# Patient Record
Sex: MALE | HISPANIC OR LATINO | Employment: UNEMPLOYED | URBAN - METROPOLITAN AREA
[De-identification: names, ages, dates, MRNs, and addresses within clinical notes are randomized per-mention and may not be internally consistent; named-entity substitution may affect disease eponyms.]

---

## 2017-02-25 ENCOUNTER — TRANSFERRED RECORDS (OUTPATIENT)
Dept: HEALTH INFORMATION MANAGEMENT | Facility: CLINIC | Age: 47
End: 2017-02-25

## 2017-02-28 ENCOUNTER — TRANSFERRED RECORDS (OUTPATIENT)
Dept: HEALTH INFORMATION MANAGEMENT | Facility: CLINIC | Age: 47
End: 2017-02-28

## 2017-03-07 ENCOUNTER — TRANSFERRED RECORDS (OUTPATIENT)
Dept: HEALTH INFORMATION MANAGEMENT | Facility: CLINIC | Age: 47
End: 2017-03-07

## 2017-03-20 LAB — COPATH REPORT: NORMAL

## 2017-03-20 PROCEDURE — 00000346 ZZHCL STATISTIC REVIEW OUTSIDE SLIDES TC 88321: Performed by: INTERNAL MEDICINE

## 2017-04-07 ENCOUNTER — ONCOLOGY VISIT (OUTPATIENT)
Dept: ONCOLOGY | Facility: CLINIC | Age: 47
End: 2017-04-07
Attending: INTERNAL MEDICINE
Payer: MEDICAID

## 2017-04-07 VITALS
DIASTOLIC BLOOD PRESSURE: 82 MMHG | HEIGHT: 69 IN | RESPIRATION RATE: 16 BRPM | HEART RATE: 76 BPM | TEMPERATURE: 98.2 F | WEIGHT: 232 LBS | BODY MASS INDEX: 34.36 KG/M2 | SYSTOLIC BLOOD PRESSURE: 124 MMHG | OXYGEN SATURATION: 96 %

## 2017-04-07 DIAGNOSIS — C64.2 MALIGNANT NEOPLASM OF LEFT KIDNEY (H): Primary | ICD-10-CM

## 2017-04-07 PROCEDURE — 99211 OFF/OP EST MAY X REQ PHY/QHP: CPT

## 2017-04-07 PROCEDURE — 99205 OFFICE O/P NEW HI 60 MIN: CPT | Performed by: INTERNAL MEDICINE

## 2017-04-07 RX ORDER — NAPROXEN 250 MG/1
250 TABLET ORAL 2 TIMES DAILY PRN
Status: CANCELLED
Start: 2017-04-07

## 2017-04-07 RX ORDER — ALBUTEROL SULFATE 90 UG/1
1-2 AEROSOL, METERED RESPIRATORY (INHALATION)
Status: CANCELLED
Start: 2017-04-07

## 2017-04-07 RX ORDER — DEXTROSE MONOHYDRATE 50 MG/ML
1000 INJECTION, SOLUTION INTRAVENOUS CONTINUOUS
Status: CANCELLED
Start: 2017-04-07

## 2017-04-07 RX ORDER — ONDANSETRON 2 MG/ML
8 INJECTION INTRAMUSCULAR; INTRAVENOUS EVERY 8 HOURS
Status: CANCELLED
Start: 2017-04-07

## 2017-04-07 RX ORDER — DIPHENOXYLATE HCL/ATROPINE 2.5-.025MG
1 TABLET ORAL
Status: CANCELLED
Start: 2017-04-07

## 2017-04-07 RX ORDER — PROCHLORPERAZINE MALEATE 10 MG
10 TABLET ORAL EVERY 6 HOURS PRN
Status: CANCELLED
Start: 2017-04-07

## 2017-04-07 RX ORDER — DEXTROSE MONOHYDRATE, SODIUM CHLORIDE, AND POTASSIUM CHLORIDE 50; 1.49; 9 G/1000ML; G/1000ML; G/1000ML
INJECTION, SOLUTION INTRAVENOUS CONTINUOUS
Status: CANCELLED
Start: 2017-04-07

## 2017-04-07 RX ORDER — DIPHENHYDRAMINE HCL 25 MG
25 CAPSULE ORAL EVERY 4 HOURS PRN
Status: CANCELLED
Start: 2017-04-07

## 2017-04-07 RX ORDER — METHYLPREDNISOLONE SODIUM SUCCINATE 125 MG/2ML
125 INJECTION, POWDER, LYOPHILIZED, FOR SOLUTION INTRAMUSCULAR; INTRAVENOUS
Status: CANCELLED
Start: 2017-04-07

## 2017-04-07 RX ORDER — LORAZEPAM 0.5 MG/1
.5-1 TABLET ORAL EVERY 6 HOURS PRN
Status: CANCELLED
Start: 2017-04-07

## 2017-04-07 RX ORDER — ALBUTEROL SULFATE 0.83 MG/ML
2.5 SOLUTION RESPIRATORY (INHALATION)
Status: CANCELLED | OUTPATIENT
Start: 2017-04-07

## 2017-04-07 RX ORDER — DIPHENHYDRAMINE HYDROCHLORIDE 50 MG/ML
50 INJECTION INTRAMUSCULAR; INTRAVENOUS
Status: CANCELLED
Start: 2017-04-07

## 2017-04-07 RX ORDER — CEPHALEXIN 500 MG/1
500 CAPSULE ORAL 2 TIMES DAILY
Status: CANCELLED
Start: 2017-04-07

## 2017-04-07 RX ORDER — MEPERIDINE HYDROCHLORIDE 25 MG/ML
25 INJECTION INTRAMUSCULAR; INTRAVENOUS; SUBCUTANEOUS
Status: CANCELLED
Start: 2017-04-07

## 2017-04-07 RX ORDER — MEPERIDINE HYDROCHLORIDE 25 MG/ML
25 INJECTION INTRAMUSCULAR; INTRAVENOUS; SUBCUTANEOUS EVERY 30 MIN PRN
Status: CANCELLED | OUTPATIENT
Start: 2017-04-07

## 2017-04-07 RX ORDER — LORAZEPAM 2 MG/ML
.5-1 INJECTION INTRAMUSCULAR EVERY 6 HOURS PRN
Status: CANCELLED | OUTPATIENT
Start: 2017-04-07

## 2017-04-07 RX ORDER — SODIUM CHLORIDE 9 MG/ML
1000 INJECTION, SOLUTION INTRAVENOUS CONTINUOUS PRN
Status: CANCELLED
Start: 2017-04-07

## 2017-04-07 RX ORDER — ACETAMINOPHEN 325 MG/1
650 TABLET ORAL EVERY 4 HOURS
Status: CANCELLED
Start: 2017-04-07

## 2017-04-07 ASSESSMENT — PAIN SCALES - GENERAL: PAINLEVEL: NO PAIN (0)

## 2017-04-07 NOTE — PATIENT INSTRUCTIONS
Please schedule-  All tests are scheduled for April 11th and 12th/[facility]    - PFT    - Echocardiogram    - CT scan    - Bone scan    - Brain MRI    - Visit with NP/PA once above are done and results are available during morning hrs- Scheduled for April 13th @ 9:15 -[facility] bldg/[facility]    PICC line placement and Hospital admission 7D immediately after visit with NP/PA for high dose IL2 therapy- Jose will arrange for this/[facility]    AVS printed for pt.  [facility]

## 2017-04-07 NOTE — NURSING NOTE
"Julio John is a 47 year old male who presents for:  Chief Complaint   Patient presents with     Oncology Clinic Visit     New Patient Consult        Initial Vitals:  /82  Pulse 76  Temp 98.2  F (36.8  C) (Oral)  Resp 16  Ht 1.753 m (5' 9\")  Wt 105.2 kg (232 lb)  SpO2 96%  BMI 34.26 kg/m2 Estimated body mass index is 34.26 kg/(m^2) as calculated from the following:    Height as of this encounter: 1.753 m (5' 9\").    Weight as of this encounter: 105.2 kg (232 lb).. Body surface area is 2.26 meters squared. BP completed using cuff size: large  No Pain (0) No LMP for male patient. Allergies and medications reviewed.     Medications: Medication refills not needed today.  Pharmacy name entered into EPIC: Data Unavailable    Comments: Consult New Patient    8 minutes for nursing intake (face to face time)   Josie Bryan CMA     DISCHARGE PLAN:  Next appointments: See patient instruction section  Departure Mode: Ambulatory  Accompanied by: self  0 minutes for nursing discharge (face to face time)   Josie Bryan CMA            "

## 2017-04-07 NOTE — PROGRESS NOTES
[facility] PHYSICIANS  [facility] SPECIALTY CLINIC   HEMATOLOGY AND MEDICAL ONCOLOGY    NEW PATIENT VISIT NOTE    PATIENT NAME: Julio John   MRN# [id number]     Date of Visit: Apr 7, 2017    Referring Provider: MD BILL Braun [facility]  [address] [place] [address] YOB: 1970      HISTORY OF PRESENTING ILLNESS   Julio is 47 year old gentleman who has been referred to Medical Oncology at [facility] for immunotherapy with high dose IL2.     Julio comes in alone for this visit and his wife joining us over the phone. History obtained in presence of a Romanian interpretor. Julio presented with hemorrhoids and had BRBPR. He had intense pain after picking a box and fevers for 2 days. He had blood in stools. He presented to ED with these complains. He still had pain in his lower back. He was worked up with a CT scan which revealed a kidney cancer. He was then informed about his kidney cancer. He had left nephrectomy in March and had hernia surgery in June. He was noted to have pulmonary nodules in left lung on surveillance scan and was referred to Dr. Rodriguez in Medical Oncology. He was offered either surgery, radiation or chemotherapy but Dr. Rodriguez recommended radiation therapy. He has completed stereotactic radiation. His next restaging scans have suggested new nodules in right lung.     He has good energy for the most part, he does have occasional fatigue, but he tries not to focus on this. He has not lost any weight and on the contrary has been gaining weight. He has gained about 20 lbs from December.    He was helping a friend couple of days ago and felt dizzy and SOB. He has SOB climbing upstairs. He has noticed this lately. He gets SOB after 2 flights of stairs. His wife has insisted him to take the stairs at [facility] for him to lose weight.   He has pain in his low back and aches in his bones. He has pain in his shoulders, legs.     Per Charts  from Dr. Rodriguez's notes.   1/29/16 - Incidental left kidney mass (work-up for abdominal pain) - 7.8 x 6.7 x 6.7 cm mass localized in the left kidney.  1/28/16 - presence of multiple, but very small pulmonary nodules concerning for metastatic disease.  2/15/16 - Brain MRI negative for CNS metastatic disease.  2/25/16 - NM Bone scan negative for metastatic disease.     3/9/16 - Kidney, left, nephrectomy - Renal cell carcinoma, clear cell type, Boom nuclear grade 3 of 4, 7.5 cm in greatest dimmension, limited to kidney.     3/28/16 - CT Chest - Multiple pulmonary nodules bilaterally, the largest of which is 7 millimeters in the left base. There is a new left upper lobe nodule measuring 2 millimeters. A left lower lobe nodule has decreased in size from 5 mm to 2 mm, and may have been infectious/inflammatory. Regarding the other nodules, they are concerning for metastatic disease, given history of left renal cell carcinoma status post nephrectomy.     6/15/16 - CT chest - Stable pulmonary nodules.  6/15/16 - Abd/Pelvis MRI - no evidence of recurrent disease    10/17/16 - CT chest - increase in size of pulmonary nodules - concerning for metastatic disease   10/17/16 - Abd/Pelvis MRI - no evidence of recurrent disease  11/14/16 - CT guided biopsy of LLL nodule positive for malignancy; consistent with metastatic carcinoma of renal origin.     12/02/16 - NM Bone negative for osseous metastatic disease.   12/03/16 - MR Brain negative for metastatic disease.     December 2016-January 2017 - Underwent stereotactic radiation therapy to pulmonary nodules @ [facility]. We will request records today for further details    2/28/17 - CT C/A/P New small-multiple pulmonary nodules seen in bilateral lungs.        PAST MEDICAL HISTORY   - None other than recent diagnosis of kidney cancer with biopsy proven lung metastasis      CURRENT OUTPATIENT MEDICATIONS   He is not on any active medications.   The last medication he had taken  "was for stomach - he took it for 2 weeks. Not on any medications now.      ALLERGIES   All allergies reviewed and addressed  No Known Allergies     SOCIAL HISTORY   He is  and has 6 kids and 2 young kids living with her current wife (4 from previous marriage living with her ex-wife). He is not working at this time. 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   He does not smoke. He smoked about 2 packs every time he was at [employer]. He stopped [employer] and stopped everything including cigarette and alcohol. He would visit the [employer] about 4 times a week. He did a lot of cocaine and tequila in the past. He denies any active drug use.      FAMILY HISTORY   None  On direct questioning he admits that his maternal grandmother had uterine cancer  He had genetic testing done by Dr Rodriguez and no inherited cancer was identified.      REVIEW OF SYSTEMS   Pertinent positives have been included in HPI; remainder of detailed complete 20-point ROS was negative.     PHYSICAL EXAM   /82  Pulse 76  Temp 98.2  F (36.8  C) (Oral)  Resp 16  Ht 1.753 m (5' 9\")  Wt 105.2 kg (232 lb)  SpO2 96%  BMI 34.26 kg/m2    Wt Readings from Last 3 Encounters:   04/07/17 105.2 kg (232 lb)     GEN: NAD  HEENT: PERRL, EOMI, no icterus, injection or pallor. Oropharynx is clear.  NECK: no cervical or supraclavicular lymphadenopathy  LUNGS: clear bilaterally  CV: regular, no murmurs, rubs, or gallops  ABDOMEN: soft, non-tender, non-distended, normal bowel sounds, no hepatosplenomegaly by percussion or palpation  EXT: warm, well perfused, no edema  NEURO: alert  SKIN: no rashes     LABORATORY AND IMAGING STUDIES   No results for input(s): NA, POTASSIUM, CHLORIDE, CO2, ANIONGAP, BUN, CR, GLC, KVNG in the last 72181 hours.  No results for input(s): MAG, PHOS in the last 88246 hours.  No results for input(s): WBC, HGB, PLT, MCV, NEUTROPHIL in the last 36054 hours.  No " results for input(s): BILITOTAL, ALKPHOS, ALT, AST, ALBUMIN, LDH in the last 83394 hours.  No results found for: TSH    No results found for this or any previous visit.  Lab Results   Component Value Date    PATH  03/20/2017     Patient Name: ALIA VASQUEZ  MR#: [id number]  Specimen #: [id number]  Collected: 3/20/2017  Received: 3/20/2017  Reported: 3/20/2017 17:39  Ordering Phy(s): [facility] PACK  Additional Phy(s): [facility], Department of  Pathology    For improved result formatting, select 'View Enhanced Report Format'  under Linked Documents section.    TEST(S):  A: 12 Slides, case #[id number]  B: 11 Slides, case #[id number]    FINAL DIAGNOSIS:  I. CASE FROM [facility], [place], [place]  ([id number], OBTAINED 03/11/2016):  Kidney, left, nephrectomy:  - Clear cell renal cell carcinoma, ISUP grade 3  - Tumor size: 7.5 cm  - Tumor extent: Carcinoma involves renal sinus fat and segmental  branches of renal vein  - Margins: Negative for carcinoma  - Pathologic stage: pT3a  - Non neoplastic kidney with no significant pathology    II. CASE FROM [facility], [place], [place]  ([id number], OBTAINED 11/14/2016):  Lung, left, nodule, CT-guided needle core biopsy:  - Metastatic clear cell renal carcinoma    COMMENT:  Immunohistochemical stains performed with appropriate controls at an  outside institution were sent for our review.  The lung metastasis is  positive for PAX-8 and negative for CK7, CK20, TTF-1, consistent with  the known renal primary.    I have personally reviewed all specimens and or slides, including the  listed special stains, and used them with my medical judgement to  determine the final diagnosis.    Electronically signed out by:    Jen Gillette M.D., [facility]    CLINICAL HISTORY:  The patient is a 47-year-old male.    GROSS:  Received from [facility] in [place], [place] are 12  stained slides labeled  [id number] (obtained 03/11/16), 11 stained  slides labeled [id number] (obtained 11/14/16), and copies of the  referring pathologist's reports with patient identifying information.  All slides are returned.    MICROSCOPIC:  Microscopic examination was performed.    CPT Codes:  A: 17613-JPX0  B: 77491-OMZ8    TESTING LAB LOCATION:  [address], [address]   [id number] [phone]    COLLECTION SITE:  Client:  [facility]  Location:  [id number] (B)           ASSESSMENT  1.   RCC from left kidney - clear cell with pulmonary metastasis  2. No other significant medical comorbidity  3. ECOG PS 0     DISCUSSION   I had a lengthy discussion with Julio about his disease course, treatment options and prognosis. With additional recurrent disease, this disease is unlikely to be curable, but would remain treatable. I reviewed primarily immunotherapy in form of high dose IL2 at this visit.     I reviewed high dose IL2 at great length. This modality involves inpatient treatment with intravenous high doses of interleukin - a chemical substance release in setting of infections. It is administered every 8 hrs for a maximum of 14 doses. After a break of 3-4 weeks, this treatment is repeated again. Restaging scans are done after another 4-6 weeks and entire treatment can be repeated once (2 more inpatient courses of therapy) in the setting of response and depending on how it was tolerated.     This gives fevers, chills, fatigue and feels like a sever case of flu. It also leads to peripheral accumulation of fluid called vascular leak. This can cause fluid accumulation in legs, lungs and other places and also drop blood pressure. In the past this was administered in the Intensive care Unit and was associated with significant mortality. We have been one of the bigger centers providing this treatment for long time and in  our experience, we can safely administer this treatment without placing patients life at risk. In our experience it is not important to administer the last dose to get optimal response and response is not dependent on dose of this therapy.     I did clearly outline the low response rates in the order of 20% and the small but real possibility of long term disease control or possibly cure in about 6% of patients.     Traditional chemotherapy does not seem to be effective for kidney cancer. There are numerous biological agents which have been approved within the last decade and have shown both efficacy in terms of responses with shrinkage of tumor, long term disease control and improvement in survival.    In my opinion, for patients who are fit and healthy, it is worthwhile trying immunotherapy with high dose IL2 for there is a slim but realistic chance for cure. If this has to be considered, sooner is better as the disease burden is low and immune system is well preserved.     Julio seems to be interested in trying high dose IL2 and we will get additional evaluations to establish adequate organ functions - echocardiogram for heart and lung function tests. I would also get a brain MRI and bone scan to complete the staging. I will get CT chest/abd/pelvis repeated as the last one was over 6 weeks ago.      PLAN 1.   Recommend high dose therapy with interleukin 2. I have reviewed the immunotherapy inpatient regimen, side effects, risks, benefits and alternatives. Julio is interested in this therapy.   2. I will get a pulmonary function test and echocardiogram done prior to initiating therapy.   3. I will get a brain MRI, bone scan and CT chest/abd/pelvis to complete staging  4. We will initiate therapy with IL2 once we have the above completed.   5. We plan to restage after 4 weeks after 2 cycles of inpatient therapy  by 3-4 weeks. If he tolerates therapy and is responding, we would consider additional 2 rounds  of therapy.     Over 60 min of direct face to face time spent with patient with more than 50% time spent in counseling and coordinating care.      [employer]  ,  Division of Hematology, Oncology & Transplantation  [facility].

## 2017-04-07 NOTE — MR AVS SNAPSHOT
After Visit Summary   4/7/2017    Julio John    MRN: [id number]           Patient Information     Date Of Birth          1970        Visit Information        Provider Department      4/7/2017 8:15 AM Ludwin Painting MD; [facility] [employer] [facility]        Today's Diagnoses     Malignant neoplasm of left kidney (H)    -  1      Care Instructions    Please schedule-  All tests are scheduled for April 11th and 12th/[facility]    - PFT    - Echocardiogram    - CT scan    - Bone scan    - Brain MRI    - Visit with NP/PA once above are done and results are available during morning hrs- Scheduled for April 13th @ 9:15 -[facility] bldg/[facility]    PICC line placement and Hospital admission 7D immediately after visit with NP/PA for high dose IL2 therapy- Jose will arrange for this/[facility]    AVS printed for pt.  [facility]        Follow-ups after your visit        Your next 10 appointments already scheduled     Apr 11, 2017 10:30 AM CDT   Ech Limited with [id number] ([facility])    [address]  [facility] [address] [phone]           1.  Please bring or wear a comfortable two-piece outfit. 2.  You may eat, drink and take your normal medicines. 3.  For any questions that cannot be answered, please contact the ordering physician ***Please check-in at the [facility] Registration Office located in [address] in the [facility] building. When you are finished registering, please go to [address] and have a seat. The technician will call your name for the test.            Apr 11, 2017  2:00 PM CDT   Pulmonary Function with RH PULMONARY FUNCTION   [facility] ([facility])    [address] [facility] [address]   [phone]           No Inhalers for 6 hours prior to test No Smoking 2 hours prior to test            Apr 12, 2017 11:00 AM CDT   NM  INJECTION with RSCCINJ   [facility] ([facility])    [address]  [facility] [address]   [phone]            Apr 12, 2017 11:30 AM CDT   CT CHEST/ABDOMEN/PELVIS W CONTRAST with RSCCCT1   [facility] ([facility])    [address]  [facility] [address]   [phone]           Please bring any scans or X-rays taken at other hospitals, if similar tests were done. Also bring a list of your medicines, including vitamins, minerals and over-the-counter drugs. It is safest to leave personal items at home.  Be sure to tell your doctor:   If you have any allergies.   If there s any chance you are pregnant.   If you are breastfeeding.   If you have any special needs.  You may have contrast for this exam. To prepare:   Do not eat or drink for 2 hours before your exam. If you need to take medicine, you may take it with small sips of water. (We may ask you to take liquid medicine as well.)   The day before your exam, drink extra fluids at least six 8-ounce glasses (unless your doctor tells you to restrict your fluids).  Patients over 70 or patients with diabetes or kidney problems:   If you haven t had a blood test (creatinine test) within the last 30 days, go to your clinic or Diagnostic Imaging Department for this test.  If you have diabetes:   If your kidney function is normal, continue taking your metformin (Avandamet, Glucophage, Glucovance, Metaglip) on the day of your exam.   If your kidney function is abnormal, wait 48 hours before restarting this medicine.  You will have oral contrast for this exam:   You will drink the contrast at home. Get this from your clinic or Diagnostic Imaging Department. Please follow the directions given.  Please wear loose clothing, such as a sweat suit or jogging clothes. Avoid snaps, zippers and other metal. We may ask you to undress and put on a hospital  gown.  If you have any questions, please call the Imaging Department where you will have your exam.            Apr 12, 2017 12:00 PM CDT   MR BRAIN W CONTRAST with RSCCMR1   [facility] ([facility])    [address]  [facility] [id number] [phone]           Take your medicines as usual, unless your doctor tells you not to. Bring a list of your current medicines to your exam (including vitamins, minerals and over-the-counter drugs).  You will be given intravenous contrast for this exam. To prepare:   The day before your exam, drink extra fluids at least six 8-ounce glasses (unless your doctor tells you to restrict your fluids).   Have a blood test (creatinine test) within 30 days of your exam. Go to your clinic or Diagnostic Imaging Department for this test.  The MRI machine uses a strong magnet. Please wear clothes without metal (snaps, zippers). A sweatsuit works well, or we may give you a hospital gown.  Please remove any body piercings and hair extensions before you arrive. You will also remove watches, jewelry, hairpins, wallets, dentures, partial dental plates and hearing aids. You may wear contact lenses, and you may be able to wear your rings. We have a safe place to keep your personal items, but it is safer to leave them at home.   **IMPORTANT** THE INSTRUCTIONS BELOW ARE ONLY FOR THOSE PATIENTS WHO HAVE BEEN TOLD THEY WILL RECEIVE SEDATION OR GENERAL ANESTHESIA DURING THEIR MRI PROCEDURE:  IF YOU WILL RECEIVE SEDATION (take medicine to help you relax during your exam):   You must get the medicine from your doctor before you arrive. Bring the medicine to the exam. Do not take it at home.   Arrive one hour early. Bring someone who can take you home after the test. Your medicine will make you sleepy. After the exam, you may not drive, take a bus or take a taxi by yourself.   No eating 8 hours before your exam. You may have clear liquids up  until 4 hours before your exam. (Clear liquids include water, clear tea, black coffee and fruit juice without pulp.)  IF YOU WILL RECEIVE ANESTHESIA (be asleep for your exam):   Arrive 1 1/2 hours early. Bring someone who can take you home after the test. You may not drive, take a bus or take a taxi by yourself.   No eating 8 hours before your exam. You may have clear liquids up until 4 hours before your exam. (Clear liquids include water, clear tea, black coffee and fruit juice without pulp.)  Please call the Imaging Department at your exam site with any questions.            Apr 12, 2017  1:00 PM CDT   NM BONE SCAN WHOLE BODY with RSCCNM1   [facility] ([facility])    [address]  [facility] [address] [phone]           Please bring a list of your medicines to the exam. (Include vitamins, minerals and over-the-counter drugs.) You should wear comfortable clothes. Leave your valuables at home. Please bring related prior results and films. Tell your doctor:   If you are breastfeeding or may be pregnant.   If you have had a barium test within the past few days. Barium may change the results of certain exams.   If you think you may need sedation (medicine to help you relax).  You may eat and drink as normal.  Drink plenty of fluids and empty bladder frequently between injection and scans.            Apr 13, 2017  9:20 AM CDT   (Arrive by 9:05 AM)   Return Visit with JAZMYNE Barron   [facility] ([facility])    [address] [place] [address]  [address]nd [facility] [address] [phone]              Future tests that were ordered for you today     Open Future Orders        Priority Expected Expires Ordered    IR PICC Vascular Routine  4/7/2018 4/7/2017    CT Chest/Abdomen/Pelvis w Contrast Routine 4/7/2017 6/7/2017 4/7/2017    NM Bone Scan Whole Body Routine 4/7/2017 6/7/2017 4/7/2017     "Echocardiogram Limited Routine 2017    MR Brain w Contrast Routine 2017    General PFT Lab (Please always keep checked) Routine 2017    CBC with platelets differential Routine 2017    Comprehensive metabolic panel Routine 2017    Lactate Dehydrogenase Routine 2017    Magnesium Routine 2017    Pulmonary Function Test Routine 2017            Who to contact     If you have questions or need follow up information about today's clinic visit or your schedule please contact [facility] directly at [phone].  Normal or non-critical lab and imaging results will be communicated to you by MyChart, letter or phone within 4 business days after the clinic has received the results. If you do not hear from us within 7 days, please contact the clinic through [employer] or phone. If you have a critical or abnormal lab result, we will notify you by phone as soon as possible.  Submit refill requests through [employer] or call your pharmacy and they will forward the refill request to us. Please allow 3 business days for your refill to be completed.          Additional Information About Your Visit        [employer] Information     [employer] lets you send messages to your doctor, view your test results, renew your prescriptions, schedule appointments and more. To sign up, go to [email or web address] . Click on \"Log in\" on the left side of the screen, which will take you to the Welcome page. Then click on \"Sign up Now\" on the right side of the page.     You will be asked to enter the access code listed below, as well as some personal information. Please follow the directions to create your username and password.     Your access code is: [email or web address]  Expires: 2017 10:24 AM     Your access code will  in 90 days. If you need help or a new " "code, please call your [facility] clinic or [phone].        Care EveryWhere ID     This is your Care EveryWhere ID. This could be used by other organizations to access your [facility] medical records  [id number]        Your Vitals Were     Pulse Temperature Respirations Height Pulse Oximetry BMI (Body Mass Index)    76 98.2  F (36.8  C) (Oral) 16 1.753 m (5' 9\") 96% 34.26 kg/m2       Blood Pressure from Last 3 Encounters:   04/07/17 124/82    Weight from Last 3 Encounters:   04/07/17 105.2 kg (232 lb)               Primary Care Provider Office Phone # Fax #    Uchemadu MD Doc [phone]       [facility] [address] [facility] [address]        Thank you!     Thank you for choosing [facility]  for your care. Our goal is always to provide you with excellent care. Hearing back from our patients is one way we can continue to improve our services. Please take a few minutes to complete the written survey that you may receive in the mail after your visit with us. Thank you!             Your Updated Medication List - Protect others around you: Learn how to safely use, store and throw away your medicines at www.disposemymeds.org.      Notice  As of 4/7/2017 10:24 AM    You have not been prescribed any medications.      "

## 2017-04-10 NOTE — NURSING NOTE
Patient education completed for IL-2.  All infoirmation given in written form both in English and Dominican.   at chairside.  Please see complete teach under Education tab.  Jose Mike, RN, BSN, OCN  [facility]  Patient Care Coordinator

## 2017-04-11 ENCOUNTER — TELEPHONE (OUTPATIENT)
Dept: ONCOLOGY | Facility: CLINIC | Age: 47
End: 2017-04-11

## 2017-04-11 ENCOUNTER — HOSPITAL ENCOUNTER (OUTPATIENT)
Dept: RESPIRATORY THERAPY | Facility: CLINIC | Age: 47
End: 2017-04-11
Attending: INTERNAL MEDICINE
Payer: MEDICAID

## 2017-04-11 ENCOUNTER — HOSPITAL ENCOUNTER (OUTPATIENT)
Dept: CARDIOLOGY | Facility: CLINIC | Age: 47
Discharge: HOME | End: 2017-04-11
Attending: INTERNAL MEDICINE | Admitting: INTERNAL MEDICINE
Payer: MEDICAID

## 2017-04-11 DIAGNOSIS — C64.2 MALIGNANT NEOPLASM OF LEFT KIDNEY (H): ICD-10-CM

## 2017-04-11 LAB
DLCOUNC-%PRED-PRE: 96 %
DLCOUNC-PRE: 28.64 ML/MIN/MMHG
DLCOUNC-PRED: 29.7 ML/MIN/MMHG
ERV-%PRED-PRE: 95 %
ERV-PRE: 0.62 L
ERV-PRED: 0.65 L
EXPTIME-PRE: 11 SEC
FEF2575-%PRED-PRE: 83 %
FEF2575-PRE: 2.7 L/SEC
FEF2575-PRED: 3.21 L/SEC
FEFMAX-%PRED-PRE: 71 %
FEFMAX-PRE: 6.47 L/SEC
FEFMAX-PRED: 9.07 L/SEC
FEV1-%PRED-PRE: 92 %
FEV1-PRE: 3.05 L
FEV1FEV6-PRE: 80 %
FEV1FEV6-PRED: 81 %
FEV1FVC-PRE: 78 %
FEV1FVC-PRED: 81 %
FEV1SVC-PRE: 86 %
FEV1SVC-PRED: 71 %
FIFMAX-PRE: 3.07 L/SEC
FRCPLETH-%PRED-PRE: 76 %
FRCPLETH-PRE: 2.51 L
FRCPLETH-PRED: 3.26 L
FVC-%PRED-PRE: 96 %
FVC-PRE: 3.91 L
FVC-PRED: 4.05 L
IC-%PRED-PRE: 74 %
IC-PRE: 2.94 L
IC-PRED: 3.94 L
RVPLETH-%PRED-PRE: 94 %
RVPLETH-PRE: 1.89 L
RVPLETH-PRED: 2 L
TLCPLETH-%PRED-PRE: 86 %
TLCPLETH-PRE: 5.45 L
TLCPLETH-PRED: 6.31 L
VA-%PRED-PRE: 73 %
VA-PRE: 4.46 L
VC-%PRED-PRE: 77 %
VC-PRE: 3.56 L
VC-PRED: 4.59 L

## 2017-04-11 PROCEDURE — 94726 PLETHYSMOGRAPHY LUNG VOLUMES: CPT

## 2017-04-11 PROCEDURE — 93306 TTE W/DOPPLER COMPLETE: CPT | Mod: 26 | Performed by: INTERNAL MEDICINE

## 2017-04-11 PROCEDURE — 94729 DIFFUSING CAPACITY: CPT

## 2017-04-11 PROCEDURE — 93306 TTE W/DOPPLER COMPLETE: CPT

## 2017-04-11 PROCEDURE — 0399T ZZHC MYOCARDIAL STRAIN IMAGING: CPT | Performed by: INTERNAL MEDICINE

## 2017-04-11 PROCEDURE — 94010 BREATHING CAPACITY TEST: CPT

## 2017-04-11 NOTE — TELEPHONE ENCOUNTER
Called patient to give his information for admission to the hospital for inpatient chemo treatment.  Patient to be admitted on 4/17/17 at the  with 10am arrival.  Patient is able to go directly to 7D or can check in at the Information desk.  Confirmation # is [id number]. Patient verbalized understanding and will call with any questions or concerns.  Jose Mike RN, BSN, OCN  [facility] [place]  Patient Care Coordinator'

## 2017-04-11 NOTE — PROGRESS NOTES
PFT Note:  Pt completed pulmonary function testing with DLCO.  Good Pt effort and cooperation.     Spirometry  Meets all ATS-ERS recommendations.     Plethysmography  All plethysmographic measurements meet ATS-ERS recommendations.    DLCO  Does not meet ATS-ERS recommendations.  DLCO is an average of 3 maneuvers.  Inspired volume <85% of VC and his breath hold was >12 seconds.  He is unable to breath in fast enough for a perfect test.  DLCO values are within 2 ml CO/min/mmHg.  DLCO is not corrected for Hgb.    April 11, 2017.3:17 PM    Cheng Aponte

## 2017-04-12 ENCOUNTER — HOSPITAL ENCOUNTER (OUTPATIENT)
Dept: MRI IMAGING | Facility: CLINIC | Age: 47
End: 2017-04-12
Attending: INTERNAL MEDICINE
Payer: MEDICAID

## 2017-04-12 ENCOUNTER — HOSPITAL ENCOUNTER (OUTPATIENT)
Dept: NUCLEAR MEDICINE | Facility: CLINIC | Age: 47
Setting detail: NUCLEAR MEDICINE
End: 2017-04-12
Attending: INTERNAL MEDICINE
Payer: MEDICAID

## 2017-04-12 ENCOUNTER — HOSPITAL ENCOUNTER (OUTPATIENT)
Dept: CT IMAGING | Facility: CLINIC | Age: 47
Discharge: HOME | End: 2017-04-12
Attending: INTERNAL MEDICINE | Admitting: INTERNAL MEDICINE
Payer: MEDICAID

## 2017-04-12 DIAGNOSIS — C64.2 MALIGNANT NEOPLASM OF LEFT KIDNEY (H): ICD-10-CM

## 2017-04-12 LAB
CREAT BLD-MCNC: 1 MG/DL (ref 0.66–1.25)
GFR SERPL CREATININE-BSD FRML MDRD: 80 ML/MIN/1.7M2

## 2017-04-12 PROCEDURE — 78306 BONE IMAGING WHOLE BODY: CPT

## 2017-04-12 PROCEDURE — A9561 TC99M OXIDRONATE: HCPCS | Performed by: INTERNAL MEDICINE

## 2017-04-12 PROCEDURE — 34300033 ZZH RX 343: Performed by: INTERNAL MEDICINE

## 2017-04-12 RX ORDER — GADOBUTROL 604.72 MG/ML
10 INJECTION INTRAVENOUS ONCE
Status: COMPLETED | OUTPATIENT
Start: 2017-04-12 | End: 2017-04-12

## 2017-04-12 RX ORDER — IOPAMIDOL 755 MG/ML
500 INJECTION, SOLUTION INTRAVASCULAR ONCE
Status: COMPLETED | OUTPATIENT
Start: 2017-04-12 | End: 2017-04-12

## 2017-04-12 RX ADMIN — IOPAMIDOL 100 ML: 755 INJECTION, SOLUTION INTRAVENOUS at 11:53

## 2017-04-12 RX ADMIN — GADOBUTROL 10 ML: 604.72 INJECTION INTRAVENOUS at 10:58

## 2017-04-12 RX ADMIN — SODIUM CHLORIDE 65 ML: 9 INJECTION, SOLUTION INTRAVENOUS at 11:53

## 2017-04-12 RX ADMIN — Medication 24 MCI.: at 11:45

## 2017-04-13 NOTE — PROCEDURES
Please see medical chart for graphs and statistics related to this report.       REFERRING PHYSICIAN:   Ludwin Painting                                     TECHNICIAN:   Cheng Aponte   DIAGNOSIS:   Kidney cancer with lung metastasis   HEIGHT:   66.00 inches                 WEIGHT:   230.00 Lbs.       DYSPNEA:   After any exertion   COUGH:   No cough   WHEEZE:   Rare      TOBACCO PROD:   Cigarette   YEARS SMOKED:   4.0   PKS/DAY:   0.6   YEARS QUIT:    2.0                                                              MEDICATIONS:           POST-TEST COMMENTS:   Patient completed pulmonary function testing with DLCO.  Good patient effort and cooperation.  All spirometry and plethysmographic measurements meet ATS-ERS recommendations.  DLCO does not meet ATS-ERS recommendations.  DLCO is an average of 3 maneuvers.  Inspired volume < 85% of VC and his breath hold was > 12seconds.  He is unable to breath in fast enough for a perfect test.  DLCO values are within 2ml CO/min/mmHg.  DLCO is not corrected for HGB.          INTERPRETATION:      1.  Normal volumes   2.  Normal lung mechanics   3.  Normal gas transfer   4.  Normal studies         VIDHYA DEL TORO MD             D: 2017 09:40   T: 2017 09:56   MT: MANUEL      Name:     ALIA VASQUEZ   MRN:      [id number]        Account:        [id number]   :      1970           Procedure Date: 2017      Document: [id number]       cc: Ludwin Painting MD

## 2017-04-17 ENCOUNTER — HOSPITAL ENCOUNTER (INPATIENT)
Dept: VASCULAR ULTRASOUND | Facility: CLINIC | Age: 47
DRG: 837 | End: 2017-04-17
Attending: NURSE PRACTITIONER | Admitting: INTERNAL MEDICINE
Payer: MEDICAID

## 2017-04-17 ENCOUNTER — HOSPITAL ENCOUNTER (INPATIENT)
Facility: CLINIC | Age: 47
LOS: 3 days | Discharge: HOME | DRG: 837 | End: 2017-04-20
Attending: INTERNAL MEDICINE | Admitting: INTERNAL MEDICINE
Payer: MEDICAID

## 2017-04-17 ENCOUNTER — APPOINTMENT (OUTPATIENT)
Dept: LAB | Facility: CLINIC | Age: 47
End: 2017-04-17
Attending: INTERNAL MEDICINE

## 2017-04-17 ENCOUNTER — ONCOLOGY VISIT (OUTPATIENT)
Dept: ONCOLOGY | Facility: CLINIC | Age: 47
End: 2017-04-17
Attending: PHYSICIAN ASSISTANT
Payer: MEDICAID

## 2017-04-17 VITALS
DIASTOLIC BLOOD PRESSURE: 88 MMHG | BODY MASS INDEX: 34.11 KG/M2 | WEIGHT: 231 LBS | SYSTOLIC BLOOD PRESSURE: 130 MMHG | TEMPERATURE: 98 F | RESPIRATION RATE: 18 BRPM | OXYGEN SATURATION: 98 % | HEART RATE: 80 BPM

## 2017-04-17 DIAGNOSIS — C64.2 MALIGNANT NEOPLASM OF LEFT KIDNEY (H): ICD-10-CM

## 2017-04-17 PROBLEM — C64.9 CLEAR CELL RENAL CELL CARCINOMA (H): Status: ACTIVE | Noted: 2017-04-17

## 2017-04-17 LAB
ALBUMIN SERPL-MCNC: 3.6 G/DL (ref 3.4–5)
ALP SERPL-CCNC: 70 U/L (ref 40–150)
ALT SERPL W P-5'-P-CCNC: 26 U/L (ref 0–70)
ANION GAP SERPL CALCULATED.3IONS-SCNC: 8 MMOL/L (ref 3–14)
AST SERPL W P-5'-P-CCNC: 18 U/L (ref 0–45)
BASOPHILS # BLD AUTO: 0 10E9/L (ref 0–0.2)
BASOPHILS NFR BLD AUTO: 0.9 %
BILIRUB SERPL-MCNC: 0.5 MG/DL (ref 0.2–1.3)
BUN SERPL-MCNC: 20 MG/DL (ref 7–30)
CALCIUM SERPL-MCNC: 8.1 MG/DL (ref 8.5–10.1)
CHLORIDE SERPL-SCNC: 109 MMOL/L (ref 94–109)
CO2 SERPL-SCNC: 24 MMOL/L (ref 20–32)
CREAT SERPL-MCNC: 1.17 MG/DL (ref 0.66–1.25)
DIFFERENTIAL METHOD BLD: NORMAL
EOSINOPHIL # BLD AUTO: 0.2 10E9/L (ref 0–0.7)
EOSINOPHIL NFR BLD AUTO: 4.1 %
ERYTHROCYTE [DISTWIDTH] IN BLOOD BY AUTOMATED COUNT: 13.2 % (ref 10–15)
GFR SERPL CREATININE-BSD FRML MDRD: 67 ML/MIN/1.7M2
GLUCOSE SERPL-MCNC: 111 MG/DL (ref 70–99)
HCT VFR BLD AUTO: 45.7 % (ref 40–53)
HGB BLD-MCNC: 15.4 G/DL (ref 13.3–17.7)
IMM GRANULOCYTES # BLD: 0 10E9/L (ref 0–0.4)
IMM GRANULOCYTES NFR BLD: 0.4 %
LDH SERPL L TO P-CCNC: 167 U/L (ref 85–227)
LYMPHOCYTES # BLD AUTO: 1.2 10E9/L (ref 0.8–5.3)
LYMPHOCYTES NFR BLD AUTO: 26.2 %
MAGNESIUM SERPL-MCNC: 2.4 MG/DL (ref 1.6–2.3)
MCH RBC QN AUTO: 28.7 PG (ref 26.5–33)
MCHC RBC AUTO-ENTMCNC: 33.7 G/DL (ref 31.5–36.5)
MCV RBC AUTO: 85 FL (ref 78–100)
MONOCYTES # BLD AUTO: 0.5 10E9/L (ref 0–1.3)
MONOCYTES NFR BLD AUTO: 10 %
NEUTROPHILS # BLD AUTO: 2.7 10E9/L (ref 1.6–8.3)
NEUTROPHILS NFR BLD AUTO: 58.4 %
NRBC # BLD AUTO: 0 10*3/UL
NRBC BLD AUTO-RTO: 0 /100
PLATELET # BLD AUTO: 161 10E9/L (ref 150–450)
POTASSIUM SERPL-SCNC: 3.9 MMOL/L (ref 3.4–5.3)
PROT SERPL-MCNC: 7 G/DL (ref 6.8–8.8)
RBC # BLD AUTO: 5.37 10E12/L (ref 4.4–5.9)
SODIUM SERPL-SCNC: 141 MMOL/L (ref 133–144)
WBC # BLD AUTO: 4.7 10E9/L (ref 4–11)

## 2017-04-17 PROCEDURE — 25000125 ZZHC RX 250: Performed by: NURSE PRACTITIONER

## 2017-04-17 PROCEDURE — 12000008 ZZH R&B INTERMEDIATE UMMC

## 2017-04-17 PROCEDURE — 36569 INSJ PICC 5 YR+ W/O IMAGING: CPT

## 2017-04-17 PROCEDURE — 85025 COMPLETE CBC W/AUTO DIFF WBC: CPT | Performed by: INTERNAL MEDICINE

## 2017-04-17 PROCEDURE — 99212 OFFICE O/P EST SF 10 MIN: CPT | Mod: ZF

## 2017-04-17 PROCEDURE — 25000132 ZZH RX MED GY IP 250 OP 250 PS 637: Performed by: INTERNAL MEDICINE

## 2017-04-17 PROCEDURE — 25800025 ZZH RX 258: Performed by: INTERNAL MEDICINE

## 2017-04-17 PROCEDURE — 27210208 ZZH KIT VALVED DOUBLE LUMEN

## 2017-04-17 PROCEDURE — 25000125 ZZHC RX 250: Performed by: INTERNAL MEDICINE

## 2017-04-17 PROCEDURE — 83735 ASSAY OF MAGNESIUM: CPT | Performed by: INTERNAL MEDICINE

## 2017-04-17 PROCEDURE — 83615 LACTATE (LD) (LDH) ENZYME: CPT | Performed by: INTERNAL MEDICINE

## 2017-04-17 PROCEDURE — 80053 COMPREHEN METABOLIC PANEL: CPT | Performed by: INTERNAL MEDICINE

## 2017-04-17 PROCEDURE — 36415 COLL VENOUS BLD VENIPUNCTURE: CPT | Performed by: INTERNAL MEDICINE

## 2017-04-17 PROCEDURE — 36415 COLL VENOUS BLD VENIPUNCTURE: CPT

## 2017-04-17 PROCEDURE — T1013 SIGN LANG/ORAL INTERPRETER: HCPCS | Mod: U3,ZF

## 2017-04-17 PROCEDURE — 99221 1ST HOSP IP/OBS SF/LOW 40: CPT | Mod: AI | Performed by: INTERNAL MEDICINE

## 2017-04-17 PROCEDURE — 99207 ZZC CHGS TRANSFERRED TO HOSPITAL: CPT | Mod: ZP | Performed by: PHYSICIAN ASSISTANT

## 2017-04-17 PROCEDURE — 3E04302 INTRODUCTION OF HIGH-DOSE INTERLEUKIN-2 INTO CENTRAL VEIN, PERCUTANEOUS APPROACH: ICD-10-PCS | Performed by: INTERNAL MEDICINE

## 2017-04-17 PROCEDURE — 25000128 H RX IP 250 OP 636: Performed by: INTERNAL MEDICINE

## 2017-04-17 RX ORDER — ACETAMINOPHEN 500 MG
500 TABLET ORAL
Status: ON HOLD | COMMUNITY
Start: 2016-11-07 | End: 2017-05-06

## 2017-04-17 RX ORDER — LORAZEPAM 2 MG/ML
.5-1 INJECTION INTRAMUSCULAR EVERY 6 HOURS PRN
Status: DISCONTINUED | OUTPATIENT
Start: 2017-04-17 | End: 2017-04-20 | Stop reason: HOSPADM

## 2017-04-17 RX ORDER — SODIUM CHLORIDE 9 MG/ML
1000 INJECTION, SOLUTION INTRAVENOUS CONTINUOUS PRN
Status: DISCONTINUED | OUTPATIENT
Start: 2017-04-17 | End: 2017-04-20 | Stop reason: CLARIF

## 2017-04-17 RX ORDER — DIPHENHYDRAMINE HCL 25 MG
25 CAPSULE ORAL EVERY 4 HOURS PRN
Status: DISCONTINUED | OUTPATIENT
Start: 2017-04-17 | End: 2017-04-20 | Stop reason: HOSPADM

## 2017-04-17 RX ORDER — LIDOCAINE 40 MG/G
CREAM TOPICAL
Status: DISCONTINUED | OUTPATIENT
Start: 2017-04-17 | End: 2017-04-18 | Stop reason: CLARIF

## 2017-04-17 RX ORDER — DIPHENOXYLATE HCL/ATROPINE 2.5-.025MG
1 TABLET ORAL
Status: DISCONTINUED | OUTPATIENT
Start: 2017-04-17 | End: 2017-04-20 | Stop reason: HOSPADM

## 2017-04-17 RX ORDER — POTASSIUM CL/LIDO/0.9 % NACL 10MEQ/0.1L
10 INTRAVENOUS SOLUTION, PIGGYBACK (ML) INTRAVENOUS
Status: DISCONTINUED | OUTPATIENT
Start: 2017-04-17 | End: 2017-04-20 | Stop reason: HOSPADM

## 2017-04-17 RX ORDER — DEXTROSE MONOHYDRATE 50 MG/ML
1000 INJECTION, SOLUTION INTRAVENOUS CONTINUOUS
Status: DISCONTINUED | OUTPATIENT
Start: 2017-04-17 | End: 2017-04-20 | Stop reason: CLARIF

## 2017-04-17 RX ORDER — ACETAMINOPHEN 325 MG/1
650 TABLET ORAL EVERY 4 HOURS
Status: DISCONTINUED | OUTPATIENT
Start: 2017-04-17 | End: 2017-04-20 | Stop reason: HOSPADM

## 2017-04-17 RX ORDER — NAPROXEN 250 MG/1
250 TABLET ORAL 2 TIMES DAILY PRN
Status: DISCONTINUED | OUTPATIENT
Start: 2017-04-17 | End: 2017-04-20 | Stop reason: HOSPADM

## 2017-04-17 RX ORDER — MEPERIDINE HYDROCHLORIDE 25 MG/ML
25 INJECTION INTRAMUSCULAR; INTRAVENOUS; SUBCUTANEOUS
Status: DISCONTINUED | OUTPATIENT
Start: 2017-04-17 | End: 2017-04-20 | Stop reason: HOSPADM

## 2017-04-17 RX ORDER — ALBUTEROL SULFATE 0.83 MG/ML
2.5 SOLUTION RESPIRATORY (INHALATION)
Status: DISCONTINUED | OUTPATIENT
Start: 2017-04-17 | End: 2017-04-20 | Stop reason: CLARIF

## 2017-04-17 RX ORDER — EPINEPHRINE 1 MG/ML
0.3 INJECTION INTRAMUSCULAR; INTRAVENOUS; SUBCUTANEOUS EVERY 5 MIN PRN
Status: DISCONTINUED | OUTPATIENT
Start: 2017-04-17 | End: 2017-04-20 | Stop reason: CLARIF

## 2017-04-17 RX ORDER — NALOXONE HYDROCHLORIDE 0.4 MG/ML
.1-.4 INJECTION, SOLUTION INTRAMUSCULAR; INTRAVENOUS; SUBCUTANEOUS
Status: DISCONTINUED | OUTPATIENT
Start: 2017-04-17 | End: 2017-04-20 | Stop reason: HOSPADM

## 2017-04-17 RX ORDER — LORAZEPAM 0.5 MG/1
.5-1 TABLET ORAL EVERY 6 HOURS PRN
Status: DISCONTINUED | OUTPATIENT
Start: 2017-04-17 | End: 2017-04-20 | Stop reason: HOSPADM

## 2017-04-17 RX ORDER — CEPHALEXIN 500 MG/1
500 CAPSULE ORAL 2 TIMES DAILY
Status: DISCONTINUED | OUTPATIENT
Start: 2017-04-17 | End: 2017-04-20 | Stop reason: HOSPADM

## 2017-04-17 RX ORDER — HEPARIN SODIUM,PORCINE 10 UNIT/ML
2-5 VIAL (ML) INTRAVENOUS
Status: DISCONTINUED | OUTPATIENT
Start: 2017-04-17 | End: 2017-04-20 | Stop reason: HOSPADM

## 2017-04-17 RX ORDER — METHYLPREDNISOLONE SODIUM SUCCINATE 125 MG/2ML
125 INJECTION, POWDER, LYOPHILIZED, FOR SOLUTION INTRAMUSCULAR; INTRAVENOUS
Status: DISCONTINUED | OUTPATIENT
Start: 2017-04-17 | End: 2017-04-20 | Stop reason: CLARIF

## 2017-04-17 RX ORDER — POTASSIUM CHLORIDE 750 MG/1
20-40 TABLET, EXTENDED RELEASE ORAL
Status: DISCONTINUED | OUTPATIENT
Start: 2017-04-17 | End: 2017-04-20 | Stop reason: HOSPADM

## 2017-04-17 RX ORDER — MAGNESIUM SULFATE HEPTAHYDRATE 40 MG/ML
4 INJECTION, SOLUTION INTRAVENOUS EVERY 4 HOURS PRN
Status: DISCONTINUED | OUTPATIENT
Start: 2017-04-17 | End: 2017-04-20 | Stop reason: HOSPADM

## 2017-04-17 RX ORDER — DEXTROSE MONOHYDRATE, SODIUM CHLORIDE, AND POTASSIUM CHLORIDE 50; 1.49; 9 G/1000ML; G/1000ML; G/1000ML
INJECTION, SOLUTION INTRAVENOUS CONTINUOUS
Status: DISCONTINUED | OUTPATIENT
Start: 2017-04-17 | End: 2017-04-20

## 2017-04-17 RX ORDER — ONDANSETRON 2 MG/ML
8 INJECTION INTRAMUSCULAR; INTRAVENOUS EVERY 8 HOURS
Status: DISCONTINUED | OUTPATIENT
Start: 2017-04-17 | End: 2017-04-20 | Stop reason: HOSPADM

## 2017-04-17 RX ORDER — HEPARIN SODIUM,PORCINE 10 UNIT/ML
2-5 VIAL (ML) INTRAVENOUS
Status: DISCONTINUED | OUTPATIENT
Start: 2017-04-17 | End: 2017-04-18 | Stop reason: CLARIF

## 2017-04-17 RX ORDER — MEPERIDINE HYDROCHLORIDE 25 MG/ML
25 INJECTION INTRAMUSCULAR; INTRAVENOUS; SUBCUTANEOUS EVERY 30 MIN PRN
Status: DISCONTINUED | OUTPATIENT
Start: 2017-04-17 | End: 2017-04-20 | Stop reason: HOSPADM

## 2017-04-17 RX ORDER — POTASSIUM CHLORIDE 7.45 MG/ML
10 INJECTION INTRAVENOUS
Status: DISCONTINUED | OUTPATIENT
Start: 2017-04-17 | End: 2017-04-20 | Stop reason: HOSPADM

## 2017-04-17 RX ORDER — POTASSIUM CHLORIDE 29.8 MG/ML
20 INJECTION INTRAVENOUS
Status: DISCONTINUED | OUTPATIENT
Start: 2017-04-17 | End: 2017-04-20 | Stop reason: HOSPADM

## 2017-04-17 RX ORDER — POTASSIUM CHLORIDE 1.5 G/1.58G
20-40 POWDER, FOR SOLUTION ORAL
Status: DISCONTINUED | OUTPATIENT
Start: 2017-04-17 | End: 2017-04-20 | Stop reason: HOSPADM

## 2017-04-17 RX ORDER — PROCHLORPERAZINE MALEATE 10 MG
10 TABLET ORAL EVERY 6 HOURS PRN
Status: DISCONTINUED | OUTPATIENT
Start: 2017-04-17 | End: 2017-04-20 | Stop reason: HOSPADM

## 2017-04-17 RX ORDER — DIPHENHYDRAMINE HYDROCHLORIDE 50 MG/ML
50 INJECTION INTRAMUSCULAR; INTRAVENOUS
Status: DISCONTINUED | OUTPATIENT
Start: 2017-04-17 | End: 2017-04-20 | Stop reason: CLARIF

## 2017-04-17 RX ORDER — ALBUTEROL SULFATE 90 UG/1
1-2 AEROSOL, METERED RESPIRATORY (INHALATION)
Status: DISCONTINUED | OUTPATIENT
Start: 2017-04-17 | End: 2017-04-20 | Stop reason: CLARIF

## 2017-04-17 RX ADMIN — ONDANSETRON 8 MG: 2 INJECTION INTRAMUSCULAR; INTRAVENOUS at 15:27

## 2017-04-17 RX ADMIN — ACETAMINOPHEN 650 MG: 325 TABLET, FILM COATED ORAL at 20:15

## 2017-04-17 RX ADMIN — CEPHALEXIN 500 MG: 500 CAPSULE ORAL at 14:42

## 2017-04-17 RX ADMIN — ACETAMINOPHEN 650 MG: 325 TABLET, FILM COATED ORAL at 15:27

## 2017-04-17 RX ADMIN — POTASSIUM CHLORIDE, DEXTROSE MONOHYDRATE AND SODIUM CHLORIDE: 150; 5; 900 INJECTION, SOLUTION INTRAVENOUS at 14:42

## 2017-04-17 RX ADMIN — LIDOCAINE HYDROCHLORIDE 3.5 ML: 10 INJECTION, SOLUTION INFILTRATION; PERINEURAL at 12:49

## 2017-04-17 RX ADMIN — RANITIDINE 150 MG: 150 TABLET ORAL at 20:00

## 2017-04-17 RX ADMIN — CEPHALEXIN 500 MG: 500 CAPSULE ORAL at 20:15

## 2017-04-17 RX ADMIN — DEXTROSE MONOHYDRATE 1000 ML: 50 INJECTION, SOLUTION INTRAVENOUS at 15:39

## 2017-04-17 RX ADMIN — ALDESLEUKIN 64 MILLION UNITS: 1.1 INJECTION, POWDER, LYOPHILIZED, FOR SOLUTION INTRAVENOUS at 16:26

## 2017-04-17 RX ADMIN — RANITIDINE 150 MG: 150 TABLET ORAL at 15:27

## 2017-04-17 RX ADMIN — DEXTROSE MONOHYDRATE 1000 ML: 50 INJECTION, SOLUTION INTRAVENOUS at 14:43

## 2017-04-17 RX ADMIN — LIDOCAINE HYDROCHLORIDE 3.5 ML: 10 INJECTION, SOLUTION INFILTRATION; PERINEURAL at 12:50

## 2017-04-17 NOTE — IP AVS SNAPSHOT
[address] [address]    Phone:  [phone]                                       After Visit Summary   4/17/2017    Julio John    MRN: [id number]           After Visit Summary Signature Page     I have received my discharge instructions, and my questions have been answered. I have discussed any challenges I see with this plan with the nurse or doctor.    ..........................................................................................................................................  Patient/Patient Representative Signature      ..........................................................................................................................................  Patient Representative Print Name and Relationship to Patient    ..................................................               ................................................  Date                                            Time    ..........................................................................................................................................  Reviewed by Signature/Title    ...................................................              ..............................................  Date                                                            Time

## 2017-04-17 NOTE — LETTER
4/17/2017      RE: Julio John  [address]    [facility] [address]       Hematology-Oncology Visit  Apr 17, 2017    Reason for Visit: follow-up metastatic renal cell carcinoma    HPI: Julio John is a 47 year old gentleman without significant past medical history with metastatic renal cell carcinoma to lungs. He presented with back pain, fevers, BRBPR with hemorrhoids. He had a CT scan which revealed left kidney cancer. He has left nephrectomy 3/2016. He was note to have lung nodules on left lung on surveillance scan. He had stereotactic radiation to lung. Follow-up scans showed new nodules in R lung. He was referred to Dr. Painting for consideration of IL-2. Dr. Painting deemed him a good candidate. He had echo, PFTs, brain MRI, bone scan, and CT CAP last week. He comes in prior to admission for cycle 1 of IL-2.     Interval History: Julio is here today with his wife and children and a professional . He is feeling quite well and maintains a positive attitude. For about a week, he has had intermittent chest tightness with questionable wheezing, SOB, and palpitations. His wife thinks this may be due to anxiety with him starting treatment. He does not have a significant cough. Fell a few weeks ago and has had some shoulder discomfort. Also saw dentist last week because of some molar pain. He has cracked filling and has some sensitivity with this. He is wondering if treatment will affect this. No fevers/chills or infectious concerns. Has been eating and drinking well. No nausea, abdominal pain, changes in bowels or bladder. ROS otherwise negative.     No current facility-administered medications for this visit.      No current outpatient prescriptions on file.     Facility-Administered Medications Ordered in Other Visits   Medication     eucerin cream     dextrose 5% and 0.9% NaCl + KCl 20 mEq/L infusion     dextrose 5% infusion     0.9% sodium chloride BOLUS      ondansetron (ZOFRAN) injection 8 mg     acetaminophen (TYLENOL) tablet 650 mg     ranitidine (ZANTAC) tablet 150 mg     cephALEXin (KEFLEX) capsule 500 mg     diphenhydrAMINE (BENADRYL) capsule 25 mg     meperidine (DEMEROL) injection 25 mg     naproxen (NAPROSYN) tablet 250 mg     diphenoxylate-atropine (LOMOTIL) 2.5-0.025 MG per tablet 1 tablet     aldesleukin (PROLEUKIN) 64 Million Units in D5W 53.56 mL infusion     prochlorperazine (COMPAZINE) tablet 10 mg     prochlorperazine (COMPAZINE) injection 10 mg     LORazepam (ATIVAN) tablet 0.5-1 mg     LORazepam (ATIVAN) injection 0.5-1 mg     MEDICATION INSTRUCTION     methylPREDNISolone sodium succinate (solu-MEDROL) injection 125 mg     diphenhydrAMINE (BENADRYL) injection 50 mg     meperidine (DEMEROL) injection 25 mg     EPINEPHrine (ADRENALIN) injection 0.3 mg     albuterol (PROAIR HFA/PROVENTIL HFA/VENTOLIN HFA) Inhaler 1-2 puff     albuterol neb solution 2.5 mg     0.9% sodium chloride infusion       PHYSICAL EXAM:  /88 (BP Location: Right arm, Patient Position: Chair, Cuff Size: Adult Large)  Pulse 80  Temp 98  F (36.7  C) (Oral)  Resp 18  Wt 104.8 kg (231 lb)  SpO2 98%  BMI 34.11 kg/m2  General: Alert, oriented, pleasant, NAD  Skin: No rashes, bruising, or petechiae on exposed skin   HEENT: Normocephalic, atraumatic, PERRLA, EOMI. Moist mucus membranes, no lesions or thrush. Gum line looks okay without erythema.   Neck: No cervical or supraclavicular LAD.   Axillary: No LAD  Lungs: CTA bilaterally, normal work of breathing  Cardiac: RRR, S1, S2, no murmurs  Abdomen: Soft, nontender, nondistended. Normoactive bowel sounds. No hepatosplenomegaly, masses  Neuro: CNII-XII grossly intact  Extremities: No pedal edema    Labs:   Results for orders placed or performed in visit on 04/17/17 (from the past 24 hour(s))   CBC with platelets differential   Result Value Ref Range    WBC 4.7 4.0 - 11.0 10e9/L    RBC Count 5.37 4.4 - 5.9 10e12/L    Hemoglobin  15.4 13.3 - 17.7 g/dL    Hematocrit 45.7 40.0 - 53.0 %    MCV 85 78 - 100 fl    MCH 28.7 26.5 - 33.0 pg    MCHC 33.7 31.5 - 36.5 g/dL    RDW 13.2 10.0 - 15.0 %    Platelet Count 161 150 - 450 10e9/L    Diff Method Automated Method     % Neutrophils 58.4 %    % Lymphocytes 26.2 %    % Monocytes 10.0 %    % Eosinophils 4.1 %    % Basophils 0.9 %    % Immature Granulocytes 0.4 %    Nucleated RBCs 0 0 /100    Absolute Neutrophil 2.7 1.6 - 8.3 10e9/L    Absolute Lymphocytes 1.2 0.8 - 5.3 10e9/L    Absolute Monocytes 0.5 0.0 - 1.3 10e9/L    Absolute Eosinophils 0.2 0.0 - 0.7 10e9/L    Absolute Basophils 0.0 0.0 - 0.2 10e9/L    Abs Immature Granulocytes 0.0 0 - 0.4 10e9/L    Absolute Nucleated RBC 0.0    Comprehensive metabolic panel   Result Value Ref Range    Sodium 141 133 - 144 mmol/L    Potassium 3.9 3.4 - 5.3 mmol/L    Chloride 109 94 - 109 mmol/L    Carbon Dioxide 24 20 - 32 mmol/L    Anion Gap 8 3 - 14 mmol/L    Glucose 111 (H) 70 - 99 mg/dL    Urea Nitrogen 20 7 - 30 mg/dL    Creatinine 1.17 0.66 - 1.25 mg/dL    GFR Estimate 67 >60 mL/min/1.7m2    GFR Estimate If Black 81 >60 mL/min/1.7m2    Calcium 8.1 (L) 8.5 - 10.1 mg/dL    Bilirubin Total 0.5 0.2 - 1.3 mg/dL    Albumin 3.6 3.4 - 5.0 g/dL    Protein Total 7.0 6.8 - 8.8 g/dL    Alkaline Phosphatase 70 40 - 150 U/L    ALT 26 0 - 70 U/L    AST 18 0 - 45 U/L   Lactate Dehydrogenase   Result Value Ref Range    Lactate Dehydrogenase 167 85 - 227 U/L   Magnesium   Result Value Ref Range    Magnesium 2.4 (H) 1.6 - 2.3 mg/dL       Assessment & Plan:   1. Metastatic renal cell carcinoma with pulmonary metastasis: S/p left nephrectomy and XRT to left lung. Now with disease involving R lung. I reviewed his restaging from last week. His brain MRI and bone scan show ANGELA. His CT CAP shows stable nodules in R and L lung. He does have some post-XRT changes in left lung. Echo and PFTs are WNL. He is feeling well to proceed with IL-2 cycle 1 today. He will need PICC line placed  in hospital. Plan to restage following 2 cycles.     2. Intermittent dyspnea, chest tightness palpitations: ?Secondary to anxiety. Normal echo and PFTs done about 5 days ago. CT shows some radiation changes. His oxygen saturation and BP are fine. Monitor.     3. Cracked filling: Follow-up with dentist.     Jocelyn Seaman PA-C    [facility]  [address] [place] [id number] [phone]

## 2017-04-17 NOTE — MR AVS SNAPSHOT
"              After Visit Summary   2017    Julio John    MRN: [id number]           Patient Information     Date Of Birth          1970        Visit Information        Provider Department      2017 8:00 AM Reyes, Rebecca; Jocelyn Seaman PA [facility]        Today's Diagnoses     Malignant neoplasm of left kidney (H)           Follow-ups after your visit        Who to contact     If you have questions or need follow up information about today's clinic visit or your schedule please contact [facility] directly at [phone].  Normal or non-critical lab and imaging results will be communicated to you by [employer], letter or phone within 4 business days after the clinic has received the results. If you do not hear from us within 7 days, please contact the clinic through [employer] or phone. If you have a critical or abnormal lab result, we will notify you by phone as soon as possible.  Submit refill requests through Qpyn or call your pharmacy and they will forward the refill request to us. Please allow 3 business days for your refill to be completed.          Additional Information About Your Visit        MyChart Information     Qpyn lets you send messages to your doctor, view your test results, renew your prescriptions, schedule appointments and more. To sign up, go to [email or web address] . Click on \"Log in\" on the left side of the screen, which will take you to the Welcome page. Then click on \"Sign up Now\" on the right side of the page.     You will be asked to enter the access code listed below, as well as some personal information. Please follow the directions to create your username and password.     Your access code is: [email or web address]  Expires: 2017 10:24 AM     Your access code will  in 90 days. If you need help or a new code, please call your [place] clinic or [phone].        Care EveryWhere ID     This is your Care " EveryWhere ID. This could be used by other organizations to access your [place] medical records  [id number]        Your Vitals Were     Pulse Temperature Respirations Pulse Oximetry BMI (Body Mass Index)       80 98  F (36.7  C) (Oral) 18 98% 34.11 kg/m2        Blood Pressure from Last 3 Encounters:   04/17/17 123/77   04/17/17 130/88   04/07/17 124/82    Weight from Last 3 Encounters:   04/17/17 104.4 kg (230 lb 3.2 oz)   04/17/17 104.8 kg (231 lb)   04/07/17 105.2 kg (232 lb)              We Performed the Following     CBC with platelets differential     Comprehensive metabolic panel     Lactate Dehydrogenase     Magnesium        Primary Care Provider Office Phone # Fax #    Maryhelensean Roach [phone]       [facility] [address] [place] [address]        Thank you!     Thank you for choosing [facility]  for your care. Our goal is always to provide you with excellent care. Hearing back from our patients is one way we can continue to improve our services. Please take a few minutes to complete the written survey that you may receive in the mail after your visit with us. Thank you!             Your Updated Medication List - Protect others around you: Learn how to safely use, store and throw away your medicines at www.disposemymeds.org.          This list is accurate as of: 4/17/17  9:29 AM.  Always use your most recent med list.                   Brand Name Dispense Instructions for use    acetaminophen 500 MG tablet    TYLENOL     Take 500 mg by mouth

## 2017-04-17 NOTE — SIGNIFICANT EVENT
SPIRITUAL HEALTH SERVICES Significant Event  Hoahaoism Sacrament of ANOINTING  [facility] ([place] 7D    Pt anointed by Father Pk Canales, [facility]  , per request of patient.  Pt shared his illness narrative and his life and family stories. Pt was baptized Hoahaoism, but had not yet received first communion or confirmation. He has good attitude facing his illness. He understood that he is sick for some reasons and God would like to use him so save others. Since he got sick he is praying for many people around the world. Pt is a Danish speaker, he speaks a little bit English, but he is fluent in Danish. For this reason, an interpretor was translating our conversation.     Father Pk Delatorre

## 2017-04-17 NOTE — NURSING NOTE
Chief Complaint   Patient presents with     Blood Draw     right arm, vitals taken      Susi Sanchez CMA

## 2017-04-17 NOTE — PROGRESS NOTES
DATE/TIME  (DOT-TD, DOT-NOW) CHEMO CHECK ACTIVITY (REGIMEN & DOSE CHECK, DAY, DOSE #, NAME OF CHEMO #1)  CHEMO DRUG #2  CHEMO DRUG #3 NAME OF RN #1 (USE DOT-ME HERE) NAME OF RN#2 (2ND RN TO LOG IN SEPARATELY)   4/17/2017 10:15 AM IL-2 Protocol Double Check   Piper Rush    4/17/2017  4:24 PM   IL-2 dose #1 double check   Jennie Lu     04/18/17  8:03 AM   IL-2 dose #2 double check   Nicole Jagdish Butler     4/18/2017 8:48 AM  IL-2 dose #3 double check   Dawson Rush  (ad)   04/18/17  3:56 PM       IL-2 dose #4 double check   Tess Beck   04/19/17  12:29 AM   IL-2 Dose #5 double check   Barrett Dubon    4/19/2017  8:40 AM   IL-2 dose #6 double check   Jennie Costello     04/19/17  5:06 PM   IL-2 dose #7 double check   Tess Shin   (socorro)  Socorro Garcia     4/20/2017  2:12 AM   IL-2 dose # 8   Barrett Dasilva

## 2017-04-17 NOTE — PROGRESS NOTES
Hematology-Oncology Visit  Apr 17, 2017    Reason for Visit: follow-up metastatic renal cell carcinoma    HPI: Julio John is a 47 year old gentleman without significant past medical history with metastatic renal cell carcinoma to lungs. He presented with back pain, fevers, BRBPR with hemorrhoids. He had a CT scan which revealed left kidney cancer. He has left nephrectomy 3/2016. He was note to have lung nodules on left lung on surveillance scan. He had stereotactic radiation to lung. Follow-up scans showed new nodules in R lung. He was referred to Dr. Painting for consideration of IL-2. Dr. Painting deemed him a good candidate. He had echo, PFTs, brain MRI, bone scan, and CT CAP last week. He comes in prior to admission for cycle 1 of IL-2.     Interval History: Julio is here today with his wife and children and a professional . He is feeling quite well and maintains a positive attitude. For about a week, he has had intermittent chest tightness with questionable wheezing, SOB, and palpitations. His wife thinks this may be due to anxiety with him starting treatment. He does not have a significant cough. Fell a few weeks ago and has had some shoulder discomfort. Also saw dentist last week because of some molar pain. He has cracked filling and has some sensitivity with this. He is wondering if treatment will affect this. No fevers/chills or infectious concerns. Has been eating and drinking well. No nausea, abdominal pain, changes in bowels or bladder. ROS otherwise negative.     No current facility-administered medications for this visit.      No current outpatient prescriptions on file.     Facility-Administered Medications Ordered in Other Visits   Medication     eucerin cream     dextrose 5% and 0.9% NaCl + KCl 20 mEq/L infusion     dextrose 5% infusion     0.9% sodium chloride BOLUS     ondansetron (ZOFRAN) injection 8 mg     acetaminophen (TYLENOL) tablet 650 mg     ranitidine (ZANTAC) tablet  150 mg     cephALEXin (KEFLEX) capsule 500 mg     diphenhydrAMINE (BENADRYL) capsule 25 mg     meperidine (DEMEROL) injection 25 mg     naproxen (NAPROSYN) tablet 250 mg     diphenoxylate-atropine (LOMOTIL) 2.5-0.025 MG per tablet 1 tablet     aldesleukin (PROLEUKIN) 64 Million Units in D5W 53.56 mL infusion     prochlorperazine (COMPAZINE) tablet 10 mg     prochlorperazine (COMPAZINE) injection 10 mg     LORazepam (ATIVAN) tablet 0.5-1 mg     LORazepam (ATIVAN) injection 0.5-1 mg     MEDICATION INSTRUCTION     methylPREDNISolone sodium succinate (solu-MEDROL) injection 125 mg     diphenhydrAMINE (BENADRYL) injection 50 mg     meperidine (DEMEROL) injection 25 mg     EPINEPHrine (ADRENALIN) injection 0.3 mg     albuterol (PROAIR HFA/PROVENTIL HFA/VENTOLIN HFA) Inhaler 1-2 puff     albuterol neb solution 2.5 mg     0.9% sodium chloride infusion       PHYSICAL EXAM:  /88 (BP Location: Right arm, Patient Position: Chair, Cuff Size: Adult Large)  Pulse 80  Temp 98  F (36.7  C) (Oral)  Resp 18  Wt 104.8 kg (231 lb)  SpO2 98%  BMI 34.11 kg/m2  General: Alert, oriented, pleasant, NAD  Skin: No rashes, bruising, or petechiae on exposed skin   HEENT: Normocephalic, atraumatic, PERRLA, EOMI. Moist mucus membranes, no lesions or thrush. Gum line looks okay without erythema.   Neck: No cervical or supraclavicular LAD.   Axillary: No LAD  Lungs: CTA bilaterally, normal work of breathing  Cardiac: RRR, S1, S2, no murmurs  Abdomen: Soft, nontender, nondistended. Normoactive bowel sounds. No hepatosplenomegaly, masses  Neuro: CNII-XII grossly intact  Extremities: No pedal edema    Labs:   Results for orders placed or performed in visit on 04/17/17 (from the past 24 hour(s))   CBC with platelets differential   Result Value Ref Range    WBC 4.7 4.0 - 11.0 10e9/L    RBC Count 5.37 4.4 - 5.9 10e12/L    Hemoglobin 15.4 13.3 - 17.7 g/dL    Hematocrit 45.7 40.0 - 53.0 %    MCV 85 78 - 100 fl    MCH 28.7 26.5 - 33.0 pg    MCHC  33.7 31.5 - 36.5 g/dL    RDW 13.2 10.0 - 15.0 %    Platelet Count 161 150 - 450 10e9/L    Diff Method Automated Method     % Neutrophils 58.4 %    % Lymphocytes 26.2 %    % Monocytes 10.0 %    % Eosinophils 4.1 %    % Basophils 0.9 %    % Immature Granulocytes 0.4 %    Nucleated RBCs 0 0 /100    Absolute Neutrophil 2.7 1.6 - 8.3 10e9/L    Absolute Lymphocytes 1.2 0.8 - 5.3 10e9/L    Absolute Monocytes 0.5 0.0 - 1.3 10e9/L    Absolute Eosinophils 0.2 0.0 - 0.7 10e9/L    Absolute Basophils 0.0 0.0 - 0.2 10e9/L    Abs Immature Granulocytes 0.0 0 - 0.4 10e9/L    Absolute Nucleated RBC 0.0    Comprehensive metabolic panel   Result Value Ref Range    Sodium 141 133 - 144 mmol/L    Potassium 3.9 3.4 - 5.3 mmol/L    Chloride 109 94 - 109 mmol/L    Carbon Dioxide 24 20 - 32 mmol/L    Anion Gap 8 3 - 14 mmol/L    Glucose 111 (H) 70 - 99 mg/dL    Urea Nitrogen 20 7 - 30 mg/dL    Creatinine 1.17 0.66 - 1.25 mg/dL    GFR Estimate 67 >60 mL/min/1.7m2    GFR Estimate If Black 81 >60 mL/min/1.7m2    Calcium 8.1 (L) 8.5 - 10.1 mg/dL    Bilirubin Total 0.5 0.2 - 1.3 mg/dL    Albumin 3.6 3.4 - 5.0 g/dL    Protein Total 7.0 6.8 - 8.8 g/dL    Alkaline Phosphatase 70 40 - 150 U/L    ALT 26 0 - 70 U/L    AST 18 0 - 45 U/L   Lactate Dehydrogenase   Result Value Ref Range    Lactate Dehydrogenase 167 85 - 227 U/L   Magnesium   Result Value Ref Range    Magnesium 2.4 (H) 1.6 - 2.3 mg/dL       Assessment & Plan:   1. Metastatic renal cell carcinoma with pulmonary metastasis: S/p left nephrectomy and XRT to left lung. Now with disease involving R lung. I reviewed his restaging from last week. His brain MRI and bone scan show ANGELA. His CT CAP shows stable nodules in R and L lung. He does have some post-XRT changes in left lung. Echo and PFTs are WNL. He is feeling well to proceed with IL-2 cycle 1 today. He will need PICC line placed in hospital. Plan to restage following 2 cycles.     2. Intermittent dyspnea, chest tightness palpitations:  ?Secondary to anxiety. Normal echo and PFTs done about 5 days ago. CT shows some radiation changes. His oxygen saturation and BP are fine. Monitor.     3. Cracked filling: Follow-up with dentist.     Jocelyn Seaman PA-C    [facility] [address] [address] [phone]

## 2017-04-17 NOTE — IP AVS SNAPSHOT
MRN:[id number]                      After Visit Summary   4/17/2017    Julio John    MRN: [id number]           Thank you!     Thank you for choosing [facility] for your care. Our goal is always to provide you with excellent care. Hearing back from our patients is one way we can continue to improve our services. Please take a few minutes to complete the written survey that you may receive in the mail after you visit with us. Thank you!        Patient Information     Date Of Birth          1970        Designated Caregiver       Most Recent Value    Caregiver    Will someone help with your care after discharge? yes    Name of designated caregiver Alda spouse    Phone number of caregiver [phone]    Caregiver address [address]      About your hospital stay     You were admitted on:  April 17, 2017 You last received care in the:  [address] [facility]    You were discharged on:  April 20, 2017        Reason for your hospital stay       Admitted for IL 2 immunotherapy, completed 8 doses, had expected s/e.                  Who to Call     For medical emergencies, please call 911.  For non-urgent questions about your medical care, please call your primary care provider or clinic, [phone]          Attending Provider     Provider Specialty    Darrian Contreras DO Internal Medicine-Hematology & Oncology       Primary Care Provider Office Phone # Fax #    Uchem[facility]u MD Doc [phone]       [facility] [address] [place] [address]         When to contact your care team       Please call the [facility] [phone] for temperature greater than 100.4 F, uncontrolled nausea/vomiting/diarrhea or unrelieved constipation, pain not relieved by medications, bleeding not stopped by pressure, dizziness, chest pain, shortness of breath, changes in level of consciousness, or any other new concerning symptoms.                   After Care Instructions     Activity       Your activity upon discharge: as tolerated            Diet       Follow this diet upon discharge: regular                  Follow-up Appointments     Adult [facility] Follow-up and recommended labs and tests       Scheduled for next week with ANA + labs.    Appointments on [place] and/or [facility] (with [facility] or [facility] provider or service). Call [phone] if you haven't heard regarding these appointments within 7 days of discharge.                  Your next 10 appointments already scheduled     Apr 21, 2017  8:00 AM [facility] Inpatient with Shikha Rai    Services Department ([facility])    [address] [place] [id number] [phone]            Apr 22, 2017  8:00 AM [facility] Inpatient with  MD Xavi    Services Department ([facility])    [address] [place] [id number] [phone]            Apr 23, 2017  8:00 AM [facility] Inpatient with  MD Xavi    Services Department ([facility])    [address] [place] [id number] [phone]            Apr 24, 2017  8:00 AM [facility] Inpatient with Rebecca Reyes    Services Department ([facility])    [address] [place] [address] [phone]            Apr 25, 2017  8:00 AM [facility] Inpatient with Ifrah Bunn    Services Department ([facility])    [address] [place] [address]   [phone]            Apr 25, 2017 12:45 PM T   [facility] Draw with  [facility]   [facility] Draw ([facility])    [address] [place] [address]  "Floor  [facility] [id number] [phone]            Apr 25, 2017  1:20 PM CDT   (Arrive by 1:05 PM)   Return Visit with KAPIL Willoughby [facility] ([facility])    [address] [facility] [address] [facility]  2nd [facility] [id number] [phone]              Future tests that were ordered for you     CBC with platelets differential       Last Lab Result: Hemoglobin (g/dL)       Date                     Value                 04/20/2017               13.0 (L)         ----------            Comprehensive metabolic panel                 Pending Results     Date and Time Order Name Status Description    4/19/2017 2332 EKG 12-lead, complete Preliminary     4/19/2017 2330 Blood culture Preliminary     4/18/2017 2330 Blood culture Preliminary     4/17/2017 2330 Blood culture Preliminary     4/17/2017 1117 IR PICC Vascular In process             Statement of Approval     Ordered          04/20/17 1157  I have reviewed and agree with all the recommendations and orders detailed in this document.  EFFECTIVE NOW     Approved and electronically signed by:  Safia Wong APRN CNP             Admission Information     Date & Time Provider Department Dept. Phone    4/17/2017 Darrian Contreras DO Unit [address]D [facility] [place] [phone]      Your Vitals Were     Blood Pressure Pulse Temperature Respirations Height Weight    118/60 (BP Location: Right arm) 123 97.8  F (36.6  C) (Oral) 18 1.676 m (5' 6\") 110.4 kg (243 lb 4.8 oz)    Pulse Oximetry BMI (Body Mass Index)                96% 39.27 kg/m2          Yun Yun Information     Yun Yun lets you send messages to your doctor, view your test results, renew your prescriptions, schedule appointments and more. To sign up, go to [email or web address] . Click on \"Log in\" on the left side of the screen, which will take you to the Welcome page. Then click on \"Sign up Now\" on the right side of the page.     You will be asked " to enter the access code listed below, as well as some personal information. Please follow the directions to create your username and password.     Your access code is: [email or web address]  Expires: 2017 10:24 AM     Your access code will  in 90 days. If you need help or a new code, please call your [facility] clinic or [phone].        Care EveryWhere ID     This is your Care EveryWhere ID. This could be used by other organizations to access your [facility] medical records  [id number]           Review of your medicines      START taking        Dose / Directions    diphenoxylate-atropine 2.5-0.025 MG per tablet   Commonly known as:  LOMOTIL   Used for:  Malignant neoplasm of left kidney (H)        Dose:  1 tablet   Take 1 tablet by mouth 4 times daily as needed for diarrhea (After each diarrheal stool.)   Quantity:  40 tablet   Refills:  0       ondansetron 4 MG tablet   Commonly known as:  ZOFRAN   Used for:  Malignant neoplasm of left kidney (H)        Dose:  8 mg   Take 2 tablets (8 mg) by mouth every 8 hours as needed for nausea   Quantity:  18 tablet   Refills:  0       prochlorperazine 10 MG tablet   Commonly known as:  COMPAZINE   Used for:  Malignant neoplasm of left kidney (H)        Dose:  10 mg   Take 1 tablet (10 mg) by mouth every 6 hours as needed (Breakthrough Nausea/Vomiting)   Quantity:  30 tablet   Refills:  0         CONTINUE these medicines which have NOT CHANGED        Dose / Directions    acetaminophen 500 MG tablet   Commonly known as:  TYLENOL        Dose:  500 mg   Take 500 mg by mouth   Refills:  0            Where to get your medicines      These medications were sent to [facility] [place], [place] - [address]  [address] [address]     Phone:  [phone]     ondansetron 4 MG tablet    prochlorperazine 10 MG tablet         Some of these will need a paper prescription and others can be bought over the counter. Ask your nurse if you have  questions.     Bring a paper prescription for each of these medications     diphenoxylate-atropine 2.5-0.025 MG per tablet                Protect others around you: Learn how to safely use, store and throw away your medicines at www.disposemymeds.org.             Medication List: This is a list of all your medications and when to take them. Check marks below indicate your daily home schedule. Keep this list as a reference.      Medications           Morning Afternoon Evening Bedtime As Needed    acetaminophen 500 MG tablet   Commonly known as:  TYLENOL   Take 500 mg by mouth   Last time this was given:  650 mg on 4/20/2017 11:56 AM                                   diphenoxylate-atropine 2.5-0.025 MG per tablet   Commonly known as:  LOMOTIL   Take 1 tablet by mouth 4 times daily as needed for diarrhea (After each diarrheal stool.)   Last time this was given:  1 tablet on 4/20/2017  9:54 AM                                   ondansetron 4 MG tablet   Commonly known as:  ZOFRAN   Take 2 tablets (8 mg) by mouth every 8 hours as needed for nausea                                   prochlorperazine 10 MG tablet   Commonly known as:  COMPAZINE   Take 1 tablet (10 mg) by mouth every 6 hours as needed (Breakthrough Nausea/Vomiting)   Last time this was given:  10 mg on 4/19/2017  7:59 PM

## 2017-04-17 NOTE — NURSING NOTE
"Julio John is a 47 year old male who presents for:  Chief Complaint   Patient presents with     Blood Draw     right arm, vitals taken      Oncology Clinic Visit     met renal         Initial Vitals:  /88 (BP Location: Right arm, Patient Position: Chair, Cuff Size: Adult Large)  Pulse 80  Temp 98  F (36.7  C) (Oral)  Resp 18  Wt 104.8 kg (231 lb)  SpO2 98%  BMI 34.11 kg/m2 Estimated body mass index is 34.11 kg/(m^2) as calculated from the following:    Height as of 4/7/17: 1.753 m (5' 9\").    Weight as of this encounter: 104.8 kg (231 lb).. Body surface area is 2.26 meters squared. BP completed using cuff size: NA (Not Taken)  Data Unavailable No LMP for male patient. Allergies and medications reviewed.     Medications: Medication refills not needed today.  Pharmacy name entered into EPIC: Data Unavailable    Comments: pt states of ongoing chest pain and makes a wheezing sound (4) and only taking tylenol for tooth molars pain and nothing else    4 minutes for nursing intake (face to face time)   Suni Irizarry CMA        "

## 2017-04-17 NOTE — PLAN OF CARE
Problem: Goal Outcome Summary  Goal: Goal Outcome Summary  Outcome: No Change  Nursing Focus: Admission  D: Arrived at 10:00 from home via ambulatory. Patient accompanied by spouse and children. Admitted for interleukin 1st cycle for kidney cancer. Complains of nervousness about new therapy. He arrived without intravenous access.       I: Admission process began.  Patient oriented to room, enviroment, call light.  MD notified of patient's arrival on unit. Peripherally inserted central catheter ordered and obtained. Teaching done with .     A: Vital signs stable, afebrile.  Patient stable at this time.      P: Implement plan of care when available. Continue to monitor patient. Nursing interventions as appropriate. Notify MD with changes in pt status. Interleukin scheduled for 16:00.

## 2017-04-17 NOTE — PROGRESS NOTES
Nursing Focus: Chemotherapy  D: Positive blood return via PICC. Insertion site is clean/dry/intact, dressing intact with no complaints of pain.  Urine output is recorded in intake in Doc Flowsheet.    I: Premedications given per order (see electronic medical administration record). Dose #1 of IL-2 started to infuse over 15 minutes. Reviewed pt teaching on chemotherapy side effects.  Pt denies need for further teaching. Chemotherapy double checked per protocol by two chemotherapy competent RN's.   A: Tolerating procedure well. Denies nausea and or pain.   P: Continue to monitor urine output and symptoms of nausea. Screen for symptoms of toxicity.

## 2017-04-17 NOTE — H&P
[facility], [place]    History and Physical  Hematology / Oncology     Date of Admission:  4/17/2017  Date of Service (when I saw the patient): 04/17/17    Assessment & Plan   Julio John is a 48 yo M PMH recurrent metastatic RCC (mets lungs) s/p nephrectomy & XRT, CVA 2010, A fib cardiovert 2012, BPH, Obesity, admitted for cycle 1 IL therapy.    # Metastatic clear cell renal cancer (pT3a, lung metastasis) s/p L nephrectomy (x3/16) & stereotactic XRT to lung nodules (x12/16) admitted for IL 2 therapy.  - Place PICC line.  - Labs reviewed, WDL.  - Follow treatment plan below.    # Therapy Plan Aldesleukin 64 million units q 8 hr (for 14 doses, or as long as tolerated).  - Pre med Zofran 8 mg q 8 & tylenol 650 mg q 4.  - Supportive care included ranitidine 150 mg BID, cephalexin 500 mg BID (D1-5 of thepray).  - PRN Meds (benadryl, meperidine, naproxen, compazine, ativan & lomotil).  - Follow IVF guidelines (D5&0.9NS + 20 k at 100 ml/hr with PRN bolus 250 ml SBP< 85 or low UOP).  - Daily weights, I&O q 8, NO steroids NO diuretics, VS q 4.  - Follow labs CBC, CMP, Mg, Ph, LDH, CK total, & daily blood cultures.    FEN  - IVF per protocol  - Electrolyte s/s  - RDAT    PPX  - Ranitidine BID  - DVT/PE held r/t ambulatory patient & concern for TCP    Code Status   Full Code    DISPO: d/c to home once acute toxicities 2/2 to IL 2 have resolved.    This plan of care has been discussed with Dr. Contreras.    Safia Wong  [facility]  [phone]  Hematology/Oncology  April 17, 2017    Primary Care Physician   Tao Roach    History of Present Illness   History obtained from chart review and discussion with the patient.     Oncology History -  Presented with BRBPR & hemorrhoids, intense pain (low back) after lifting a heavy box, & persistent fevers.    1/29/16 - Incidental left kidney mass (work-up for abdominal pain) - 7.8 x 6.7 x 6.7 cm mass localized in the left kidney.  1/28/16  - presence of multiple, but very small pulmonary nodules concerning for metastatic disease.  2/15/16 - Brain MRI negative for CNS metastatic disease.  2/25/16 - NM Bone scan negative for metastatic disease.     3/9/16 - Kidney, left, nephrectomy - Renal cell carcinoma, clear cell type, Boom nuclear grade 3 of 4, 7.5 cm in greatest dimmension, limited to kidney.     3/28/16 - CT Chest - Multiple pulmonary nodules bilaterally, the largest of which is 7 millimeters in the left base. There is a new left upper lobe nodule measuring 2 millimeters. A left lower lobe nodule has decreased in size from 5 mm to 2 mm, and may have been infectious/inflammatory. Regarding the other nodules, they are concerning for metastatic disease, given history of left renal cell carcinoma status post nephrectomy.     6/15/16 - CT chest - Stable pulmonary nodules.  6/15/16 - Abd/Pelvis MRI - no evidence of recurrent disease    10/17/16 - CT chest - increase in size of pulmonary nodules - concerning for metastatic disease   10/17/16 - Abd/Pelvis MRI - no evidence of recurrent disease  11/14/16 - CT guided biopsy of LLL nodule positive for malignancy; consistent with metastatic carcinoma of renal origin.     12/02/16 - NM Bone negative for osseous metastatic disease.   12/03/16 - MR Brain negative for metastatic disease.     December 2016-January 2017 - Underwent stereotactic radiation therapy to pulmonary nodules @ [facility]. We will request records today for further details    2/28/17 - CT C/A/P New small-multiple pulmonary nodules seen in bilateral lungs.     April 2017 work up completed prior to initiation of IL 2 therapy-   PFT- normal volumes, normal lung mechanics, normal gas transfer, normal studies , ECHO- EF 53%, Brain MRI- no evidence of brain/bone mets... Focal lesion superior aspect of L parotid, Bone scan- negative for osseous disease , CT CAP--post rad change LL, scattered R lung nodules & dominant NAIN nodule no other  evidence of met disease in abdomen or pelvis s/p L nephrectomy.    Today Julio reports -- doing well overall, he is feeling strong & ready to start chemotherapy.  He had multiple questions regarding his upcoming treatment & overall status of his disease.  We reviewed his CT imaging & the size of the lung nodules (1 cm LL, 0.3 cm scattered bilateral lungs), we reviewed SE of IL 2 therapy.  He had a few questions regarding a burning pain felt in his LLQ s/p nephrectomy that has since resolved, & chest tightness/sharp pain that happened a few times last week (no cough, no fever/chills, no prolonged SOB).  As these symptoms have all resolved, & he had an extensive work up prior to initiating IL 2 we will just monitor.  He reports he's eating & drinking well, and completing his own ADLs.  Agreeable to start IL 2 today, PICC line placement set for noon.      Past Medical History    No past medical history on file.    Past Surgical History   No past surgical history on file.    Prior to Admission Medications   Prior to Admission Medications   Prescriptions Last Dose Informant Patient Reported? Taking?   acetaminophen (TYLENOL) 500 MG tablet   Yes No   Sig: Take 500 mg by mouth      Facility-Administered Medications: None     Allergies   No Known Allergies    Social History   Social History     Social History     Marital status:      Spouse name: N/A     Number of children: N/A     Years of education: N/A     Occupational History     Not on file.     Social History Main Topics     Smoking status: Never Smoker     Smokeless tobacco: Not on file     Alcohol use No     Drug use: Not on file     Sexual activity: Not on file     Other Topics Concern     Not on file     Social History Narrative       Family History   No family history on file.    Review of Systems   ROS negative unless otherwise noted in above HPI.    Physical Exam   Temp:  [97.3  F (36.3  C)-98  F (36.7  C)] 97.3  F (36.3  C)  Pulse:  [70-80] 70  Resp:   [16-18] 16  BP: (123-130)/(77-88) 123/77  SpO2:  [95 %-98 %] 95 %  230 lbs 3.2 oz    Constitutional: Awake, alert, cooperative, in NAD.  Eyes: PERRL, EOMI, sclera clear, conjunctiva normal.  ENT: Normocephalic, without obvious abnormality, sinuses nontender on palpation, oral pharynx with moist mucus membranes  Respiratory: Non-labored breathing, good air exchange, clear to auscultation bilaterally, no crackles or wheezing.  Cardiovascular: RRR, no murmur noted.  GI: + bowel sounds, soft, non-distended, non-tender, no masses palpated, no hepatosplenomegaly.  Skin: No concerning lesions or rash on exposed areas.  Musculoskeletal: No edema lucille LEs.  Neurologic: Awake, alert & oriented x3.  Cranial nerves II-XII are grossly intact.   Psych: appropriate affect    Data   Results for orders placed or performed in visit on 04/17/17 (from the past 24 hour(s))   CBC with platelets differential   Result Value Ref Range    WBC 4.7 4.0 - 11.0 10e9/L    RBC Count 5.37 4.4 - 5.9 10e12/L    Hemoglobin 15.4 13.3 - 17.7 g/dL    Hematocrit 45.7 40.0 - 53.0 %    MCV 85 78 - 100 fl    MCH 28.7 26.5 - 33.0 pg    MCHC 33.7 31.5 - 36.5 g/dL    RDW 13.2 10.0 - 15.0 %    Platelet Count 161 150 - 450 10e9/L    Diff Method Automated Method     % Neutrophils 58.4 %    % Lymphocytes 26.2 %    % Monocytes 10.0 %    % Eosinophils 4.1 %    % Basophils 0.9 %    % Immature Granulocytes 0.4 %    Nucleated RBCs 0 0 /100    Absolute Neutrophil 2.7 1.6 - 8.3 10e9/L    Absolute Lymphocytes 1.2 0.8 - 5.3 10e9/L    Absolute Monocytes 0.5 0.0 - 1.3 10e9/L    Absolute Eosinophils 0.2 0.0 - 0.7 10e9/L    Absolute Basophils 0.0 0.0 - 0.2 10e9/L    Abs Immature Granulocytes 0.0 0 - 0.4 10e9/L    Absolute Nucleated RBC 0.0    Comprehensive metabolic panel   Result Value Ref Range    Sodium 141 133 - 144 mmol/L    Potassium 3.9 3.4 - 5.3 mmol/L    Chloride 109 94 - 109 mmol/L    Carbon Dioxide 24 20 - 32 mmol/L    Anion Gap 8 3 - 14 mmol/L    Glucose 111 (H) 70 -  99 mg/dL    Urea Nitrogen 20 7 - 30 mg/dL    Creatinine 1.17 0.66 - 1.25 mg/dL    GFR Estimate 67 >60 mL/min/1.7m2    GFR Estimate If Black 81 >60 mL/min/1.7m2    Calcium 8.1 (L) 8.5 - 10.1 mg/dL    Bilirubin Total 0.5 0.2 - 1.3 mg/dL    Albumin 3.6 3.4 - 5.0 g/dL    Protein Total 7.0 6.8 - 8.8 g/dL    Alkaline Phosphatase 70 40 - 150 U/L    ALT 26 0 - 70 U/L    AST 18 0 - 45 U/L   Lactate Dehydrogenase   Result Value Ref Range    Lactate Dehydrogenase 167 85 - 227 U/L   Magnesium   Result Value Ref Range    Magnesium 2.4 (H) 1.6 - 2.3 mg/dL

## 2017-04-18 LAB
ALBUMIN SERPL-MCNC: 3.2 G/DL (ref 3.4–5)
ALP SERPL-CCNC: 45 U/L (ref 40–150)
ALT SERPL W P-5'-P-CCNC: 21 U/L (ref 0–70)
ANION GAP SERPL CALCULATED.3IONS-SCNC: 7 MMOL/L (ref 3–14)
AST SERPL W P-5'-P-CCNC: 11 U/L (ref 0–45)
BASOPHILS # BLD AUTO: 0 10E9/L (ref 0–0.2)
BASOPHILS NFR BLD AUTO: 0.1 %
BILIRUB SERPL-MCNC: 1.4 MG/DL (ref 0.2–1.3)
BUN SERPL-MCNC: 14 MG/DL (ref 7–30)
CALCIUM SERPL-MCNC: 7.6 MG/DL (ref 8.5–10.1)
CHLORIDE SERPL-SCNC: 109 MMOL/L (ref 94–109)
CK SERPL-CCNC: 90 U/L (ref 30–300)
CO2 SERPL-SCNC: 21 MMOL/L (ref 20–32)
CREAT SERPL-MCNC: 1.21 MG/DL (ref 0.66–1.25)
DIFFERENTIAL METHOD BLD: ABNORMAL
EOSINOPHIL # BLD AUTO: 0 10E9/L (ref 0–0.7)
EOSINOPHIL NFR BLD AUTO: 0.5 %
ERYTHROCYTE [DISTWIDTH] IN BLOOD BY AUTOMATED COUNT: 13.8 % (ref 10–15)
GFR SERPL CREATININE-BSD FRML MDRD: 64 ML/MIN/1.7M2
GLUCOSE SERPL-MCNC: 124 MG/DL (ref 70–99)
HCT VFR BLD AUTO: 43.3 % (ref 40–53)
HGB BLD-MCNC: 14.4 G/DL (ref 13.3–17.7)
IMM GRANULOCYTES # BLD: 0 10E9/L (ref 0–0.4)
IMM GRANULOCYTES NFR BLD: 0.1 %
LACTATE BLD-SCNC: 1 MMOL/L (ref 0.7–2.1)
LDH SERPL L TO P-CCNC: 147 U/L (ref 85–227)
LYMPHOCYTES # BLD AUTO: 0.1 10E9/L (ref 0.8–5.3)
LYMPHOCYTES NFR BLD AUTO: 1.3 %
MAGNESIUM SERPL-MCNC: 1.7 MG/DL (ref 1.6–2.3)
MCH RBC QN AUTO: 28.9 PG (ref 26.5–33)
MCHC RBC AUTO-ENTMCNC: 33.3 G/DL (ref 31.5–36.5)
MCV RBC AUTO: 87 FL (ref 78–100)
MONOCYTES # BLD AUTO: 0.3 10E9/L (ref 0–1.3)
MONOCYTES NFR BLD AUTO: 3.8 %
NEUTROPHILS # BLD AUTO: 7.4 10E9/L (ref 1.6–8.3)
NEUTROPHILS NFR BLD AUTO: 94.2 %
NRBC # BLD AUTO: 0 10*3/UL
NRBC BLD AUTO-RTO: 0 /100
PHOSPHATE SERPL-MCNC: 2.3 MG/DL (ref 2.5–4.5)
PLATELET # BLD AUTO: 101 10E9/L (ref 150–450)
POTASSIUM SERPL-SCNC: 3.7 MMOL/L (ref 3.4–5.3)
PROT SERPL-MCNC: 6.2 G/DL (ref 6.8–8.8)
RBC # BLD AUTO: 4.98 10E12/L (ref 4.4–5.9)
SODIUM SERPL-SCNC: 137 MMOL/L (ref 133–144)
WBC # BLD AUTO: 7.8 10E9/L (ref 4–11)

## 2017-04-18 PROCEDURE — 25000125 ZZHC RX 250: Performed by: NURSE PRACTITIONER

## 2017-04-18 PROCEDURE — 83615 LACTATE (LD) (LDH) ENZYME: CPT | Performed by: NURSE PRACTITIONER

## 2017-04-18 PROCEDURE — 99232 SBSQ HOSP IP/OBS MODERATE 35: CPT | Performed by: INTERNAL MEDICINE

## 2017-04-18 PROCEDURE — T1013 SIGN LANG/ORAL INTERPRETER: HCPCS | Mod: U3

## 2017-04-18 PROCEDURE — 25000128 H RX IP 250 OP 636: Performed by: INTERNAL MEDICINE

## 2017-04-18 PROCEDURE — 80053 COMPREHEN METABOLIC PANEL: CPT | Performed by: NURSE PRACTITIONER

## 2017-04-18 PROCEDURE — 83735 ASSAY OF MAGNESIUM: CPT | Performed by: NURSE PRACTITIONER

## 2017-04-18 PROCEDURE — 83605 ASSAY OF LACTIC ACID: CPT | Performed by: INTERNAL MEDICINE

## 2017-04-18 PROCEDURE — 12000008 ZZH R&B INTERMEDIATE UMMC

## 2017-04-18 PROCEDURE — 25800025 ZZH RX 258: Performed by: INTERNAL MEDICINE

## 2017-04-18 PROCEDURE — 85025 COMPLETE CBC W/AUTO DIFF WBC: CPT | Performed by: NURSE PRACTITIONER

## 2017-04-18 PROCEDURE — 87040 BLOOD CULTURE FOR BACTERIA: CPT | Performed by: NURSE PRACTITIONER

## 2017-04-18 PROCEDURE — 25000132 ZZH RX MED GY IP 250 OP 250 PS 637: Performed by: INTERNAL MEDICINE

## 2017-04-18 PROCEDURE — 82550 ASSAY OF CK (CPK): CPT | Performed by: NURSE PRACTITIONER

## 2017-04-18 PROCEDURE — 36592 COLLECT BLOOD FROM PICC: CPT | Performed by: NURSE PRACTITIONER

## 2017-04-18 PROCEDURE — 84100 ASSAY OF PHOSPHORUS: CPT | Performed by: NURSE PRACTITIONER

## 2017-04-18 PROCEDURE — 25000125 ZZHC RX 250: Performed by: INTERNAL MEDICINE

## 2017-04-18 RX ORDER — HYDROMORPHONE HYDROCHLORIDE 1 MG/ML
0.5 INJECTION, SOLUTION INTRAMUSCULAR; INTRAVENOUS; SUBCUTANEOUS ONCE
Status: DISCONTINUED | OUTPATIENT
Start: 2017-04-18 | End: 2017-04-20 | Stop reason: CLARIF

## 2017-04-18 RX ORDER — OXYCODONE HYDROCHLORIDE 5 MG/1
5 TABLET ORAL EVERY 4 HOURS PRN
Status: DISCONTINUED | OUTPATIENT
Start: 2017-04-18 | End: 2017-04-20 | Stop reason: HOSPADM

## 2017-04-18 RX ADMIN — ACETAMINOPHEN 650 MG: 325 TABLET, FILM COATED ORAL at 12:12

## 2017-04-18 RX ADMIN — CEPHALEXIN 500 MG: 500 CAPSULE ORAL at 08:39

## 2017-04-18 RX ADMIN — LORAZEPAM 0.5 MG: 2 INJECTION INTRAMUSCULAR; INTRAVENOUS at 11:48

## 2017-04-18 RX ADMIN — ALDESLEUKIN 64 MILLION UNITS: 1.1 INJECTION, POWDER, LYOPHILIZED, FOR SOLUTION INTRAVENOUS at 00:35

## 2017-04-18 RX ADMIN — ACETAMINOPHEN 650 MG: 325 TABLET, FILM COATED ORAL at 20:06

## 2017-04-18 RX ADMIN — ACETAMINOPHEN 650 MG: 325 TABLET, FILM COATED ORAL at 00:00

## 2017-04-18 RX ADMIN — ALDESLEUKIN 64 MILLION UNITS: 1.1 INJECTION, POWDER, LYOPHILIZED, FOR SOLUTION INTRAVENOUS at 17:06

## 2017-04-18 RX ADMIN — RANITIDINE 150 MG: 150 TABLET ORAL at 08:39

## 2017-04-18 RX ADMIN — POTASSIUM CHLORIDE, DEXTROSE MONOHYDRATE AND SODIUM CHLORIDE: 150; 5; 900 INJECTION, SOLUTION INTRAVENOUS at 11:34

## 2017-04-18 RX ADMIN — MEPERIDINE HYDROCHLORIDE 25 MG: 25 INJECTION, SOLUTION INTRAMUSCULAR; INTRAVENOUS; SUBCUTANEOUS at 02:00

## 2017-04-18 RX ADMIN — ACETAMINOPHEN 650 MG: 325 TABLET, FILM COATED ORAL at 08:39

## 2017-04-18 RX ADMIN — ACETAMINOPHEN 650 MG: 325 TABLET, FILM COATED ORAL at 04:00

## 2017-04-18 RX ADMIN — MEPERIDINE HYDROCHLORIDE 25 MG: 25 INJECTION, SOLUTION INTRAMUSCULAR; INTRAVENOUS; SUBCUTANEOUS at 19:39

## 2017-04-18 RX ADMIN — CEPHALEXIN 500 MG: 500 CAPSULE ORAL at 20:06

## 2017-04-18 RX ADMIN — SODIUM PHOSPHATE, MONOBASIC, MONOHYDRATE AND SODIUM PHOSPHATE, DIBASIC, ANHYDROUS 15 MMOL: 276; 142 INJECTION, SOLUTION INTRAVENOUS at 09:39

## 2017-04-18 RX ADMIN — ACETAMINOPHEN 650 MG: 325 TABLET, FILM COATED ORAL at 16:19

## 2017-04-18 RX ADMIN — RANITIDINE 150 MG: 150 TABLET ORAL at 20:05

## 2017-04-18 RX ADMIN — MEPERIDINE HYDROCHLORIDE 25 MG: 25 INJECTION, SOLUTION INTRAMUSCULAR; INTRAVENOUS; SUBCUTANEOUS at 10:41

## 2017-04-18 RX ADMIN — MEPERIDINE HYDROCHLORIDE 25 MG: 25 INJECTION, SOLUTION INTRAMUSCULAR; INTRAVENOUS; SUBCUTANEOUS at 18:46

## 2017-04-18 RX ADMIN — ONDANSETRON 8 MG: 2 INJECTION INTRAMUSCULAR; INTRAVENOUS at 08:39

## 2017-04-18 RX ADMIN — ALDESLEUKIN 64 MILLION UNITS: 1.1 INJECTION, POWDER, LYOPHILIZED, FOR SOLUTION INTRAVENOUS at 08:50

## 2017-04-18 RX ADMIN — ONDANSETRON 8 MG: 2 INJECTION INTRAMUSCULAR; INTRAVENOUS at 16:19

## 2017-04-18 RX ADMIN — ONDANSETRON 8 MG: 2 INJECTION INTRAMUSCULAR; INTRAVENOUS at 00:00

## 2017-04-18 RX ADMIN — POTASSIUM CHLORIDE, DEXTROSE MONOHYDRATE AND SODIUM CHLORIDE: 150; 5; 900 INJECTION, SOLUTION INTRAVENOUS at 20:23

## 2017-04-18 NOTE — PLAN OF CARE
Problem: Goal Outcome Summary  Goal: Goal Outcome Summary  Outcome: No Change  D/I: Dose #3 Aldesleukin given. Urine output/Blood pressure parameters met.  See flowsheet. Aldesleukin infused at 08:50 over 15 minutes. Meperidine 1 dose IV required for rigors and chilling. Acetaminophen given as scheduled every four hours. Intravenous fluid support. Patient complains of pain in mid-chest, right groin, bones and headache associated with rigors. This responded well to a small dose of lorazepam in addition to meperidine.  A: Tolerating Aldesleukin well. Evidence of capillary leak syndrome was rigors and low-grade fevers.  P: Continue to monitor fluid status and vital signs for evidence of capillary leak syndrome. Continue IV fluid support and scheduled acetaminophen. Notify MD if patient falls below blood pressure or urine output parameters. Dose #4 ordered from pharmacy and is planned for 17:00.

## 2017-04-18 NOTE — DOWNTIME EVENT NOTE
The EMR was down for 11 hours on 4/18/2017.    Jennie Martinez RN and Nicole Aguirre RN were responsible for completing the paper charting during this time period.     The following information was re-entered into the system by Nicole Dubon: Flowsheet data, Intake and output, Orders and MAR    The following information will remain in the paper chart: head to toe assessment and end of shift and chemo note    Nicole Dubon  4/18/2017

## 2017-04-18 NOTE — PROGRESS NOTES
[facility], [place]    Hematology / Oncology Progress Note    Date of Service (when I saw the patient): 04/18/2017     Assessment & Plan   Julio John is a 48 yo M PMH recurrent metastatic RCC (mets lungs) s/p nephrectomy & XRT, CVA 2010, A fib cardiovert 2012, BPH, Obesity, admitted for cycle 1 IL therapy.     # Metastatic clear cell renal cancer (pT3a, lung metastasis) s/p L nephrectomy (x3/16) & stereotactic XRT to lung nodules (x12/16) admitted for IL 2 therapy.  - Follow treatment plan below.  - Had intermittent HA, SOB with infusion, rigors, & testicle pain overnight, all resolved s/p dose.     # Therapy Plan Aldesleukin 64 million units q 8 hr (for 14 doses, or as long as tolerated).  - Pre med Zofran 8 mg q 8 & tylenol 650 mg q 4.  - Supportive care included ranitidine 150 mg BID, cephalexin 500 mg BID (D1-5 of thepray).  - PRN Meds (benadryl, meperidine, naproxen, compazine, ativan & lomotil).  - Follow IVF guidelines (D5&0.9NS + 20 k at 100 ml/hr with PRN bolus 250 ml SBP< 85 or low UOP).  - Daily weights, I&O q 8, NO steroids NO diuretics, VS q 4.  - Follow labs CBC, CMP, Mg, Ph, LDH, CK total, & daily blood cultures  (Cx sent 0604, tmax 99.7).    # Testicle pain on R.  Happened during IL 2 infusion, then resolved.  No edema, rash, lesion, ecchymosis noted.  - Monitor for recurrence, PO oxycodone PRN, hot packs.  Nursing wonders if it's r/t leg shaking during rigors induced trauma.     FEN  - IVF per protocol (UOP borderline, RN monitoring per protocol).  - Electrolyte s/s  - RDAT     PPX  - Ranitidine BID  - DVT/PE held r/t ambulatory patient & concern for TCP (plt 101).    Full Code     DISPO: d/c to home once acute toxicities 2/2 to IL 2 have resolved.     This plan of care has been discussed with Dr. Contreras.    Safia Wong  [facility]  [phone]  Hematology/Oncology  April 18, 2017    Interval History   Ujlio reports R testicle pain with infusion.  Reports  felt like shooting pain.  Used pain medication & hot packs to help.  (Per RN reports with his rigors his legs were shaking very bad & may have caused some trauma r/t shaking against sensitive area).  He also reports  HA last night that improved with pain medication (0.5 mg iv dilaudid).  He reports with the infusion he was very SOB but that it resolved once chilling started (used demerol PRN).  He denies dysuria (UOP overnight 250 ml/325 ml ida shift) being monitored by nursing. Tmax recorded was 99.7 F, with stable BP's, no O2 required.  No diarrhea, no N, no V.  Able to move around the room when not chilling.  Okay to proceed with further IL 2 doses.  Cr 1.21.      Physical Exam   Vitals:    04/17/17 0941 04/18/17 0918   Weight: 104.4 kg (230 lb 3.2 oz) 105.4 kg (232 lb 4.8 oz)     Vital Signs with Ranges  Temp:  [96.4  F (35.8  C)-99.7  F (37.6  C)] 99.5  F (37.5  C)  Pulse:  [] 103  Resp:  [18-20] 18  BP: ()/(50-93) 148/93  SpO2:  [94 %-98 %] 98 %  I/O last 3 completed shifts:  In: 2911 [P.O.:1100; I.V.:1811]  Out: 575 [Urine:575]    Constitutional: Awake, alert, cooperative, in NAD.  Eyes: PERRL, EOMI, sclera clear, conjunctiva normal.  ENT: Normocephalic, without obvious abnormality, sinuses nontender on palpation, oral pharynx with moist mucus membranes.  Respiratory: Non-labored breathing, good air exchange, clear to auscultation bilaterally, no crackles or wheezing.  Cardiovascular: RRR, no murmur noted.  GI: + bowel sounds, soft, non-distended, non-tender, no masses palpated, no hepatosplenomegaly.  Skin: No concerning lesions or rash on exposed areas.  No lesion/sores/bruising/edema noted on scrotum.  Musculoskeletal: No edema lucille LEs.  Neurologic: Awake, alert & oriented x3. Cranial nerves II-XII are grossly intact.   Psych: appropriate affect.    MEDS  Medications     dextrose 5% and 0.9% NaCl + KCl 20 mEq/L 100 mL/hr at 04/17/17 1442     D5W 1,000 mL (04/17/17 1539)     - MEDICATION  INSTRUCTIONS -       NaCl       - MEDICATION INSTRUCTIONS -         HYDROmorphone  0.5 mg Intravenous Once     eucerin   Topical BID     ondansetron  8 mg Intravenous Q8H     acetaminophen  650 mg Oral Q4H     ranitidine  150 mg Oral BID     cephalexin  500 mg Oral BID     aldesleukin (PROLEUKIN) infusion  600,000 Units/kg (Treatment Plan Recorded) Intravenous Q8H     sodium chloride (PF)  10 mL Intracatheter Q7 Days       LABS  Recent Labs   Lab Test  04/18/17   0604  04/17/17   0808   WBC  7.8  4.7   NEUTROPHIL  94.2  58.4   HGB  14.4  15.4   PLT  101*  161     Recent Labs   Lab Test  04/18/17   0604  04/17/17   0808   NA  137  141   POTASSIUM  3.7  3.9   CHLORIDE  109  109   CO2  21  24   ANIONGAP  7  8   BUN  14  20   CR  1.21  1.17   KVNG  7.6*  8.1*     Recent Labs   Lab Test  04/18/17   0604  04/17/17   0808   MAG  1.7  2.4*   PHOS  2.3*   --    LDH  147  167     Recent Labs   Lab Test  04/18/17   0604  04/17/17   0808   BILITOTAL  1.4*  0.5   ALKPHOS  45  70   ALT  21  26   AST  11  18   ALBUMIN  3.2*  3.6   LDH  147  167     CULTURES    Recent Labs   Lab Test  04/18/17   0604   CULT  Pending   SDES  Blood PURPLE PORT       IMAGING  No results found for this or any previous visit (from the past 24 hour(s)).

## 2017-04-18 NOTE — PROGRESS NOTES
Nursing Focus: Chemotherapy  D: Positive blood return via PICC. Insertion site is clean/dry/intact, dressing intact with no complaints of pain.  Urine output is recorded in intake in Doc Flowsheet.    I: Premedications given per order (see electronic medical administration record). Urine parameters met, VSS, labs WNL. Dose #4 of IL-2 started to infuse over 15 minutes. Reviewed pt teaching on chemotherapy side effects.  Pt denies need for further teaching. Chemotherapy double checked per protocol by two chemotherapy competent RN's.   A: Tolerating procedure well. Denies nausea and or pain.   P: Continue to monitor urine output and symptoms of nausea. Screen for symptoms of toxicity.

## 2017-04-19 ENCOUNTER — OFFICE VISIT (OUTPATIENT)
Dept: INTERPRETER SERVICES | Facility: CLINIC | Age: 47
End: 2017-04-19

## 2017-04-19 LAB
ALBUMIN SERPL-MCNC: 2.7 G/DL (ref 3.4–5)
ALBUMIN UR-MCNC: 30 MG/DL
ALP SERPL-CCNC: 46 U/L (ref 40–150)
ALT SERPL W P-5'-P-CCNC: 48 U/L (ref 0–70)
ANION GAP SERPL CALCULATED.3IONS-SCNC: 7 MMOL/L (ref 3–14)
APPEARANCE UR: CLEAR
AST SERPL W P-5'-P-CCNC: 41 U/L (ref 0–45)
BASOPHILS # BLD AUTO: 0 10E9/L (ref 0–0.2)
BASOPHILS NFR BLD AUTO: 0.1 %
BILIRUB SERPL-MCNC: 2.4 MG/DL (ref 0.2–1.3)
BILIRUB UR QL STRIP: ABNORMAL
BUN SERPL-MCNC: 10 MG/DL (ref 7–30)
CALCIUM SERPL-MCNC: 7 MG/DL (ref 8.5–10.1)
CHLORIDE SERPL-SCNC: 111 MMOL/L (ref 94–109)
CK SERPL-CCNC: 102 U/L (ref 30–300)
CO2 SERPL-SCNC: 21 MMOL/L (ref 20–32)
COLOR UR AUTO: ABNORMAL
CREAT SERPL-MCNC: 1.31 MG/DL (ref 0.66–1.25)
DIFFERENTIAL METHOD BLD: ABNORMAL
EOSINOPHIL # BLD AUTO: 0.3 10E9/L (ref 0–0.7)
EOSINOPHIL NFR BLD AUTO: 3.5 %
ERYTHROCYTE [DISTWIDTH] IN BLOOD BY AUTOMATED COUNT: 14 % (ref 10–15)
GFR SERPL CREATININE-BSD FRML MDRD: 59 ML/MIN/1.7M2
GLUCOSE SERPL-MCNC: 126 MG/DL (ref 70–99)
GLUCOSE UR STRIP-MCNC: NEGATIVE MG/DL
HCT VFR BLD AUTO: 41.3 % (ref 40–53)
HGB BLD-MCNC: 13.7 G/DL (ref 13.3–17.7)
HGB UR QL STRIP: ABNORMAL
IMM GRANULOCYTES # BLD: 0 10E9/L (ref 0–0.4)
IMM GRANULOCYTES NFR BLD: 0.3 %
KETONES UR STRIP-MCNC: 5 MG/DL
LACTATE BLD-SCNC: 1.4 MMOL/L (ref 0.7–2.1)
LDH SERPL L TO P-CCNC: 204 U/L (ref 85–227)
LEUKOCYTE ESTERASE UR QL STRIP: NEGATIVE
LYMPHOCYTES # BLD AUTO: 0.1 10E9/L (ref 0.8–5.3)
LYMPHOCYTES NFR BLD AUTO: 2 %
MAGNESIUM SERPL-MCNC: 1.7 MG/DL (ref 1.6–2.3)
MCH RBC QN AUTO: 28.6 PG (ref 26.5–33)
MCHC RBC AUTO-ENTMCNC: 33.2 G/DL (ref 31.5–36.5)
MCV RBC AUTO: 86 FL (ref 78–100)
MONOCYTES # BLD AUTO: 0.4 10E9/L (ref 0–1.3)
MONOCYTES NFR BLD AUTO: 5.1 %
NEUTROPHILS # BLD AUTO: 6.3 10E9/L (ref 1.6–8.3)
NEUTROPHILS NFR BLD AUTO: 89 %
NITRATE UR QL: NEGATIVE
NRBC # BLD AUTO: 0 10*3/UL
NRBC BLD AUTO-RTO: 0 /100
PH UR STRIP: 6 PH (ref 5–7)
PHOSPHATE SERPL-MCNC: 1.1 MG/DL (ref 2.5–4.5)
PHOSPHATE SERPL-MCNC: 1.2 MG/DL (ref 2.5–4.5)
PLATELET # BLD AUTO: 70 10E9/L (ref 150–450)
POTASSIUM SERPL-SCNC: 3.8 MMOL/L (ref 3.4–5.3)
PROT SERPL-MCNC: 5.5 G/DL (ref 6.8–8.8)
RBC # BLD AUTO: 4.79 10E12/L (ref 4.4–5.9)
SODIUM SERPL-SCNC: 139 MMOL/L (ref 133–144)
SP GR UR STRIP: 1.03 (ref 1–1.03)
URN SPEC COLLECT METH UR: ABNORMAL
UROBILINOGEN UR STRIP-MCNC: 2 MG/DL (ref 0–2)
WBC # BLD AUTO: 7.1 10E9/L (ref 4–11)

## 2017-04-19 PROCEDURE — 25000125 ZZHC RX 250: Performed by: NURSE PRACTITIONER

## 2017-04-19 PROCEDURE — 40000275 ZZH STATISTIC RCP TIME EA 10 MIN

## 2017-04-19 PROCEDURE — 25000125 ZZHC RX 250: Performed by: INTERNAL MEDICINE

## 2017-04-19 PROCEDURE — T1013 SIGN LANG/ORAL INTERPRETER: HCPCS | Mod: U3

## 2017-04-19 PROCEDURE — 83615 LACTATE (LD) (LDH) ENZYME: CPT | Performed by: NURSE PRACTITIONER

## 2017-04-19 PROCEDURE — 84100 ASSAY OF PHOSPHORUS: CPT | Performed by: NURSE PRACTITIONER

## 2017-04-19 PROCEDURE — 40000558 ZZH STATISTIC CVC DRESSING CHANGE

## 2017-04-19 PROCEDURE — 83735 ASSAY OF MAGNESIUM: CPT | Performed by: NURSE PRACTITIONER

## 2017-04-19 PROCEDURE — 82550 ASSAY OF CK (CPK): CPT | Performed by: NURSE PRACTITIONER

## 2017-04-19 PROCEDURE — 93010 ELECTROCARDIOGRAM REPORT: CPT | Performed by: INTERNAL MEDICINE

## 2017-04-19 PROCEDURE — 80053 COMPREHEN METABOLIC PANEL: CPT | Performed by: NURSE PRACTITIONER

## 2017-04-19 PROCEDURE — 25000132 ZZH RX MED GY IP 250 OP 250 PS 637: Performed by: INTERNAL MEDICINE

## 2017-04-19 PROCEDURE — 25000128 H RX IP 250 OP 636: Performed by: INTERNAL MEDICINE

## 2017-04-19 PROCEDURE — 99232 SBSQ HOSP IP/OBS MODERATE 35: CPT | Mod: 25 | Performed by: INTERNAL MEDICINE

## 2017-04-19 PROCEDURE — 36415 COLL VENOUS BLD VENIPUNCTURE: CPT | Performed by: INTERNAL MEDICINE

## 2017-04-19 PROCEDURE — 87040 BLOOD CULTURE FOR BACTERIA: CPT | Performed by: NURSE PRACTITIONER

## 2017-04-19 PROCEDURE — 81001 URINALYSIS AUTO W/SCOPE: CPT | Performed by: NURSE PRACTITIONER

## 2017-04-19 PROCEDURE — 36592 COLLECT BLOOD FROM PICC: CPT | Performed by: NURSE PRACTITIONER

## 2017-04-19 PROCEDURE — 12000008 ZZH R&B INTERMEDIATE UMMC

## 2017-04-19 PROCEDURE — 83605 ASSAY OF LACTIC ACID: CPT | Performed by: INTERNAL MEDICINE

## 2017-04-19 PROCEDURE — 85025 COMPLETE CBC W/AUTO DIFF WBC: CPT | Performed by: NURSE PRACTITIONER

## 2017-04-19 PROCEDURE — 25800025 ZZH RX 258: Performed by: INTERNAL MEDICINE

## 2017-04-19 PROCEDURE — 93005 ELECTROCARDIOGRAM TRACING: CPT

## 2017-04-19 RX ADMIN — MEPERIDINE HYDROCHLORIDE 25 MG: 25 INJECTION, SOLUTION INTRAMUSCULAR; INTRAVENOUS; SUBCUTANEOUS at 02:35

## 2017-04-19 RX ADMIN — Medication: at 07:43

## 2017-04-19 RX ADMIN — OXYCODONE HYDROCHLORIDE 5 MG: 5 TABLET ORAL at 17:07

## 2017-04-19 RX ADMIN — DIPHENOXYLATE HYDROCHLORIDE AND ATROPINE SULFATE 1 TABLET: 2.5; .025 TABLET ORAL at 14:42

## 2017-04-19 RX ADMIN — ONDANSETRON 8 MG: 2 INJECTION INTRAMUSCULAR; INTRAVENOUS at 00:17

## 2017-04-19 RX ADMIN — ACETAMINOPHEN 650 MG: 325 TABLET, FILM COATED ORAL at 00:16

## 2017-04-19 RX ADMIN — ACETAMINOPHEN 650 MG: 325 TABLET, FILM COATED ORAL at 11:47

## 2017-04-19 RX ADMIN — RANITIDINE 150 MG: 150 TABLET ORAL at 07:43

## 2017-04-19 RX ADMIN — POTASSIUM CHLORIDE, DEXTROSE MONOHYDRATE AND SODIUM CHLORIDE: 150; 5; 900 INJECTION, SOLUTION INTRAVENOUS at 04:35

## 2017-04-19 RX ADMIN — ACETAMINOPHEN 650 MG: 325 TABLET, FILM COATED ORAL at 04:28

## 2017-04-19 RX ADMIN — NAPROXEN 250 MG: 250 TABLET ORAL at 13:24

## 2017-04-19 RX ADMIN — ACETAMINOPHEN 650 MG: 325 TABLET, FILM COATED ORAL at 07:43

## 2017-04-19 RX ADMIN — MEPERIDINE HYDROCHLORIDE 25 MG: 25 INJECTION, SOLUTION INTRAMUSCULAR; INTRAVENOUS; SUBCUTANEOUS at 11:00

## 2017-04-19 RX ADMIN — ACETAMINOPHEN 650 MG: 325 TABLET, FILM COATED ORAL at 16:27

## 2017-04-19 RX ADMIN — RANITIDINE 150 MG: 150 TABLET ORAL at 19:59

## 2017-04-19 RX ADMIN — MEPERIDINE HYDROCHLORIDE 25 MG: 25 INJECTION, SOLUTION INTRAMUSCULAR; INTRAVENOUS; SUBCUTANEOUS at 10:24

## 2017-04-19 RX ADMIN — PROCHLORPERAZINE MALEATE 10 MG: 10 TABLET, FILM COATED ORAL at 19:59

## 2017-04-19 RX ADMIN — ACETAMINOPHEN 650 MG: 325 TABLET, FILM COATED ORAL at 19:59

## 2017-04-19 RX ADMIN — CEPHALEXIN 500 MG: 500 CAPSULE ORAL at 19:59

## 2017-04-19 RX ADMIN — SODIUM PHOSPHATE, MONOBASIC, MONOHYDRATE AND SODIUM PHOSPHATE, DIBASIC, ANHYDROUS 20 MMOL: 276; 142 INJECTION, SOLUTION INTRAVENOUS at 09:26

## 2017-04-19 RX ADMIN — OXYCODONE HYDROCHLORIDE 5 MG: 5 TABLET ORAL at 09:03

## 2017-04-19 RX ADMIN — OXYCODONE HYDROCHLORIDE 5 MG: 5 TABLET ORAL at 02:45

## 2017-04-19 RX ADMIN — DIPHENOXYLATE HYDROCHLORIDE AND ATROPINE SULFATE 1 TABLET: 2.5; .025 TABLET ORAL at 19:59

## 2017-04-19 RX ADMIN — MEPERIDINE HYDROCHLORIDE 25 MG: 25 INJECTION, SOLUTION INTRAMUSCULAR; INTRAVENOUS; SUBCUTANEOUS at 03:01

## 2017-04-19 RX ADMIN — MEPERIDINE HYDROCHLORIDE 25 MG: 25 INJECTION, SOLUTION INTRAMUSCULAR; INTRAVENOUS; SUBCUTANEOUS at 18:51

## 2017-04-19 RX ADMIN — ONDANSETRON 8 MG: 2 INJECTION INTRAMUSCULAR; INTRAVENOUS at 16:27

## 2017-04-19 RX ADMIN — DIPHENOXYLATE HYDROCHLORIDE AND ATROPINE SULFATE 1 TABLET: 2.5; .025 TABLET ORAL at 07:43

## 2017-04-19 RX ADMIN — ONDANSETRON 8 MG: 2 INJECTION INTRAMUSCULAR; INTRAVENOUS at 07:43

## 2017-04-19 RX ADMIN — Medication: at 20:00

## 2017-04-19 RX ADMIN — ALDESLEUKIN 64 MILLION UNITS: 1.1 INJECTION, POWDER, LYOPHILIZED, FOR SOLUTION INTRAVENOUS at 09:03

## 2017-04-19 RX ADMIN — DIPHENOXYLATE HYDROCHLORIDE AND ATROPINE SULFATE 1 TABLET: 2.5; .025 TABLET ORAL at 10:38

## 2017-04-19 RX ADMIN — SODIUM PHOSPHATE, MONOBASIC, MONOHYDRATE AND SODIUM PHOSPHATE, DIBASIC, ANHYDROUS 20 MMOL: 276; 142 INJECTION, SOLUTION INTRAVENOUS at 20:04

## 2017-04-19 RX ADMIN — MEPERIDINE HYDROCHLORIDE 25 MG: 25 INJECTION, SOLUTION INTRAMUSCULAR; INTRAVENOUS; SUBCUTANEOUS at 18:16

## 2017-04-19 RX ADMIN — ALDESLEUKIN 64 MILLION UNITS: 1.1 INJECTION, POWDER, LYOPHILIZED, FOR SOLUTION INTRAVENOUS at 01:01

## 2017-04-19 RX ADMIN — POTASSIUM CHLORIDE, DEXTROSE MONOHYDRATE AND SODIUM CHLORIDE: 150; 5; 900 INJECTION, SOLUTION INTRAVENOUS at 14:42

## 2017-04-19 RX ADMIN — ALDESLEUKIN 64 MILLION UNITS: 1.1 INJECTION, POWDER, LYOPHILIZED, FOR SOLUTION INTRAVENOUS at 17:12

## 2017-04-19 RX ADMIN — CEPHALEXIN 500 MG: 500 CAPSULE ORAL at 07:43

## 2017-04-19 ASSESSMENT — PAIN DESCRIPTION - DESCRIPTORS
DESCRIPTORS: ACHING
DESCRIPTORS: HEADACHE;ACHING
DESCRIPTORS: ACHING

## 2017-04-19 NOTE — PLAN OF CARE
Problem: Goal Outcome Summary  Goal: Goal Outcome Summary  Outcome: No Change  Tmax 103.3. HR tachy with rigors. Received dose #4 of IL-2. Pt rigored 1.5hrs after. Demerol x2 required. Pt c/o slight nausea; scheduled Zofran given with relief. Crackles heard in the lung bases. Triggered SIRS; LA was 1.0. Pt is starting to have loose stools now. Pt knows we need to measure these stools. During rigors, pt gets a headache and pain in various areas. Oxycodone was ordered to try with next dose of IL-2 to see if that helps. Skin is reddened and starting to itch; aquaphor provided. Pt has met parameters for dose #5. Dose ordered from pharmacy. Wears oxygen intermittently. Cont with POC.

## 2017-04-19 NOTE — PLAN OF CARE
Problem: Goal Outcome Summary  Goal: Goal Outcome Summary  Alert and oriented X 4. Dose # 5 IL-2 given at 0100. Tolerated infusion well. Developed SOB, chills, rigors and headache 1.5 hours after dose. Used Willian Hugger warming blanket. Meperidine given X 2. Oxygen at 2 LPM/NC during IL-2 reaction. T-max 101.6. Pulse 132. Denied dizziness, chest pain or pressure. Physician updated. Voiding adequately. Had large loose stool. Slept in between cares.

## 2017-04-19 NOTE — PLAN OF CARE
Problem: Chemotherapy Effects (Adult)  Goal: Signs and Symptoms of Listed Potential Problems Will be Absent or Manageable (Chemotherapy Effects)  Signs and symptoms of listed potential problems will be absent or manageable by discharge/transition of care (reference Chemotherapy Effects (Adult) CPG).   Outcome: No Change     Received dose #6 IL-2 at 0900 (see chemo note), rigors about 1.5hrs after dose requiring demerol x2. Pt c/o ear, molar, and chest pain during rxt, MD's aware, tried giving PO oxycodone x1 w/ dose IL-2 to prevent headache/pains, provided little relief. Tmax 101.9, naproxen x1. Tachycardic to 125 after rxt. BP's /60. Meeting urine parameters for dose #7 IL-2 due at 1700, 260mL U/O so far. Urine this AM was red/blood tinged, MD's aware, UA/UC sent. Had 1 soft BM and 2 loose BMs (110mL loose BM total), lomotil given x3, please continue to offer. Pt w/ little PO intake but continues taking frequent sips water. Phos replaced for 1.1, recheck ordered for 1530. Platelets down to 70k, MD's aware. Continue to monitor and w/ POC.     Temp trending down after naproxen, 101.1. Meeting all other parameters for next dose IL-2, ordered from pharmacy.

## 2017-04-19 NOTE — PROGRESS NOTES
[facility], [place]    Hematology / Oncology Progress Note    Date of Service (when I saw the patient): 04/19/2017     Assessment & Plan   Julio John is a 48 yo M PMH recurrent metastatic RCC (mets lungs) s/p nephrectomy & XRT, CVA 2010, A fib cardiovert 2012, BPH, Obesity, admitted for cycle 1 IL therapy.     # Metastatic clear cell renal cancer (pT3a, lung metastasis) s/p L nephrectomy (x3/16) & stereotactic XRT to lung nodules (x12/16) admitted for IL 2 therapy.  - Follow treatment plan below.  - Has HA, testicle pain, rigors 90 minutes s/p infusion.  Also having some loose stool, will use imodium today.  Fever as high as 103.3 yesterday, BP stable 90's/50's.  On RA.  Symptoms resolve after demerol & oxycodone are given.  Continue to watch Cr it's climbing as anticipated 1.31 today.     # Therapy Plan Aldesleukin 64 million units q 8 hr (for 14 doses, or as long as tolerated).  - Pre med Zofran 8 mg q 8 & tylenol 650 mg q 4.  - Supportive care included ranitidine 150 mg BID, cephalexin 500 mg BID (D1-5 of thepray).  - PRN Meds (benadryl, meperidine, naproxen, compazine, ativan & lomotil).  - Follow IVF guidelines (D5&0.9NS + 20 k at 100 ml/hr with PRN bolus 250 ml SBP< 85 or low UOP).  - Daily weights (wt up slightly yesterday 104.4adimt /105.4), I&O q 8, NO steroids NO diuretics, VS q 4.  - Follow labs CBC, CMP, Mg, Ph, LDH, CK total (trending up 90/102), & daily blood cultures  (Cx sent 4/19, tmax 103.3).    # Testicle pain on R.  Happens with Rigors s/p IL 2 infusion 90 minutes, then resolves.  No edema, rash, lesion, ecchymosis noted.  - Monitor for recurrence, PO oxycodone PRN, hot packs.  Nursing wonders if it's r/t leg shaking during rigors induced trauma.    # Red Urine.  - Think r/t low plt (70 today) will send a UA/UC.  No dysuria reports.  Cr is up at 1.31.     FEN  - IVF per protocol (UOP good  ml, RN monitoring per protocol).  - Electrolyte s/s  -  RDAT     PPX  - Ranitidine BID  - DVT/PE held r/t ambulatory patient & concern for TCP (plt 70).    Full Code     DISPO: d/c to home once acute toxicities 2/2 to IL 2 have resolved.     This plan of care has been discussed with Dr. Contreras.    Safia Wong  [facility]  [phone]  Hematology/Oncology  April 19, 2017    Interval History   Julio reports coming up on his 6th dose this AM.  He reports HA with the rigors, 2 loose stools (RN brining imodium this AM), testicle pain, molar pain.  He was curious about his kidney function (Cr up to 1.31).  He reports his symptoms resolve with time (after the dose, the rigors start about 90 minutes s/p dose and last until he gets demerol, the HA abates with Oxycodone).  He has no abdominal pain/cramping, just loose stool.  He reports still eating/good appetite (grapes & fluids present at bedside).  Asked how long he should continue, I told him he can go up to 14 doses as he tolerates.  He'd like to continue, he's hopeful with more doses the IL 2 will attack the disease in his lungs.  He lives in [place], had a Curse Business, he's [place] [facility] [place] he has 7 kids (from 21-6 mo old).  He wants to live many more years and have more children.    Physical Exam   Vitals:    04/17/17 0941 04/18/17 0918 04/19/17 0745   Weight: 104.4 kg (230 lb 3.2 oz) 105.4 kg (232 lb 4.8 oz) 107.1 kg (236 lb 1.6 oz)     Vital Signs with Ranges  Temp:  [97.6  F (36.4  C)-103.3  F (39.6  C)] 99.8  F (37.7  C)  Pulse:  [] 136  Heart Rate:  [] 105  Resp:  [16-20] 20  BP: ()/(43-93) 97/63  SpO2:  [94 %-98 %] 96 %  I/O last 3 completed shifts:  In: 3450 [P.O.:300; I.V.:2855; IV Piggyback:295]  Out: 2660 [Urine:2560; Stool:100]    Constitutional: Awake, alert, cooperative, in NAD, sitting up in bed, animated with discussion.  Eyes: PERRL, EOMI, sclera clear, conjunctiva normal.  ENT: Normocephalic, without obvious abnormality, sinuses nontender on palpation,  oral pharynx with moist mucus membranes.  Respiratory: Non-labored breathing, good air exchange, clear to auscultation bilaterally, no crackles or wheezing.  Cardiovascular: RRR, no murmur noted.  GI: + bowel sounds, soft, non-distended, non-tender, no masses palpated, no hepatosplenomegaly.  Skin: No concerning lesions or rash on exposed areas.  Musculoskeletal: No edema lucille LEs.  Neurologic: Awake, alert & oriented x3. Cranial nerves II-XII are grossly intact.   Psych: appropriate affect.    MEDS  Medications     dextrose 5% and 0.9% NaCl + KCl 20 mEq/L 100 mL/hr at 04/19/17 0435     D5W 1,000 mL (04/18/17 1623)     - MEDICATION INSTRUCTIONS -       NaCl       - MEDICATION INSTRUCTIONS -         HYDROmorphone  0.5 mg Intravenous Once     eucerin   Topical BID     ondansetron  8 mg Intravenous Q8H     acetaminophen  650 mg Oral Q4H     ranitidine  150 mg Oral BID     cephalexin  500 mg Oral BID     aldesleukin (PROLEUKIN) infusion  600,000 Units/kg (Treatment Plan Recorded) Intravenous Q8H     sodium chloride (PF)  10 mL Intracatheter Q7 Days       LABS  Recent Labs   Lab Test  04/18/17   0604  04/17/17   0808   WBC  7.8  4.7   NEUTROPHIL  94.2  58.4   HGB  14.4  15.4   PLT  101*  161     Recent Labs   Lab Test  04/18/17   0604  04/17/17   0808   NA  137  141   POTASSIUM  3.7  3.9   CHLORIDE  109  109   CO2  21  24   ANIONGAP  7  8   BUN  14  20   CR  1.21  1.17   KVNG  7.6*  8.1*     Recent Labs   Lab Test  04/18/17   0604  04/17/17   0808   MAG  1.7  2.4*   PHOS  2.3*   --    LDH  147  167     Recent Labs   Lab Test  04/18/17   0604  04/17/17   0808   BILITOTAL  1.4*  0.5   ALKPHOS  45  70   ALT  21  26   AST  11  18   ALBUMIN  3.2*  3.6   LDH  147  167     CULTURES    Recent Labs   Lab Test  04/19/17   0548  04/18/17   0604   CULT  No growth after 1 hour  No growth after 1 day   SDES  Blood  Blood PURPLE PORT       IMAGING  No results found for this or any previous visit (from the past 24 hour(s)).

## 2017-04-19 NOTE — PROGRESS NOTES
Nursing Focus: Chemotherapy  D: Positive blood return via PICC. Insertion site is clean/dry/intact, dressing intact with no complaints of pain.  Urine output is recorded in intake in Doc Flowsheet.    I: Premedications given per order (see electronic medical administration record). Dose #6 of IL-2 started to infuse over 15 minutes. Reviewed pt teaching on chemotherapy side effects.  Pt denies need for further teaching. Chemotherapy double checked per protocol by two chemotherapy competent RN's.   A: Tolerating procedure well. Denies nausea and or pain.   P: Continue to monitor urine output and symptoms of nausea. Screen for symptoms of toxicity.

## 2017-04-20 ENCOUNTER — CARE COORDINATION (OUTPATIENT)
Dept: CARDIOLOGY | Facility: CLINIC | Age: 47
End: 2017-04-20

## 2017-04-20 ENCOUNTER — OFFICE VISIT (OUTPATIENT)
Dept: INTERPRETER SERVICES | Facility: CLINIC | Age: 47
End: 2017-04-20

## 2017-04-20 VITALS
SYSTOLIC BLOOD PRESSURE: 118 MMHG | DIASTOLIC BLOOD PRESSURE: 60 MMHG | RESPIRATION RATE: 18 BRPM | BODY MASS INDEX: 39.1 KG/M2 | WEIGHT: 243.3 LBS | HEIGHT: 66 IN | HEART RATE: 123 BPM | OXYGEN SATURATION: 96 % | TEMPERATURE: 97.8 F

## 2017-04-20 LAB
ALBUMIN SERPL-MCNC: 2.5 G/DL (ref 3.4–5)
ALP SERPL-CCNC: 36 U/L (ref 40–150)
ALT SERPL W P-5'-P-CCNC: 109 U/L (ref 0–70)
ANION GAP SERPL CALCULATED.3IONS-SCNC: 8 MMOL/L (ref 3–14)
AST SERPL W P-5'-P-CCNC: 93 U/L (ref 0–45)
BASOPHILS # BLD AUTO: 0 10E9/L (ref 0–0.2)
BASOPHILS NFR BLD AUTO: 0 %
BILIRUB SERPL-MCNC: 3.7 MG/DL (ref 0.2–1.3)
BUN SERPL-MCNC: 13 MG/DL (ref 7–30)
CALCIUM SERPL-MCNC: 6.6 MG/DL (ref 8.5–10.1)
CHLORIDE SERPL-SCNC: 110 MMOL/L (ref 94–109)
CK SERPL-CCNC: 154 U/L (ref 30–300)
CO2 SERPL-SCNC: 18 MMOL/L (ref 20–32)
CREAT SERPL-MCNC: 1.48 MG/DL (ref 0.66–1.25)
DIFFERENTIAL METHOD BLD: ABNORMAL
EOSINOPHIL # BLD AUTO: 0.1 10E9/L (ref 0–0.7)
EOSINOPHIL NFR BLD AUTO: 3.8 %
ERYTHROCYTE [DISTWIDTH] IN BLOOD BY AUTOMATED COUNT: 14.3 % (ref 10–15)
GFR SERPL CREATININE-BSD FRML MDRD: 51 ML/MIN/1.7M2
GLUCOSE BLDC GLUCOMTR-MCNC: 97 MG/DL (ref 70–99)
GLUCOSE SERPL-MCNC: 109 MG/DL (ref 70–99)
HCT VFR BLD AUTO: 38.9 % (ref 40–53)
HGB BLD-MCNC: 13 G/DL (ref 13.3–17.7)
IMM GRANULOCYTES # BLD: 0 10E9/L (ref 0–0.4)
IMM GRANULOCYTES NFR BLD: 0 %
INTERPRETATION ECG - MUSE: NORMAL
LDH SERPL L TO P-CCNC: 277 U/L (ref 85–227)
LYMPHOCYTES # BLD AUTO: 0.1 10E9/L (ref 0.8–5.3)
LYMPHOCYTES NFR BLD AUTO: 3 %
MAGNESIUM SERPL-MCNC: 1.5 MG/DL (ref 1.6–2.3)
MCH RBC QN AUTO: 28.6 PG (ref 26.5–33)
MCHC RBC AUTO-ENTMCNC: 33.4 G/DL (ref 31.5–36.5)
MCV RBC AUTO: 86 FL (ref 78–100)
MONOCYTES # BLD AUTO: 0.1 10E9/L (ref 0–1.3)
MONOCYTES NFR BLD AUTO: 3.5 %
NEUTROPHILS # BLD AUTO: 3.3 10E9/L (ref 1.6–8.3)
NEUTROPHILS NFR BLD AUTO: 89.7 %
NRBC # BLD AUTO: 0 10*3/UL
NRBC BLD AUTO-RTO: 0 /100
PHOSPHATE SERPL-MCNC: 1.4 MG/DL (ref 2.5–4.5)
PLATELET # BLD AUTO: 43 10E9/L (ref 150–450)
POTASSIUM SERPL-SCNC: 3.7 MMOL/L (ref 3.4–5.3)
PROT SERPL-MCNC: 5 G/DL (ref 6.8–8.8)
RBC # BLD AUTO: 4.54 10E12/L (ref 4.4–5.9)
SODIUM SERPL-SCNC: 136 MMOL/L (ref 133–144)
WBC # BLD AUTO: 3.7 10E9/L (ref 4–11)

## 2017-04-20 PROCEDURE — 25000132 ZZH RX MED GY IP 250 OP 250 PS 637: Performed by: INTERNAL MEDICINE

## 2017-04-20 PROCEDURE — 83735 ASSAY OF MAGNESIUM: CPT | Performed by: NURSE PRACTITIONER

## 2017-04-20 PROCEDURE — 25000125 ZZHC RX 250: Performed by: NURSE PRACTITIONER

## 2017-04-20 PROCEDURE — 25000132 ZZH RX MED GY IP 250 OP 250 PS 637: Performed by: NURSE PRACTITIONER

## 2017-04-20 PROCEDURE — 25800025 ZZH RX 258: Performed by: INTERNAL MEDICINE

## 2017-04-20 PROCEDURE — 99238 HOSP IP/OBS DSCHRG MGMT 30/<: CPT | Performed by: INTERNAL MEDICINE

## 2017-04-20 PROCEDURE — 83615 LACTATE (LD) (LDH) ENZYME: CPT | Performed by: NURSE PRACTITIONER

## 2017-04-20 PROCEDURE — 40000802 ZZH SITE CHECK

## 2017-04-20 PROCEDURE — 25000128 H RX IP 250 OP 636: Performed by: INTERNAL MEDICINE

## 2017-04-20 PROCEDURE — 85025 COMPLETE CBC W/AUTO DIFF WBC: CPT | Performed by: NURSE PRACTITIONER

## 2017-04-20 PROCEDURE — 87040 BLOOD CULTURE FOR BACTERIA: CPT | Performed by: NURSE PRACTITIONER

## 2017-04-20 PROCEDURE — 25000128 H RX IP 250 OP 636: Performed by: NURSE PRACTITIONER

## 2017-04-20 PROCEDURE — 36592 COLLECT BLOOD FROM PICC: CPT | Performed by: NURSE PRACTITIONER

## 2017-04-20 PROCEDURE — T1013 SIGN LANG/ORAL INTERPRETER: HCPCS | Mod: U3

## 2017-04-20 PROCEDURE — 84100 ASSAY OF PHOSPHORUS: CPT | Performed by: NURSE PRACTITIONER

## 2017-04-20 PROCEDURE — 80053 COMPREHEN METABOLIC PANEL: CPT | Performed by: NURSE PRACTITIONER

## 2017-04-20 PROCEDURE — 82550 ASSAY OF CK (CPK): CPT | Performed by: NURSE PRACTITIONER

## 2017-04-20 PROCEDURE — 00000146 ZZHCL STATISTIC GLUCOSE BY METER IP

## 2017-04-20 PROCEDURE — 25000125 ZZHC RX 250: Performed by: INTERNAL MEDICINE

## 2017-04-20 RX ORDER — DEXTROSE MONOHYDRATE, SODIUM CHLORIDE, AND POTASSIUM CHLORIDE 50; 1.49; 9 G/1000ML; G/1000ML; G/1000ML
INJECTION, SOLUTION INTRAVENOUS CONTINUOUS
Status: DISCONTINUED | OUTPATIENT
Start: 2017-04-20 | End: 2017-04-20 | Stop reason: HOSPADM

## 2017-04-20 RX ORDER — DIPHENOXYLATE HCL/ATROPINE 2.5-.025MG
1 TABLET ORAL 4 TIMES DAILY PRN
Qty: 40 TABLET | Refills: 0 | Status: SHIPPED | OUTPATIENT
Start: 2017-04-20 | End: 2017-05-09

## 2017-04-20 RX ORDER — ONDANSETRON 4 MG/1
8 TABLET, FILM COATED ORAL EVERY 8 HOURS PRN
Qty: 18 TABLET | Refills: 0 | Status: SHIPPED | OUTPATIENT
Start: 2017-04-20 | End: 2022-01-01

## 2017-04-20 RX ORDER — PROCHLORPERAZINE MALEATE 10 MG
10 TABLET ORAL EVERY 6 HOURS PRN
Qty: 30 TABLET | Refills: 0 | Status: SHIPPED | OUTPATIENT
Start: 2017-04-20 | End: 2022-01-01

## 2017-04-20 RX ADMIN — POTASSIUM CHLORIDE, DEXTROSE MONOHYDRATE AND SODIUM CHLORIDE: 150; 5; 900 INJECTION, SOLUTION INTRAVENOUS at 01:45

## 2017-04-20 RX ADMIN — RANITIDINE 150 MG: 150 TABLET ORAL at 08:05

## 2017-04-20 RX ADMIN — MEPERIDINE HYDROCHLORIDE 25 MG: 25 INJECTION, SOLUTION INTRAMUSCULAR; INTRAVENOUS; SUBCUTANEOUS at 03:50

## 2017-04-20 RX ADMIN — ONDANSETRON 8 MG: 2 INJECTION INTRAMUSCULAR; INTRAVENOUS at 01:07

## 2017-04-20 RX ADMIN — POTASSIUM CHLORIDE, DEXTROSE MONOHYDRATE AND SODIUM CHLORIDE: 150; 5; 900 INJECTION, SOLUTION INTRAVENOUS at 00:58

## 2017-04-20 RX ADMIN — SODIUM PHOSPHATE, MONOBASIC, MONOHYDRATE AND SODIUM PHOSPHATE, DIBASIC, ANHYDROUS 20 MMOL: 276; 142 INJECTION, SOLUTION INTRAVENOUS at 09:54

## 2017-04-20 RX ADMIN — OXYCODONE HYDROCHLORIDE 5 MG: 5 TABLET ORAL at 02:34

## 2017-04-20 RX ADMIN — DIPHENOXYLATE HYDROCHLORIDE AND ATROPINE SULFATE 1 TABLET: 2.5; .025 TABLET ORAL at 02:34

## 2017-04-20 RX ADMIN — ACETAMINOPHEN 650 MG: 325 TABLET, FILM COATED ORAL at 11:56

## 2017-04-20 RX ADMIN — CEPHALEXIN 500 MG: 500 CAPSULE ORAL at 08:05

## 2017-04-20 RX ADMIN — ONDANSETRON 8 MG: 2 INJECTION INTRAMUSCULAR; INTRAVENOUS at 07:32

## 2017-04-20 RX ADMIN — DIPHENOXYLATE HYDROCHLORIDE AND ATROPINE SULFATE 1 TABLET: 2.5; .025 TABLET ORAL at 09:54

## 2017-04-20 RX ADMIN — MAGNESIUM SULFATE IN WATER 4 G: 40 INJECTION, SOLUTION INTRAVENOUS at 07:32

## 2017-04-20 RX ADMIN — ALDESLEUKIN 64 MILLION UNITS: 1.1 INJECTION, POWDER, LYOPHILIZED, FOR SOLUTION INTRAVENOUS at 02:24

## 2017-04-20 RX ADMIN — SODIUM CHLORIDE 250 ML: 9 INJECTION, SOLUTION INTRAVENOUS at 08:09

## 2017-04-20 RX ADMIN — ACETAMINOPHEN 650 MG: 325 TABLET, FILM COATED ORAL at 06:28

## 2017-04-20 RX ADMIN — Medication 1 LOZENGE: at 10:40

## 2017-04-20 RX ADMIN — PROCHLORPERAZINE EDISYLATE 10 MG: 5 INJECTION INTRAMUSCULAR; INTRAVENOUS at 02:52

## 2017-04-20 RX ADMIN — ACETAMINOPHEN 650 MG: 325 TABLET, FILM COATED ORAL at 01:07

## 2017-04-20 RX ADMIN — LORAZEPAM 0.5 MG: 2 INJECTION INTRAMUSCULAR; INTRAVENOUS at 05:53

## 2017-04-20 NOTE — PLAN OF CARE
Problem: Goal Outcome Summary  Goal: Goal Outcome Summary  Outcome: No Change  Tmax 102.7. Came down with scheduled tylenol. HR tachy. Triggered sepsis; LA was 1.4. Received dose #7 of IL-2. Oxycodone given with dose to try and eliminate pain with rigors. Pt unfortunately still experienced the same pain with rigors. Demerol given about 1.5hrs after dose and required an additional dose to alleviate them. PRN compazine given for nausea. Did not eat much of anything this shift. Phos recheck from day shift was 1.2; Phos is currently being replaced. Recheck with AM labs. Lomotil given for loose stools. Urine is orange/red in color. Pt has met parameters for dose #8; IL-2 ordered. Pt feels unwell now. He is more fatigued and feeling the effects of this but wants to continue on. Pt reported feeling dizzy this evening; instructed to call when needing to get up. VS were stable at that time. Pt is now resting. Cont to monitor and with POC.

## 2017-04-20 NOTE — PROGRESS NOTES
[facility], [place]    Hematology / Oncology Progress Note    Date of Service (when I saw the patient): 04/20/2017     Assessment & Plan   Julio John is a 46 yo M PMH recurrent metastatic RCC (mets lungs) s/p nephrectomy & XRT, CVA 2010, A fib cardiovert 2012, BPH, Obesity, admitted for cycle 1 IL therapy.     # Metastatic clear cell renal cancer (pT3a, lung metastasis) s/p L nephrectomy (x3/16) & stereotactic XRT to lung nodules (x12/16) admitted for IL 2 therapy Able to complete 8 doses total.  - Side effects included testicle pain, rigors 90 minutes s/p infusion.  Loose stool, using imodium.  Fevers as high as 103.3, BP soft 2/2 to capillary leak.  On RA, no crackles on exam.  Headaches.  N/V.  Throat pain.  Supportive care continues, will likely stay 4/20 to ensure resolution of symptoms r/t IL 2.       # Therapy Plan Aldesleukin 64 million units q 8 hr (completed 8 doses).  - Pre med Zofran 8 mg q 8 & tylenol 650 mg q 4.  - Supportive care included ranitidine 150 mg BID, cephalexin 500 mg BID (D1-5 of thepray).  - PRN Meds (benadryl, meperidine, naproxen, compazine, ativan & lomotil).  - Follow IVF guidelines (D5&0.9NS + 20 k at 100 ml/hr with PRN bolus 250 ml SBP< 85 or low UOP).  - Daily weights (wt up slightly yesterday 104.4adimt /105.4), I&O q 8, NO steroids NO diuretics, VS q 4.  - Follow labs CBC, CMP, Mg, Ph, LDH, CK total, & daily blood cultures.  Seeing elevation in Cr, Liver enzymes.    # Testicle pain on R.  Happens with Rigors s/p IL 2 infusion 90 minutes, then resolves.  No edema, rash, lesion, ecchymosis noted.  - Monitor for recurrence, PO oxycodone PRN, hot packs.  Nursing wonders if it's r/t leg shaking during rigors induced trauma.    # Red Urine.  - Think r/t low plt (43 today) will send a UA/UC--no infection Cx NTD.  No dysuria reports.  Cr is up at 1.48.     FEN  - IVF per protocol (UOP good  ml, RN monitoring per protocol).  - Electrolyte  s/s.  - RDAT.     PPX  - Ranitidine BID.  - DVT/PE held r/t ambulatory patient & concern for TCP (plt 43).    Full Code     DISPO: d/c to home likely tomorrow, Julio had significant symptoms and poor sleep overnight.  I'd like to see him up walking around & eating better prior to d/c.     This plan of care has been discussed with Dr. Contreras.    Safia gage  [facility]  [phone]  Hematology/Oncology  April 20, 2017    Interval History   Julio reports a multitude of symptoms overnight.  SOB, palpitations, nausea, vomiting, diarrhea, rigors, headache, tooth pain, throat pain, testicle pain.  All of these symptoms prevented him from getting any good rest.  Reports he's mostly just tired this AM with a sore throat (lozenge offered).  He wants to sleep.  Reports poor PO intake last ida/AM r/t N/V.  He wants to try and get up and move around later this afternoon after he gets a nap.  He wants his PICC line out so he can be more mobile & shower.  We discussed going home today vs. Tomorrow, but that the goal is to see him up & moving around, eating & drinking well, with symptoms appropriately managed.    Physical Exam   Vitals:    04/17/17 0941 04/18/17 0918 04/19/17 0745 04/20/17 0901   Weight: 104.4 kg (230 lb 3.2 oz) 105.4 kg (232 lb 4.8 oz) 107.1 kg (236 lb 1.6 oz) 110.4 kg (243 lb 4.8 oz)     Vital Signs with Ranges  Temp:  [97.4  F (36.3  C)-102.7  F (39.3  C)] 97.8  F (36.6  C)  Pulse:  [123-127] 123  Heart Rate:  [] 104  Resp:  [18-22] 18  BP: ()/(41-67) 118/60  SpO2:  [95 %-98 %] 96 %  I/O last 3 completed shifts:  In: 5473 [P.O.:1920; I.V.:3435; IV Piggyback:118]  Out: 1965 [Urine:1330; Emesis/NG output:180; Stool:455]    Constitutional: Awake, alert, cooperative, in NAD, sitting up in bed, animated with discussion.  Eyes: PERRL, EOMI, sclera clear, conjunctiva normal.  ENT: Normocephalic, without obvious abnormality, sinuses nontender on palpation, oral pharynx with moist mucus  membranes.  Respiratory: Non-labored breathing, good air exchange, clear to auscultation bilaterally, no crackles or wheezing.  Cardiovascular: RRR, no murmur noted.  GI: + bowel sounds, soft, non-distended, non-tender, no masses palpated, no hepatosplenomegaly.  Skin: No concerning lesions or rash on exposed areas.  Musculoskeletal: No edema lucille LEs.  Neurologic: Awake, alert & oriented x3. Cranial nerves II-XII are grossly intact.   Psych: appropriate affect.    MEDS  Medications     dextrose 5% and 0.9% NaCl + KCl 20 mEq/L 100 mL/hr at 04/20/17 0145     - MEDICATION INSTRUCTIONS -         eucerin   Topical BID     ondansetron  8 mg Intravenous Q8H     acetaminophen  650 mg Oral Q4H     ranitidine  150 mg Oral BID     cephalexin  500 mg Oral BID       LABS  Recent Labs   Lab Test  04/18/17   0604  04/17/17   0808   WBC  7.8  4.7   NEUTROPHIL  94.2  58.4   HGB  14.4  15.4   PLT  101*  161     Recent Labs   Lab Test  04/18/17   0604  04/17/17   0808   NA  137  141   POTASSIUM  3.7  3.9   CHLORIDE  109  109   CO2  21  24   ANIONGAP  7  8   BUN  14  20   CR  1.21  1.17   KVNG  7.6*  8.1*     Recent Labs   Lab Test  04/18/17   0604  04/17/17   0808   MAG  1.7  2.4*   PHOS  2.3*   --    LDH  147  167     Recent Labs   Lab Test  04/18/17   0604  04/17/17   0808   BILITOTAL  1.4*  0.5   ALKPHOS  45  70   ALT  21  26   AST  11  18   ALBUMIN  3.2*  3.6   LDH  147  167     CULTURES    Recent Labs   Lab Test  04/20/17   0553  04/19/17   0548  04/18/17   0604   CULT  Pending  No growth after 22 hours  No growth after 2 days   SDES  Blood Unspecified Site  Blood Unspecified Site  Blood PURPLE PORT       IMAGING  No results found for this or any previous visit (from the past 24 hour(s)).

## 2017-04-20 NOTE — PLAN OF CARE
Problem: Goal Outcome Summary  Goal: Goal Outcome Summary  Dose # 8 IL-2 given at 0224. Tolerated infusion well. Oxycodone given preemptively for pain during rigors. Developed chills and rigors about 1 hour and 25 minutes after IL-2 dose. Used Willian Hugger warming blanket and one dose of meperidine. Has struggled with nausea most of the night. Had 180 mL emesis consisting of water and partially digested banana . Used scheduled Zofran as well as prn compazine and lorazepam. Reports relief after lorazepam.  Had 100 mL of tan-coloured, loose stool and a small soft stool. Lomotil given. Will continue to monitor nausea, s/sx of capillary syndrome as well as any complications from pancytopenia. Bed alarm on d/t pt receiving IL-2 and multiple sedating medications.

## 2017-04-20 NOTE — PROGRESS NOTES
"[facility]  \"Hello, my name is Delia Colbert , and I am calling from the [facility].  I want to check in and see how you are doing, after leaving the hospital.  You may also receive a call from your Care Coordinator (care team), but I want to make sure you don t have any urgent needs.  I have a couple questions to review with you:     Post-Discharge Outreach                                                    Julio John is a 47 year old male       Follow-up Appointments           [facility] Follow-up and recommended labs and tests       Scheduled for next week with ANA + labs.                  Apr 25, 2017 12:45 PM CDT   [facility] with  [facility]   [facility] ([facility]     [address]  [address] [facility] [address] [phone]                 Apr 25, 2017 1:20 PM CDT   (Arrive by 1:05 PM)   Return Visit with Dori Garnica PA-C   [facility] ([facility])             Care Team:    Patient Care Team       Relationship Specialty Notifications Start End    Tao Roach MD PCP - General   3/17/17     Phone: [phone] Fax: [phone]         [facility] [address] [place] [address]            Transition of Care Review                                                      Did you have a surgery or procedure during your hospital visit? NO   If yes, do you have any of the following:     Signs of infection:  No    Pain:  No         Wound/incision concerns? N/A    Do you have all of your medications/refills?  Yes    Are you having any side effects or questions about your medication(s)? No    Do you have any new or worsening symptoms?  No- But Patient Did say that he is still having nausea and dizziness, and wants to know when will the diarrhea stop? He would like for someone to please give him a call.     Do you have " any future appointments scheduled?  Yes             Plan                                                      Thanks for your time.  Your Care Coordinator may follow-up within the next couple days.  In the meantime if you have questions, concerns or problems call your care team.        Delia Colbert

## 2017-04-20 NOTE — DISCHARGE SUMMARY
[facility], [place]    Discharge Summary  Hematology / Oncology    Date of Admission:  4/17/2017  Date of Discharge:  4/20/2017  Discharging Provider: Safia Wong  Date of Service (when I saw the patient): 04/20/17    Discharge Diagnoses   Active Problems:    Clear cell renal cell carcinoma (H)      History of Present Illness   **Adopted from H&P -- Oncology History -    Julio John is a 46 yo M PMH recurrent metastatic RCC (mets lungs) s/p nephrectomy & XRT, CVA 2010, A fib cardiovert 2012, BPH, Obesity, admitted for cycle 1 IL therapy.  Presented with BRBPR & hemorrhoids, intense pain (low back) after lifting a heavy box, & persistent fevers.  Met with Dr Painting & in April 2017 work up completed prior to initiation of IL 2 therapy PFT- normal volumes, normal lung mechanics, normal gas transfer, normal studies , ECHO- EF 53%, Brain MRI- no evidence of brain/bone mets... Focal lesion superior aspect of L parotid, Bone scan- negative for osseous disease , CT CAP--post rad change LL, scattered R lung nodules & dominant NAIN nodule no other evidence of met disease in abdomen or pelvis s/p L nephrectomy.     Today Julio reports -- doing well overall, he is feeling strong & ready to start chemotherapy. He had multiple questions regarding his upcoming treatment & overall status of his disease. We reviewed his CT imaging & the size of the lung nodules (1 cm LL, 0.3 cm scattered bilateral lungs), we reviewed SE of IL 2 therapy. He had a few questions regarding a burning pain felt in his LLQ s/p nephrectomy that has since resolved, & chest tightness/sharp pain that happened a few times last week (no cough, no fever/chills, no prolonged SOB). As these symptoms have all resolved, & he had an extensive work up prior to initiating IL 2 we will just monitor. He reports he's eating & drinking well, and completing his own ADLs. Agreeable to start IL 2 today, PICC line placement set for  noon.     Hospital Course   Julio John is a 46 yo M PMH recurrent metastatic RCC (mets lungs) s/p nephrectomy & XRT, CVA 2010, A fib cardiovert 2012, BPH, Obesity, admitted for cycle 1 IL therapy.  He was admitted x4/17/17 and was able to complete 8 doses of IL 2.  He did experience expected side effects & lab normalities associated with IL2 induced capillary leak syndrome.  SOB, palpitations, nausea, vomiting, diarrhea, rigors, headache, tooth pain, throat pain, testicle pain.  He was managed with supportive medications.  His last dose of IL2 was 4/20 around 2 AM.  He's feeling better this afternoon and able to get up and move around the unit with his family, eat normally, and his diarrhea is managed with imodium.  He's using PRN throat lozenges for throat tenderness.  He wants to go home today.  I set up ANA f/u with him for next week (per Garima's note he will have 3-4 weeks between IL 2 cycles, nothing is scheduled, being that Julio had a lot of symptoms, and a lot of anxiety I think it'd be helpful for him to be seen more closely outpatient).      Problems addressed--    # Metastatic clear cell renal cancer (pT3a, lung metastasis) s/p L nephrectomy (x3/16) & stereotactic XRT to lung nodules (x12/16) admitted for IL 2 therapy Able to complete 8 doses total.  - Side effects included testicle pain, rigors 90 minutes s/p infusion. Loose stool, using imodium. Fevers as high as 103.3, BP soft 2/2 to capillary leak. On RA, no crackles on exam. Headaches. N/V. Throat pain. Supportive care continues.  Patient receiving electrolyte replacements but overall feeling better and would like to d/c to home.      # Therapy Plan Aldesleukin 64 million units q 8 hr (completed 8 doses).  - Pre med Zofran 8 mg q 8 & tylenol 650 mg q 4.  - Supportive care included ranitidine 150 mg BID, cephalexin 500 mg BID (D1-5 of thepray).  - PRN Meds (benadryl, meperidine, naproxen, compazine, ativan & lomotil).  - Follow IVF guidelines  (D5&0.9NS + 20 k at 100 ml/hr with PRN bolus 250 ml SBP< 85 or low UOP).  - Daily weights (wt up slightly yesterday 104.4adimt /105.4), I&O q 8, NO steroids NO diuretics, VS q 4.  - Follow labs CBC, CMP, Mg, Ph, LDH, CK total, & daily blood cultures. Seeing anticipated elevation in Cr, Liver enzymes.    # LEO stage I, expected side effect of IL 2, repeat Cr at follow up visit & avoid nephrotoxins.    # Pancytopenia r/t IL-2 immunotherapy, expected side effect, repeat CBC at follow up visit.     # Testicle pain on R.  Happens with Rigors s/p IL 2 infusion 90 minutes, then resolves. No edema, rash, lesion, ecchymosis noted.  - Monitor for recurrence, PO oxycodone PRN, hot packs. Nursing wonders if it's r/t leg shaking during rigors induced trauma.     # Red Urine.  - Think r/t low plt (43 today) will send a UA/UC--no infection Cx NTD. No dysuria reports. Cr is up at 1.48.      FEN  - IVF per protocol.  - Electrolyte s/s.  - RDAT.      PPX  - Ranitidine BID.  - DVT/PE held r/t ambulatory patient & concern for TCP (plt 43).     Full Code      DISPO: d/c to home likely tomorrow, Julio had significant symptoms and poor sleep overnight. I'd like to see him up walking around & eating better prior to d/c.      This plan of care has been discussed with Dr. Contreras.  Safia Wong  [facility]  [phone]  Hematology/Oncology  April 20, 2017    Pending Results   These results will be followed up by Jocelyn Seaman / Brooks Painting  Unresulted Labs Ordered in the Past 30 Days of this Admission     Date and Time Order Name Status Description    4/19/2017 2330 Blood culture Preliminary     4/18/2017 2330 Blood culture Preliminary     4/17/2017 2330 Blood culture Preliminary           Code Status   Full Code    Primary Care Physician   Tao Nwaononiwu    Physical Exam   Vital Signs with Ranges  Temp:  [97.4  F (36.3  C)-102.7  F (39.3  C)] 97.8  F (36.6  C)  Pulse:  [123-127] 123  Heart Rate:  [] 104  Resp:  [18-22]  18  BP: ()/(41-65) 118/60  SpO2:  [95 %-98 %] 96 %    Constitutional: Awake, alert, cooperative, in NAD, sitting up in bed, animated with discussion.  Seen later in the day up walking the halls, steady on his feet.  Eyes: PERRL, EOMI, sclera clear, conjunctiva normal.  ENT: Normocephalic, without obvious abnormality, sinuses nontender on palpation, oral pharynx with moist mucus membranes.  Respiratory: Non-labored breathing, good air exchange, clear to auscultation bilaterally, no crackles or wheezing.  Cardiovascular: RRR, no murmur noted.  GI: + bowel sounds, soft, non-distended, non-tender, no masses palpated, no hepatosplenomegaly.  Skin: No concerning lesions or rash on exposed areas.  Musculoskeletal: No edema lucille LEs.  Neurologic: Awake, alert & oriented x3. Cranial nerves II-XII are grossly intact.   Psych: appropriate affect.    Discharge Disposition   Home & in stable condition.    Discharge Orders   No discharge procedures on file.  Discharge Medications   Current Discharge Medication List      CONTINUE these medications which have NOT CHANGED    Details   acetaminophen (TYLENOL) 500 MG tablet Take 500 mg by mouth           Allergies   No Known Allergies  Data   Most Recent 3 CBC's:  Recent Labs   Lab Test  04/20/17   0553  04/19/17   0548  04/18/17   0604   WBC  3.7*  7.1  7.8   HGB  13.0*  13.7  14.4   MCV  86  86  87   PLT  43*  70*  101*      Most Recent 3 BMP's:  Recent Labs   Lab Test  04/20/17   0553  04/19/17   0548  04/18/17   0604   NA  136  139  137   POTASSIUM  3.7  3.8  3.7   CHLORIDE  110*  111*  109   CO2  18*  21  21   BUN  13  10  14   CR  1.48*  1.31*  1.21   ANIONGAP  8  7  7   KVNG  6.6*  7.0*  7.6*   GLC  109*  126*  124*     Most Recent 2 LFT's:  Recent Labs   Lab Test  04/20/17   0553  04/19/17   0548   AST  93*  41   ALT  109*  48   ALKPHOS  36*  46   BILITOTAL  3.7*  2.4*     Most Recent INR's and Anticoagulation Dosing History:  Anticoagulation Dose History     There is no  flowsheet data to display.        Most Recent 3 Troponin's:No lab results found.  Most Recent Cholesterol Panel:No lab results found.  Most Recent 6 Bacteria Isolates From Any Culture (See EPIC Reports for Culture Details):  Recent Labs   Lab Test  04/20/17   0553  04/19/17   0548  04/18/17   0604   CULT  Pending  No growth after 22 hours  No growth after 2 days     Most Recent TSH, T4 and A1c Labs:No lab results found.

## 2017-04-20 NOTE — PLAN OF CARE
Problem: Goal Outcome Summary  Goal: Goal Outcome Summary  Alert and oriented X 4. Tachycardic. C/o heart palpitations and dizziness. Denies chest pain or pressure.  Physician updated. Sinus tachycardia per ECG. Falls risk education reinforced. Pt verbalized understanding. Urinal and commode at bedside.

## 2017-04-20 NOTE — PROGRESS NOTES
Discharge  D: Orders for discharge and outpatient medications written.  I: Home medications and return to clinic schedule reviewed with patient. Discharge instructions and parameters for calling Health Care Provider reviewed. Patient left at 1430 accompanied by spouse.   A: Patient/family verbalized understanding and was ready for discharge.  Vitals stabilizing, afebrile.  Pt feeling overall poor post IL-2 treatment as expected, looking forward to discharging home and resting.  Mag and Phos replaced prior to d/c.  PICC removed.  Instructed pt to take lomotil as needed for diarrhea and antiemetics.   P: Patient instructed to  medications in Pharmacy. Follow up as scheduled in clinic next week.

## 2017-04-24 LAB
BACTERIA SPEC CULT: NO GROWTH
MICRO REPORT STATUS: NORMAL
SPECIMEN SOURCE: NORMAL

## 2017-04-25 ENCOUNTER — ONCOLOGY VISIT (OUTPATIENT)
Dept: ONCOLOGY | Facility: CLINIC | Age: 47
End: 2017-04-25
Attending: PHYSICIAN ASSISTANT
Payer: MEDICAID

## 2017-04-25 ENCOUNTER — APPOINTMENT (OUTPATIENT)
Dept: LAB | Facility: CLINIC | Age: 47
End: 2017-04-25
Attending: INTERNAL MEDICINE
Payer: MEDICAID

## 2017-04-25 ENCOUNTER — TELEPHONE (OUTPATIENT)
Dept: ONCOLOGY | Facility: CLINIC | Age: 47
End: 2017-04-25

## 2017-04-25 VITALS
HEART RATE: 109 BPM | BODY MASS INDEX: 36.26 KG/M2 | DIASTOLIC BLOOD PRESSURE: 88 MMHG | TEMPERATURE: 98.8 F | SYSTOLIC BLOOD PRESSURE: 122 MMHG | HEIGHT: 66 IN | OXYGEN SATURATION: 95 % | RESPIRATION RATE: 18 BRPM | WEIGHT: 225.6 LBS

## 2017-04-25 DIAGNOSIS — C64.2 MALIGNANT NEOPLASM OF LEFT KIDNEY (H): ICD-10-CM

## 2017-04-25 LAB
ALBUMIN SERPL-MCNC: 3.5 G/DL (ref 3.4–5)
ALP SERPL-CCNC: 86 U/L (ref 40–150)
ALT SERPL W P-5'-P-CCNC: 88 U/L (ref 0–70)
ANION GAP SERPL CALCULATED.3IONS-SCNC: 5 MMOL/L (ref 3–14)
AST SERPL W P-5'-P-CCNC: 22 U/L (ref 0–45)
BACTERIA SPEC CULT: NO GROWTH
BASOPHILS # BLD AUTO: 0.1 10E9/L (ref 0–0.2)
BASOPHILS NFR BLD AUTO: 0.6 %
BILIRUB SERPL-MCNC: 0.7 MG/DL (ref 0.2–1.3)
BUN SERPL-MCNC: 16 MG/DL (ref 7–30)
CALCIUM SERPL-MCNC: 8.6 MG/DL (ref 8.5–10.1)
CHLORIDE SERPL-SCNC: 106 MMOL/L (ref 94–109)
CO2 SERPL-SCNC: 26 MMOL/L (ref 20–32)
CREAT SERPL-MCNC: 1.06 MG/DL (ref 0.66–1.25)
DIFFERENTIAL METHOD BLD: NORMAL
EOSINOPHIL # BLD AUTO: 0.5 10E9/L (ref 0–0.7)
EOSINOPHIL NFR BLD AUTO: 5.7 %
ERYTHROCYTE [DISTWIDTH] IN BLOOD BY AUTOMATED COUNT: 13.7 % (ref 10–15)
GFR SERPL CREATININE-BSD FRML MDRD: 75 ML/MIN/1.7M2
GLUCOSE SERPL-MCNC: 119 MG/DL (ref 70–99)
HCT VFR BLD AUTO: 45.9 % (ref 40–53)
HGB BLD-MCNC: 15.5 G/DL (ref 13.3–17.7)
IMM GRANULOCYTES # BLD: 0.3 10E9/L (ref 0–0.4)
IMM GRANULOCYTES NFR BLD: 3.7 %
LYMPHOCYTES # BLD AUTO: 2.7 10E9/L (ref 0.8–5.3)
LYMPHOCYTES NFR BLD AUTO: 32.3 %
MCH RBC QN AUTO: 28.7 PG (ref 26.5–33)
MCHC RBC AUTO-ENTMCNC: 33.8 G/DL (ref 31.5–36.5)
MCV RBC AUTO: 85 FL (ref 78–100)
MICRO REPORT STATUS: NORMAL
MONOCYTES # BLD AUTO: 0.7 10E9/L (ref 0–1.3)
MONOCYTES NFR BLD AUTO: 8 %
NEUTROPHILS # BLD AUTO: 4.2 10E9/L (ref 1.6–8.3)
NEUTROPHILS NFR BLD AUTO: 49.7 %
NRBC # BLD AUTO: 0 10*3/UL
NRBC BLD AUTO-RTO: 0 /100
PLATELET # BLD AUTO: 207 10E9/L (ref 150–450)
POTASSIUM SERPL-SCNC: 4.1 MMOL/L (ref 3.4–5.3)
PROT SERPL-MCNC: 7.4 G/DL (ref 6.8–8.8)
RBC # BLD AUTO: 5.41 10E12/L (ref 4.4–5.9)
SODIUM SERPL-SCNC: 137 MMOL/L (ref 133–144)
SPECIMEN SOURCE: NORMAL
WBC # BLD AUTO: 8.4 10E9/L (ref 4–11)

## 2017-04-25 PROCEDURE — 99214 OFFICE O/P EST MOD 30 MIN: CPT | Mod: ZP | Performed by: PHYSICIAN ASSISTANT

## 2017-04-25 PROCEDURE — 80053 COMPREHEN METABOLIC PANEL: CPT | Performed by: NURSE PRACTITIONER

## 2017-04-25 PROCEDURE — 36415 COLL VENOUS BLD VENIPUNCTURE: CPT

## 2017-04-25 PROCEDURE — 85025 COMPLETE CBC W/AUTO DIFF WBC: CPT | Performed by: NURSE PRACTITIONER

## 2017-04-25 PROCEDURE — 99212 OFFICE O/P EST SF 10 MIN: CPT | Mod: ZF

## 2017-04-25 ASSESSMENT — PAIN SCALES - GENERAL: PAINLEVEL: MILD PAIN (3)

## 2017-04-25 NOTE — Clinical Note
4/25/2017       RE: Julio John  [address]    [place] [place] [address]     Dear Colleague,    Thank you for referring your patient, Julio John, to the [facility]. Please see a copy of my visit note below.    No notes on file    Again, thank you for allowing me to participate in the care of your patient.      Sincerely,    Dori Garnica PA-C

## 2017-04-25 NOTE — MR AVS SNAPSHOT
After Visit Summary   4/25/2017    Julio John    MRN: [id number]           Patient Information     Date Of Birth          1970        Visit Information        Provider Department      4/25/2017 1:05 PM Dori Garnica PA-C; [facility] [employer] [facility]        Today's Diagnoses     Malignant neoplasm of left kidney (H)           Follow-ups after your visit        Your next 10 appointments already scheduled     May 03, 2017  8:15 AM CDT   [facility]   [facility] ([facility])    [address] [place] [id number] [phone]            May 03, 2017  8:40 AM CDT   (Arrive by 8:25 AM)   Return Visit with JAZMYNE Barron   [facility] ([facility])    [address] [place] [id number] [phone]              Future tests that were ordered for you today     Open Future Orders        Priority Expected Expires Ordered    IR PICC Placement > 5 Yrs of Age Routine  4/25/2018 4/25/2017            Who to contact     If you have questions or need follow up information about today's clinic visit or your schedule please contact [facility] directly at [phone].  Normal or non-critical lab and imaging results will be communicated to you by MyChart, letter or phone within 4 business days after the clinic has received the results. If you do not hear from us within 7 days, please contact the clinic through MyChart or phone. If you have a critical or abnormal lab result, we will notify you by phone as soon as possible.  Submit refill requests through VivoText or call your pharmacy and they will forward the refill request to us. Please allow 3 business days for your refill to be completed.          Additional Information About Your Visit        MyChart Information     Freedu.int  "lets you send messages to your doctor, view your test results, renew your prescriptions, schedule appointments and more. To sign up, go to [email or web address] . Click on \"Log in\" on the left side of the screen, which will take you to the Welcome page. Then click on \"Sign up Now\" on the right side of the page.     You will be asked to enter the access code listed below, as well as some personal information. Please follow the directions to create your username and password.     Your access code is: [email or web address]  Expires: 2017 10:24 AM     Your access code will  in 90 days. If you need help or a new code, please call your [place] clinic or [phone].        Care EveryWhere ID     This is your Care EveryWhere ID. This could be used by other organizations to access your [place] medical records  [id number]        Your Vitals Were     Pulse Temperature Respirations Height Pulse Oximetry BMI (Body Mass Index)    109 98.8  F (37.1  C) (Oral) 18 1.676 m (5' 5.98\") 95% 36.43 kg/m2       Blood Pressure from Last 3 Encounters:   17 122/88   17 118/60   17 130/88    Weight from Last 3 Encounters:   17 102.3 kg (225 lb 9.6 oz)   17 110.4 kg (243 lb 4.8 oz)   17 104.8 kg (231 lb)              We Performed the Following     CBC with platelets differential     Comprehensive metabolic panel        Primary Care Provider Office Phone # Fax #    Uchemadu MD Doc [phone]       [facility] [address] [place] [address]        Thank you!     Thank you for choosing [facility]  for your care. Our goal is always to provide you with excellent care. Hearing back from our patients is one way we can continue to improve our services. Please take a few minutes to complete the written survey that you may receive in the mail after your visit with us. Thank you!             Your Updated Medication List - Protect others around you: " Learn how to safely use, store and throw away your medicines at www.disposemymeds.org.          This list is accurate as of: 4/25/17  2:56 PM.  Always use your most recent med list.                   Brand Name Dispense Instructions for use    acetaminophen 500 MG tablet    TYLENOL     Take 500 mg by mouth       diphenoxylate-atropine 2.5-0.025 MG per tablet    LOMOTIL    40 tablet    Take 1 tablet by mouth 4 times daily as needed for diarrhea (After each diarrheal stool.)       ondansetron 4 MG tablet    ZOFRAN    18 tablet    Take 2 tablets (8 mg) by mouth every 8 hours as needed for nausea       prochlorperazine 10 MG tablet    COMPAZINE    30 tablet    Take 1 tablet (10 mg) by mouth every 6 hours as needed (Breakthrough Nausea/Vomiting)

## 2017-04-25 NOTE — NURSING NOTE
Chief Complaint   Patient presents with     Oncology Clinic Visit     Clear cell renal cell carcinoma      Blood Draw     Pt with hx of renal cell ca labs collected via venipuncture.

## 2017-04-25 NOTE — LETTER
"4/25/2017      RE: Julio John  [address]  [facility] [address]       [facility]  FOLLOW-UP VISIT NOTE  Date of visit: 4-25-17          REASON FOR VISIT: [facility] here for post IL-2 follow up    HPI: Julio John is a 47 year old gentleman without significant past medical history with metastatic renal cell carcinoma to lungs. He presented with back pain, fevers, BRBPR with hemorrhoids. He had a CT scan which revealed left kidney cancer. He has left nephrectomy 3/2016. He was note to have lung nodules on left lung on surveillance scan. He had stereotactic radiation to lung. Follow-up scans showed new nodules in R lung. He was referred to Dr. Painting for consideration of IL-2. Dr. Painting deemed him a good candidate.     His echo and PFTs were appropriate for IL-2.  His brain MRI and bone scan were negative.  His CT CAP showed growing pulmonary metastasis only.    INTERVAL HISTORY: Julio is here with his wife and 2 year old son and six month old baby.  He was inpatient 4/17-4/20 fro C1 of IL-2 and tolerated 8 total doses.  He had expected toxicity including capillary leak syndrome, SOB, palpitations, nausea, vomiting, diarrhea, rigors, headache, tooth pain, throat pain, testicle pain. He was managed with supportive medications. He has been home for about five days now and slowly is feeling stronger and appetite is returning.  He is mostly still bothered by his left lower molar and apparently he wants the dentist to pull it out.      ROS: no f/s/c. Breathing has improved. Diarrhea has stopped.      EXAM:  /88  Pulse 109  Temp 98.8  F (37.1  C) (Oral)  Resp 18  Ht 1.676 m (5' 5.98\")  Wt 102.3 kg (225 lb 9.6 oz)  SpO2 95%  BMI 36.43 kg/m2  Wt Readings from Last 4 Encounters:   05/03/17 103.3 kg (227 lb 11.2 oz)   04/25/17 102.3 kg (225 lb 9.6 oz)   04/20/17 110.4 kg (243 lb 4.8 oz)   04/17/17 104.8 kg (231 lb)     Vital signs were reviewed. "   Patient alert and oriented x3.   PERRLA. EOMI. No scleral icterus noted. OP without thrush/sores.  He has no gum erythema or ionfection in his gums but it tender with pressure to lower molars  Neck exam: No palpable cervical, supraclavicular or axillary nodes bilaterally.   Heart: RRR no murmurs noted.   Lungs: clear to auscultation bilaterally.  No crackles or wheezing.   Abd: positive bowel sounds in all four quadrants.  No tenderness to palpation.  No hepatomegaly.   Extremities: No lower extremity edema.   Neuro: grossly intact.   Mood and affect is stable.       LABS:      4/20/2017 05:53 4/25/2017 13:39   Sodium 136 137   Potassium 3.7 4.1   Chloride 110 (H) 106   Carbon Dioxide 18 (L) 26   Urea Nitrogen 13 16   Creatinine 1.48 (H) 1.06   GFR Estimate 51 (L) 75   GFR Estimate If Black 62 >90...   Calcium 6.6 (L) 8.6   Anion Gap 8 5   Magnesium 1.5 (L)    Phosphorus 1.4 (L)    Albumin 2.5 (L) 3.5   Protein Total 5.0 (L) 7.4   Bilirubin Total 3.7 (H) 0.7   Alkaline Phosphatase 36 (L) 86    (H) 88 (H)   AST 93 (H) 22   CK Total 154    Lactate Dehydrogenase 277 (H)    Glucose 109 (H) 119 (H)   WBC 3.7 (L) 8.4   Hemoglobin 13.0 (L) 15.5   Hematocrit 38.9 (L) 45.9   Platelet Count 43 (LL) 207   RBC Count 4.54 5.41   MCV 86 85     ASSESSMENT/PLAN:       Dori Garnica PA-C

## 2017-04-25 NOTE — TELEPHONE ENCOUNTER
Called patient to see how he was feeling and if diarrhea had subsided.  Patient states diarrhea is better, but he is having pain in his chest and teeth hurt. Reports breathing can be difficult at times.  Patient was at appt with JAZMYNE Nava at [facility] at time of writer's call.  Will be evaluated and writer will monitor status in EMR and f/u with patient.  Patient understood conversation with writer and will have  at [facility] visit today.  Jose Mike RN, BSN, OCN  [facility]  Patient Care Coordinator

## 2017-04-25 NOTE — NURSING NOTE
"Julio John is a 47 year old male who presents for:  Chief Complaint   Patient presents with     Oncology Clinic Visit     Clear cell renal cell carcinoma      Blood Draw     Pt with hx of renal cell ca labs collected via venipuncture.         Initial Vitals:  /88  Pulse 109  Temp 98.8  F (37.1  C) (Oral)  Resp 18  Ht 1.676 m (5' 5.98\")  Wt 102.3 kg (225 lb 9.6 oz)  SpO2 95%  BMI 36.43 kg/m2 Estimated body mass index is 36.43 kg/(m^2) as calculated from the following:    Height as of this encounter: 1.676 m (5' 5.98\").    Weight as of this encounter: 102.3 kg (225 lb 9.6 oz).. Body surface area is 2.18 meters squared. BP completed using cuff size: regular  Mild Pain (3) No LMP for male patient. Allergies and medications reviewed.     Medications: Medication refills not needed today.  Pharmacy name entered into EPIC: Data Unavailable    Comments: Pt mentions he has had a headache from Monday night, 4/24/17 to Tuesday 4/25/17. Pt would also like Bailee Garnica to know he is having one of his mollars taken out tomorrow     7 minutes for nursing intake (face to face time)   Julita Nobles MA        "

## 2017-04-26 LAB
BACTERIA SPEC CULT: NO GROWTH
MICRO REPORT STATUS: NORMAL
SPECIMEN SOURCE: NORMAL

## 2017-05-01 ENCOUNTER — TELEPHONE (OUTPATIENT)
Dept: ONCOLOGY | Facility: CLINIC | Age: 47
End: 2017-05-01

## 2017-05-01 NOTE — TELEPHONE ENCOUNTER
NOMAN Mendoza from  at the  called to inquire as to status of patient's admission for cycle #2 of IL-2.  Patient was seen by PA on 4/25, but no notes.  Called patient and he reports his chemo has been delayed until 5/3/17  Kelly notified + MD called and verified same.  Will place orders, patient has lab and PA visit prior to admission.  Writer verified with Jose Luis in patient placement, admission is confirmed for 10am on 5/3/17.  Patient verbalized understanding of all instructions and will call with any questions or concerns.  Jose Mike, RN, BSN, OCN  [facility]  Patient Care Coordinator'

## 2017-05-02 DIAGNOSIS — C64.2 MALIGNANT NEOPLASM OF LEFT KIDNEY (H): Primary | ICD-10-CM

## 2017-05-03 ENCOUNTER — APPOINTMENT (OUTPATIENT)
Dept: ULTRASOUND IMAGING | Facility: CLINIC | Age: 47
DRG: 838 | End: 2017-05-03
Attending: NURSE PRACTITIONER
Payer: MEDICAID

## 2017-05-03 ENCOUNTER — HOSPITAL ENCOUNTER (INPATIENT)
Facility: CLINIC | Age: 47
LOS: 3 days | Discharge: HOME | DRG: 838 | End: 2017-05-06
Attending: INTERNAL MEDICINE | Admitting: INTERNAL MEDICINE
Payer: MEDICAID

## 2017-05-03 ENCOUNTER — ONCOLOGY VISIT (OUTPATIENT)
Dept: ONCOLOGY | Facility: CLINIC | Age: 47
DRG: 838 | End: 2017-05-03
Attending: PHYSICIAN ASSISTANT
Payer: MEDICAID

## 2017-05-03 ENCOUNTER — APPOINTMENT (OUTPATIENT)
Dept: LAB | Facility: CLINIC | Age: 47
DRG: 838 | End: 2017-05-03
Attending: INTERNAL MEDICINE
Payer: MEDICAID

## 2017-05-03 ENCOUNTER — HOSPITAL ENCOUNTER (INPATIENT)
Dept: VASCULAR ULTRASOUND | Facility: CLINIC | Age: 47
DRG: 838 | End: 2017-05-03
Attending: PHYSICIAN ASSISTANT | Admitting: INTERNAL MEDICINE
Payer: MEDICAID

## 2017-05-03 VITALS
OXYGEN SATURATION: 96 % | DIASTOLIC BLOOD PRESSURE: 81 MMHG | TEMPERATURE: 98 F | RESPIRATION RATE: 18 BRPM | HEART RATE: 80 BPM | SYSTOLIC BLOOD PRESSURE: 122 MMHG | BODY MASS INDEX: 36.77 KG/M2 | WEIGHT: 227.7 LBS

## 2017-05-03 DIAGNOSIS — C64.2 MALIGNANT NEOPLASM OF LEFT KIDNEY (H): Primary | ICD-10-CM

## 2017-05-03 DIAGNOSIS — C64.2 MALIGNANT NEOPLASM OF LEFT KIDNEY (H): ICD-10-CM

## 2017-05-03 DIAGNOSIS — L29.9 PRURITIC DISORDER: Primary | ICD-10-CM

## 2017-05-03 PROBLEM — C64.9 RENAL CELL CARCINOMA (H): Status: ACTIVE | Noted: 2017-05-03

## 2017-05-03 LAB
ALBUMIN SERPL-MCNC: 3.4 G/DL (ref 3.4–5)
ALP SERPL-CCNC: 69 U/L (ref 40–150)
ALT SERPL W P-5'-P-CCNC: 43 U/L (ref 0–70)
ANION GAP SERPL CALCULATED.3IONS-SCNC: 10 MMOL/L (ref 3–14)
AST SERPL W P-5'-P-CCNC: 18 U/L (ref 0–45)
BASOPHILS # BLD AUTO: 0.1 10E9/L (ref 0–0.2)
BASOPHILS NFR BLD AUTO: 0.9 %
BILIRUB SERPL-MCNC: 0.6 MG/DL (ref 0.2–1.3)
BUN SERPL-MCNC: 12 MG/DL (ref 7–30)
CALCIUM SERPL-MCNC: 8 MG/DL (ref 8.5–10.1)
CHLORIDE SERPL-SCNC: 108 MMOL/L (ref 94–109)
CO2 SERPL-SCNC: 21 MMOL/L (ref 20–32)
CREAT SERPL-MCNC: 0.97 MG/DL (ref 0.66–1.25)
DIFFERENTIAL METHOD BLD: NORMAL
EOSINOPHIL # BLD AUTO: 0.6 10E9/L (ref 0–0.7)
EOSINOPHIL NFR BLD AUTO: 11.4 %
ERYTHROCYTE [DISTWIDTH] IN BLOOD BY AUTOMATED COUNT: 13.8 % (ref 10–15)
GFR SERPL CREATININE-BSD FRML MDRD: 83 ML/MIN/1.7M2
GLUCOSE BLDC GLUCOMTR-MCNC: 91 MG/DL (ref 70–99)
GLUCOSE SERPL-MCNC: 104 MG/DL (ref 70–99)
HCT VFR BLD AUTO: 45.1 % (ref 40–53)
HGB BLD-MCNC: 15 G/DL (ref 13.3–17.7)
IMM GRANULOCYTES # BLD: 0 10E9/L (ref 0–0.4)
IMM GRANULOCYTES NFR BLD: 0.4 %
LYMPHOCYTES # BLD AUTO: 1.7 10E9/L (ref 0.8–5.3)
LYMPHOCYTES NFR BLD AUTO: 31.9 %
MAGNESIUM SERPL-MCNC: 2.3 MG/DL (ref 1.6–2.3)
MCH RBC QN AUTO: 28.5 PG (ref 26.5–33)
MCHC RBC AUTO-ENTMCNC: 33.3 G/DL (ref 31.5–36.5)
MCV RBC AUTO: 86 FL (ref 78–100)
MONOCYTES # BLD AUTO: 0.5 10E9/L (ref 0–1.3)
MONOCYTES NFR BLD AUTO: 9 %
NEUTROPHILS # BLD AUTO: 2.5 10E9/L (ref 1.6–8.3)
NEUTROPHILS NFR BLD AUTO: 46.4 %
NRBC # BLD AUTO: 0 10*3/UL
NRBC BLD AUTO-RTO: 0 /100
PLATELET # BLD AUTO: 246 10E9/L (ref 150–450)
POTASSIUM SERPL-SCNC: 4.2 MMOL/L (ref 3.4–5.3)
PROT SERPL-MCNC: 6.9 G/DL (ref 6.8–8.8)
RBC # BLD AUTO: 5.27 10E12/L (ref 4.4–5.9)
SODIUM SERPL-SCNC: 139 MMOL/L (ref 133–144)
WBC # BLD AUTO: 5.5 10E9/L (ref 4–11)

## 2017-05-03 PROCEDURE — 83735 ASSAY OF MAGNESIUM: CPT | Performed by: PHYSICIAN ASSISTANT

## 2017-05-03 PROCEDURE — 99221 1ST HOSP IP/OBS SF/LOW 40: CPT | Mod: 25 | Performed by: INTERNAL MEDICINE

## 2017-05-03 PROCEDURE — 93005 ELECTROCARDIOGRAM TRACING: CPT

## 2017-05-03 PROCEDURE — 25000132 ZZH RX MED GY IP 250 OP 250 PS 637: Performed by: PHYSICIAN ASSISTANT

## 2017-05-03 PROCEDURE — 85025 COMPLETE CBC W/AUTO DIFF WBC: CPT | Performed by: PHYSICIAN ASSISTANT

## 2017-05-03 PROCEDURE — 93971 EXTREMITY STUDY: CPT | Mod: LT

## 2017-05-03 PROCEDURE — 25000125 ZZHC RX 250: Performed by: PHYSICIAN ASSISTANT

## 2017-05-03 PROCEDURE — 93010 ELECTROCARDIOGRAM REPORT: CPT | Performed by: INTERNAL MEDICINE

## 2017-05-03 PROCEDURE — C1751 CATH, INF, PER/CENT/MIDLINE: HCPCS

## 2017-05-03 PROCEDURE — 00000146 ZZHCL STATISTIC GLUCOSE BY METER IP

## 2017-05-03 PROCEDURE — 25000128 H RX IP 250 OP 636: Performed by: PHYSICIAN ASSISTANT

## 2017-05-03 PROCEDURE — 25000132 ZZH RX MED GY IP 250 OP 250 PS 637: Performed by: NURSE PRACTITIONER

## 2017-05-03 PROCEDURE — 12000008 ZZH R&B INTERMEDIATE UMMC

## 2017-05-03 PROCEDURE — 3E04302 INTRODUCTION OF HIGH-DOSE INTERLEUKIN-2 INTO CENTRAL VEIN, PERCUTANEOUS APPROACH: ICD-10-PCS | Performed by: INTERNAL MEDICINE

## 2017-05-03 PROCEDURE — 25800025 ZZH RX 258: Performed by: PHYSICIAN ASSISTANT

## 2017-05-03 PROCEDURE — 80053 COMPREHEN METABOLIC PANEL: CPT | Performed by: PHYSICIAN ASSISTANT

## 2017-05-03 PROCEDURE — 99212 OFFICE O/P EST SF 10 MIN: CPT | Mod: ZF

## 2017-05-03 PROCEDURE — 36569 INSJ PICC 5 YR+ W/O IMAGING: CPT

## 2017-05-03 PROCEDURE — 99207 ZZC CHGS TRANSFERRED TO HOSPITAL: CPT | Mod: ZP | Performed by: PHYSICIAN ASSISTANT

## 2017-05-03 RX ORDER — DEXTROSE MONOHYDRATE 50 MG/ML
1000 INJECTION, SOLUTION INTRAVENOUS CONTINUOUS
Status: CANCELLED
Start: 2017-05-03

## 2017-05-03 RX ORDER — DEXTROSE MONOHYDRATE 50 MG/ML
1000 INJECTION, SOLUTION INTRAVENOUS CONTINUOUS
Status: DISCONTINUED | OUTPATIENT
Start: 2017-05-03 | End: 2017-05-05

## 2017-05-03 RX ORDER — ACETAMINOPHEN 325 MG/1
650 TABLET ORAL EVERY 4 HOURS
Status: DISCONTINUED | OUTPATIENT
Start: 2017-05-03 | End: 2017-05-05

## 2017-05-03 RX ORDER — DIPHENHYDRAMINE HYDROCHLORIDE 50 MG/ML
50 INJECTION INTRAMUSCULAR; INTRAVENOUS
Status: DISCONTINUED | OUTPATIENT
Start: 2017-05-03 | End: 2017-05-05

## 2017-05-03 RX ORDER — MEPERIDINE HYDROCHLORIDE 25 MG/ML
25 INJECTION INTRAMUSCULAR; INTRAVENOUS; SUBCUTANEOUS
Status: CANCELLED
Start: 2017-05-03

## 2017-05-03 RX ORDER — MEPERIDINE HYDROCHLORIDE 25 MG/ML
25 INJECTION INTRAMUSCULAR; INTRAVENOUS; SUBCUTANEOUS EVERY 30 MIN PRN
Status: CANCELLED | OUTPATIENT
Start: 2017-05-03

## 2017-05-03 RX ORDER — DIPHENHYDRAMINE HYDROCHLORIDE 50 MG/ML
50 INJECTION INTRAMUSCULAR; INTRAVENOUS
Status: CANCELLED
Start: 2017-05-03

## 2017-05-03 RX ORDER — NAPROXEN 250 MG/1
250 TABLET ORAL 2 TIMES DAILY PRN
Status: DISCONTINUED | OUTPATIENT
Start: 2017-05-03 | End: 2017-05-05

## 2017-05-03 RX ORDER — NAPROXEN 250 MG/1
250 TABLET ORAL 2 TIMES DAILY PRN
Status: CANCELLED
Start: 2017-05-03

## 2017-05-03 RX ORDER — LORAZEPAM 2 MG/ML
.5-1 INJECTION INTRAMUSCULAR EVERY 6 HOURS PRN
Status: CANCELLED | OUTPATIENT
Start: 2017-05-03

## 2017-05-03 RX ORDER — PROCHLORPERAZINE MALEATE 10 MG
10 TABLET ORAL EVERY 6 HOURS PRN
Status: CANCELLED
Start: 2017-05-03

## 2017-05-03 RX ORDER — MEPERIDINE HYDROCHLORIDE 25 MG/ML
25 INJECTION INTRAMUSCULAR; INTRAVENOUS; SUBCUTANEOUS EVERY 30 MIN PRN
Status: DISCONTINUED | OUTPATIENT
Start: 2017-05-03 | End: 2017-05-05

## 2017-05-03 RX ORDER — NALOXONE HYDROCHLORIDE 0.4 MG/ML
.1-.4 INJECTION, SOLUTION INTRAMUSCULAR; INTRAVENOUS; SUBCUTANEOUS
Status: DISCONTINUED | OUTPATIENT
Start: 2017-05-03 | End: 2017-05-06 | Stop reason: HOSPADM

## 2017-05-03 RX ORDER — ONDANSETRON 2 MG/ML
8 INJECTION INTRAMUSCULAR; INTRAVENOUS EVERY 8 HOURS
Status: DISCONTINUED | OUTPATIENT
Start: 2017-05-03 | End: 2017-05-04

## 2017-05-03 RX ORDER — LORAZEPAM 2 MG/ML
.5-1 INJECTION INTRAMUSCULAR EVERY 6 HOURS PRN
Status: DISCONTINUED | OUTPATIENT
Start: 2017-05-03 | End: 2017-05-06 | Stop reason: HOSPADM

## 2017-05-03 RX ORDER — METHYLPREDNISOLONE SODIUM SUCCINATE 125 MG/2ML
125 INJECTION, POWDER, LYOPHILIZED, FOR SOLUTION INTRAMUSCULAR; INTRAVENOUS
Status: DISCONTINUED | OUTPATIENT
Start: 2017-05-03 | End: 2017-05-05

## 2017-05-03 RX ORDER — ALBUTEROL SULFATE 0.83 MG/ML
2.5 SOLUTION RESPIRATORY (INHALATION)
Status: DISCONTINUED | OUTPATIENT
Start: 2017-05-03 | End: 2017-05-05

## 2017-05-03 RX ORDER — METHYLPREDNISOLONE SODIUM SUCCINATE 125 MG/2ML
125 INJECTION, POWDER, LYOPHILIZED, FOR SOLUTION INTRAMUSCULAR; INTRAVENOUS
Status: CANCELLED
Start: 2017-05-03

## 2017-05-03 RX ORDER — DEXTROSE MONOHYDRATE, SODIUM CHLORIDE, AND POTASSIUM CHLORIDE 50; 1.49; 9 G/1000ML; G/1000ML; G/1000ML
INJECTION, SOLUTION INTRAVENOUS CONTINUOUS
Status: DISCONTINUED | OUTPATIENT
Start: 2017-05-03 | End: 2017-05-06 | Stop reason: HOSPADM

## 2017-05-03 RX ORDER — EPINEPHRINE 1 MG/ML
0.3 INJECTION INTRAMUSCULAR; INTRAVENOUS; SUBCUTANEOUS EVERY 5 MIN PRN
Status: DISCONTINUED | OUTPATIENT
Start: 2017-05-03 | End: 2017-05-05

## 2017-05-03 RX ORDER — ACETAMINOPHEN 325 MG/1
650 TABLET ORAL EVERY 4 HOURS
Status: CANCELLED
Start: 2017-05-03

## 2017-05-03 RX ORDER — DEXTROSE MONOHYDRATE, SODIUM CHLORIDE, AND POTASSIUM CHLORIDE 50; 1.49; 9 G/1000ML; G/1000ML; G/1000ML
INJECTION, SOLUTION INTRAVENOUS CONTINUOUS
Status: CANCELLED
Start: 2017-05-03

## 2017-05-03 RX ORDER — ALBUTEROL SULFATE 0.83 MG/ML
2.5 SOLUTION RESPIRATORY (INHALATION)
Status: CANCELLED | OUTPATIENT
Start: 2017-05-03

## 2017-05-03 RX ORDER — SODIUM CHLORIDE 9 MG/ML
1000 INJECTION, SOLUTION INTRAVENOUS CONTINUOUS PRN
Status: CANCELLED
Start: 2017-05-03

## 2017-05-03 RX ORDER — DIPHENHYDRAMINE HCL 25 MG
25 CAPSULE ORAL EVERY 4 HOURS PRN
Status: CANCELLED
Start: 2017-05-03

## 2017-05-03 RX ORDER — DIPHENOXYLATE HCL/ATROPINE 2.5-.025MG
1 TABLET ORAL
Status: DISCONTINUED | OUTPATIENT
Start: 2017-05-03 | End: 2017-05-05

## 2017-05-03 RX ORDER — LORAZEPAM 0.5 MG/1
.5-1 TABLET ORAL EVERY 6 HOURS PRN
Status: DISCONTINUED | OUTPATIENT
Start: 2017-05-03 | End: 2017-05-06 | Stop reason: HOSPADM

## 2017-05-03 RX ORDER — OXYCODONE HYDROCHLORIDE 5 MG/1
5 TABLET ORAL EVERY 4 HOURS PRN
Status: DISCONTINUED | OUTPATIENT
Start: 2017-05-03 | End: 2017-05-06 | Stop reason: HOSPADM

## 2017-05-03 RX ORDER — PROCHLORPERAZINE MALEATE 10 MG
10 TABLET ORAL EVERY 6 HOURS PRN
Status: DISCONTINUED | OUTPATIENT
Start: 2017-05-03 | End: 2017-05-06 | Stop reason: HOSPADM

## 2017-05-03 RX ORDER — DIPHENOXYLATE HCL/ATROPINE 2.5-.025MG
1 TABLET ORAL
Status: CANCELLED
Start: 2017-05-03

## 2017-05-03 RX ORDER — ALBUTEROL SULFATE 90 UG/1
1-2 AEROSOL, METERED RESPIRATORY (INHALATION)
Status: DISCONTINUED | OUTPATIENT
Start: 2017-05-03 | End: 2017-05-05

## 2017-05-03 RX ORDER — CEPHALEXIN 500 MG/1
500 CAPSULE ORAL 2 TIMES DAILY
Status: DISCONTINUED | OUTPATIENT
Start: 2017-05-03 | End: 2017-05-06 | Stop reason: HOSPADM

## 2017-05-03 RX ORDER — MEPERIDINE HYDROCHLORIDE 25 MG/ML
25 INJECTION INTRAMUSCULAR; INTRAVENOUS; SUBCUTANEOUS
Status: DISCONTINUED | OUTPATIENT
Start: 2017-05-03 | End: 2017-05-05

## 2017-05-03 RX ORDER — ONDANSETRON 2 MG/ML
8 INJECTION INTRAMUSCULAR; INTRAVENOUS EVERY 8 HOURS
Status: CANCELLED
Start: 2017-05-03

## 2017-05-03 RX ORDER — LORAZEPAM 0.5 MG/1
.5-1 TABLET ORAL EVERY 6 HOURS PRN
Status: CANCELLED
Start: 2017-05-03

## 2017-05-03 RX ORDER — ALBUTEROL SULFATE 90 UG/1
1-2 AEROSOL, METERED RESPIRATORY (INHALATION)
Status: CANCELLED
Start: 2017-05-03

## 2017-05-03 RX ORDER — SODIUM CHLORIDE 9 MG/ML
1000 INJECTION, SOLUTION INTRAVENOUS CONTINUOUS PRN
Status: DISCONTINUED | OUTPATIENT
Start: 2017-05-03 | End: 2017-05-05

## 2017-05-03 RX ORDER — CEPHALEXIN 500 MG/1
500 CAPSULE ORAL 2 TIMES DAILY
Status: CANCELLED
Start: 2017-05-03

## 2017-05-03 RX ORDER — DIPHENHYDRAMINE HCL 25 MG
25 CAPSULE ORAL EVERY 4 HOURS PRN
Status: DISCONTINUED | OUTPATIENT
Start: 2017-05-03 | End: 2017-05-05

## 2017-05-03 RX ADMIN — LIDOCAINE HYDROCHLORIDE 5 ML: 10 INJECTION, SOLUTION INFILTRATION; PERINEURAL at 11:43

## 2017-05-03 RX ADMIN — RANITIDINE 150 MG: 150 TABLET ORAL at 12:13

## 2017-05-03 RX ADMIN — ALDESLEUKIN 64 MILLION UNITS: 1.1 INJECTION, POWDER, LYOPHILIZED, FOR SOLUTION INTRAVENOUS at 17:48

## 2017-05-03 RX ADMIN — ACETAMINOPHEN 650 MG: 325 TABLET, FILM COATED ORAL at 17:51

## 2017-05-03 RX ADMIN — ONDANSETRON 8 MG: 2 INJECTION INTRAMUSCULAR; INTRAVENOUS at 22:16

## 2017-05-03 RX ADMIN — ACETAMINOPHEN 650 MG: 325 TABLET, FILM COATED ORAL at 14:02

## 2017-05-03 RX ADMIN — OXYCODONE HYDROCHLORIDE 5 MG: 5 TABLET ORAL at 19:02

## 2017-05-03 RX ADMIN — ONDANSETRON 8 MG: 2 INJECTION INTRAMUSCULAR; INTRAVENOUS at 15:05

## 2017-05-03 RX ADMIN — RANITIDINE 150 MG: 150 TABLET ORAL at 20:34

## 2017-05-03 RX ADMIN — WHITE PETROLATUM: 1.75 OINTMENT TOPICAL at 14:02

## 2017-05-03 RX ADMIN — POTASSIUM CHLORIDE, DEXTROSE MONOHYDRATE AND SODIUM CHLORIDE: 150; 5; 900 INJECTION, SOLUTION INTRAVENOUS at 15:02

## 2017-05-03 RX ADMIN — DEXTROSE MONOHYDRATE 1000 ML: 50 INJECTION, SOLUTION INTRAVENOUS at 15:07

## 2017-05-03 RX ADMIN — ACETAMINOPHEN 650 MG: 325 TABLET, FILM COATED ORAL at 22:16

## 2017-05-03 RX ADMIN — CEPHALEXIN 500 MG: 500 CAPSULE ORAL at 12:13

## 2017-05-03 RX ADMIN — LORAZEPAM 0.5 MG: 2 INJECTION INTRAMUSCULAR; INTRAVENOUS at 22:32

## 2017-05-03 RX ADMIN — DEXTROSE MONOHYDRATE 1000 ML: 50 INJECTION, SOLUTION INTRAVENOUS at 17:46

## 2017-05-03 RX ADMIN — CEPHALEXIN 500 MG: 500 CAPSULE ORAL at 20:34

## 2017-05-03 ASSESSMENT — PAIN SCALES - GENERAL: PAINLEVEL: NO PAIN (0)

## 2017-05-03 NOTE — MR AVS SNAPSHOT
After Visit Summary   5/3/2017    Julio John    MRN: [id number]           Patient Information     Date Of Birth          1970        Visit Information        Provider Department      5/3/2017 8:25 AM Jocelyn Seaman PA; DA [employer] [facility]        Today's Diagnoses     Malignant neoplasm of left kidney (H)    -  1       Follow-ups after your visit        Additional Services     PSYCHOLOGY REFERRAL       Your provider has referred you to:  [facility]    Please be aware that coverage of these services is subject to the terms and limitations of your health insurance plan.  Call member services at your health plan with any benefit or coverage questions.      Please bring the following to your appointment:    >>   Any x-rays, CTs or MRIs which have been performed.  Contact the facility where they were done to arrange for  prior to your scheduled appointment.   >>   List of current medications   >>   This referral request   >>   Any documents/labs given to you for this referral                  Your next 10 appointments already scheduled     May 16, 2017  8:00 AM CDT   (Arrive by 7:45 AM)   NEW WITH ROOM with Nirav Kim PsyD,  2 116 CONSULT [facility] ([facility])    [address] [place] [address]  2nd [facility] [address] [phone]              Future tests that were ordered for you today     Open Future Orders        Priority Expected Expires Ordered    IR PICC Vascular Routine 5/31/2017 5/3/2018 5/3/2017    CT Chest Abdomen Pelvis w/o & w Contrast Routine 5/26/2017 6/1/2018 5/3/2017    IR PICC Vascular Routine 5/3/2017 5/3/2018 5/3/2017            Who to contact     If you have questions or need follow up information about today's clinic visit or your schedule please contact [facility] directly at [phone].  Normal or non-critical lab and imaging  "results will be communicated to you by MyChart, letter or phone within 4 business days after the clinic has received the results. If you do not hear from us within 7 days, please contact the clinic through Jamglue or phone. If you have a critical or abnormal lab result, we will notify you by phone as soon as possible.  Submit refill requests through Jamglue or call your pharmacy and they will forward the refill request to us. Please allow 3 business days for your refill to be completed.          Additional Information About Your Visit        Jamglue Information     Jamglue lets you send messages to your doctor, view your test results, renew your prescriptions, schedule appointments and more. To sign up, go to [email or web address] . Click on \"Log in\" on the left side of the screen, which will take you to the Welcome page. Then click on \"Sign up Now\" on the right side of the page.     You will be asked to enter the access code listed below, as well as some personal information. Please follow the directions to create your username and password.     Your access code is: [email or web address]  Expires: 2017 10:24 AM     Your access code will  in 90 days. If you need help or a new code, please call your [place] clinic or [phone].        Care EveryWhere ID     This is your Care EveryWhere ID. This could be used by other organizations to access your [place] medical records  [id number]        Your Vitals Were     Pulse Temperature Respirations Pulse Oximetry BMI (Body Mass Index)       80 98  F (36.7  C) (Oral) 18 96% 36.77 kg/m2        Blood Pressure from Last 3 Encounters:   17 95/50   17 122/81   17 122/88    Weight from Last 3 Encounters:   17 102.8 kg (226 lb 10.1 oz)   17 103.3 kg (227 lb 11.2 oz)   17 102.3 kg (225 lb 9.6 oz)              We Performed the Following     CBC with platelets differential     Comprehensive metabolic panel     Magnesium     PSYCHOLOGY " REFERRAL        Primary Care Provider Office Phone # Fax #    Uchemadu MD Doc [phone]       [facility] [address]  [facility] [address]        Thank you!     Thank you for choosing [facility]  for your care. Our goal is always to provide you with excellent care. Hearing back from our patients is one way we can continue to improve our services. Please take a few minutes to complete the written survey that you may receive in the mail after your visit with us. Thank you!             Your Updated Medication List - Protect others around you: Learn how to safely use, store and throw away your medicines at www.disposemymeds.org.          This list is accurate as of: 5/3/17 10:17 AM.  Always use your most recent med list.                   Brand Name Dispense Instructions for use    acetaminophen 500 MG tablet    TYLENOL     Take 500 mg by mouth       diphenoxylate-atropine 2.5-0.025 MG per tablet    LOMOTIL    40 tablet    Take 1 tablet by mouth 4 times daily as needed for diarrhea (After each diarrheal stool.)       ondansetron 4 MG tablet    ZOFRAN    18 tablet    Take 2 tablets (8 mg) by mouth every 8 hours as needed for nausea       prochlorperazine 10 MG tablet    COMPAZINE    30 tablet    Take 1 tablet (10 mg) by mouth every 6 hours as needed (Breakthrough Nausea/Vomiting)

## 2017-05-03 NOTE — PROGRESS NOTES
DATE/TIME  (DOT-TD, DOT-NOW) CHEMO CHECK ACTIVITY (REGIMEN & DOSE CHECK, DAY, DOSE #, NAME OF CHEMO #1)  CHEMO DRUG #2  CHEMO DRUG #3 NAME OF RN #1 (USE DOT-ME HERE) NAME OF RN#2 (2ND RN TO LOG IN SEPARATELY)   5/3/2017  10:49 AM   IL-2 protocol check   Jennie Varelawillem Costello     5/3/2017  5:31 PM   Aldesleukin dose #1   Behzad Discala   Awo Braden     05/04/17  2:02 AM Aldesleukin dose #2   Bobbi Garcia   5/4/2017  9:44 PM   Aldesleukin dose #3   Behzad Discala   Awo Braden     05/05/17  0630 AM Aldesleukin dose #4   Bobbi HERNANDEZ

## 2017-05-03 NOTE — IP AVS SNAPSHOT
[address] [address]    Phone:  [phone]                                       After Visit Summary   5/3/2017    Julio John    MRN: [id number]           After Visit Summary Signature Page     I have received my discharge instructions, and my questions have been answered. I have discussed any challenges I see with this plan with the nurse or doctor.    ..........................................................................................................................................  Patient/Patient Representative Signature      ..........................................................................................................................................  Patient Representative Print Name and Relationship to Patient    ..................................................               ................................................  Date                                            Time    ..........................................................................................................................................  Reviewed by Signature/Title    ...................................................              ..............................................  Date                                                            Time

## 2017-05-03 NOTE — PLAN OF CARE
"Problem: Chemotherapy Effects (Adult)  Goal: Signs and Symptoms of Listed Potential Problems Will be Absent or Manageable (Chemotherapy Effects)  Signs and symptoms of listed potential problems will be absent or manageable by discharge/transition of care (reference Chemotherapy Effects (Adult) CPG).   Outcome: No Change     VSS. Afebrile. Currently denies pain/nausea. Anxious for cycle #2 IL-2, had intermittent chest, tooth, and testicular pain w/ cycle 1 IL-2. Pt endorses good appetite, up ad paco. PICC placed in L arm on admission. Upon return to unit, pt c/o L hand feeling \"heavy\" and \"funny,\" hand cool to touch, strong pulse. NP notified, stat US ordered. Waiting to begin IV fluids until US obtained. Dose #1 IL-2 scheduled for 1400 pending US. Continue to monitor and w/ POC.     Addendum: US negative for DVT. IVF and pre-med IV zofran given at 1510, then will proceed w/ IL-2 dose #1.   "

## 2017-05-03 NOTE — PROGRESS NOTES
Nursing Focus: Admission  D: Arrived at 1030 from clinic via private vehicle. Patient accompanied by self. Admitted for cycle #2 IL-2.      I: Admission process began.  Patient oriented to room, enviroment, call light.  Md. notified of patients arrival on unit.     A: Vital signs stable, afebrile.  Patient stable at this time.  No current complaints.  Pt will have PICC line placed prior to initiation of cycle #2 IL-2.    P: Implement plan of care when available. Continue to monitor patient. Nursing interventions as appropriate. Notify md with changes in pt status.

## 2017-05-03 NOTE — PROGRESS NOTES
Hematology-Oncology Visit  May 3, 2017    Reason for Visit: follow-up metastatic renal cell carcinoma    HPI: Julio John is a 47 year old gentleman without significant past medical history with metastatic renal cell carcinoma to lungs. He presented with back pain, fevers, BRBPR with hemorrhoids. He had a CT scan which revealed left kidney cancer. He has left nephrectomy 3/2016. He was note to have lung nodules on left lung on surveillance scan. He had stereotactic radiation to lung. Follow-up scans showed new nodules in R lung. He was referred to Dr. Painting for consideration of IL-2. Dr. Painting deemed him a good candidate. He had echo, PFTs, brain MRI, bone scan, and CT CAP as a baseline prior to starting therapy on 4/17/17. He comes in prior to admission for cycle 2 of IL-2.     Interval History:   Julio is here with his wife, Alda, son, daughter and  for his appointment today. He has been doing well since his last IL2 infusion. He slowly has stopped itching as much as when he was first discharged. He occasionally has a dry cough which is still lingering. He had DUGGAN at time of discharge but that has resolved. He is anxious and scared to go into chemo again today but he does feel stronger and ready. He has not had any issues with fevers or chills.     Review of Systems: Patient denies any of the following except if noted above: fever, chills, vision or hearing changes, chest pain, cough, dyspnea, abdominal pain, nausea, vomiting, diarrhea, constipation, urinary concerns, headaches, numbness, tingling or issues with mood.     Current Outpatient Prescriptions   Medication     diphenoxylate-atropine (LOMOTIL) 2.5-0.025 MG per tablet     prochlorperazine (COMPAZINE) 10 MG tablet     ondansetron (ZOFRAN) 4 MG tablet     acetaminophen (TYLENOL) 500 MG tablet     No current facility-administered medications for this visit.        PHYSICAL EXAM:  /81 (BP Location: Right arm, Patient Position: Chair,  Cuff Size: Adult Regular)  Pulse 80  Temp 98  F (36.7  C) (Oral)  Resp 18  Wt 103.3 kg (227 lb 11.2 oz)  SpO2 96%  BMI 36.77 kg/m2  General: Alert, oriented, pleasant, NAD  Skin: No rashes, bruising, or petechiae on exposed skin   HEENT: Normocephalic, atraumatic, PERRLA, EOMI. Moist mucus membranes, no lesions or thrush. No erythema.   Neck: No cervical or supraclavicular LAD.   Axillary: No LAD  Lungs: CTA bilaterally, normal work of breathing. No wheezing, crackles or rhonchi  Cardiac: RRR, S1, S2, no murmurs  Abdomen: Soft, nontender, nondistended. Normoactive bowel sounds. No hepatosplenomegaly, masses  Neuro: CNII-XII grossly intact  Extremities: No pedal edema    Labs:   Results for orders placed or performed in visit on 05/03/17 (from the past 24 hour(s))   CBC with platelets differential   Result Value Ref Range    WBC 5.5 4.0 - 11.0 10e9/L    RBC Count 5.27 4.4 - 5.9 10e12/L    Hemoglobin 15.0 13.3 - 17.7 g/dL    Hematocrit 45.1 40.0 - 53.0 %    MCV 86 78 - 100 fl    MCH 28.5 26.5 - 33.0 pg    MCHC 33.3 31.5 - 36.5 g/dL    RDW 13.8 10.0 - 15.0 %    Platelet Count 246 150 - 450 10e9/L    Diff Method Automated Method     % Neutrophils 46.4 %    % Lymphocytes 31.9 %    % Monocytes 9.0 %    % Eosinophils 11.4 %    % Basophils 0.9 %    % Immature Granulocytes 0.4 %    Nucleated RBCs 0 0 /100    Absolute Neutrophil 2.5 1.6 - 8.3 10e9/L    Absolute Lymphocytes 1.7 0.8 - 5.3 10e9/L    Absolute Monocytes 0.5 0.0 - 1.3 10e9/L    Absolute Eosinophils 0.6 0.0 - 0.7 10e9/L    Absolute Basophils 0.1 0.0 - 0.2 10e9/L    Abs Immature Granulocytes 0.0 0 - 0.4 10e9/L    Absolute Nucleated RBC 0.0    Comprehensive metabolic panel   Result Value Ref Range    Sodium 139 133 - 144 mmol/L    Potassium 4.2 3.4 - 5.3 mmol/L    Chloride 108 94 - 109 mmol/L    Carbon Dioxide 21 20 - 32 mmol/L    Anion Gap 10 3 - 14 mmol/L    Glucose 104 (H) 70 - 99 mg/dL    Urea Nitrogen 12 7 - 30 mg/dL    Creatinine 0.97 0.66 - 1.25 mg/dL     GFR Estimate 83 >60 mL/min/1.7m2    GFR Estimate If Black >90 >60 mL/min/1.7m2    Calcium 8.0 (L) 8.5 - 10.1 mg/dL    Bilirubin Total 0.6 0.2 - 1.3 mg/dL    Albumin 3.4 3.4 - 5.0 g/dL    Protein Total 6.9 6.8 - 8.8 g/dL    Alkaline Phosphatase 69 40 - 150 U/L    ALT 43 0 - 70 U/L    AST 18 0 - 45 U/L   Magnesium   Result Value Ref Range    Magnesium 2.3 1.6 - 2.3 mg/dL       Assessment & Plan:   1. Metastatic renal cell carcinoma with pulmonary metastasis: S/p left nephrectomy and XRT to left lung. Now with disease involving R lung. He started on treatment with IL-2 and is s/p 1 cycle. He is feeling well to proceed with IL-2 cycle 2 today. He will need PICC line placed in hospital. He will have a 4 week break prior to restaging CT CAP prior to cycle three. He will see Dr. Painting on 5/31 and be admitted the same day for cycle 3. Order placed for picc line. He will call with any interval concerns.     2. Anxiety: He admits to being anxious today about the next cycle. Discussed seeing psychology to have someone to talk to about his anxiety. He was in agreement. Referral placed and asked scheduling to have an appointment late next week or early the following week.     3. Cracked filling: Follow-up with dentist - did not discuss at today's appointment.     Jes REARDON    The patient was seen in conjunction with Jes Rhodes NP who served as a scribe for today's visit. I have reviewed and edited the above note, and agree with the above findings and plan.    Jocelyn Seaman PA-C  [facility] [address] [address] [phone]

## 2017-05-03 NOTE — NURSING NOTE
Chief Complaint   Patient presents with     Blood Draw     labs drawn by vpt by rn.  vs taken.     Labs drawn by vpt by rn.  Vs taken.  Pt checked in to next appt.  Dulce Maria Moe RN

## 2017-05-03 NOTE — H&P
[facility], [place]    History and Physical  Hematology / Oncology     Date of Admission:  5/3/2017    Assessment & Plan   Julio John is a 47 year old man with metastatic renal cell carcinoma to the lungs. He is admitted for cycle 2 HD IL-2 therapy.     #Metastatic RCC. S/p L nephrectomy, XRT to L lung, and cycle 1 HD IL-2. Admitted for cycle 2 IL-2. Pt feeling well. Labs and vitals reviewed and adequate to proceed with treatment.     Cycle 2 Treatment Plan  -D5 NS + KCl 20meq at 100cc/hr.  -NS bolus prn SBP <85 or UOP <30cc/hr (<240 cc/8 hr), may repeat x1.  -Premedicate with zofran q8h and tylenol q4h.   -Zantac bid, keflex bid.   -PRN: benadryl, demerol, naprosyn, lomotil, compazine, ativan.   -Aldesleukin 64 million units IV q8 hours x14 doses or as many as tolerated.   -Avoid steroids and diuretics.   -Oxycodone prn pain.     #Intermittent dyspnea, chest tightness. ?Secondary to anxiety vs recent radiation. Normal echo and PFTs done recently. Chest CT shows some radiation changes. Oxygen saturation and BP are fine. Monitor.    FEN:   -IVF per treatment plan   -PRN lyte replacement  -RDAT     Mare Pfeiffer DNP, APRN, CNP  Hematology/Oncology  Pager: [phone]    Code Status   Full Code    Primary Care Physician   Primary hematologist/oncologist: Dr. Painting    Chief Complaint   Scheduled admission for cycle 2 HD IL-2    History of Present Illness   History obtained from chart review and discussed with patient.    Julio John is a 47 year old man with metastatic renal cell carcinoma to the lungs. He is admitted for cycle 2 HD IL-2 therapy. He initially presented with back pain, fevers, BRBPR with hemorrhoids. He had a CT scan that revealed left kidney cancer. He had left nephrectomy 3/2016. He was noted to have lung nodules on left lung on surveillance scan. He had stereotactic radiation to lung. Follow-up scans showed new nodules in right lung. He was referred to  Dr. Painting for consideration of IL-2. Dr. Painting deemed him a good candidate. He tolerated cycle 1 relatively well and completed 8 doses. He had expected side effects as well as testicular pain of unclear etiology but felt r/t either edema or rigors. He has recovered from that first cycle. Plan to restage after 2 cycles.     Past Medical History    I have reviewed this patient's medical history and updated it with pertinent information if needed.   No past medical history on file.    Past Surgical History   I have reviewed this patient's surgical history and updated it with pertinent information if needed.  No past surgical history on file.    Prior to Admission Medications   Prior to Admission Medications   Prescriptions Last Dose Informant Patient Reported? Taking?   acetaminophen (TYLENOL) 500 MG tablet   Yes No   Sig: Take 500 mg by mouth   diphenoxylate-atropine (LOMOTIL) 2.5-0.025 MG per tablet   No No   Sig: Take 1 tablet by mouth 4 times daily as needed for diarrhea (After each diarrheal stool.)   Patient not taking: Reported on 5/3/2017   ondansetron (ZOFRAN) 4 MG tablet   No No   Sig: Take 2 tablets (8 mg) by mouth every 8 hours as needed for nausea   Patient not taking: Reported on 5/3/2017   prochlorperazine (COMPAZINE) 10 MG tablet   No No   Sig: Take 1 tablet (10 mg) by mouth every 6 hours as needed (Breakthrough Nausea/Vomiting)   Patient not taking: Reported on 5/3/2017      Facility-Administered Medications: None       Allergies   No Known Allergies    Social History   I have reviewed this patient's social history and updated it with pertinent information if needed. Julio Draper Alvaro  reports that he has never smoked. He does not have any smokeless tobacco history on file. He reports that he does not drink alcohol.    Review of Systems   Negative other than as stated above in HPI.   Physical Exam                      Vital Signs with Ranges  Temp:  [98  F (36.7  C)] 98  F (36.7  C)  Pulse:  [80]  "80  Resp:  [18] 18  BP: (122)/(81) 122/81  SpO2:  [96 %] 96 %  0 lbs 0 oz    Ht 1.676 m (5' 6\")  Wt 102.8 kg (226 lb 10.1 oz)  BMI 36.58 kg/m2  Vitals:    05/03/17 1144   Weight: 102.8 kg (226 lb 10.1 oz)       Constitutional: Pleasant Bahamian-speaking man seen sitting comfortably in bed in no distress. Alert and interactive.   HEENT: NCAT. PERRL, anicteric sclera. Oral mucosa pink and moist with no lesions or thrush.  Respiratory: Non-labored breathing, good air exchange, lungs clear to auscultation bilaterally.   Cardiovascular: Regular rate and rhythm. No murmur or rub.   GI: Normoactive bowel sounds. Abdomen soft, non-distended, and non-tender.   Skin: Warm and dry. Dry, flaking, itchy skin on b/l forearms and back. No concerning lesions or rash.  Musculoskeletal: Extremities grossly normal, non-tender, trace non-pitting sockline edema.   Neurologic: A&O, speech normal, no focal deficits.   Neuropsychiatric: Mentation and affect appear normal/appropriate.  Vascular Access: LUE PICC is CDI.       Data   CBC  Recent Labs  Lab 05/03/17  0909   WBC 5.5   RBC 5.27   HGB 15.0   HCT 45.1   MCV 86   MCH 28.5   MCHC 33.3   RDW 13.8        CMP  Recent Labs  Lab 05/03/17  0909      POTASSIUM 4.2   CHLORIDE 108   CO2 21   ANIONGAP 10   *   BUN 12   CR 0.97   GFRESTIMATED 83   GFRESTBLACK >90   KVNG 8.0*   MAG 2.3   PROTTOTAL 6.9   ALBUMIN 3.4   BILITOTAL 0.6   ALKPHOS 69   AST 18   ALT 43     INRNo lab results found in last 7 days.                          "

## 2017-05-03 NOTE — LETTER
5/3/2017      RE: Julio John  [id number]  [place] [address]       Hematology-Oncology Visit  May 3, 2017    Reason for Visit: follow-up metastatic renal cell carcinoma    HPI: Julio John is a 47 year old gentleman without significant past medical history with metastatic renal cell carcinoma to lungs. He presented with back pain, fevers, BRBPR with hemorrhoids. He had a CT scan which revealed left kidney cancer. He has left nephrectomy 3/2016. He was note to have lung nodules on left lung on surveillance scan. He had stereotactic radiation to lung. Follow-up scans showed new nodules in R lung. He was referred to Dr. Painting for consideration of IL-2. Dr. Painting deemed him a good candidate. He had echo, PFTs, brain MRI, bone scan, and CT CAP as a baseline prior to starting therapy on 4/17/17. He comes in prior to admission for cycle 2 of IL-2.     Interval History:   Julio is here with his wife, Alda, son, daughter and  for his appointment today. He has been doing well since his last IL2 infusion. He slowly has stopped itching as much as when he was first discharged. He occasionally has a dry cough which is still lingering. He had DUGGAN at time of discharge but that has resolved. He is anxious and scared to go into chemo again today but he does feel stronger and ready. He has not had any issues with fevers or chills.     Review of Systems: Patient denies any of the following except if noted above: fever, chills, vision or hearing changes, chest pain, cough, dyspnea, abdominal pain, nausea, vomiting, diarrhea, constipation, urinary concerns, headaches, numbness, tingling or issues with mood.     Current Outpatient Prescriptions   Medication     diphenoxylate-atropine (LOMOTIL) 2.5-0.025 MG per tablet     prochlorperazine (COMPAZINE) 10 MG tablet     ondansetron (ZOFRAN) 4 MG tablet     acetaminophen (TYLENOL) 500 MG tablet     No current facility-administered medications for  this visit.        PHYSICAL EXAM:  /81 (BP Location: Right arm, Patient Position: Chair, Cuff Size: Adult Regular)  Pulse 80  Temp 98  F (36.7  C) (Oral)  Resp 18  Wt 103.3 kg (227 lb 11.2 oz)  SpO2 96%  BMI 36.77 kg/m2  General: Alert, oriented, pleasant, NAD  Skin: No rashes, bruising, or petechiae on exposed skin   HEENT: Normocephalic, atraumatic, PERRLA, EOMI. Moist mucus membranes, no lesions or thrush. No erythema.   Neck: No cervical or supraclavicular LAD.   Axillary: No LAD  Lungs: CTA bilaterally, normal work of breathing. No wheezing, crackles or rhonchi  Cardiac: RRR, S1, S2, no murmurs  Abdomen: Soft, nontender, nondistended. Normoactive bowel sounds. No hepatosplenomegaly, masses  Neuro: CNII-XII grossly intact  Extremities: No pedal edema    Labs:   Results for orders placed or performed in visit on 05/03/17 (from the past 24 hour(s))   CBC with platelets differential   Result Value Ref Range    WBC 5.5 4.0 - 11.0 10e9/L    RBC Count 5.27 4.4 - 5.9 10e12/L    Hemoglobin 15.0 13.3 - 17.7 g/dL    Hematocrit 45.1 40.0 - 53.0 %    MCV 86 78 - 100 fl    MCH 28.5 26.5 - 33.0 pg    MCHC 33.3 31.5 - 36.5 g/dL    RDW 13.8 10.0 - 15.0 %    Platelet Count 246 150 - 450 10e9/L    Diff Method Automated Method     % Neutrophils 46.4 %    % Lymphocytes 31.9 %    % Monocytes 9.0 %    % Eosinophils 11.4 %    % Basophils 0.9 %    % Immature Granulocytes 0.4 %    Nucleated RBCs 0 0 /100    Absolute Neutrophil 2.5 1.6 - 8.3 10e9/L    Absolute Lymphocytes 1.7 0.8 - 5.3 10e9/L    Absolute Monocytes 0.5 0.0 - 1.3 10e9/L    Absolute Eosinophils 0.6 0.0 - 0.7 10e9/L    Absolute Basophils 0.1 0.0 - 0.2 10e9/L    Abs Immature Granulocytes 0.0 0 - 0.4 10e9/L    Absolute Nucleated RBC 0.0    Comprehensive metabolic panel   Result Value Ref Range    Sodium 139 133 - 144 mmol/L    Potassium 4.2 3.4 - 5.3 mmol/L    Chloride 108 94 - 109 mmol/L    Carbon Dioxide 21 20 - 32 mmol/L    Anion Gap 10 3 - 14 mmol/L    Glucose  104 (H) 70 - 99 mg/dL    Urea Nitrogen 12 7 - 30 mg/dL    Creatinine 0.97 0.66 - 1.25 mg/dL    GFR Estimate 83 >60 mL/min/1.7m2    GFR Estimate If Black >90 >60 mL/min/1.7m2    Calcium 8.0 (L) 8.5 - 10.1 mg/dL    Bilirubin Total 0.6 0.2 - 1.3 mg/dL    Albumin 3.4 3.4 - 5.0 g/dL    Protein Total 6.9 6.8 - 8.8 g/dL    Alkaline Phosphatase 69 40 - 150 U/L    ALT 43 0 - 70 U/L    AST 18 0 - 45 U/L   Magnesium   Result Value Ref Range    Magnesium 2.3 1.6 - 2.3 mg/dL       Assessment & Plan:   1. Metastatic renal cell carcinoma with pulmonary metastasis: S/p left nephrectomy and XRT to left lung. Now with disease involving R lung. He started on treatment with IL-2 and is s/p 1 cycle. He is feeling well to proceed with IL-2 cycle 2 today. He will need PICC line placed in hospital. He will have a 4 week break prior to restaging CT CAP prior to cycle three. He will see Dr. Painting on 5/31 and be admitted the same day for cycle 3. Order placed for picc line. He will call with any interval concerns.     2. Anxiety: He admits to being anxious today about the next cycle. Discussed seeing psychology to have someone to talk to about his anxiety. He was in agreement. Referral placed and asked scheduling to have an appointment late next week or early the following week.     3. Cracked filling: Follow-up with dentist - did not discuss at today's appointment.     Jes TENA NP-C [facility]    The patient was seen in conjunction with Jes Rhodes NP who served as a scribe for today's visit. I have reviewed and edited the above note, and agree with the above findings and plan.    Jocelyn Seaman PA-C  [facility] [address] [address]  [phone]

## 2017-05-03 NOTE — NURSING NOTE
"Oncology Rooming Note    May 3, 2017 9:22 AM   Julio John is a 47 year old male who presents for:    Chief Complaint   Patient presents with     Blood Draw     labs drawn by vpt by rn.  vs taken.     Oncology Clinic Visit     Clear cell renal cell carcinoma (H)     Initial Vitals: /81 (BP Location: Right arm, Patient Position: Chair, Cuff Size: Adult Regular)  Pulse 80  Temp 98  F (36.7  C) (Oral)  Resp 18  Wt 103.3 kg (227 lb 11.2 oz)  SpO2 96%  BMI 36.77 kg/m2 Estimated body mass index is 36.77 kg/(m^2) as calculated from the following:    Height as of 4/25/17: 1.676 m (5' 5.98\").    Weight as of this encounter: 103.3 kg (227 lb 11.2 oz). Body surface area is 2.19 meters squared.  No Pain (0) Comment: Data Unavailable   No LMP for male patient.  Allergies reviewed: Yes  Medications reviewed: Yes    Medications: Medication refills not needed today.  Pharmacy name entered into EPIC: Data Unavailable    Clinical concerns: Patient is extremely anxious about     6 minutes for nursing intake (face to face time)     Damaris Ruelas CMA              "

## 2017-05-03 NOTE — PROGRESS NOTES
"[facility]  FOLLOW-UP VISIT NOTE  Date of visit: 4-25-17          REASON FOR VISIT: [facility] here for post IL-2 follow up    HPI: Julio Jhon is a 47 year old gentleman without significant past medical history with metastatic renal cell carcinoma to lungs. He presented with back pain, fevers, BRBPR with hemorrhoids. He had a CT scan which revealed left kidney cancer. He has left nephrectomy 3/2016. He was note to have lung nodules on left lung on surveillance scan. He had stereotactic radiation to lung. Follow-up scans showed new nodules in R lung. He was referred to Dr. Painting for consideration of IL-2. Dr. Painting deemed him a good candidate.     His echo and PFTs were appropriate for IL-2.  His brain MRI and bone scan were negative.  His CT CAP showed growing pulmonary metastasis only.    INTERVAL HISTORY: Julio is here with his wife and 2 year old son and six month old baby.  He was inpatient 4/17-4/20 fro C1 of IL-2 and tolerated 8 total doses.  He had expected toxicity including capillary leak syndrome, SOB, palpitations, nausea, vomiting, diarrhea, rigors, headache, tooth pain, throat pain, testicle pain. He was managed with supportive medications. He has been home for about five days now and slowly is feeling stronger and appetite is returning.  He is mostly still bothered by his left lower molar and apparently he wants the dentist to pull it out.      ROS: no f/s/c. Breathing has improved. Diarrhea has stopped.      EXAM:  /88  Pulse 109  Temp 98.8  F (37.1  C) (Oral)  Resp 18  Ht 1.676 m (5' 5.98\")  Wt 102.3 kg (225 lb 9.6 oz)  SpO2 95%  BMI 36.43 kg/m2  Wt Readings from Last 4 Encounters:   05/03/17 103.3 kg (227 lb 11.2 oz)   04/25/17 102.3 kg (225 lb 9.6 oz)   04/20/17 110.4 kg (243 lb 4.8 oz)   04/17/17 104.8 kg (231 lb)     Vital signs were reviewed.   Patient alert and oriented x3.   PERRLA. EOMI. No scleral icterus noted. OP without thrush/sores.  He " has no gum erythema or ionfection in his gums but it tender with pressure to lower molars  Neck exam: No palpable cervical, supraclavicular or axillary nodes bilaterally.   Heart: RRR no murmurs noted.   Lungs: clear to auscultation bilaterally.  No crackles or wheezing.   Abd: positive bowel sounds in all four quadrants.  No tenderness to palpation.  No hepatomegaly.   Extremities: No lower extremity edema.   Neuro: grossly intact.   Mood and affect is stable.       LABS:      4/20/2017 05:53 4/25/2017 13:39   Sodium 136 137   Potassium 3.7 4.1   Chloride 110 (H) 106   Carbon Dioxide 18 (L) 26   Urea Nitrogen 13 16   Creatinine 1.48 (H) 1.06   GFR Estimate 51 (L) 75   GFR Estimate If Black 62 >90...   Calcium 6.6 (L) 8.6   Anion Gap 8 5   Magnesium 1.5 (L)    Phosphorus 1.4 (L)    Albumin 2.5 (L) 3.5   Protein Total 5.0 (L) 7.4   Bilirubin Total 3.7 (H) 0.7   Alkaline Phosphatase 36 (L) 86    (H) 88 (H)   AST 93 (H) 22   CK Total 154    Lactate Dehydrogenase 277 (H)    Glucose 109 (H) 119 (H)   WBC 3.7 (L) 8.4   Hemoglobin 13.0 (L) 15.5   Hematocrit 38.9 (L) 45.9   Platelet Count 43 (LL) 207   RBC Count 4.54 5.41   MCV 86 85     ASSESSMENT/PLAN:   47 year old with mRCC, s/p C1 of IL-2.  Julio and I reviewed all his laboratory data today showing improvement of his lytes, creatinine and counts.  Physically he is improving- better appetite, improved sleep, diarrhea has stopped.  Despite improvement in physical and laboratory findings today, he is still having tenderness of his lower gum. He will see the dentist for an evaluation.  Since he is doing so well, we will set him up for admission next week for C2.      Bailee Garnica PA-C

## 2017-05-03 NOTE — IP AVS SNAPSHOT
MRN:[id number]                      After Visit Summary   5/3/2017    Julio John    MRN: [id number]           Thank you!     Thank you for choosing [facility] for your care. Our goal is always to provide you with excellent care. Hearing back from our patients is one way we can continue to improve our services. Please take a few minutes to complete the written survey that you may receive in the mail after you visit with us. Thank you!        Patient Information     Date Of Birth          1970        Designated Caregiver       Most Recent Value    Caregiver    Will someone help with your care after discharge? yes    Name of designated caregiver Alda    Phone number of caregiver [phone]    Caregiver address [address]      About your hospital stay     You were admitted on:  May 3, 2017 You last received care in the:  Unit [facility]    You were discharged on:  May 6, 2017       Who to Call     For medical emergencies, please call 911.  For non-urgent questions about your medical care, please call your primary care provider or clinic, [phone]          Attending Provider     Provider Specialty    Rick Martins MD Oncology       Primary Care Provider Office Phone # Fax #    Uchemadu MD Doc [phone]       [facility] [address] [place] [address]        Your next 10 appointments already scheduled     May 12, 2017  9:00 AM CDT   [facility] with  [facility]   [facility] ([facility])    [address]  2nd [facility] [address] [phone]            May 12, 2017  9:30 AM CDT   (Arrive by 9:15 AM)   Return Visit with Dori Garnica PA-C   [facility] ([facility])    [address]  2nd Floor  [facility] [address] [phone]            May 16, 2017  8:00 AM CDT   (Arrive by 7:45  "AM)   NEW WITH ROOM with Nirav Kim PsyD,  2 116 CONSULT [facility] ([facility])    [address]  2nd Floor  [facility] [address] [phone]              Pending Results     Date and Time Order Name Status Description    2017 0020 EKG 12-lead, tracing only Preliminary     2017 2330 Blood culture Preliminary     5/3/2017 2330 Blood culture Preliminary             Admission Information     Date & Time Provider Department Dept. Phone    5/3/2017 Rick Martins MD Unit [facility] [place] [phone]      Your Vitals Were     Blood Pressure Pulse Temperature Respirations Height Weight    112/68 (BP Location: Right arm) 115 97.9  F (36.6  C) (Oral) 18 1.676 m (5' 6\") 108 kg (238 lb 1.6 oz)    Pulse Oximetry BMI (Body Mass Index)                97% 38.43 kg/m2          [email or web address] Information     [email or web address] lets you send messages to your doctor, view your test results, renew your prescriptions, schedule appointments and more. To sign up, go to [email or web address] . Click on \"Log in\" on the left side of the screen, which will take you to the Welcome page. Then click on \"Sign up Now\" on the right side of the page.     You will be asked to enter the access code listed below, as well as some personal information. Please follow the directions to create your username and password.     Your access code is: [email or web address]  Expires: 2017 10:24 AM     Your access code will  in 90 days. If you need help or a new code, please call your [place] clinic or [phone].        Care EveryWhere ID     This is your Care EveryWhere ID. This could be used by other organizations to access your [place] medical records  [id number]           Review of your medicines      START taking        Dose / Directions    eucerin cream   Used for:  Malignant neoplasm of left kidney (H)        Apply topically 2 times daily   Refills:  0       hydrOXYzine 25 " MG tablet   Commonly known as:  ATARAX   Used for:  Pruritic disorder        Dose:  25 mg   Take 1 tablet (25 mg) by mouth every 4 hours as needed for itching   Quantity:  60 tablet   Refills:  0       oxyCODONE 5 MG IR tablet   Commonly known as:  ROXICODONE   Used for:  Malignant neoplasm of left kidney (H)        Dose:  5 mg   Take 1 tablet (5 mg) by mouth every 4 hours as needed for moderate to severe pain   Quantity:  10 tablet   Refills:  0         CONTINUE these medicines which may have CHANGED, or have new prescriptions. If we are uncertain of the size of tablets/capsules you have at home, strength may be listed as something that might have changed.        Dose / Directions    acetaminophen 500 MG tablet   Commonly known as:  TYLENOL   This may have changed:    - when to take this  - reasons to take this   Used for:  Malignant neoplasm of left kidney (H)        Dose:  500 mg   Take 1 tablet (500 mg) by mouth every 6 hours as needed for mild pain   Quantity:  1 Bottle   Refills:  1         CONTINUE these medicines which have NOT CHANGED        Dose / Directions    diphenoxylate-atropine 2.5-0.025 MG per tablet   Commonly known as:  LOMOTIL   Used for:  Malignant neoplasm of left kidney (H)        Dose:  1 tablet   Take 1 tablet by mouth 4 times daily as needed for diarrhea (After each diarrheal stool.)   Quantity:  40 tablet   Refills:  0       ondansetron 4 MG tablet   Commonly known as:  ZOFRAN   Used for:  Malignant neoplasm of left kidney (H)        Dose:  8 mg   Take 2 tablets (8 mg) by mouth every 8 hours as needed for nausea   Quantity:  18 tablet   Refills:  0       prochlorperazine 10 MG tablet   Commonly known as:  COMPAZINE   Used for:  Malignant neoplasm of left kidney (H)        Dose:  10 mg   Take 1 tablet (10 mg) by mouth every 6 hours as needed (Breakthrough Nausea/Vomiting)   Quantity:  30 tablet   Refills:  0            Where to get your medicines      These medications were sent to [facility] [place], [place] - [address]  [address] [address]     Phone:  [phone]     acetaminophen 500 MG tablet    hydrOXYzine 25 MG tablet         Some of these will need a paper prescription and others can be bought over the counter. Ask your nurse if you have questions.     Bring a paper prescription for each of these medications     oxyCODONE 5 MG IR tablet                Protect others around you: Learn how to safely use, store and throw away your medicines at www.disposemymeds.org.             Medication List: This is a list of all your medications and when to take them. Check marks below indicate your daily home schedule. Keep this list as a reference.      Medications           Morning Afternoon Evening Bedtime As Needed    acetaminophen 500 MG tablet   Commonly known as:  TYLENOL   Take 1 tablet (500 mg) by mouth every 6 hours as needed for mild pain   Last time this was given:  650 mg on 5/5/2017 10:26 AM                                diphenoxylate-atropine 2.5-0.025 MG per tablet   Commonly known as:  LOMOTIL   Take 1 tablet by mouth 4 times daily as needed for diarrhea (After each diarrheal stool.)   Last time this was given:  1 tablet on 5/5/2017  8:34 PM                                eucerin cream   Apply topically 2 times daily   Last time this was given:  5/5/2017  8:34 PM                                hydrOXYzine 25 MG tablet   Commonly known as:  ATARAX   Take 1 tablet (25 mg) by mouth every 4 hours as needed for itching   Last time this was given:  25 mg on 5/6/2017  1:45 AM                                ondansetron 4 MG tablet   Commonly known as:  ZOFRAN   Take 2 tablets (8 mg) by mouth every 8 hours as needed for nausea                                oxyCODONE 5 MG IR tablet   Commonly known as:  ROXICODONE   Take 1 tablet (5 mg) by mouth every 4 hours as needed for moderate to severe pain   Last time this was given:  5 mg on 5/5/2017  6:59 PM                                 prochlorperazine 10 MG tablet   Commonly known as:  COMPAZINE   Take 1 tablet (10 mg) by mouth every 6 hours as needed (Breakthrough Nausea/Vomiting)

## 2017-05-04 LAB
ALBUMIN SERPL-MCNC: 2.7 G/DL (ref 3.4–5)
ALBUMIN UR-MCNC: 30 MG/DL
ALP SERPL-CCNC: 45 U/L (ref 40–150)
ALT SERPL W P-5'-P-CCNC: 39 U/L (ref 0–70)
ANION GAP SERPL CALCULATED.3IONS-SCNC: 11 MMOL/L (ref 3–14)
APPEARANCE UR: CLEAR
AST SERPL W P-5'-P-CCNC: 24 U/L (ref 0–45)
BASOPHILS # BLD AUTO: 0 10E9/L (ref 0–0.2)
BASOPHILS NFR BLD AUTO: 0.1 %
BILIRUB SERPL-MCNC: 1 MG/DL (ref 0.2–1.3)
BILIRUB UR QL STRIP: ABNORMAL
BUN SERPL-MCNC: 12 MG/DL (ref 7–30)
CALCIUM SERPL-MCNC: 7.5 MG/DL (ref 8.5–10.1)
CHLORIDE SERPL-SCNC: 109 MMOL/L (ref 94–109)
CK SERPL-CCNC: 57 U/L (ref 30–300)
CO2 SERPL-SCNC: 21 MMOL/L (ref 20–32)
COLOR UR AUTO: ABNORMAL
CREAT SERPL-MCNC: 1.33 MG/DL (ref 0.66–1.25)
DIFFERENTIAL METHOD BLD: ABNORMAL
EOSINOPHIL # BLD AUTO: 0.2 10E9/L (ref 0–0.7)
EOSINOPHIL NFR BLD AUTO: 2.3 %
ERYTHROCYTE [DISTWIDTH] IN BLOOD BY AUTOMATED COUNT: 14.4 % (ref 10–15)
GFR SERPL CREATININE-BSD FRML MDRD: 58 ML/MIN/1.7M2
GLUCOSE SERPL-MCNC: 124 MG/DL (ref 70–99)
GLUCOSE UR STRIP-MCNC: NEGATIVE MG/DL
GRAN CASTS #/AREA URNS LPF: 5 /LPF
HCT VFR BLD AUTO: 41.4 % (ref 40–53)
HGB BLD-MCNC: 13.9 G/DL (ref 13.3–17.7)
HGB UR QL STRIP: NEGATIVE
HYALINE CASTS #/AREA URNS LPF: 11 /LPF (ref 0–2)
IMM GRANULOCYTES # BLD: 0 10E9/L (ref 0–0.4)
IMM GRANULOCYTES NFR BLD: 0.5 %
INTERPRETATION ECG - MUSE: NORMAL
KETONES UR STRIP-MCNC: 5 MG/DL
LACTATE BLD-SCNC: 3 MMOL/L (ref 0.7–2.1)
LDH SERPL L TO P-CCNC: 217 U/L (ref 85–227)
LEUKOCYTE ESTERASE UR QL STRIP: NEGATIVE
LYMPHOCYTES # BLD AUTO: 0.1 10E9/L (ref 0.8–5.3)
LYMPHOCYTES NFR BLD AUTO: 1.1 %
MAGNESIUM SERPL-MCNC: 1.5 MG/DL (ref 1.6–2.3)
MAGNESIUM SERPL-MCNC: 3.1 MG/DL (ref 1.6–2.3)
MCH RBC QN AUTO: 29.3 PG (ref 26.5–33)
MCHC RBC AUTO-ENTMCNC: 33.6 G/DL (ref 31.5–36.5)
MCV RBC AUTO: 87 FL (ref 78–100)
MONOCYTES # BLD AUTO: 0.1 10E9/L (ref 0–1.3)
MONOCYTES NFR BLD AUTO: 0.8 %
MUCOUS THREADS #/AREA URNS LPF: PRESENT /LPF
NEUTROPHILS # BLD AUTO: 8 10E9/L (ref 1.6–8.3)
NEUTROPHILS NFR BLD AUTO: 95.2 %
NITRATE UR QL: NEGATIVE
NRBC # BLD AUTO: 0 10*3/UL
NRBC BLD AUTO-RTO: 0 /100
PH UR STRIP: 5.5 PH (ref 5–7)
PHOSPHATE SERPL-MCNC: 1.4 MG/DL (ref 2.5–4.5)
PHOSPHATE SERPL-MCNC: 3.2 MG/DL (ref 2.5–4.5)
PLATELET # BLD AUTO: 158 10E9/L (ref 150–450)
POTASSIUM SERPL-SCNC: 4.2 MMOL/L (ref 3.4–5.3)
PROT SERPL-MCNC: 5.7 G/DL (ref 6.8–8.8)
RBC # BLD AUTO: 4.75 10E12/L (ref 4.4–5.9)
RBC #/AREA URNS AUTO: 3 /HPF (ref 0–2)
SODIUM SERPL-SCNC: 141 MMOL/L (ref 133–144)
SP GR UR STRIP: 1.04 (ref 1–1.03)
SQUAMOUS #/AREA URNS AUTO: 1 /HPF (ref 0–1)
TRANS CELLS #/AREA URNS HPF: <1 /HPF (ref 0–1)
URN SPEC COLLECT METH UR: ABNORMAL
UROBILINOGEN UR STRIP-MCNC: 2 MG/DL (ref 0–2)
WBC # BLD AUTO: 8.4 10E9/L (ref 4–11)
WBC #/AREA URNS AUTO: 2 /HPF (ref 0–2)

## 2017-05-04 PROCEDURE — 85025 COMPLETE CBC W/AUTO DIFF WBC: CPT | Performed by: PHYSICIAN ASSISTANT

## 2017-05-04 PROCEDURE — 99233 SBSQ HOSP IP/OBS HIGH 50: CPT | Performed by: INTERNAL MEDICINE

## 2017-05-04 PROCEDURE — 83735 ASSAY OF MAGNESIUM: CPT | Performed by: PHYSICIAN ASSISTANT

## 2017-05-04 PROCEDURE — 25000132 ZZH RX MED GY IP 250 OP 250 PS 637: Performed by: PHYSICIAN ASSISTANT

## 2017-05-04 PROCEDURE — 25000132 ZZH RX MED GY IP 250 OP 250 PS 637: Performed by: NURSE PRACTITIONER

## 2017-05-04 PROCEDURE — 83605 ASSAY OF LACTIC ACID: CPT | Performed by: INTERNAL MEDICINE

## 2017-05-04 PROCEDURE — 25000128 H RX IP 250 OP 636: Performed by: PHYSICIAN ASSISTANT

## 2017-05-04 PROCEDURE — 80053 COMPREHEN METABOLIC PANEL: CPT | Performed by: PHYSICIAN ASSISTANT

## 2017-05-04 PROCEDURE — 84100 ASSAY OF PHOSPHORUS: CPT | Performed by: INTERNAL MEDICINE

## 2017-05-04 PROCEDURE — 81001 URINALYSIS AUTO W/SCOPE: CPT | Performed by: NURSE PRACTITIONER

## 2017-05-04 PROCEDURE — 25000128 H RX IP 250 OP 636: Performed by: NURSE PRACTITIONER

## 2017-05-04 PROCEDURE — 25800025 ZZH RX 258: Performed by: PHYSICIAN ASSISTANT

## 2017-05-04 PROCEDURE — 83615 LACTATE (LD) (LDH) ENZYME: CPT | Performed by: PHYSICIAN ASSISTANT

## 2017-05-04 PROCEDURE — 87040 BLOOD CULTURE FOR BACTERIA: CPT | Performed by: PHYSICIAN ASSISTANT

## 2017-05-04 PROCEDURE — 36592 COLLECT BLOOD FROM PICC: CPT | Performed by: PHYSICIAN ASSISTANT

## 2017-05-04 PROCEDURE — 25000125 ZZHC RX 250: Performed by: NURSE PRACTITIONER

## 2017-05-04 PROCEDURE — 36592 COLLECT BLOOD FROM PICC: CPT | Performed by: INTERNAL MEDICINE

## 2017-05-04 PROCEDURE — 84484 ASSAY OF TROPONIN QUANT: CPT | Performed by: INTERNAL MEDICINE

## 2017-05-04 PROCEDURE — 82550 ASSAY OF CK (CPK): CPT | Performed by: PHYSICIAN ASSISTANT

## 2017-05-04 PROCEDURE — 12000008 ZZH R&B INTERMEDIATE UMMC

## 2017-05-04 PROCEDURE — 83735 ASSAY OF MAGNESIUM: CPT | Performed by: INTERNAL MEDICINE

## 2017-05-04 PROCEDURE — 84100 ASSAY OF PHOSPHORUS: CPT | Performed by: PHYSICIAN ASSISTANT

## 2017-05-04 RX ORDER — POTASSIUM CHLORIDE 1.5 G/1.58G
20-40 POWDER, FOR SOLUTION ORAL
Status: DISCONTINUED | OUTPATIENT
Start: 2017-05-04 | End: 2017-05-06 | Stop reason: HOSPADM

## 2017-05-04 RX ORDER — POTASSIUM CHLORIDE 750 MG/1
20-40 TABLET, EXTENDED RELEASE ORAL
Status: DISCONTINUED | OUTPATIENT
Start: 2017-05-04 | End: 2017-05-06 | Stop reason: HOSPADM

## 2017-05-04 RX ORDER — POTASSIUM CL/LIDO/0.9 % NACL 10MEQ/0.1L
10 INTRAVENOUS SOLUTION, PIGGYBACK (ML) INTRAVENOUS
Status: DISCONTINUED | OUTPATIENT
Start: 2017-05-04 | End: 2017-05-06 | Stop reason: HOSPADM

## 2017-05-04 RX ORDER — GRANISETRON HYDROCHLORIDE 1 MG/ML
1 INJECTION INTRAVENOUS EVERY 12 HOURS
Status: DISCONTINUED | OUTPATIENT
Start: 2017-05-04 | End: 2017-05-06 | Stop reason: HOSPADM

## 2017-05-04 RX ORDER — POTASSIUM CHLORIDE 29.8 MG/ML
20 INJECTION INTRAVENOUS
Status: DISCONTINUED | OUTPATIENT
Start: 2017-05-04 | End: 2017-05-06 | Stop reason: HOSPADM

## 2017-05-04 RX ORDER — MAGNESIUM SULFATE HEPTAHYDRATE 40 MG/ML
4 INJECTION, SOLUTION INTRAVENOUS EVERY 4 HOURS PRN
Status: DISCONTINUED | OUTPATIENT
Start: 2017-05-04 | End: 2017-05-06 | Stop reason: HOSPADM

## 2017-05-04 RX ORDER — POTASSIUM CHLORIDE 7.45 MG/ML
10 INJECTION INTRAVENOUS
Status: DISCONTINUED | OUTPATIENT
Start: 2017-05-04 | End: 2017-05-06 | Stop reason: HOSPADM

## 2017-05-04 RX ADMIN — LORAZEPAM 0.5 MG: 2 INJECTION INTRAMUSCULAR; INTRAVENOUS at 02:46

## 2017-05-04 RX ADMIN — POTASSIUM CHLORIDE, DEXTROSE MONOHYDRATE AND SODIUM CHLORIDE: 150; 5; 900 INJECTION, SOLUTION INTRAVENOUS at 11:29

## 2017-05-04 RX ADMIN — ACETAMINOPHEN 650 MG: 325 TABLET, FILM COATED ORAL at 14:04

## 2017-05-04 RX ADMIN — ACETAMINOPHEN 650 MG: 325 TABLET, FILM COATED ORAL at 10:49

## 2017-05-04 RX ADMIN — WHITE PETROLATUM: 1.75 OINTMENT TOPICAL at 21:21

## 2017-05-04 RX ADMIN — ACETAMINOPHEN 650 MG: 325 TABLET, FILM COATED ORAL at 06:22

## 2017-05-04 RX ADMIN — SODIUM PHOSPHATE, MONOBASIC, MONOHYDRATE AND SODIUM PHOSPHATE, DIBASIC, ANHYDROUS 20 MMOL: 276; 142 INJECTION, SOLUTION INTRAVENOUS at 12:12

## 2017-05-04 RX ADMIN — POTASSIUM CHLORIDE, DEXTROSE MONOHYDRATE AND SODIUM CHLORIDE: 150; 5; 900 INJECTION, SOLUTION INTRAVENOUS at 01:02

## 2017-05-04 RX ADMIN — RANITIDINE 150 MG: 150 TABLET ORAL at 20:13

## 2017-05-04 RX ADMIN — CEPHALEXIN 500 MG: 500 CAPSULE ORAL at 08:01

## 2017-05-04 RX ADMIN — ONDANSETRON 8 MG: 2 INJECTION INTRAMUSCULAR; INTRAVENOUS at 06:22

## 2017-05-04 RX ADMIN — LORAZEPAM 0.5 MG: 2 INJECTION INTRAMUSCULAR; INTRAVENOUS at 23:53

## 2017-05-04 RX ADMIN — ALDESLEUKIN 64 MILLION UNITS: 1.1 INJECTION, POWDER, LYOPHILIZED, FOR SOLUTION INTRAVENOUS at 02:03

## 2017-05-04 RX ADMIN — RANITIDINE 150 MG: 150 TABLET ORAL at 08:01

## 2017-05-04 RX ADMIN — CEPHALEXIN 500 MG: 500 CAPSULE ORAL at 20:14

## 2017-05-04 RX ADMIN — OXYCODONE HYDROCHLORIDE 5 MG: 5 TABLET ORAL at 01:50

## 2017-05-04 RX ADMIN — LORAZEPAM 0.5 MG: 2 INJECTION INTRAMUSCULAR; INTRAVENOUS at 23:13

## 2017-05-04 RX ADMIN — MEPERIDINE HYDROCHLORIDE 25 MG: 25 INJECTION, SOLUTION INTRAMUSCULAR; INTRAVENOUS; SUBCUTANEOUS at 23:28

## 2017-05-04 RX ADMIN — GRANISETRON HYDROCHLORIDE 1 MG: 1 INJECTION INTRAVENOUS at 14:04

## 2017-05-04 RX ADMIN — OXYCODONE HYDROCHLORIDE 5 MG: 5 TABLET ORAL at 22:32

## 2017-05-04 RX ADMIN — NAPROXEN 250 MG: 250 TABLET ORAL at 04:26

## 2017-05-04 RX ADMIN — SODIUM CHLORIDE 250 ML: 9 INJECTION, SOLUTION INTRAVENOUS at 14:05

## 2017-05-04 RX ADMIN — ALDESLEUKIN 64 MILLION UNITS: 1.1 INJECTION, POWDER, LYOPHILIZED, FOR SOLUTION INTRAVENOUS at 21:50

## 2017-05-04 RX ADMIN — SODIUM CHLORIDE 250 ML: 9 INJECTION, SOLUTION INTRAVENOUS at 09:21

## 2017-05-04 RX ADMIN — MEPERIDINE HYDROCHLORIDE 25 MG: 25 INJECTION, SOLUTION INTRAMUSCULAR; INTRAVENOUS; SUBCUTANEOUS at 03:31

## 2017-05-04 RX ADMIN — ACETAMINOPHEN 650 MG: 325 TABLET, FILM COATED ORAL at 18:05

## 2017-05-04 RX ADMIN — ACETAMINOPHEN 650 MG: 325 TABLET, FILM COATED ORAL at 01:50

## 2017-05-04 RX ADMIN — MAGNESIUM SULFATE HEPTAHYDRATE 4 G: 40 INJECTION, SOLUTION INTRAVENOUS at 09:22

## 2017-05-04 RX ADMIN — PROCHLORPERAZINE EDISYLATE 10 MG: 5 INJECTION INTRAMUSCULAR; INTRAVENOUS at 03:31

## 2017-05-04 ASSESSMENT — PAIN DESCRIPTION - DESCRIPTORS: DESCRIPTORS: ACHING

## 2017-05-04 NOTE — PROGRESS NOTES
[facility], [place]    Hematology / Oncology Progress Note    Date of Admission: 5/3/2017  Hospital Day #: 1   Date of Service (when I saw the patient): 05/04/2017     Assessment & Plan   Julio John is a 47 year old man with metastatic renal cell carcinoma to the lungs. He is admitted for cycle 2 high dose IL-2 therapy.      #Metastatic RCC. S/p L nephrectomy, XRT to L lung, and cycle 1 HD IL-2. Admitted for cycle 2 IL-2. Has completed 2 doses so far.      Cycle 2 Treatment Plan  -D5 NS + KCl 20meq at 100cc/hr.  -NS bolus prn SBP <85 or UOP <30cc/hr (<240 cc/8 hr), may repeat x1.  -Premedicate with tylenol q4h. D/c'ed zofran premed d/t headaches, replaced with granisetron 1mg q12h.   -Zantac bid, keflex bid.   -PRN: benadryl, demerol, naprosyn, lomotil, compazine, ativan.   -Aldesleukin 64 million units IV q8 hours x14 doses or as many as tolerated.   -Avoid steroids and diuretics.   -Oxycodone prn pain.     *OK to hold next dose for up to 24 hours to allow pt to recover physically and/or if not meeting parameters.      #Intermittent dyspnea, chest tightness. ?Secondary to anxiety vs recent radiation. Normal echo and PFTs done recently. Chest CT shows some radiation changes. Oxygen saturation and BP are fine. Monitor.     FEN:   -IVF per treatment plan   -PRN lyte replacement  -RDAT    Code status: FULL   Disposition: D/c home on completion of IL-2 and acute toxicities.     Mare Pfeiffer DNP, APRN, CNP  Hematology/Oncology  Pager: [phone]    Interval History   Julio reports feeling OK this AM and when seen again later in the afternoon. Has been resting most of day. Low UOP with mild gross hematuria, not yet meeting UOP parameters for 3rd dose. Reports heart palpitations and rigors after each dose IL-2. Also reports HA presumed r/t zofran so will try a different antiemetic. Mild, vague and intermittent pains usually around same time as other IL-2 symptoms. No other  immediate concerns or acute issues.     Physical Exam   Temp: 98  F (36.7  C) Temp src: Oral BP: 100/61 Pulse: 91   Resp: 20 SpO2: 95 % O2 Device: None (Room air)    Vitals:    05/03/17 1144 05/04/17 1208   Weight: 102.8 kg (226 lb 10.1 oz) 104.1 kg (229 lb 6.4 oz)     Vital Signs with Ranges  Temp:  [97.5  F (36.4  C)-103.4  F (39.7  C)] 98  F (36.7  C)  Pulse:  [] 91  Resp:  [18-20] 20  BP: ()/(50-70) 100/61  SpO2:  [92 %-98 %] 95 %  I/O last 3 completed shifts:  In: 6927 [P.O.:2909; I.V.:3459; IV Piggyback:559]  Out: 1850 [Urine:1650; Emesis/NG output:200]    Constitutional: Pleasant [place]ib[place]ian-speaking man seen with , resting comfortably in bed in NAD. Alert and interactive.   HEENT: NCAT. PERRL, anicteric sclera. MMM, no lesions or thrush.  Respiratory: Non-labored breathing on RA, good air exchange, lungs clear to auscultation bilaterally.   Cardiovascular: Regular rate and rhythm. No murmur.   GI: Normoactive bowel sounds. Abdomen soft, non-distended, and non-tender.   Skin: Warm and dry. Dry, flaking, itchy skin on b/l forearms and back. No concerning lesions or rash.  Musculoskeletal: Extremities grossly normal, non-tender, trace non-pitting sockline edema.   Neurologic: A&O, speech normal, no focal deficits.   Neuropsychiatric: Mentation and affect appear normal/appropriate.  Vascular Access: LUE PICC is CDI, no pain or swelling.    Medications   Current Facility-Administered Medications   Medication     potassium chloride SA (K-DUR/KLOR-CON M) CR tablet 20-40 mEq     potassium chloride (KLOR-CON) Packet 20-40 mEq     potassium chloride 10 mEq in 100 mL intermittent infusion     potassium chloride 10 mEq in 100 mL intermittent infusion with 10 mg lidocaine     potassium chloride 20 mEq in 50 mL intermittent infusion     magnesium sulfate 4 g in 100 mL sterile water (premade)     sodium phosphate 15 mmol in D5W intermittent infusion     sodium phosphate 20 mmol in D5W intermittent  infusion     sodium phosphate 25 mmol in D5W intermittent infusion     granisetron (KYTRIL) injection 1 mg     Medication Instruction     oxyCODONE (ROXICODONE) IR tablet 5 mg     eucerin cream     dextrose 5% and 0.9% NaCl with potassium chloride 20 mEq infusion     dextrose 5% infusion     0.9% sodium chloride BOLUS     acetaminophen (TYLENOL) tablet 650 mg     ranitidine (ZANTAC) tablet 150 mg     cephALEXin (KEFLEX) capsule 500 mg     diphenhydrAMINE (BENADRYL) capsule 25 mg     meperidine (DEMEROL) injection 25 mg     naproxen (NAPROSYN) tablet 250 mg     diphenoxylate-atropine (LOMOTIL) 2.5-0.025 MG per tablet 1 tablet     aldesleukin (PROLEUKIN) 64 Million Units in D5W 59 mL infusion     prochlorperazine (COMPAZINE) tablet 10 mg     prochlorperazine (COMPAZINE) injection 10 mg     LORazepam (ATIVAN) tablet 0.5-1 mg     LORazepam (ATIVAN) injection 0.5-1 mg     MEDICATION INSTRUCTION     methylPREDNISolone sodium succinate (solu-MEDROL) injection 125 mg     diphenhydrAMINE (BENADRYL) injection 50 mg     meperidine (DEMEROL) injection 25 mg     EPINEPHrine (ADRENALIN) injection 0.3 mg     albuterol (PROAIR HFA/PROVENTIL HFA/VENTOLIN HFA) Inhaler 1-2 puff     albuterol neb solution 2.5 mg     0.9% sodium chloride infusion     naloxone (NARCAN) injection 0.1-0.4 mg       Data   CBC  Recent Labs  Lab 05/04/17  0440 05/03/17  0909   WBC 8.4 5.5   RBC 4.75 5.27   HGB 13.9 15.0   HCT 41.4 45.1   MCV 87 86   MCH 29.3 28.5   MCHC 33.6 33.3   RDW 14.4 13.8    246     CMP  Recent Labs  Lab 05/04/17  1410 05/04/17  0440 05/03/17  0909   NA  --  141 139   POTASSIUM  --  4.2 4.2   CHLORIDE  --  109 108   CO2  --  21 21   ANIONGAP  --  11 10   GLC  --  124* 104*   BUN  --  12 12   CR  --  1.33* 0.97   GFRESTIMATED  --  58* 83   GFRESTBLACK  --  70 >90   KVNG  --  7.5* 8.0*   MAG 3.1* 1.5* 2.3   PHOS  --  1.4*  --    PROTTOTAL  --  5.7* 6.9   ALBUMIN  --  2.7* 3.4   BILITOTAL  --  1.0 0.6   ALKPHOS  --  45 69   AST  --   24 18   ALT  --  39 43     INRNo lab results found in last 7 days.    Results for orders placed or performed during the hospital encounter of 05/03/17   US Upper Extremity Venous Duplex Left Portable    Narrative    EXAMINATION: DOPPLER VENOUS ULTRASOUND OF THE LEFT UPPER EXTREMITY,  5/3/2017 2:45 PM     COMPARISON: None.    HISTORY: Left arm feels heavy, cool to touch    TECHNIQUE:  Gray-scale evaluation with compression, spectral flow and  color Doppler assessment of the deep venous system of the left upper  extremity.    FINDINGS:  Left: Normal blood flow and waveforms are demonstrated in the internal  jugular, innominate, subclavian, and axillary veins. There is normal  compressibility of the brachial, basilic and cephalic veins.    A PICC is seen coursing from the basilic vein through the axillary and  subclavian veins.      Impression    IMPRESSION:  1.  No evidence of left upper extremity deep venous thrombosis.    I have personally reviewed the examination and initial interpretation  and I agree with the findings.    TRISTAN SHERWOOD MD

## 2017-05-04 NOTE — PLAN OF CARE
Problem: Goal Outcome Summary  Goal: Goal Outcome Summary  T max 102.8 Oral (103.4 Ax after Willian Hugger removed). HR max at 142, BP low at 93/52.  Naproxen given, temp now down to 100.1, HR at 124.     D/I: Dose #2 Aldesleukin given @2 AM. Urine output/Blood pressure parameters met.  See flowsheet. Aldesleukin infused at 2 AM over 15 minutes. Demerol x1 for this dose for chilling and rigors approximately 1.5 hrs after infusion.  Acetaminophen given as scheduled every four hours. Intravenous fluid support. Ativan x 1 for nausea and anxiety. Pt requested oxycodone for headache and bone pain x 1. Compazine given with demerol for nausea.  A: Tolerating Aldesleukin thus far. Pt very anxious, some tachycardia related to anxiety prior to infusion, ativan very helpful for nausea and anxiety per pt.   P: Continue to monitor fluid status and vital signs for evidence of capillary leak syndrome. Continue IV fluid support and scheduled acetaminophen. Notify MD if patient falls below blood pressure or urine output parameters.  Lactic acid @ 3.0. MD notified. Continue to monitor.

## 2017-05-04 NOTE — PLAN OF CARE
Problem: Goal Outcome Summary  Goal: Goal Outcome Summary  Outcome: No Change  Here for cycle 2 IL-2. Received first dose at 1800. HR after IL-2 dose 120's-130's. EKG done for c/o palpitations. EKG showed sinus tach. Tmax 100.3 while using Willian Hugger. Patient request Bairhugger and oxycodone X 1 for HA and bone pain r/t IL-2 administration. . All his schedule medication was explained to him upon given. Had one clear emesis. Given Zofran and ativan. Will continue with POC.

## 2017-05-04 NOTE — PLAN OF CARE
Problem: Goal Outcome Summary  Goal: Goal Outcome Summary  Outcome: No Change  AVSS this shift. No complaints of nausea or pain. Urine output 175 this shift, unable to give IL-2 dose #3. Administered 250 cc bolus x2 this shift. Per Mare, NP, may hold IL-2 dose until tomorrow. Blood-tinged urine early this am, notified NP, so far unable to collect UA/UC. Mag and phos replaced. Phos recheck scheduled for 1815. Continue with POC.    Addendum: pt urinated additional 100 cc, sent UA/UC. Has 200 cc UO since 0800, so needs additional 40 cc by 1600 to receive dose #3 of IL-2.

## 2017-05-05 ENCOUNTER — OFFICE VISIT (OUTPATIENT)
Dept: INTERPRETER SERVICES | Facility: CLINIC | Age: 47
End: 2017-05-05

## 2017-05-05 LAB
ALBUMIN SERPL-MCNC: 2.2 G/DL (ref 3.4–5)
ALP SERPL-CCNC: 44 U/L (ref 40–150)
ALT SERPL W P-5'-P-CCNC: 37 U/L (ref 0–70)
ANION GAP SERPL CALCULATED.3IONS-SCNC: 11 MMOL/L (ref 3–14)
AST SERPL W P-5'-P-CCNC: 23 U/L (ref 0–45)
BASOPHILS # BLD AUTO: 0 10E9/L (ref 0–0.2)
BASOPHILS NFR BLD AUTO: 0.1 %
BILIRUB SERPL-MCNC: 1 MG/DL (ref 0.2–1.3)
BUN SERPL-MCNC: 15 MG/DL (ref 7–30)
CALCIUM SERPL-MCNC: 6.8 MG/DL (ref 8.5–10.1)
CHLORIDE SERPL-SCNC: 111 MMOL/L (ref 94–109)
CK SERPL-CCNC: 100 U/L (ref 30–300)
CO2 SERPL-SCNC: 18 MMOL/L (ref 20–32)
CREAT SERPL-MCNC: 1.79 MG/DL (ref 0.66–1.25)
DIFFERENTIAL METHOD BLD: ABNORMAL
EOSINOPHIL # BLD AUTO: 1.5 10E9/L (ref 0–0.7)
EOSINOPHIL NFR BLD AUTO: 19.2 %
ERYTHROCYTE [DISTWIDTH] IN BLOOD BY AUTOMATED COUNT: 14.9 % (ref 10–15)
GFR SERPL CREATININE-BSD FRML MDRD: 41 ML/MIN/1.7M2
GLUCOSE SERPL-MCNC: 143 MG/DL (ref 70–99)
HCT VFR BLD AUTO: 40.1 % (ref 40–53)
HGB BLD-MCNC: 13.1 G/DL (ref 13.3–17.7)
IMM GRANULOCYTES # BLD: 0 10E9/L (ref 0–0.4)
IMM GRANULOCYTES NFR BLD: 0.5 %
LACTATE BLD-SCNC: 1.8 MMOL/L (ref 0.7–2.1)
LDH SERPL L TO P-CCNC: 162 U/L (ref 85–227)
LYMPHOCYTES # BLD AUTO: 0.3 10E9/L (ref 0.8–5.3)
LYMPHOCYTES NFR BLD AUTO: 3.2 %
MAGNESIUM SERPL-MCNC: 2.3 MG/DL (ref 1.6–2.3)
MCH RBC QN AUTO: 28.6 PG (ref 26.5–33)
MCHC RBC AUTO-ENTMCNC: 32.7 G/DL (ref 31.5–36.5)
MCV RBC AUTO: 88 FL (ref 78–100)
MONOCYTES # BLD AUTO: 0.4 10E9/L (ref 0–1.3)
MONOCYTES NFR BLD AUTO: 5.3 %
NEUTROPHILS # BLD AUTO: 5.8 10E9/L (ref 1.6–8.3)
NEUTROPHILS NFR BLD AUTO: 71.7 %
NRBC # BLD AUTO: 0 10*3/UL
NRBC BLD AUTO-RTO: 0 /100
PHOSPHATE SERPL-MCNC: 2.3 MG/DL (ref 2.5–4.5)
PLATELET # BLD AUTO: 123 10E9/L (ref 150–450)
POTASSIUM SERPL-SCNC: 4.2 MMOL/L (ref 3.4–5.3)
PROT SERPL-MCNC: 4.8 G/DL (ref 6.8–8.8)
RBC # BLD AUTO: 4.58 10E12/L (ref 4.4–5.9)
SODIUM SERPL-SCNC: 140 MMOL/L (ref 133–144)
TROPONIN I SERPL-MCNC: NORMAL UG/L (ref 0–0.04)
WBC # BLD AUTO: 8 10E9/L (ref 4–11)

## 2017-05-05 PROCEDURE — 25000128 H RX IP 250 OP 636: Performed by: NURSE PRACTITIONER

## 2017-05-05 PROCEDURE — 83735 ASSAY OF MAGNESIUM: CPT | Performed by: PHYSICIAN ASSISTANT

## 2017-05-05 PROCEDURE — 83605 ASSAY OF LACTIC ACID: CPT | Performed by: INTERNAL MEDICINE

## 2017-05-05 PROCEDURE — 87040 BLOOD CULTURE FOR BACTERIA: CPT | Performed by: PHYSICIAN ASSISTANT

## 2017-05-05 PROCEDURE — 36592 COLLECT BLOOD FROM PICC: CPT | Performed by: INTERNAL MEDICINE

## 2017-05-05 PROCEDURE — 25000132 ZZH RX MED GY IP 250 OP 250 PS 637: Performed by: PHYSICIAN ASSISTANT

## 2017-05-05 PROCEDURE — 12000008 ZZH R&B INTERMEDIATE UMMC

## 2017-05-05 PROCEDURE — 99233 SBSQ HOSP IP/OBS HIGH 50: CPT | Performed by: INTERNAL MEDICINE

## 2017-05-05 PROCEDURE — T1013 SIGN LANG/ORAL INTERPRETER: HCPCS | Mod: U3

## 2017-05-05 PROCEDURE — 84100 ASSAY OF PHOSPHORUS: CPT | Performed by: PHYSICIAN ASSISTANT

## 2017-05-05 PROCEDURE — 83615 LACTATE (LD) (LDH) ENZYME: CPT | Performed by: PHYSICIAN ASSISTANT

## 2017-05-05 PROCEDURE — 25000128 H RX IP 250 OP 636: Performed by: STUDENT IN AN ORGANIZED HEALTH CARE EDUCATION/TRAINING PROGRAM

## 2017-05-05 PROCEDURE — 93010 ELECTROCARDIOGRAM REPORT: CPT | Performed by: INTERNAL MEDICINE

## 2017-05-05 PROCEDURE — 36592 COLLECT BLOOD FROM PICC: CPT | Performed by: PHYSICIAN ASSISTANT

## 2017-05-05 PROCEDURE — 25800025 ZZH RX 258: Performed by: PHYSICIAN ASSISTANT

## 2017-05-05 PROCEDURE — 80053 COMPREHEN METABOLIC PANEL: CPT | Performed by: PHYSICIAN ASSISTANT

## 2017-05-05 PROCEDURE — 82550 ASSAY OF CK (CPK): CPT | Performed by: PHYSICIAN ASSISTANT

## 2017-05-05 PROCEDURE — 25000125 ZZHC RX 250: Performed by: NURSE PRACTITIONER

## 2017-05-05 PROCEDURE — 25000132 ZZH RX MED GY IP 250 OP 250 PS 637: Performed by: NURSE PRACTITIONER

## 2017-05-05 PROCEDURE — 25000128 H RX IP 250 OP 636: Performed by: PHYSICIAN ASSISTANT

## 2017-05-05 PROCEDURE — 85025 COMPLETE CBC W/AUTO DIFF WBC: CPT | Performed by: PHYSICIAN ASSISTANT

## 2017-05-05 RX ORDER — DIPHENOXYLATE HCL/ATROPINE 2.5-.025MG
1 TABLET ORAL 4 TIMES DAILY
Status: DISCONTINUED | OUTPATIENT
Start: 2017-05-05 | End: 2017-05-06 | Stop reason: HOSPADM

## 2017-05-05 RX ORDER — FLUDROCORTISONE ACETATE 0.1 MG/1
200 TABLET ORAL ONCE
Status: COMPLETED | OUTPATIENT
Start: 2017-05-05 | End: 2017-05-05

## 2017-05-05 RX ORDER — LOPERAMIDE HCL 2 MG
2-4 CAPSULE ORAL 4 TIMES DAILY PRN
Status: DISCONTINUED | OUTPATIENT
Start: 2017-05-05 | End: 2017-05-06 | Stop reason: HOSPADM

## 2017-05-05 RX ADMIN — RANITIDINE 150 MG: 150 TABLET ORAL at 08:51

## 2017-05-05 RX ADMIN — MEPERIDINE HYDROCHLORIDE 25 MG: 25 INJECTION, SOLUTION INTRAMUSCULAR; INTRAVENOUS; SUBCUTANEOUS at 07:51

## 2017-05-05 RX ADMIN — CEPHALEXIN 500 MG: 500 CAPSULE ORAL at 20:34

## 2017-05-05 RX ADMIN — DIPHENOXYLATE HYDROCHLORIDE AND ATROPINE SULFATE 1 TABLET: 2.5; .025 TABLET ORAL at 20:34

## 2017-05-05 RX ADMIN — LORAZEPAM 0.5 MG: 2 INJECTION INTRAMUSCULAR; INTRAVENOUS at 15:45

## 2017-05-05 RX ADMIN — LORAZEPAM 0.5 MG: 2 INJECTION INTRAMUSCULAR; INTRAVENOUS at 08:42

## 2017-05-05 RX ADMIN — SODIUM CHLORIDE 250 ML: 9 INJECTION, SOLUTION INTRAVENOUS at 02:12

## 2017-05-05 RX ADMIN — CEPHALEXIN 500 MG: 500 CAPSULE ORAL at 08:51

## 2017-05-05 RX ADMIN — PROCHLORPERAZINE EDISYLATE 10 MG: 5 INJECTION INTRAMUSCULAR; INTRAVENOUS at 04:27

## 2017-05-05 RX ADMIN — SODIUM CHLORIDE 500 ML: 9 INJECTION, SOLUTION INTRAVENOUS at 04:30

## 2017-05-05 RX ADMIN — DIPHENOXYLATE HYDROCHLORIDE AND ATROPINE SULFATE 1 TABLET: 2.5; .025 TABLET ORAL at 12:14

## 2017-05-05 RX ADMIN — RANITIDINE 150 MG: 150 TABLET ORAL at 20:34

## 2017-05-05 RX ADMIN — SODIUM CHLORIDE 500 ML: 9 INJECTION, SOLUTION INTRAVENOUS at 11:12

## 2017-05-05 RX ADMIN — GRANISETRON HYDROCHLORIDE 1 MG: 1 INJECTION INTRAVENOUS at 02:10

## 2017-05-05 RX ADMIN — NAPROXEN 250 MG: 250 TABLET ORAL at 00:06

## 2017-05-05 RX ADMIN — ACETAMINOPHEN 650 MG: 325 TABLET, FILM COATED ORAL at 10:26

## 2017-05-05 RX ADMIN — DIPHENOXYLATE HYDROCHLORIDE AND ATROPINE SULFATE 1 TABLET: 2.5; .025 TABLET ORAL at 15:41

## 2017-05-05 RX ADMIN — POTASSIUM CHLORIDE, DEXTROSE MONOHYDRATE AND SODIUM CHLORIDE: 150; 5; 900 INJECTION, SOLUTION INTRAVENOUS at 01:54

## 2017-05-05 RX ADMIN — GRANISETRON HYDROCHLORIDE 1 MG: 1 INJECTION INTRAVENOUS at 14:15

## 2017-05-05 RX ADMIN — POTASSIUM CHLORIDE, DEXTROSE MONOHYDRATE AND SODIUM CHLORIDE: 150; 5; 900 INJECTION, SOLUTION INTRAVENOUS at 15:41

## 2017-05-05 RX ADMIN — LOPERAMIDE HYDROCHLORIDE 4 MG: 2 CAPSULE ORAL at 18:59

## 2017-05-05 RX ADMIN — ACETAMINOPHEN 650 MG: 325 TABLET, FILM COATED ORAL at 01:54

## 2017-05-05 RX ADMIN — LORAZEPAM 0.5 MG: 2 INJECTION INTRAMUSCULAR; INTRAVENOUS at 18:59

## 2017-05-05 RX ADMIN — OXYCODONE HYDROCHLORIDE 5 MG: 5 TABLET ORAL at 06:47

## 2017-05-05 RX ADMIN — SODIUM CHLORIDE 250 ML: 9 INJECTION, SOLUTION INTRAVENOUS at 14:10

## 2017-05-05 RX ADMIN — SODIUM PHOSPHATE, MONOBASIC, MONOHYDRATE AND SODIUM PHOSPHATE, DIBASIC, ANHYDROUS 15 MMOL: 276; 142 INJECTION, SOLUTION INTRAVENOUS at 10:21

## 2017-05-05 RX ADMIN — LORAZEPAM 0.5 MG: 2 INJECTION INTRAMUSCULAR; INTRAVENOUS at 12:20

## 2017-05-05 RX ADMIN — SODIUM CHLORIDE 250 ML: 9 INJECTION, SOLUTION INTRAVENOUS at 03:11

## 2017-05-05 RX ADMIN — OXYCODONE HYDROCHLORIDE 5 MG: 5 TABLET ORAL at 18:59

## 2017-05-05 RX ADMIN — ALDESLEUKIN 64 MILLION UNITS: 1.1 INJECTION, POWDER, LYOPHILIZED, FOR SOLUTION INTRAVENOUS at 06:37

## 2017-05-05 RX ADMIN — FLUDROCORTISONE ACETATE 200 MCG: 0.1 TABLET ORAL at 13:45

## 2017-05-05 RX ADMIN — WHITE PETROLATUM: 1.75 OINTMENT TOPICAL at 20:34

## 2017-05-05 RX ADMIN — DIPHENOXYLATE HYDROCHLORIDE AND ATROPINE SULFATE 1 TABLET: 2.5; .025 TABLET ORAL at 04:27

## 2017-05-05 RX ADMIN — ACETAMINOPHEN 650 MG: 325 TABLET, FILM COATED ORAL at 06:12

## 2017-05-05 ASSESSMENT — PAIN DESCRIPTION - DESCRIPTORS
DESCRIPTORS: HEADACHE
DESCRIPTORS: HEADACHE
DESCRIPTORS: TIGHTNESS

## 2017-05-05 NOTE — PLAN OF CARE
Problem: Goal Outcome Summary  Goal: Goal Outcome Summary  Patient Vitals for the past 8 hrs:   Temp Temp src Heart Rate BP Resp   05/05/17 0531 97.4  F (36.3  C) Oral 109 102/50 20   05/05/17 0430 - - - (!) 84/41 -   05/05/17 0358 - - 110 (!) 89/44 -   05/05/17 0325 97.7  F (36.5  C) Oral 115 (!) 80/39 20   05/05/17 0311 - - - (!) 85/43 -   05/05/17 0216 - - - (!) 84/39 -   05/05/17 0151 100.4  F (38  C) Oral 127 (!) 86/37 20   05/05/17 0102 100.8  F (38.2  C) Oral 134 (!) 87/41 18   05/05/17 0004 102.8  F (39.3  C) Oral 157 118/50 20   05/04/17 2244 96.2  F (35.7  C) Oral - 122/72 18     T max 102.8. HR max 157. Pt received naproxen for fever of 102.8. Now afebrile, HR in 110s. BP low at 80/39, pt received 2x 250 ml bolus with marginal effect. MD notified, 500 cc bolus now infusing. Pt reports chest tightness with deep breaths. LS clear. No coughing noted. EKG reveals Sinus tach rhythm, troponin <0.015. Compazine x 1 and ativan x 1 for nausea. Pt reports 1 large watery stool, lomotil x 1.  Continue to monitor BPs closely.    Addendum: Pt reports another watery stool. BP is now WNL at 102/50.

## 2017-05-05 NOTE — PLAN OF CARE
Problem: Goal Outcome Summary  Goal: Goal Outcome Summary  Outcome: No Change  D/I: Dose #4 Aldesleukin given at 0645. Urine output/Blood pressure parameters met.  See flowsheet. Aldesleukin infused at 236 ml/hr over 15 minutes. No demerol needed thus far. Pt has Willian Hugger on low and warm blankets in anticipation of reaction, which occurs approx. 1 hr post infusion. Acetaminophen given as scheduled every four hours. Intravenous fluid support.   A: Tolerating Aldesleukin thus far. Pt continues to have loose stools, lomotil x 1 this AM. PO oxycodone for HA and bone pain prior to infusion of IL-2.   P: Continue to monitor fluid status and vital signs for evidence of capillary leak syndrome. Continue IV fluid support and scheduled acetaminophen. Notify MD if patient falls below blood pressure or urine output parameters.

## 2017-05-05 NOTE — PROGRESS NOTES
SPIRITUAL HEALTH SERVICES Progress Note  [facility] ([place] 7D    Routine visit with per epic consult order. This is a pt I had anointed last month. Pt is asking God for a second chance in his life. After our reflective conversation, we shared a prayer and blessed the pt. This unit will continue to f/u, but Worship priests are also available if necessary.                                                                                                                                         Father Pk Hopson

## 2017-05-05 NOTE — PROGRESS NOTES
[facility], [place]    Hematology / Oncology Progress Note    Date of Admission: 5/3/2017  Hospital Day #: 2   Date of Service (when I saw the patient): 05/05/2017     Assessment & Plan   Julio John is a 47 year old man with metastatic renal cell carcinoma to the lungs. He is admitted for cycle 2 high dose IL-2 therapy.      #Metastatic RCC. S/p L nephrectomy, XRT to L lung, and cycle 1 HD IL-2. Admitted for cycle 2 IL-2. Pt completed 4 doses; no further therapy this cycle per patient request (d/t has been very symptomatic with each dose) and persistent hypotension and urinary retention. Monitor overnight, continue supportive care.   -PRN benadryl, naprosyn, lomotil, compazine, ativan.   -Oxycodone prn pain.     #Hypotension. 2/2 IL-2. Pt asymptomatic. Minimal improvement with multiple IVF boluses. Will try a 1x dose florinef with 250cc bolus. Continue to monitor.      #Intermittent dyspnea, chest tightness. ?Secondary to anxiety vs recent radiation. Normal echo and PFTs done recently. Chest CT shows some radiation changes. Oxygen saturation fine. Monitor.     FEN:   -Continue IVF per treatment plan though done with IL-2  -PRN lyte replacement  -RDAT    Code status: FULL   Disposition: Possible d/c home tomorrow if stable and resolution of acute toxicities.     Mare Pfeiffer DNP, APRN, CNP  Hematology/Oncology  Pager: [phone]    Interval History   Julio is resting comfortably and feels OK at this time. Had dose #4 IL-2 at 6:45am. Notes that he gets very symptomatic about 1 hour after each dose (headache, chills, rigors, heart palpitations, vague groin pain, muscle aches, etc.) and these symptoms resolve after about an hour. His BP has been low all morning and afternoon despite no further doses of IL-2. He is asymptomatic. He does not want to continue with IL-2 therapy due to ill feeling.     Physical Exam   Temp: 98.8  F (37.1  C) Temp src: Oral BP: (!) 77/41 Pulse: 118  Heart Rate: 102 Resp: 18 SpO2: 96 % O2 Device: None (Room air) Oxygen Delivery: 2 LPM  Vitals:    05/03/17 1144 05/04/17 1208 05/05/17 1233   Weight: 102.8 kg (226 lb 10.1 oz) 104.1 kg (229 lb 6.4 oz) 108 kg (238 lb 1.6 oz)     Vital Signs with Ranges  Temp:  [96.2  F (35.7  C)-102.8  F (39.3  C)] 98.8  F (37.1  C)  Pulse:  [] 118  Heart Rate:  [102-157] 102  Resp:  [16-20] 18  BP: ()/(37-72) 77/41  SpO2:  [91 %-97 %] 96 %  I/O last 3 completed shifts:  In: 5648 [P.O.:730; I.V.:3109; IV Piggyback:1809]  Out: 680 [Urine:680]    Constitutional: Pleasant Urdu-speaking man seen with , resting comfortably in bed, tired but NAD. Alert and interactive.   HEENT: NCAT. PERRL, anicteric sclera. MMM, no lesions or thrush.  Respiratory: Non-labored breathing on RA, good air exchange, lungs clear to auscultation bilaterally. No crackles, cough, or wheeze.   Cardiovascular: Regular mild tachycardia. No murmur or rub. Strong radial pulse.   GI: Normoactive bowel sounds. Abdomen soft, non-distended, and non-tender.   Skin: Warm and dry. No concerning lesions or rash.  Musculoskeletal: Extremities grossly normal, non-tender, trace non-pitting BLE edema.   Neurologic: A&O, speech normal, no focal deficits.   Neuropsychiatric: Mentation and affect appear normal/appropriate.  Vascular Access: LUE PICC is CDI.    Medications   Current Facility-Administered Medications   Medication     diphenoxylate-atropine (LOMOTIL) 2.5-0.025 MG per tablet 1 tablet     0.9% sodium chloride BOLUS     potassium chloride SA (K-DUR/KLOR-CON M) CR tablet 20-40 mEq     potassium chloride (KLOR-CON) Packet 20-40 mEq     potassium chloride 10 mEq in 100 mL intermittent infusion     potassium chloride 10 mEq in 100 mL intermittent infusion with 10 mg lidocaine     potassium chloride 20 mEq in 50 mL intermittent infusion     magnesium sulfate 4 g in 100 mL sterile water (premade)     sodium phosphate 15 mmol in D5W intermittent infusion      sodium phosphate 20 mmol in D5W intermittent infusion     sodium phosphate 25 mmol in D5W intermittent infusion     granisetron (KYTRIL) injection 1 mg     Medication Instruction     oxyCODONE (ROXICODONE) IR tablet 5 mg     eucerin cream     dextrose 5% and 0.9% NaCl with potassium chloride 20 mEq infusion     ranitidine (ZANTAC) tablet 150 mg     cephALEXin (KEFLEX) capsule 500 mg     prochlorperazine (COMPAZINE) tablet 10 mg     prochlorperazine (COMPAZINE) injection 10 mg     LORazepam (ATIVAN) tablet 0.5-1 mg     LORazepam (ATIVAN) injection 0.5-1 mg     naloxone (NARCAN) injection 0.1-0.4 mg       Data   CBC    Recent Labs  Lab 05/05/17  0628 05/04/17  0440 05/03/17  0909   WBC 8.0 8.4 5.5   RBC 4.58 4.75 5.27   HGB 13.1* 13.9 15.0   HCT 40.1 41.4 45.1   MCV 88 87 86   MCH 28.6 29.3 28.5   MCHC 32.7 33.6 33.3   RDW 14.9 14.4 13.8   * 158 246     CMP    Recent Labs  Lab 05/05/17  0628 05/04/17  1801 05/04/17  1410 05/04/17  0440 05/03/17  0909     --   --  141 139   POTASSIUM 4.2  --   --  4.2 4.2   CHLORIDE 111*  --   --  109 108   CO2 18*  --   --  21 21   ANIONGAP 11  --   --  11 10   *  --   --  124* 104*   BUN 15  --   --  12 12   CR 1.79*  --   --  1.33* 0.97   GFRESTIMATED 41*  --   --  58* 83   GFRESTBLACK 49*  --   --  70 >90   KVNG 6.8*  --   --  7.5* 8.0*   MAG 2.3  --  3.1* 1.5* 2.3   PHOS 2.3* 3.2  --  1.4*  --    PROTTOTAL 4.8*  --   --  5.7* 6.9   ALBUMIN 2.2*  --   --  2.7* 3.4   BILITOTAL 1.0  --   --  1.0 0.6   ALKPHOS 44  --   --  45 69   AST 23  --   --  24 18   ALT 37  --   --  39 43     INRNo lab results found in last 7 days.    Results for orders placed or performed during the hospital encounter of 05/03/17   US Upper Extremity Venous Duplex Left Portable    Narrative    EXAMINATION: DOPPLER VENOUS ULTRASOUND OF THE LEFT UPPER EXTREMITY,  5/3/2017 2:45 PM     COMPARISON: None.    HISTORY: Left arm feels heavy, cool to touch    TECHNIQUE:  Gray-scale evaluation with  compression, spectral flow and  color Doppler assessment of the deep venous system of the left upper  extremity.    FINDINGS:  Left: Normal blood flow and waveforms are demonstrated in the internal  jugular, innominate, subclavian, and axillary veins. There is normal  compressibility of the brachial, basilic and cephalic veins.    A PICC is seen coursing from the basilic vein through the axillary and  subclavian veins.      Impression    IMPRESSION:  1.  No evidence of left upper extremity deep venous thrombosis.    I have personally reviewed the examination and initial interpretation  and I agree with the findings.    TRISTAN SHERWOOD MD

## 2017-05-05 NOTE — PLAN OF CARE
Problem: Goal Outcome Summary  Goal: Goal Outcome Summary  Outcome: No Change  Here for cycle 2 IL-2. Received third dose at 2150.  Patient request Bairhugger and oxycodone X 1 for HA and bone pain r/t IL-2 administration. Strong rigors post administration. Ativan X1 and demorol X1 administered. All his schedule medication was explained to him upon given. Declined his tylenol. Will continsue with POC.

## 2017-05-05 NOTE — PROGRESS NOTES
"S: Paged re: chest pain.  At bedside, pt describes as \"airway tightness.\"  States he has had \"chest flutter all day\" pain has gone away in interval between page and bedside exam.  Pt given naproxen by RN for pain, prior tylenol dose declined.  No SOB, breathing comfortably per pt report.  Concerned about dark urine today.     O: EKG tachy to 130-140s, no ST elevations/depressions or t-wave inversions per my read, O2 requirements stable. RRR, tachy on auscultation.  Normal WOB.     A/P: Continue hydration per plan of day team.  UA done already today, so no need for additional study ~8 hours later.  Chest pain unclear etiology.  EKG without concerning findings for ischemia, but he is tachy.  Lactate ordered per protocol.  Trop initially ordered prior to bedside exam.  After exam, suspect it will be demand ischemia if positive due to tachycardia. Reassess as needed if recurrent.     Jose Clinton MD  Moonlighter    "

## 2017-05-06 VITALS
WEIGHT: 238.1 LBS | BODY MASS INDEX: 38.27 KG/M2 | HEART RATE: 115 BPM | HEIGHT: 66 IN | RESPIRATION RATE: 18 BRPM | DIASTOLIC BLOOD PRESSURE: 68 MMHG | TEMPERATURE: 97.9 F | OXYGEN SATURATION: 97 % | SYSTOLIC BLOOD PRESSURE: 112 MMHG

## 2017-05-06 LAB
ALBUMIN SERPL-MCNC: 2.1 G/DL (ref 3.4–5)
ALP SERPL-CCNC: 46 U/L (ref 40–150)
ALT SERPL W P-5'-P-CCNC: 40 U/L (ref 0–70)
ANION GAP SERPL CALCULATED.3IONS-SCNC: 8 MMOL/L (ref 3–14)
AST SERPL W P-5'-P-CCNC: 29 U/L (ref 0–45)
BASOPHILS # BLD AUTO: 0 10E9/L (ref 0–0.2)
BASOPHILS NFR BLD AUTO: 0.5 %
BILIRUB SERPL-MCNC: 1.5 MG/DL (ref 0.2–1.3)
BUN SERPL-MCNC: 13 MG/DL (ref 7–30)
CALCIUM SERPL-MCNC: 6.7 MG/DL (ref 8.5–10.1)
CHLORIDE SERPL-SCNC: 114 MMOL/L (ref 94–109)
CK SERPL-CCNC: 77 U/L (ref 30–300)
CO2 SERPL-SCNC: 19 MMOL/L (ref 20–32)
CREAT SERPL-MCNC: 1.36 MG/DL (ref 0.66–1.25)
DIFFERENTIAL METHOD BLD: ABNORMAL
EOSINOPHIL # BLD AUTO: 1.8 10E9/L (ref 0–0.7)
EOSINOPHIL NFR BLD AUTO: 29.7 %
ERYTHROCYTE [DISTWIDTH] IN BLOOD BY AUTOMATED COUNT: 15.3 % (ref 10–15)
GFR SERPL CREATININE-BSD FRML MDRD: 56 ML/MIN/1.7M2
GLUCOSE SERPL-MCNC: 94 MG/DL (ref 70–99)
HCT VFR BLD AUTO: 37.1 % (ref 40–53)
HGB BLD-MCNC: 11.9 G/DL (ref 13.3–17.7)
IMM GRANULOCYTES # BLD: 0 10E9/L (ref 0–0.4)
IMM GRANULOCYTES NFR BLD: 0.3 %
LDH SERPL L TO P-CCNC: 203 U/L (ref 85–227)
LYMPHOCYTES # BLD AUTO: 0.9 10E9/L (ref 0.8–5.3)
LYMPHOCYTES NFR BLD AUTO: 15.9 %
MAGNESIUM SERPL-MCNC: 2.3 MG/DL (ref 1.6–2.3)
MCH RBC QN AUTO: 28.3 PG (ref 26.5–33)
MCHC RBC AUTO-ENTMCNC: 32.1 G/DL (ref 31.5–36.5)
MCV RBC AUTO: 88 FL (ref 78–100)
MONOCYTES # BLD AUTO: 0.5 10E9/L (ref 0–1.3)
MONOCYTES NFR BLD AUTO: 7.8 %
NEUTROPHILS # BLD AUTO: 2.7 10E9/L (ref 1.6–8.3)
NEUTROPHILS NFR BLD AUTO: 45.8 %
NRBC # BLD AUTO: 0 10*3/UL
NRBC BLD AUTO-RTO: 0 /100
PHOSPHATE SERPL-MCNC: 1.6 MG/DL (ref 2.5–4.5)
PLATELET # BLD AUTO: 95 10E9/L (ref 150–450)
POTASSIUM SERPL-SCNC: 4.3 MMOL/L (ref 3.4–5.3)
PROT SERPL-MCNC: 4.8 G/DL (ref 6.8–8.8)
RBC # BLD AUTO: 4.2 10E12/L (ref 4.4–5.9)
SODIUM SERPL-SCNC: 142 MMOL/L (ref 133–144)
WBC # BLD AUTO: 5.9 10E9/L (ref 4–11)

## 2017-05-06 PROCEDURE — 85025 COMPLETE CBC W/AUTO DIFF WBC: CPT | Performed by: PHYSICIAN ASSISTANT

## 2017-05-06 PROCEDURE — 25000132 ZZH RX MED GY IP 250 OP 250 PS 637: Performed by: DERMATOLOGY

## 2017-05-06 PROCEDURE — 25000128 H RX IP 250 OP 636: Performed by: NURSE PRACTITIONER

## 2017-05-06 PROCEDURE — 84100 ASSAY OF PHOSPHORUS: CPT | Performed by: PHYSICIAN ASSISTANT

## 2017-05-06 PROCEDURE — 25800025 ZZH RX 258: Performed by: PHYSICIAN ASSISTANT

## 2017-05-06 PROCEDURE — 25000132 ZZH RX MED GY IP 250 OP 250 PS 637: Performed by: PHYSICIAN ASSISTANT

## 2017-05-06 PROCEDURE — 99239 HOSP IP/OBS DSCHRG MGMT >30: CPT | Performed by: INTERNAL MEDICINE

## 2017-05-06 PROCEDURE — 36592 COLLECT BLOOD FROM PICC: CPT | Performed by: PHYSICIAN ASSISTANT

## 2017-05-06 PROCEDURE — 80053 COMPREHEN METABOLIC PANEL: CPT | Performed by: PHYSICIAN ASSISTANT

## 2017-05-06 PROCEDURE — 82550 ASSAY OF CK (CPK): CPT | Performed by: PHYSICIAN ASSISTANT

## 2017-05-06 PROCEDURE — 83615 LACTATE (LD) (LDH) ENZYME: CPT | Performed by: PHYSICIAN ASSISTANT

## 2017-05-06 PROCEDURE — 83735 ASSAY OF MAGNESIUM: CPT | Performed by: PHYSICIAN ASSISTANT

## 2017-05-06 RX ORDER — ACETAMINOPHEN 500 MG
500 TABLET ORAL EVERY 6 HOURS PRN
Qty: 1 BOTTLE | Refills: 1 | Status: SHIPPED | OUTPATIENT
Start: 2017-05-06 | End: 2022-01-01

## 2017-05-06 RX ORDER — HYDROXYZINE HYDROCHLORIDE 25 MG/1
25 TABLET, FILM COATED ORAL 3 TIMES DAILY PRN
Status: DISCONTINUED | OUTPATIENT
Start: 2017-05-06 | End: 2017-05-06 | Stop reason: HOSPADM

## 2017-05-06 RX ORDER — HYDROXYZINE HYDROCHLORIDE 25 MG/1
25 TABLET, FILM COATED ORAL EVERY 4 HOURS PRN
Qty: 60 TABLET | Refills: 0 | Status: ON HOLD | OUTPATIENT
Start: 2017-05-06 | End: 2017-05-16

## 2017-05-06 RX ORDER — OXYCODONE HYDROCHLORIDE 5 MG/1
5 TABLET ORAL EVERY 4 HOURS PRN
Qty: 10 TABLET | Refills: 0 | Status: SHIPPED | OUTPATIENT
Start: 2017-05-06 | End: 2017-12-25

## 2017-05-06 RX ADMIN — POTASSIUM CHLORIDE, DEXTROSE MONOHYDRATE AND SODIUM CHLORIDE: 150; 5; 900 INJECTION, SOLUTION INTRAVENOUS at 01:45

## 2017-05-06 RX ADMIN — POTASSIUM & SODIUM PHOSPHATES POWDER PACK 280-160-250 MG 3 PACKET: 280-160-250 PACK at 10:55

## 2017-05-06 RX ADMIN — HYDROXYZINE HYDROCHLORIDE 25 MG: 25 TABLET ORAL at 01:45

## 2017-05-06 RX ADMIN — GRANISETRON HYDROCHLORIDE 1 MG: 1 INJECTION INTRAVENOUS at 01:45

## 2017-05-06 RX ADMIN — WHITE PETROLATUM: 1.75 OINTMENT TOPICAL at 10:56

## 2017-05-06 NOTE — PLAN OF CARE
Problem: Goal Outcome Summary  Goal: Goal Outcome Summary  Outcome: No Change     1900-0730: BPs improving, OVSS. Afebrile. C/o a headache and itchiness. PRN PO Atarax 25 mg given. Heat applied and rest promoted for headache. Denied nausea and vomiting. One loose stool. AM Phosphorous 1.6 and pharmacy notified to tube up medication. Awake multiple times throughout the night and walked the halls. Potential discharge today. Continue with POC.

## 2017-05-06 NOTE — DISCHARGE SUMMARY
[facility], [place]    Discharge Summary  Hospitalist    Date of Admission:  5/3/2017  Date of Discharge:  5/6/2017 11:08 AM  Discharging Provider: Angelica Ahn  Date of Service (when I saw the patient): 05/06/17    Discharge Diagnoses   Metastatic renal cell carcinoma  IL2 therapy  Hypotension    History of Present Illness   Julio John is a 47 year old man with metastatic renal cell carcinoma to the lungs. He is admitted for cycle 2 high dose IL-2 therapy.     Hospital Course   Julio John was admitted on 5/3/2017.  The following problems were addressed during his hospitalization:    #Metastatic RCC. S/p L nephrectomy, XRT to L lung, and cycle 1 HD IL-2. Admitted for cycle 2 IL-2. Pt completed 4 doses; no further therapy this cycle per patient request (d/t has been very symptomatic with each dose) and persistent hypotension and urinary retention. Resolved today. Feeling well.   -PRN atarax at home for pruritis  -Oxycodone prn pain.   - Follow up in clinic in about 1 week     #Hypotension. 2/2 IL-2. Pt asymptomatic. Minimal improvement with multiple IVF boluses. Given a 1x dose florinef with 250cc bolus on 5/5. Normotensive on day of discharge.       #Intermittent dyspnea, chest tightness. ?Secondary to anxiety vs recent radiation. Normal echo and PFTs done recently. Chest CT shows some radiation changes. Oxygen saturation fine. Resolved on day of discharge.     Angelica Ahn PAFER  Hematology/Oncology    Significant Results and Procedures     Pending Results   Unresulted Labs Ordered in the Past 30 Days of this Admission     Date and Time Order Name Status Description    5/4/2017 2330 Blood culture Preliminary     5/3/2017 2330 Blood culture Preliminary           Code Status   Full Code       Primary Care Physician   Tao Nwaononiwu    Constitutional: Pleasant, alert and conversational, NAD  HEENT: NCAT. PERRL, anicteric sclera. MMM, no  lesions or thrush.  Respiratory: Non-labored breathing on RA, good air exchange, lungs clear to auscultation bilaterally. No crackles, cough, or wheeze.   Cardiovascular: RRR, S1S2, no m/r/g   GI: Normoactive bowel sounds. Abdomen soft, non-distended, and non-tender.   Skin: Warm and dry. No concerning lesions or rash.  Musculoskeletal: Extremities grossly normal, non-tender, trace non-pitting BLE edema.   Neurologic: A&O, speech normal, no focal deficits.   Neuropsychiatric: Mentation and affect appear normal/appropriate.  Vascular Access: LUE PICC is CDI.    Discharge Disposition   Discharged to home  Condition at discharge: Good    Consultations This Hospital Stay   VASCULAR ACCESS ADULT IP CONSULT  SPIRITUAL HEALTH SERVICES IP CONSULT    Time Spent on this Encounter     Discharge Orders     CBC with platelets differential   Last Lab Result: Hemoglobin (g/dL)      Date                     Value                05/06/2017               11.9 (L)         ----------     Comprehensive metabolic panel     Phosphorus     Magnesium     Reason for your hospital stay   IL 2 treatment for metastatic renal cell cancer     Follow Up and recommended labs and tests   Follow up as scheduled in the oncology clinic     Activity   Your activity upon discharge: activity as tolerated     Discharge Instructions   New medications: hydroxyzine is for itching, you can take 1 pill every 4 hours as needed.     [facility] ([phone]) for temperature > 100.4, shortness of breath, chest pain, headaches, vision changes, bleeding, uncontrolled nausea, vomiting, diarrhea, or pain.     Full Code     Diet   Follow this diet upon discharge: Regular       Discharge Medications   Discharge Medication List as of 5/6/2017 10:36 AM      START taking these medications    Details   oxyCODONE (ROXICODONE) 5 MG IR tablet Take 1 tablet (5 mg) by mouth every 4 hours as needed for moderate to severe pain, Disp-10 tablet, R-0,  Local Print      hydrOXYzine (ATARAX) 25 MG tablet Take 1 tablet (25 mg) by mouth every 4 hours as needed for itching, Disp-60 tablet, R-0, E-Prescribe      Skin Protectants, Misc. (EUCERIN) cream Apply topically 2 times dailyHistorical         CONTINUE these medications which have CHANGED    Details   acetaminophen (TYLENOL) 500 MG tablet Take 1 tablet (500 mg) by mouth every 6 hours as needed for mild pain, Disp-1 Bottle, R-1, E-Prescribe         CONTINUE these medications which have NOT CHANGED    Details   diphenoxylate-atropine (LOMOTIL) 2.5-0.025 MG per tablet Take 1 tablet by mouth 4 times daily as needed for diarrhea (After each diarrheal stool.), Disp-40 tablet, R-0, Local Print      prochlorperazine (COMPAZINE) 10 MG tablet Take 1 tablet (10 mg) by mouth every 6 hours as needed (Breakthrough Nausea/Vomiting), Disp-30 tablet, R-0, E-Prescribe      ondansetron (ZOFRAN) 4 MG tablet Take 2 tablets (8 mg) by mouth every 8 hours as needed for nausea, Disp-18 tablet, R-0, E-Prescribe           Allergies   No Known Allergies  Data   Most Recent 3 CBC's:  Recent Labs   Lab Test  05/06/17   0557  05/05/17   0628  05/04/17   0440   WBC  5.9  8.0  8.4   HGB  11.9*  13.1*  13.9   MCV  88  88  87   PLT  95*  123*  158      Most Recent 3 BMP's:  Recent Labs   Lab Test  05/06/17   0557  05/05/17   0628  05/04/17   0440   NA  142  140  141   POTASSIUM  4.3  4.2  4.2   CHLORIDE  114*  111*  109   CO2  19*  18*  21   BUN  13  15  12   CR  1.36*  1.79*  1.33*   ANIONGAP  8  11  11   KVNG  6.7*  6.8*  7.5*   GLC  94  143*  124*     Most Recent 2 LFT's:  Recent Labs   Lab Test  05/06/17 0557  05/05/17   0628   AST  29  23   ALT  40  37   ALKPHOS  46  44   BILITOTAL  1.5*  1.0     Most Recent INR's and Anticoagulation Dosing History:  Anticoagulation Dose History     There is no flowsheet data to display.        Most Recent 3 Troponin's:  Recent Labs   Lab Test  05/04/17   1801   TROPI  <0.015  The 99th percentile for upper  reference range is 0.045 ug/L.  Troponin values in   the range of 0.045 - 0.120 ug/L may be associated with risks of adverse   clinical events.       Most Recent Cholesterol Panel:No lab results found.  Most Recent 6 Bacteria Isolates From Any Culture (See EPIC Reports for Culture Details):  Recent Labs   Lab Test  05/05/17   0628  05/04/17   0445  04/20/17   0553  04/19/17   0548  04/18/17   0604   CULT  No growth after 21 hours  No growth after 2 days  No growth  No growth  No growth     Most Recent TSH, T4 and A1c Labs:No lab results found.  Results for orders placed or performed during the hospital encounter of 05/03/17   US Upper Extremity Venous Duplex Left Portable    Narrative    EXAMINATION: DOPPLER VENOUS ULTRASOUND OF THE LEFT UPPER EXTREMITY,  5/3/2017 2:45 PM     COMPARISON: None.    HISTORY: Left arm feels heavy, cool to touch    TECHNIQUE:  Gray-scale evaluation with compression, spectral flow and  color Doppler assessment of the deep venous system of the left upper  extremity.    FINDINGS:  Left: Normal blood flow and waveforms are demonstrated in the internal  jugular, innominate, subclavian, and axillary veins. There is normal  compressibility of the brachial, basilic and cephalic veins.    A PICC is seen coursing from the basilic vein through the axillary and  subclavian veins.      Impression    IMPRESSION:  1.  No evidence of left upper extremity deep venous thrombosis.    I have personally reviewed the examination and initial interpretation  and I agree with the findings.    TRISTAN SHERWOOD MD

## 2017-05-07 ENCOUNTER — TELEPHONE (OUTPATIENT)
Dept: NURSING | Facility: CLINIC | Age: 47
End: 2017-05-07

## 2017-05-07 ENCOUNTER — HOSPITAL ENCOUNTER (EMERGENCY)
Facility: CLINIC | Age: 47
Discharge: HOME | End: 2017-05-08
Attending: EMERGENCY MEDICINE | Admitting: EMERGENCY MEDICINE
Payer: MEDICAID

## 2017-05-07 DIAGNOSIS — C64.9 CLEAR CELL RENAL CELL CARCINOMA, UNSPECIFIED LATERALITY (H): ICD-10-CM

## 2017-05-07 DIAGNOSIS — C78.00 MALIGNANT NEOPLASM METASTATIC TO LUNG, UNSPECIFIED LATERALITY (H): ICD-10-CM

## 2017-05-07 DIAGNOSIS — M62.838 MUSCLE SPASM: ICD-10-CM

## 2017-05-07 PROCEDURE — 83735 ASSAY OF MAGNESIUM: CPT | Performed by: EMERGENCY MEDICINE

## 2017-05-07 PROCEDURE — 96361 HYDRATE IV INFUSION ADD-ON: CPT | Performed by: EMERGENCY MEDICINE

## 2017-05-07 PROCEDURE — 25000128 H RX IP 250 OP 636: Performed by: EMERGENCY MEDICINE

## 2017-05-07 PROCEDURE — 80048 BASIC METABOLIC PNL TOTAL CA: CPT | Performed by: EMERGENCY MEDICINE

## 2017-05-07 PROCEDURE — 96374 THER/PROPH/DIAG INJ IV PUSH: CPT | Performed by: EMERGENCY MEDICINE

## 2017-05-07 PROCEDURE — 99284 EMERGENCY DEPT VISIT MOD MDM: CPT | Mod: 25 | Performed by: EMERGENCY MEDICINE

## 2017-05-07 PROCEDURE — 99284 EMERGENCY DEPT VISIT MOD MDM: CPT | Mod: Z6 | Performed by: EMERGENCY MEDICINE

## 2017-05-07 PROCEDURE — 85025 COMPLETE CBC W/AUTO DIFF WBC: CPT | Performed by: EMERGENCY MEDICINE

## 2017-05-07 RX ORDER — SODIUM CHLORIDE 9 MG/ML
1000 INJECTION, SOLUTION INTRAVENOUS CONTINUOUS
Status: DISCONTINUED | OUTPATIENT
Start: 2017-05-07 | End: 2017-05-08 | Stop reason: HOSPADM

## 2017-05-07 RX ORDER — LORAZEPAM 2 MG/ML
0.5 INJECTION INTRAMUSCULAR ONCE
Status: COMPLETED | OUTPATIENT
Start: 2017-05-07 | End: 2017-05-07

## 2017-05-07 RX ADMIN — LORAZEPAM 0.5 MG: 2 INJECTION INTRAMUSCULAR; INTRAVENOUS at 23:56

## 2017-05-07 RX ADMIN — SODIUM CHLORIDE 1000 ML: 9 INJECTION, SOLUTION INTRAVENOUS at 23:57

## 2017-05-07 NOTE — ED AVS SNAPSHOT
[facility], Emergency Department    [address] [address]    Phone:  [phone]                                       Julio John   MRN: [id number]    Department:  [facility], Emergency Department   Date of Visit:  5/7/2017           Patient Information     Date Of Birth          1970        Your diagnoses for this visit were:     Muscle spasm        You were seen by James Perez MD.      Follow-up Information     Schedule an appointment as soon as possible for a visit with Tao Roach MD.    Contact information:    [facility]  [address] [address]  [phone]          Follow up with [facility], Emergency Department.    Specialty:  EMERGENCY MEDICINE    Why:  If symptoms worsen    Contact information:    [address] [facility] [id number] [phone]    Additional information:    The [facility] is located on the corner of [address] and [address] on the [facility] [facility]. It is easily accessible from virtually any point in [place], via [employer] and [employer].        Discharge Instructions         Espasmo muscular  Un espasmo muscular (también llamado calambre) es phyllis contracción involuntaria de un músculo, el cual se tensa de manera rápida y brusca y forma un bulto aidan. Los espasmos musculares son muy dolorosos. Siga leyendo para aprender más sobre los espasmos musculares y saber cómo tratarlos y prevenirlos.     Cuál es la causa de un espasmo muscular?  Un espasmo muscular se produce debido a la irritación de las fibras musculares. Algunas situaciones pueden favorecer la aparición de un espasmo muscular, por ejemplo:    Phyllis lesión    El ejercicio físico intenso    El gran cansancio muscular    Un músculo que se mantiene en la misma posición aishwarya mucho tiempo    La deshidratación    La falta de ciertos minerales en el cuerpo    Determinados medicamentos    Ciertas  afecciones médicas, ponce la insuficiencia renal o la diabetes  Cómo calmar un espasmo muscular  Magali siempre, los espasmos musculares aparecen y desaparecen rápidamente. Cuando se contraiga el músculo, estírelo muy suavemente y hágale masajes. Eso ayudará a calmar las fibras musculares. Luego, descanse el músculo.  Prevención de espasmos musculares  Según cuál sea la causa de los espasmos, joel vez usted pueda prevenirlos. Consulte a martinez médico sobre los pasos que debe seguir para evitarlos. Usted puede:    Beber suficiente líquido para no deshidratarse, en especial cuando hace ejercicio.    Kemi suplementos de vitaminas o minerales.    Hacer actividad física periódicamente.    Estirar fito músculos periódicamente.    Kemi medicamentos de venta susan, ponce ibuprofeno o naproxeno.    Kemi un relajante muscular de venta bajo receta.  Llame a martinez médico si tiene alguno de estos síntomas:    Calambres mariana    Calambres que reyes mucho tiempo, que no desaparecen aunque estire fito músculos, o que desaparecen y luego vuelven.    Dolor, hormigueo o debilidad en las piernas    Sasha nocturnos que le impiden dormir           [id number] [employer]. [address] [place] [id number]. Todos los derechos reservados. Esta información no pretende sustituir la atención médica profesional. Sólo martinez médico puede diagnosticar y tratar un problema de keven.          Future Appointments        Provider Department Dept Phone Center    5/12/2017 9:00 AM [facility] [phone] [facility]    5/12/2017 9:30 AM Dori Garnica PA-C [facility] [phone] [facility]    5/16/2017 8:00 AM Nirav Kim PsyD; 2nd Floor Consult Room [facility] [phone] [facility]      24 Hour Appointment Hotline       To make an appointment at any [facility], call [phone] ([phone]). If you don't have a family doctor or clinic, we will help you  find one. [facility] are conveniently located to serve the needs of you and your family.             Review of your medicines      Our records show that you are taking the medicines listed below. If these are incorrect, please call your family doctor or clinic.        Dose / Directions Last dose taken    acetaminophen 500 MG tablet   Commonly known as:  TYLENOL   Dose:  500 mg   Quantity:  1 Bottle        Take 1 tablet (500 mg) by mouth every 6 hours as needed for mild pain   Refills:  1        diphenoxylate-atropine 2.5-0.025 MG per tablet   Commonly known as:  LOMOTIL   Dose:  1 tablet   Quantity:  40 tablet        Take 1 tablet by mouth 4 times daily as needed for diarrhea (After each diarrheal stool.)   Refills:  0        eucerin cream        Apply topically 2 times daily   Refills:  0        hydrOXYzine 25 MG tablet   Commonly known as:  ATARAX   Dose:  25 mg   Quantity:  60 tablet        Take 1 tablet (25 mg) by mouth every 4 hours as needed for itching   Refills:  0        ondansetron 4 MG tablet   Commonly known as:  ZOFRAN   Dose:  8 mg   Quantity:  18 tablet        Take 2 tablets (8 mg) by mouth every 8 hours as needed for nausea   Refills:  0        oxyCODONE 5 MG IR tablet   Commonly known as:  ROXICODONE   Dose:  5 mg   Quantity:  10 tablet        Take 1 tablet (5 mg) by mouth every 4 hours as needed for moderate to severe pain   Refills:  0        prochlorperazine 10 MG tablet   Commonly known as:  COMPAZINE   Dose:  10 mg   Quantity:  30 tablet        Take 1 tablet (10 mg) by mouth every 6 hours as needed (Breakthrough Nausea/Vomiting)   Refills:  0                Procedures and tests performed during your visit     Basic metabolic panel    CBC with platelets differential    Magnesium      Orders Needing Specimen Collection     None      Pending Results     No orders found for last 3 day(s).            Pending Culture Results     No orders found for last 3 day(s).            Pending Results  "Instructions     If you had any lab results that were not finalized at the time of your Discharge, you can call the ED Lab Result RN at [phone]. You will be contacted by this team for any positive Lab results or changes in treatment. The nurses are available 7 days a week from 10A to 6:30P.  You can leave a message 24 hours per day and they will return your call.        Thank you for choosing [facility]       Thank you for choosing [facility] for your care. Our goal is always to provide you with excellent care. Hearing back from our patients is one way we can continue to improve our services. Please take a few minutes to complete the written survey that you may receive in the mail after you visit with us. Thank you!        itravelhart Information     MyScreen lets you send messages to your doctor, view your test results, renew your prescriptions, schedule appointments and more. To sign up, go to [email or web address] . Click on \"Log in\" on the left side of the screen, which will take you to the Welcome page. Then click on \"Sign up Now\" on the right side of the page.     You will be asked to enter the access code listed below, as well as some personal information. Please follow the directions to create your username and password.     Your access code is: [email or web address]  Expires: 2017 10:24 AM     Your access code will  in 90 days. If you need help or a new code, please call your [facility] clinic or [phone].        Care EveryWhere ID     This is your Care EveryWhere ID. This could be used by other organizations to access your [facility] medical records  [id number]        After Visit Summary       This is your record. Keep this with you and show to your community pharmacist(s) and doctor(s) at your next visit.                  "

## 2017-05-07 NOTE — ED AVS SNAPSHOT
[facility], [place], Emergency Department    [address] [place] [id number]    Phone:  [phone]                                       Julio John   MRN: [id number]    Department:  [facility], Emergency Department   Date of Visit:  5/7/2017           After Visit Summary Signature Page     I have received my discharge instructions, and my questions have been answered. I have discussed any challenges I see with this plan with the nurse or doctor.    ..........................................................................................................................................  Patient/Patient Representative Signature      ..........................................................................................................................................  Patient Representative Print Name and Relationship to Patient    ..................................................               ................................................  Date                                            Time    ..........................................................................................................................................  Reviewed by Signature/Title    ...................................................              ..............................................  Date                                                            Time

## 2017-05-08 ENCOUNTER — CARE COORDINATION (OUTPATIENT)
Dept: CARE COORDINATION | Facility: CLINIC | Age: 47
End: 2017-05-08

## 2017-05-08 VITALS
BODY MASS INDEX: 36.96 KG/M2 | SYSTOLIC BLOOD PRESSURE: 136 MMHG | RESPIRATION RATE: 17 BRPM | HEIGHT: 66 IN | WEIGHT: 230 LBS | TEMPERATURE: 97.8 F | DIASTOLIC BLOOD PRESSURE: 91 MMHG | HEART RATE: 82 BPM | OXYGEN SATURATION: 100 %

## 2017-05-08 LAB
ANION GAP SERPL CALCULATED.3IONS-SCNC: 6 MMOL/L (ref 3–14)
BASOPHILS # BLD AUTO: 0 10E9/L (ref 0–0.2)
BASOPHILS NFR BLD AUTO: 0.4 %
BUN SERPL-MCNC: 13 MG/DL (ref 7–30)
CALCIUM SERPL-MCNC: 7.9 MG/DL (ref 8.5–10.1)
CHLORIDE SERPL-SCNC: 106 MMOL/L (ref 94–109)
CO2 SERPL-SCNC: 26 MMOL/L (ref 20–32)
CREAT SERPL-MCNC: 1.25 MG/DL (ref 0.66–1.25)
DIFFERENTIAL METHOD BLD: ABNORMAL
EOSINOPHIL # BLD AUTO: 2.3 10E9/L (ref 0–0.7)
EOSINOPHIL NFR BLD AUTO: 29.2 %
ERYTHROCYTE [DISTWIDTH] IN BLOOD BY AUTOMATED COUNT: 14.2 % (ref 10–15)
GFR SERPL CREATININE-BSD FRML MDRD: 62 ML/MIN/1.7M2
GLUCOSE SERPL-MCNC: 94 MG/DL (ref 70–99)
HCT VFR BLD AUTO: 37.8 % (ref 40–53)
HGB BLD-MCNC: 12.9 G/DL (ref 13.3–17.7)
IMM GRANULOCYTES # BLD: 0 10E9/L (ref 0–0.4)
IMM GRANULOCYTES NFR BLD: 0.3 %
INTERPRETATION ECG - MUSE: NORMAL
LYMPHOCYTES # BLD AUTO: 3.3 10E9/L (ref 0.8–5.3)
LYMPHOCYTES NFR BLD AUTO: 43.3 %
MAGNESIUM SERPL-MCNC: 2 MG/DL (ref 1.6–2.3)
MCH RBC QN AUTO: 29 PG (ref 26.5–33)
MCHC RBC AUTO-ENTMCNC: 34.1 G/DL (ref 31.5–36.5)
MCV RBC AUTO: 85 FL (ref 78–100)
MONOCYTES # BLD AUTO: 0.5 10E9/L (ref 0–1.3)
MONOCYTES NFR BLD AUTO: 6.4 %
NEUTROPHILS # BLD AUTO: 1.6 10E9/L (ref 1.6–8.3)
NEUTROPHILS NFR BLD AUTO: 20.4 %
NRBC # BLD AUTO: 0 10*3/UL
NRBC BLD AUTO-RTO: 0 /100
PLATELET # BLD AUTO: 143 10E9/L (ref 150–450)
POTASSIUM SERPL-SCNC: 4 MMOL/L (ref 3.4–5.3)
RBC # BLD AUTO: 4.45 10E12/L (ref 4.4–5.9)
SODIUM SERPL-SCNC: 138 MMOL/L (ref 133–144)
WBC # BLD AUTO: 7.7 10E9/L (ref 4–11)

## 2017-05-08 NOTE — DISCHARGE INSTRUCTIONS
Espasmo muscular  Un espasmo muscular (también llamado calambre) es phyllis contracción involuntaria de un músculo, el cual se tensa de manera rápida y brusca y forma un bulto aidan. Los espasmos musculares son muy dolorosos. Siga leyendo para aprender más sobre los espasmos musculares y saber cómo tratarlos y prevenirlos.     Cuál es la causa de un espasmo muscular?  Un espasmo muscular se produce debido a la irritación de las fibras musculares. Algunas situaciones pueden favorecer la aparición de un espasmo muscular, por ejemplo:    Phyllis lesión    El ejercicio físico intenso    El gran cansancio muscular    Un músculo que se mantiene en la misma posición aishwarya mucho tiempo    La deshidratación    La falta de ciertos minerales en el cuerpo    Determinados medicamentos    Ciertas afecciones médicas, ponce la insuficiencia renal o la diabetes  Cómo calmar un espasmo muscular  Magali siempre, los espasmos musculares aparecen y desaparecen rápidamente. Cuando se contraiga el músculo, estírelo muy suavemente y hágale masajes. Eso ayudará a calmar las fibras musculares. Luego, descanse el músculo.  Prevención de espasmos musculares  Según cuál sea la causa de los espasmos, joel vez usted pueda prevenirlos. Consulte a martinez médico sobre los pasos que debe seguir para evitarlos. Usted puede:    Beber suficiente líquido para no deshidratarse, en especial cuando hace ejercicio.    Kemi suplementos de vitaminas o minerales.    Hacer actividad física periódicamente.    Estirar fito músculos periódicamente.    Kemi medicamentos de venta susan, ponce ibuprofeno o naproxeno.    Kemi un relajante muscular de venta bajo receta.  Llame a martinez médico si tiene alguno de estos síntomas:    Calambres mariana    Calambres que reyes mucho tiempo, que no desaparecen aunque estire fito músculos, o que desaparecen y luego vuelven.    Dolor, hormigueo o debilidad en las piernas    Sasha nocturnos que le impiden dormir           [id number] [employer]. [address] [place] [address], [place] [address]. Todos los derechos reservados. Esta información no pretende sustituir la atención médica profesional. Sólo martinez médico puede diagnosticar y tratar un problema de keven.

## 2017-05-08 NOTE — TELEPHONE ENCOUNTER
"Call Type: Triage Call    Presenting Problem: Pt calling on [facility] line w/ assist of [employer]   #[id number].c/o \"cramps all over my body\" all day today.  Apparently this is a 2nd call. Pt called 30 min ago and waiting to  hear from on-call provider for [facility]. Told pt we will page  again and if he does not hear from on-call in 20-30 min call us back  again. Paged on-call via [facility] page  to call pt directly  [phone]. Double checked phone# w/ [facility] page  for  accuracy.  Triage Note:  Guideline Title: No Guideline - Advice Per Reference (Adult)  Recommended Disposition: Call Provider Immediately  Original Inclination: Wanted to speak with a nurse  Override Disposition:  Intended Action: Call PCP/HCP  Physician Contacted: No  CALL PROVIDER IMMEDIATELY ?  YES  SEE ED IMMEDIATELY ? NO  ACTIVATE  ? NO  Physician Instructions:  Care Advice:  "

## 2017-05-08 NOTE — ED PROVIDER NOTES
History     Chief Complaint   Patient presents with     Generalized Body Aches     Pruritis     HPI  Julio John is a 47 year old male with a history of metastatic renal cell carcinoma. He was just discharged from the hospital on May 6th after getting his 2nd cycle of IL-2. He notes that he was feeling better after discharge but then today started developing these diffuse crampy sensations all over his body.  He notes that he feels it in his legs, in his abdomen, in his back, in his chest and in his arms. Intermittently throughout the day he feels a tight, twinge sensation that causes cramping of his muscles. He denies any fevers or chills. No nausea or vomiting. No chest pain or shortness of breath. No abdominal pain. No dysuria or hematuria. No leg swelling. He notes that these symptoms have made him feel very anxious and he just wanted to get checked out. He does have an appointment with oncology on 5/12 for post-hospital follow-up.    I have reviewed the Medications, Allergies, Past Medical and Surgical History, and Social History in the [employer] system.    Past Medical History:   Diagnosis Date     Metastatic renal cell carcinoma (H)        No past surgical history on file.    No family history on file.    Social History   Substance Use Topics     Smoking status: Never Smoker     Smokeless tobacco: Not on file     Alcohol use No     Current Facility-Administered Medications   Medication     0.9% sodium chloride BOLUS    Followed by     0.9% sodium chloride infusion     LORazepam (ATIVAN) injection 0.5 mg     Current Outpatient Prescriptions   Medication     acetaminophen (TYLENOL) 500 MG tablet     oxyCODONE (ROXICODONE) 5 MG IR tablet     hydrOXYzine (ATARAX) 25 MG tablet     Skin Protectants, Misc. (EUCERIN) cream     diphenoxylate-atropine (LOMOTIL) 2.5-0.025 MG per tablet     prochlorperazine (COMPAZINE) 10 MG tablet     ondansetron (ZOFRAN) 4 MG tablet      No Known Allergies    Review of Systems  "  All other systems negative except as noted in the HPI.      Physical Exam   BP: (!) 147/106  Pulse: 82  Heart Rate: 70  Temp: 97.8  F (36.6  C)  Resp: 18  Height: 167.6 cm (5' 6\")  Weight: 104.3 kg (230 lb)  SpO2: 100 %  Physical Exam   Gen: NAD, sitting on stretcher, conversant and pleasant, non-toxic appearing  HEENT: NCAT, PERRL, EOMI, MMM  Neck: trachea midline, supple with FROM  Cardio: normal rate regular rhythm, no R/M/G, normally perfused and warm  Pulm: steady, non-labored respirations, normal WOB, CTAB, no w/r/r  Abd: soft nt/nd, no organomegaly, no CVA tenderness  Ext: normal peripheral pulses, no edema, neurovascularly intact distally.  Skin: no rashes or signs of trauma  Neuro: no focal deficits, 5/5 strength in all ext      ED Course     ED Course     Procedures           Labs Ordered and Resulted from Time of ED Arrival Up to the Time of Departure from the ED   CBC WITH PLATELETS DIFFERENTIAL - Abnormal; Notable for the following:        Result Value    Hemoglobin 12.9 (*)     Hematocrit 37.8 (*)     Platelet Count 143 (*)     Absolute Eosinophils 2.3 (*)     All other components within normal limits   BASIC METABOLIC PANEL - Abnormal; Notable for the following:     Calcium 7.9 (*)     All other components within normal limits   MAGNESIUM            Assessments & Plan (with Medical Decision Making)   This is a 47 year old male with a history of metastatic renal cell carcinoma with metastases to the lungs. He is status post left nephrectomy, XRT to the left lung, and 2 cycles of IL-2. He complains of diffuse body cramping but no symptoms of fevers, lightheadedness, chest pain or urinary symptoms. I suspect metabolic derangements or anxiety as the etiology for his symptoms. I have a very low suspicion for infection, PE or myositis as the etiology for his symptoms. After history and physical examination he was given 0.5 mg of Ativan for his anxiety.  Basic labs were obtained including a CBC and a " chemistry which were unremarkable without any evidence of leukocytosis, anemia, metabolic derangements. Mildly low calcium, encouraged to take OTC calcium supplementation, though I think most likely his symptoms are more related to anxiety.  Patient was reassured. He was already feeling much better and no longer having any symptoms and so he was discharged to home in good condition with close follow up with his oncologist.     I have reviewed the nursing notes.    I have reviewed the findings, diagnosis, plan and need for follow up with the patient.    New Prescriptions    No medications on file       Final diagnoses:   Muscle spasm   I, Joyce Black, am serving as a trained medical scribe to document services personally performed by James Perez MD, based on the provider's statements to me.      I, James Perez MD, was physically present and have reviewed and verified the accuracy of this note documented by Joyce Black.       5/7/2017   [facility], [place], EMERGENCY DEPARTMENT     James Peerz MD  05/08/17 0041

## 2017-05-08 NOTE — ED NOTES
Patient told to come to ED by hem/onc after having body cramps all over starting yesterday but worsening today. Patient had new chemo infusion on Friday. Patient having itching all over, but was told this was a normal side effect.

## 2017-05-08 NOTE — PROGRESS NOTES
Patient returned to the ED yesterday 5/7 before post dc follow up could be done so none will be done at this time              James Perez MD   Emergency Medicine           History          Chief Complaint   Patient presents with     Generalized Body Aches     Pruritis

## 2017-05-08 NOTE — TELEPHONE ENCOUNTER
"Call Type: Triage Call    Presenting Problem:  \" I was in the hospital, and  released, Today I  am home, and I  am having muscle cramps all over, they felt a little  better  after I drank gatorade , but not a lot,  I have had them all  day, they come and go all  over \"  He reports no  problems with  urination , has been able to drink , he continues to be itchy  allover, medication  does not help much .  Cramps are severe, like  akilah horse, pain ful , in hands, back , arms , fingers, stomach ,  legs, come and go , about every 15 minutes or so for hours .   [facility]aged  [facility] , Dr Safia Herndon . His  number  is [phone]  A  was used to complete  this call.  Triage Note:  Guideline Title: Muscle Pain ; Muscle Pain  Recommended Disposition: Provide Home/Self Care  Original Inclination: Would have called HCP now  Override Disposition: Call Provider Immediately  Intended Action: Call PCP/HCP  Physician Contacted: No  All other situations ?  YES  Muscle pain associated with flu-like symptoms ? NO  Muscle pain following heat exposure ? NO  History of collagen or connective tissue disorders AND is having a flare ? NO  Impairs ability to perform activities of daily living (ADLs) ? NO  Recent new exercise, overuse, or prolonged physical activity ? NO  Severe breathing problems ? NO  Muscle pain associated with neck pain ? NO  Following suspected spider bite or scorpion sting ? NO  Sudden change in mental status ? NO  Generalized muscle pain AND multiple tender points especially in neck and  shoulders ? NO  Continuous or intermittent muscle aching/pain for 2 weeks AND no other symptoms ?  NO  Generalized muscle pain AND headache, fatigue, joint pain or rash ? NO  Generalized muscle pain AND recent heavy alcohol or substance abuse ? NO  Generalized muscle pain and dark, red, or cola-colored urine ? NO  Generalized muscle pain, spasm or weakness AND currently on " lipid-lowering  medication (such as Crestor, Lipitor, Mevacor, Pravachol, Vytorin, Zetia, Zocor,  etc.) ? NO  New generalized muscle pain AND possible ingestion of poorly processed meat(s) ? NO  Localized muscle pain AND joint ache or pain ? NO  Muscle aches/pain associated with back pain ? NO  Any other cardiac signs/symptoms for more than 5 minutes, now or within last hour.  Pain is NOT associated with taking a deep breath or a productive cough, movement,  or touch to a localized area on the chest or upper body. ? NO  Over 50 years of age with new muscle pain and stiffness around neck, shoulders and  hips and not previously evaluated ? NO  One or more occurrences of unexplained pain in shoulders, neck, jaw, in one or  both arms, stomach or back lasting more than a few minutes that has not been  evaluated by a healthcare provider and has risk factors for cardiovascular  disease. ? NO  Sudden onset of shortness of breath, chest pain and cough with blood tinged sputum  ? NO  Symptoms began after a change or starting a new prescription or nonprescription  medicine or alternative medicine / therapy within last 4 weeks ? NO  High to low (but not zero) risk of exposure to Ebola within the past 21 days ? NO  Traveled out of country in past 2 months and new onset of unexplained symptom(s) ?  NO  New onset or worsening of profound weakness or inability to stand ? NO  Sudden, intense pain from a muscle cramp, often in calf, thigh, or foot lasting  for seconds or minutes. ? NO  New onset of generalized muscle pain AND recently stopped or decreased oral  corticosteroids ? NO  Physician Instructions:  Care Advice: Muscle ache or pain that is persistent for 2 weeks or more,  regardless of age, is not usual. If it has not been evaluated, you need to  see a provider.

## 2017-05-09 ENCOUNTER — APPOINTMENT (OUTPATIENT)
Dept: CT IMAGING | Facility: CLINIC | Age: 47
End: 2017-05-09
Attending: EMERGENCY MEDICINE
Payer: MEDICAID

## 2017-05-09 ENCOUNTER — HOSPITAL ENCOUNTER (EMERGENCY)
Facility: CLINIC | Age: 47
Discharge: HOME | End: 2017-05-09
Attending: EMERGENCY MEDICINE | Admitting: EMERGENCY MEDICINE
Payer: MEDICAID

## 2017-05-09 VITALS
TEMPERATURE: 98.7 F | HEIGHT: 66 IN | DIASTOLIC BLOOD PRESSURE: 86 MMHG | BODY MASS INDEX: 37.06 KG/M2 | RESPIRATION RATE: 14 BRPM | OXYGEN SATURATION: 100 % | WEIGHT: 230.6 LBS | SYSTOLIC BLOOD PRESSURE: 142 MMHG | HEART RATE: 69 BPM

## 2017-05-09 DIAGNOSIS — R19.7 DIARRHEA, UNSPECIFIED TYPE: ICD-10-CM

## 2017-05-09 DIAGNOSIS — R10.84 ABDOMINAL PAIN, GENERALIZED: ICD-10-CM

## 2017-05-09 LAB
ALBUMIN SERPL-MCNC: 3.2 G/DL (ref 3.4–5)
ALBUMIN UR-MCNC: NEGATIVE MG/DL
ALP SERPL-CCNC: 114 U/L (ref 40–150)
ALT SERPL W P-5'-P-CCNC: 32 U/L (ref 0–70)
AMYLASE SERPL-CCNC: 84 U/L (ref 30–110)
ANION GAP SERPL CALCULATED.3IONS-SCNC: 7 MMOL/L (ref 3–14)
APPEARANCE UR: CLEAR
AST SERPL W P-5'-P-CCNC: 18 U/L (ref 0–45)
BASOPHILS # BLD AUTO: 0 10E9/L (ref 0–0.2)
BASOPHILS NFR BLD AUTO: 0.1 %
BILIRUB SERPL-MCNC: 0.4 MG/DL (ref 0.2–1.3)
BILIRUB UR QL STRIP: NEGATIVE
BUN SERPL-MCNC: 15 MG/DL (ref 7–30)
CALCIUM SERPL-MCNC: 8.3 MG/DL (ref 8.5–10.1)
CHLORIDE SERPL-SCNC: 102 MMOL/L (ref 94–109)
CO2 SERPL-SCNC: 25 MMOL/L (ref 20–32)
COLOR UR AUTO: ABNORMAL
CREAT SERPL-MCNC: 1.19 MG/DL (ref 0.66–1.25)
DIFFERENTIAL METHOD BLD: ABNORMAL
EOSINOPHIL # BLD AUTO: 3.3 10E9/L (ref 0–0.7)
EOSINOPHIL NFR BLD AUTO: 24.3 %
ERYTHROCYTE [DISTWIDTH] IN BLOOD BY AUTOMATED COUNT: 14.1 % (ref 10–15)
GFR SERPL CREATININE-BSD FRML MDRD: 65 ML/MIN/1.7M2
GLUCOSE SERPL-MCNC: 104 MG/DL (ref 70–99)
GLUCOSE UR STRIP-MCNC: NEGATIVE MG/DL
HCT VFR BLD AUTO: 41.1 % (ref 40–53)
HGB BLD-MCNC: 13.9 G/DL (ref 13.3–17.7)
HGB UR QL STRIP: NEGATIVE
IMM GRANULOCYTES # BLD: 0.1 10E9/L (ref 0–0.4)
IMM GRANULOCYTES NFR BLD: 0.7 %
KETONES UR STRIP-MCNC: NEGATIVE MG/DL
LEUKOCYTE ESTERASE UR QL STRIP: NEGATIVE
LIPASE SERPL-CCNC: 514 U/L (ref 73–393)
LYMPHOCYTES # BLD AUTO: 3.8 10E9/L (ref 0.8–5.3)
LYMPHOCYTES NFR BLD AUTO: 27.7 %
MCH RBC QN AUTO: 28.7 PG (ref 26.5–33)
MCHC RBC AUTO-ENTMCNC: 33.8 G/DL (ref 31.5–36.5)
MCV RBC AUTO: 85 FL (ref 78–100)
MONOCYTES # BLD AUTO: 0.8 10E9/L (ref 0–1.3)
MONOCYTES NFR BLD AUTO: 6.1 %
MUCOUS THREADS #/AREA URNS LPF: PRESENT /LPF
NEUTROPHILS # BLD AUTO: 5.5 10E9/L (ref 1.6–8.3)
NEUTROPHILS NFR BLD AUTO: 41.1 %
NITRATE UR QL: NEGATIVE
NRBC # BLD AUTO: 0 10*3/UL
NRBC BLD AUTO-RTO: 0 /100
PH UR STRIP: 5 PH (ref 5–7)
PLATELET # BLD AUTO: 173 10E9/L (ref 150–450)
POTASSIUM SERPL-SCNC: 3.9 MMOL/L (ref 3.4–5.3)
PROT SERPL-MCNC: 7 G/DL (ref 6.8–8.8)
RBC # BLD AUTO: 4.84 10E12/L (ref 4.4–5.9)
RBC #/AREA URNS AUTO: 0 /HPF (ref 0–2)
SODIUM SERPL-SCNC: 135 MMOL/L (ref 133–144)
SP GR UR STRIP: 1.01 (ref 1–1.03)
URN SPEC COLLECT METH UR: ABNORMAL
UROBILINOGEN UR STRIP-MCNC: NORMAL MG/DL (ref 0–2)
WBC # BLD AUTO: 13.5 10E9/L (ref 4–11)
WBC #/AREA URNS AUTO: <1 /HPF (ref 0–2)

## 2017-05-09 PROCEDURE — 85025 COMPLETE CBC W/AUTO DIFF WBC: CPT | Performed by: EMERGENCY MEDICINE

## 2017-05-09 PROCEDURE — 96374 THER/PROPH/DIAG INJ IV PUSH: CPT | Mod: 59 | Performed by: EMERGENCY MEDICINE

## 2017-05-09 PROCEDURE — 74177 CT ABD & PELVIS W/CONTRAST: CPT

## 2017-05-09 PROCEDURE — 82150 ASSAY OF AMYLASE: CPT | Performed by: EMERGENCY MEDICINE

## 2017-05-09 PROCEDURE — 80053 COMPREHEN METABOLIC PANEL: CPT | Performed by: EMERGENCY MEDICINE

## 2017-05-09 PROCEDURE — 25000128 H RX IP 250 OP 636: Performed by: EMERGENCY MEDICINE

## 2017-05-09 PROCEDURE — 83690 ASSAY OF LIPASE: CPT | Performed by: EMERGENCY MEDICINE

## 2017-05-09 PROCEDURE — 96361 HYDRATE IV INFUSION ADD-ON: CPT | Mod: 59 | Performed by: EMERGENCY MEDICINE

## 2017-05-09 PROCEDURE — 25500064 ZZH RX 255 OP 636: Performed by: RADIOLOGY

## 2017-05-09 PROCEDURE — 99285 EMERGENCY DEPT VISIT HI MDM: CPT | Mod: Z6 | Performed by: EMERGENCY MEDICINE

## 2017-05-09 PROCEDURE — 99285 EMERGENCY DEPT VISIT HI MDM: CPT | Mod: 25 | Performed by: EMERGENCY MEDICINE

## 2017-05-09 PROCEDURE — 81001 URINALYSIS AUTO W/SCOPE: CPT | Performed by: EMERGENCY MEDICINE

## 2017-05-09 RX ORDER — IOPAMIDOL 755 MG/ML
135 INJECTION, SOLUTION INTRAVASCULAR ONCE
Status: COMPLETED | OUTPATIENT
Start: 2017-05-09 | End: 2017-05-09

## 2017-05-09 RX ORDER — HYDROMORPHONE HYDROCHLORIDE 1 MG/ML
0.5 INJECTION, SOLUTION INTRAMUSCULAR; INTRAVENOUS; SUBCUTANEOUS ONCE
Status: COMPLETED | OUTPATIENT
Start: 2017-05-09 | End: 2017-05-09

## 2017-05-09 RX ORDER — SODIUM CHLORIDE 9 MG/ML
1000 INJECTION, SOLUTION INTRAVENOUS CONTINUOUS
Status: DISCONTINUED | OUTPATIENT
Start: 2017-05-09 | End: 2017-05-10 | Stop reason: HOSPADM

## 2017-05-09 RX ORDER — ONDANSETRON 2 MG/ML
4 INJECTION INTRAMUSCULAR; INTRAVENOUS ONCE
Status: DISCONTINUED | OUTPATIENT
Start: 2017-05-09 | End: 2017-05-10 | Stop reason: HOSPADM

## 2017-05-09 RX ADMIN — SODIUM CHLORIDE 1000 ML: 9 INJECTION, SOLUTION INTRAVENOUS at 19:47

## 2017-05-09 RX ADMIN — SODIUM CHLORIDE 1000 ML: 900 INJECTION, SOLUTION INTRAVENOUS at 21:31

## 2017-05-09 RX ADMIN — IOPAMIDOL 135 ML: 755 INJECTION, SOLUTION INTRAVENOUS at 21:11

## 2017-05-09 RX ADMIN — HYDROMORPHONE HYDROCHLORIDE 0.5 MG: 1 INJECTION, SOLUTION INTRAMUSCULAR; INTRAVENOUS; SUBCUTANEOUS at 21:01

## 2017-05-09 ASSESSMENT — ENCOUNTER SYMPTOMS
FEVER: 0
NAUSEA: 1
EYE REDNESS: 0
CONFUSION: 0
ABDOMINAL PAIN: 1
VOMITING: 0
WEAKNESS: 0
LIGHT-HEADEDNESS: 0
ARTHRALGIAS: 0
NECK STIFFNESS: 0
DIFFICULTY URINATING: 0
DIARRHEA: 1
SHORTNESS OF BREATH: 0
COLOR CHANGE: 0

## 2017-05-09 NOTE — ED AVS SNAPSHOT
[facility], Emergency Department    [address] [address]    Phone:  [phone]                                       Julio John   MRN: [id number]    Department:  [facility], Emergency Department   Date of Visit:  5/9/2017           Patient Information     Date Of Birth          1970        Your diagnoses for this visit were:     Abdominal pain, generalized     Diarrhea, unspecified type        You were seen by Jarad Devlin MD.        Discharge Instructions             Dieta Para El Vómito O La Diarrea [Diet, Vomiting Or Diarrhea, 6Yr-Adult]    Shena vez que pare el vómito entonces...   Dimitri Las Primeras 12-24 Horas Siga La Siguiente Dieta:    Bebidas: Agua corriente, bebidas para deportistas ponce soluciónes de electrolito, refrescos sin cafeína, agua mineral (común o saborizada), jugos de fruta isidro, té y café descafeinados.    Sopas: Caldo louis y consomé.    Postres: Gelatina común, barras heladas de agua y fruta. Cuando se sienta mejor, puede agregar 6-8 onzas de yogur por día.  Dimitri Las Siguientes 24 Horas Puede Agregar Lo Siguiente A Lo De Mullen:    Cereal caliente, tostadas solas, pan, panecillos o galletas.    Tallarines solos, arroz, puré de patatas, sopa de tallarines con berta o de arroz.    Fruta enlatada sin endulzar (evite la lynch) y bananas.  Limite el consumo de grasas a menos de 15 gramos por día evitando margarinas, manteca, aceites, mayonesa, salsas, condimentos, comidas fritas, mantequilla de maní, carne, berta y pescado.  Limite las fibras: evitando las verduras crudas o cocidas, frutas frescas (excepto bananas) y cereales de salvado.  Limite la cafeína y el chocolate. No use condimentos o especias con excepción de la sivan.  Dimitri Las Siguientes 24 Horas  Gradualmente retome la dieta normal a medida que se sienta mejor.    9850-6349 [employer]. [address] [place] [address]. Todos los derechos reservados. Esta  información no pretende sustituir la atención médica profesional. Sólo martinez médico puede diagnosticar y tratar un problema de keven.          Diet for Vomiting or Diarrhea (Adult)    If your symptoms return or get worse after eating certain foods listed below, you should stop eating them until your symptoms ease and you feel better.  Once the vomiting stops, then follow the steps below.   During the first 12 to 24 hours  During the first 12 to 24 hours, follow this diet:    Beverages. Plain water, sport drinks like electrolyte solutions, soft drinks without caffeine, mineral water (plain or flavored), clear fruit juices, and decaffeinated tea and coffee.    Soups. Clear broth, consommé, and bouillon.    Desserts. Plain gelatin, popsicles, and fruit juice bars. As you feel better, you may add 6 to 8 ounces of yogurt per day. If you have diarrhea, don't have foods or beverages that contain sugar, high-fructose corn syrup, or sugar alcohols.  During the next 24 hours  During the next 24 hours you may add the following to the above:    Hot cereal, plain toast, bread, rolls, and crackers    Plain noodles, rice, mashed potatoes, and chicken noodle or rice soup    Unsweetened canned fruit (but not pineapple) and bananas  Don't have more than 15 grams of fat a day. Do this by staying away from margarine, butter, oils, mayonnaise, sauces, gravies, fried foods, peanut butter, meat, poultry, and fish.  Don't eat much fiber. Stay away from raw or cooked vegetables, fresh fruits (except bananas), and bran cereals.  Limit how much caffeine and chocolate you have. Do not use any spices or seasonings except salt.  During the next 24 hours  Gradually go back to your normal diet, as you feel better and your symptoms ease.    0571-7331 [employer]. [address] [place] [address], [place] [address]. All rights reserved. This information is not intended as a substitute for professional medical care. Always follow your healthcare  professional's instructions.      Follow-up with your providers on Friday as planned.    Return to the ER if fever, worsening symptoms, or other concerns.          Future Appointments        Provider Department Dept Phone Center    5/12/2017 9:00 AM Michelle Britton; [facility] Lab Draw  Services Department [phone] [facility]    5/12/2017 9:15 AM Michelle Britton; Dori Garnica PA-C [facility] [phone] [facility]    5/16/2017 8:00 AM Nirav Kim PsyD; 2nd Floor Consult Room [facility] [phone] [facility]      24 Hour Appointment Hotline       To make an appointment at any [facility], call [phone] ([phone]). If you don't have a family doctor or clinic, we will help you find one. [facility] clinics are conveniently located to serve the needs of you and your family.             Review of your medicines      Our records show that you are taking the medicines listed below. If these are incorrect, please call your family doctor or clinic.        Dose / Directions Last dose taken    acetaminophen 500 MG tablet   Commonly known as:  TYLENOL   Dose:  500 mg   Quantity:  1 Bottle        Take 1 tablet (500 mg) by mouth every 6 hours as needed for mild pain   Refills:  1        eucerin cream        Apply topically 2 times daily   Refills:  0        hydrOXYzine 25 MG tablet   Commonly known as:  ATARAX   Dose:  25 mg   Quantity:  60 tablet        Take 1 tablet (25 mg) by mouth every 4 hours as needed for itching   Refills:  0        ondansetron 4 MG tablet   Commonly known as:  ZOFRAN   Dose:  8 mg   Quantity:  18 tablet        Take 2 tablets (8 mg) by mouth every 8 hours as needed for nausea   Refills:  0        oxyCODONE 5 MG IR tablet   Commonly known as:  ROXICODONE   Dose:  5 mg   Quantity:  10 tablet        Take 1 tablet (5 mg) by mouth every 4 hours as needed for moderate to severe pain   Refills:  0        prochlorperazine 10 MG  tablet   Commonly known as:  COMPAZINE   Dose:  10 mg   Quantity:  30 tablet        Take 1 tablet (10 mg) by mouth every 6 hours as needed (Breakthrough Nausea/Vomiting)   Refills:  0                Procedures and tests performed during your visit     Amylase    CBC with platelets differential    CT Abdomen Pelvis w Contrast    Comprehensive metabolic panel    Lipase    Peripheral IV: Standard    Routine UA with microscopic      Orders Needing Specimen Collection     Ordered          05/09/17 1926  Clostridium difficile toxin B PCR - STAT, [facility]: STAT, Needs to be Collected     Scheduled Task Status   05/09/17 1927 Collect Clostridium difficile toxin B PCR Open   Order Class:  PCU Collect                05/09/17 1926  Enteric Bacteria and Virus Panel by JABIER Stool - STAT, [facility]: STAT, Needs to be Collected     Scheduled Task Status   05/09/17 1927 Collect Enteric Bacteria and Virus Panel by JABIER Stool Open   Order Class:  PCU Collect                  Pending Results     No orders found from 5/7/2017 to 5/10/2017.            Pending Culture Results     No orders found from 5/7/2017 to 5/10/2017.            Pending Results Instructions     If you had any lab results that were not finalized at the time of your Discharge, you can call the ED Lab Result RN at [phone]. You will be contacted by this team for any positive Lab results or changes in treatment. The nurses are available 7 days a week from 10A to 6:30P.  You can leave a message 24 hours per day and they will return your call.        Thank you for choosing [facility]       Thank you for choosing [facility] for your care. Our goal is always to provide you with excellent care. Hearing back from our patients is one way we can continue to improve our services. Please take a few minutes to complete the written survey that you may receive in the mail after you visit with us. Thank you!        [employer] Information     [email or web address] lets you send messages to your doctor, view your  "test results, renew your prescriptions, schedule appointments and more. To sign up, go to [email or web address] . Click on \"Log in\" on the left side of the screen, which will take you to the Welcome page. Then click on \"Sign up Now\" on the right side of the page.     You will be asked to enter the access code listed below, as well as some personal information. Please follow the directions to create your username and password.     Your access code is: [email or web address]  Expires: 2017 10:24 AM     Your access code will  in 90 days. If you need help or a new code, please call your [facility] clinic or [phone].        Care EveryWhere ID     This is your Care EveryWhere ID. This could be used by other organizations to access your [facility] medical records  [id number]        After Visit Summary       This is your record. Keep this with you and show to your community pharmacist(s) and doctor(s) at your next visit.                  "

## 2017-05-09 NOTE — ED AVS SNAPSHOT
[facility], [place], Emergency Department    [address] [place] [address]    Phone:  [phone]                                       Julio John   MRN: [id number]    Department:  [facility], Emergency Department   Date of Visit:  5/9/2017           After Visit Summary Signature Page     I have received my discharge instructions, and my questions have been answered. I have discussed any challenges I see with this plan with the nurse or doctor.    ..........................................................................................................................................  Patient/Patient Representative Signature      ..........................................................................................................................................  Patient Representative Print Name and Relationship to Patient    ..................................................               ................................................  Date                                            Time    ..........................................................................................................................................  Reviewed by Signature/Title    ...................................................              ..............................................  Date                                                            Time

## 2017-05-10 LAB
BACTERIA SPEC CULT: NO GROWTH
MICRO REPORT STATUS: NORMAL
SPECIMEN SOURCE: NORMAL

## 2017-05-10 NOTE — ED NOTES
48 yo male presents ambulatory to triage with c/o abdominal pain, nausea & diarrhea starting at 03:00 this morning.   Also c/o urinary retention & headache.   Hx cancer, last chemo tx Saturday.

## 2017-05-10 NOTE — PROGRESS NOTES
Patient continues to remove BP cuff. Patient educated about the need to check BP, but would prefer to not wear BP cuff.

## 2017-05-10 NOTE — ED PROVIDER NOTES
"  History     Chief Complaint   Patient presents with     Abdominal Pain     Diarrhea     HPI  Julio John is a 47 year old male who presents to the emergency department with abdominal pain and diarrhea.  Patient has a history of metastatic renal cell carcinoma.  He underwent interleukin-2 treatment last week.  This was complicated by thrombocytopenia per his report and also had hypotension.  Last night, the patient states he developed diffuse abdominal cramping with diarrhea.  He states he had multiple episodes of watery diarrhea.  He denies any hematochezia.  He denies recent antibiotic use.  Patient reports nausea but denies vomiting.  Patient states that after he developed the diarrhea, he had urinary frequency.  He denies dysuria and urgency.  Patient denies any chest pain or dyspnea.  He denies cough.  Patient states that he ate barbecued chicken after discharge from the hospital recently.    I have reviewed the Medications, Allergies, Past Medical and Surgical History, and Social History in the Epic system.    Review of Systems   Constitutional: Negative for fever.   HENT: Negative for congestion.    Eyes: Negative for redness.   Respiratory: Negative for shortness of breath.    Cardiovascular: Negative for chest pain.   Gastrointestinal: Positive for abdominal pain, diarrhea and nausea. Negative for vomiting.   Genitourinary: Negative for difficulty urinating.   Musculoskeletal: Negative for arthralgias and neck stiffness.   Skin: Negative for color change.   Neurological: Negative for weakness and light-headedness.   Psychiatric/Behavioral: Negative for confusion.   All other systems reviewed and are negative.      Physical Exam   BP: 146/82  Heart Rate: 91  Temp: 98.8  F (37.1  C)  Resp: 11  Height: 167.6 cm (5' 6\")  Weight: 104.6 kg (230 lb 9.6 oz)  SpO2: 96 %  Physical Exam   Constitutional: He appears well-developed and well-nourished. No distress.   HENT:   Head: Normocephalic and atraumatic. "   Eyes: Pupils are equal, round, and reactive to light. No scleral icterus.   Neck: Normal range of motion.   Cardiovascular: Normal rate, regular rhythm, normal heart sounds and intact distal pulses.    Pulmonary/Chest: Effort normal and breath sounds normal. No respiratory distress.   Abdominal: Soft. Bowel sounds are normal. There is tenderness in the left lower quadrant. There is no rebound.   Musculoskeletal: Normal range of motion. He exhibits no edema or tenderness.   Neurological: He is alert. He has normal strength.   Skin: Skin is warm. No rash noted. He is not diaphoretic.   Nursing note and vitals reviewed.      ED Course     ED Course     Procedures            Critical Care time:             Results for orders placed or performed during the hospital encounter of 05/09/17 (from the past 24 hour(s))   CBC with platelets differential   Result Value Ref Range    WBC 13.5 (H) 4.0 - 11.0 10e9/L    RBC Count 4.84 4.4 - 5.9 10e12/L    Hemoglobin 13.9 13.3 - 17.7 g/dL    Hematocrit 41.1 40.0 - 53.0 %    MCV 85 78 - 100 fl    MCH 28.7 26.5 - 33.0 pg    MCHC 33.8 31.5 - 36.5 g/dL    RDW 14.1 10.0 - 15.0 %    Platelet Count 173 150 - 450 10e9/L    Diff Method Automated Method     % Neutrophils 41.1 %    % Lymphocytes 27.7 %    % Monocytes 6.1 %    % Eosinophils 24.3 %    % Basophils 0.1 %    % Immature Granulocytes 0.7 %    Nucleated RBCs 0 0 /100    Absolute Neutrophil 5.5 1.6 - 8.3 10e9/L    Absolute Lymphocytes 3.8 0.8 - 5.3 10e9/L    Absolute Monocytes 0.8 0.0 - 1.3 10e9/L    Absolute Eosinophils 3.3 (H) 0.0 - 0.7 10e9/L    Absolute Basophils 0.0 0.0 - 0.2 10e9/L    Abs Immature Granulocytes 0.1 0 - 0.4 10e9/L    Absolute Nucleated RBC 0.0    Comprehensive metabolic panel   Result Value Ref Range    Sodium 135 133 - 144 mmol/L    Potassium 3.9 3.4 - 5.3 mmol/L    Chloride 102 94 - 109 mmol/L    Carbon Dioxide 25 20 - 32 mmol/L    Anion Gap 7 3 - 14 mmol/L    Glucose 104 (H) 70 - 99 mg/dL    Urea Nitrogen 15 7 -  30 mg/dL    Creatinine 1.19 0.66 - 1.25 mg/dL    GFR Estimate 65 >60 mL/min/1.7m2    GFR Estimate If Black 79 >60 mL/min/1.7m2    Calcium 8.3 (L) 8.5 - 10.1 mg/dL    Bilirubin Total 0.4 0.2 - 1.3 mg/dL    Albumin 3.2 (L) 3.4 - 5.0 g/dL    Protein Total 7.0 6.8 - 8.8 g/dL    Alkaline Phosphatase 114 40 - 150 U/L    ALT 32 0 - 70 U/L    AST 18 0 - 45 U/L   Routine UA with microscopic   Result Value Ref Range    Color Urine Light Yellow     Appearance Urine Clear     Glucose Urine Negative NEG mg/dL    Bilirubin Urine Negative NEG    Ketones Urine Negative NEG mg/dL    Specific Gravity Urine 1.008 1.003 - 1.035    Blood Urine Negative NEG    pH Urine 5.0 5.0 - 7.0 pH    Protein Albumin Urine Negative NEG mg/dL    Urobilinogen mg/dL Normal 0.0 - 2.0 mg/dL    Nitrite Urine Negative NEG    Leukocyte Esterase Urine Negative NEG    Source Midstream Urine     WBC Urine <1 0 - 2 /HPF    RBC Urine 0 0 - 2 /HPF    Mucous Urine Present (A) NEG /LPF   Lipase   Result Value Ref Range    Lipase 514 (H) 73 - 393 U/L   Amylase   Result Value Ref Range    Amylase 84 30 - 110 U/L   CT Abdomen Pelvis w Contrast    Narrative    EXAMINATION: CT ABDOMEN PELVIS W CONTRAST, 5/9/2017 9:19 PM    TECHNIQUE:  Helical CT images from the lung bases through the  symphysis pubis were obtained with contrast.  Coronal and sagittal  reformatted images were generated at a workstation for further  assessment.    CONTRAST:  135 cc CT 4/12/2017 IV.    COMPARISON: None.    HISTORY: LLQ pain, diffuse abdominal pain and diarrhea. History of  metastatic renal cell carcinoma.    FINDINGS:    Lines and tubes: None.    Liver: Focal region of hypodensity without mass effect along the right  hepatic lobe, adjacent to the falciform ligament; appearance favors  focal fatty infiltration. No suspicious liver lesions. Portal veins  appear patent.  Gallbladder: No gallstones. No evidence of acute cholecystitis.  Spleen: Normal size.  Pancreas: No suspicious pancreatic  lesions. The pancreatic duct is not  dilated.  Adrenal glands: No adrenal nodules.  Kidneys: Postsurgical changes of a left nephrectomy. No hydronephrosis  or obstructing renal stones.  Bladder / Pelvic organs: Coarse prostate calcifications. Urinary  bladder is unremarkable.  Bowel: No bowel wall thickening. Appendicolith; no inflammation.  Lymph nodes: No retroperitoneal, mesenteric, or pelvic  lymphadenopathy.  Peritoneum / Retroperitoneum: No free fluid or air within the abdomen.  Vessels: No infrarenal aortic aneurysm.     Lung bases: Increasing soft tissue density in the left lung base  compared with 4/12/2017. Bones and soft tissues: No suspicious osseous  lesions. Tiny, fat-containing umbilical hernia.      Impression    IMPRESSION:   1. No radiographic explanation to explain the patient's left lower  quadrant pain.  2. Increasing soft tissue density associated with the left lower lobe.  Findings may be related to the patient's metastatic renal disease  versus infection.  3. Postsurgical changes of a left nephrectomy. No suspicious soft  tissue nodularity in the surgical bed.    I have personally reviewed the examination and initial interpretation  and I agree with the findings.    SMOOTH SAM MD      Medications   0.9% sodium chloride infusion (0 mLs Intravenous Stopped 5/9/17 2256)   ondansetron (ZOFRAN) injection 4 mg (4 mg Intravenous Not Given 5/9/17 1951)   0.9% sodium chloride BOLUS (0 mLs Intravenous Stopped 5/9/17 2131)   HYDROmorphone (PF) (DILAUDID) injection 0.5 mg (0.5 mg Intravenous Given 5/9/17 2101)   iopamidol (ISOVUE-370) solution 135 mL (135 mLs Intravenous Given 5/9/17 2111)   sodium chloride (PF) 0.9% PF flush 84 mL (84 mLs Intravenous Given 5/9/17 2111)         Discussed with Oncology Fellow- ok for outpatient follow-up.  Message sent to Dr. Painting regarding patient's worsening eosinophilia.    10:44 PM Patient re-evaluated- feeling better.        Assessments & Plan (with Medical  Decision Making)   47 year old male with history of renal cell carcinoma status post recent mild to treatment here with 1 day history of intermittent abdominal pain/cramping with associated nausea and diarrhea.  Patient also had some urinary frequency earlier in the day.  In the emergency department, the patient had no further episodes of diarrhea.  The patient does have a mild leukocytosis and worsening eosinophilia.  His labs are otherwise normal.  CT of the abdomen and pelvis was performed due to left lower quadrant abdominal pain.  This is not reveal any cause for abdominal pain or acute intra-abdominal pathology.  The patient appeared clinically improved after above therapies.  His symptoms and recent treatment were discussed with the on-call oncology fellow who felt patient was appropriate for outpatient management.  He appears clinically well and is feeling improved after above therapies.  Patient will be discharged home.  A message was sent to his primary oncologist regarding his worsening eosinophilia.    I have reviewed the nursing notes.    I have reviewed the findings, diagnosis, plan and need for follow up with the patient.    Discharge Medication List as of 5/9/2017 10:56 PM          Final diagnoses:   Abdominal pain, generalized   Diarrhea, unspecified type       5/9/2017   [facility] [place], EMERGENCY DEPARTMENT     Jarad Devlin MD  05/09/17 4054

## 2017-05-10 NOTE — DISCHARGE INSTRUCTIONS
Dieta Para El Vómito O La Diarrea [Diet, Vomiting Or Diarrhea, 6Yr-Adult]    Shena vez que pare el vómito entonces...   Dimitri [place] Primer[place] 12-24 Horas Siga La Siguiente Dieta:    Bebidas: Agua corriente, bebidas para deportistas ponce soluciónes de electrolito, refrescos sin cafeína, agua mineral (común o saborizada), jugos de fruta isidro, té y café descafeinados.    Sopas: Caldo louis y consomé.    Postres: Gelatina común, barras heladas de agua y fruta. Cuando se sienta mejor, puede agregar 6-8 onzas de yogur por día.  Dimitri Las Siguientes 24 Horas Puede Agregar Lo Siguiente A Lo De [place]:    Cereal caliente, tostadas solas, pan, panecillos o galletas.    Tallarines solos, arroz, puré de patatas, sopa de tallarines con berta o de arroz.    Fruta enlatada sin endulzar (evite la lynch) y bananas.  Limite el consumo de grasas a menos de 15 gramos por día evitando margarinas, manteca, aceites, mayonesa, salsas, condimentos, comidas fritas, mantequilla de maní, carne, berta y pescado.  Limite las fibras: evitando las verduras crudas o cocidas, frutas frescas (excepto bananas) y cereales de salvado.  Limite la cafeína y el chocolate. No use condimentos o especias con excepción de la sivan.  Dimitri Las Siguientes 24 Horas  Gradualmente retome la dieta normal a medida que se sienta mejor.    8648-4409 [employer]. [address] [place] [address]. Todos los derechos reservados. Esta información no pretende sustituir la atención médica profesional. Sólo martinez médico puede diagnosticar y tratar un problema de keven.          Diet for Vomiting or Diarrhea (Adult)    If your symptoms return or get worse after eating certain foods listed below, you should stop eating them until your symptoms ease and you feel better.  Once the vomiting stops, then follow the steps below.   During the first 12 to 24 hours  During the first 12 to 24 hours, follow this diet:    Beverages. Plain water, sport drinks like  electrolyte solutions, soft drinks without caffeine, mineral water (plain or flavored), clear fruit juices, and decaffeinated tea and coffee.    Soups. Clear broth, consommé, and bouillon.    Desserts. Plain gelatin, popsicles, and fruit juice bars. As you feel better, you may add 6 to 8 ounces of yogurt per day. If you have diarrhea, don't have foods or beverages that contain sugar, high-fructose corn syrup, or sugar alcohols.  During the next 24 hours  During the next 24 hours you may add the following to the above:    Hot cereal, plain toast, bread, rolls, and crackers    Plain noodles, rice, mashed potatoes, and chicken noodle or rice soup    Unsweetened canned fruit (but not pineapple) and bananas  Don't have more than 15 grams of fat a day. Do this by staying away from margarine, butter, oils, mayonnaise, sauces, gravies, fried foods, peanut butter, meat, poultry, and fish.  Don't eat much fiber. Stay away from raw or cooked vegetables, fresh fruits (except bananas), and bran cereals.  Limit how much caffeine and chocolate you have. Do not use any spices or seasonings except salt.  During the next 24 hours  Gradually go back to your normal diet, as you feel better and your symptoms ease.    4613-5881 [employer]. [address] [place] [address], [place] [address]. All rights reserved. This information is not intended as a substitute for professional medical care. Always follow your healthcare professional's instructions.      Follow-up with your providers on Friday as planned.    Return to the ER if fever, worsening symptoms, or other concerns.

## 2017-05-11 LAB
BACTERIA SPEC CULT: NO GROWTH
MICRO REPORT STATUS: NORMAL
SPECIMEN SOURCE: NORMAL

## 2017-05-12 ENCOUNTER — ONCOLOGY VISIT (OUTPATIENT)
Dept: ONCOLOGY | Facility: CLINIC | Age: 47
End: 2017-05-12
Attending: PHYSICIAN ASSISTANT
Payer: MEDICAID

## 2017-05-12 VITALS
WEIGHT: 222.7 LBS | TEMPERATURE: 98.3 F | RESPIRATION RATE: 16 BRPM | OXYGEN SATURATION: 97 % | HEART RATE: 67 BPM | BODY MASS INDEX: 35.79 KG/M2 | HEIGHT: 66 IN | SYSTOLIC BLOOD PRESSURE: 115 MMHG | DIASTOLIC BLOOD PRESSURE: 77 MMHG

## 2017-05-12 DIAGNOSIS — C64.2 MALIGNANT NEOPLASM OF LEFT KIDNEY (H): ICD-10-CM

## 2017-05-12 LAB
ALBUMIN SERPL-MCNC: 3.3 G/DL (ref 3.4–5)
ALP SERPL-CCNC: 71 U/L (ref 40–150)
ALT SERPL W P-5'-P-CCNC: 26 U/L (ref 0–70)
ANION GAP SERPL CALCULATED.3IONS-SCNC: 8 MMOL/L (ref 3–14)
AST SERPL W P-5'-P-CCNC: 17 U/L (ref 0–45)
BASOPHILS # BLD AUTO: 0.1 10E9/L (ref 0–0.2)
BASOPHILS NFR BLD AUTO: 0.8 %
BILIRUB SERPL-MCNC: 0.4 MG/DL (ref 0.2–1.3)
BUN SERPL-MCNC: 11 MG/DL (ref 7–30)
CALCIUM SERPL-MCNC: 8.1 MG/DL (ref 8.5–10.1)
CHLORIDE SERPL-SCNC: 108 MMOL/L (ref 94–109)
CO2 SERPL-SCNC: 23 MMOL/L (ref 20–32)
CREAT SERPL-MCNC: 1.09 MG/DL (ref 0.66–1.25)
DIFFERENTIAL METHOD BLD: ABNORMAL
EOSINOPHIL # BLD AUTO: 1.5 10E9/L (ref 0–0.7)
EOSINOPHIL NFR BLD AUTO: 17.6 %
ERYTHROCYTE [DISTWIDTH] IN BLOOD BY AUTOMATED COUNT: 14.2 % (ref 10–15)
GFR SERPL CREATININE-BSD FRML MDRD: 72 ML/MIN/1.7M2
GLUCOSE SERPL-MCNC: 105 MG/DL (ref 70–99)
HCT VFR BLD AUTO: 43.6 % (ref 40–53)
HGB BLD-MCNC: 14.3 G/DL (ref 13.3–17.7)
IMM GRANULOCYTES # BLD: 0.3 10E9/L (ref 0–0.4)
IMM GRANULOCYTES NFR BLD: 3 %
LYMPHOCYTES # BLD AUTO: 3 10E9/L (ref 0.8–5.3)
LYMPHOCYTES NFR BLD AUTO: 35.4 %
MAGNESIUM SERPL-MCNC: 2.3 MG/DL (ref 1.6–2.3)
MCH RBC QN AUTO: 28.4 PG (ref 26.5–33)
MCHC RBC AUTO-ENTMCNC: 32.8 G/DL (ref 31.5–36.5)
MCV RBC AUTO: 87 FL (ref 78–100)
MONOCYTES # BLD AUTO: 0.5 10E9/L (ref 0–1.3)
MONOCYTES NFR BLD AUTO: 6.4 %
NEUTROPHILS # BLD AUTO: 3.1 10E9/L (ref 1.6–8.3)
NEUTROPHILS NFR BLD AUTO: 36.8 %
NRBC # BLD AUTO: 0 10*3/UL
NRBC BLD AUTO-RTO: 0 /100
PHOSPHATE SERPL-MCNC: 3 MG/DL (ref 2.5–4.5)
PLATELET # BLD AUTO: 178 10E9/L (ref 150–450)
POTASSIUM SERPL-SCNC: 4 MMOL/L (ref 3.4–5.3)
PROT SERPL-MCNC: 7 G/DL (ref 6.8–8.8)
RBC # BLD AUTO: 5.03 10E12/L (ref 4.4–5.9)
SODIUM SERPL-SCNC: 139 MMOL/L (ref 133–144)
WBC # BLD AUTO: 8.4 10E9/L (ref 4–11)

## 2017-05-12 PROCEDURE — 40000268 ZZH STATISTIC NO CHARGES: Mod: ZF

## 2017-05-12 PROCEDURE — 84100 ASSAY OF PHOSPHORUS: CPT | Performed by: PHYSICIAN ASSISTANT

## 2017-05-12 PROCEDURE — 83735 ASSAY OF MAGNESIUM: CPT | Performed by: PHYSICIAN ASSISTANT

## 2017-05-12 PROCEDURE — 36415 COLL VENOUS BLD VENIPUNCTURE: CPT

## 2017-05-12 PROCEDURE — 85025 COMPLETE CBC W/AUTO DIFF WBC: CPT | Performed by: PHYSICIAN ASSISTANT

## 2017-05-12 PROCEDURE — T1013 SIGN LANG/ORAL INTERPRETER: HCPCS | Mod: U3

## 2017-05-12 PROCEDURE — 99212 OFFICE O/P EST SF 10 MIN: CPT

## 2017-05-12 PROCEDURE — 99214 OFFICE O/P EST MOD 30 MIN: CPT | Mod: ZP | Performed by: PHYSICIAN ASSISTANT

## 2017-05-12 PROCEDURE — T1013 SIGN LANG/ORAL INTERPRETER: HCPCS | Mod: U3,ZF

## 2017-05-12 PROCEDURE — 80053 COMPREHEN METABOLIC PANEL: CPT | Performed by: PHYSICIAN ASSISTANT

## 2017-05-12 ASSESSMENT — PAIN SCALES - GENERAL: PAINLEVEL: NO PAIN (0)

## 2017-05-12 NOTE — PROGRESS NOTES
"Hematology-Oncology Visit  May 12, 2017    Reason for Visit: follow-up metastatic renal cell carcinoma    HPI: Julio John is a 47 year old gentleman without significant past medical history with metastatic renal cell carcinoma to lungs. He presented with back pain, fevers, BRBPR with hemorrhoids. He had a CT scan which revealed left kidney cancer. He has left nephrectomy 3/2016. He was note to have lung nodules on left lung on surveillance scan. He had stereotactic radiation to lung. Follow-up scans showed new nodules in R lung. He was referred to Dr. Painting for consideration of IL-2. Dr. Pianting deemed him a good candidate. He had echo, PFTs, brain MRI, bone scan, and CT CAP as a baseline prior to starting therapy on 4/17/17. He comes in prior to admission for cycle 2 of IL-2.     Interval History:   Julio is here alone today.  He is recovering from IL-2 a little slower than the first cycle. He has a cough intermittently, no sputum, f/s/c and some mild shortness of breath.  He has itchy skin, fatigue and appetite is totally resolved.  He otherwise is well.     Review of Systems: Patient denies any of the following except if noted above: fever, chills, vision or hearing changes, chest pain, cough, dyspnea, abdominal pain, nausea, vomiting, diarrhea, constipation, urinary concerns, headaches, numbness, tingling or issues with mood.     Current Outpatient Prescriptions   Medication     acetaminophen (TYLENOL) 500 MG tablet     oxyCODONE (ROXICODONE) 5 MG IR tablet     hydrOXYzine (ATARAX) 25 MG tablet     Skin Protectants, Misc. (EUCERIN) cream     prochlorperazine (COMPAZINE) 10 MG tablet     ondansetron (ZOFRAN) 4 MG tablet     No current facility-administered medications for this visit.        PHYSICAL EXAM:  /77  Pulse 67  Temp 98.3  F (36.8  C) (Oral)  Resp 16  Ht 1.676 m (5' 6\")  Wt 101 kg (222 lb 11.2 oz)  SpO2 97%  BMI 35.94 kg/m2  General: Alert, oriented, pleasant, NAD  Skin: No rashes, " bruising, or petechiae on exposed skin   HEENT: Normocephalic, atraumatic, PERRLA, EOMI. Moist mucus membranes, no lesions or thrush. No erythema.   Neck: No cervical or supraclavicular LAD.   Axillary: No LAD  Lungs: CTA bilaterally, normal work of breathing. No wheezing, crackles or rhonchi  Cardiac: RRR, S1, S2, no murmurs  Abdomen: Soft, nontender, nondistended. Normoactive bowel sounds. No hepatosplenomegaly, masses  Neuro: CNII-XII grossly intact  Extremities: No pedal edema    Labs:      5/6/2017 05:57 5/7/2017 23:39 5/9/2017 19:40 5/12/2017 09:48   Sodium 142 138 135 139   Potassium 4.3 4.0 3.9 4.0   Chloride 114 (H) 106 102 108   Carbon Dioxide 19 (L) 26 25 23   Urea Nitrogen 13 13 15 11   Creatinine 1.36 (H) 1.25 1.19 1.09   GFR Estimate 56 (L) 62 65 72   GFR Estimate If Black 68 75 79 88   Calcium 6.7 (L) 7.9 (L) 8.3 (L) 8.1 (L)   Anion Gap 8 6 7 8   Magnesium 2.3 2.0  2.3   Phosphorus 1.6 (L)   3.0   Albumin 2.1 (L)  3.2 (L) 3.3 (L)   Protein Total 4.8 (L)  7.0 7.0   Bilirubin Total 1.5 (H)  0.4 0.4   Alkaline Phosphatase 46  114 71   ALT 40  32 26   AST 29  18 17   Amylase   84    CK Total 77      Lactate Dehydrogenase 203      Lipase   514 (H)    Glucose 94 94 104 (H) 105 (H)   WBC 5.9 7.7 13.5 (H) 8.4   Hemoglobin 11.9 (L) 12.9 (L) 13.9 14.3   Hematocrit 37.1 (L) 37.8 (L) 41.1 43.6   Platelet Count 95 (L) 143 (L) 173 178   RBC Count 4.20 (L) 4.45 4.84 5.03   MCV 88 85 85 87     Assessment & Plan:   1. Metastatic renal cell carcinoma with pulmonary metastasis: S/p left nephrectomy and XRT to left lung. Now with disease involving R lung. He started on treatment with IL-2 and is s/p 1 cycle. He tolerated C2 OK, only 4 doses this time as he had quite a bit of toxicity.    He will have a 4 week break prior to restaging CT CAP prior to cycle three. He will see Dr. Painting on 5/31 and be admitted the same day for cycle 3. Order placed for picc line. He will call with any interval concerns.     2. Cough.  Its not  uncommon to have cough post IL-2 from excess fluid overload.  We reviewed his CT scan and he does have increase GGO in the left lung. This could be some post radiation changes.  He has no other symptoms of infection at this time so I will hold off on antibiotics, but encouraged him to call in next week if cough worsens or new symptoms occur.       Bailee Garnica PA-C  [facility] [address] [id number] [phone]

## 2017-05-12 NOTE — NURSING NOTE
Chief Complaint   Patient presents with     Blood Draw     vs/labs drawn in right AC by cma   See flowsheets.  Shaunna Newton,CMA

## 2017-05-12 NOTE — MR AVS SNAPSHOT
After Visit Summary   5/12/2017    Julio John    MRN: [id number]           Patient Information     Date Of Birth          1970        Visit Information        Provider Department      5/12/2017 9:15 AM Mercedes Arreola Lucia G, PA-C [facility]        Today's Diagnoses     Malignant neoplasm of left kidney (H)           Follow-ups after your visit        Your next 10 appointments already scheduled     May 16, 2017  7:45 AM CDT   NEW WITH ROOM with Nirav Kim PsyD,  2 116 CONSULT [facility] ([facility])    [address]  2nd Floor  [facility] [id number] [phone]            May 30, 2017 10:20 AM CDT   (Arrive by 10:05 AM)   CT CHEST ABDOMEN PELVIS W/O & W CONTRAST with UCCT2   [facility] CT ([facility])    [address]  [address] [facility] [id number] [phone]           Please bring any scans or X-rays taken at other hospitals, if similar tests were done. Also bring a list of your medicines, including vitamins, minerals and over-the-counter drugs. It is safest to leave personal items at home.  Be sure to tell your doctor:   If you have any allergies.   If there s any chance you are pregnant.   If you are breastfeeding.   If you have any special needs.  You may have contrast for this exam. To prepare:   Do not eat or drink for 2 hours before your exam. If you need to take medicine, you may take it with small sips of water. (We may ask you to take liquid medicine as well.)   The day before your exam, drink extra fluids at least six 8-ounce glasses (unless your doctor tells you to restrict your fluids).  Patients over 70 or patients with diabetes or kidney problems:   If you haven t had a blood test (creatinine test) within the last 30 days, go to your clinic or Diagnostic Imaging Department for this test.  If you have diabetes:    If your kidney function is normal, continue taking your metformin (Avandamet, Glucophage, Glucovance, Metaglip) on the day of your exam.   If your kidney function is abnormal, wait 48 hours before restarting this medicine.  You will have oral contrast for this exam:   You will drink the contrast at home. Get this from your clinic or Diagnostic Imaging Department. Please follow the directions given.  Please wear loose clothing, such as a sweat suit or jogging clothes. Avoid snaps, zippers and other metal. We may ask you to undress and put on a hospital gown.  If you have any questions, please call the Imaging Department where you will have your exam.            May 31, 2017  8:00 AM CDT   [facility] Lab Draw with  [email or web address] LAB DRAW   [facility] Lab Draw ([facility])    [address] [place] [address] [phone]            May 31, 2017  8:45 AM CDT   (Arrive by 8:30 AM)   Return Visit with Ludwin Painting MD   [facility] ([facility])    [address] [place] [address] [phone]              Future tests that were ordered for you today     Open Future Orders        Priority Expected Expires Ordered    IR PICC Placement > 5 Yrs of Age Routine 5/31/2017 6/9/2017 5/12/2017            Who to contact     If you have questions or need follow up information about today's clinic visit or your schedule please contact [facility] directly at [phone].  Normal or non-critical lab and imaging results will be communicated to you by MyChart, letter or phone within 4 business days after the clinic has received the results. If you do not hear from us within 7 days, please contact the clinic through MyChart or phone. If you have a critical or abnormal lab result, we will notify you by phone as soon as possible.  Submit refill requests through [employer] or call your pharmacy  "and they will forward the refill request to us. Please allow 3 business days for your refill to be completed.          Additional Information About Your Visit        MyChart Information     "[employer]" lets you send messages to your doctor, view your test results, renew your prescriptions, schedule appointments and more. To sign up, go to [email or web address]" . Click on \"Log in\" on the left side of the screen, which will take you to the Welcome page. Then click on \"Sign up Now\" on the right side of the page.     You will be asked to enter the access code listed below, as well as some personal information. Please follow the directions to create your username and password.     Your access code is: [email or web address]  Expires: 2017 10:24 AM     Your access code will  in 90 days. If you need help or a new code, please call your [place] clinic or [phone].        Care EveryWhere ID     This is your [facility] EveryWhere ID. This could be used by other organizations to access your [place] medical records  [id number]        Your Vitals Were     Pulse Temperature Respirations Height Pulse Oximetry BMI (Body Mass Index)    67 98.3  F (36.8  C) (Oral) 16 1.676 m (5' 6\") 97% 35.94 kg/m2       Blood Pressure from Last 3 Encounters:   17 115/77   17 142/86   17 (!) 136/91    Weight from Last 3 Encounters:   17 101 kg (222 lb 11.2 oz)   17 104.6 kg (230 lb 9.6 oz)   17 104.3 kg (230 lb)              We Performed the Following     CBC with platelets differential     Comprehensive metabolic panel     Magnesium     Phosphorus        Primary Care Provider Office Phone # Fax #    Uchemadu MD Doc [phone]       [facility] [address] [facility] [address]        Thank you!     Thank you for choosing [facility]  for your care. Our goal is always to provide you with excellent care. Hearing back from our patients is one way we can " continue to improve our services. Please take a few minutes to complete the written survey that you may receive in the mail after your visit with us. Thank you!             Your Updated Medication List - Protect others around you: Learn how to safely use, store and throw away your medicines at www.disposemymeds.org.          This list is accurate as of: 5/12/17 11:13 AM.  Always use your most recent med list.                   Brand Name Dispense Instructions for use    acetaminophen 500 MG tablet    TYLENOL    1 Bottle    Take 1 tablet (500 mg) by mouth every 6 hours as needed for mild pain       eucerin cream      Apply topically 2 times daily Reported on 5/12/2017       hydrOXYzine 25 MG tablet    ATARAX    60 tablet    Take 1 tablet (25 mg) by mouth every 4 hours as needed for itching       ondansetron 4 MG tablet    ZOFRAN    18 tablet    Take 2 tablets (8 mg) by mouth every 8 hours as needed for nausea       oxyCODONE 5 MG IR tablet    ROXICODONE    10 tablet    Take 1 tablet (5 mg) by mouth every 4 hours as needed for moderate to severe pain       prochlorperazine 10 MG tablet    COMPAZINE    30 tablet    Take 1 tablet (10 mg) by mouth every 6 hours as needed (Breakthrough Nausea/Vomiting)

## 2017-05-12 NOTE — NURSING NOTE
"Oncology Rooming Note    May 12, 2017 10:27 AM   Julio John is a 47 year old male who presents for:    Chief Complaint   Patient presents with     Blood Draw     vs/labs drawn in right AC by [facility]     Oncology Clinic Visit     return patient visit related for lab results/right rib pain follow up related to Renal cell carcinoma (H)     Initial Vitals: /77  Pulse 67  Temp 98.3  F (36.8  C) (Oral)  Resp 16  Ht 1.676 m (5' 6\")  Wt 101 kg (222 lb 11.2 oz)  SpO2 97%  BMI 35.94 kg/m2 Estimated body mass index is 35.94 kg/(m^2) as calculated from the following:    Height as of this encounter: 1.676 m (5' 6\").    Weight as of this encounter: 101 kg (222 lb 11.2 oz). Body surface area is 2.17 meters squared.  No Pain (0) Comment: Data Unavailable   No LMP for male patient.  Allergies reviewed: Yes  Medications reviewed: Yes    Medications: Medication refills not needed today.  Pharmacy name entered into EPIC: Data Unavailable    Clinical concerns: right rib pain - when coughing and urinating. Also itching that lasts all day, especially at night. ayde andrew was notified.    5 minutes for nursing intake (face to face time)     Ronda Pino CMA              "

## 2017-05-14 ENCOUNTER — APPOINTMENT (OUTPATIENT)
Dept: ULTRASOUND IMAGING | Facility: CLINIC | Age: 47
DRG: 815 | End: 2017-05-14
Attending: EMERGENCY MEDICINE
Payer: MEDICAID

## 2017-05-14 ENCOUNTER — APPOINTMENT (OUTPATIENT)
Dept: CT IMAGING | Facility: CLINIC | Age: 47
DRG: 815 | End: 2017-05-14
Attending: EMERGENCY MEDICINE
Payer: MEDICAID

## 2017-05-14 ENCOUNTER — HOSPITAL ENCOUNTER (INPATIENT)
Facility: CLINIC | Age: 47
LOS: 1 days | Discharge: HOME | DRG: 815 | End: 2017-05-16
Attending: EMERGENCY MEDICINE | Admitting: INTERNAL MEDICINE
Payer: MEDICAID

## 2017-05-14 DIAGNOSIS — C80.1 CLEAR CELL CARCINOMA (H): ICD-10-CM

## 2017-05-14 DIAGNOSIS — R50.9 FEVER IN ADULT: ICD-10-CM

## 2017-05-14 DIAGNOSIS — R05.9 COUGH: ICD-10-CM

## 2017-05-14 DIAGNOSIS — R10.9 RIGHT SIDED ABDOMINAL PAIN: ICD-10-CM

## 2017-05-14 LAB
ALBUMIN SERPL-MCNC: 3.2 G/DL (ref 3.4–5)
ALBUMIN UR-MCNC: 10 MG/DL
ALP SERPL-CCNC: 55 U/L (ref 40–150)
ALT SERPL W P-5'-P-CCNC: 22 U/L (ref 0–70)
ANION GAP SERPL CALCULATED.3IONS-SCNC: 9 MMOL/L (ref 3–14)
APPEARANCE UR: CLEAR
AST SERPL W P-5'-P-CCNC: 10 U/L (ref 0–45)
BASOPHILS # BLD AUTO: 0 10E9/L (ref 0–0.2)
BASOPHILS NFR BLD AUTO: 0.1 %
BILIRUB SERPL-MCNC: 0.7 MG/DL (ref 0.2–1.3)
BILIRUB UR QL STRIP: NEGATIVE
BUN SERPL-MCNC: 18 MG/DL (ref 7–30)
CALCIUM SERPL-MCNC: 8 MG/DL (ref 8.5–10.1)
CHLORIDE SERPL-SCNC: 106 MMOL/L (ref 94–109)
CO2 SERPL-SCNC: 22 MMOL/L (ref 20–32)
COLOR UR AUTO: YELLOW
CREAT SERPL-MCNC: 1.52 MG/DL (ref 0.66–1.25)
DIFFERENTIAL METHOD BLD: ABNORMAL
EOSINOPHIL # BLD AUTO: 1 10E9/L (ref 0–0.7)
EOSINOPHIL NFR BLD AUTO: 6.7 %
ERYTHROCYTE [DISTWIDTH] IN BLOOD BY AUTOMATED COUNT: 14.7 % (ref 10–15)
GFR SERPL CREATININE-BSD FRML MDRD: 49 ML/MIN/1.7M2
GLUCOSE SERPL-MCNC: 97 MG/DL (ref 70–99)
GLUCOSE UR STRIP-MCNC: NEGATIVE MG/DL
HCT VFR BLD AUTO: 43.1 % (ref 40–53)
HGB BLD-MCNC: 14.8 G/DL (ref 13.3–17.7)
HGB UR QL STRIP: ABNORMAL
IMM GRANULOCYTES # BLD: 0 10E9/L (ref 0–0.4)
IMM GRANULOCYTES NFR BLD: 0.3 %
KETONES UR STRIP-MCNC: 5 MG/DL
LACTATE BLD-SCNC: 0.9 MMOL/L (ref 0.7–2.1)
LEUKOCYTE ESTERASE UR QL STRIP: NEGATIVE
LIPASE SERPL-CCNC: 202 U/L (ref 73–393)
LYMPHOCYTES # BLD AUTO: 0.3 10E9/L (ref 0.8–5.3)
LYMPHOCYTES NFR BLD AUTO: 1.8 %
MCH RBC QN AUTO: 29.4 PG (ref 26.5–33)
MCHC RBC AUTO-ENTMCNC: 34.3 G/DL (ref 31.5–36.5)
MCV RBC AUTO: 86 FL (ref 78–100)
MONOCYTES # BLD AUTO: 0.3 10E9/L (ref 0–1.3)
MONOCYTES NFR BLD AUTO: 2.1 %
MUCOUS THREADS #/AREA URNS LPF: PRESENT /LPF
NEUTROPHILS # BLD AUTO: 13.7 10E9/L (ref 1.6–8.3)
NEUTROPHILS NFR BLD AUTO: 89 %
NITRATE UR QL: NEGATIVE
NRBC # BLD AUTO: 0 10*3/UL
NRBC BLD AUTO-RTO: 0 /100
PH UR STRIP: 5.5 PH (ref 5–7)
PLATELET # BLD AUTO: 144 10E9/L (ref 150–450)
POTASSIUM SERPL-SCNC: 3.4 MMOL/L (ref 3.4–5.3)
PROT SERPL-MCNC: 6.4 G/DL (ref 6.8–8.8)
RBC # BLD AUTO: 5.03 10E12/L (ref 4.4–5.9)
RBC #/AREA URNS AUTO: 1 /HPF (ref 0–2)
SODIUM SERPL-SCNC: 137 MMOL/L (ref 133–144)
SP GR UR STRIP: 1.03 (ref 1–1.03)
SQUAMOUS #/AREA URNS AUTO: 1 /HPF (ref 0–1)
URN SPEC COLLECT METH UR: ABNORMAL
UROBILINOGEN UR STRIP-MCNC: 2 MG/DL (ref 0–2)
WBC # BLD AUTO: 15.4 10E9/L (ref 4–11)
WBC #/AREA URNS AUTO: 1 /HPF (ref 0–2)

## 2017-05-14 PROCEDURE — 80053 COMPREHEN METABOLIC PANEL: CPT | Performed by: EMERGENCY MEDICINE

## 2017-05-14 PROCEDURE — 93005 ELECTROCARDIOGRAM TRACING: CPT | Performed by: EMERGENCY MEDICINE

## 2017-05-14 PROCEDURE — 76705 ECHO EXAM OF ABDOMEN: CPT

## 2017-05-14 PROCEDURE — 99285 EMERGENCY DEPT VISIT HI MDM: CPT | Mod: Z6 | Performed by: EMERGENCY MEDICINE

## 2017-05-14 PROCEDURE — 87040 BLOOD CULTURE FOR BACTERIA: CPT | Performed by: EMERGENCY MEDICINE

## 2017-05-14 PROCEDURE — 25000128 H RX IP 250 OP 636: Performed by: EMERGENCY MEDICINE

## 2017-05-14 PROCEDURE — 99285 EMERGENCY DEPT VISIT HI MDM: CPT | Mod: 25 | Performed by: EMERGENCY MEDICINE

## 2017-05-14 PROCEDURE — 74176 CT ABD & PELVIS W/O CONTRAST: CPT

## 2017-05-14 PROCEDURE — 85025 COMPLETE CBC W/AUTO DIFF WBC: CPT | Performed by: EMERGENCY MEDICINE

## 2017-05-14 PROCEDURE — 96361 HYDRATE IV INFUSION ADD-ON: CPT | Performed by: EMERGENCY MEDICINE

## 2017-05-14 PROCEDURE — 83690 ASSAY OF LIPASE: CPT | Performed by: EMERGENCY MEDICINE

## 2017-05-14 PROCEDURE — 81001 URINALYSIS AUTO W/SCOPE: CPT | Performed by: EMERGENCY MEDICINE

## 2017-05-14 PROCEDURE — 83605 ASSAY OF LACTIC ACID: CPT | Performed by: EMERGENCY MEDICINE

## 2017-05-14 PROCEDURE — 25000132 ZZH RX MED GY IP 250 OP 250 PS 637: Performed by: EMERGENCY MEDICINE

## 2017-05-14 PROCEDURE — 96375 TX/PRO/DX INJ NEW DRUG ADDON: CPT | Performed by: EMERGENCY MEDICINE

## 2017-05-14 RX ORDER — ACETAMINOPHEN 325 MG/1
650 TABLET ORAL ONCE
Status: COMPLETED | OUTPATIENT
Start: 2017-05-14 | End: 2017-05-14

## 2017-05-14 RX ORDER — DIPHENHYDRAMINE HYDROCHLORIDE 50 MG/ML
25 INJECTION INTRAMUSCULAR; INTRAVENOUS ONCE
Status: COMPLETED | OUTPATIENT
Start: 2017-05-14 | End: 2017-05-14

## 2017-05-14 RX ORDER — ONDANSETRON 2 MG/ML
4 INJECTION INTRAMUSCULAR; INTRAVENOUS ONCE
Status: COMPLETED | OUTPATIENT
Start: 2017-05-14 | End: 2017-05-14

## 2017-05-14 RX ADMIN — SODIUM CHLORIDE 1000 ML: 9 INJECTION, SOLUTION INTRAVENOUS at 21:35

## 2017-05-14 RX ADMIN — DIPHENHYDRAMINE HYDROCHLORIDE 25 MG: 50 INJECTION, SOLUTION INTRAMUSCULAR; INTRAVENOUS at 23:01

## 2017-05-14 RX ADMIN — ACETAMINOPHEN 650 MG: 325 TABLET, FILM COATED ORAL at 21:32

## 2017-05-14 RX ADMIN — HYDROMORPHONE HYDROCHLORIDE 1 MG: 1 INJECTION, SOLUTION INTRAMUSCULAR; INTRAVENOUS; SUBCUTANEOUS at 18:34

## 2017-05-14 RX ADMIN — SODIUM CHLORIDE 1000 ML: 9 INJECTION, SOLUTION INTRAVENOUS at 18:34

## 2017-05-14 RX ADMIN — SODIUM CHLORIDE 500 ML: 9 INJECTION, SOLUTION INTRAVENOUS at 20:24

## 2017-05-14 RX ADMIN — ONDANSETRON 4 MG: 2 INJECTION INTRAMUSCULAR; INTRAVENOUS at 18:34

## 2017-05-14 RX ADMIN — HYDROMORPHONE HYDROCHLORIDE 1 MG: 1 INJECTION, SOLUTION INTRAMUSCULAR; INTRAVENOUS; SUBCUTANEOUS at 20:24

## 2017-05-14 ASSESSMENT — ENCOUNTER SYMPTOMS
SHORTNESS OF BREATH: 1
COUGH: 1
HEADACHES: 1
HEMATURIA: 0
DYSURIA: 1
FEVER: 1
ABDOMINAL PAIN: 1
NAUSEA: 1
VOMITING: 1
DIARRHEA: 1
ARTHRALGIAS: 1

## 2017-05-14 NOTE — IP AVS SNAPSHOT
[address] [address]    Phone:  [phone]                                       After Visit Summary   5/14/2017    Julio John    MRN: [id number]           After Visit Summary Signature Page     I have received my discharge instructions, and my questions have been answered. I have discussed any challenges I see with this plan with the nurse or doctor.    ..........................................................................................................................................  Patient/Patient Representative Signature      ..........................................................................................................................................  Patient Representative Print Name and Relationship to Patient    ..................................................               ................................................  Date                                            Time    ..........................................................................................................................................  Reviewed by Signature/Title    ...................................................              ..............................................  Date                                                            Time

## 2017-05-14 NOTE — IP AVS SNAPSHOT
MRN:[id number]                      After Visit Summary   5/14/2017    Julio John    MRN: [id number]           Thank you!     Thank you for choosing [facility] for your care. Our goal is always to provide you with excellent care. Hearing back from our patients is one way we can continue to improve our services. Please take a few minutes to complete the written survey that you may receive in the mail after you visit with us. Thank you!        Patient Information     Date Of Birth          1970        Designated Caregiver       Most Recent Value    Caregiver    Will someone help with your care after discharge? yes    Name of designated caregiver Jessy    Phone number of caregiver     Caregiver address [address] [place], [place]       About your hospital stay     You were admitted on:  May 15, 2017 You last received care in the:  [address] [facility]    You were discharged on:  May 16, 2017        Reason for your hospital stay       Admitted we think for allergic reaction (vs. IL2 drug reaction).                  Who to Call     For medical emergencies, please call 911.  For non-urgent questions about your medical care, please call your primary care provider or clinic, [phone]          Attending Provider     Provider Specialty    Stacey Bacon MD Emergency Medicine    Yamila Leonardo MD Hematology & Oncology    Darrian Contreras DO Internal Medicine-Hematology & Oncology       Primary Care Provider Office Phone # Fax #    Uchemadu MD Doc [phone]       [facility] [address] [place] [address]         When to contact your care team       Please call the [facility] Triage RN Line [phone] ([facility] RN available M-F 8-5, after 5 pm the RN Advisor will page the On-Call Oncology Fellow who will return your call) for temperature greater than 100.4 F, uncontrolled nausea/vomiting/diarrhea or  unrelieved constipation, pain not relieved by medications, bleeding not stopped by pressure, dizziness, chest pain, shortness of breath, changes in level of consciousness, or any other new concerning symptoms.    If you experience itching, peppery-feeling skin, sneezing, watery eyes, feel free to take PRN benadryl or PRN atarax.    - Continue to take Zytrec for allergies 1x daily.  - If you feel short of breath or throat tightness, wheezing at any time, signs of an anaphylactic reaction please present to your nearest ED/EMS services.                  After Care Instructions     Activity       Your activity upon discharge: Activity as tolerated.            Diet       Follow this diet upon discharge: RDAT                  Follow-up Appointments     Adult [facility] Follow-up and recommended labs and tests       Please f/u in 1 week for hospital f/u (r/t IL 2 drug reaction vs. Allergy prompting hospital admission).  Had LOE & fever on admission, please check Cr & CBC w diff.  D/C on Zyrtec, with PRN atarax & benadryl.  No antibiotics were ordered on d/c (could have had allergy exacerbation r/t zosyn as itching got much more intense/worse after starting antibiotics).    - Working on rescheduling health psychology appointment as well (5/23 8 AM scheduled).    Appointments on [place] and/or [facility] (with [facility] or [facility] provider or service). Call [phone] if you haven't heard regarding these appointments within 7 days of discharge.            Follow Up and recommended labs and tests       CBC with Diff & CMP.                  Your next 10 appointments already scheduled     May 23, 2017  8:00 AM CDT   (Arrive by 7:45 AM)   NEW WITH ROOM with Nirav Kim PsyD,  2 114 CONSULT [facility] ([facility])    [address]  2nd Floor  [facility] [address] [phone]            May 23, 2017  9:45 AM T   [facility] Lab Draw with [address] LAB DRAW    [facility] Lab Draw ([facility])    [address]  2nd Floor  [facility] [address]   [phone]            May 23, 2017 10:20 AM CDT   (Arrive by 10:05 AM)   Return Visit with Dori Garnica PA-C   [facility] ([facility])    [address]  2nd [facility] [address]   [phone]            May 30, 2017 10:20 AM CDT   (Arrive by 10:05 AM)   CT CHEST ABDOMEN PELVIS W/O & W CONTRAST with UCCT2   [facility] CT ([facility])    [address]  1st [facility] [address]   [phone]           Please bring any scans or X-rays taken at other hospitals, if similar tests were done. Also bring a list of your medicines, including vitamins, minerals and over-the-counter drugs. It is safest to leave personal items at home.  Be sure to tell your doctor:   If you have any allergies.   If there s any chance you are pregnant.   If you are breastfeeding.   If you have any special needs.  You may have contrast for this exam. To prepare:   Do not eat or drink for 2 hours before your exam. If you need to take medicine, you may take it with small sips of water. (We may ask you to take liquid medicine as well.)   The day before your exam, drink extra fluids at least six 8-ounce glasses (unless your doctor tells you to restrict your fluids).  Patients over 70 or patients with diabetes or kidney problems:   If you haven t had a blood test (creatinine test) within the last 30 days, go to your clinic or Diagnostic Imaging Department for this test.  If you have diabetes:   If your kidney function is normal, continue taking your metformin (Avandamet, Glucophage, Glucovance, Metaglip) on the day of your exam.   If your kidney function is abnormal, wait 48 hours before restarting this medicine.  You will have oral contrast for this exam:   You will drink the contrast at home. Get  "this from your clinic or Diagnostic Imaging Department. Please follow the directions given.  Please wear loose clothing, such as a sweat suit or jogging clothes. Avoid snaps, zippers and other metal. We may ask you to undress and put on a hospital gown.  If you have any questions, please call the Imaging Department where you will have your exam.            May 31, 2017  8:00 AM CDT   [facility] Lab Draw with  [facility] LAB DRAW   [facility] ([facility])    [address] [place] [address] [phone]            May 31, 2017  8:45 AM CDT   (Arrive by 8:30 AM)   Return Visit with Ludwin Painting MD   [facility] ([facility])    [address] [place] [address] [phone]              Future tests that were ordered for you     CBC with platelets differential       Last Lab Result: Hemoglobin (g/dL)       Date                     Value                 05/16/2017               12.4 (L)         ----------            Comprehensive metabolic panel                 Pending Results     Date and Time Order Name Status Description    5/15/2017 0817 Respiratory Virus Panel by PCR In process     5/15/2017 0007 Blood culture Preliminary     5/15/2017 0007 Blood culture Preliminary             Statement of Approval     Ordered          05/16/17 0950  I have reviewed and agree with all the recommendations and orders detailed in this document.  EFFECTIVE NOW     Approved and electronically signed by:  Safia Wong APRN CNP             Admission Information     Date & Time Provider Department Dept. Phone    5/14/2017 Darrian Contreras, DO [address] [facility] [phone]      Your Vitals Were     Blood Pressure Pulse Temperature Respirations Height Weight    118/64 68 98  F (36.7  C) (Oral) 16 1.676 m (5' 6\") 100.8 kg (222 lb 3.6 oz)    Pulse Oximetry BMI (Body Mass Index) " "               98% 35.87 kg/m2          [employer] Information     [employer] lets you send messages to your doctor, view your test results, renew your prescriptions, schedule appointments and more. To sign up, go to [email or web address] . Click on \"Log in\" on the left side of the screen, which will take you to the Welcome page. Then click on \"Sign up Now\" on the right side of the page.     You will be asked to enter the access code listed below, as well as some personal information. Please follow the directions to create your username and password.     Your access code is: [email or web address]  Expires: 2017 10:24 AM     Your access code will  in 90 days. If you need help or a new code, please call your [place] clinic or [phone].        Care EveryWhere ID     This is your Care EveryWhere ID. This could be used by other organizations to access your [place] medical records  [id number]           Review of your medicines      START taking        Dose / Directions    cetirizine 10 MG tablet   Commonly known as:  zyrTEC   Used for:  Clear cell carcinoma (H)        Dose:  10 mg   Take 1 tablet (10 mg) by mouth daily   Quantity:  30 tablet   Refills:  0       diphenhydrAMINE 50 MG capsule   Commonly known as:  BENADRYL   Used for:  Clear cell carcinoma (H)        Dose:  50 mg   Take 1 capsule (50 mg) by mouth every 6 hours as needed for itching   Quantity:  56 capsule   Refills:  0       ranitidine 150 MG tablet   Commonly known as:  ZANTAC   Used for:  Clear cell carcinoma (H)        Dose:  150 mg   Take 1 tablet (150 mg) by mouth 2 times daily   Quantity:  60 tablet   Refills:  0         CONTINUE these medicines which may have CHANGED, or have new prescriptions. If we are uncertain of the size of tablets/capsules you have at home, strength may be listed as something that might have changed.        Dose / Directions    hydrOXYzine 50 MG tablet   Commonly known as:  ATARAX   This may have changed:    - medication " strength  - how much to take  - reasons to take this   Used for:  Clear cell carcinoma (H)        Dose:  50 mg   Take 1 tablet (50 mg) by mouth every 4 hours as needed for itching or anxiety   Quantity:  30 tablet   Refills:  1         CONTINUE these medicines which have NOT CHANGED        Dose / Directions    acetaminophen 500 MG tablet   Commonly known as:  TYLENOL   Used for:  Malignant neoplasm of left kidney (H)        Dose:  500 mg   Take 1 tablet (500 mg) by mouth every 6 hours as needed for mild pain   Quantity:  1 Bottle   Refills:  1       eucerin cream   Used for:  Malignant neoplasm of left kidney (H)        Apply topically 2 times daily Reported on 5/12/2017   Refills:  0       ondansetron 4 MG tablet   Commonly known as:  ZOFRAN   Used for:  Malignant neoplasm of left kidney (H)        Dose:  8 mg   Take 2 tablets (8 mg) by mouth every 8 hours as needed for nausea   Quantity:  18 tablet   Refills:  0       oxyCODONE 5 MG IR tablet   Commonly known as:  ROXICODONE   Used for:  Malignant neoplasm of left kidney (H)        Dose:  5 mg   Take 1 tablet (5 mg) by mouth every 4 hours as needed for moderate to severe pain   Quantity:  10 tablet   Refills:  0       prochlorperazine 10 MG tablet   Commonly known as:  COMPAZINE   Used for:  Malignant neoplasm of left kidney (H)        Dose:  10 mg   Take 1 tablet (10 mg) by mouth every 6 hours as needed (Breakthrough Nausea/Vomiting)   Quantity:  30 tablet   Refills:  0            Where to get your medicines      These medications were sent to [facility] [place], [place] - [address] [facility]  [address] [address]     Phone:  [phone]     cetirizine 10 MG tablet    diphenhydrAMINE 50 MG capsule    hydrOXYzine 50 MG tablet    ranitidine 150 MG tablet                Protect others around you: Learn how to safely use, store and throw away your medicines at www.disposemymeds.org.             Medication List: This is a list  of all your medications and when to take them. Check marks below indicate your daily home schedule. Keep this list as a reference.      Medications           Morning Afternoon Evening Bedtime As Needed    acetaminophen 500 MG tablet   Commonly known as:  TYLENOL   Take 1 tablet (500 mg) by mouth every 6 hours as needed for mild pain   Last time this was given:  500 mg on 5/15/2017  5:02 AM                                cetirizine 10 MG tablet   Commonly known as:  zyrTEC   Take 1 tablet (10 mg) by mouth daily   Last time this was given:  10 mg on 5/16/2017  8:05 AM                                diphenhydrAMINE 50 MG capsule   Commonly known as:  BENADRYL   Take 1 capsule (50 mg) by mouth every 6 hours as needed for itching   Last time this was given:  50 mg on 5/15/2017  5:29 PM                                eucerin cream   Apply topically 2 times daily Reported on 5/12/2017   Last time this was given:  5/16/2017  8:07 AM                                hydrOXYzine 50 MG tablet   Commonly known as:  ATARAX   Take 1 tablet (50 mg) by mouth every 4 hours as needed for itching or anxiety   Last time this was given:  50 mg on 5/16/2017  8:06 AM                                ondansetron 4 MG tablet   Commonly known as:  ZOFRAN   Take 2 tablets (8 mg) by mouth every 8 hours as needed for nausea                                oxyCODONE 5 MG IR tablet   Commonly known as:  ROXICODONE   Take 1 tablet (5 mg) by mouth every 4 hours as needed for moderate to severe pain                                prochlorperazine 10 MG tablet   Commonly known as:  COMPAZINE   Take 1 tablet (10 mg) by mouth every 6 hours as needed (Breakthrough Nausea/Vomiting)                                ranitidine 150 MG tablet   Commonly known as:  ZANTAC   Take 1 tablet (150 mg) by mouth 2 times daily   Last time this was given:  150 mg on 5/16/2017  8:06 AM

## 2017-05-14 NOTE — ED NOTES
Arrived to ED d/t c/o fever, 100.0 F oral upon arrival to triage, pulse tachy at 129, BP low at 95/61, hx of metastatic renal CA w/ mets to the lungs, last chemo on Friday, also c/o pain under right rib

## 2017-05-14 NOTE — ED PROVIDER NOTES
History     Chief Complaint   Patient presents with     Fever     HPI  Julio John is a 47 year old male with a history of metastatic renal cell carcinoma s/p left nephrectomy who presents for evaluation of fever and abdominal pain. Patient complains of a fever, measured at 100.3 degrees this morning as well as a headache, arthralgias, and an intermittently productive cough with blood-tinged sputum that began last night. He also complains of right lower quadrant abdominal pain with associated nausea and 2 episodes of vomiting with a small amount of blood that began 2-3 hours prior to arrival. He passed one diarrheal stool this morning. He has had chronic issues with shortness of breath, and states this is unchanged from baseline. He also complains of dysuria since last night, but denies hematuria. He did take Tylenol this morning after he measured his fever. He has not spoken to any providers from his Oncology team today prior to arrival. He reports his last chemotherapy treatment was last Friday.     Per chart review, patient underwent IL 2 treatment on 05/03/2017. Patient was worked up in the ER on 05/09/2017 for abdominal pain and diarrhea. His work-up included labs and his CT-scan of the abdomen pelvis revealed no acute findings.        I have reviewed the Medications, Allergies, Past Medical and Surgical History, and Social History in the [facility] system.  Past Medical History:   Diagnosis Date     Metastatic renal cell carcinoma (H)        History reviewed. No pertinent surgical history.    No family history on file.    Social History   Substance Use Topics     Smoking status: Never Smoker     Smokeless tobacco: Never Used     Alcohol use No     Current Facility-Administered Medications   Medication     piperacillin-tazobactam (ZOSYN) 3.375 g vial to attach to  mL bag     Current Outpatient Prescriptions   Medication     acetaminophen (TYLENOL) 500 MG tablet     oxyCODONE (ROXICODONE) 5 MG IR  tablet     hydrOXYzine (ATARAX) 25 MG tablet     Skin Protectants, Misc. (EUCERIN) cream     prochlorperazine (COMPAZINE) 10 MG tablet     ondansetron (ZOFRAN) 4 MG tablet      No Known Allergies    Review of Systems   Constitutional: Positive for fever (100.3).   Respiratory: Positive for cough (productive, blood-tinged) and shortness of breath.    Gastrointestinal: Positive for abdominal pain (RLQ), diarrhea (x1 episode), nausea and vomiting (x2 episodes w/blood).   Genitourinary: Positive for dysuria. Negative for hematuria.   Musculoskeletal: Positive for arthralgias.   Neurological: Positive for headaches.   All other systems reviewed and are negative.      Physical Exam   BP: 95/61  Pulse: 129  Temp: 100  F (37.8  C)  Resp: 18  Weight: 101.8 kg (224 lb 6.9 oz)  SpO2: 97 %  Physical Exam   Constitutional: He appears distressed.   Anxious, distressed, having difficulty holding still, moving all over the bed   HENT:   Head: Normocephalic and atraumatic.   Mouth/Throat: Oropharynx is clear and moist. No oropharyngeal exudate.   Eyes: Pupils are equal, round, and reactive to light. No scleral icterus.   Cardiovascular: Normal heart sounds and intact distal pulses.    tachycardic   Pulmonary/Chest: Effort normal. No respiratory distress.   Rhonchi in L lower lobe   Abdominal: Soft. There is no tenderness.   Abdomen is soft, TTP in RUQ and less TTP in epigastrium, no lower abd TTP, hypoactive bowel sounds   Musculoskeletal: He exhibits no edema or tenderness.   Skin: Skin is warm. No rash noted. He is not diaphoretic.   Psychiatric:   Highly anxious   Nursing note and vitals reviewed.      ED Course     ED Course     Procedures       6:09 PM  The patient was seen and examined by Dr. Bacon in Room 20.          Critical Care time:  none               Results for orders placed or performed during the hospital encounter of 05/14/17 (from the past 24 hour(s))   EKG 12-lead, tracing only   Result Value Ref Range     Interpretation ECG Click View Image link to view waveform and result    CBC with platelets differential   Result Value Ref Range    WBC 15.4 (H) 4.0 - 11.0 10e9/L    RBC Count 5.03 4.4 - 5.9 10e12/L    Hemoglobin 14.8 13.3 - 17.7 g/dL    Hematocrit 43.1 40.0 - 53.0 %    MCV 86 78 - 100 fl    MCH 29.4 26.5 - 33.0 pg    MCHC 34.3 31.5 - 36.5 g/dL    RDW 14.7 10.0 - 15.0 %    Platelet Count 144 (L) 150 - 450 10e9/L    Diff Method Automated Method     % Neutrophils 89.0 %    % Lymphocytes 1.8 %    % Monocytes 2.1 %    % Eosinophils 6.7 %    % Basophils 0.1 %    % Immature Granulocytes 0.3 %    Nucleated RBCs 0 0 /100    Absolute Neutrophil 13.7 (H) 1.6 - 8.3 10e9/L    Absolute Lymphocytes 0.3 (L) 0.8 - 5.3 10e9/L    Absolute Monocytes 0.3 0.0 - 1.3 10e9/L    Absolute Eosinophils 1.0 (H) 0.0 - 0.7 10e9/L    Absolute Basophils 0.0 0.0 - 0.2 10e9/L    Abs Immature Granulocytes 0.0 0 - 0.4 10e9/L    Absolute Nucleated RBC 0.0    Comprehensive metabolic panel   Result Value Ref Range    Sodium 137 133 - 144 mmol/L    Potassium 3.4 3.4 - 5.3 mmol/L    Chloride 106 94 - 109 mmol/L    Carbon Dioxide 22 20 - 32 mmol/L    Anion Gap 9 3 - 14 mmol/L    Glucose 97 70 - 99 mg/dL    Urea Nitrogen 18 7 - 30 mg/dL    Creatinine 1.52 (H) 0.66 - 1.25 mg/dL    GFR Estimate 49 (L) >60 mL/min/1.7m2    GFR Estimate If Black 60 (L) >60 mL/min/1.7m2    Calcium 8.0 (L) 8.5 - 10.1 mg/dL    Bilirubin Total 0.7 0.2 - 1.3 mg/dL    Albumin 3.2 (L) 3.4 - 5.0 g/dL    Protein Total 6.4 (L) 6.8 - 8.8 g/dL    Alkaline Phosphatase 55 40 - 150 U/L    ALT 22 0 - 70 U/L    AST 10 0 - 45 U/L   Lipase   Result Value Ref Range    Lipase 202 73 - 393 U/L   Lactic acid   Result Value Ref Range    Lactic Acid 0.9 0.7 - 2.1 mmol/L   UA with Microscopic reflex to Culture   Result Value Ref Range    Color Urine Yellow     Appearance Urine Clear     Glucose Urine Negative NEG mg/dL    Bilirubin Urine Negative NEG    Ketones Urine 5 (A) NEG mg/dL    Specific Gravity  Urine 1.028 1.003 - 1.035    Blood Urine Trace (A) NEG    pH Urine 5.5 5.0 - 7.0 pH    Protein Albumin Urine 10 (A) NEG mg/dL    Urobilinogen mg/dL 2.0 0.0 - 2.0 mg/dL    Nitrite Urine Negative NEG    Leukocyte Esterase Urine Negative NEG    Source Midstream Urine     WBC Urine 1 0 - 2 /HPF    RBC Urine 1 0 - 2 /HPF    Squamous Epithelial /HPF Urine 1 0 - 1 /HPF    Mucous Urine Present (A) NEG /LPF   Abdomen US, limited (RUQ only)    Narrative    EXAMINATION: TEMPORARY, 5/14/2017 8:11 PM     COMPARISON: None.    HISTORY: Right upper quadrant pain, evaluate for stones, obstruction  or cholelithiasis.    FINDINGS:   Fluid: No evidence of ascites or pleural effusions.    Liver: The liver parenchyma is diffusely echogenic and difficult to  penetrate, measuring 18.8 cm in craniocaudal dimension. There is no  focal mass.     Gallbladder: There is no wall thickening, pericholecystic fluid,  positive sonographic Olvera's sign or evidence for cholelithiasis.    Bile Ducts: Both the intra- and extrahepatic biliary system are of  normal caliber.  The common bile duct measures 5 mm in diameter.    Pancreas: Visualized portions of the head and body of the pancreas are  unremarkable.     Kidney: The right kidney measures 12.6 cm long. There is no  hydronephrosis.       Impression    IMPRESSION:   1.  Hepatomegaly with hepatic steatosis.  2.  No gallstones or evidence of cholecystitis.    I have personally reviewed the examination and initial interpretation  and I agree with the findings.    ELIZABETH BISHOP MD   CT Chest Abdomen Pelvis w/o Contrast    Impression    IMPRESSION:  1. No findings to explain right-sided rib/upper abdominal pain.  2. Increased size of left upper lobe nodule, corresponds to known lung  metastasis. Multiple additional bilateral sub-5 mm lung nodules are  unchanged and remain indeterminate for lung metastasis. Continued  attention on follow-up.  3. Increased left upper lobe and left lower lobe  segmental  consolidative/groundglass opacities, although may represent  postradiation fibrosis, disease progression or superimposed infection  are not excluded.   3. Stable post surgical changes of left nephrectomy without evidence  of local recurrence.  4. Hepatic steatosis.  5. Small fat-containing umbilical hernia and fat-containing left  inguinal hernia.              Assessments & Plan (with Medical Decision Making)   This is a 47 year old male with a history of metastatic clear cell renal cell carcinoma currently on chemotherapy who presents to the emergency department today for a fever, right upper quadrant pain, nausea, and vomiting. Patient is exceedingly anxious and getting a clear history is somewhat problematic. Evidently this has been going on for the past 1-2 days. He has noted that this seems to get worse.     Here in the emergency department he is febrile to 100. His heart rate is 129. He is quite tender to palpation particularly in the right upper quadrant. Certainly, need to consider the possibility of biliary diagnoses as the etiology of his symptoms. We did give him IV fluids, labs were drawn. CBC shows WBC of 15.4, CMP shows some degree of renal insufficiency with creatinine of 1.52 (up from prior), lipase WNL, lactate WNL. UA without e/o infection. Patient was given IV fluids here in the emergency department as well as a dose of Zofran and Dilaudid. RUQ ultrasound was initially done to evaluate for his biliary tract. This shows no acute pathology.    He continued to spike fevers here in the ED.  IV fluids and tylenol given.  Elected to do noncontrast chest/abd/pelvis CT which did not reveal an etiology of his RUQ/rib pain, he has some possible radiation changes vs tumor vs infiltrate in NAIN and LLL.  Will admit to the oncology service, zosyn ordered.      I have reviewed the nursing notes.    I have reviewed the findings, diagnosis, plan and need for follow up with the patient.    New  Prescriptions    No medications on file       Final diagnoses:   Fever in adult   Clear cell carcinoma (H)   Right sided abdominal pain   Cough   I, Sumi Bernardo, am serving as a trained medical scribe to document services personally performed by Stacey Bacon MD, based on the provider's statements to me.   Stacey WILDER MD, was physically present and have reviewed and verified the accuracy of this note documented by Sumi Bernardo.      5/14/2017   [facility], [place], EMERGENCY DEPARTMENT     Stacey Bacon MD  05/15/17 0106

## 2017-05-15 ENCOUNTER — OFFICE VISIT (OUTPATIENT)
Dept: INTERPRETER SERVICES | Facility: CLINIC | Age: 47
End: 2017-05-15

## 2017-05-15 PROBLEM — R50.9 FEVER: Status: ACTIVE | Noted: 2017-05-15

## 2017-05-15 LAB
ALBUMIN SERPL-MCNC: 2.5 G/DL (ref 3.4–5)
ALP SERPL-CCNC: 48 U/L (ref 40–150)
ALT SERPL W P-5'-P-CCNC: 18 U/L (ref 0–70)
ANION GAP SERPL CALCULATED.3IONS-SCNC: 7 MMOL/L (ref 3–14)
AST SERPL W P-5'-P-CCNC: 13 U/L (ref 0–45)
BILIRUB DIRECT SERPL-MCNC: 0.2 MG/DL (ref 0–0.2)
BILIRUB SERPL-MCNC: 1.4 MG/DL (ref 0.2–1.3)
BUN SERPL-MCNC: 15 MG/DL (ref 7–30)
CALCIUM SERPL-MCNC: 7.1 MG/DL (ref 8.5–10.1)
CHLORIDE SERPL-SCNC: 111 MMOL/L (ref 94–109)
CO2 SERPL-SCNC: 22 MMOL/L (ref 20–32)
CREAT SERPL-MCNC: 1.49 MG/DL (ref 0.66–1.25)
ERYTHROCYTE [DISTWIDTH] IN BLOOD BY AUTOMATED COUNT: 15.1 % (ref 10–15)
GFR SERPL CREATININE-BSD FRML MDRD: 51 ML/MIN/1.7M2
GLUCOSE SERPL-MCNC: 95 MG/DL (ref 70–99)
HCT VFR BLD AUTO: 39.5 % (ref 40–53)
HGB BLD-MCNC: 12.9 G/DL (ref 13.3–17.7)
INR PPP: 1.2 (ref 0.86–1.14)
INTERPRETATION ECG - MUSE: NORMAL
MCH RBC QN AUTO: 28.3 PG (ref 26.5–33)
MCHC RBC AUTO-ENTMCNC: 32.7 G/DL (ref 31.5–36.5)
MCV RBC AUTO: 87 FL (ref 78–100)
PLATELET # BLD AUTO: 139 10E9/L (ref 150–450)
POTASSIUM SERPL-SCNC: 3.8 MMOL/L (ref 3.4–5.3)
PROT SERPL-MCNC: 5.4 G/DL (ref 6.8–8.8)
RBC # BLD AUTO: 4.56 10E12/L (ref 4.4–5.9)
SODIUM SERPL-SCNC: 141 MMOL/L (ref 133–144)
WBC # BLD AUTO: 12 10E9/L (ref 4–11)

## 2017-05-15 PROCEDURE — 25000132 ZZH RX MED GY IP 250 OP 250 PS 637: Performed by: STUDENT IN AN ORGANIZED HEALTH CARE EDUCATION/TRAINING PROGRAM

## 2017-05-15 PROCEDURE — 25000128 H RX IP 250 OP 636: Performed by: STUDENT IN AN ORGANIZED HEALTH CARE EDUCATION/TRAINING PROGRAM

## 2017-05-15 PROCEDURE — T1013 SIGN LANG/ORAL INTERPRETER: HCPCS | Mod: U3

## 2017-05-15 PROCEDURE — 25000128 H RX IP 250 OP 636: Performed by: NURSE PRACTITIONER

## 2017-05-15 PROCEDURE — 99233 SBSQ HOSP IP/OBS HIGH 50: CPT | Performed by: INTERNAL MEDICINE

## 2017-05-15 PROCEDURE — 12000008 ZZH R&B INTERMEDIATE UMMC

## 2017-05-15 PROCEDURE — 25000128 H RX IP 250 OP 636: Performed by: EMERGENCY MEDICINE

## 2017-05-15 PROCEDURE — 85610 PROTHROMBIN TIME: CPT | Performed by: STUDENT IN AN ORGANIZED HEALTH CARE EDUCATION/TRAINING PROGRAM

## 2017-05-15 PROCEDURE — 87040 BLOOD CULTURE FOR BACTERIA: CPT | Performed by: EMERGENCY MEDICINE

## 2017-05-15 PROCEDURE — 87633 RESP VIRUS 12-25 TARGETS: CPT | Performed by: NURSE PRACTITIONER

## 2017-05-15 PROCEDURE — 36415 COLL VENOUS BLD VENIPUNCTURE: CPT | Performed by: STUDENT IN AN ORGANIZED HEALTH CARE EDUCATION/TRAINING PROGRAM

## 2017-05-15 PROCEDURE — 85027 COMPLETE CBC AUTOMATED: CPT | Performed by: STUDENT IN AN ORGANIZED HEALTH CARE EDUCATION/TRAINING PROGRAM

## 2017-05-15 PROCEDURE — 80053 COMPREHEN METABOLIC PANEL: CPT | Performed by: STUDENT IN AN ORGANIZED HEALTH CARE EDUCATION/TRAINING PROGRAM

## 2017-05-15 PROCEDURE — 82248 BILIRUBIN DIRECT: CPT | Performed by: STUDENT IN AN ORGANIZED HEALTH CARE EDUCATION/TRAINING PROGRAM

## 2017-05-15 PROCEDURE — 96365 THER/PROPH/DIAG IV INF INIT: CPT | Performed by: EMERGENCY MEDICINE

## 2017-05-15 PROCEDURE — 25000132 ZZH RX MED GY IP 250 OP 250 PS 637: Performed by: NURSE PRACTITIONER

## 2017-05-15 RX ORDER — OXYCODONE HYDROCHLORIDE 5 MG/1
5 TABLET ORAL EVERY 4 HOURS PRN
Status: DISCONTINUED | OUTPATIENT
Start: 2017-05-15 | End: 2017-05-16 | Stop reason: HOSPADM

## 2017-05-15 RX ORDER — LIDOCAINE 40 MG/G
CREAM TOPICAL
Status: DISCONTINUED | OUTPATIENT
Start: 2017-05-15 | End: 2017-05-16 | Stop reason: HOSPADM

## 2017-05-15 RX ORDER — LEVOFLOXACIN 750 MG/1
750 TABLET, FILM COATED ORAL DAILY
Status: DISCONTINUED | OUTPATIENT
Start: 2017-05-15 | End: 2017-05-16 | Stop reason: HOSPADM

## 2017-05-15 RX ORDER — SODIUM CHLORIDE 9 MG/ML
INJECTION, SOLUTION INTRAVENOUS CONTINUOUS
Status: DISCONTINUED | OUTPATIENT
Start: 2017-05-15 | End: 2017-05-16 | Stop reason: HOSPADM

## 2017-05-15 RX ORDER — CETIRIZINE HYDROCHLORIDE 10 MG/1
10 TABLET ORAL DAILY
Status: DISCONTINUED | OUTPATIENT
Start: 2017-05-15 | End: 2017-05-16 | Stop reason: HOSPADM

## 2017-05-15 RX ORDER — ACETAMINOPHEN 500 MG
500 TABLET ORAL EVERY 6 HOURS PRN
Status: DISCONTINUED | OUTPATIENT
Start: 2017-05-15 | End: 2017-05-16 | Stop reason: HOSPADM

## 2017-05-15 RX ORDER — PIPERACILLIN SODIUM, TAZOBACTAM SODIUM 3; .375 G/15ML; G/15ML
3.38 INJECTION, POWDER, LYOPHILIZED, FOR SOLUTION INTRAVENOUS ONCE
Status: COMPLETED | OUTPATIENT
Start: 2017-05-15 | End: 2017-05-15

## 2017-05-15 RX ORDER — DIPHENHYDRAMINE HCL 25 MG
25 CAPSULE ORAL EVERY 6 HOURS PRN
Status: DISCONTINUED | OUTPATIENT
Start: 2017-05-15 | End: 2017-05-15

## 2017-05-15 RX ORDER — DIPHENHYDRAMINE HCL 50 MG
50 CAPSULE ORAL EVERY 6 HOURS PRN
Status: DISCONTINUED | OUTPATIENT
Start: 2017-05-15 | End: 2017-05-16 | Stop reason: HOSPADM

## 2017-05-15 RX ORDER — DIPHENHYDRAMINE HYDROCHLORIDE 50 MG/ML
50 INJECTION INTRAMUSCULAR; INTRAVENOUS EVERY 6 HOURS PRN
Status: DISCONTINUED | OUTPATIENT
Start: 2017-05-15 | End: 2017-05-16 | Stop reason: HOSPADM

## 2017-05-15 RX ORDER — PROCHLORPERAZINE MALEATE 10 MG
10 TABLET ORAL EVERY 6 HOURS PRN
Status: DISCONTINUED | OUTPATIENT
Start: 2017-05-15 | End: 2017-05-16 | Stop reason: HOSPADM

## 2017-05-15 RX ORDER — DIPHENHYDRAMINE HYDROCHLORIDE 50 MG/ML
25 INJECTION INTRAMUSCULAR; INTRAVENOUS EVERY 6 HOURS PRN
Status: DISCONTINUED | OUTPATIENT
Start: 2017-05-15 | End: 2017-05-15

## 2017-05-15 RX ORDER — NALOXONE HYDROCHLORIDE 0.4 MG/ML
.1-.4 INJECTION, SOLUTION INTRAMUSCULAR; INTRAVENOUS; SUBCUTANEOUS
Status: DISCONTINUED | OUTPATIENT
Start: 2017-05-15 | End: 2017-05-16 | Stop reason: HOSPADM

## 2017-05-15 RX ORDER — HYDROXYZINE HYDROCHLORIDE 25 MG/1
25 TABLET, FILM COATED ORAL EVERY 4 HOURS
Status: DISCONTINUED | OUTPATIENT
Start: 2017-05-15 | End: 2017-05-15

## 2017-05-15 RX ORDER — ONDANSETRON 8 MG/1
8 TABLET, FILM COATED ORAL EVERY 8 HOURS PRN
Status: DISCONTINUED | OUTPATIENT
Start: 2017-05-15 | End: 2017-05-16 | Stop reason: HOSPADM

## 2017-05-15 RX ORDER — HYDROXYZINE HYDROCHLORIDE 50 MG/1
50 TABLET, FILM COATED ORAL EVERY 4 HOURS
Status: DISCONTINUED | OUTPATIENT
Start: 2017-05-15 | End: 2017-05-16 | Stop reason: HOSPADM

## 2017-05-15 RX ORDER — PIPERACILLIN SODIUM, TAZOBACTAM SODIUM 4; .5 G/20ML; G/20ML
4.5 INJECTION, POWDER, LYOPHILIZED, FOR SOLUTION INTRAVENOUS EVERY 6 HOURS
Status: DISCONTINUED | OUTPATIENT
Start: 2017-05-15 | End: 2017-05-15 | Stop reason: CLARIF

## 2017-05-15 RX ADMIN — HYDROXYZINE HYDROCHLORIDE 25 MG: 25 TABLET ORAL at 02:49

## 2017-05-15 RX ADMIN — RANITIDINE HYDROCHLORIDE 150 MG: 150 TABLET, FILM COATED ORAL at 19:51

## 2017-05-15 RX ADMIN — HYDROXYZINE HYDROCHLORIDE 50 MG: 50 TABLET, FILM COATED ORAL at 19:50

## 2017-05-15 RX ADMIN — PIPERACILLIN AND TAZOBACTAM 4.5 G: 4; .5 INJECTION, POWDER, LYOPHILIZED, FOR SOLUTION INTRAVENOUS; PARENTERAL at 07:05

## 2017-05-15 RX ADMIN — HYDROXYZINE HYDROCHLORIDE 50 MG: 50 TABLET, FILM COATED ORAL at 16:34

## 2017-05-15 RX ADMIN — HYDROXYZINE HYDROCHLORIDE 50 MG: 50 TABLET, FILM COATED ORAL at 11:43

## 2017-05-15 RX ADMIN — Medication: at 19:51

## 2017-05-15 RX ADMIN — CETIRIZINE HYDROCHLORIDE 10 MG: 10 TABLET, FILM COATED ORAL at 11:04

## 2017-05-15 RX ADMIN — PIPERACILLIN SODIUM,TAZOBACTAM SODIUM 3.38 G: 3; .375 INJECTION, POWDER, FOR SOLUTION INTRAVENOUS at 01:05

## 2017-05-15 RX ADMIN — SODIUM CHLORIDE: 9 INJECTION, SOLUTION INTRAVENOUS at 13:16

## 2017-05-15 RX ADMIN — ACETAMINOPHEN 500 MG: 500 TABLET, FILM COATED ORAL at 05:02

## 2017-05-15 RX ADMIN — LEVOFLOXACIN 750 MG: 750 TABLET, FILM COATED ORAL at 11:01

## 2017-05-15 RX ADMIN — Medication: at 11:01

## 2017-05-15 RX ADMIN — HYDROXYZINE HYDROCHLORIDE 25 MG: 25 TABLET ORAL at 07:06

## 2017-05-15 RX ADMIN — DIPHENHYDRAMINE HYDROCHLORIDE 25 MG: 25 CAPSULE ORAL at 05:02

## 2017-05-15 RX ADMIN — DIPHENHYDRAMINE HYDROCHLORIDE 50 MG: 50 CAPSULE ORAL at 17:29

## 2017-05-15 RX ADMIN — RANITIDINE HYDROCHLORIDE 150 MG: 150 TABLET, FILM COATED ORAL at 11:01

## 2017-05-15 ASSESSMENT — PAIN DESCRIPTION - DESCRIPTORS
DESCRIPTORS: ACHING
DESCRIPTORS: ACHING

## 2017-05-15 NOTE — PROGRESS NOTES
[facility], [place]    Hematology / Oncology Progress Note    Date of Service (when I saw the patient): 05/15/2017     Assessment & Plan   Julio John is a 46 y/o PMH metastatic renal cell carcinoma (lungs) s/p nephrectomy & XRT, now s/p 2 cycles of IL-2 (most recent 5/6) that presented with fever, HA, RUQ pain, severe pruritis & generalized faint erythematous rash.    # Fever, RUQ pain, muscle aches.  S/p CT & RUQ US, unrevealing.  # Leukocytosis (15.4/12 WBC).  Elevated eosinophils on diff (have been elevated since 5/5.  He got d/c from hospital from that day.  Julio describes polishing his wood floors recently, only exposure to chemicals really, supposedly wore a mask & didn't breathe it in.  # Severe pruritis & faint diffuse maculopapular erythematous rash.   Pt with 2 days of arthralgia, fever, RUQ pain. S/p IL-2 infusion on 5/12. Lab and imaging unrevealing to etiology. No e/o cholecystitis, pancreatitis, appendicitis. UA bland. Chest CT with GGO and consolidative changes in the RLL that could be contributing to RUQ pain. His diffuse symptoms are suspicious for a reaction to the IL-2. Currently hemodynamically stable.  - Started Hydroxyzine (increased dose).  - Increased benadryl dosing.  - Starting zyrtec.  - Continue Ranitidine.    - Consider derm consult, steroids (avoid r/t IL 2 currently), topical steroids if things don't improve.    # Pneumonia (?) GGO worse on L side (CT scan 5/14), known lung metastasis on L, could be immune reaction r/t IL 2 vs. Infection.  - Initially started on Zosyn (d/c'ed r/t higher risk drug rash vs aspiration unlikely).  Switched to levaquin (1x daily, PO).  - Follow blood cultures (NTD), UA/UC.     - RVP (young kiddos at home, they did recently get MMR vaccines).      # LEO.  Cr elevated 1.5's (slightly up from baseline).  Likely r/t dehydration with fevers & poor PO intake.  - IVF given, monitor.  - Avoid contrast & nephrotoxins.  -  Surprised that Cr didn't improve with IVF.    - ED notes say vomiting & diarrhea  Yesterday.  IF he has more diarrhea I will order enteric stool panel.  It's really hard with Julio b/c due to language/cultural barriers hard to discuss ROS.      # Metastatic clear cell renal carcinoma (pT3a, lung metastasis).  - Cycle 1 IL 2 (4/17-4/20).  8 doses.  - Cycle 2 IL 2 (5/3-5/6). 4 doses.    # Anxiety.      # Elevated bilirubin.  0.7 last night, 1.4 today.  - Fractionate bilirubin.    FEN regular diet, IVF PRN, electrolyte monitoring replace PRN  PPX ranitidine--GI, lovenox--DVT/VTE  DISPO monitor wbc/fever curve/rash inpatient.      FULL CODE    Safia Wong  [facility]  [phone]  Hematology/Oncology  May 15, 2017    Interval History   Julio reports not doing well r/t itching & fever.  Reports yesterday he was helping his wife clean off some shelves in the garage, didn't spill anything or get exposed to any chemicals.  Reports last week he did use chemicals to redo his floors but he wore a respiratory mask.  He denies diarrhea, dysuria, appetite changes, n, v.  He denies dizziness/syncope at home.  He reports sometimes he a little SOB, sometimes he has a cough.  He denies trouble breathing.  He denies any new medication changes, and new supplements.  Denies sick contacts at home.  Denies seasonal allergies.    Physical Exam   Vitals:    05/14/17 1724 05/15/17 0137 05/15/17 0925   Weight: 101.8 kg (224 lb 6.9 oz) 104.1 kg (229 lb 6.4 oz) 102.7 kg (226 lb 8 oz)     Vital Signs with Ranges  Temp:  [98  F (36.7  C)-101.2  F (38.4  C)] 98  F (36.7  C)  Pulse:  [] 91  Heart Rate:  [] 97  Resp:  [12-21] 18  BP: ()/(42-80) 128/61  SpO2:  [92 %-100 %] 98 %  I/O last 3 completed shifts:  In: 200 [I.V.:200]  Out: -     Constitutional: Awake, alert, cooperative, in NAD, however is itchy & intermittently scratching (his arms, his belly mostly)--red scratch marks present.  Eyes: PERRL, EOMI, sclera clear,  conjunctiva normal.  ENT: Normocephalic, without obvious abnormality, moist mucus membranes, no lesions or thrush.   Respiratory: Non-labored breathing, good air exchange, CTAB, no crackles or wheezing.  Cardiovascular: RRR, no murmur noted.  GI: +BS, soft, non-distended, non-tender, no masses palpated, no hepatosplenomegaly.  Skin: diffuse mildly erythematous scratch marks on b/l UE & abdomen, faint generalized rash present.    Musculoskeletal: No edema lucille LEs, worried about lip swelling/lip edema-- vs. Baseline (?) but no wheezing on ausculation or leah-orbital edema.  Neurologic: Awake, A&O x 3. Cranial nerves II-XII are grossly intact.   Neuropsychiatric: Calm, normal affect.     MEDS  Medications        sodium chloride (PF)  3 mL Intracatheter Q8H     enoxaparin  40 mg Subcutaneous Q24H     eucerin   Topical BID     ranitidine  150 mg Oral BID     cetirizine  10 mg Oral Daily     hydrOXYzine  50 mg Oral Q4H     levofloxacin  750 mg Oral Daily       LABS  Recent Labs   Lab Test  05/15/17   0531  05/14/17   1837  05/12/17   0948  05/09/17   1940  05/07/17   2339  05/06/17   0557   WBC  12.0*  15.4*  8.4  13.5*  7.7  5.9   NEUTROPHIL   --   89.0  36.8  41.1  20.4  45.8   HGB  12.9*  14.8  14.3  13.9  12.9*  11.9*   PLT  139*  144*  178  173  143*  95*     Recent Labs   Lab Test  05/15/17   0531  05/14/17   1837  05/12/17   0948  05/09/17   1940  05/07/17   2339   NA  141  137  139  135  138   POTASSIUM  3.8  3.4  4.0  3.9  4.0   CHLORIDE  111*  106  108  102  106   CO2  22  22  23  25  26   ANIONGAP  7  9  8  7  6   BUN  15  18  11  15  13   CR  1.49*  1.52*  1.09  1.19  1.25   KVNG  7.1*  8.0*  8.1*  8.3*  7.9*     Recent Labs   Lab Test  05/12/17 0948 05/07/17 2339 05/06/17   0557  05/05/17   0628   05/04/17   0440   MAG  2.3  2.0  2.3  2.3   < >  1.5*   PHOS  3.0   --   1.6*  2.3*   < >  1.4*   LDH   --    --   203  162   --   217    < > = values in this interval not displayed.     Recent Labs   Lab Test   05/15/17   0531  05/14/17   1837  05/12/17   0948   05/06/17   0557  05/05/17   0628  05/04/17   0440   BILITOTAL  1.4*  0.7  0.4   < >  1.5*  1.0  1.0   ALKPHOS  48  55  71   < >  46  44  45   ALT  18  22  26   < >  40  37  39   AST  13  10  17   < >  29  23  24   ALBUMIN  2.5*  3.2*  3.3*   < >  2.1*  2.2*  2.7*   LDH   --    --    --    --   203  162  217    < > = values in this interval not displayed.     CULTURES    Recent Labs   Lab Test  05/15/17   0041  05/14/17   1837  05/05/17   0628  05/04/17   0445  04/20/17   0553   CULT  No growth after 5 hours  No growth after 5 hours  No growth  No growth  No growth   SDES  Blood Left Arm  Blood Right Arm  Blood grey Port  Blood GRAY PORT  Blood Unspecified Site       IMAGING  Recent Results (from the past 24 hour(s))   Abdomen US, limited (RUQ only)    Narrative    EXAMINATION: TEMPORARY, 5/14/2017 8:11 PM     COMPARISON: None.    HISTORY: Right upper quadrant pain, evaluate for stones, obstruction  or cholelithiasis.    FINDINGS:   Fluid: No evidence of ascites or pleural effusions.    Liver: The liver parenchyma is diffusely echogenic and difficult to  penetrate, measuring 18.8 cm in craniocaudal dimension. There is no  focal mass.     Gallbladder: There is no wall thickening, pericholecystic fluid,  positive sonographic Olvera's sign or evidence for cholelithiasis.    Bile Ducts: Both the intra- and extrahepatic biliary system are of  normal caliber.  The common bile duct measures 5 mm in diameter.    Pancreas: Visualized portions of the head and body of the pancreas are  unremarkable.     Kidney: The right kidney measures 12.6 cm long. There is no  hydronephrosis.       Impression    IMPRESSION:   1.  Hepatomegaly with hepatic steatosis.  2.  No gallstones or evidence of cholecystitis.    I have personally reviewed the examination and initial interpretation  and I agree with the findings.    ELIZABETH BISHOP MD   CT Chest Abdomen Pelvis w/o Contrast     Narrative    EXAMINATION: CT CHEST ABDOMEN PELVIS W/O CONTRAST, 2017 10:27 PM    TECHNIQUE:  Helical CT images from the thoracic inlet through the  symphysis pubis were obtained  without IV contrast.     COMPARISON: CT abdomen and pelvis 2017, CT cap 2017    HISTORY: metastatic renal cell CA, now with new renal insufficiency,  fever and R sided rib/upper abd pain, cough, eval for infiltrate or  acute intraabdominal process, no IV contrast due to kidney injury.  Stereotactic radiation to the lung.    FINDINGS:    Chest: Heart size is normal without significant pericardial effusion.  Aorta and main pulmonary artery are normal in caliber. No mediastinal,  hilar or axillary lymphadenopathy. Visualized esophagus and thyroid  gland are unremarkable. No pneumothorax or plural effusion.    There is a 12 mm nodule in the left upper lobe (image 40 series 2),  previously measuring 10 mm. Increased left upper lobe and left lower  lobe segmental consolidative/groundglass opacities, likely post  radiation therapy changes. Multiple additional bilateral pulmonary  nodules, includin mm peribronchial nodule in the right lower lobe  (image 95 series 2); 4 mm nodule in the right upper lobe (image 43  series 2); 3 mm nodule in the left upper lobe (image 59 series 2)  unchanged since prior study.    Abdomen and pelvis:  Postsurgical changes of left nephrectomy without evidence of local  recurrence. Right kidney is unremarkable.     Liver is diffusely hypodense as compared to the spleen. No focal liver  lesions identified on this limited unenhanced CT. Spleen is mildly  enlarged measuring 14.3 cm in craniocaudal dimension. Gallbladder,  pancreas, adrenal glands and right kidney are unremarkable on this  unenhanced exam. No evidence of bowel obstruction. Appendicolith is  present without evidence of appendicitis. Small fat-containing  umbilical hernia and fat-containing left inguinal hernia. Central  prostatic  calcifications. Bladder is distended and unremarkable.     No retroperitoneal or mesenteric lymphadenopathy. Prominent inguinal  lymph nodes, unchanged as prior study and likely reactive. No free  fluid in the abdomen or pelvis. Abdominal aorta is normal in caliber.    Bones and soft tissues: Unchanged sclerotic focus in the posterior  seventh rib (image 94 series 5), likely represents a bone island. No  pathologic appearing osseous lesions. Incidentally noted lipoma in the  right piriformis muscle (image 215 series 4) and right subscapularis  muscle (image 36 series 4).      Impression    IMPRESSION: Note patient is on interleukin-2, pseudoprogression versus  true progression is in the differential.   1. No findings to explain right-sided rib/upper abdominal pain.  2. Slightly increased size of left upper lobe nodule, corresponds to  known lung metastasis. Multiple additional bilateral sub-5 mm lung  nodules are unchanged and remain indeterminate for lung metastasis.  Continued attention on follow-up.  3. Increased left upper lobe and left lower lobe segmental  consolidative/groundglass opacities, although may represent  postradiation fibrosis, superimposed infection or disease progression  are not excluded.   4. Stable post surgical changes of left nephrectomy without evidence  of local recurrence.  5. Hepatic steatosis.    I have personally reviewed the examination and initial interpretation  and I agree with the findings.    BEE URRUTIA MD

## 2017-05-15 NOTE — PLAN OF CARE
Problem: Goal Outcome Summary  Goal: Goal Outcome Summary     VSS, afebrile. C/o abdominal pain, and shoulder bone pain, declined oxycodone, felt it would too strong for what he was feeling, so waited for tylenol. Atarax x2 & benadryl x1 for itching, not much relief. Was able to get some sleep in between cares. Currently NPO. Independent. Admission questions need to be finished. Continue to monitor & w/ POC.

## 2017-05-15 NOTE — PROGRESS NOTES
Nursing Focus: Admission  D: Arrived at 0200 from ED via transport. Admitted for fever, headache, and abdominal pain. Complains of itchiness.      I: Admission process began.  Patient oriented to room, enviroment, call light.  MD notified of patient's arrival on unit.     A: Vital signs stable, afebrile.  Patient stable at this time.     P: Implement plan of care when available. Continue to monitor patient. Nursing interventions as appropriate. Notify MD with changes in pt status.

## 2017-05-15 NOTE — PLAN OF CARE
Problem: Goal Outcome Summary  Goal: Goal Outcome Summary  Afebrile. OVSS. Pt alert and oriented. Intermittent abdominal and shoulder pain noted. Declines oxycodone. Pt up ad paco. Voiding spont. Urinal at bedside. Last bm 5/14. Diet advanced to regular. Offered food, yet pt declines to eat at this time. Nausea this am, resolved on it own. Respiratory viral panel sent. Cough infrequent, nonproductive. Pt continues to itch. Zyrtec ordered and atarax dose increased. Zosyn d/c'ed and po Levaquin ordered. Pt sleeping most of shift. Continue with poc.

## 2017-05-15 NOTE — ED NOTES
Pt called saying he is itching all over.  Upon assessment pt's torso appears red and blotchy.   Notified and order for benadryl was received.

## 2017-05-16 ENCOUNTER — CARE COORDINATION (OUTPATIENT)
Dept: CARE COORDINATION | Facility: CLINIC | Age: 47
End: 2017-05-16

## 2017-05-16 ENCOUNTER — OFFICE VISIT (OUTPATIENT)
Dept: INTERPRETER SERVICES | Facility: CLINIC | Age: 47
End: 2017-05-16

## 2017-05-16 VITALS
BODY MASS INDEX: 35.71 KG/M2 | RESPIRATION RATE: 16 BRPM | HEIGHT: 66 IN | DIASTOLIC BLOOD PRESSURE: 64 MMHG | OXYGEN SATURATION: 98 % | HEART RATE: 68 BPM | WEIGHT: 222.22 LBS | SYSTOLIC BLOOD PRESSURE: 118 MMHG | TEMPERATURE: 98 F

## 2017-05-16 LAB
ALBUMIN SERPL-MCNC: 2.6 G/DL (ref 3.4–5)
ALP SERPL-CCNC: 40 U/L (ref 40–150)
ALT SERPL W P-5'-P-CCNC: 17 U/L (ref 0–70)
ANION GAP SERPL CALCULATED.3IONS-SCNC: 6 MMOL/L (ref 3–14)
AST SERPL W P-5'-P-CCNC: 14 U/L (ref 0–45)
BASOPHILS # BLD AUTO: 0 10E9/L (ref 0–0.2)
BASOPHILS NFR BLD AUTO: 0.3 %
BILIRUB SERPL-MCNC: 0.5 MG/DL (ref 0.2–1.3)
BUN SERPL-MCNC: 10 MG/DL (ref 7–30)
CALCIUM SERPL-MCNC: 7.8 MG/DL (ref 8.5–10.1)
CHLORIDE SERPL-SCNC: 114 MMOL/L (ref 94–109)
CO2 SERPL-SCNC: 23 MMOL/L (ref 20–32)
CREAT SERPL-MCNC: 1.25 MG/DL (ref 0.66–1.25)
DIFFERENTIAL METHOD BLD: ABNORMAL
EOSINOPHIL # BLD AUTO: 2.5 10E9/L (ref 0–0.7)
EOSINOPHIL NFR BLD AUTO: 40.5 %
ERYTHROCYTE [DISTWIDTH] IN BLOOD BY AUTOMATED COUNT: 15.1 % (ref 10–15)
FLUAV H1 2009 PAND RNA SPEC QL NAA+PROBE: NEGATIVE
FLUAV H1 RNA SPEC QL NAA+PROBE: NEGATIVE
FLUAV H3 RNA SPEC QL NAA+PROBE: NEGATIVE
FLUAV RNA SPEC QL NAA+PROBE: NEGATIVE
FLUBV RNA SPEC QL NAA+PROBE: NEGATIVE
GFR SERPL CREATININE-BSD FRML MDRD: 62 ML/MIN/1.7M2
GLUCOSE SERPL-MCNC: 92 MG/DL (ref 70–99)
HADV DNA SPEC QL NAA+PROBE: NEGATIVE
HADV DNA SPEC QL NAA+PROBE: NEGATIVE
HCT VFR BLD AUTO: 38.7 % (ref 40–53)
HGB BLD-MCNC: 12.4 G/DL (ref 13.3–17.7)
HMPV RNA SPEC QL NAA+PROBE: NEGATIVE
HPIV1 RNA SPEC QL NAA+PROBE: NEGATIVE
HPIV2 RNA SPEC QL NAA+PROBE: NEGATIVE
HPIV3 RNA SPEC QL NAA+PROBE: NEGATIVE
IMM GRANULOCYTES # BLD: 0 10E9/L (ref 0–0.4)
IMM GRANULOCYTES NFR BLD: 0.2 %
LYMPHOCYTES # BLD AUTO: 1.6 10E9/L (ref 0.8–5.3)
LYMPHOCYTES NFR BLD AUTO: 25.5 %
MCH RBC QN AUTO: 27.9 PG (ref 26.5–33)
MCHC RBC AUTO-ENTMCNC: 32 G/DL (ref 31.5–36.5)
MCV RBC AUTO: 87 FL (ref 78–100)
MICROBIOLOGIST REVIEW: ABNORMAL
MONOCYTES # BLD AUTO: 0.2 10E9/L (ref 0–1.3)
MONOCYTES NFR BLD AUTO: 3.2 %
NEUTROPHILS # BLD AUTO: 1.9 10E9/L (ref 1.6–8.3)
NEUTROPHILS NFR BLD AUTO: 30.3 %
NRBC # BLD AUTO: 0 10*3/UL
NRBC BLD AUTO-RTO: 0 /100
PLATELET # BLD AUTO: 128 10E9/L (ref 150–450)
POTASSIUM SERPL-SCNC: 3.8 MMOL/L (ref 3.4–5.3)
PROT SERPL-MCNC: 5.6 G/DL (ref 6.8–8.8)
RBC # BLD AUTO: 4.44 10E12/L (ref 4.4–5.9)
RHINOVIRUS RNA SPEC QL NAA+PROBE: ABNORMAL
RSV RNA SPEC QL NAA+PROBE: NEGATIVE
RSV RNA SPEC QL NAA+PROBE: NEGATIVE
SODIUM SERPL-SCNC: 143 MMOL/L (ref 133–144)
SPECIMEN SOURCE: ABNORMAL
WBC # BLD AUTO: 6.3 10E9/L (ref 4–11)

## 2017-05-16 PROCEDURE — 25000132 ZZH RX MED GY IP 250 OP 250 PS 637: Performed by: NURSE PRACTITIONER

## 2017-05-16 PROCEDURE — 85025 COMPLETE CBC W/AUTO DIFF WBC: CPT | Performed by: NURSE PRACTITIONER

## 2017-05-16 PROCEDURE — T1013 SIGN LANG/ORAL INTERPRETER: HCPCS | Mod: U3

## 2017-05-16 PROCEDURE — 25000132 ZZH RX MED GY IP 250 OP 250 PS 637: Performed by: STUDENT IN AN ORGANIZED HEALTH CARE EDUCATION/TRAINING PROGRAM

## 2017-05-16 PROCEDURE — 36415 COLL VENOUS BLD VENIPUNCTURE: CPT | Performed by: NURSE PRACTITIONER

## 2017-05-16 PROCEDURE — 99238 HOSP IP/OBS DSCHRG MGMT 30/<: CPT | Performed by: INTERNAL MEDICINE

## 2017-05-16 PROCEDURE — 80053 COMPREHEN METABOLIC PANEL: CPT | Performed by: NURSE PRACTITIONER

## 2017-05-16 RX ORDER — CETIRIZINE HYDROCHLORIDE 10 MG/1
10 TABLET ORAL DAILY
Qty: 30 TABLET | Refills: 0 | Status: SHIPPED | OUTPATIENT
Start: 2017-05-16 | End: 2022-01-01

## 2017-05-16 RX ORDER — DIPHENHYDRAMINE HCL 50 MG
50 CAPSULE ORAL EVERY 6 HOURS PRN
Qty: 56 CAPSULE | Refills: 0 | Status: SHIPPED | OUTPATIENT
Start: 2017-05-16 | End: 2022-01-01

## 2017-05-16 RX ORDER — HYDROXYZINE HYDROCHLORIDE 50 MG/1
50 TABLET, FILM COATED ORAL EVERY 4 HOURS PRN
Qty: 30 TABLET | Refills: 1 | Status: SHIPPED | OUTPATIENT
Start: 2017-05-16 | End: 2022-01-01

## 2017-05-16 RX ADMIN — HYDROXYZINE HYDROCHLORIDE 50 MG: 50 TABLET, FILM COATED ORAL at 08:06

## 2017-05-16 RX ADMIN — RANITIDINE HYDROCHLORIDE 150 MG: 150 TABLET, FILM COATED ORAL at 08:06

## 2017-05-16 RX ADMIN — LEVOFLOXACIN 750 MG: 750 TABLET, FILM COATED ORAL at 08:05

## 2017-05-16 RX ADMIN — Medication: at 08:07

## 2017-05-16 RX ADMIN — HYDROXYZINE HYDROCHLORIDE 50 MG: 50 TABLET, FILM COATED ORAL at 04:26

## 2017-05-16 RX ADMIN — HYDROXYZINE HYDROCHLORIDE 50 MG: 50 TABLET, FILM COATED ORAL at 00:23

## 2017-05-16 RX ADMIN — CETIRIZINE HYDROCHLORIDE 10 MG: 10 TABLET, FILM COATED ORAL at 08:05

## 2017-05-16 ASSESSMENT — PAIN DESCRIPTION - DESCRIPTORS
DESCRIPTORS: ACHING
DESCRIPTORS: ACHING

## 2017-05-16 NOTE — PROGRESS NOTES
"[facility]  \"Hello, my name is Delia Colbert , and I am calling from the [facility].  I want to check in and see how you are doing, after leaving the hospital.  You may also receive a call from your Care Coordinator (care team), but I want to make sure you don t have any urgent needs.  I have a couple questions to review with you:     Post-Discharge Outreach                                                    Julio John is a 47 year old male       Follow-up Appointments           Adult [facility] Follow-up and recommended labs and tests       Please f/u in 1 week for hospital f/u (r/t IL 2 drug reaction vs. Allergy prompting hospital admission). Had LEO & fever on admission, please check Cr & CBC w diff. D/C on Zyrtec, with PRN atarax & benadryl. No antibiotics were ordered on d/c (could have had allergy exacerbation r/t zosyn as itching got much more intense/worse after starting antibiotics).   - Working on rescheduling health psychology appointment as well (5/23 8 AM scheduled).     Appointments on [place] and/or [facility] (with [facility] or [facility] provider or service). Call [phone] if you haven't heard regarding these appointments within 7 days of discharge.              Follow Up and recommended labs and tests       CBC with Diff & CMP.                       Your next 10 appointments already scheduled            May 23, 2017 8:00 AM CDT   (Arrive by 7:45 AM)   NEW WITH ROOM with Nirav Kim PsyD,  2 114 CONSULT [facility] ([facility])     [address] [place] [address]  2nd Floor  [facility] [id number] [phone]                  May 23, 2017 9:45 AM CDT   [facility] with [facility]   [facility])     [address]  2nd Floor  [facility] [id number] [phone]                  May 23, 2017 10:20 AM CDT   (Arrive by 10:05 " AM)   Return Visit with Dori Garnica PA-C   [facility] ([facility])     [address]  2nd Floor  [facility] [address] [phone]                  May 30, 2017 10:20 AM CDT   (Arrive by 10:05 AM)   CT CHEST ABDOMEN PELVIS W/O & W CONTRAST with UCCT2   [facility] [place] [facility] ([facility])     [address]  1st Floor  [facility] [address] [phone]                   Care Team:    Patient Care Team       Relationship Specialty Notifications Start End    Tao Roach MD PCP - General   3/17/17     Phone: [phone] Fax: [phone]         [facility] [address] [facility] [address]    Ludwin Painting MD MD Oncology Admissions 5/8/17     Phone: [phone] Fax: [phone]          [facility] [address] [place] [address] [address] [facility] [address]    Jose Mike, RN Nurse Coordinator Oncology Admissions 5/8/17             Transition of Care Review                                                      Did you have a surgery or procedure during your hospital visit? NO   If yes, do you have any of the following:     Signs of infection:  No:     Pain:  No         Wound/incision concerns? N/A    Do you have all of your medications/refills?  Yes    Are you having any side effects or questions about your medication(s)? No    Do you have any new or worsening symptoms?  No    Do you have any future appointments scheduled?   YES 5/23/17             Plan                                                      Thanks for your time.  Your Care Coordinator may follow-up within the next couple days.  In the meantime if you have questions, concerns or problems call your care team.        Delia Colbert

## 2017-05-16 NOTE — PLAN OF CARE
Problem: Goal Outcome Summary  Goal: Goal Outcome Summary  Outcome: Improving  AVSS on RA. A&Ox4. Denies pain and nausea. Complaint of itching, controlled with scheduled atarax and PRN benadryl x1. Resp panel pending, no complaints of cough. Possible discharge tomorrow (5/16). Continue with POC.

## 2017-05-16 NOTE — DISCHARGE SUMMARY
[facility], [place]    Discharge Summary  Hematology / Oncology    Date of Admission:  5/14/2017  Date of Discharge:  5/16/2017  Discharging Provider: Safia oWng  Date of Service (when I saw the patient): 05/16/17    Discharge Diagnoses   Active Problems:    Fever      History of Present Illness   **Adopted from H&P -- Julio John is a 48 y/o PMH metastatic renal cell carcinoma (lungs) s/p nephrectomy & XRT, now s/p 2 cycles of IL-2 (most recent 5/6) that presented with fever, HA, RUQ pain, severe pruritis & generalized faint erythematous rash.  Julio reports not doing well r/t itching & fever. Reports yesterday he was helping his wife clean off some shelves in the garage, didn't spill anything or get exposed to any chemicals. Reports last week he did use chemicals to redo his floors but he wore a respiratory mask. He denies diarrhea, dysuria, appetite changes, n, v. He denies dizziness/syncope at home. He reports sometimes he a little SOB, sometimes he has a cough. He denies trouble breathing. He denies any new medication changes, and new supplements. Denies sick contacts at home. Denies seasonal allergies.    Hospital Course   Julio John is a 48 y/o PMH metastatic renal cell carcinoma (lungs) s/p nephrectomy & XRT, now s/p 2 cycles of IL-2 (most recent 5/6) that presented with fever, HA, RUQ pain, severe pruritis & generalized faint erythematous rash.  Pt with 2 days of arthralgia, fever, RUQ pain. S/p IL-2 infusion on 5/12. Lab and imaging unrevealing to etiology. No e/o cholecystitis, pancreatitis, appendicitis. UA bland. Chest CT with GGO and consolidative changes in the RLL that could be contributing to RUQ pain. His diffuse symptoms are suspicious for a reaction to the IL-2. Currently hemodynamically stable.  He had elevated Cr (1.5), elevated bilirubin (1.5), isolated fever on presentation. Zosyn was started on admission r/t concern for worsening GGO  in L lung near mets.  Rash, fever, symptoms improved s/p adding Zyrtec daily & increasing atarax dose.  LEO improved with IVF.  D/C'ed zosyn hospital day 1 r/t potential for rash/allergy, switched to levaquin.  Choice was made to d/c antibiotic on d/c as CT scan wasn't that changed.  Bilirubin & Cr improved hospital D2, gilmar feeling much better.  Able to d/c home (scheduled close f/u).      # Allergic reaction 2/2 to irritant (vs IL 2 delayed immune reaction... Possible irritants being chemical used to polish floor, working in garage, sunburn, &/or worsened by Zosyn).  # Fever, RUQ pain, muscle aches. S/p CT & RUQ US, unrevealing.  Possibly r/t allergic reaction.  # Leukocytosis (15.4/12 WBC on admit, resolved was 6 today).   # Elevated eosinophils on diff (have been elevated since 5/5. He got d/c from hospital from that day. Continue to be elevated, will monitor for now.  Think r/t allergy (less likely parasitic/fungal infection), eosinophils can also be elevated in IL 2 therapy.  # Severe pruritis & faint diffuse maculopapular erythematous rash.  - Started Hydroxyzine (increased dose, 60 mg q 4, sent home with PRN prescription).  - Increased benadryl dosing (PRN only used 1 dose yesterday)  - Starting zyrtec (will continue 10 mg daily outpatient).  - Continue Ranitidine (will continue outpatient).     # Monitoring for Pneumonia (?) GGO worse on L side (CT scan 5/14), known lung metastasis on L, could be immune reaction r/t IL 2 vs. Infection.  - Initially started on Zosyn (d/c'ed r/t higher risk drug rash vs aspiration unlikely). Switched to levaquin (1x daily, PO).  - Follow blood cultures (NTD), UA/UC.   - RVP (young kiddos at home, they did recently get MMR vaccines) still pending.  - Unlikely PNA GGO on CT more consistent with just IL 2 immune reaction.  Cx negative, no symptoms, d/c'ed antibiotics on d/c (patient isn't neutropenic no need for ppx).     # LEO, pre renal r/t dehydration. Cr elevated 1.5's  (slightly up from baseline). Likely r/t dehydration with fevers & poor PO intake.  On d/c Cr 1.25.  - IVF given, monitor.  - Avoid contrast & nephrotoxins.  - Surprised that Cr didn't improve with IVF.   - ED notes say vomiting & diarrhea Yesterday. IF he has more diarrhea I will order enteric stool panel. It's really hard with Julio b/c due to language/cultural barriers hard to discuss ROS.      # Metastatic clear cell renal carcinoma (pT3a, lung metastasis).  - Cycle 1 IL 2 (4/17-4/20). 8 doses.  - Cycle 2 IL 2 (5/3-5/6). 4 doses.    # Anxiety.      # Elevated bilirubin. 0.7 last night, 1.4 5/16... Back to normal level on d/c.  Possible r/t IL 2 rxn vs. Hemolysis (although no acute anemia noted).  - Fractionate bilirubin (0.1).       FEN regular diet, IVF PRN, electrolyte monitoring replace PRN  PPX ranitidine--GI, lovenox--DVT/VTE  DISPO monitor wbc/fever curve/rash inpatient.     Safia Wong  [facility]  [phone]  Hematology/Oncology  May 16, 2017    Pending Results   These results will be followed up by ANA in 1 week (if RVP + just supportive care).  Unresulted Labs Ordered in the Past 30 Days of this Admission     Date and Time Order Name Status Description    5/15/2017 0817 Respiratory Virus Panel by PCR In process     5/15/2017 0007 Blood culture Preliminary     5/15/2017 0007 Blood culture Preliminary         Code Status   Full Code    Primary Care Physician   Tao Nwaononiwu    Physical Exam   Vital Signs with Ranges  Temp:  [97.8  F (36.6  C)-99.7  F (37.6  C)] 98  F (36.7  C)  Pulse:  [65-68] 68  Heart Rate:  [86-97] 86  Resp:  [16-20] 16  BP: (118-134)/(61-73) 118/64  SpO2:  [95 %-99 %] 98 %    Constitutional: Awake, alert, cooperative, in NAD, faint scratch marks, resolving, skin moist.  Eyes: PERRL, EOMI, sclera clear, conjunctiva normal.  ENT: Normocephalic, without obvious abnormality, moist mucus membranes, no lesions or thrush.   Respiratory: Non-labored breathing, good air exchange,  CTAB, no crackles or wheezing.  Cardiovascular: RRR, no murmur noted.  GI: +BS, soft, non-distended, non-tender, no masses palpated, no hepatosplenomegaly.  Skin: diffuse mildly erythematous scratch marks on b/l UE & abdomen, faint generalized rash present.   Musculoskeletal: No edema lucille LEs.  Neurologic: Awake, A&O x 3. Cranial nerves II-XII are grossly intact.   Neuropsychiatric: Calm, normal affect.     Discharge Disposition   Home & in stable condition, with close f/u scheduled (1 week w health psychology & ANA in [place]).  Discharge Orders     CBC with platelets differential   Last Lab Result: Hemoglobin (g/dL)      Date                     Value                05/16/2017               12.4 (L)         ----------     Comprehensive metabolic panel     Reason for your hospital stay   Admitted we think for allergic reaction (vs. IL2 drug reaction).     Adult [facility] Follow-up and recommended labs and tests   Please f/u in 1 week for hospital f/u (r/t IL 2 drug reaction vs. Allergy prompting hospital admission).  Had LEO & fever on admission, please check Cr & CBC w diff.  D/C on Zyrtec, with PRN atarax & benadryl.  No antibiotics were ordered on d/c (could have had allergy exacerbation r/t zosyn as itching got much more intense/worse after starting antibiotics).    - Working on rescheduling health psychology appointment as well (5/23 8 AM scheduled).    Appointments on [place] and/or [facility] (with [facility] or [facility] provider or service). Call [phone] if you haven't heard regarding these appointments within 7 days of discharge.     Follow Up and recommended labs and tests   CBC with Diff & CMP.     Activity   Your activity upon discharge: Activity as tolerated.     When to contact your care team   Please call the [facility] Triage RN Line [phone] ([facility] RN available M-F 8-5, after 5 pm the RN Advisor will page the On-Call Oncology Fellow who will return your call) for temperature greater  than 100.4 F, uncontrolled nausea/vomiting/diarrhea or unrelieved constipation, pain not relieved by medications, bleeding not stopped by pressure, dizziness, chest pain, shortness of breath, changes in level of consciousness, or any other new concerning symptoms.    If you experience itching, peppery-feeling skin, sneezing, watery eyes, feel free to take PRN benadryl or PRN atarax.    - Continue to take Zytrec for allergies 1x daily.  - If you feel short of breath or throat tightness, wheezing at any time, signs of an anaphylactic reaction please present to your nearest ED/EMS services.     Full Code     Diet   Follow this diet upon discharge: RDAT       Discharge Medications   Current Discharge Medication List      START taking these medications    Details   cetirizine (ZYRTEC) 10 MG tablet Take 1 tablet (10 mg) by mouth daily  Qty: 30 tablet, Refills: 0    Associated Diagnoses: Clear cell carcinoma (H)      diphenhydrAMINE (BENADRYL) 50 MG capsule Take 1 capsule (50 mg) by mouth every 6 hours as needed for itching  Qty: 56 capsule, Refills: 0    Associated Diagnoses: Clear cell carcinoma (H)      ranitidine (ZANTAC) 150 MG tablet Take 1 tablet (150 mg) by mouth 2 times daily  Qty: 60 tablet, Refills: 0    Associated Diagnoses: Clear cell carcinoma (H)         CONTINUE these medications which have CHANGED    Details   hydrOXYzine (ATARAX) 50 MG tablet Take 1 tablet (50 mg) by mouth every 4 hours as needed for itching or anxiety  Qty: 30 tablet, Refills: 1    Associated Diagnoses: Clear cell carcinoma (H)         CONTINUE these medications which have NOT CHANGED    Details   acetaminophen (TYLENOL) 500 MG tablet Take 1 tablet (500 mg) by mouth every 6 hours as needed for mild pain  Qty: 1 Bottle, Refills: 1    Associated Diagnoses: Malignant neoplasm of left kidney (H)      oxyCODONE (ROXICODONE) 5 MG IR tablet Take 1 tablet (5 mg) by mouth every 4 hours as needed for moderate to severe pain  Qty: 10 tablet,  Refills: 0    Associated Diagnoses: Malignant neoplasm of left kidney (H)      Skin Protectants, Misc. (EUCERIN) cream Apply topically 2 times daily Reported on 5/12/2017    Associated Diagnoses: Malignant neoplasm of left kidney (H)      prochlorperazine (COMPAZINE) 10 MG tablet Take 1 tablet (10 mg) by mouth every 6 hours as needed (Breakthrough Nausea/Vomiting)  Qty: 30 tablet, Refills: 0    Associated Diagnoses: Malignant neoplasm of left kidney (H)      ondansetron (ZOFRAN) 4 MG tablet Take 2 tablets (8 mg) by mouth every 8 hours as needed for nausea  Qty: 18 tablet, Refills: 0    Associated Diagnoses: Malignant neoplasm of left kidney (H)           Allergies   No Known Allergies  Data   Most Recent 3 CBC's:  Recent Labs   Lab Test  05/16/17   0641  05/15/17   0531  05/14/17   1837   WBC  6.3  12.0*  15.4*   HGB  12.4*  12.9*  14.8   MCV  87  87  86   PLT  128*  139*  144*      Most Recent 3 BMP's:  Recent Labs   Lab Test  05/16/17   0641  05/15/17   0531  05/14/17   1837   NA  143  141  137   POTASSIUM  3.8  3.8  3.4   CHLORIDE  114*  111*  106   CO2  23  22  22   BUN  10  15  18   CR  1.25  1.49*  1.52*   ANIONGAP  6  7  9   KVNG  7.8*  7.1*  8.0*   GLC  92  95  97     Most Recent 2 LFT's:  Recent Labs   Lab Test  05/16/17   0641  05/15/17   0531   AST  14  13   ALT  17  18   ALKPHOS  40  48   BILITOTAL  0.5  1.4*     Most Recent INR's and Anticoagulation Dosing History:  Anticoagulation Dose History     Recent Dosing and Labs Latest Ref Rng & Units 5/15/2017    INR 0.86 - 1.14 1.20(H)        Most Recent 3 Troponin's:  Recent Labs   Lab Test  05/04/17   1801   TROPI  <0.015  The 99th percentile for upper reference range is 0.045 ug/L.  Troponin values in   the range of 0.045 - 0.120 ug/L may be associated with risks of adverse   clinical events.       Most Recent Cholesterol Panel:No lab results found.  Most Recent 6 Bacteria Isolates From Any Culture (See EPIC Reports for Culture Details):  Recent Labs   Lab  Test  05/15/17   0041  05/14/17   1837  05/05/17   0628  05/04/17   0445  04/20/17   0553  04/19/17   0548   CULT  No growth after 1 day  No growth after 1 day  No growth  No growth  No growth  No growth     Most Recent TSH, T4 and A1c Labs:No lab results found.

## 2017-05-16 NOTE — PLAN OF CARE
Problem: Goal Outcome Summary  Goal: Goal Outcome Summary  Outcome: Adequate for Discharge Date Met:  05/16/17  Nursing Focus: Discharge    D: Patient discharged home at 1030. Patient VSS, afebrile.  All belongings sent with patient.     I: Discharge prescriptions sent to discharge pharmacy to be filled. All discharge medications and instructions reviewed with patient.  Patient instructed to call clinic triage nurse if he experiences a fever >100.4, uncontrolled nausea, vomiting, diarrhea, or pain; or experiences any signs or symptoms of bleeding. Instructed to take atarax and benadryl for allergic symptoms such as itching, watery eyes, but instructed to call 911 or present to emergency department if exhibiting symptoms of anaphylaxis.  Other phone numbers to call with questions or concerns after discharge reviewed with patient. Follow-up outpatient appointment scheduled reviewed with patient.  PIV removed. Education completed.  Care Plan goals met and adequate for discharge.    A: patient verbalized understanding of discharge medications and instructions. Patient will  medications at discharge pharmacy.  P: Patient to follow-up in clinic with health psychology and Bailee Bennett in 1 week, May 23.

## 2017-05-21 LAB
BACTERIA SPEC CULT: NO GROWTH
BACTERIA SPEC CULT: NO GROWTH
MICRO REPORT STATUS: NORMAL
MICRO REPORT STATUS: NORMAL
SPECIMEN SOURCE: NORMAL
SPECIMEN SOURCE: NORMAL

## 2017-05-23 ENCOUNTER — ONCOLOGY VISIT (OUTPATIENT)
Dept: ONCOLOGY | Facility: CLINIC | Age: 47
End: 2017-05-23
Attending: PHYSICIAN ASSISTANT
Payer: MEDICAID

## 2017-05-23 ENCOUNTER — APPOINTMENT (OUTPATIENT)
Dept: LAB | Facility: CLINIC | Age: 47
End: 2017-05-23
Attending: INTERNAL MEDICINE
Payer: MEDICAID

## 2017-05-23 ENCOUNTER — APPOINTMENT (OUTPATIENT)
Dept: ONCOLOGY | Facility: CLINIC | Age: 47
End: 2017-05-23
Attending: PSYCHOLOGIST
Payer: MEDICAID

## 2017-05-23 VITALS
WEIGHT: 221.6 LBS | OXYGEN SATURATION: 95 % | BODY MASS INDEX: 35.62 KG/M2 | HEIGHT: 66 IN | RESPIRATION RATE: 18 BRPM | TEMPERATURE: 97.6 F | DIASTOLIC BLOOD PRESSURE: 82 MMHG | HEART RATE: 76 BPM | SYSTOLIC BLOOD PRESSURE: 122 MMHG

## 2017-05-23 DIAGNOSIS — C80.1 CLEAR CELL CARCINOMA (H): ICD-10-CM

## 2017-05-23 LAB
ALBUMIN SERPL-MCNC: 3.5 G/DL (ref 3.4–5)
ALP SERPL-CCNC: 60 U/L (ref 40–150)
ALT SERPL W P-5'-P-CCNC: 32 U/L (ref 0–70)
ANION GAP SERPL CALCULATED.3IONS-SCNC: 9 MMOL/L (ref 3–14)
AST SERPL W P-5'-P-CCNC: 20 U/L (ref 0–45)
BASOPHILS # BLD AUTO: 0.1 10E9/L (ref 0–0.2)
BASOPHILS NFR BLD AUTO: 1.3 %
BILIRUB SERPL-MCNC: 0.4 MG/DL (ref 0.2–1.3)
BUN SERPL-MCNC: 18 MG/DL (ref 7–30)
CALCIUM SERPL-MCNC: 8.6 MG/DL (ref 8.5–10.1)
CHLORIDE SERPL-SCNC: 109 MMOL/L (ref 94–109)
CO2 SERPL-SCNC: 23 MMOL/L (ref 20–32)
CREAT SERPL-MCNC: 1.06 MG/DL (ref 0.66–1.25)
DIFFERENTIAL METHOD BLD: ABNORMAL
EOSINOPHIL # BLD AUTO: 0.5 10E9/L (ref 0–0.7)
EOSINOPHIL NFR BLD AUTO: 9.9 %
ERYTHROCYTE [DISTWIDTH] IN BLOOD BY AUTOMATED COUNT: 14.1 % (ref 10–15)
GFR SERPL CREATININE-BSD FRML MDRD: 75 ML/MIN/1.7M2
GLUCOSE SERPL-MCNC: 106 MG/DL (ref 70–99)
HCT VFR BLD AUTO: 49 % (ref 40–53)
HGB BLD-MCNC: 14.9 G/DL (ref 13.3–17.7)
IMM GRANULOCYTES # BLD: 0 10E9/L (ref 0–0.4)
IMM GRANULOCYTES NFR BLD: 0.2 %
LYMPHOCYTES # BLD AUTO: 2.4 10E9/L (ref 0.8–5.3)
LYMPHOCYTES NFR BLD AUTO: 46.3 %
MCH RBC QN AUTO: 28.3 PG (ref 26.5–33)
MCHC RBC AUTO-ENTMCNC: 30.4 G/DL (ref 31.5–36.5)
MCV RBC AUTO: 93 FL (ref 78–100)
MONOCYTES # BLD AUTO: 0.3 10E9/L (ref 0–1.3)
MONOCYTES NFR BLD AUTO: 6.5 %
NEUTROPHILS # BLD AUTO: 1.9 10E9/L (ref 1.6–8.3)
NEUTROPHILS NFR BLD AUTO: 35.8 %
NRBC # BLD AUTO: 0 10*3/UL
NRBC BLD AUTO-RTO: 0 /100
PLATELET # BLD AUTO: 236 10E9/L (ref 150–450)
POTASSIUM SERPL-SCNC: 4.3 MMOL/L (ref 3.4–5.3)
PROT SERPL-MCNC: 7.4 G/DL (ref 6.8–8.8)
RBC # BLD AUTO: 5.26 10E12/L (ref 4.4–5.9)
SODIUM SERPL-SCNC: 141 MMOL/L (ref 133–144)
WBC # BLD AUTO: 5.3 10E9/L (ref 4–11)

## 2017-05-23 PROCEDURE — 99212 OFFICE O/P EST SF 10 MIN: CPT | Mod: ZF

## 2017-05-23 PROCEDURE — 99214 OFFICE O/P EST MOD 30 MIN: CPT | Mod: ZP | Performed by: PHYSICIAN ASSISTANT

## 2017-05-23 PROCEDURE — 85025 COMPLETE CBC W/AUTO DIFF WBC: CPT | Performed by: NURSE PRACTITIONER

## 2017-05-23 PROCEDURE — 80053 COMPREHEN METABOLIC PANEL: CPT | Performed by: NURSE PRACTITIONER

## 2017-05-23 PROCEDURE — 36415 COLL VENOUS BLD VENIPUNCTURE: CPT

## 2017-05-23 ASSESSMENT — PAIN SCALES - GENERAL: PAINLEVEL: MILD PAIN (3)

## 2017-05-23 NOTE — MR AVS SNAPSHOT
After Visit Summary   5/23/2017    Julio John    MRN: [id number]           Patient Information     Date Of Birth          1970        Visit Information        Provider Department      5/23/2017 10:05 AM Adalgisa Marinelli; Dori Garnica PA-C [facility]        Today's Diagnoses     Clear cell carcinoma (H)           Follow-ups after your visit        Your next 10 appointments already scheduled     May 30, 2017 10:20 AM CDT   (Arrive by 10:05 AM)   CT CHEST ABDOMEN PELVIS W/O & W CONTRAST with UCCT2   [facility] CT ([facility])    [address]  1st Floor  [facility] [address] [phone]           Please bring any scans or X-rays taken at other hospitals, if similar tests were done. Also bring a list of your medicines, including vitamins, minerals and over-the-counter drugs. It is safest to leave personal items at home.  Be sure to tell your doctor:   If you have any allergies.   If there s any chance you are pregnant.   If you are breastfeeding.   If you have any special needs.  You may have contrast for this exam. To prepare:   Do not eat or drink for 2 hours before your exam. If you need to take medicine, you may take it with small sips of water. (We may ask you to take liquid medicine as well.)   The day before your exam, drink extra fluids at least six 8-ounce glasses (unless your doctor tells you to restrict your fluids).  Patients over 70 or patients with diabetes or kidney problems:   If you haven t had a blood test (creatinine test) within the last 30 days, go to your clinic or Diagnostic Imaging Department for this test.  If you have diabetes:   If your kidney function is normal, continue taking your metformin (Avandamet, Glucophage, Glucovance, Metaglip) on the day of your exam.   If your kidney function is abnormal, wait 48 hours before restarting this medicine.  You will have oral contrast for  this exam:   You will drink the contrast at home. Get this from your clinic or Diagnostic Imaging Department. Please follow the directions given.  Please wear loose clothing, such as a sweat suit or jogging clothes. Avoid snaps, zippers and other metal. We may ask you to undress and put on a hospital gown.  If you have any questions, please call the Imaging Department where you will have your exam.            May 31, 2017  8:00 AM CDT   [facility] Lab Draw with UC [facility] LAB DRAW   [facility] Lab Draw ([facility])    [address] [place] [id number]   [phone]            May 31, 2017  8:45 AM CDT   (Arrive by 8:30 AM)   Return Visit with Ludwin Painting MD   [facility] ([facility])    [address] [place] [id number]   [phone]            May 31, 2017  9:00 AM CDT   IR PICC VASCULAR with UUVAS1   [facility] [place] Vascular Access ([facility] [place])    [address] [address]              1. You will need to have had a history and physical exam within 7 days of the procedure. 2. Laboratory test are to be obtained by your doctor prior to the exam (CBCP, INR and PTT) 3. Someone will need to drive you to and from the hospital. 4. If you are or may be pregnant, contact your doctor or a Radiology nurse prior to the day of the exam. 5. If you have diabetes, check with your doctor or a Radiology nurse to see if your insulin needs to be adjusted for the exam. 6. If you are taking Coumadin (to thin you blood) please contact your doctor or a Radiology nurse at least 3 days before the exam for special instructions. 7. The day before your exam you may eat your regular diet and are encouraged to drink at least 2 quarts of clear liquids. Drink no alcoholic beverages for 24 hours prior to the exam. 8. Do not eat any solid food or milk  "products for 6 hours prior to the exam. You may drink clear liquids until 2 hours prior to the exam. Clear liquids include the following: water, Jell-O, clear broth, apple juice or any noncarbonated drink that you can see through (no pop!) 9. The morning of the exam you may brush your teeth and take medications as directed with a sip of water. 10. Tell the Radiology nurse if you have any allergies.              Who to contact     If you have questions or need follow up information about today's clinic visit or your schedule please contact [facility] directly at [phone].  Normal or non-critical lab and imaging results will be communicated to you by Room 8 Studiohart, letter or phone within 4 business days after the clinic has received the results. If you do not hear from us within 7 days, please contact the clinic through [employer] or phone. If you have a critical or abnormal lab result, we will notify you by phone as soon as possible.  Submit refill requests through SuiteLinq or call your pharmacy and they will forward the refill request to us. Please allow 3 business days for your refill to be completed.          Additional Information About Your Visit        SuiteLinq Information     SuiteLinq lets you send messages to your doctor, view your test results, renew your prescriptions, schedule appointments and more. To sign up, go to [email or web address] . Click on \"Log in\" on the left side of the screen, which will take you to the Welcome page. Then click on \"Sign up Now\" on the right side of the page.     You will be asked to enter the access code listed below, as well as some personal information. Please follow the directions to create your username and password.     Your access code is: [email or web address]  Expires: 2017 10:24 AM     Your access code will  in 90 days. If you need help or a new code, please call your [facility] clinic or [phone].        Care EveryWhere ID     This is your Care " "EveryWhere ID. This could be used by other organizations to access your [facility] medical records  [id number]        Your Vitals Were     Pulse Temperature Respirations Height Pulse Oximetry BMI (Body Mass Index)    76 97.6  F (36.4  C) (Oral) 18 1.676 m (5' 5.98\") 95% 35.78 kg/m2       Blood Pressure from Last 3 Encounters:   05/23/17 122/82   05/16/17 118/64   05/12/17 115/77    Weight from Last 3 Encounters:   05/23/17 100.5 kg (221 lb 9.6 oz)   05/16/17 100.8 kg (222 lb 3.6 oz)   05/12/17 101 kg (222 lb 11.2 oz)              We Performed the Following     CBC with platelets differential     Comprehensive metabolic panel          Today's Medication Changes          These changes are accurate as of: 5/23/17 12:31 PM.  If you have any questions, ask your nurse or doctor.               These medicines have changed or have updated prescriptions.        Dose/Directions    cetirizine 10 MG tablet   Commonly known as:  zyrTEC   This may have changed:    - when to take this  - reasons to take this   Used for:  Clear cell carcinoma (H)        Dose:  10 mg   Take 1 tablet (10 mg) by mouth daily   Quantity:  30 tablet   Refills:  0       ranitidine 150 MG tablet   Commonly known as:  ZANTAC   This may have changed:    - when to take this  - reasons to take this   Used for:  Clear cell carcinoma (H)        Dose:  150 mg   Take 1 tablet (150 mg) by mouth 2 times daily   Quantity:  60 tablet   Refills:  0                Primary Care Provider Office Phone # Fax #    Uchemadu MD Doc [phone]       [facility] [address] [place] [address]        Thank you!     Thank you for choosing [facility]  for your care. Our goal is always to provide you with excellent care. Hearing back from our patients is one way we can continue to improve our services. Please take a few minutes to complete the written survey that you may receive in the mail after your visit with us. Thank " you!             Your Updated Medication List - Protect others around you: Learn how to safely use, store and throw away your medicines at www.disposemymeds.org.          This list is accurate as of: 5/23/17 12:31 PM.  Always use your most recent med list.                   Brand Name Dispense Instructions for use    acetaminophen 500 MG tablet    TYLENOL    1 Bottle    Take 1 tablet (500 mg) by mouth every 6 hours as needed for mild pain       cetirizine 10 MG tablet    zyrTEC    30 tablet    Take 1 tablet (10 mg) by mouth daily       diphenhydrAMINE 50 MG capsule    BENADRYL    56 capsule    Take 1 capsule (50 mg) by mouth every 6 hours as needed for itching       eucerin cream      Apply topically 2 times daily Reported on 5/12/2017       hydrOXYzine 50 MG tablet    ATARAX    30 tablet    Take 1 tablet (50 mg) by mouth every 4 hours as needed for itching or anxiety       ondansetron 4 MG tablet    ZOFRAN    18 tablet    Take 2 tablets (8 mg) by mouth every 8 hours as needed for nausea       oxyCODONE 5 MG IR tablet    ROXICODONE    10 tablet    Take 1 tablet (5 mg) by mouth every 4 hours as needed for moderate to severe pain       prochlorperazine 10 MG tablet    COMPAZINE    30 tablet    Take 1 tablet (10 mg) by mouth every 6 hours as needed (Breakthrough Nausea/Vomiting)       ranitidine 150 MG tablet    ZANTAC    60 tablet    Take 1 tablet (150 mg) by mouth 2 times daily

## 2017-05-23 NOTE — PROGRESS NOTES
Hematology-Oncology Visit  May 23, 2017    Reason for Visit: follow-up metastatic renal cell carcinoma    HPI: Julio John is a 47 year old gentleman without significant past medical history with metastatic renal cell carcinoma to lungs. He presented with back pain, fevers, BRBPR with hemorrhoids. He had a CT scan which revealed left kidney cancer. He has left nephrectomy 3/2016. He was note to have lung nodules on left lung on surveillance scan. He had stereotactic radiation to lung. Follow-up scans showed new nodules in R lung. He was referred to Dr. Painting for consideration of IL-2. Dr. Painting deemed him a good candidate. He had echo, PFTs, brain MRI, bone scan, and CT CAP as a baseline prior to starting therapy on 4/17/17.     C1 4/17/17 (10 doses)  C2 5/3/17 (4 doses)    Interval History:   Julio is here today with his wife and youngest two children.  He was in the ED last weekend when he developed fever, diarrhea 5-10 the first day, body aches and weakness.  He was admitted and treated with IV antibiotics for possible PNA and then switched to oral levaquin. He thought this was weird as he had not had a cough.  While he did have a cough at our last visit on his post-IL-2 visit, it had resolved the following week.  He quickly recovered on day 2 of his hospitalization.  He otherwise is having bilateral shoulder pains 3/10 during the day and sometimes 6/10 late in the day.     Review of Systems: Patient denies any of the following except if noted above: fever, chills, vision or hearing changes, chest pain, cough, dyspnea, abdominal pain, nausea, vomiting, diarrhea, constipation, urinary concerns, headaches, numbness, tingling or issues with mood.     Current Outpatient Prescriptions   Medication     ranitidine (ZANTAC) 150 MG tablet     Skin Protectants, Misc. (EUCERIN) cream     hydrOXYzine (ATARAX) 50 MG tablet     cetirizine (ZYRTEC) 10 MG tablet     diphenhydrAMINE (BENADRYL) 50 MG capsule     acetaminophen  "(TYLENOL) 500 MG tablet     oxyCODONE (ROXICODONE) 5 MG IR tablet     prochlorperazine (COMPAZINE) 10 MG tablet     ondansetron (ZOFRAN) 4 MG tablet     No current facility-administered medications for this visit.        PHYSICAL EXAM:  /82  Pulse 76  Temp 97.6  F (36.4  C) (Oral)  Resp 18  Ht 1.676 m (5' 5.98\")  Wt 100.5 kg (221 lb 9.6 oz)  SpO2 95%  BMI 35.78 kg/m2  General: Alert, oriented, pleasant, NAD  Skin: No rashes, bruising, or petechiae on exposed skin   HEENT: Normocephalic, atraumatic, PERRLA, EOMI. Moist mucus membranes, no lesions or thrush. No erythema.   Neck: No cervical or supraclavicular LAD.   Axillary: No LAD  Lungs: CTA bilaterally, normal work of breathing. No wheezing, crackles or rhonchi  Cardiac: RRR, S1, S2, no murmurs  Abdomen: Soft, nontender, nondistended. Normoactive bowel sounds. No hepatosplenomegaly, masses  Neuro: CNII-XII grossly intact  Extremities: No pedal edema.    MSK: bilateral shoulder examination done today.  Testing for rotator cuff muscles were +     Labs:      5/15/2017 05:31 5/16/2017 06:41 5/23/2017 10:36   WBC 12.0 (H) 6.3 5.3   Hemoglobin 12.9 (L) 12.4 (L) 14.9   Hematocrit 39.5 (L) 38.7 (L) 49.0   Platelet Count 139 (L) 128 (L) 236   RBC Count 4.56 4.44 5.26   MCV 87 87 93      5/15/2017 05:31 5/16/2017 06:41 5/23/2017 10:36   Sodium 141 143 141   Potassium 3.8 3.8 4.3   Chloride 111 (H) 114 (H) 109   Carbon Dioxide 22 23 23   Urea Nitrogen 15 10 18   Creatinine 1.49 (H) 1.25 1.06   GFR Estimate 51 (L) 62 75   GFR Estimate If Black 61 75 >90...   Calcium 7.1 (L) 7.8 (L) 8.6   Anion Gap 7 6 9   Albumin 2.5 (L) 2.6 (L) 3.5   Protein Total 5.4 (L) 5.6 (L) 7.4   Bilirubin Total 1.4 (H) 0.5 0.4   Alkaline Phosphatase 48 40 60   ALT 18 17 32   AST 13 14 20       Assessment & Plan:   1. Metastatic renal cell carcinoma with pulmonary metastasis: S/p left nephrectomy and XRT to left lung. Now with disease involving R lung. He started on treatment with IL-2 and " is s/p 1 cycle. He tolerated C2 OK, only 4 doses this time as he had quite a bit of toxicity.    -CT CAP on 5/30 and Dr. Painting on 5/31with admission following assuming scans are stable    2. Fever and diarrhea with inpatient stay 5/14: sounds from Julio like this was viral gastroenteritis with fever, diarrhea and body aches.  They all improved the next day and by day 3 had resolved.   This last week he has been feeling completely well- eating/drinking and normal stooling.     3. Cough: Julio had a cough post IL-2 C2.  There was some concern about this inpatient as his scan showed one of his pulm lesions was slightly infectious appearing.  He was treated with IV antibiotics and dc'd on Levaquin. Of note his RVP was + for Rhinovirus    4. Shoulder pains: bilateral shoulder pain- on exam seem consistent with rotator cuff and since bilateral, more likely to be an injury than metastatic disease.  No imaging today, the CT CAP next week will include.  For now, ROM exercises, tylenol and oxycodone prn.     Bailee Garnica PA-C  [facility]  [address] [place] [id number] [phone]

## 2017-05-23 NOTE — NURSING NOTE
"Oncology Rooming Note    May 23, 2017 10:53 AM   Julio John is a 47 year old male who presents for:    Chief Complaint   Patient presents with     Blood Draw     lab draw in right  arm by RN, Vitals taken by [facility]      Oncology Clinic Visit     Met Renal Call Carcinoma F/U.     Initial Vitals: /82  Pulse 76  Temp 97.6  F (36.4  C) (Oral)  Resp 18  Ht 1.676 m (5' 5.98\")  Wt 100.5 kg (221 lb 9.6 oz)  SpO2 95%  BMI 35.78 kg/m2 Estimated body mass index is 35.78 kg/(m^2) as calculated from the following:    Height as of this encounter: 1.676 m (5' 5.98\").    Weight as of this encounter: 100.5 kg (221 lb 9.6 oz). Body surface area is 2.16 meters squared.  Mild Pain (3) Comment: Sharp pain in both shoulders   No LMP for male patient.  Allergies reviewed: Yes  Medications reviewed: Yes    Medications: Medication refills not needed today.  Pharmacy name entered into EPIC: Data Unavailable    Clinical concerns: None Ruthie Garnica was NOT notified.    7 minutes for nursing intake (face to face time)     Margarita Meléndez LPN              "

## 2017-05-23 NOTE — NURSING NOTE
Chief Complaint   Patient presents with     Blood Draw     lab draw in right  arm by RN, Vitals taken by CMA

## 2017-05-25 ENCOUNTER — CARE COORDINATION (OUTPATIENT)
Dept: ONCOLOGY | Facility: CLINIC | Age: 47
End: 2017-05-25

## 2017-05-25 DIAGNOSIS — C64.9 CLEAR CELL RENAL CELL CARCINOMA, UNSPECIFIED LATERALITY (H): ICD-10-CM

## 2017-05-25 DIAGNOSIS — C64.2 MALIGNANT NEOPLASM OF LEFT KIDNEY (H): Primary | ICD-10-CM

## 2017-05-25 NOTE — PROGRESS NOTES
"Patient came into clinic today for clarification of scans he is scheduled for both at [facility] and at the [facility]  Cancelled scans at [facility] and ordered Bone scan to accompany CT C/A/P at the [facility]".  Patient states he is tolerating IL-2 infusions OK, but has a great deal of joint pain, specifically in both shoulders.  Patient has had 2 of 4 IL-2 treatments scheduled and will be admitted for cycle #3 on 5/31.  Patient is aware that writer is following him with treatments, hospitalizations, ED visits ~ s/e of treatment.  Appt's changed and patient is aware of updated POC.  MD + Dr. STEVE Arevalo MD both notified and writer will fax past visit notes to Irina, per his request to [phone].  Patient will f/u with Dr. Arevalo in June.  Patient is understanding of updated POC.  Jose Mike, RN, BSN, OCN  [facility]  Patient Care Coordinator    "

## 2017-05-26 ENCOUNTER — CARE COORDINATION (OUTPATIENT)
Dept: ONCOLOGY | Facility: CLINIC | Age: 47
End: 2017-05-26

## 2017-05-26 NOTE — PROGRESS NOTES
Faxed records to Dr. Irina MD's office per patient's request @ [phone].  Patient gave verbal approval for writer to fax records of treatment to his primary physician Dr. Irina [place] at [facility].  Jose Mike, RN, BSN, OCN  [facility]  Patient Care Coordinator

## 2017-05-30 ENCOUNTER — HOSPITAL ENCOUNTER (OUTPATIENT)
Dept: NUCLEAR MEDICINE | Facility: CLINIC | Age: 47
Setting detail: NUCLEAR MEDICINE
End: 2017-05-30
Attending: INTERNAL MEDICINE
Payer: MEDICAID

## 2017-05-30 ENCOUNTER — HOSPITAL ENCOUNTER (OUTPATIENT)
Dept: CT IMAGING | Facility: CLINIC | Age: 47
Discharge: HOME | End: 2017-05-30
Attending: NURSE PRACTITIONER | Admitting: NURSE PRACTITIONER
Payer: MEDICAID

## 2017-05-30 DIAGNOSIS — C64.2 MALIGNANT NEOPLASM OF LEFT KIDNEY (H): Primary | ICD-10-CM

## 2017-05-30 DIAGNOSIS — C64.2 MALIGNANT NEOPLASM OF LEFT KIDNEY (H): ICD-10-CM

## 2017-05-30 DIAGNOSIS — C64.9 CLEAR CELL RENAL CELL CARCINOMA, UNSPECIFIED LATERALITY (H): ICD-10-CM

## 2017-05-30 PROCEDURE — 78306 BONE IMAGING WHOLE BODY: CPT

## 2017-05-30 PROCEDURE — A9561 TC99M OXIDRONATE: HCPCS | Performed by: INTERNAL MEDICINE

## 2017-05-30 PROCEDURE — 34300033 ZZH RX 343: Performed by: INTERNAL MEDICINE

## 2017-05-30 PROCEDURE — T1013 SIGN LANG/ORAL INTERPRETER: HCPCS | Mod: U3

## 2017-05-30 RX ORDER — IOPAMIDOL 755 MG/ML
500 INJECTION, SOLUTION INTRAVASCULAR ONCE
Status: COMPLETED | OUTPATIENT
Start: 2017-05-30 | End: 2017-05-30

## 2017-05-30 RX ADMIN — SODIUM CHLORIDE 65 ML: 9 INJECTION, SOLUTION INTRAVENOUS at 08:39

## 2017-05-30 RX ADMIN — IOPAMIDOL 100 ML: 755 INJECTION, SOLUTION INTRAVENOUS at 08:39

## 2017-05-30 RX ADMIN — Medication 26 MILLICURIE: at 08:46

## 2017-05-31 ENCOUNTER — HOSPITAL ENCOUNTER (INPATIENT)
Facility: CLINIC | Age: 47
LOS: 2 days | Discharge: HOME | DRG: 838 | End: 2017-06-02
Attending: INTERNAL MEDICINE | Admitting: INTERNAL MEDICINE
Payer: MEDICAID

## 2017-05-31 ENCOUNTER — HOSPITAL ENCOUNTER (INPATIENT)
Dept: VASCULAR ULTRASOUND | Facility: CLINIC | Age: 47
DRG: 838 | End: 2017-05-31
Attending: NURSE PRACTITIONER | Admitting: INTERNAL MEDICINE
Payer: MEDICAID

## 2017-05-31 ENCOUNTER — ONCOLOGY VISIT (OUTPATIENT)
Dept: ONCOLOGY | Facility: CLINIC | Age: 47
DRG: 838 | End: 2017-05-31
Attending: INTERNAL MEDICINE
Payer: MEDICAID

## 2017-05-31 ENCOUNTER — APPOINTMENT (OUTPATIENT)
Dept: LAB | Facility: CLINIC | Age: 47
DRG: 838 | End: 2017-05-31
Attending: INTERNAL MEDICINE
Payer: MEDICAID

## 2017-05-31 VITALS
WEIGHT: 225.4 LBS | OXYGEN SATURATION: 95 % | TEMPERATURE: 97.3 F | DIASTOLIC BLOOD PRESSURE: 84 MMHG | BODY MASS INDEX: 36.22 KG/M2 | SYSTOLIC BLOOD PRESSURE: 126 MMHG | HEART RATE: 77 BPM | RESPIRATION RATE: 17 BRPM | HEIGHT: 66 IN

## 2017-05-31 DIAGNOSIS — C64.2 MALIGNANT NEOPLASM OF LEFT KIDNEY (H): ICD-10-CM

## 2017-05-31 DIAGNOSIS — C64.9 CLEAR CELL RENAL CELL CARCINOMA, UNSPECIFIED LATERALITY (H): ICD-10-CM

## 2017-05-31 LAB
ALBUMIN SERPL-MCNC: 3.4 G/DL (ref 3.4–5)
ALP SERPL-CCNC: 66 U/L (ref 40–150)
ALT SERPL W P-5'-P-CCNC: 29 U/L (ref 0–70)
ANION GAP SERPL CALCULATED.3IONS-SCNC: 9 MMOL/L (ref 3–14)
AST SERPL W P-5'-P-CCNC: 14 U/L (ref 0–45)
BASOPHILS # BLD AUTO: 0.1 10E9/L (ref 0–0.2)
BASOPHILS NFR BLD AUTO: 1 %
BILIRUB SERPL-MCNC: 0.5 MG/DL (ref 0.2–1.3)
BUN SERPL-MCNC: 14 MG/DL (ref 7–30)
CALCIUM SERPL-MCNC: 8.1 MG/DL (ref 8.5–10.1)
CHLORIDE SERPL-SCNC: 107 MMOL/L (ref 94–109)
CO2 SERPL-SCNC: 22 MMOL/L (ref 20–32)
CREAT SERPL-MCNC: 0.98 MG/DL (ref 0.66–1.25)
DIFFERENTIAL METHOD BLD: ABNORMAL
EOSINOPHIL # BLD AUTO: 1.4 10E9/L (ref 0–0.7)
EOSINOPHIL NFR BLD AUTO: 22.2 %
ERYTHROCYTE [DISTWIDTH] IN BLOOD BY AUTOMATED COUNT: 14.3 % (ref 10–15)
GFR SERPL CREATININE-BSD FRML MDRD: 82 ML/MIN/1.7M2
GLUCOSE SERPL-MCNC: 105 MG/DL (ref 70–99)
HCT VFR BLD AUTO: 44.8 % (ref 40–53)
HGB BLD-MCNC: 14.8 G/DL (ref 13.3–17.7)
IMM GRANULOCYTES # BLD: 0 10E9/L (ref 0–0.4)
IMM GRANULOCYTES NFR BLD: 0.2 %
LDH SERPL L TO P-CCNC: 127 U/L (ref 85–227)
LYMPHOCYTES # BLD AUTO: 2.1 10E9/L (ref 0.8–5.3)
LYMPHOCYTES NFR BLD AUTO: 32.8 %
MAGNESIUM SERPL-MCNC: 2.2 MG/DL (ref 1.6–2.3)
MCH RBC QN AUTO: 28.4 PG (ref 26.5–33)
MCHC RBC AUTO-ENTMCNC: 33 G/DL (ref 31.5–36.5)
MCV RBC AUTO: 86 FL (ref 78–100)
MONOCYTES # BLD AUTO: 0.4 10E9/L (ref 0–1.3)
MONOCYTES NFR BLD AUTO: 6.4 %
NEUTROPHILS # BLD AUTO: 2.3 10E9/L (ref 1.6–8.3)
NEUTROPHILS NFR BLD AUTO: 37.4 %
NRBC # BLD AUTO: 0 10*3/UL
NRBC BLD AUTO-RTO: 0 /100
PLATELET # BLD AUTO: 173 10E9/L (ref 150–450)
POTASSIUM SERPL-SCNC: 3.8 MMOL/L (ref 3.4–5.3)
PROT SERPL-MCNC: 7 G/DL (ref 6.8–8.8)
RBC # BLD AUTO: 5.21 10E12/L (ref 4.4–5.9)
SODIUM SERPL-SCNC: 138 MMOL/L (ref 133–144)
WBC # BLD AUTO: 6.3 10E9/L (ref 4–11)

## 2017-05-31 PROCEDURE — 25800025 ZZH RX 258: Performed by: INTERNAL MEDICINE

## 2017-05-31 PROCEDURE — T1013 SIGN LANG/ORAL INTERPRETER: HCPCS | Mod: U3

## 2017-05-31 PROCEDURE — 83735 ASSAY OF MAGNESIUM: CPT | Performed by: INTERNAL MEDICINE

## 2017-05-31 PROCEDURE — 36569 INSJ PICC 5 YR+ W/O IMAGING: CPT

## 2017-05-31 PROCEDURE — T1013 SIGN LANG/ORAL INTERPRETER: HCPCS | Mod: U3,ZF | Performed by: INTERNAL MEDICINE

## 2017-05-31 PROCEDURE — 25000125 ZZHC RX 250: Performed by: NURSE PRACTITIONER

## 2017-05-31 PROCEDURE — 83615 LACTATE (LD) (LDH) ENZYME: CPT | Performed by: INTERNAL MEDICINE

## 2017-05-31 PROCEDURE — 25000132 ZZH RX MED GY IP 250 OP 250 PS 637: Performed by: PHYSICIAN ASSISTANT

## 2017-05-31 PROCEDURE — 93005 ELECTROCARDIOGRAM TRACING: CPT

## 2017-05-31 PROCEDURE — 99207 ZZC CHGS TRANSFERRED TO HOSPITAL: CPT | Mod: ZP | Performed by: INTERNAL MEDICINE

## 2017-05-31 PROCEDURE — 3E03302 INTRODUCTION OF HIGH-DOSE INTERLEUKIN-2 INTO PERIPHERAL VEIN, PERCUTANEOUS APPROACH: ICD-10-PCS | Performed by: INTERNAL MEDICINE

## 2017-05-31 PROCEDURE — 12000008 ZZH R&B INTERMEDIATE UMMC

## 2017-05-31 PROCEDURE — 85025 COMPLETE CBC W/AUTO DIFF WBC: CPT | Performed by: INTERNAL MEDICINE

## 2017-05-31 PROCEDURE — 25000128 H RX IP 250 OP 636: Performed by: PHYSICIAN ASSISTANT

## 2017-05-31 PROCEDURE — 99212 OFFICE O/P EST SF 10 MIN: CPT | Mod: ZF

## 2017-05-31 PROCEDURE — C1751 CATH, INF, PER/CENT/MIDLINE: HCPCS

## 2017-05-31 PROCEDURE — 80053 COMPREHEN METABOLIC PANEL: CPT | Performed by: INTERNAL MEDICINE

## 2017-05-31 PROCEDURE — 93010 ELECTROCARDIOGRAM REPORT: CPT | Performed by: INTERNAL MEDICINE

## 2017-05-31 PROCEDURE — 25000132 ZZH RX MED GY IP 250 OP 250 PS 637: Performed by: INTERNAL MEDICINE

## 2017-05-31 PROCEDURE — 99222 1ST HOSP IP/OBS MODERATE 55: CPT | Mod: AI | Performed by: INTERNAL MEDICINE

## 2017-05-31 PROCEDURE — 25000128 H RX IP 250 OP 636: Performed by: INTERNAL MEDICINE

## 2017-05-31 RX ORDER — ALBUTEROL SULFATE 90 UG/1
1-2 AEROSOL, METERED RESPIRATORY (INHALATION)
Status: CANCELLED | OUTPATIENT
Start: 2017-05-31

## 2017-05-31 RX ORDER — PROCHLORPERAZINE MALEATE 10 MG
10 TABLET ORAL EVERY 6 HOURS PRN
Status: CANCELLED | OUTPATIENT
Start: 2017-05-31

## 2017-05-31 RX ORDER — ONDANSETRON 2 MG/ML
8 INJECTION INTRAMUSCULAR; INTRAVENOUS EVERY 8 HOURS PRN
Status: DISCONTINUED | OUTPATIENT
Start: 2017-05-31 | End: 2017-06-02 | Stop reason: HOSPADM

## 2017-05-31 RX ORDER — POTASSIUM CHLORIDE 1.5 G/1.58G
20-40 POWDER, FOR SOLUTION ORAL
Status: DISCONTINUED | OUTPATIENT
Start: 2017-05-31 | End: 2017-06-02 | Stop reason: HOSPADM

## 2017-05-31 RX ORDER — OXYCODONE HYDROCHLORIDE 5 MG/1
5 TABLET ORAL EVERY 4 HOURS PRN
Status: DISCONTINUED | OUTPATIENT
Start: 2017-05-31 | End: 2017-06-02 | Stop reason: HOSPADM

## 2017-05-31 RX ORDER — DIPHENHYDRAMINE HYDROCHLORIDE 50 MG/ML
50 INJECTION INTRAMUSCULAR; INTRAVENOUS
Status: DISCONTINUED | OUTPATIENT
Start: 2017-05-31 | End: 2017-06-02 | Stop reason: HOSPADM

## 2017-05-31 RX ORDER — POTASSIUM CHLORIDE 29.8 MG/ML
20 INJECTION INTRAVENOUS
Status: DISCONTINUED | OUTPATIENT
Start: 2017-05-31 | End: 2017-06-02 | Stop reason: HOSPADM

## 2017-05-31 RX ORDER — LIDOCAINE 40 MG/G
CREAM TOPICAL
Status: DISCONTINUED | OUTPATIENT
Start: 2017-05-31 | End: 2017-06-02 | Stop reason: HOSPADM

## 2017-05-31 RX ORDER — PROCHLORPERAZINE MALEATE 5 MG
5-10 TABLET ORAL EVERY 6 HOURS PRN
Status: DISCONTINUED | OUTPATIENT
Start: 2017-05-31 | End: 2017-06-02 | Stop reason: HOSPADM

## 2017-05-31 RX ORDER — DIPHENHYDRAMINE HCL 25 MG
25 CAPSULE ORAL EVERY 4 HOURS PRN
Status: DISCONTINUED | OUTPATIENT
Start: 2017-05-31 | End: 2017-06-02 | Stop reason: HOSPADM

## 2017-05-31 RX ORDER — EPINEPHRINE 1 MG/ML
0.3 INJECTION INTRAMUSCULAR; INTRAVENOUS; SUBCUTANEOUS EVERY 5 MIN PRN
Status: DISCONTINUED | OUTPATIENT
Start: 2017-05-31 | End: 2017-06-02 | Stop reason: HOSPADM

## 2017-05-31 RX ORDER — DEXTROSE MONOHYDRATE 50 MG/ML
1000 INJECTION, SOLUTION INTRAVENOUS CONTINUOUS
Status: CANCELLED | OUTPATIENT
Start: 2017-05-31

## 2017-05-31 RX ORDER — METHYLPREDNISOLONE SODIUM SUCCINATE 125 MG/2ML
125 INJECTION, POWDER, LYOPHILIZED, FOR SOLUTION INTRAMUSCULAR; INTRAVENOUS
Status: DISCONTINUED | OUTPATIENT
Start: 2017-05-31 | End: 2017-06-02 | Stop reason: HOSPADM

## 2017-05-31 RX ORDER — EPINEPHRINE 1 MG/ML
0.3 INJECTION INTRAMUSCULAR; INTRAVENOUS; SUBCUTANEOUS EVERY 5 MIN PRN
Status: CANCELLED | OUTPATIENT
Start: 2017-05-31

## 2017-05-31 RX ORDER — DIPHENHYDRAMINE HCL 25 MG
25 CAPSULE ORAL EVERY 4 HOURS PRN
Status: CANCELLED | OUTPATIENT
Start: 2017-05-31

## 2017-05-31 RX ORDER — POTASSIUM CHLORIDE 750 MG/1
20-40 TABLET, EXTENDED RELEASE ORAL
Status: DISCONTINUED | OUTPATIENT
Start: 2017-05-31 | End: 2017-06-02 | Stop reason: HOSPADM

## 2017-05-31 RX ORDER — NAPROXEN 250 MG/1
250 TABLET ORAL 2 TIMES DAILY PRN
Status: CANCELLED | OUTPATIENT
Start: 2017-05-31

## 2017-05-31 RX ORDER — NAPROXEN 250 MG/1
250 TABLET ORAL 2 TIMES DAILY PRN
Status: DISCONTINUED | OUTPATIENT
Start: 2017-05-31 | End: 2017-06-02 | Stop reason: HOSPADM

## 2017-05-31 RX ORDER — ALBUTEROL SULFATE 90 UG/1
1-2 AEROSOL, METERED RESPIRATORY (INHALATION)
Status: DISCONTINUED | OUTPATIENT
Start: 2017-05-31 | End: 2017-06-02 | Stop reason: HOSPADM

## 2017-05-31 RX ORDER — LORAZEPAM 0.5 MG/1
.5-1 TABLET ORAL EVERY 6 HOURS PRN
Status: CANCELLED | OUTPATIENT
Start: 2017-05-31

## 2017-05-31 RX ORDER — ONDANSETRON 2 MG/ML
8 INJECTION INTRAMUSCULAR; INTRAVENOUS EVERY 8 HOURS
Status: DISCONTINUED | OUTPATIENT
Start: 2017-05-31 | End: 2017-06-02 | Stop reason: HOSPADM

## 2017-05-31 RX ORDER — NALOXONE HYDROCHLORIDE 0.4 MG/ML
.1-.4 INJECTION, SOLUTION INTRAMUSCULAR; INTRAVENOUS; SUBCUTANEOUS
Status: DISCONTINUED | OUTPATIENT
Start: 2017-05-31 | End: 2017-06-02 | Stop reason: HOSPADM

## 2017-05-31 RX ORDER — ONDANSETRON 8 MG/1
8 TABLET, ORALLY DISINTEGRATING ORAL EVERY 8 HOURS PRN
Status: DISCONTINUED | OUTPATIENT
Start: 2017-05-31 | End: 2017-06-02 | Stop reason: HOSPADM

## 2017-05-31 RX ORDER — SODIUM CHLORIDE 9 MG/ML
1000 INJECTION, SOLUTION INTRAVENOUS CONTINUOUS PRN
Status: CANCELLED | OUTPATIENT
Start: 2017-05-31

## 2017-05-31 RX ORDER — ALBUTEROL SULFATE 0.83 MG/ML
2.5 SOLUTION RESPIRATORY (INHALATION)
Status: CANCELLED | OUTPATIENT
Start: 2017-05-31

## 2017-05-31 RX ORDER — DEXTROSE MONOHYDRATE, SODIUM CHLORIDE, AND POTASSIUM CHLORIDE 50; 1.49; 9 G/1000ML; G/1000ML; G/1000ML
INJECTION, SOLUTION INTRAVENOUS CONTINUOUS
Status: CANCELLED | OUTPATIENT
Start: 2017-05-31

## 2017-05-31 RX ORDER — SODIUM CHLORIDE 9 MG/ML
1000 INJECTION, SOLUTION INTRAVENOUS CONTINUOUS PRN
Status: DISCONTINUED | OUTPATIENT
Start: 2017-05-31 | End: 2017-06-02 | Stop reason: HOSPADM

## 2017-05-31 RX ORDER — PROCHLORPERAZINE MALEATE 10 MG
10 TABLET ORAL EVERY 6 HOURS PRN
Status: DISCONTINUED | OUTPATIENT
Start: 2017-05-31 | End: 2017-05-31

## 2017-05-31 RX ORDER — DEXTROSE MONOHYDRATE 50 MG/ML
1000 INJECTION, SOLUTION INTRAVENOUS CONTINUOUS
Status: DISCONTINUED | OUTPATIENT
Start: 2017-05-31 | End: 2017-06-02

## 2017-05-31 RX ORDER — MEPERIDINE HYDROCHLORIDE 25 MG/ML
25 INJECTION INTRAMUSCULAR; INTRAVENOUS; SUBCUTANEOUS EVERY 30 MIN PRN
Status: DISCONTINUED | OUTPATIENT
Start: 2017-05-31 | End: 2017-06-02 | Stop reason: HOSPADM

## 2017-05-31 RX ORDER — MAGNESIUM SULFATE HEPTAHYDRATE 40 MG/ML
4 INJECTION, SOLUTION INTRAVENOUS EVERY 4 HOURS PRN
Status: DISCONTINUED | OUTPATIENT
Start: 2017-05-31 | End: 2017-06-02 | Stop reason: HOSPADM

## 2017-05-31 RX ORDER — DIPHENOXYLATE HCL/ATROPINE 2.5-.025MG
1 TABLET ORAL 4 TIMES DAILY PRN
Status: DISCONTINUED | OUTPATIENT
Start: 2017-05-31 | End: 2017-06-02 | Stop reason: HOSPADM

## 2017-05-31 RX ORDER — MEPERIDINE HYDROCHLORIDE 25 MG/ML
25 INJECTION INTRAMUSCULAR; INTRAVENOUS; SUBCUTANEOUS EVERY 30 MIN PRN
Status: CANCELLED | OUTPATIENT
Start: 2017-05-31

## 2017-05-31 RX ORDER — CEPHALEXIN 500 MG/1
500 CAPSULE ORAL 2 TIMES DAILY
Status: DISCONTINUED | OUTPATIENT
Start: 2017-05-31 | End: 2017-06-02 | Stop reason: HOSPADM

## 2017-05-31 RX ORDER — POTASSIUM CL/LIDO/0.9 % NACL 10MEQ/0.1L
10 INTRAVENOUS SOLUTION, PIGGYBACK (ML) INTRAVENOUS
Status: DISCONTINUED | OUTPATIENT
Start: 2017-05-31 | End: 2017-06-02 | Stop reason: HOSPADM

## 2017-05-31 RX ORDER — DIPHENOXYLATE HCL/ATROPINE 2.5-.025MG
1 TABLET ORAL 4 TIMES DAILY
Status: CANCELLED | OUTPATIENT
Start: 2017-05-31

## 2017-05-31 RX ORDER — LORAZEPAM 0.5 MG/1
.5-1 TABLET ORAL EVERY 6 HOURS PRN
Status: DISCONTINUED | OUTPATIENT
Start: 2017-05-31 | End: 2017-05-31

## 2017-05-31 RX ORDER — DEXTROSE MONOHYDRATE, SODIUM CHLORIDE, AND POTASSIUM CHLORIDE 50; 1.49; 9 G/1000ML; G/1000ML; G/1000ML
INJECTION, SOLUTION INTRAVENOUS CONTINUOUS
Status: DISCONTINUED | OUTPATIENT
Start: 2017-05-31 | End: 2017-06-02

## 2017-05-31 RX ORDER — ACETAMINOPHEN 325 MG/1
650 TABLET ORAL EVERY 4 HOURS
Status: CANCELLED | OUTPATIENT
Start: 2017-05-31

## 2017-05-31 RX ORDER — CETIRIZINE HYDROCHLORIDE 10 MG/1
10 TABLET ORAL DAILY PRN
Status: DISCONTINUED | OUTPATIENT
Start: 2017-05-31 | End: 2017-06-02 | Stop reason: HOSPADM

## 2017-05-31 RX ORDER — LORAZEPAM 2 MG/ML
.5-1 INJECTION INTRAMUSCULAR EVERY 6 HOURS PRN
Status: CANCELLED | OUTPATIENT
Start: 2017-05-31

## 2017-05-31 RX ORDER — DIPHENOXYLATE HCL/ATROPINE 2.5-.025MG
1 TABLET ORAL 4 TIMES DAILY
Status: DISCONTINUED | OUTPATIENT
Start: 2017-05-31 | End: 2017-05-31

## 2017-05-31 RX ORDER — LORAZEPAM 2 MG/ML
.5-1 INJECTION INTRAMUSCULAR EVERY 6 HOURS PRN
Status: DISCONTINUED | OUTPATIENT
Start: 2017-05-31 | End: 2017-05-31

## 2017-05-31 RX ORDER — POTASSIUM CHLORIDE 7.45 MG/ML
10 INJECTION INTRAVENOUS
Status: DISCONTINUED | OUTPATIENT
Start: 2017-05-31 | End: 2017-06-02 | Stop reason: HOSPADM

## 2017-05-31 RX ORDER — CEPHALEXIN 500 MG/1
500 CAPSULE ORAL 2 TIMES DAILY
Status: CANCELLED | OUTPATIENT
Start: 2017-05-31

## 2017-05-31 RX ORDER — ALBUTEROL SULFATE 0.83 MG/ML
2.5 SOLUTION RESPIRATORY (INHALATION)
Status: DISCONTINUED | OUTPATIENT
Start: 2017-05-31 | End: 2017-06-02 | Stop reason: HOSPADM

## 2017-05-31 RX ORDER — LORAZEPAM 2 MG/ML
.5-1 INJECTION INTRAMUSCULAR EVERY 6 HOURS PRN
Status: DISCONTINUED | OUTPATIENT
Start: 2017-05-31 | End: 2017-06-02 | Stop reason: HOSPADM

## 2017-05-31 RX ORDER — HEPARIN SODIUM,PORCINE 10 UNIT/ML
2-5 VIAL (ML) INTRAVENOUS
Status: DISCONTINUED | OUTPATIENT
Start: 2017-05-31 | End: 2017-06-02 | Stop reason: HOSPADM

## 2017-05-31 RX ORDER — MEPERIDINE HYDROCHLORIDE 25 MG/ML
25 INJECTION INTRAMUSCULAR; INTRAVENOUS; SUBCUTANEOUS
Status: DISCONTINUED | OUTPATIENT
Start: 2017-05-31 | End: 2017-06-02 | Stop reason: HOSPADM

## 2017-05-31 RX ORDER — METHYLPREDNISOLONE SODIUM SUCCINATE 125 MG/2ML
125 INJECTION, POWDER, LYOPHILIZED, FOR SOLUTION INTRAMUSCULAR; INTRAVENOUS
Status: CANCELLED | OUTPATIENT
Start: 2017-05-31

## 2017-05-31 RX ORDER — ACETAMINOPHEN 325 MG/1
650 TABLET ORAL EVERY 4 HOURS
Status: DISCONTINUED | OUTPATIENT
Start: 2017-05-31 | End: 2017-06-02 | Stop reason: HOSPADM

## 2017-05-31 RX ORDER — MEPERIDINE HYDROCHLORIDE 25 MG/ML
25 INJECTION INTRAMUSCULAR; INTRAVENOUS; SUBCUTANEOUS
Status: CANCELLED | OUTPATIENT
Start: 2017-05-31 | End: 2017-06-06

## 2017-05-31 RX ORDER — ONDANSETRON 2 MG/ML
8 INJECTION INTRAMUSCULAR; INTRAVENOUS EVERY 8 HOURS
Status: CANCELLED | OUTPATIENT
Start: 2017-05-31

## 2017-05-31 RX ORDER — DIPHENHYDRAMINE HYDROCHLORIDE 50 MG/ML
50 INJECTION INTRAMUSCULAR; INTRAVENOUS
Status: CANCELLED | OUTPATIENT
Start: 2017-05-31

## 2017-05-31 RX ORDER — LORAZEPAM 0.5 MG/1
.5-1 TABLET ORAL EVERY 6 HOURS PRN
Status: DISCONTINUED | OUTPATIENT
Start: 2017-05-31 | End: 2017-06-02 | Stop reason: HOSPADM

## 2017-05-31 RX ORDER — HYDROXYZINE HYDROCHLORIDE 25 MG/1
50 TABLET, FILM COATED ORAL EVERY 4 HOURS PRN
Status: DISCONTINUED | OUTPATIENT
Start: 2017-05-31 | End: 2017-06-02 | Stop reason: HOSPADM

## 2017-05-31 RX ADMIN — ALDESLEUKIN 64 MILLION UNITS: 1.1 INJECTION, POWDER, LYOPHILIZED, FOR SOLUTION INTRAVENOUS at 16:39

## 2017-05-31 RX ADMIN — RANITIDINE HYDROCHLORIDE 150 MG: 150 TABLET, FILM COATED ORAL at 20:14

## 2017-05-31 RX ADMIN — DEXTROSE MONOHYDRATE 1000 ML: 50 INJECTION, SOLUTION INTRAVENOUS at 15:10

## 2017-05-31 RX ADMIN — POTASSIUM CHLORIDE, DEXTROSE MONOHYDRATE AND SODIUM CHLORIDE: 150; 5; 900 INJECTION, SOLUTION INTRAVENOUS at 15:10

## 2017-05-31 RX ADMIN — CEPHALEXIN 500 MG: 500 CAPSULE ORAL at 20:14

## 2017-05-31 RX ADMIN — ACETAMINOPHEN 650 MG: 325 TABLET, FILM COATED ORAL at 23:44

## 2017-05-31 RX ADMIN — PROCHLORPERAZINE EDISYLATE 10 MG: 5 INJECTION INTRAMUSCULAR; INTRAVENOUS at 20:53

## 2017-05-31 RX ADMIN — LIDOCAINE HYDROCHLORIDE 2 ML: 10 INJECTION, SOLUTION INFILTRATION; PERINEURAL at 14:32

## 2017-05-31 RX ADMIN — CEPHALEXIN 500 MG: 500 CAPSULE ORAL at 15:12

## 2017-05-31 RX ADMIN — ONDANSETRON 8 MG: 2 INJECTION INTRAMUSCULAR; INTRAVENOUS at 23:44

## 2017-05-31 RX ADMIN — OXYCODONE HYDROCHLORIDE 5 MG: 5 TABLET ORAL at 15:49

## 2017-05-31 RX ADMIN — ONDANSETRON 8 MG: 2 INJECTION INTRAMUSCULAR; INTRAVENOUS at 15:46

## 2017-05-31 RX ADMIN — MEPERIDINE HYDROCHLORIDE 25 MG: 25 INJECTION, SOLUTION INTRAMUSCULAR; INTRAVENOUS; SUBCUTANEOUS at 17:52

## 2017-05-31 RX ADMIN — ACETAMINOPHEN 650 MG: 325 TABLET, FILM COATED ORAL at 15:49

## 2017-05-31 RX ADMIN — RANITIDINE HYDROCHLORIDE 150 MG: 150 TABLET, FILM COATED ORAL at 15:12

## 2017-05-31 RX ADMIN — ACETAMINOPHEN 650 MG: 325 TABLET, FILM COATED ORAL at 20:14

## 2017-05-31 ASSESSMENT — PAIN DESCRIPTION - DESCRIPTORS: DESCRIPTORS: SHARP

## 2017-05-31 ASSESSMENT — PAIN SCALES - GENERAL: PAINLEVEL: MODERATE PAIN (4)

## 2017-05-31 NOTE — PROGRESS NOTES
Hematology-Oncology Visit  May 31, 2017    Reason for Visit: follow-up metastatic renal cell carcinoma    HPI: Julio John is a 47 year old gentleman without significant past medical history with metastatic renal cell carcinoma to lungs. He presented with back pain, fevers, BRBPR with hemorrhoids. He had a CT scan which revealed left kidney cancer. He has left nephrectomy 3/2016. He was note to have lung nodules on left lung on surveillance scan. He had stereotactic radiation to lung. Follow-up scans showed new nodules in R lung. He was referred to Dr. Painting for consideration of IL-2. Dr. Painting deemed him a good candidate. He had echo, PFTs, brain MRI, bone scan, and CT CAP as a baseline prior to starting therapy on 4/17/17.     C1 4/17/17 (10 doses)  C2 5/3/17 (4 doses)    Interval History:   Julio is here today with his wife and youngest two children.      He has recovered from his previous therapy. He has discomfort in both his shoulders. He has pain with raising either of his arms. He has cough which is mostly dry. He gets pain with coughing episodes. He has noticed pain in his RUQ which extends to the RLQ. The pain is intermittent with no obvious precipitating or relieving factors.     Review of Systems: Patient denies any of the following except if noted above: fever, chills, vision or hearing changes, chest pain, cough, dyspnea, abdominal pain, nausea, vomiting, diarrhea, constipation, urinary concerns, headaches, numbness, tingling or issues with mood.     Current Outpatient Prescriptions   Medication     hydrOXYzine (ATARAX) 50 MG tablet     cetirizine (ZYRTEC) 10 MG tablet     ranitidine (ZANTAC) 150 MG tablet     Skin Protectants, Misc. (EUCERIN) cream     diphenhydrAMINE (BENADRYL) 50 MG capsule     acetaminophen (TYLENOL) 500 MG tablet     oxyCODONE (ROXICODONE) 5 MG IR tablet     prochlorperazine (COMPAZINE) 10 MG tablet     ondansetron (ZOFRAN) 4 MG tablet     No current facility-administered  "medications for this visit.      Facility-Administered Medications Ordered in Other Visits   Medication     lidocaine 1 % 0.5-5 mL     sodium chloride (PF) 0.9% PF flush 5-50 mL       PHYSICAL EXAM:  /84  Pulse 77  Temp 97.3  F (36.3  C) (Oral)  Resp 17  Ht 1.676 m (5' 5.98\")  Wt 102.2 kg (225 lb 6.4 oz)  SpO2 95%  BMI 36.4 kg/m2  General: Alert, oriented, pleasant, NAD  Skin: No rashes, bruising, or petechiae on exposed skin   HEENT: Normocephalic, atraumatic, PERRLA, EOMI. Moist mucus membranes, no lesions or thrush. No erythema.   Neck: No cervical or supraclavicular LAD.   Axillary: No LAD  Lungs: CTA bilaterally, normal work of breathing. No wheezing, crackles or rhonchi  Cardiac: RRR, S1, S2, no murmurs  Abdomen: Soft, nontender, nondistended. Normoactive bowel sounds. No hepatosplenomegaly, masses  Neuro: CNII-XII grossly intact  Extremities: No pedal edema.    MSK: bilateral shoulder examination done today.  Testing for rotator cuff muscles were +     Labs:     Recent Labs   Lab Test  05/31/17   0837  05/23/17   1036  05/16/17   0641  05/15/17   0531  05/14/17   1837   NA  138  141  143  141  137   POTASSIUM  3.8  4.3  3.8  3.8  3.4   CHLORIDE  107  109  114*  111*  106   CO2  22  23  23  22  22   ANIONGAP  9  9  6  7  9   BUN  14  18  10  15  18   CR  0.98  1.06  1.25  1.49*  1.52*   GLC  105*  106*  92  95  97   KVNG  8.1*  8.6  7.8*  7.1*  8.0*     Recent Labs   Lab Test  05/31/17   0837  05/12/17   0948  05/07/17   2339  05/06/17   0557  05/05/17   0628  05/04/17   1801   05/04/17   0440   MAG  2.2  2.3  2.0  2.3  2.3   --    < >  1.5*   PHOS   --   3.0   --   1.6*  2.3*  3.2   --   1.4*    < > = values in this interval not displayed.     Recent Labs   Lab Test  05/31/17   0837  05/23/17   1036  05/16/17   0641  05/15/17   0531  05/14/17   1837  05/12/17   0948   WBC  6.3  5.3  6.3  12.0*  15.4*  8.4   HGB  14.8  14.9  12.4*  12.9*  14.8  14.3   PLT  173  236  128*  139*  144*  178   MCV  86  " 93  87  87  86  87   NEUTROPHIL  37.4  35.8  30.3   --   89.0  36.8     Recent Labs   Lab Test  05/31/17   0837  05/23/17   1036  05/16/17   0641   05/06/17   0557  05/05/17   0628   BILITOTAL  0.5  0.4  0.5   < >  1.5*  1.0   ALKPHOS  66  60  40   < >  46  44   ALT  29  32  17   < >  40  37   AST  14  20  14   < >  29  23   ALBUMIN  3.4  3.5  2.6*   < >  2.1*  2.2*   LDH  127   --    --    --   203  162    < > = values in this interval not displayed.     CT CHEST, ABDOMEN AND PELVIS WITH CONTRAST   5/30/2017 8:53 AM      HISTORY: Renal cell cancer with metastases to the lungs.     COMPARISON: 5/14/2017 and 4/12/2017.      FINDINGS:      Chest: Small irregular opacities associated with volume loss in the  central aspects of the upper left lung (series 3 image 19) and lower  left lung (series 3 image 49), unchanged. A few small nodules  scattered within both lungs, unchanged. For example, there are a 1.1 x  0.9 cm nodule in the upper left lung (series 3 image 18) and a 0.4 cm  nodule in the anterolateral aspect of the mid right lung (series 3  image 26). No pleural or pericardial effusion. No enlarged lymph nodes  in the chest.     Abdomen: Subtle subcentimeter low-attenuation focus in the posterior  segment of the right lobe of liver (series 2 image 67). In retrospect,  this was also present on 4/12/2017. The spleen, pancreas, right  adrenal gland and right kidney are unremarkable. Prior left  nephrectomy. The left adrenal gland is not visualized and may also  have been resected. The gallbladder is present. No enlarged lymph  nodes or free fluid in the upper abdomen.     Pelvis: The small and large bowel are normal in caliber. The appendix  is unremarkable. No bowel wall thickening, pneumatosis or free  intraperitoneal gas. No enlarged lymph nodes or free fluid in the  pelvis.         IMPRESSION:   1. No convincing interval change since 5/14/2017.  2. A few nodular opacities scattered within both lungs,  unchanged.  3. A nonspecific subtle subcentimeter low-attenuation focus in the  right lobe of liver, unchanged since 4/12/2017.     AJAY HOLDEN MD         Results for orders placed or performed during the hospital encounter of 05/30/17   NM Bone Scan Whole Body    Narrative    NUCLEAR MEDICINE BONE SCAN WHOLE BODY   5/30/2017 11:47 AM     TECHNIQUE:  26.0 mCi 99mTc-HDP    HISTORY:  Malignant neoplasm of left kidney, except renal pelvis.  Malignant neoplasm of unspecified kidney, except renal pelvis.    COMPARISON:   Nuclear Study: 4/12/2017.  Other Relevant Studies: Ultrasound 5/14/2017, CT abdomen and pelvis  5/9/2017.    FINDINGS:  No uptake is seen in the left kidney, consistent with  history of left nephrectomy. Degenerative-type uptake is seen in both  knees and the acromioclavicular joints. No other abnormal osseous  uptake is identified. No soft tissue abnormality noted.    No radiotracer uptake to suggest osseous metastatic disease.       Assessment & Plan:   1. Metastatic renal cell carcinoma with pulmonary metastasis: S/p left nephrectomy and XRT to left lung. Now with disease involving R lung. He started on treatment with IL-2 and is s/p 2 cycles. He tolerated 10 doses with cycle 1 and only 4 doses with the cycle 2    He has recovered well and all his labs are back to baseline. He has no major complains. I have reviewed actual images from his restaging scans including CT and bone scans. He has stable disease which is good news. Previously he had progressive disease. I would recommend that we complete 4 inpatient cycles of therapy. He tolerated barely 4 doses with cycle 2. Hopefully, he would do much better this time around. If he is not ready in 2 weeks for additional therapy, I will give him some additional time so that he recovers better and receives more therapy.     2. Shoulder pains: bilateral shoulder pain- on exam seem consistent with rotator cuff and since bilateral, more likely to be an  injury than metastatic disease.  It is odd to have bilateral injuries at the same time. But this has nuisance value but relatively low seriousness. He does not have any known disease in the region.     3. Cough: Julio had a cough post IL-2 C2.  There was some concern about this inpatient as his scan showed one of his pulm lesions was slightly infectious appearing.  He was treated with IV antibiotics and dc'd on Levaquin. Of note his RVP was + for Rhinovirus. He continues to have cough - though this is getting better.

## 2017-05-31 NOTE — LETTER
5/31/2017       RE: Julio John  [address]    [facility] [id number]     Dear Colleague,    Thank you for referring your patient, Julio John, to the [facility]. Please see a copy of my visit note below.    Hematology-Oncology Visit  May 31, 2017    Reason for Visit: follow-up metastatic renal cell carcinoma    HPI: Julio John is a 47 year old gentleman without significant past medical history with metastatic renal cell carcinoma to lungs. He presented with back pain, fevers, BRBPR with hemorrhoids. He had a CT scan which revealed left kidney cancer. He has left nephrectomy 3/2016. He was note to have lung nodules on left lung on surveillance scan. He had stereotactic radiation to lung. Follow-up scans showed new nodules in R lung. He was referred to Dr. Painting for consideration of IL-2. Dr. Painting deemed him a good candidate. He had echo, PFTs, brain MRI, bone scan, and CT CAP as a baseline prior to starting therapy on 4/17/17.     C1 4/17/17 (10 doses)  C2 5/3/17 (4 doses)    Interval History:   Julio is here today with his wife and youngest two children.      He has recovered from his previous therapy. He has discomfort in both his shoulders. He has pain with raising either of his arms. He has cough which is mostly dry. He gets pain with coughing episodes. He has noticed pain in his RUQ which extends to the RLQ. The pain is intermittent with no obvious precipitating or relieving factors.     Review of Systems: Patient denies any of the following except if noted above: fever, chills, vision or hearing changes, chest pain, cough, dyspnea, abdominal pain, nausea, vomiting, diarrhea, constipation, urinary concerns, headaches, numbness, tingling or issues with mood.     Current Outpatient Prescriptions   Medication     hydrOXYzine (ATARAX) 50 MG tablet     cetirizine (ZYRTEC) 10 MG tablet     ranitidine (ZANTAC) 150 MG tablet     Skin Protectants, Misc.  "(EUCERIN) cream     diphenhydrAMINE (BENADRYL) 50 MG capsule     acetaminophen (TYLENOL) 500 MG tablet     oxyCODONE (ROXICODONE) 5 MG IR tablet     prochlorperazine (COMPAZINE) 10 MG tablet     ondansetron (ZOFRAN) 4 MG tablet     No current facility-administered medications for this visit.      Facility-Administered Medications Ordered in Other Visits   Medication     lidocaine 1 % 0.5-5 mL     sodium chloride (PF) 0.9% PF flush 5-50 mL       PHYSICAL EXAM:  /84  Pulse 77  Temp 97.3  F (36.3  C) (Oral)  Resp 17  Ht 1.676 m (5' 5.98\")  Wt 102.2 kg (225 lb 6.4 oz)  SpO2 95%  BMI 36.4 kg/m2  General: Alert, oriented, pleasant, NAD  Skin: No rashes, bruising, or petechiae on exposed skin   HEENT: Normocephalic, atraumatic, PERRLA, EOMI. Moist mucus membranes, no lesions or thrush. No erythema.   Neck: No cervical or supraclavicular LAD.   Axillary: No LAD  Lungs: CTA bilaterally, normal work of breathing. No wheezing, crackles or rhonchi  Cardiac: RRR, S1, S2, no murmurs  Abdomen: Soft, nontender, nondistended. Normoactive bowel sounds. No hepatosplenomegaly, masses  Neuro: CNII-XII grossly intact  Extremities: No pedal edema.    MSK: bilateral shoulder examination done today.  Testing for rotator cuff muscles were +     Labs:     Recent Labs   Lab Test  05/31/17   0837  05/23/17   1036  05/16/17   0641  05/15/17   0531  05/14/17   1837   NA  138  141  143  141  137   POTASSIUM  3.8  4.3  3.8  3.8  3.4   CHLORIDE  107  109  114*  111*  106   CO2  22  23  23  22  22   ANIONGAP  9  9  6  7  9   BUN  14  18  10  15  18   CR  0.98  1.06  1.25  1.49*  1.52*   GLC  105*  106*  92  95  97   KVNG  8.1*  8.6  7.8*  7.1*  8.0*     Recent Labs   Lab Test  05/31/17   0837  05/12/17   0948  05/07/17   2339  05/06/17   0557  05/05/17   0628  05/04/17   1801   05/04/17   0440   MAG  2.2  2.3  2.0  2.3  2.3   --    < >  1.5*   PHOS   --   3.0   --   1.6*  2.3*  3.2   --   1.4*    < > = values in this interval not " displayed.     Recent Labs   Lab Test  05/31/17   0837  05/23/17   1036  05/16/17   0641  05/15/17   0531  05/14/17   1837  05/12/17   0948   WBC  6.3  5.3  6.3  12.0*  15.4*  8.4   HGB  14.8  14.9  12.4*  12.9*  14.8  14.3   PLT  173  236  128*  139*  144*  178   MCV  86  93  87  87  86  87   NEUTROPHIL  37.4  35.8  30.3   --   89.0  36.8     Recent Labs   Lab Test  05/31/17   0837  05/23/17   1036  05/16/17   0641   05/06/17   0557  05/05/17   0628   BILITOTAL  0.5  0.4  0.5   < >  1.5*  1.0   ALKPHOS  66  60  40   < >  46  44   ALT  29  32  17   < >  40  37   AST  14  20  14   < >  29  23   ALBUMIN  3.4  3.5  2.6*   < >  2.1*  2.2*   LDH  127   --    --    --   203  162    < > = values in this interval not displayed.     CT CHEST, ABDOMEN AND PELVIS WITH CONTRAST   5/30/2017 8:53 AM      HISTORY: Renal cell cancer with metastases to the lungs.     COMPARISON: 5/14/2017 and 4/12/2017.      FINDINGS:      Chest: Small irregular opacities associated with volume loss in the  central aspects of the upper left lung (series 3 image 19) and lower  left lung (series 3 image 49), unchanged. A few small nodules  scattered within both lungs, unchanged. For example, there are a 1.1 x  0.9 cm nodule in the upper left lung (series 3 image 18) and a 0.4 cm  nodule in the anterolateral aspect of the mid right lung (series 3  image 26). No pleural or pericardial effusion. No enlarged lymph nodes  in the chest.     Abdomen: Subtle subcentimeter low-attenuation focus in the posterior  segment of the right lobe of liver (series 2 image 67). In retrospect,  this was also present on 4/12/2017. The spleen, pancreas, right  adrenal gland and right kidney are unremarkable. Prior left  nephrectomy. The left adrenal gland is not visualized and may also  have been resected. The gallbladder is present. No enlarged lymph  nodes or free fluid in the upper abdomen.     Pelvis: The small and large bowel are normal in caliber. The appendix  is  unremarkable. No bowel wall thickening, pneumatosis or free  intraperitoneal gas. No enlarged lymph nodes or free fluid in the  pelvis.         IMPRESSION:   1. No convincing interval change since 5/14/2017.  2. A few nodular opacities scattered within both lungs, unchanged.  3. A nonspecific subtle subcentimeter low-attenuation focus in the  right lobe of liver, unchanged since 4/12/2017.     AJAY HOLDEN MD         Results for orders placed or performed during the hospital encounter of 05/30/17   NM Bone Scan Whole Body    Narrative    NUCLEAR MEDICINE BONE SCAN WHOLE BODY   5/30/2017 11:47 AM     TECHNIQUE:  26.0 mCi 99mTc-HDP    HISTORY:  Malignant neoplasm of left kidney, except renal pelvis.  Malignant neoplasm of unspecified kidney, except renal pelvis.    COMPARISON:   Nuclear Study: 4/12/2017.  Other Relevant Studies: Ultrasound 5/14/2017, CT abdomen and pelvis  5/9/2017.    FINDINGS:  No uptake is seen in the left kidney, consistent with  history of left nephrectomy. Degenerative-type uptake is seen in both  knees and the acromioclavicular joints. No other abnormal osseous  uptake is identified. No soft tissue abnormality noted.    No radiotracer uptake to suggest osseous metastatic disease.       Assessment & Plan:   1. Metastatic renal cell carcinoma with pulmonary metastasis: S/p left nephrectomy and XRT to left lung. Now with disease involving R lung. He started on treatment with IL-2 and is s/p 2 cycles. He tolerated 10 doses with cycle 1 and only 4 doses with the cycle 2    He has recovered well and all his labs are back to baseline. He has no major complains. I have reviewed actual images from his restaging scans including CT and bone scans. He has stable disease which is good news. Previously he had progressive disease. I would recommend that we complete 4 inpatient cycles of therapy. He tolerated barely 4 doses with cycle 2. Hopefully, he would do much better this time around. If he is not  ready in 2 weeks for additional therapy, I will give him some additional time so that he recovers better and receives more therapy.     2. Shoulder pains: bilateral shoulder pain- on exam seem consistent with rotator cuff and since bilateral, more likely to be an injury than metastatic disease.  It is odd to have bilateral injuries at the same time. But this has nuisance value but relatively low seriousness. He does not have any known disease in the region.     3. Cough: Julio had a cough post IL-2 C2.  There was some concern about this inpatient as his scan showed one of his pulm lesions was slightly infectious appearing.  He was treated with IV antibiotics and dc'd on Levaquin. Of note his RVP was + for Rhinovirus. He continues to have cough - though this is getting better.       Again, thank you for allowing me to participate in the care of your patient.      Sincerely,    Ludwin Painting MD

## 2017-05-31 NOTE — IP AVS SNAPSHOT
[address] [address]    Phone:  [phone]                                       After Visit Summary   5/31/2017    Julio John    MRN: [id number]           After Visit Summary Signature Page     I have received my discharge instructions, and my questions have been answered. I have discussed any challenges I see with this plan with the nurse or doctor.    ..........................................................................................................................................  Patient/Patient Representative Signature      ..........................................................................................................................................  Patient Representative Print Name and Relationship to Patient    ..................................................               ................................................  Date                                            Time    ..........................................................................................................................................  Reviewed by Signature/Title    ...................................................              ..............................................  Date                                                            Time

## 2017-05-31 NOTE — IP AVS SNAPSHOT
MRN:[id number]                      After Visit Summary   5/31/2017    Julio John    MRN: [id number]           Thank you!     Thank you for choosing [facility] for your care. Our goal is always to provide you with excellent care. Hearing back from our patients is one way we can continue to improve our services. Please take a few minutes to complete the written survey that you may receive in the mail after you visit with us. Thank you!        Patient Information     Date Of Birth          1970        Designated Caregiver       Most Recent Value    Caregiver    Will someone help with your care after discharge? yes    Name of designated caregiver Alda Billings    Phone number of caregiver [phone]    Caregiver address [address] [address], [place] [address]      About your hospital stay     You were admitted on:  May 31, 2017 You last received care in the:  [address] [facility]    You were discharged on:  June 2, 2017        Reason for your hospital stay       You were hospitalized for IL-2 therapy for kidney cancer.                  Who to Call     For medical emergencies, please call 911.  For non-urgent questions about your medical care, please call your primary care provider or clinic, [phone]          Attending Provider     Provider Specialty    Yamila Leonardo MD Hematology & Oncology    Meche Banks MD Hematology & Oncology       Primary Care Provider Office Phone # Fax #    Uchemadu MD Doc [phone]       When to contact your care team       Please call the [facility] [phone] for fever (temp >100.5), chills, uncontrolled nausea, vomiting, constipation, or diarrhea, unrelieved pain, bleeding not relieved with pressure, dizziness, chest pain, shortness of breath, loss of consciousness, and any new or concerning symptoms.                  After Care Instructions     Activity        Your activity upon discharge: activity as tolerated            Diet       Follow this diet upon discharge: Regular Diet                  Follow-up Appointments     Adult [facility] Follow-up and recommended labs and tests       Follow up with JAZMYNE Banks as scheduled on 6/7/17.    Appointments on [place] and/or [facility] (with [facility] or [facility] provider or service). Call [phone] if you haven't heard regarding these appointments within 7 days of discharge.                  Your next 10 appointments already scheduled     Jun 07, 2017  2:15 PM CDT   [facility] with  [employer] [facility]   [facility] ([facility])    [address] [id number] [phone]            Jun 07, 2017  2:40 PM CDT   (Arrive by 2:25 PM)   Return Visit with JAZMYNE Barron   [facility] ([facility])    [address] [place] [id number] [phone]              Future tests that were ordered for you     CBC with platelets differential       Last Lab Result: Hemoglobin (g/dL)       Date                     Value                 06/02/2017               12.6 (L)         ----------            Comprehensive metabolic panel                 Pending Results     Date and Time Order Name Status Description    6/1/2017 2330 Blood culture Preliminary     5/31/2017 2330 Blood culture Preliminary             Statement of Approval     Ordered          06/02/17 1414  I have reviewed and agree with all the recommendations and orders detailed in this document.  EFFECTIVE NOW     Approved and electronically signed by:  Mila Rose PA-C             Admission Information     Date & Time Provider Department Dept. Phone    5/31/2017 Meche Banks MD [address] [facility] [place] [phone]      Your Vitals Were     Blood Pressure Pulse Temperature Respirations Height Weight    90/50 (BP  "Location: Right arm, Cuff Size: Adult Large) 69 97.8  F (36.6  C) (Oral) 18 1.676 m (5' 6\") 105.6 kg (232 lb 14.4 oz)    Pulse Oximetry BMI (Body Mass Index)                98% 37.59 kg/m2          [id number] Information     [employer] lets you send messages to your doctor, view your test results, renew your prescriptions, schedule appointments and more. To sign up, go to [email or web address] . Click on \"Log in\" on the left side of the screen, which will take you to the Welcome page. Then click on \"Sign up Now\" on the right side of the page.     You will be asked to enter the access code listed below, as well as some personal information. Please follow the directions to create your username and password.     Your access code is: [email or web address]  Expires: 2017 10:24 AM     Your access code will  in 90 days. If you need help or a new code, please call your [facility] clinic or [phone].        Care EveryWhere ID     This is your Care EveryWhere ID. This could be used by other organizations to access your [facility] medical records  [id number]           Review of your medicines      UNREVIEWED medicines. Ask your doctor about these medicines        Dose / Directions    acetaminophen 500 MG tablet   Commonly known as:  TYLENOL   Used for:  Malignant neoplasm of left kidney (H)        Dose:  500 mg   Take 1 tablet (500 mg) by mouth every 6 hours as needed for mild pain   Quantity:  1 Bottle   Refills:  1       cetirizine 10 MG tablet   Commonly known as:  zyrTEC   Used for:  Clear cell carcinoma (H)        Dose:  10 mg   Take 1 tablet (10 mg) by mouth daily   Quantity:  30 tablet   Refills:  0       diphenhydrAMINE 50 MG capsule   Commonly known as:  BENADRYL   Used for:  Clear cell carcinoma (H)        Dose:  50 mg   Take 1 capsule (50 mg) by mouth every 6 hours as needed for itching   Quantity:  56 capsule   Refills:  0       eucerin cream   Used for:  Malignant neoplasm of left kidney (H)        Apply topically " 2 times daily Reported on 5/12/2017   Refills:  0       hydrOXYzine 50 MG tablet   Commonly known as:  ATARAX   Used for:  Clear cell carcinoma (H)        Dose:  50 mg   Take 1 tablet (50 mg) by mouth every 4 hours as needed for itching or anxiety   Quantity:  30 tablet   Refills:  1       ondansetron 4 MG tablet   Commonly known as:  ZOFRAN   Used for:  Malignant neoplasm of left kidney (H)        Dose:  8 mg   Take 2 tablets (8 mg) by mouth every 8 hours as needed for nausea   Quantity:  18 tablet   Refills:  0       oxyCODONE 5 MG IR tablet   Commonly known as:  ROXICODONE   Used for:  Malignant neoplasm of left kidney (H)        Dose:  5 mg   Take 1 tablet (5 mg) by mouth every 4 hours as needed for moderate to severe pain   Quantity:  10 tablet   Refills:  0       prochlorperazine 10 MG tablet   Commonly known as:  COMPAZINE   Used for:  Malignant neoplasm of left kidney (H)        Dose:  10 mg   Take 1 tablet (10 mg) by mouth every 6 hours as needed (Breakthrough Nausea/Vomiting)   Quantity:  30 tablet   Refills:  0       ranitidine 150 MG tablet   Commonly known as:  ZANTAC   Used for:  Clear cell carcinoma (H)        Dose:  150 mg   Take 1 tablet (150 mg) by mouth 2 times daily   Quantity:  60 tablet   Refills:  0                Protect others around you: Learn how to safely use, store and throw away your medicines at www.disposemymeds.org.             Medication List: This is a list of all your medications and when to take them. Check marks below indicate your daily home schedule. Keep this list as a reference.      Medications           Morning Afternoon Evening Bedtime As Needed    acetaminophen 500 MG tablet   Commonly known as:  TYLENOL   Take 1 tablet (500 mg) by mouth every 6 hours as needed for mild pain   Last time this was given:  650 mg on 6/2/2017  9:04 AM                                   cetirizine 10 MG tablet   Commonly known as:  zyrTEC   Take 1 tablet (10 mg) by mouth daily                                    diphenhydrAMINE 50 MG capsule   Commonly known as:  BENADRYL   Take 1 capsule (50 mg) by mouth every 6 hours as needed for itching                                   eucerin cream   Apply topically 2 times daily Reported on 5/12/2017   Last time this was given:  6/2/2017  9:02 AM                                   hydrOXYzine 50 MG tablet   Commonly known as:  ATARAX   Take 1 tablet (50 mg) by mouth every 4 hours as needed for itching or anxiety                                   ondansetron 4 MG tablet   Commonly known as:  ZOFRAN   Take 2 tablets (8 mg) by mouth every 8 hours as needed for nausea                                   oxyCODONE 5 MG IR tablet   Commonly known as:  ROXICODONE   Take 1 tablet (5 mg) by mouth every 4 hours as needed for moderate to severe pain   Last time this was given:  5 mg on 6/2/2017  3:18 AM                                   prochlorperazine 10 MG tablet   Commonly known as:  COMPAZINE   Take 1 tablet (10 mg) by mouth every 6 hours as needed (Breakthrough Nausea/Vomiting)                                   ranitidine 150 MG tablet   Commonly known as:  ZANTAC   Take 1 tablet (150 mg) by mouth 2 times daily   Last time this was given:  150 mg on 6/2/2017  9:04 AM

## 2017-05-31 NOTE — NURSING NOTE
Chief Complaint   Patient presents with     Blood Draw     Vitals taken. Labs collected and sent. Pt checkeed in to next appt.  Nisha Rodgers

## 2017-05-31 NOTE — H&P
[facility], [place]    History and Physical  Hematology / Oncology     Date of Admission:  5/31/2017  Date of Service (when I saw the patient): 05/31/17    Assessment & Plan   Julio John is a 47 year old man with metastatic renal cell carcinoma to the lungs. He is admitted for cycle 3 HD IL-2 therapy.      #Metastatic RCC. S/p L nephrectomy, XRT to L lung, and cycle 2 HD IL-2. Stage CT scan 5/30 revealed stable disease. Admitted for cycle 3 IL-2. Pt feeling well. Labs and vitals reviewed and adequate to proceed with treatment.      IL-2 Cycle 3 Treatment Plan. (Day 1=5/21/17)  -Aldesleukin 64 million units IV q8 hours x14 doses or as many as tolerated.   -D5 NS + KCl 20meq at 100cc/hr.  -NS bolus prn SBP <85 or UOP <30cc/hr (<240 cc/8 hr), may repeat x1.  -Premedicate with zofran q8h and tylenol q4h.   -Zantac bid, keflex bid.   -PRN: benadryl, demerol, naprosyn, lomotil, compazine, ativan.   -Avoid steroids and diuretics.   -Oxycodone prn pain.      #Bilateral shoulder pain. Reports has worsened since last IL-2 cycle. Pain is located on superior aspect of shoulder and worse with flexion. Reports no trauma in the past couple weeks, but did fall on ice back in February and believes he had left shoulder pain after that incident. Bone scan 5/27 negative for metastatic disease, but did show degenerative uptake in AC joints.   -PT/OT  -Consider outpatient MRI for possible rotator cuff tear if no improvement with rehab.    #Elevated eosinophils. Recently admitted 5/14-5/16 for rash, fever and pruritis. Believed to be an allergic reaction vs exposure to irritant. Symptoms improved with medication control. Absolute eosinophil count 1.4 on admission.  -Continue Zyrtec, Ranitidine and prn Benadryl and Atarax.     FEN   -IVF per treatment plan   -PRN lyte replacement  -RDAT     PPx  -DVT: Mechanical 2/2 developing TCP  -GI: Ranitidine 150 mg BID    Code status: Full code    Above plan  discussed with staff physician, Dr. Qamar Acevedo PA-C  Hematology/Oncology  Pager: [phone]    Code Status   Full Code    Primary Care Physician   Tao Roach    Chief Complaint   Admission for cycle 3 IL-2    History is obtained from the patient    History of Present Illness   Obtained from chart review and discussed with patient.     Julio John is a 47 year old man with metastatic renal cell carcinoma to the lungs. He is admitted for cycle 2 HD IL-2 therapy. He initially presented with back pain, fevers, BRBPR with hemorrhoids. He had a CT scan that revealed left kidney cancer. He had left nephrectomy 3/2016. He was noted to have lung nodules on left lung on surveillance scan. He had stereotactic radiation to lung. Follow-up scans showed new nodules in right lung. He was referred to Dr. Painting for consideration of IL-2. Dr. Painting deemed him a good candidate. He completed 8 doses of IL2 in cycle 1 and 4 doses in cycle 2. Restaging CT after cycle 2 revealed stable disease. He will proceed with cycle 3 and 4.     On admission, pt reports he has been doing well since last admission mid May for abdominal pain, fevers, rash, and pruritis. His abdominal pain has resolved, but does report it intermittently will act up after eating. No fevers, but he did feel warm a couple days ago. His shoulder have been causing him worsening discomfort since his last cycle of IL-2. Reports it's bilateral and worsens with movement. He also had palpitations yesterday after eating a piece of cake, but that has now resolved. Pt denies cough, chest pain, N/V, changes in bowel or bladder, and LE edema.    Past Medical History    I have reviewed this patient's medical history and updated it with pertinent information if needed.   Past Medical History:   Diagnosis Date     Metastatic renal cell carcinoma (H)        Past Surgical History   I have reviewed this patient's surgical history and updated it with  pertinent information if needed.  No past surgical history on file.    Prior to Admission Medications   Prior to Admission Medications   Prescriptions Last Dose Informant Patient Reported? Taking?   Skin Protectants, Misc. (EUCERIN) cream   Yes No   Sig: Apply topically 2 times daily Reported on 5/12/2017   acetaminophen (TYLENOL) 500 MG tablet   No No   Sig: Take 1 tablet (500 mg) by mouth every 6 hours as needed for mild pain   Patient not taking: Reported on 5/12/2017   cetirizine (ZYRTEC) 10 MG tablet   No No   Sig: Take 1 tablet (10 mg) by mouth daily   Patient taking differently: Take 10 mg by mouth daily as needed    diphenhydrAMINE (BENADRYL) 50 MG capsule   No No   Sig: Take 1 capsule (50 mg) by mouth every 6 hours as needed for itching   Patient not taking: Reported on 5/31/2017   hydrOXYzine (ATARAX) 50 MG tablet   No No   Sig: Take 1 tablet (50 mg) by mouth every 4 hours as needed for itching or anxiety   ondansetron (ZOFRAN) 4 MG tablet   No No   Sig: Take 2 tablets (8 mg) by mouth every 8 hours as needed for nausea   Patient not taking: Reported on 5/3/2017   oxyCODONE (ROXICODONE) 5 MG IR tablet   No No   Sig: Take 1 tablet (5 mg) by mouth every 4 hours as needed for moderate to severe pain   Patient not taking: Reported on 5/31/2017   prochlorperazine (COMPAZINE) 10 MG tablet   No No   Sig: Take 1 tablet (10 mg) by mouth every 6 hours as needed (Breakthrough Nausea/Vomiting)   Patient not taking: Reported on 5/3/2017   ranitidine (ZANTAC) 150 MG tablet   No No   Sig: Take 1 tablet (150 mg) by mouth 2 times daily   Patient taking differently: Take 150 mg by mouth 2 times daily as needed       Facility-Administered Medications: None     Allergies   No Known Allergies    Social History   I have reviewed this patient's social history and updated it with pertinent information if needed. Julio Baron John  reports that he has never smoked. He has never used smokeless tobacco. He reports that he does not  drink alcohol or use illicit drugs.    Family History   I have reviewed this patient's family history and updated it with pertinent information if needed.   No family history on file.    Review of Systems   The 10 point Review of Systems is negative other than noted in the HPI or here.     Physical Exam                      Vital Signs with Ranges  Temp:  [97.3  F (36.3  C)] 97.3  F (36.3  C)  Pulse:  [77] 77  Resp:  [17] 17  BP: (126)/(84) 126/84  SpO2:  [95 %] 95 %  0 lbs 0 oz    Constitutional: Pleasant male seen laying in bed. No apparent distress, and appears stated age.  Eyes: Lids and lashes normal, sclera clear, conjunctiva normal.  ENT: Normocephalic, without obvious abnormality, atraumatic, sinuses nontender on palpation, oral pharynx with moist mucus membranes, tonsils without erythema or exudates, gums normal and good dentition.   Respiratory: No increased work of breathing, good air exchange, clear to auscultation bilaterally, no crackles or wheezing.  Cardiovascular: Regular rate and rhythm, normal S1 and S2, and no murmur noted.  GI: No masses or scars. +BS. Soft. No tenderness on palpation.  Skin: No bruising or bleeding, normal skin color, texture, turgor, no redness, warmth, or swelling, no rashes, no lesions, no jaundice.  Extremities: +Empty can test bilaterally. Bilateral shoulder pain with flexion of arms. There is no redness, warmth, or swelling of the joints. No lower extremity edema. No cyanosis.  Neurologic: Awake, alert, oriented to name, place and time.     Data   ROUTINE IP LABS (Last four results)  BMP  Recent Labs  Lab 05/31/17  0837      POTASSIUM 3.8   CHLORIDE 107   KVNG 8.1*   CO2 22   BUN 14   CR 0.98   *     CBC  Recent Labs  Lab 05/31/17  0837   WBC 6.3   RBC 5.21   HGB 14.8   HCT 44.8   MCV 86   MCH 28.4   MCHC 33.0   RDW 14.3        INRNo lab results found in last 7 days.

## 2017-05-31 NOTE — MR AVS SNAPSHOT
After Visit Summary   5/31/2017    Julio John    MRN: [id number]           Patient Information     Date Of Birth          1970        Visit Information        Provider Department      5/31/2017 8:30 AM Ludwin Painting MD; [facility] [employer] [facility]        Today's Diagnoses     Malignant neoplasm of left kidney (H)        Clear cell renal cell carcinoma, unspecified laterality (H)           Follow-ups after your visit        Future tests that were ordered for you today     Open Standing Orders        Priority Remaining Interval Expires Ordered    XR Chest 1 View STAT 2/2 CONDITIONAL X 2  5/30/2017    XR Chest Port 1 View STAT 2/2 CONDITIONAL X 2  5/30/2017    Retention sutures Routine 87850/61918 PRN  5/30/2017    Magnesium Routine 3/4  5/30/2018 5/30/2017    CBC with platelets and differential Routine 3/4  5/30/2018 5/30/2017    Comprehensive metabolic panel (BMP + Alb, Alk Phos, ALT, AST, Total. Bili, TP) Routine 3/4  5/30/2018 5/30/2017    Lactate Dehydrogenase Routine 3/4  5/30/2018 5/30/2017            Who to contact     If you have questions or need follow up information about today's clinic visit or your schedule please contact [facility] directly at [phone].  Normal or non-critical lab and imaging results will be communicated to you by fitmobhart, letter or phone within 4 business days after the clinic has received the results. If you do not hear from us within 7 days, please contact the clinic through fitmobhart or phone. If you have a critical or abnormal lab result, we will notify you by phone as soon as possible.  Submit refill requests through [employer] or call your pharmacy and they will forward the refill request to us. Please allow 3 business days for your refill to be completed.          Additional Information About Your Visit        Coll[employer] Information     [employer] lets you send messages to your doctor, view your  "test results, renew your prescriptions, schedule appointments and more. To sign up, go to [email or web address] . Click on \"Log in\" on the left side of the screen, which will take you to the Welcome page. Then click on \"Sign up Now\" on the right side of the page.     You will be asked to enter the access code listed below, as well as some personal information. Please follow the directions to create your username and password.     Your access code is: [email or web address]  Expires: 2017 10:24 AM     Your access code will  in 90 days. If you need help or a new code, please call your [facility] clinic or [phone].        Care EveryWhere ID     This is your Care EveryWhere ID. This could be used by other organizations to access your [facility] medical records  [id number]        Your Vitals Were     Pulse Temperature Respirations Height Pulse Oximetry BMI (Body Mass Index)    77 97.3  F (36.3  C) (Oral) 17 1.676 m (5' 5.98\") 95% 36.4 kg/m2       Blood Pressure from Last 3 Encounters:   17 126/84   17 122/82   17 118/64    Weight from Last 3 Encounters:   17 102.2 kg (225 lb 6.4 oz)   17 100.5 kg (221 lb 9.6 oz)   17 100.8 kg (222 lb 3.6 oz)              We Performed the Following     CBC with platelets and differential     Comprehensive metabolic panel (BMP + Alb, Alk Phos, ALT, AST, Total. Bili, TP)     Lactate Dehydrogenase     Magnesium          Today's Medication Changes          These changes are accurate as of: 17  9:38 AM.  If you have any questions, ask your nurse or doctor.               These medicines have changed or have updated prescriptions.        Dose/Directions    cetirizine 10 MG tablet   Commonly known as:  zyrTEC   This may have changed:    - when to take this  - reasons to take this   Used for:  Clear cell carcinoma (H)        Dose:  10 mg   Take 1 tablet (10 mg) by mouth daily   Quantity:  30 tablet   Refills:  0       ranitidine 150 MG tablet "   Commonly known as:  ZANTAC   This may have changed:    - when to take this  - reasons to take this   Used for:  Clear cell carcinoma (H)        Dose:  150 mg   Take 1 tablet (150 mg) by mouth 2 times daily   Quantity:  60 tablet   Refills:  0                Primary Care Provider Office Phone # Fax #    Tao MD Doc [phone]       [facility] [address]  [facility] [address]        Thank you!     Thank you for choosing [facility]  for your care. Our goal is always to provide you with excellent care. Hearing back from our patients is one way we can continue to improve our services. Please take a few minutes to complete the written survey that you may receive in the mail after your visit with us. Thank you!             Your Updated Medication List - Protect others around you: Learn how to safely use, store and throw away your medicines at www.disposemymeds.org.          This list is accurate as of: 5/31/17  9:38 AM.  Always use your most recent med list.                   Brand Name Dispense Instructions for use    acetaminophen 500 MG tablet    TYLENOL    1 Bottle    Take 1 tablet (500 mg) by mouth every 6 hours as needed for mild pain       cetirizine 10 MG tablet    zyrTEC    30 tablet    Take 1 tablet (10 mg) by mouth daily       diphenhydrAMINE 50 MG capsule    BENADRYL    56 capsule    Take 1 capsule (50 mg) by mouth every 6 hours as needed for itching       eucerin cream      Apply topically 2 times daily Reported on 5/12/2017       hydrOXYzine 50 MG tablet    ATARAX    30 tablet    Take 1 tablet (50 mg) by mouth every 4 hours as needed for itching or anxiety       ondansetron 4 MG tablet    ZOFRAN    18 tablet    Take 2 tablets (8 mg) by mouth every 8 hours as needed for nausea       oxyCODONE 5 MG IR tablet    ROXICODONE    10 tablet    Take 1 tablet (5 mg) by mouth every 4 hours as needed for moderate to severe pain       prochlorperazine 10 MG  tablet    COMPAZINE    30 tablet    Take 1 tablet (10 mg) by mouth every 6 hours as needed (Breakthrough Nausea/Vomiting)       ranitidine 150 MG tablet    ZANTAC    60 tablet    Take 1 tablet (150 mg) by mouth 2 times daily

## 2017-05-31 NOTE — PLAN OF CARE
Problem: Goal Outcome Summary  Goal: Goal Outcome Summary  Outcome: No Change  VSS. Denied nausea. C/O bilateral shoulder pain (not new), declined intervention. Up ad paco in room and halls. Good po intake. Voiding without difficulty. BM today. Awaiting PICC line placement to begin IL-2 treatment.

## 2017-05-31 NOTE — PROGRESS NOTES
DATE/TIME  (DOT-TD, DOT-NOW) CHEMO CHECK ACTIVITY (REGIMEN & DOSE CHECK, DAY, DOSE #, NAME OF CHEMO #1)  CHEMO DRUG #2  CHEMO DRUG #3 NAME OF RN #1 (USE DOT-ME HERE) NAME OF RN#2 (2ND RN TO LOG IN SEPARATELY)   5/31/2017 11:25 AM IL-2 protocol double check   Piper Costello     5/31/2017 4:26 PM  Interleukin-2 dose 1double check   Shanti Pelletier     6/1/2017  00:55 PM Dose #2 IL-2 double check   Olga Butler     6/1/2017  9:42 AM     Dose #3 IL-2 double check   Jennie ledbetter   6/2/2017  2:46 AM   Dose #5 IL-2 double check   Desire Pepe

## 2017-05-31 NOTE — NURSING NOTE
"Oncology Rooming Note    May 31, 2017 9:06 AM   Julio John is a 47 year old male who presents for:    Chief Complaint   Patient presents with     Blood Draw     Oncology Clinic Visit     return patient visit for treatment follow up/treatment planning/CT scan results related to Renal cell carcinoma (H)     Initial Vitals: /84  Pulse 77  Temp 97.3  F (36.3  C) (Oral)  Resp 17  Ht 1.676 m (5' 5.98\")  Wt 102.2 kg (225 lb 6.4 oz)  SpO2 95%  BMI 36.4 kg/m2 Estimated body mass index is 36.4 kg/(m^2) as calculated from the following:    Height as of this encounter: 1.676 m (5' 5.98\").    Weight as of this encounter: 102.2 kg (225 lb 6.4 oz). Body surface area is 2.18 meters squared.  Moderate Pain (4) Comment: Data Unavailable   No LMP for male patient.  Allergies reviewed: Yes  Medications reviewed: Yes    Medications: Medication refills not needed today.  Pharmacy name entered into EPIC: Data Unavailable    Clinical concerns: moderate shoulder pain dr. segundo was notified.    5 minutes for nursing intake (face to face time)     Ronda Pino CMA              "

## 2017-05-31 NOTE — PLAN OF CARE
Problem: Chemotherapy Effects (Adult)  Goal: Signs and Symptoms of Listed Potential Problems Will be Absent or Manageable (Chemotherapy Effects)  Signs and symptoms of listed potential problems will be absent or manageable by discharge/transition of care (reference Chemotherapy Effects (Adult) CPG).   Outcome: No Change  D: Dose 1 Interleukin-2 via picc.  Picc with brisk blood return.  Pt had not been saving urine reports 3 good void since arrival on 7D.  Did void 100 ml pale yellow urine when asked, he states he will save all urine from now on.    I:  Interleukin-2 IV over 15 minutes.  Antiemetics as ordered, for breakthrough nausea  A:  Treatment begun.  Close watch.  P:  Continue to moniter and treat signs of capillary leak syndrome (see fluid flush and 02 orders).  Notify Dr per parameters or if with change in status.  Call light in reach.  Teaching ongoing.

## 2017-05-31 NOTE — PROGRESS NOTES
Nursing Focus: Admission    D: Arrived at 1030 from home. Admitted for cycle # 3 of IL-2.     I: Admission process began.  Patient oriented to room, enviroment, call light.  Md. notified of patients arrival on unit.     A: Vital signs stable, afebrile.  Patient stable at this time.      P: Implement plan of care when available. Continue to monitor patient. Nursing interventions as appropriate. Notify md with changes in pt status.

## 2017-06-01 ENCOUNTER — APPOINTMENT (OUTPATIENT)
Dept: PHYSICAL THERAPY | Facility: CLINIC | Age: 47
DRG: 838 | End: 2017-06-01
Attending: PHYSICIAN ASSISTANT
Payer: MEDICAID

## 2017-06-01 LAB
ALBUMIN SERPL-MCNC: 2.5 G/DL (ref 3.4–5)
ALBUMIN SERPL-MCNC: NORMAL G/DL (ref 3.4–5)
ALP SERPL-CCNC: 39 U/L (ref 40–150)
ALP SERPL-CCNC: NORMAL U/L (ref 40–150)
ALT SERPL W P-5'-P-CCNC: 28 U/L (ref 0–70)
ALT SERPL W P-5'-P-CCNC: NORMAL U/L (ref 0–70)
ANION GAP SERPL CALCULATED.3IONS-SCNC: 8 MMOL/L (ref 3–14)
ANION GAP SERPL CALCULATED.3IONS-SCNC: NORMAL MMOL/L (ref 6–17)
AST SERPL W P-5'-P-CCNC: 18 U/L (ref 0–45)
AST SERPL W P-5'-P-CCNC: NORMAL U/L (ref 0–45)
BASOPHILS # BLD AUTO: 0 10E9/L (ref 0–0.2)
BASOPHILS NFR BLD AUTO: 0.2 %
BILIRUB SERPL-MCNC: 0.9 MG/DL (ref 0.2–1.3)
BILIRUB SERPL-MCNC: NORMAL MG/DL (ref 0.2–1.3)
BUN SERPL-MCNC: 12 MG/DL (ref 7–30)
BUN SERPL-MCNC: NORMAL MG/DL (ref 7–30)
CALCIUM SERPL-MCNC: 7.2 MG/DL (ref 8.5–10.1)
CALCIUM SERPL-MCNC: NORMAL MG/DL (ref 8.5–10.1)
CHLORIDE SERPL-SCNC: 108 MMOL/L (ref 94–109)
CHLORIDE SERPL-SCNC: NORMAL MMOL/L (ref 94–109)
CK SERPL-CCNC: 59 U/L (ref 30–300)
CK SERPL-CCNC: NORMAL U/L (ref 30–300)
CO2 SERPL-SCNC: 22 MMOL/L (ref 20–32)
CO2 SERPL-SCNC: NORMAL MMOL/L (ref 20–32)
CREAT SERPL-MCNC: 1.24 MG/DL (ref 0.66–1.25)
CREAT SERPL-MCNC: NORMAL MG/DL (ref 0.66–1.25)
DIFFERENTIAL METHOD BLD: ABNORMAL
EOSINOPHIL # BLD AUTO: 0.7 10E9/L (ref 0–0.7)
EOSINOPHIL NFR BLD AUTO: 8.3 %
ERYTHROCYTE [DISTWIDTH] IN BLOOD BY AUTOMATED COUNT: 15.2 % (ref 10–15)
GFR SERPL CREATININE-BSD FRML MDRD: 62 ML/MIN/1.7M2
GFR SERPL CREATININE-BSD FRML MDRD: NORMAL ML/MIN/1.7M2
GLUCOSE SERPL-MCNC: 118 MG/DL (ref 70–99)
GLUCOSE SERPL-MCNC: NORMAL MG/DL (ref 70–99)
HCT VFR BLD AUTO: 43.1 % (ref 40–53)
HGB BLD-MCNC: 14.6 G/DL (ref 13.3–17.7)
IMM GRANULOCYTES # BLD: 0 10E9/L (ref 0–0.4)
IMM GRANULOCYTES NFR BLD: 0.2 %
INTERPRETATION ECG - MUSE: NORMAL
LDH SERPL L TO P-CCNC: 156 U/L (ref 85–227)
LDH SERPL L TO P-CCNC: NORMAL U/L (ref 85–227)
LYMPHOCYTES # BLD AUTO: 0.2 10E9/L (ref 0.8–5.3)
LYMPHOCYTES NFR BLD AUTO: 2 %
MAGNESIUM SERPL-MCNC: 1.5 MG/DL (ref 1.6–2.3)
MAGNESIUM SERPL-MCNC: 2.6 MG/DL (ref 1.6–2.3)
MAGNESIUM SERPL-MCNC: NORMAL MG/DL (ref 1.6–2.3)
MCH RBC QN AUTO: 28.8 PG (ref 26.5–33)
MCHC RBC AUTO-ENTMCNC: 33.9 G/DL (ref 31.5–36.5)
MCV RBC AUTO: 85 FL (ref 78–100)
MONOCYTES # BLD AUTO: 0.4 10E9/L (ref 0–1.3)
MONOCYTES NFR BLD AUTO: 4.6 %
NEUTROPHILS # BLD AUTO: 6.8 10E9/L (ref 1.6–8.3)
NEUTROPHILS NFR BLD AUTO: 84.7 %
NRBC # BLD AUTO: 0 10*3/UL
NRBC BLD AUTO-RTO: 0 /100
PHOSPHATE SERPL-MCNC: 2.7 MG/DL (ref 2.5–4.5)
PHOSPHATE SERPL-MCNC: NORMAL MG/DL (ref 2.5–4.5)
PLATELET # BLD AUTO: 93 10E9/L (ref 150–450)
POTASSIUM SERPL-SCNC: 4.1 MMOL/L (ref 3.4–5.3)
POTASSIUM SERPL-SCNC: NORMAL MMOL/L (ref 3.4–5.3)
PROT SERPL-MCNC: 5.5 G/DL (ref 6.8–8.8)
PROT SERPL-MCNC: NORMAL G/DL (ref 6.8–8.8)
RBC # BLD AUTO: 5.07 10E12/L (ref 4.4–5.9)
SODIUM SERPL-SCNC: 138 MMOL/L (ref 133–144)
SODIUM SERPL-SCNC: NORMAL MMOL/L (ref 133–144)
WBC # BLD AUTO: 8.1 10E9/L (ref 4–11)

## 2017-06-01 PROCEDURE — 40000193 ZZH STATISTIC PT WARD VISIT

## 2017-06-01 PROCEDURE — 82550 ASSAY OF CK (CPK): CPT | Performed by: INTERNAL MEDICINE

## 2017-06-01 PROCEDURE — 97530 THERAPEUTIC ACTIVITIES: CPT | Mod: GP

## 2017-06-01 PROCEDURE — 83735 ASSAY OF MAGNESIUM: CPT | Performed by: INTERNAL MEDICINE

## 2017-06-01 PROCEDURE — 87040 BLOOD CULTURE FOR BACTERIA: CPT | Performed by: INTERNAL MEDICINE

## 2017-06-01 PROCEDURE — 36592 COLLECT BLOOD FROM PICC: CPT | Performed by: INTERNAL MEDICINE

## 2017-06-01 PROCEDURE — 84100 ASSAY OF PHOSPHORUS: CPT | Performed by: INTERNAL MEDICINE

## 2017-06-01 PROCEDURE — 25000128 H RX IP 250 OP 636: Performed by: INTERNAL MEDICINE

## 2017-06-01 PROCEDURE — 25000132 ZZH RX MED GY IP 250 OP 250 PS 637: Performed by: INTERNAL MEDICINE

## 2017-06-01 PROCEDURE — 99232 SBSQ HOSP IP/OBS MODERATE 35: CPT | Performed by: INTERNAL MEDICINE

## 2017-06-01 PROCEDURE — 25000128 H RX IP 250 OP 636: Performed by: PHYSICIAN ASSISTANT

## 2017-06-01 PROCEDURE — 25800025 ZZH RX 258: Performed by: INTERNAL MEDICINE

## 2017-06-01 PROCEDURE — 97110 THERAPEUTIC EXERCISES: CPT | Mod: GP

## 2017-06-01 PROCEDURE — 80053 COMPREHEN METABOLIC PANEL: CPT | Performed by: INTERNAL MEDICINE

## 2017-06-01 PROCEDURE — 25000132 ZZH RX MED GY IP 250 OP 250 PS 637: Performed by: PHYSICIAN ASSISTANT

## 2017-06-01 PROCEDURE — T1013 SIGN LANG/ORAL INTERPRETER: HCPCS | Mod: U3

## 2017-06-01 PROCEDURE — 36415 COLL VENOUS BLD VENIPUNCTURE: CPT | Performed by: INTERNAL MEDICINE

## 2017-06-01 PROCEDURE — 40000558 ZZH STATISTIC CVC DRESSING CHANGE

## 2017-06-01 PROCEDURE — 83615 LACTATE (LD) (LDH) ENZYME: CPT | Performed by: INTERNAL MEDICINE

## 2017-06-01 PROCEDURE — 97162 PT EVAL MOD COMPLEX 30 MIN: CPT | Mod: GP

## 2017-06-01 PROCEDURE — 85025 COMPLETE CBC W/AUTO DIFF WBC: CPT | Performed by: INTERNAL MEDICINE

## 2017-06-01 PROCEDURE — 12000008 ZZH R&B INTERMEDIATE UMMC

## 2017-06-01 RX ADMIN — RANITIDINE HYDROCHLORIDE 150 MG: 150 TABLET, FILM COATED ORAL at 20:11

## 2017-06-01 RX ADMIN — CEPHALEXIN 500 MG: 500 CAPSULE ORAL at 20:11

## 2017-06-01 RX ADMIN — ONDANSETRON 8 MG: 2 INJECTION INTRAMUSCULAR; INTRAVENOUS at 16:05

## 2017-06-01 RX ADMIN — POTASSIUM CHLORIDE, DEXTROSE MONOHYDRATE AND SODIUM CHLORIDE: 150; 5; 900 INJECTION, SOLUTION INTRAVENOUS at 00:56

## 2017-06-01 RX ADMIN — ACETAMINOPHEN 650 MG: 325 TABLET, FILM COATED ORAL at 16:07

## 2017-06-01 RX ADMIN — MEPERIDINE HYDROCHLORIDE 25 MG: 25 INJECTION, SOLUTION INTRAMUSCULAR; INTRAVENOUS; SUBCUTANEOUS at 11:43

## 2017-06-01 RX ADMIN — ACETAMINOPHEN 650 MG: 325 TABLET, FILM COATED ORAL at 04:29

## 2017-06-01 RX ADMIN — LORAZEPAM 1 MG: 2 INJECTION INTRAMUSCULAR; INTRAVENOUS at 12:39

## 2017-06-01 RX ADMIN — ALDESLEUKIN 64 MILLION UNITS: 1.1 INJECTION, POWDER, LYOPHILIZED, FOR SOLUTION INTRAVENOUS at 10:07

## 2017-06-01 RX ADMIN — ACETAMINOPHEN 650 MG: 325 TABLET, FILM COATED ORAL at 08:10

## 2017-06-01 RX ADMIN — OXYCODONE HYDROCHLORIDE 5 MG: 5 TABLET ORAL at 10:10

## 2017-06-01 RX ADMIN — MAGNESIUM SULFATE IN WATER 4 G: 40 INJECTION, SOLUTION INTRAVENOUS at 10:09

## 2017-06-01 RX ADMIN — ALDESLEUKIN 64 MILLION UNITS: 1.1 INJECTION, POWDER, LYOPHILIZED, FOR SOLUTION INTRAVENOUS at 18:51

## 2017-06-01 RX ADMIN — NAPROXEN 250 MG: 250 TABLET ORAL at 12:41

## 2017-06-01 RX ADMIN — MEPERIDINE HYDROCHLORIDE 25 MG: 25 INJECTION, SOLUTION INTRAMUSCULAR; INTRAVENOUS; SUBCUTANEOUS at 02:31

## 2017-06-01 RX ADMIN — ALDESLEUKIN 64 MILLION UNITS: 1.1 INJECTION, POWDER, LYOPHILIZED, FOR SOLUTION INTRAVENOUS at 00:56

## 2017-06-01 RX ADMIN — MEPERIDINE HYDROCHLORIDE 12.5 MG: 25 INJECTION, SOLUTION INTRAMUSCULAR; INTRAVENOUS; SUBCUTANEOUS at 22:03

## 2017-06-01 RX ADMIN — MEPERIDINE HYDROCHLORIDE 12.5 MG: 25 INJECTION, SOLUTION INTRAMUSCULAR; INTRAVENOUS; SUBCUTANEOUS at 21:48

## 2017-06-01 RX ADMIN — ACETAMINOPHEN 650 MG: 325 TABLET, FILM COATED ORAL at 13:19

## 2017-06-01 RX ADMIN — ACETAMINOPHEN 650 MG: 325 TABLET, FILM COATED ORAL at 20:11

## 2017-06-01 RX ADMIN — PROCHLORPERAZINE EDISYLATE 10 MG: 5 INJECTION INTRAMUSCULAR; INTRAVENOUS at 11:44

## 2017-06-01 RX ADMIN — RANITIDINE HYDROCHLORIDE 150 MG: 150 TABLET, FILM COATED ORAL at 10:09

## 2017-06-01 RX ADMIN — OXYCODONE HYDROCHLORIDE 5 MG: 5 TABLET ORAL at 01:21

## 2017-06-01 RX ADMIN — OXYCODONE HYDROCHLORIDE 5 MG: 5 TABLET ORAL at 19:25

## 2017-06-01 RX ADMIN — POTASSIUM CHLORIDE, DEXTROSE MONOHYDRATE AND SODIUM CHLORIDE: 150; 5; 900 INJECTION, SOLUTION INTRAVENOUS at 22:52

## 2017-06-01 RX ADMIN — ONDANSETRON 8 MG: 2 INJECTION INTRAMUSCULAR; INTRAVENOUS at 08:10

## 2017-06-01 RX ADMIN — LORAZEPAM 1 MG: 2 INJECTION INTRAMUSCULAR; INTRAVENOUS at 21:44

## 2017-06-01 RX ADMIN — PROCHLORPERAZINE EDISYLATE 10 MG: 5 INJECTION INTRAMUSCULAR; INTRAVENOUS at 19:02

## 2017-06-01 RX ADMIN — CEPHALEXIN 500 MG: 500 CAPSULE ORAL at 08:10

## 2017-06-01 RX ADMIN — LORAZEPAM 0.5 MG: 2 INJECTION INTRAMUSCULAR; INTRAVENOUS at 03:39

## 2017-06-01 ASSESSMENT — PAIN DESCRIPTION - DESCRIPTORS
DESCRIPTORS: ACHING
DESCRIPTORS: ACHING
DESCRIPTORS: ACHING;CONSTANT
DESCRIPTORS: ACHING

## 2017-06-01 NOTE — PROGRESS NOTES
[facility], [place]    Hematology / Oncology Progress Note    Date of Service (when I saw the patient): 06/01/2017     Assessment & Plan   Julio John is a 47 year old man with metastatic renal cell carcinoma to the lungs. He is admitted for cycle 3 HD IL-2 therapy.       #Metastatic RCC. S/p L nephrectomy, XRT to L lung, and cycle 2 HD IL-2. Stage CT scan 5/30 revealed stable disease. Admitted for cycle 3 IL-2. Pt feeling well. Labs and vitals reviewed and adequate to proceed with treatment.   -PICC placed on admission      IL-2 Cycle 3 Treatment Plan. (Day 1=5/21/17). Completed 3 doses thus far.   -Aldesleukin 64 million units IV q8 hours x14 doses or as many as tolerated.   -D5 NS + KCl 20meq at 100cc/hr.  -NS bolus prn SBP <85 or UOP <30cc/hr (<240 cc/8 hr), may repeat x1.  -Premedicate with zofran q8h and tylenol q4h.   -Zantac bid, keflex bid.   -PRN: benadryl, demerol, naprosyn, lomotil, compazine, ativan.   -Avoid steroids and diuretics.   -Oxycodone prn pain.       #Bilateral shoulder pain. Reports has worsened since last IL-2 cycle. Pain is located on superior aspect of shoulder and worse with flexion. Reports no trauma in the past couple weeks, but did fall on ice back in February and believes he had left shoulder pain after that incident. Bone scan 5/27 negative for metastatic disease, but did show degenerative uptake in AC joints.   -PT/OT  -Consider outpatient MRI for possible rotator cuff tear if no improvement with rehab.     #Elevated eosinophils. Recently admitted 5/14-5/16 for rash, fever and pruritis. Believed to be an allergic reaction vs exposure to irritant. Symptoms improved with medication control. Absolute eosinophil count 1.4 on admission.  -Continue Zyrtec, Ranitidine and prn Benadryl and Atarax.      FEN   -IVF per treatment plan   -PRN lyte replacement  -RDAT      PPx  -DVT: Mechanical 2/2 developing TCP  -GI: Ranitidine 150 mg BID     Code status:  Full code     Above plan discussed with staff physician, Dr. Qamar Acevedo PA-C  Hematology/Oncology  Pager: [phone]    Interval History   Reports being fatigued this morning. No appetite. He made it through 2 doses of IL-2, which he is proud of. Continues to have bilateral shoulder pain. No complaints of LE edema. Breathing is stable. No chest pain. Complaints of slight nausea, but no emesis. No diarrhea.     Physical Exam   Temp: 101.1  F (38.4  C) Temp src: Oral BP: 116/65 Pulse: 69 Heart Rate: 98 Resp: 18 SpO2: 98 % O2 Device: None (Room air)    Vitals:    05/31/17 1033 06/01/17 1040   Weight: 102.6 kg (226 lb 3.1 oz) 102.9 kg (226 lb 14.4 oz)     Vital Signs with Ranges  Temp:  [96.8  F (36  C)-101.7  F (38.7  C)] 101.1  F (38.4  C)  Pulse:  [69] 69  Heart Rate:  [] 98  Resp:  [18-20] 18  BP: ()/(56-91) 116/65  Cuff Mean (mmHg):  [78-86] 78  SpO2:  [93 %-98 %] 98 %  I/O last 3 completed shifts:  In: 3131 [P.O.:850; I.V.:2163; IV Piggyback:118]  Out: 960 [Urine:560; Emesis/NG output:400]    Constitutional: Pleasant male seen laying in bed. No apparent distress, and appears stated age.  Eyes: Lids and lashes normal, sclera clear, conjunctiva normal.  ENT: Normocephalic, without obvious abnormality, atraumatic, sinuses nontender on palpation, oral pharynx with moist mucus membranes, tonsils without erythema or exudates, gums normal and good dentition.   Respiratory: No increased work of breathing, good air exchange, clear to auscultation bilaterally, no crackles or wheezing.  Cardiovascular: Regular rate and rhythm, normal S1 and S2, and no murmur noted.  GI: No masses or scars. +BS. Soft. No tenderness on palpation.  Skin: No bruising or bleeding, normal skin color, texture, turgor, no redness, warmth, or swelling, no rashes, no lesions, no jaundice.  Extremities: +Empty can test bilaterally. Bilateral shoulder pain with flexion of arms. There is no redness, warmth, or swelling  of the joints. No lower extremity edema. No cyanosis.  Neurologic: Awake, alert, oriented to name, place and time.     Medications     - MEDICATION INSTRUCTIONS -       dextrose 5% and 0.9% NaCl with potassium chloride 20 mEq 100 mL/hr at 06/01/17 0056     D5W 1,000 mL (05/31/17 1510)     - MEDICATION INSTRUCTIONS -       NaCl         eucerin   Topical BID     ondansetron  8 mg Intravenous Q8H     acetaminophen  650 mg Oral Q4H     ranitidine  150 mg Oral BID     cephalexin  500 mg Oral BID     aldesleukin (PROLEUKIN) infusion  600,000 Units/kg (Treatment Plan Recorded) Intravenous Q8H       Data   ROUTINE IP LABS (Last four results)  BMP  Recent Labs  Lab 06/01/17  0928 06/01/17  0530 05/31/17  0837    Specimen Lost, Testing unable to be completedNOTIFIED ANNITA HASSAN RN AT 0915 ON 6/1/17 JODIE 138   POTASSIUM 4.1 Specimen Lost, Testing unable to be completedNOTIFIED ANNITA HASSAN RN AT 0915 ON 6/1/17 JODIE 3.8   CHLORIDE 108 Specimen Lost, Testing unable to be completedNOTIFIED ANNITA HASSAN RN AT 0915 ON 6/1/17 JODIE 107   KVNG 7.2* Specimen Lost, Testing unable to be completedNOTIFIED ANNITA HASSAN RN AT 0915 ON 6/1/17 JODIE 8.1*   CO2 22 Specimen Lost, Testing unable to be completedNOTIFIED ANNITA HASSAN RN AT 0915 ON 6/1/17 JODIE 22   BUN 12 Specimen Lost, Testing unable to be completedNOTIFIED ANNITA HASSAN RN AT 0915 ON 6/1/17 JODIE 14   CR 1.24 Specimen Lost, Testing unable to be completedNOTIFIED ANNITA HASSAN RN AT 0915 ON 6/1/17 JODIE 0.98   * Specimen Lost, Testing unable to be completedNOTIFIED ANNITA HASSAN RN AT 0915 ON 6/1/17 JODIE 105*     CBC  Recent Labs  Lab 06/01/17  0530 05/31/17  0837   WBC 8.1 6.3   RBC 5.07 5.21   HGB 14.6 14.8   HCT 43.1 44.8   MCV 85 86   MCH 28.8 28.4   MCHC 33.9 33.0   RDW 15.2* 14.3   PLT 93* 173     INRNo lab results found in last 7 days.

## 2017-06-01 NOTE — PROGRESS NOTES
Chemotherapy  D: Blood return is brisk  per DL PICC. Urine output is adequate as recorded in intake and output flowsheet, chemotherapy agents double checked by two chemotherapy certified RN's, documentation in doc flowsheet recorded .     I: Premedications given (see electronic medical administration record). Dose #2 of IL-2 started to infuse over 15min.     A: Tolerating well, A&O x4, denies nausea. Pain controlled with 0.5mg PO oxy. Pt ambulating in room. Education provided about premedications. Denies need for further education.     P: Continue to monitor urine output and symptoms of nausea. Screen for symptoms of toxicity.

## 2017-06-01 NOTE — PLAN OF CARE
Problem: Goal Outcome Summary  Goal: Goal Outcome Summary  PT 7D: PT Eval completed and treatment initiated. Pt c/o MATTHIAS shoulder pain that began after a fall in January, states he was carrying an object and slipped on the ice and fell on the object with both sides cutting in underneath his armpits. Pt states the pain was tolerable, full shoulder PROM, shoulder flexion, abduction, and external rotation AROM limited by pain. Pt reports that his pain has become significantly worse since his last round of IL-2, L shoulder more painful than R, no known trauma since initial fall in January. Pt states his L hand gets cold and crampy when his L shoulder is acting up that are also associated with uncontrollable jaw movements. Shoulder special tests (Bilateral): + Neer's Impingement Test, + Lift Off Test, + Empty Can Test, + Drop Arm Test. Subjective/Objective findings most consistent with rotator cuff pathology. Educated pt on performing movements in pain free range and provided gentle shoulder ROM/strengthening exercises. Would recommend patient follow up with Outpatient PT for further assessment and treatment upon discharge.     REC: Home with assist and OP PT when medically appropriate.

## 2017-06-01 NOTE — PLAN OF CARE
Problem: Goal Outcome Summary  Goal: Goal Outcome Summary  Outcome: No Change  Day 1 Interleukin-2 today, see chemo note.  Reports bilateral shoulder pain, oxycodone 5 mg given with Interleukin-2 premeds at pt request. Rigors about 1.5 hours after Interleukin-2 dose infused.  Warm blankets, bear hugger and demerol 25 mg IV x 1 with resolution of chills.  Pt willing to remove blankets about and hour later.  Temp high 100.1, other VS within parameters.  Nausea with emesis 300 ml about 20:00, compazine given with Julio sleeping afterwards.  Julio reports all over body aches and head ache after Interleukin-2 that lasted through rest of shift.  Tylenol Q 4 hours, declined additional oxycodone but would like it given at midnight prior to next dose of Interleukin-2.  Void total of 195 ml since first dose of Interleukin-2 given, has not yet met u.o. parameters for next dose of Interleukin-2. Urinal at bedside.  Continue to assess.      Addendum:  pt with additional 70 ml urine out at 22:00.  Now has produced  270 ml urine, adequate for second dose of Interleukin-2.  Heart rate is up to 126 recheck again at midnight if continues elevated may need fluid bolus.

## 2017-06-01 NOTE — PLAN OF CARE
Problem: Goal Outcome Summary  Goal: Goal Outcome Summary  Outcome: No Change  HR continues to be tachy (115-130bpm). AOVSS. Dose #2 of IL-2 administered at 0100. Reacted about 1hr and 30min after dose. Demerol x1 and kesha hugger applied with warm blankets. Tmax 100.2f. Kesha hugger taken off. Emesis x1 2hrs after dose. Ativan 0.5mg x1.  Oxycodone x1 for generalized pain. Has not met urine parameters for dose #3. Dose #3 due at 0900. Continuing to encourage to void. DL PICC with brisk blood return noted q 4hrs. Sipping apple juice at bedside. Continue with POC.

## 2017-06-01 NOTE — PROGRESS NOTES
Nursing Focus: Chemotherapy  D: Positive blood return via PICC. Insertion site is clean/dry/intact, dressing intact with no complaints of pain.  Urine output is recorded in intake in Doc Flowsheet.    I: Premedications given per order (see electronic medical administration record). Dose #3 of IL-2 started to infuse over 15 minutes. Reviewed pt teaching on chemotherapy side effects.  Pt denies need for further teaching. Chemotherapy double checked per protocol by two chemotherapy competent RN's.   A: Tolerating procedure well. Denies nausea and or pain.   P: Continue to monitor urine output and symptoms of nausea. Screen for symptoms of toxicity.

## 2017-06-01 NOTE — PROGRESS NOTES
06/01/17 1000   Quick Adds   Type of Visit Initial PT Evaluation       Present yes   Language [place]ian   Living Environment   Lives With spouse;child(meg), dependent   Living Arrangements apartment   Home Accessibility no concerns   Number of Stairs to Enter Home 0   Number of Stairs Within Home 0   Living Environment Comment Pt lives with his wife and 2 children (7 month old and 7 year old). Pt has 4 other children that live with their mom. Pt does not currently work, pt's wife works multiple jobs. Pt says he has good support system from family and neighbors who help out as needed.    Self-Care   Dominant Hand ambidextrous   Usual Activity Tolerance good   Current Activity Tolerance moderate   Functional Level Prior   Ambulation 0-->independent   Transferring 0-->independent   Toileting 0-->independent   Bathing 0-->independent   Dressing 0-->independent   Eating 0-->independent   Communication 0-->understands/communicates without difficulty   Swallowing 0-->swallows foods/liquids without difficulty   Cognition 0 - no cognition issues reported   Fall history within last six months yes   Number of times patient has fallen within last six months 1   Which of the above functional risks had a recent onset or change? none   Prior Functional Level Comment Pt was IND with functional mobility and ADLs prior to admission.   General Information   Onset of Illness/Injury or Date of Surgery - Date 05/31/17   Referring Physician Selma Acevedo, PARosalindC   Patient/Family Goals Statement Decrease shoudler pain   Pertinent History of Current Problem (include personal factors and/or comorbidities that impact the POC) Julio John is a 47 year old man with metastatic renal cell carcinoma to the lungs. He is admitted for cycle 3 HD IL-2 therapy.  Additionally, pt presents with Bilateral Shoulder Pain, states pain has worsened since last IL-2 cycle. Bone scan 5/27 negative for metastatic disease, but did  show degenerative uptake in AC joints.   General Observations Patient reports feeling unwell, had rough night with chills, n/v. Agreeable to PT, RN ok'd session.   General Info Comments Activity: Up ad paco   Cognitive Status Examination   Orientation orientation to person, place and time   Level of Consciousness alert   Follows Commands and Answers Questions 100% of the time   Personal Safety and Judgment intact   Memory intact   Pain Assessment   Patient Currently in Pain Yes, see Vital Sign flowsheet   Integumentary/Edema   Integumentary/Edema no deficits were identifed   Posture    Posture Protracted shoulders   Range of Motion (ROM)   ROM Comment BUE AROM to ~90 deg shoulder abd and shoulder flex before limited by pain. PROM WFL. BLE WFL.   Strength   Strength Comments BLE WFL, BUE not formally tested due to pain, but observed to have decreased bilateral shoulder flex and abduction strength. Shoulder Special Tests: + Neer's Impingement Test, + Drop Arm Test, + Lift Off Test, + Empty Can Test   Bed Mobility   Bed Mobility Comments IND   Transfer Skills   Transfer Comments IND   Gait   Gait Comments IND   Sensory Examination   Sensory Perception Comments Pt reports cold, crampy sesnsation down L arm into hand when L shoulder pain is worsened   General Therapy Interventions   Planned Therapy Interventions strengthening;ROM;risk factor education;home program guidelines;progressive activity/exercise   Clinical Impression   Criteria for Skilled Therapeutic Intervention yes, treatment indicated   PT Diagnosis MATTHIAS shoulder pain, impaired shoudler functional mobility, deconditioning   Influenced by the following impairments shoulder pain, decreased shoulder AROM, decreased activity tolerance   Functional limitations due to impairments functional mobility requiring UE strength and overhead activities, including washing hair, lifting objects, reaching in cupboards   Clinical Presentation Stable/Uncomplicated   Clinical  "Presentation Rationale Pt presents with bilateral shoudler pain as a result of fall in January, progressively worsened impacting functional mobility. Pt also presents with deconditiong in setting of chemotherapy.   Clinical Decision Making (Complexity) Moderate complexity   Therapy Frequency` 2 times/week   Predicted Duration of Therapy Intervention (days/wks) 1 week   Anticipated Discharge Disposition Home with Outpatient Therapy   Risk & Benefits of therapy have been explained Yes   Patient, Family & other staff in agreement with plan of care Yes   [facility] \"6 Clicks\"   2016, Trustees of [facility], under license to [employer]  All rights reserved.   6 Clicks Short Forms Basic Mobility Inpatient Short Form   [facility]  \"6 Clicks\" V.2 Basic Mobility Inpatient Short Form   1. Turning from your back to your side while in a flat bed without using bedrails? 4 - None   2. Moving from lying on your back to sitting on the side of a flat bed without using bedrails? 4 - None   3. Moving to and from a bed to a chair (including a wheelchair)? 4 - None   4. Standing up from a chair using your arms (e.g., wheelchair, or bedside chair)? 4 - None   5. To walk in hospital room? 4 - None   6. Climbing 3-5 steps with a railing? 4 - None   Basic Mobility Raw Score (Score out of 24.Lower scores equate to lower levels of function) 24   Total Evaluation Time   Total Evaluation Time (Minutes) 10     "

## 2017-06-01 NOTE — PLAN OF CARE
Problem: Goal Outcome Summary  Goal: Goal Outcome Summary  OT 7D: Per chart review and discussion with PT, pt with only need for one therapy discipline primarily limited by bilateral shoulder pain, otherwise up independent with activity. PT to continue following to provide shoulder exercises and education. OT to defer.

## 2017-06-02 ENCOUNTER — OFFICE VISIT (OUTPATIENT)
Dept: INTERPRETER SERVICES | Facility: CLINIC | Age: 47
End: 2017-06-02

## 2017-06-02 VITALS
DIASTOLIC BLOOD PRESSURE: 59 MMHG | OXYGEN SATURATION: 97 % | SYSTOLIC BLOOD PRESSURE: 103 MMHG | RESPIRATION RATE: 16 BRPM | TEMPERATURE: 97.1 F | WEIGHT: 232.9 LBS | HEART RATE: 69 BPM | HEIGHT: 66 IN | BODY MASS INDEX: 37.43 KG/M2

## 2017-06-02 LAB
ALBUMIN SERPL-MCNC: 2.4 G/DL (ref 3.4–5)
ALP SERPL-CCNC: 37 U/L (ref 40–150)
ALT SERPL W P-5'-P-CCNC: 39 U/L (ref 0–70)
ANION GAP SERPL CALCULATED.3IONS-SCNC: 8 MMOL/L (ref 3–14)
ANION GAP SERPL CALCULATED.3IONS-SCNC: 9 MMOL/L (ref 3–14)
AST SERPL W P-5'-P-CCNC: 36 U/L (ref 0–45)
BASOPHILS # BLD AUTO: 0 10E9/L (ref 0–0.2)
BASOPHILS NFR BLD AUTO: 0.1 %
BILIRUB SERPL-MCNC: 1.4 MG/DL (ref 0.2–1.3)
BUN SERPL-MCNC: 17 MG/DL (ref 7–30)
BUN SERPL-MCNC: 19 MG/DL (ref 7–30)
CALCIUM SERPL-MCNC: 6.8 MG/DL (ref 8.5–10.1)
CALCIUM SERPL-MCNC: 6.9 MG/DL (ref 8.5–10.1)
CHLORIDE SERPL-SCNC: 108 MMOL/L (ref 94–109)
CHLORIDE SERPL-SCNC: 108 MMOL/L (ref 94–109)
CK SERPL-CCNC: 110 U/L (ref 30–300)
CO2 SERPL-SCNC: 19 MMOL/L (ref 20–32)
CO2 SERPL-SCNC: 21 MMOL/L (ref 20–32)
CREAT SERPL-MCNC: 1.78 MG/DL (ref 0.66–1.25)
CREAT SERPL-MCNC: 1.79 MG/DL (ref 0.66–1.25)
DIFFERENTIAL METHOD BLD: ABNORMAL
EOSINOPHIL # BLD AUTO: 1.1 10E9/L (ref 0–0.7)
EOSINOPHIL NFR BLD AUTO: 14.8 %
ERYTHROCYTE [DISTWIDTH] IN BLOOD BY AUTOMATED COUNT: 15.3 % (ref 10–15)
GFR SERPL CREATININE-BSD FRML MDRD: 41 ML/MIN/1.7M2
GFR SERPL CREATININE-BSD FRML MDRD: 41 ML/MIN/1.7M2
GLUCOSE SERPL-MCNC: 117 MG/DL (ref 70–99)
GLUCOSE SERPL-MCNC: 124 MG/DL (ref 70–99)
HCT VFR BLD AUTO: 38.8 % (ref 40–53)
HGB BLD-MCNC: 12.6 G/DL (ref 13.3–17.7)
IMM GRANULOCYTES # BLD: 0 10E9/L (ref 0–0.4)
IMM GRANULOCYTES NFR BLD: 0.4 %
LACTATE BLD-SCNC: 1.8 MMOL/L (ref 0.7–2.1)
LDH SERPL L TO P-CCNC: 233 U/L (ref 85–227)
LYMPHOCYTES # BLD AUTO: 0.2 10E9/L (ref 0.8–5.3)
LYMPHOCYTES NFR BLD AUTO: 2.9 %
MAGNESIUM SERPL-MCNC: 2.2 MG/DL (ref 1.6–2.3)
MCH RBC QN AUTO: 28.1 PG (ref 26.5–33)
MCHC RBC AUTO-ENTMCNC: 32.5 G/DL (ref 31.5–36.5)
MCV RBC AUTO: 87 FL (ref 78–100)
MONOCYTES # BLD AUTO: 0.4 10E9/L (ref 0–1.3)
MONOCYTES NFR BLD AUTO: 5.6 %
NEUTROPHILS # BLD AUTO: 5.8 10E9/L (ref 1.6–8.3)
NEUTROPHILS NFR BLD AUTO: 76.2 %
NRBC # BLD AUTO: 0 10*3/UL
NRBC BLD AUTO-RTO: 0 /100
PHOSPHATE SERPL-MCNC: 1.3 MG/DL (ref 2.5–4.5)
PLATELET # BLD AUTO: 84 10E9/L (ref 150–450)
POTASSIUM SERPL-SCNC: 4.9 MMOL/L (ref 3.4–5.3)
POTASSIUM SERPL-SCNC: 5 MMOL/L (ref 3.4–5.3)
PROT SERPL-MCNC: 5.1 G/DL (ref 6.8–8.8)
RBC # BLD AUTO: 4.48 10E12/L (ref 4.4–5.9)
SODIUM SERPL-SCNC: 136 MMOL/L (ref 133–144)
SODIUM SERPL-SCNC: 138 MMOL/L (ref 133–144)
WBC # BLD AUTO: 7.6 10E9/L (ref 4–11)

## 2017-06-02 PROCEDURE — 25000128 H RX IP 250 OP 636: Performed by: INTERNAL MEDICINE

## 2017-06-02 PROCEDURE — 25000125 ZZHC RX 250: Performed by: PHYSICIAN ASSISTANT

## 2017-06-02 PROCEDURE — 83735 ASSAY OF MAGNESIUM: CPT | Performed by: INTERNAL MEDICINE

## 2017-06-02 PROCEDURE — 85025 COMPLETE CBC W/AUTO DIFF WBC: CPT | Performed by: INTERNAL MEDICINE

## 2017-06-02 PROCEDURE — 87040 BLOOD CULTURE FOR BACTERIA: CPT | Performed by: INTERNAL MEDICINE

## 2017-06-02 PROCEDURE — 99238 HOSP IP/OBS DSCHRG MGMT 30/<: CPT | Performed by: INTERNAL MEDICINE

## 2017-06-02 PROCEDURE — 80053 COMPREHEN METABOLIC PANEL: CPT | Performed by: INTERNAL MEDICINE

## 2017-06-02 PROCEDURE — 25000132 ZZH RX MED GY IP 250 OP 250 PS 637: Performed by: INTERNAL MEDICINE

## 2017-06-02 PROCEDURE — 36592 COLLECT BLOOD FROM PICC: CPT | Performed by: INTERNAL MEDICINE

## 2017-06-02 PROCEDURE — 83615 LACTATE (LD) (LDH) ENZYME: CPT | Performed by: INTERNAL MEDICINE

## 2017-06-02 PROCEDURE — 25000132 ZZH RX MED GY IP 250 OP 250 PS 637: Performed by: PHYSICIAN ASSISTANT

## 2017-06-02 PROCEDURE — 82550 ASSAY OF CK (CPK): CPT | Performed by: INTERNAL MEDICINE

## 2017-06-02 PROCEDURE — 84100 ASSAY OF PHOSPHORUS: CPT | Performed by: INTERNAL MEDICINE

## 2017-06-02 PROCEDURE — T1013 SIGN LANG/ORAL INTERPRETER: HCPCS | Mod: U3

## 2017-06-02 PROCEDURE — 25000128 H RX IP 250 OP 636: Performed by: PHYSICIAN ASSISTANT

## 2017-06-02 PROCEDURE — 80048 BASIC METABOLIC PNL TOTAL CA: CPT | Performed by: PHYSICIAN ASSISTANT

## 2017-06-02 PROCEDURE — 25800025 ZZH RX 258: Performed by: INTERNAL MEDICINE

## 2017-06-02 PROCEDURE — 36592 COLLECT BLOOD FROM PICC: CPT | Performed by: PHYSICIAN ASSISTANT

## 2017-06-02 PROCEDURE — 83605 ASSAY OF LACTIC ACID: CPT | Performed by: INTERNAL MEDICINE

## 2017-06-02 RX ADMIN — ONDANSETRON 8 MG: 2 INJECTION INTRAMUSCULAR; INTRAVENOUS at 00:11

## 2017-06-02 RX ADMIN — OXYCODONE HYDROCHLORIDE 5 MG: 5 TABLET ORAL at 03:18

## 2017-06-02 RX ADMIN — CEPHALEXIN 500 MG: 500 CAPSULE ORAL at 09:03

## 2017-06-02 RX ADMIN — LORAZEPAM 0.5 MG: 2 INJECTION INTRAMUSCULAR; INTRAVENOUS at 05:07

## 2017-06-02 RX ADMIN — ACETAMINOPHEN 650 MG: 325 TABLET, FILM COATED ORAL at 00:11

## 2017-06-02 RX ADMIN — SODIUM PHOSPHATE, MONOBASIC, MONOHYDRATE AND SODIUM PHOSPHATE, DIBASIC, ANHYDROUS 20 MMOL: 276; 142 INJECTION, SOLUTION INTRAVENOUS at 12:05

## 2017-06-02 RX ADMIN — DIPHENOXYLATE HYDROCHLORIDE AND ATROPINE SULFATE 1 TABLET: 2.5; .025 TABLET ORAL at 00:17

## 2017-06-02 RX ADMIN — ACETAMINOPHEN 650 MG: 325 TABLET, FILM COATED ORAL at 05:16

## 2017-06-02 RX ADMIN — Medication: at 09:02

## 2017-06-02 RX ADMIN — ONDANSETRON 8 MG: 8 TABLET, ORALLY DISINTEGRATING ORAL at 16:30

## 2017-06-02 RX ADMIN — RANITIDINE HYDROCHLORIDE 150 MG: 150 TABLET, FILM COATED ORAL at 09:04

## 2017-06-02 RX ADMIN — PROCHLORPERAZINE EDISYLATE 10 MG: 5 INJECTION INTRAMUSCULAR; INTRAVENOUS at 03:03

## 2017-06-02 RX ADMIN — SODIUM CHLORIDE 1000 ML: 9 INJECTION, SOLUTION INTRAVENOUS at 10:39

## 2017-06-02 RX ADMIN — ACETAMINOPHEN 650 MG: 325 TABLET, FILM COATED ORAL at 09:04

## 2017-06-02 RX ADMIN — POTASSIUM CHLORIDE, DEXTROSE MONOHYDRATE AND SODIUM CHLORIDE: 150; 5; 900 INJECTION, SOLUTION INTRAVENOUS at 08:57

## 2017-06-02 RX ADMIN — MEPERIDINE HYDROCHLORIDE 25 MG: 25 INJECTION, SOLUTION INTRAMUSCULAR; INTRAVENOUS; SUBCUTANEOUS at 04:22

## 2017-06-02 RX ADMIN — ALDESLEUKIN 64 MILLION UNITS: 1.1 INJECTION, POWDER, LYOPHILIZED, FOR SOLUTION INTRAVENOUS at 02:55

## 2017-06-02 RX ADMIN — ONDANSETRON 8 MG: 2 INJECTION INTRAMUSCULAR; INTRAVENOUS at 08:57

## 2017-06-02 RX ADMIN — SODIUM CHLORIDE 250 ML: 9 INJECTION, SOLUTION INTRAVENOUS at 08:57

## 2017-06-02 ASSESSMENT — PAIN DESCRIPTION - DESCRIPTORS
DESCRIPTORS: ACHING
DESCRIPTORS: ACHING;CONSTANT

## 2017-06-02 NOTE — PLAN OF CARE
Problem: Chemotherapy Effects (Adult)  Goal: Signs and Symptoms of Listed Potential Problems Will be Absent or Manageable (Chemotherapy Effects)  Signs and symptoms of listed potential problems will be absent or manageable by discharge/transition of care (reference Chemotherapy Effects (Adult) CPG).   Outcome: No Change  Dose 4 Interleukin-2 this evening, see chemo note.  Pt with rigors about 3 hours after treatment this ida. Bear hugger and warm blankets given.  Had just received IV ativan for nausea so attempted 12.5 mg IV dilaudid for rigors with no relief, second 12.5 mg given with resolution of chills.  Bear hugger turned off about an hour later but blankets left in place.  Temp high 100.4, suspect he will be febrile at next check.  Close watch, up with assist s/p sedating medications.   Has voided 200 ml urine since last dose of Interleukin-2.  Pt states he will only allow 5 doses of Interleukin-2, will receive dose #5 about 02:50 if he meets parameters.  He can discuss decision to stop after 5 doses with his team on day shift.

## 2017-06-02 NOTE — PLAN OF CARE
Problem: Goal Outcome Summary  Goal: Goal Outcome Summary  Outcome: No Change  Afebrile. Blood pressures were soft this morning in the 70/30's range. 250 mL bolus given and Mila Rose PA-C notified. One liter bolus ordered as well with improvement of blood pressure. On room air with adequate oxygen saturations. Phosphorus replaced for a lab value of 1.3. Recheck scheduled for 1830 hours and tomorrow morning. Walking independently in hallway without dizziness. Eating well despite intermittent nausea. Adequate urine output. Loose stools x 2, declined imodium. Mild bilateral shoulder pain relieved by the scheduled tylenol this morning and arm exercises. Declines further intervention. Patient expresses wishes to go home this afternoon after phosphorus infusion is complete.

## 2017-06-02 NOTE — PLAN OF CARE
Problem: Chemotherapy Effects (Adult)  Goal: Signs and Symptoms of Listed Potential Problems Will be Absent or Manageable (Chemotherapy Effects)  Signs and symptoms of listed potential problems will be absent or manageable by discharge/transition of care (reference Chemotherapy Effects (Adult) CPG).   Outcome: No Change  D: Dose 4 Interleukin-2 via picc.  Picc with brisk blood return. VS within parameters, urine output 288 ml, also within parameters. Intermittent nausea on scheduled zofran.    I:  Interleukin-2 IV over 15 minutes.  Compazine IV at pt request.   A:  Pt continues to meet treatment criteria.   P:  Continue to moniter and treat signs of capillary leak syndrome (see fluid flush and 02 orders).  Notify Dr per parameters or if with change in status.  Call light in reach.  Julio will call when he begins to chill.

## 2017-06-02 NOTE — PROGRESS NOTES
Nursing Focus: Discharge    D: Patient discharged to home at 1700 hours. Patient was afebrile, vitally stable and eating prior to discharge.  Patient did have some nausea, zofran 8 mg tablet was administered. All belongings sent with the patient.    I: Discharge prescriptions sent to discharge pharmacy to be filled. All discharge medications and instructions reviewed with Julio. Patient instructed to call clinic triage nurse if he experiences a fever >100.4, uncontrolled nausea, vomiting, diarrhea, or pain; or experiences any signs or symptoms of bleeding. Other phone numbers to call with questions or concerns after discharge reviewed with Julio. Follow-up outpatient appointment scheduled reviewed with Julio.  PICC removed. Education completed.  Care Plan goals met and adequate for discharge.    A: Patient verbalized understanding of discharge medications and instructions. Patient will  medications at discharge pharmacy. Patient was educated on drinking plenty of water to stay hydrated prior to discharge as his creatinine was slightly elevated.     P: Patient to follow-up in clinic with Jocelyn Olivas in clinic on 6/7.

## 2017-06-02 NOTE — PLAN OF CARE
Problem: Goal Outcome Summary  Goal: Goal Outcome Summary  Outcome: No Change  Tmax 99.1 this shift. HR max 126. BP min 93/55. Met parameters to receive dose #5 IL-2 at 0250. Rigors began 1.5 hours after administration (0420). Bear hugger and warm blankets utilized for about an hour, gave IV demerol x1. Complaints of nausea all shift, managed with scheduled IV zofran, IV compazine x1, and IV ativan x1. Complaints of pain in groin area and shoulder, managed with PO oxy x1. Pt expressed that he would like to stop IL-2 after dose #5, will discuss with team today. Up with SBA after rigors and sedating medications. Continue to monitor, continue with POC.

## 2017-06-02 NOTE — DISCHARGE SUMMARY
[facility] [place]  Discharge Summary  Hematology / Oncology    Date of Admission:  5/31/2017  Date of Discharge:  6/2/2017  Discharging Provider: Mila Rose PAFER    Discharge Diagnoses   Patient Active Problem List   Diagnosis     Malignant neoplasm of left kidney (H)     Clear cell renal cell carcinoma (H)     Renal cell carcinoma (H)     Fever       History of Present Illness   Julio John is a 47 year old man with metastatic renal cell carcinoma to the lungs. He was admitted for cycle 3 HD IL-2 therapy. Overall he tolerated therapy as well as expected. He received 5 total doses. On day of discharge he was given extra IV fluids (total of 1125ml) due to low blood pressures. His blood pressures improved after receiving IV fluids and he reported feeling better and ready for discharge. He had no questions or concerns about going home. He will follow up next week in clinic and will re-check labs at that time to ensure creatinine returns to baseline.    Hospital Course   Julio John was admitted on 5/31/2017.  The following problems were addressed during his hospitalization:      #Metastatic RCC. S/p L nephrectomy, XRT to L lung, and cycle 2 HD IL-2. Stage CT scan 5/30 revealed stable disease. Admitted for cycle 3 IL-2. Pt feeling well. Labs and vitals reviewed and adequate to proceed with treatment.   -PICC placed on admission and removed at discharged.      IL-2 Cycle 3 Treatment Plan. (Day 1=5/31/17). Completed 5 total doses.  -Aldesleukin 64 million units IV q8 hours x14 doses or as many as tolerated.   -D5 NS + KCl 20meq at 100cc/hr.  -NS bolus prn SBP <85 or UOP <30cc/hr (<240 cc/8 hr), may repeat x1.  -Premedicate with zofran q8h and tylenol q4h.   -Zantac bid, keflex bid.   -PRN: benadryl, demerol, naprosyn, lomotil, compazine, ativan.   -Avoid steroids and diuretics.   -Oxycodone prn pain.       #Bilateral shoulder pain. Reports has worsened since last IL-2  cycle. Pain is located on superior aspect of shoulder and worse with flexion. Reports no trauma in the past couple weeks, but did fall on ice back in February and believes he had left shoulder pain after that incident. Bone scan 5/27 negative for metastatic disease, but did show degenerative uptake in AC joints.   -PT/OT  -Consider outpatient MRI for possible rotator cuff tear if no improvement with rehab.      #Elevated eosinophils. Recently admitted 5/14-5/16 for rash, fever and pruritis. Believed to be an allergic reaction vs exposure to irritant. Symptoms improved with medication control. Absolute eosinophil count 1.4 on admission, 1.1 on discharge.  -Continue Zyrtec, Ranitidine and prn Benadryl and Atarax.       Code status: Full code      Above plan discussed with staff physician, Dr. Mccartney.      Mila Rose PA-C  Hematology/Oncology  Pager: [phone]    Pending Results   These results will be followed up.  Unresulted Labs Ordered in the Past 30 Days of this Admission     Date and Time Order Name Status Description    6/1/2017 2330 Blood culture In process     5/31/2017 2330 Blood culture Preliminary           Code Status   Full Code    Primary Care Physician   Tao Nwaononiwu    Physical Exam   Temp: 97.8  F (36.6  C) Temp src: Oral BP: 90/50   Heart Rate: 98 Resp: 18 SpO2: 98 % O2 Device: None (Room air)    Vitals:    05/31/17 1033 06/01/17 1040 06/02/17 0832   Weight: 102.6 kg (226 lb 3.1 oz) 102.9 kg (226 lb 14.4 oz) 105.6 kg (232 lb 14.4 oz)     Vital Signs with Ranges  Temp:  [97.4  F (36.3  C)-100.2  F (37.9  C)] 97.8  F (36.6  C)  Heart Rate:  [] 98  Resp:  [16-20] 18  BP: ()/(34-67) 90/50  Cuff Mean (mmHg):  [59-63] 63  SpO2:  [95 %-98 %] 98 %  I/O last 3 completed shifts:  In: 4310 [P.O.:960; I.V.:3232; IV Piggyback:118]  Out: 1021 [Urine:861; Stool:160]    Constitutional: Pleasant male seen laying in bed. No apparent distress, and appears stated age.  Eyes: Lids and  lashes normal, sclera clear, conjunctiva normal.  ENT: Normocephalic, without obvious abnormality, atraumatic, sinuses nontender on palpation, oral pharynx with moist mucus membranes, tonsils without erythema or exudates, gums normal and good dentition.   Respiratory: No increased work of breathing, good air exchange, clear to auscultation bilaterally, no crackles or wheezing.  Cardiovascular: Regular rate and rhythm, normal S1 and S2, and no murmur noted.  GI: +BS. Soft, non-tender, non-distended.  Skin: No bruising or bleeding, normal skin color, texture, turgor, no redness, warmth, or swelling, no rashes, no lesions, no jaundice.  Extremities: There is no redness, warmth, or swelling of the joints. No lower extremity edema. No cyanosis.  Neurologic: Awake, alert, oriented to name, place and time.     Discharge Disposition   Discharged to home  Condition at discharge: Stable    Consultations This Hospital Stay   VASCULAR ACCESS ADULT IP CONSULT  PHYSICAL THERAPY ADULT IP CONSULT  OCCUPATIONAL THERAPY ADULT IP CONSULT    Discharge Orders     CBC with platelets differential   Last Lab Result: Hemoglobin (g/dL)      Date                     Value                06/02/2017               12.6 (L)         ----------     Comprehensive metabolic panel     Reason for your hospital stay   You were hospitalized for IL-2 therapy for kidney cancer.     Adult [facility] Follow-up and recommended labs and tests   Follow up with JAZMYNE Banks as scheduled on 6/7/17.    Appointments on [place] and/or [facility] (with [facility] or [facility] provider or service). Call [phone] if you haven't heard regarding these appointments within 7 days of discharge.     Activity   Your activity upon discharge: activity as tolerated     When to contact your care team   Please call the [facility] [phone] for fever (temp >100.5), chills, uncontrolled nausea, vomiting, constipation, or diarrhea,  unrelieved pain, bleeding not relieved with pressure, dizziness, chest pain, shortness of breath, loss of consciousness, and any new or concerning symptoms.     Full Code     Diet   Follow this diet upon discharge: Regular Diet       Discharge Medications   Current Discharge Medication List      CONTINUE these medications which have NOT CHANGED    Details   cetirizine (ZYRTEC) 10 MG tablet Take 1 tablet (10 mg) by mouth daily  Qty: 30 tablet, Refills: 0    Associated Diagnoses: Clear cell carcinoma (H)      diphenhydrAMINE (BENADRYL) 50 MG capsule Take 1 capsule (50 mg) by mouth every 6 hours as needed for itching  Qty: 56 capsule, Refills: 0    Associated Diagnoses: Clear cell carcinoma (H)      acetaminophen (TYLENOL) 500 MG tablet Take 1 tablet (500 mg) by mouth every 6 hours as needed for mild pain  Qty: 1 Bottle, Refills: 1    Associated Diagnoses: Malignant neoplasm of left kidney (H)      hydrOXYzine (ATARAX) 50 MG tablet Take 1 tablet (50 mg) by mouth every 4 hours as needed for itching or anxiety  Qty: 30 tablet, Refills: 1    Associated Diagnoses: Clear cell carcinoma (H)      ranitidine (ZANTAC) 150 MG tablet Take 1 tablet (150 mg) by mouth 2 times daily  Qty: 60 tablet, Refills: 0    Associated Diagnoses: Clear cell carcinoma (H)      oxyCODONE (ROXICODONE) 5 MG IR tablet Take 1 tablet (5 mg) by mouth every 4 hours as needed for moderate to severe pain  Qty: 10 tablet, Refills: 0    Associated Diagnoses: Malignant neoplasm of left kidney (H)      Skin Protectants, Misc. (EUCERIN) cream Apply topically 2 times daily Reported on 5/12/2017    Associated Diagnoses: Malignant neoplasm of left kidney (H)      prochlorperazine (COMPAZINE) 10 MG tablet Take 1 tablet (10 mg) by mouth every 6 hours as needed (Breakthrough Nausea/Vomiting)  Qty: 30 tablet, Refills: 0    Associated Diagnoses: Malignant neoplasm of left kidney (H)      ondansetron (ZOFRAN) 4 MG tablet Take 2 tablets (8 mg) by mouth every 8 hours as  needed for nausea  Qty: 18 tablet, Refills: 0    Associated Diagnoses: Malignant neoplasm of left kidney (H)           Allergies   No Known Allergies  Data   Results for orders placed or performed during the hospital encounter of 05/31/17   IR PICC Vascular    Impression    IMPRESSION: Fluoroscopic assistance was provided to the vascular  access nursing service for documentation of the tip position of a  peripherally inserted central venous catheter. The tip is in the high  right atrium.    RENUKA GALVAN MD     Most Recent 3 CBC's:  Recent Labs   Lab Test  06/02/17   0634  06/01/17   0530  05/31/17   0837   WBC  7.6  8.1  6.3   HGB  12.6*  14.6  14.8   MCV  87  85  86   PLT  84*  93*  173     Most Recent 3 BMP's:  Recent Labs   Lab Test  06/02/17   1303  06/02/17   0634  06/01/17   0928   NA  138  136  138   POTASSIUM  5.0  4.9  4.1   CHLORIDE  108  108  108   CO2  21  19*  22   BUN  19  17  12   CR  1.79*  1.78*  1.24   ANIONGAP  8  9  8   KVNG  6.8*  6.9*  7.2*   GLC  117*  124*  118*     Most Recent 2 LFT's:  Recent Labs   Lab Test  06/02/17   0634  06/01/17   0928   AST  36  18   ALT  39  28   ALKPHOS  37*  39*   BILITOTAL  1.4*  0.9     Most Recent 3 INR's:  Recent Labs   Lab Test  05/15/17   0531   INR  1.20*

## 2017-06-03 DIAGNOSIS — C64.2 MALIGNANT NEOPLASM OF LEFT KIDNEY (H): Primary | ICD-10-CM

## 2017-06-03 NOTE — PROGRESS NOTES
Order placed to check BMP Monday, call placed to patient to ask him to go to lab Monday for lab draw. He was agreeable to go around 0800 on Monday morning to re-check kidney function.    Mila Rose PA-C

## 2017-06-05 ENCOUNTER — CARE COORDINATION (OUTPATIENT)
Dept: ONCOLOGY | Facility: CLINIC | Age: 47
End: 2017-06-05

## 2017-06-05 DIAGNOSIS — M25.511 BILATERAL SHOULDER PAIN: Primary | ICD-10-CM

## 2017-06-05 DIAGNOSIS — M25.512 BILATERAL SHOULDER PAIN: Primary | ICD-10-CM

## 2017-06-05 NOTE — PLAN OF CARE
Problem: Goal Outcome Summary  Goal: Goal Outcome Summary  Physical Therapy Discharge Summary     Reason for therapy discharge:    Discharged to home.     Progress towards therapy goal(s). See goals on Care Plan in [facility] electronic health record for goal details.  Goals partially met.  Barriers to achieving goals:   limited tolerance for therapy and discharge from facility.     Therapy recommendation(s):    Continued therapy is recommended.  Rationale/Recommendations:  OP PT for further assessment and treatment of BILAT shoulder pain.

## 2017-06-05 NOTE — PROGRESS NOTES
Pt was discharged from the hospital on . Was admitted for cycle 3 HD IL-2 therapy.  Called pt to follow up. Pt states he continues to have bilateral shoulder pain. His shoulders are painful especially in the mornings and the middle of the night.  Will have difficulty sleeping from the pain. Is taking Tylenol 500m tabs in the morning and evening. Does not feel the Tylenol helps.  Applying heat to shoulders will sometimes help. Has oxycodone at home. Tried taking it once and states it did help. Was concerned about taking oxycodone since he only has one kidney.  This morning developed pain in his hands when he closes them. Vomited x1 yesterday; has not had any vomiting since. Felt nauseous this morning but is now feeling fine. Has zofran at home to take if needed.  Is scheduled on  to see Jocelyn Seaman PA-C for hospital follow up.    Bailee Garnica PA-C notified. Per Bailee, advised pt to continue taking tylenol 1000mg twice daily and take oxycodone q4h prn. Reinforced that it is OK for pt to take oxycodone with one kidney.  PT/OT ordered. Advised pt to keep follow up appointment on  as scheduled.  Pt verbalized understanding.

## 2017-06-06 ENCOUNTER — CARE COORDINATION (OUTPATIENT)
Dept: CARE COORDINATION | Facility: CLINIC | Age: 47
End: 2017-06-06

## 2017-06-06 NOTE — PROGRESS NOTES
"[facility]  \"Hello, my name is Eboni Wild , and I am calling from the [facility].  I want to check in and see how you are doing, after leaving the hospital.  You may also receive a call from your Care Coordinator (care team), but I want to make sure you don t have any urgent needs.  I have a couple questions to review with you:     Post-Discharge Outreach                                                    Julio John is a 47 year old male         Care Team:    Patient Care Team       Relationship Specialty Notifications Start End    Tao Roach MD PCP - General   3/17/17     Phone: [phone] Fax: [phone]         [facility] [address] [place] [address]    Ludwin Painting MD MD Oncology Admissions 17     Phone: [phone] Fax: [phone]          [facility] [address] [facility] [address] [facility] [address]    Jose Mike, RN Nurse Coordinator Oncology Admissions 17             Transition of Care Review                                                      Jennie Mcginnis, Jennie Henriquez, RN        17 4:31 PM   Note      Pt was discharged from the hospital on . Was admitted for cycle 3 HD IL-2 therapy.  Called pt to follow up. Pt states he continues to have bilateral shoulder pain. His shoulders are painful especially in the mornings and the middle of the night.  Will have difficulty sleeping from the pain. Is taking Tylenol 500m tabs in the morning and evening. Does not feel the Tylenol helps.  Applying heat to shoulders will sometimes help. Has oxycodone at home. Tried taking it once and states it did help. Was concerned about taking oxycodone since he only has one kidney.  This morning developed pain in his hands when he closes them. Vomited x1 yesterday; has not had any vomiting since. Felt nauseous this morning but is now feeling fine. Has zofran at home to take if needed.  Is " scheduled on 6/7 to see Jocelyn Seaman PA-C for hospital follow up.     Bailee Garnica PA-C notified. Per Bailee, advised pt to continue taking tylenol 1000mg twice daily and take oxycodone q4h prn. Reinforced that it is OK for pt to take oxycodone with one kidney.  PT/OT ordered. Advised pt to keep follow up appointment on 6/7 as scheduled.  Pt verbalized understanding.        Dc call already made by another RN. No further calls needed    Plan                                                      Thanks for your time.  Your Care Coordinator may follow-up within the next couple days.  In the meantime if you have questions, concerns or problems call your care team.        Eboni Wild

## 2017-06-07 ENCOUNTER — APPOINTMENT (OUTPATIENT)
Dept: LAB | Facility: CLINIC | Age: 47
End: 2017-06-07
Attending: INTERNAL MEDICINE
Payer: MEDICAID

## 2017-06-07 ENCOUNTER — ONCOLOGY VISIT (OUTPATIENT)
Dept: ONCOLOGY | Facility: CLINIC | Age: 47
End: 2017-06-07
Attending: PHYSICIAN ASSISTANT
Payer: MEDICAID

## 2017-06-07 VITALS
HEART RATE: 94 BPM | BODY MASS INDEX: 35.8 KG/M2 | SYSTOLIC BLOOD PRESSURE: 131 MMHG | RESPIRATION RATE: 15 BRPM | TEMPERATURE: 98.7 F | DIASTOLIC BLOOD PRESSURE: 90 MMHG | WEIGHT: 221.8 LBS | OXYGEN SATURATION: 98 %

## 2017-06-07 DIAGNOSIS — C64.2 MALIGNANT NEOPLASM OF LEFT KIDNEY (H): ICD-10-CM

## 2017-06-07 DIAGNOSIS — C64.9 CLEAR CELL RENAL CELL CARCINOMA, UNSPECIFIED LATERALITY (H): ICD-10-CM

## 2017-06-07 LAB
ALBUMIN SERPL-MCNC: 3.6 G/DL (ref 3.4–5)
ALP SERPL-CCNC: 60 U/L (ref 40–150)
ALT SERPL W P-5'-P-CCNC: 24 U/L (ref 0–70)
ANION GAP SERPL CALCULATED.3IONS-SCNC: 8 MMOL/L (ref 3–14)
AST SERPL W P-5'-P-CCNC: 14 U/L (ref 0–45)
BACTERIA SPEC CULT: NO GROWTH
BASOPHILS # BLD AUTO: 0 10E9/L (ref 0–0.2)
BASOPHILS NFR BLD AUTO: 0.5 %
BILIRUB SERPL-MCNC: 0.5 MG/DL (ref 0.2–1.3)
BUN SERPL-MCNC: 17 MG/DL (ref 7–30)
CALCIUM SERPL-MCNC: 8.2 MG/DL (ref 8.5–10.1)
CHLORIDE SERPL-SCNC: 105 MMOL/L (ref 94–109)
CO2 SERPL-SCNC: 25 MMOL/L (ref 20–32)
CREAT SERPL-MCNC: 1.29 MG/DL (ref 0.66–1.25)
DIFFERENTIAL METHOD BLD: NORMAL
EOSINOPHIL # BLD AUTO: 0.6 10E9/L (ref 0–0.7)
EOSINOPHIL NFR BLD AUTO: 7.6 %
ERYTHROCYTE [DISTWIDTH] IN BLOOD BY AUTOMATED COUNT: 14.4 % (ref 10–15)
GFR SERPL CREATININE-BSD FRML MDRD: 60 ML/MIN/1.7M2
GLUCOSE SERPL-MCNC: 83 MG/DL (ref 70–99)
HCT VFR BLD AUTO: 42.4 % (ref 40–53)
HGB BLD-MCNC: 14.3 G/DL (ref 13.3–17.7)
IMM GRANULOCYTES # BLD: 0.1 10E9/L (ref 0–0.4)
IMM GRANULOCYTES NFR BLD: 1 %
LDH SERPL L TO P-CCNC: 176 U/L (ref 85–227)
LYMPHOCYTES # BLD AUTO: 3.7 10E9/L (ref 0.8–5.3)
LYMPHOCYTES NFR BLD AUTO: 47.3 %
MAGNESIUM SERPL-MCNC: 2.1 MG/DL (ref 1.6–2.3)
MCH RBC QN AUTO: 28.4 PG (ref 26.5–33)
MCHC RBC AUTO-ENTMCNC: 33.7 G/DL (ref 31.5–36.5)
MCV RBC AUTO: 84 FL (ref 78–100)
MICRO REPORT STATUS: NORMAL
MONOCYTES # BLD AUTO: 0.6 10E9/L (ref 0–1.3)
MONOCYTES NFR BLD AUTO: 7.6 %
NEUTROPHILS # BLD AUTO: 2.8 10E9/L (ref 1.6–8.3)
NEUTROPHILS NFR BLD AUTO: 36 %
NRBC # BLD AUTO: 0 10*3/UL
NRBC BLD AUTO-RTO: 0 /100
PLATELET # BLD AUTO: 183 10E9/L (ref 150–450)
POTASSIUM SERPL-SCNC: 3.9 MMOL/L (ref 3.4–5.3)
PROT SERPL-MCNC: 6.9 G/DL (ref 6.8–8.8)
RBC # BLD AUTO: 5.03 10E12/L (ref 4.4–5.9)
SODIUM SERPL-SCNC: 137 MMOL/L (ref 133–144)
SPECIMEN SOURCE: NORMAL
WBC # BLD AUTO: 7.8 10E9/L (ref 4–11)

## 2017-06-07 PROCEDURE — 83735 ASSAY OF MAGNESIUM: CPT | Performed by: INTERNAL MEDICINE

## 2017-06-07 PROCEDURE — 85025 COMPLETE CBC W/AUTO DIFF WBC: CPT | Performed by: INTERNAL MEDICINE

## 2017-06-07 PROCEDURE — 99214 OFFICE O/P EST MOD 30 MIN: CPT | Mod: ZP | Performed by: PHYSICIAN ASSISTANT

## 2017-06-07 PROCEDURE — 36415 COLL VENOUS BLD VENIPUNCTURE: CPT | Performed by: INTERNAL MEDICINE

## 2017-06-07 PROCEDURE — 83615 LACTATE (LD) (LDH) ENZYME: CPT | Performed by: INTERNAL MEDICINE

## 2017-06-07 PROCEDURE — 80053 COMPREHEN METABOLIC PANEL: CPT | Performed by: INTERNAL MEDICINE

## 2017-06-07 NOTE — MR AVS SNAPSHOT
"              After Visit Summary   2017    Julio John    MRN: [id number]           Patient Information     Date Of Birth          1970        Visit Information        Provider Department      2017 2:25 PM Ifrah Lofton; Jocelyn Seaman PA [facility]        Today's Diagnoses     Malignant neoplasm of left kidney (H)        Clear cell renal cell carcinoma, unspecified laterality (H)           Follow-ups after your visit        Who to contact     If you have questions or need follow up information about today's clinic visit or your schedule please contact [facility] directly at [phone].  Normal or non-critical lab and imaging results will be communicated to you by MyChart, letter or phone within 4 business days after the clinic has received the results. If you do not hear from us within 7 days, please contact the clinic through "[employer]hart or phone. If you have a critical or abnormal lab result, we will notify you by phone as soon as possible.  Submit refill requests through [employer] or call your pharmacy and they will forward the refill request to us. Please allow 3 business days for your refill to be completed.          Additional Information About Your Visit        MyChart Information     [employer] lets you send messages to your doctor, view your test results, renew your prescriptions, schedule appointments and more. To sign up, go to [email or web address] . Click on \"Log in\" on the left side of the screen, which will take you to the Welcome page. Then click on \"Sign up Now\" on the right side of the page.     You will be asked to enter the access code listed below, as well as some personal information. Please follow the directions to create your username and password.     Your access code is: [email or web address]  Expires: 2017 10:24 AM     Your access code will  in 90 days. If you need help or a new code, please call your [place] clinic " or [phone].        Care EveryWhere ID     This is your Care EveryWhere ID. This could be used by other organizations to access your [facility] medical records  [id number]        Your Vitals Were     Pulse Temperature Respirations Pulse Oximetry BMI (Body Mass Index)       94 98.7  F (37.1  C) (Oral) 15 98% 35.8 kg/m2        Blood Pressure from Last 3 Encounters:   06/07/17 131/90   06/02/17 103/59   05/31/17 126/84    Weight from Last 3 Encounters:   06/07/17 100.6 kg (221 lb 12.8 oz)   06/02/17 105.6 kg (232 lb 14.4 oz)   05/31/17 102.2 kg (225 lb 6.4 oz)              We Performed the Following     CBC with platelets and differential     Comprehensive metabolic panel (BMP + Alb, Alk Phos, ALT, AST, Total. Bili, TP)     Lactate Dehydrogenase     Magnesium        Primary Care Provider Office Phone # Fax #    [facility] MD Doc [phone]       [facility] [address] [place] [address]        Thank you!     Thank you for choosing [facility]  for your care. Our goal is always to provide you with excellent care. Hearing back from our patients is one way we can continue to improve our services. Please take a few minutes to complete the written survey that you may receive in the mail after your visit with us. Thank you!             Your Updated Medication List - Protect others around you: Learn how to safely use, store and throw away your medicines at www.disposemymeds.org.          This list is accurate as of: 6/7/17 11:59 PM.  Always use your most recent med list.                   Brand Name Dispense Instructions for use    acetaminophen 500 MG tablet    TYLENOL    1 Bottle    Take 1 tablet (500 mg) by mouth every 6 hours as needed for mild pain       cetirizine 10 MG tablet    zyrTEC    30 tablet    Take 1 tablet (10 mg) by mouth daily       diphenhydrAMINE 50 MG capsule    BENADRYL    56 capsule    Take 1 capsule (50 mg) by mouth every 6 hours as needed  for itching       eucerin cream      Apply topically 2 times daily Reported on 5/12/2017       hydrOXYzine 50 MG tablet    ATARAX    30 tablet    Take 1 tablet (50 mg) by mouth every 4 hours as needed for itching or anxiety       ondansetron 4 MG tablet    ZOFRAN    18 tablet    Take 2 tablets (8 mg) by mouth every 8 hours as needed for nausea       oxyCODONE 5 MG IR tablet    ROXICODONE    10 tablet    Take 1 tablet (5 mg) by mouth every 4 hours as needed for moderate to severe pain       prochlorperazine 10 MG tablet    COMPAZINE    30 tablet    Take 1 tablet (10 mg) by mouth every 6 hours as needed (Breakthrough Nausea/Vomiting)       ranitidine 150 MG tablet    ZANTAC    60 tablet    Take 1 tablet (150 mg) by mouth 2 times daily

## 2017-06-07 NOTE — LETTER
6/7/2017      RE: Julio John  [address]    [facility] [address]       Hematology-Oncology Visit  Jun 7, 2017    Reason for Visit: follow-up metastatic renal cell carcinoma    HPI: Julio John is a 47 year old gentleman without significant past medical history with metastatic renal cell carcinoma to lungs. He presented with back pain, fevers, BRBPR with hemorrhoids. He had a CT scan which revealed left kidney cancer. He has left nephrectomy 3/2016. He was note to have lung nodules on left lung on surveillance scan. He had stereotactic radiation to lung. Follow-up scans showed new nodules in R lung. He was referred to Dr. Painting for consideration of IL-2. Dr. Painting deemed him a good candidate. He had echo, PFTs, brain MRI, bone scan, and CT CAP as a baseline prior to starting therapy on 4/17/17. Restaging following 2 cycles showed stable disease, so plan is for 4 cycles.     C1 4/17/17 (10 doses)  C2 5/3/17 (4 doses)  C3 5/31/17 (5 doses)    Interval History:   Julio is here today with his wife and youngest two children in hospital follow-up. He has many questions and concerns today. He continues to have b/l shoulder pain--deep to posterior/anterior joint lines of shoulder, scapular spine, and muscles. Pain is worse along acromion with flexion above head. Bone scan and CT scan were negative for any evidence of disease or bony changes. Bailee Garnica ordered PT for suspected rotator cuff injury. He also notes some aches in his right forearm and wrist as well as R low back when he stands up at night to urinate. He does not feel this pain during the day. He is worried about scratchy throat and cough at night. No fevers/chills, cough during the day, SOB, CP. No rashes. Has had mild nausea but avoiding antiemetics because he does not like pills. Weight is down to pre-IL2 baseline. No issues with bowels or bladder. His left upper and lower molars still hurt when he chews. No cheek or  jaw pain.     Review of Systems: Patient denies any of the following except if noted above: fever, chills, vision or hearing changes, chest pain, cough, dyspnea, abdominal pain, nausea, vomiting, diarrhea, constipation, urinary concerns, headaches, numbness, tingling or issues with mood.     Current Outpatient Prescriptions   Medication     hydrOXYzine (ATARAX) 50 MG tablet     diphenhydrAMINE (BENADRYL) 50 MG capsule     acetaminophen (TYLENOL) 500 MG tablet     oxyCODONE (ROXICODONE) 5 MG IR tablet     Skin Protectants, Misc. (EUCERIN) cream     cetirizine (ZYRTEC) 10 MG tablet     ranitidine (ZANTAC) 150 MG tablet     prochlorperazine (COMPAZINE) 10 MG tablet     ondansetron (ZOFRAN) 4 MG tablet     No current facility-administered medications for this visit.        PHYSICAL EXAM:  /90  Pulse 94  Temp 98.7  F (37.1  C) (Oral)  Resp 15  Wt 100.6 kg (221 lb 12.8 oz)  SpO2 98%  BMI 35.8 kg/m2  General: Alert, oriented, pleasant, NAD  Skin: No rashes, bruising, or petechiae on exposed skin   HEENT: Normocephalic, atraumatic, PERRLA, EOMI. Moist mucus membranes, no lesions or thrush. No gingival inflammation or erythema. No pharyngeal erythema.   Neck: No cervical or supraclavicular LAD.   Axillary: No LAD  Lungs: CTA bilaterally, normal work of breathing. No wheezing, crackles or rhonchi  Cardiac: RRR, S1, S2, no murmurs  Abdomen: Soft, nontender, nondistended. Normoactive bowel sounds. No hepatosplenomegaly, masses  Neuro: CNII-XII grossly intact  Extremities: No pedal edema.    MSK: bilateral shoulder examination done today. Tender along acromion, posterior and anterior joint lines, scapular spine on L. Pain with flexion above head.     Labs:    6/2/2017 06:34 6/2/2017 09:41 6/2/2017 13:03 6/7/2017 14:39   Sodium 136  138 137   Potassium 4.9  5.0 3.9   Chloride 108  108 105   Carbon Dioxide 19 (L)  21 25   Urea Nitrogen 17  19 17   Creatinine 1.78 (H)  1.79 (H) 1.29 (H)   GFR Estimate 41 (L)  41 (L) 60  (L)   GFR Estimate If Black 50 (L)  49 (L) 72   Calcium 6.9 (L)  6.8 (L) 8.2 (L)   Anion Gap 9  8 8   Magnesium 2.2   2.1   Phosphorus 1.3 (L)      Albumin 2.4 (L)   3.6   Protein Total 5.1 (L)   6.9   Bilirubin Total 1.4 (H)   0.5   Alkaline Phosphatase 37 (L)   60   ALT 39   24   AST 36   14   CK Total 110      Lactic Acid  1.8     Lactate Dehydrogenase 233 (H)   176   Glucose 124 (H)  117 (H) 83   WBC 7.6   7.8   Hemoglobin 12.6 (L)   14.3   Hematocrit 38.8 (L)   42.4   Platelet Count 84 (L)   183   RBC Count 4.48   5.03   MCV 87   84   MCH 28.1   28.4   MCHC 32.5   33.7   RDW 15.3 (H)   14.4   Diff Method Automated Method   Automated Method   % Neutrophils 76.2   36.0   % Lymphocytes 2.9   47.3   % Monocytes 5.6   7.6   % Eosinophils 14.8   7.6   % Basophils 0.1   0.5   % Immature Granulocytes 0.4   1.0   Nucleated RBCs 0   0   Absolute Neutrophil 5.8   2.8   Absolute Lymphocytes 0.2 (L)   3.7   Absolute Monocytes 0.4   0.6   Absolute Eosinophils 1.1 (H)   0.6   Absolute Basophils 0.0   0.0   Abs Immature Granulocytes 0.0   0.1   Absolute Nucleated RBC 0.0   0.0   Specimen Description Blood PURPLE PORT      Culture Micro No growth      Micro Report Status FINAL 06/08/2017      BLOOD CULTURE Rpt          Assessment & Plan:   1. Metastatic renal cell carcinoma with pulmonary metastasis: S/p left nephrectomy and XRT to left lung. Now with disease involving R lung. He started on treatment with IL-2 and is s/p 3 cycles. He tolerated C3 OK, 5 doses. Patient elected to stop, expected side effects. Will be due for cycle 4 on 6/21. Schedule admission anytime following this. Restaging will be following cycle 4.     2. Teeth pain: Follow-up with dentist--he had dental work right before starting IL-2. No gum irritation or swelling. He could rinse with salt and soda.     3. Scratchy throat, cough at night: Likely from mild PND. Exam benign. He could try antihistamine before bed.     4. Shoulder pains: bilateral shoulder  pain- on exam seem consistent with rotator cuff and since bilateral, more likely to be an injury than metastatic disease. Imaging was negative for any bone pathology. Continue tylenol and oxycodone and start PT.     Jocelyn Seaman PA-C  [facility] [address] [address]  [phone]

## 2017-06-08 LAB
BACTERIA SPEC CULT: NO GROWTH
MICRO REPORT STATUS: NORMAL
SPECIMEN SOURCE: NORMAL

## 2017-06-08 NOTE — PROGRESS NOTES
Hematology-Oncology Visit  Jun 7, 2017    Reason for Visit: follow-up metastatic renal cell carcinoma    HPI: Julio John is a 47 year old gentleman without significant past medical history with metastatic renal cell carcinoma to lungs. He presented with back pain, fevers, BRBPR with hemorrhoids. He had a CT scan which revealed left kidney cancer. He has left nephrectomy 3/2016. He was note to have lung nodules on left lung on surveillance scan. He had stereotactic radiation to lung. Follow-up scans showed new nodules in R lung. He was referred to Dr. Painting for consideration of IL-2. Dr. Painting deemed him a good candidate. He had echo, PFTs, brain MRI, bone scan, and CT CAP as a baseline prior to starting therapy on 4/17/17. Restaging following 2 cycles showed stable disease, so plan is for 4 cycles.     C1 4/17/17 (10 doses)  C2 5/3/17 (4 doses)  C3 5/31/17 (5 doses)    Interval History:   Julio is here today with his wife and youngest two children in hospital follow-up. He has many questions and concerns today. He continues to have b/l shoulder pain--deep to posterior/anterior joint lines of shoulder, scapular spine, and muscles. Pain is worse along acromion with flexion above head. Bone scan and CT scan were negative for any evidence of disease or bony changes. Bailee Garnica ordered PT for suspected rotator cuff injury. He also notes some aches in his right forearm and wrist as well as R low back when he stands up at night to urinate. He does not feel this pain during the day. He is worried about scratchy throat and cough at night. No fevers/chills, cough during the day, SOB, CP. No rashes. Has had mild nausea but avoiding antiemetics because he does not like pills. Weight is down to pre-IL2 baseline. No issues with bowels or bladder. His left upper and lower molars still hurt when he chews. No cheek or jaw pain.     Review of Systems: Patient denies any of the following except if noted above: fever, chills,  vision or hearing changes, chest pain, cough, dyspnea, abdominal pain, nausea, vomiting, diarrhea, constipation, urinary concerns, headaches, numbness, tingling or issues with mood.     Current Outpatient Prescriptions   Medication     hydrOXYzine (ATARAX) 50 MG tablet     diphenhydrAMINE (BENADRYL) 50 MG capsule     acetaminophen (TYLENOL) 500 MG tablet     oxyCODONE (ROXICODONE) 5 MG IR tablet     Skin Protectants, Misc. (EUCERIN) cream     cetirizine (ZYRTEC) 10 MG tablet     ranitidine (ZANTAC) 150 MG tablet     prochlorperazine (COMPAZINE) 10 MG tablet     ondansetron (ZOFRAN) 4 MG tablet     No current facility-administered medications for this visit.        PHYSICAL EXAM:  /90  Pulse 94  Temp 98.7  F (37.1  C) (Oral)  Resp 15  Wt 100.6 kg (221 lb 12.8 oz)  SpO2 98%  BMI 35.8 kg/m2  General: Alert, oriented, pleasant, NAD  Skin: No rashes, bruising, or petechiae on exposed skin   HEENT: Normocephalic, atraumatic, PERRLA, EOMI. Moist mucus membranes, no lesions or thrush. No gingival inflammation or erythema. No pharyngeal erythema.   Neck: No cervical or supraclavicular LAD.   Axillary: No LAD  Lungs: CTA bilaterally, normal work of breathing. No wheezing, crackles or rhonchi  Cardiac: RRR, S1, S2, no murmurs  Abdomen: Soft, nontender, nondistended. Normoactive bowel sounds. No hepatosplenomegaly, masses  Neuro: CNII-XII grossly intact  Extremities: No pedal edema.    MSK: bilateral shoulder examination done today. Tender along acromion, posterior and anterior joint lines, scapular spine on L. Pain with flexion above head.     Labs:    6/2/2017 06:34 6/2/2017 09:41 6/2/2017 13:03 6/7/2017 14:39   Sodium 136  138 137   Potassium 4.9  5.0 3.9   Chloride 108  108 105   Carbon Dioxide 19 (L)  21 25   Urea Nitrogen 17  19 17   Creatinine 1.78 (H)  1.79 (H) 1.29 (H)   GFR Estimate 41 (L)  41 (L) 60 (L)   GFR Estimate If Black 50 (L)  49 (L) 72   Calcium 6.9 (L)  6.8 (L) 8.2 (L)   Anion Gap 9  8 8    Magnesium 2.2   2.1   Phosphorus 1.3 (L)      Albumin 2.4 (L)   3.6   Protein Total 5.1 (L)   6.9   Bilirubin Total 1.4 (H)   0.5   Alkaline Phosphatase 37 (L)   60   ALT 39   24   AST 36   14   CK Total 110      Lactic Acid  1.8     Lactate Dehydrogenase 233 (H)   176   Glucose 124 (H)  117 (H) 83   WBC 7.6   7.8   Hemoglobin 12.6 (L)   14.3   Hematocrit 38.8 (L)   42.4   Platelet Count 84 (L)   183   RBC Count 4.48   5.03   MCV 87   84   MCH 28.1   28.4   MCHC 32.5   33.7   RDW 15.3 (H)   14.4   Diff Method Automated Method   Automated Method   % Neutrophils 76.2   36.0   % Lymphocytes 2.9   47.3   % Monocytes 5.6   7.6   % Eosinophils 14.8   7.6   % Basophils 0.1   0.5   % Immature Granulocytes 0.4   1.0   Nucleated RBCs 0   0   Absolute Neutrophil 5.8   2.8   Absolute Lymphocytes 0.2 (L)   3.7   Absolute Monocytes 0.4   0.6   Absolute Eosinophils 1.1 (H)   0.6   Absolute Basophils 0.0   0.0   Abs Immature Granulocytes 0.0   0.1   Absolute Nucleated RBC 0.0   0.0   Specimen Description Blood PURPLE PORT      Culture Micro No growth      Micro Report Status FINAL 06/08/2017      BLOOD CULTURE Rpt          Assessment & Plan:   1. Metastatic renal cell carcinoma with pulmonary metastasis: S/p left nephrectomy and XRT to left lung. Now with disease involving R lung. He started on treatment with IL-2 and is s/p 3 cycles. He tolerated C3 OK, 5 doses. Patient elected to stop, expected side effects. Will be due for cycle 4 on 6/21. Schedule admission anytime following this. Restaging will be following cycle 4.     2. Teeth pain: Follow-up with dentist--he had dental work right before starting IL-2. No gum irritation or swelling. He could rinse with salt and soda.     3. Scratchy throat, cough at night: Likely from mild PND. Exam benign. He could try antihistamine before bed.     4. Shoulder pains: bilateral shoulder pain- on exam seem consistent with rotator cuff and since bilateral, more likely to be an injury than  metastatic disease. Imaging was negative for any bone pathology. Continue tylenol and oxycodone and start PT.     Jocelyn Seaman PA-C  [facility] [address] [address] [phone]

## 2017-06-22 DIAGNOSIS — C64.2 METASTATIC RENAL CELL CARCINOMA, LEFT (H): Primary | ICD-10-CM

## 2017-06-25 RX ORDER — HEPARIN SODIUM,PORCINE 10 UNIT/ML
2-5 VIAL (ML) INTRAVENOUS
Status: DISCONTINUED | OUTPATIENT
Start: 2017-06-25 | End: 2017-06-27 | Stop reason: HOSPADM

## 2017-06-25 RX ORDER — LIDOCAINE 40 MG/G
CREAM TOPICAL
Status: DISCONTINUED | OUTPATIENT
Start: 2017-06-25 | End: 2017-06-27 | Stop reason: HOSPADM

## 2017-06-26 ENCOUNTER — HOSPITAL ENCOUNTER (OUTPATIENT)
Dept: VASCULAR ULTRASOUND | Facility: CLINIC | Age: 47
DRG: 838 | End: 2017-06-26
Attending: PHYSICIAN ASSISTANT | Admitting: INTERNAL MEDICINE
Payer: MEDICAID

## 2017-06-26 ENCOUNTER — HOSPITAL ENCOUNTER (INPATIENT)
Facility: CLINIC | Age: 47
LOS: 2 days | Discharge: HOME | DRG: 838 | End: 2017-06-28
Attending: INTERNAL MEDICINE | Admitting: INTERNAL MEDICINE
Payer: MEDICAID

## 2017-06-26 ENCOUNTER — ONCOLOGY VISIT (OUTPATIENT)
Dept: ONCOLOGY | Facility: CLINIC | Age: 47
DRG: 838 | End: 2017-06-26
Attending: PHYSICIAN ASSISTANT
Payer: MEDICAID

## 2017-06-26 VITALS
DIASTOLIC BLOOD PRESSURE: 86 MMHG | HEART RATE: 77 BPM | TEMPERATURE: 97.3 F | OXYGEN SATURATION: 96 % | WEIGHT: 225 LBS | HEIGHT: 66 IN | RESPIRATION RATE: 16 BRPM | SYSTOLIC BLOOD PRESSURE: 129 MMHG | BODY MASS INDEX: 36.16 KG/M2

## 2017-06-26 DIAGNOSIS — C64.2 MALIGNANT NEOPLASM OF LEFT KIDNEY (H): ICD-10-CM

## 2017-06-26 DIAGNOSIS — R52 PAIN OF MULTIPLE SITES: ICD-10-CM

## 2017-06-26 DIAGNOSIS — C64.2 METASTATIC RENAL CELL CARCINOMA, LEFT (H): Primary | ICD-10-CM

## 2017-06-26 DIAGNOSIS — C64.2 METASTATIC RENAL CELL CARCINOMA, LEFT (H): ICD-10-CM

## 2017-06-26 PROBLEM — C64.9 METASTATIC RENAL CELL CARCINOMA TO LUNG (H): Status: ACTIVE | Noted: 2017-06-26

## 2017-06-26 PROBLEM — C78.00 METASTATIC RENAL CELL CARCINOMA TO LUNG (H): Status: ACTIVE | Noted: 2017-06-26

## 2017-06-26 LAB
ALBUMIN SERPL-MCNC: 3.6 G/DL (ref 3.4–5)
ALBUMIN SERPL-MCNC: 3.7 G/DL (ref 3.4–5)
ALP SERPL-CCNC: 56 U/L (ref 40–150)
ALP SERPL-CCNC: 58 U/L (ref 40–150)
ALT SERPL W P-5'-P-CCNC: 32 U/L (ref 0–70)
ALT SERPL W P-5'-P-CCNC: 34 U/L (ref 0–70)
ANION GAP SERPL CALCULATED.3IONS-SCNC: 6 MMOL/L (ref 3–14)
ANION GAP SERPL CALCULATED.3IONS-SCNC: 9 MMOL/L (ref 3–14)
AST SERPL W P-5'-P-CCNC: 16 U/L (ref 0–45)
AST SERPL W P-5'-P-CCNC: 6 U/L (ref 0–45)
BASOPHILS # BLD AUTO: 0 10E9/L (ref 0–0.2)
BASOPHILS # BLD AUTO: 0.1 10E9/L (ref 0–0.2)
BASOPHILS NFR BLD AUTO: 0.5 %
BASOPHILS NFR BLD AUTO: 0.8 %
BILIRUB SERPL-MCNC: 0.4 MG/DL (ref 0.2–1.3)
BILIRUB SERPL-MCNC: 0.6 MG/DL (ref 0.2–1.3)
BUN SERPL-MCNC: 15 MG/DL (ref 7–30)
BUN SERPL-MCNC: 15 MG/DL (ref 7–30)
CALCIUM SERPL-MCNC: 8.3 MG/DL (ref 8.5–10.1)
CALCIUM SERPL-MCNC: 8.5 MG/DL (ref 8.5–10.1)
CHLORIDE SERPL-SCNC: 106 MMOL/L (ref 94–109)
CHLORIDE SERPL-SCNC: 109 MMOL/L (ref 94–109)
CO2 SERPL-SCNC: 23 MMOL/L (ref 20–32)
CO2 SERPL-SCNC: 26 MMOL/L (ref 20–32)
CREAT SERPL-MCNC: 1.14 MG/DL (ref 0.66–1.25)
CREAT SERPL-MCNC: 1.15 MG/DL (ref 0.66–1.25)
DIFFERENTIAL METHOD BLD: ABNORMAL
DIFFERENTIAL METHOD BLD: ABNORMAL
EOSINOPHIL # BLD AUTO: 1.1 10E9/L (ref 0–0.7)
EOSINOPHIL # BLD AUTO: 1.1 10E9/L (ref 0–0.7)
EOSINOPHIL NFR BLD AUTO: 17.3 %
EOSINOPHIL NFR BLD AUTO: 18 %
ERYTHROCYTE [DISTWIDTH] IN BLOOD BY AUTOMATED COUNT: 14.5 % (ref 10–15)
ERYTHROCYTE [DISTWIDTH] IN BLOOD BY AUTOMATED COUNT: 14.8 % (ref 10–15)
GFR SERPL CREATININE-BSD FRML MDRD: 68 ML/MIN/1.7M2
GFR SERPL CREATININE-BSD FRML MDRD: 69 ML/MIN/1.7M2
GLUCOSE SERPL-MCNC: 104 MG/DL (ref 70–99)
GLUCOSE SERPL-MCNC: 99 MG/DL (ref 70–99)
HCT VFR BLD AUTO: 43.6 % (ref 40–53)
HCT VFR BLD AUTO: 44.8 % (ref 40–53)
HGB BLD-MCNC: 14.7 G/DL (ref 13.3–17.7)
HGB BLD-MCNC: 15.1 G/DL (ref 13.3–17.7)
IMM GRANULOCYTES # BLD: 0 10E9/L (ref 0–0.4)
IMM GRANULOCYTES # BLD: 0 10E9/L (ref 0–0.4)
IMM GRANULOCYTES NFR BLD: 0.3 %
IMM GRANULOCYTES NFR BLD: 0.3 %
LYMPHOCYTES # BLD AUTO: 2 10E9/L (ref 0.8–5.3)
LYMPHOCYTES # BLD AUTO: 2.4 10E9/L (ref 0.8–5.3)
LYMPHOCYTES NFR BLD AUTO: 33.2 %
LYMPHOCYTES NFR BLD AUTO: 37.4 %
MCH RBC QN AUTO: 28.7 PG (ref 26.5–33)
MCH RBC QN AUTO: 28.7 PG (ref 26.5–33)
MCHC RBC AUTO-ENTMCNC: 33.7 G/DL (ref 31.5–36.5)
MCHC RBC AUTO-ENTMCNC: 33.7 G/DL (ref 31.5–36.5)
MCV RBC AUTO: 85 FL (ref 78–100)
MCV RBC AUTO: 85 FL (ref 78–100)
MONOCYTES # BLD AUTO: 0.4 10E9/L (ref 0–1.3)
MONOCYTES # BLD AUTO: 0.4 10E9/L (ref 0–1.3)
MONOCYTES NFR BLD AUTO: 5.7 %
MONOCYTES NFR BLD AUTO: 7 %
NEUTROPHILS # BLD AUTO: 2.3 10E9/L (ref 1.6–8.3)
NEUTROPHILS # BLD AUTO: 2.6 10E9/L (ref 1.6–8.3)
NEUTROPHILS NFR BLD AUTO: 36.5 %
NEUTROPHILS NFR BLD AUTO: 43 %
NRBC # BLD AUTO: 0 10*3/UL
NRBC # BLD AUTO: 0 10*3/UL
NRBC BLD AUTO-RTO: 0 /100
NRBC BLD AUTO-RTO: 0 /100
PLATELET # BLD AUTO: 150 10E9/L (ref 150–450)
PLATELET # BLD AUTO: 161 10E9/L (ref 150–450)
POTASSIUM SERPL-SCNC: 4 MMOL/L (ref 3.4–5.3)
POTASSIUM SERPL-SCNC: 4 MMOL/L (ref 3.4–5.3)
PROT SERPL-MCNC: 7.2 G/DL (ref 6.8–8.8)
PROT SERPL-MCNC: 7.3 G/DL (ref 6.8–8.8)
RBC # BLD AUTO: 5.12 10E12/L (ref 4.4–5.9)
RBC # BLD AUTO: 5.27 10E12/L (ref 4.4–5.9)
SODIUM SERPL-SCNC: 138 MMOL/L (ref 133–144)
SODIUM SERPL-SCNC: 141 MMOL/L (ref 133–144)
WBC # BLD AUTO: 6.1 10E9/L (ref 4–11)
WBC # BLD AUTO: 6.3 10E9/L (ref 4–11)

## 2017-06-26 PROCEDURE — 99214 OFFICE O/P EST MOD 30 MIN: CPT | Mod: ZP | Performed by: PHYSICIAN ASSISTANT

## 2017-06-26 PROCEDURE — 83605 ASSAY OF LACTIC ACID: CPT | Performed by: INTERNAL MEDICINE

## 2017-06-26 PROCEDURE — 99221 1ST HOSP IP/OBS SF/LOW 40: CPT | Mod: AI | Performed by: INTERNAL MEDICINE

## 2017-06-26 PROCEDURE — 36592 COLLECT BLOOD FROM PICC: CPT | Performed by: INTERNAL MEDICINE

## 2017-06-26 PROCEDURE — 80053 COMPREHEN METABOLIC PANEL: CPT | Performed by: PHYSICIAN ASSISTANT

## 2017-06-26 PROCEDURE — 85025 COMPLETE CBC W/AUTO DIFF WBC: CPT | Performed by: PHYSICIAN ASSISTANT

## 2017-06-26 PROCEDURE — 25000132 ZZH RX MED GY IP 250 OP 250 PS 637: Performed by: PHYSICIAN ASSISTANT

## 2017-06-26 PROCEDURE — 36415 COLL VENOUS BLD VENIPUNCTURE: CPT | Performed by: PHYSICIAN ASSISTANT

## 2017-06-26 PROCEDURE — 99212 OFFICE O/P EST SF 10 MIN: CPT | Mod: ZF

## 2017-06-26 PROCEDURE — 27210577 ZZ H INTRODUCER MICRO SET

## 2017-06-26 PROCEDURE — 3E04302 INTRODUCTION OF HIGH-DOSE INTERLEUKIN-2 INTO CENTRAL VEIN, PERCUTANEOUS APPROACH: ICD-10-PCS | Performed by: INTERNAL MEDICINE

## 2017-06-26 PROCEDURE — 12000003 ZZH R&B CRITICAL UMMC

## 2017-06-26 PROCEDURE — 27210195 ZZH KIT POWER PICC DOUBLE LUMEN

## 2017-06-26 PROCEDURE — 25000128 H RX IP 250 OP 636: Performed by: PHYSICIAN ASSISTANT

## 2017-06-26 PROCEDURE — 25000125 ZZHC RX 250: Performed by: PHYSICIAN ASSISTANT

## 2017-06-26 PROCEDURE — 36569 INSJ PICC 5 YR+ W/O IMAGING: CPT

## 2017-06-26 PROCEDURE — 25800025 ZZH RX 258: Performed by: PHYSICIAN ASSISTANT

## 2017-06-26 RX ORDER — NAPROXEN 250 MG/1
250 TABLET ORAL 2 TIMES DAILY PRN
Status: DISCONTINUED | OUTPATIENT
Start: 2017-06-26 | End: 2017-06-28 | Stop reason: HOSPADM

## 2017-06-26 RX ORDER — NAPROXEN 250 MG/1
250 TABLET ORAL 2 TIMES DAILY PRN
Status: CANCELLED | OUTPATIENT
Start: 2017-06-26

## 2017-06-26 RX ORDER — NALOXONE HYDROCHLORIDE 0.4 MG/ML
.1-.4 INJECTION, SOLUTION INTRAMUSCULAR; INTRAVENOUS; SUBCUTANEOUS
Status: DISCONTINUED | OUTPATIENT
Start: 2017-06-26 | End: 2017-06-28 | Stop reason: HOSPADM

## 2017-06-26 RX ORDER — CETIRIZINE HYDROCHLORIDE 10 MG/1
10 TABLET ORAL DAILY PRN
Status: DISCONTINUED | OUTPATIENT
Start: 2017-06-26 | End: 2017-06-28 | Stop reason: HOSPADM

## 2017-06-26 RX ORDER — POTASSIUM CHLORIDE 29.8 MG/ML
20 INJECTION INTRAVENOUS
Status: DISCONTINUED | OUTPATIENT
Start: 2017-06-26 | End: 2017-06-28 | Stop reason: HOSPADM

## 2017-06-26 RX ORDER — ALBUTEROL SULFATE 0.83 MG/ML
2.5 SOLUTION RESPIRATORY (INHALATION)
Status: CANCELLED | OUTPATIENT
Start: 2017-06-26

## 2017-06-26 RX ORDER — ACETAMINOPHEN 325 MG/1
650 TABLET ORAL EVERY 4 HOURS
Status: CANCELLED | OUTPATIENT
Start: 2017-06-26

## 2017-06-26 RX ORDER — SODIUM CHLORIDE 9 MG/ML
1000 INJECTION, SOLUTION INTRAVENOUS CONTINUOUS PRN
Status: CANCELLED | OUTPATIENT
Start: 2017-06-26

## 2017-06-26 RX ORDER — PROCHLORPERAZINE MALEATE 10 MG
10 TABLET ORAL EVERY 6 HOURS PRN
Status: DISCONTINUED | OUTPATIENT
Start: 2017-06-26 | End: 2017-06-28 | Stop reason: HOSPADM

## 2017-06-26 RX ORDER — DEXTROSE MONOHYDRATE, SODIUM CHLORIDE, AND POTASSIUM CHLORIDE 50; 1.49; 9 G/1000ML; G/1000ML; G/1000ML
INJECTION, SOLUTION INTRAVENOUS CONTINUOUS
Status: CANCELLED | OUTPATIENT
Start: 2017-06-26

## 2017-06-26 RX ORDER — LORAZEPAM 2 MG/ML
.5-1 INJECTION INTRAMUSCULAR EVERY 6 HOURS PRN
Status: DISCONTINUED | OUTPATIENT
Start: 2017-06-26 | End: 2017-06-28 | Stop reason: HOSPADM

## 2017-06-26 RX ORDER — MEPERIDINE HYDROCHLORIDE 25 MG/ML
25 INJECTION INTRAMUSCULAR; INTRAVENOUS; SUBCUTANEOUS
Status: CANCELLED | OUTPATIENT
Start: 2017-06-26 | End: 2017-07-02

## 2017-06-26 RX ORDER — DIPHENOXYLATE HCL/ATROPINE 2.5-.025MG
1 TABLET ORAL 4 TIMES DAILY
Status: DISCONTINUED | OUTPATIENT
Start: 2017-06-26 | End: 2017-06-26

## 2017-06-26 RX ORDER — DEXTROSE MONOHYDRATE, SODIUM CHLORIDE, AND POTASSIUM CHLORIDE 50; 1.49; 9 G/1000ML; G/1000ML; G/1000ML
INJECTION, SOLUTION INTRAVENOUS CONTINUOUS
Status: DISCONTINUED | OUTPATIENT
Start: 2017-06-26 | End: 2017-06-28 | Stop reason: HOSPADM

## 2017-06-26 RX ORDER — POTASSIUM CHLORIDE 1.5 G/1.58G
20-40 POWDER, FOR SOLUTION ORAL
Status: DISCONTINUED | OUTPATIENT
Start: 2017-06-26 | End: 2017-06-28 | Stop reason: HOSPADM

## 2017-06-26 RX ORDER — MEPERIDINE HYDROCHLORIDE 25 MG/ML
25 INJECTION INTRAMUSCULAR; INTRAVENOUS; SUBCUTANEOUS EVERY 30 MIN PRN
Status: CANCELLED | OUTPATIENT
Start: 2017-06-26

## 2017-06-26 RX ORDER — PROCHLORPERAZINE MALEATE 10 MG
10 TABLET ORAL EVERY 6 HOURS PRN
Status: CANCELLED | OUTPATIENT
Start: 2017-06-26

## 2017-06-26 RX ORDER — DEXTROSE MONOHYDRATE 50 MG/ML
1000 INJECTION, SOLUTION INTRAVENOUS CONTINUOUS
Status: DISCONTINUED | OUTPATIENT
Start: 2017-06-26 | End: 2017-06-28 | Stop reason: HOSPADM

## 2017-06-26 RX ORDER — METHYLPREDNISOLONE SODIUM SUCCINATE 125 MG/2ML
125 INJECTION, POWDER, LYOPHILIZED, FOR SOLUTION INTRAMUSCULAR; INTRAVENOUS
Status: CANCELLED | OUTPATIENT
Start: 2017-06-26

## 2017-06-26 RX ORDER — ALBUTEROL SULFATE 90 UG/1
1-2 AEROSOL, METERED RESPIRATORY (INHALATION)
Status: CANCELLED | OUTPATIENT
Start: 2017-06-26

## 2017-06-26 RX ORDER — CETIRIZINE HYDROCHLORIDE 10 MG/1
10 TABLET ORAL DAILY
Status: DISCONTINUED | OUTPATIENT
Start: 2017-06-26 | End: 2017-06-26

## 2017-06-26 RX ORDER — POTASSIUM CHLORIDE 7.45 MG/ML
10 INJECTION INTRAVENOUS
Status: DISCONTINUED | OUTPATIENT
Start: 2017-06-26 | End: 2017-06-28 | Stop reason: HOSPADM

## 2017-06-26 RX ORDER — POTASSIUM CL/LIDO/0.9 % NACL 10MEQ/0.1L
10 INTRAVENOUS SOLUTION, PIGGYBACK (ML) INTRAVENOUS
Status: DISCONTINUED | OUTPATIENT
Start: 2017-06-26 | End: 2017-06-28 | Stop reason: HOSPADM

## 2017-06-26 RX ORDER — POTASSIUM CHLORIDE 750 MG/1
20-40 TABLET, EXTENDED RELEASE ORAL
Status: DISCONTINUED | OUTPATIENT
Start: 2017-06-26 | End: 2017-06-28 | Stop reason: HOSPADM

## 2017-06-26 RX ORDER — HYDROXYZINE HYDROCHLORIDE 25 MG/1
50 TABLET, FILM COATED ORAL EVERY 4 HOURS PRN
Status: DISCONTINUED | OUTPATIENT
Start: 2017-06-26 | End: 2017-06-28 | Stop reason: HOSPADM

## 2017-06-26 RX ORDER — CEPHALEXIN 500 MG/1
500 CAPSULE ORAL 2 TIMES DAILY
Status: DISCONTINUED | OUTPATIENT
Start: 2017-06-26 | End: 2017-06-28 | Stop reason: HOSPADM

## 2017-06-26 RX ORDER — MEPERIDINE HYDROCHLORIDE 25 MG/ML
25 INJECTION INTRAMUSCULAR; INTRAVENOUS; SUBCUTANEOUS EVERY 30 MIN PRN
Status: DISCONTINUED | OUTPATIENT
Start: 2017-06-26 | End: 2017-06-28 | Stop reason: HOSPADM

## 2017-06-26 RX ORDER — MEPERIDINE HYDROCHLORIDE 25 MG/ML
25 INJECTION INTRAMUSCULAR; INTRAVENOUS; SUBCUTANEOUS
Status: DISCONTINUED | OUTPATIENT
Start: 2017-06-26 | End: 2017-06-28 | Stop reason: HOSPADM

## 2017-06-26 RX ORDER — OXYCODONE HYDROCHLORIDE 5 MG/1
5 TABLET ORAL EVERY 4 HOURS PRN
Status: DISCONTINUED | OUTPATIENT
Start: 2017-06-26 | End: 2017-06-28 | Stop reason: HOSPADM

## 2017-06-26 RX ORDER — CEPHALEXIN 500 MG/1
500 CAPSULE ORAL 2 TIMES DAILY
Status: CANCELLED | OUTPATIENT
Start: 2017-06-26

## 2017-06-26 RX ORDER — DEXTROSE MONOHYDRATE 50 MG/ML
1000 INJECTION, SOLUTION INTRAVENOUS CONTINUOUS
Status: CANCELLED | OUTPATIENT
Start: 2017-06-26

## 2017-06-26 RX ORDER — DIPHENOXYLATE HCL/ATROPINE 2.5-.025MG
1 TABLET ORAL 4 TIMES DAILY
Status: CANCELLED | OUTPATIENT
Start: 2017-06-26

## 2017-06-26 RX ORDER — MAGNESIUM SULFATE HEPTAHYDRATE 40 MG/ML
4 INJECTION, SOLUTION INTRAVENOUS EVERY 4 HOURS PRN
Status: DISCONTINUED | OUTPATIENT
Start: 2017-06-26 | End: 2017-06-28 | Stop reason: HOSPADM

## 2017-06-26 RX ORDER — ALBUTEROL SULFATE 90 UG/1
1-2 AEROSOL, METERED RESPIRATORY (INHALATION)
Status: DISCONTINUED | OUTPATIENT
Start: 2017-06-26 | End: 2017-06-28 | Stop reason: HOSPADM

## 2017-06-26 RX ORDER — ALBUTEROL SULFATE 0.83 MG/ML
2.5 SOLUTION RESPIRATORY (INHALATION)
Status: DISCONTINUED | OUTPATIENT
Start: 2017-06-26 | End: 2017-06-28 | Stop reason: HOSPADM

## 2017-06-26 RX ORDER — ACETAMINOPHEN 325 MG/1
650 TABLET ORAL EVERY 4 HOURS
Status: DISCONTINUED | OUTPATIENT
Start: 2017-06-26 | End: 2017-06-28 | Stop reason: HOSPADM

## 2017-06-26 RX ORDER — SODIUM CHLORIDE 9 MG/ML
1000 INJECTION, SOLUTION INTRAVENOUS CONTINUOUS PRN
Status: DISCONTINUED | OUTPATIENT
Start: 2017-06-26 | End: 2017-06-28 | Stop reason: HOSPADM

## 2017-06-26 RX ORDER — DIPHENHYDRAMINE HYDROCHLORIDE 50 MG/ML
50 INJECTION INTRAMUSCULAR; INTRAVENOUS
Status: DISCONTINUED | OUTPATIENT
Start: 2017-06-26 | End: 2017-06-28 | Stop reason: HOSPADM

## 2017-06-26 RX ORDER — LORAZEPAM 2 MG/ML
.5-1 INJECTION INTRAMUSCULAR EVERY 6 HOURS PRN
Status: CANCELLED | OUTPATIENT
Start: 2017-06-26

## 2017-06-26 RX ORDER — ONDANSETRON 2 MG/ML
8 INJECTION INTRAMUSCULAR; INTRAVENOUS EVERY 8 HOURS
Status: CANCELLED | OUTPATIENT
Start: 2017-06-26

## 2017-06-26 RX ORDER — DIPHENHYDRAMINE HCL 25 MG
25 CAPSULE ORAL EVERY 4 HOURS PRN
Status: DISCONTINUED | OUTPATIENT
Start: 2017-06-26 | End: 2017-06-28 | Stop reason: HOSPADM

## 2017-06-26 RX ORDER — METHYLPREDNISOLONE SODIUM SUCCINATE 125 MG/2ML
125 INJECTION, POWDER, LYOPHILIZED, FOR SOLUTION INTRAMUSCULAR; INTRAVENOUS
Status: DISCONTINUED | OUTPATIENT
Start: 2017-06-26 | End: 2017-06-28 | Stop reason: HOSPADM

## 2017-06-26 RX ORDER — LORAZEPAM 0.5 MG/1
.5-1 TABLET ORAL EVERY 6 HOURS PRN
Status: DISCONTINUED | OUTPATIENT
Start: 2017-06-26 | End: 2017-06-28 | Stop reason: HOSPADM

## 2017-06-26 RX ORDER — DIPHENHYDRAMINE HCL 25 MG
25 CAPSULE ORAL EVERY 4 HOURS PRN
Status: CANCELLED | OUTPATIENT
Start: 2017-06-26

## 2017-06-26 RX ORDER — LORAZEPAM 0.5 MG/1
.5-1 TABLET ORAL EVERY 6 HOURS PRN
Status: CANCELLED | OUTPATIENT
Start: 2017-06-26

## 2017-06-26 RX ORDER — ONDANSETRON 2 MG/ML
8 INJECTION INTRAMUSCULAR; INTRAVENOUS EVERY 8 HOURS
Status: DISCONTINUED | OUTPATIENT
Start: 2017-06-26 | End: 2017-06-28 | Stop reason: HOSPADM

## 2017-06-26 RX ORDER — DIPHENOXYLATE HCL/ATROPINE 2.5-.025MG
1 TABLET ORAL 4 TIMES DAILY PRN
Status: DISCONTINUED | OUTPATIENT
Start: 2017-06-26 | End: 2017-06-28 | Stop reason: HOSPADM

## 2017-06-26 RX ORDER — DIPHENHYDRAMINE HYDROCHLORIDE 50 MG/ML
50 INJECTION INTRAMUSCULAR; INTRAVENOUS
Status: CANCELLED | OUTPATIENT
Start: 2017-06-26

## 2017-06-26 RX ADMIN — RANITIDINE 150 MG: 150 TABLET ORAL at 19:33

## 2017-06-26 RX ADMIN — ONDANSETRON 8 MG: 2 INJECTION INTRAMUSCULAR; INTRAVENOUS at 11:30

## 2017-06-26 RX ADMIN — ACETAMINOPHEN 650 MG: 325 TABLET, FILM COATED ORAL at 15:34

## 2017-06-26 RX ADMIN — ALDESLEUKIN 64 MILLION UNITS: 1.1 INJECTION, POWDER, LYOPHILIZED, FOR SOLUTION INTRAVENOUS at 12:02

## 2017-06-26 RX ADMIN — MEPERIDINE HYDROCHLORIDE 25 MG: 25 INJECTION, SOLUTION INTRAMUSCULAR; INTRAVENOUS; SUBCUTANEOUS at 13:25

## 2017-06-26 RX ADMIN — PROCHLORPERAZINE EDISYLATE 10 MG: 5 INJECTION INTRAMUSCULAR; INTRAVENOUS at 13:25

## 2017-06-26 RX ADMIN — WHITE PETROLATUM: 1.75 OINTMENT TOPICAL at 20:14

## 2017-06-26 RX ADMIN — CETIRIZINE HYDROCHLORIDE 10 MG: 10 TABLET, FILM COATED ORAL at 11:30

## 2017-06-26 RX ADMIN — MEPERIDINE HYDROCHLORIDE 25 MG: 25 INJECTION, SOLUTION INTRAMUSCULAR; INTRAVENOUS; SUBCUTANEOUS at 21:56

## 2017-06-26 RX ADMIN — RANITIDINE 150 MG: 150 TABLET ORAL at 10:46

## 2017-06-26 RX ADMIN — ACETAMINOPHEN 650 MG: 325 TABLET, FILM COATED ORAL at 11:30

## 2017-06-26 RX ADMIN — ONDANSETRON 8 MG: 2 INJECTION INTRAMUSCULAR; INTRAVENOUS at 19:33

## 2017-06-26 RX ADMIN — LIDOCAINE HYDROCHLORIDE 5 ML: 10 INJECTION, SOLUTION INFILTRATION; PERINEURAL at 10:11

## 2017-06-26 RX ADMIN — ACETAMINOPHEN 650 MG: 325 TABLET, FILM COATED ORAL at 19:33

## 2017-06-26 RX ADMIN — DEXTROSE MONOHYDRATE 1000 ML: 50 INJECTION, SOLUTION INTRAVENOUS at 11:30

## 2017-06-26 RX ADMIN — PROCHLORPERAZINE EDISYLATE 10 MG: 5 INJECTION INTRAMUSCULAR; INTRAVENOUS at 22:28

## 2017-06-26 RX ADMIN — OXYCODONE HYDROCHLORIDE 5 MG: 5 TABLET ORAL at 20:34

## 2017-06-26 RX ADMIN — OXYCODONE HYDROCHLORIDE 5 MG: 5 TABLET ORAL at 12:04

## 2017-06-26 RX ADMIN — ALDESLEUKIN 64 MILLION UNITS: 1.1 INJECTION, POWDER, LYOPHILIZED, FOR SOLUTION INTRAVENOUS at 20:08

## 2017-06-26 RX ADMIN — CEPHALEXIN 500 MG: 500 CAPSULE ORAL at 10:46

## 2017-06-26 RX ADMIN — CEPHALEXIN 500 MG: 500 CAPSULE ORAL at 19:33

## 2017-06-26 RX ADMIN — POTASSIUM CHLORIDE, DEXTROSE MONOHYDRATE AND SODIUM CHLORIDE: 150; 5; 900 INJECTION, SOLUTION INTRAVENOUS at 10:47

## 2017-06-26 RX ADMIN — POTASSIUM CHLORIDE, DEXTROSE MONOHYDRATE AND SODIUM CHLORIDE: 150; 5; 900 INJECTION, SOLUTION INTRAVENOUS at 20:54

## 2017-06-26 ASSESSMENT — PAIN SCALES - GENERAL: PAINLEVEL: EXTREME PAIN (8)

## 2017-06-26 ASSESSMENT — PAIN DESCRIPTION - DESCRIPTORS: DESCRIPTORS: ACHING

## 2017-06-26 NOTE — IP AVS SNAPSHOT
[address] [address]    Phone:  [phone]                                       After Visit Summary   6/26/2017    Julio John    MRN: [id number]           After Visit Summary Signature Page     I have received my discharge instructions, and my questions have been answered. I have discussed any challenges I see with this plan with the nurse or doctor.    ..........................................................................................................................................  Patient/Patient Representative Signature      ..........................................................................................................................................  Patient Representative Print Name and Relationship to Patient    ..................................................               ................................................  Date                                            Time    ..........................................................................................................................................  Reviewed by Signature/Title    ...................................................              ..............................................  Date                                                            Time

## 2017-06-26 NOTE — PROGRESS NOTES
DATE/TIME  (DOT-TD, DOT-NOW) CHEMO CHECK ACTIVITY (REGIMEN & DOSE CHECK, DAY, DOSE #, NAME OF CHEMO #1)  CHEMO DRUG #2  CHEMO DRUG #3 NAME OF RN #1 (USE DOT-ME HERE) NAME OF RN#2 (2ND RN TO LOG IN SEPARATELY)   6/26/2017  9:22 AM   IL-2 protocol check   Jennie Costello     6/26/2017  12:00 PM   Dose #1 IL-2   Jennie sutherland   6/26/2017  8:06 PM   Dose # 2 IL-2   Cely Lundberg     06/27/17  4:00 AM Dose #3 IL-2   Bobbi Adrian     6/27/2017  3:59 PM   Dose #4 IL-2   Jennie Holley     06/28/17  01:30     Dose # 5 IL-2    Atif Parish

## 2017-06-26 NOTE — PLAN OF CARE
"Problem: Chemotherapy Effects (Adult)  Goal: Signs and Symptoms of Listed Potential Problems Will be Absent or Manageable (Chemotherapy Effects)  Signs and symptoms of listed potential problems will be absent or manageable by discharge/transition of care (reference Chemotherapy Effects (Adult) CPG).   Outcome: No Change     VSS. Afebrile. Pt admitted today for cycle #4 IL-2. Received dose #1 at 1200. Oxycodone given w/ dose per pt request, c/o \"bone pain\" especially in shoulders and feet that seems to be aggravated by IL-2 rxt. C/o chills/mild rigors about 1.5 hrs after dose, relieved by kesha hugger and demerol x1. Compazine given at same time as demerol to prevent nausea. PT consult in place for shoulder pain. Pt has 255mL U/O towards dose #2 IL-2. Continue to monitor and w/ POC.       "

## 2017-06-26 NOTE — PROGRESS NOTES
Nursing Focus: Admission  D: Arrived at 0900 from clinic via private vehicle. Patient accompanied by self. Admitted for Cycle #4 IL-2.    I: Admission process began.  Patient oriented to room, enviroment, call light.  Md. notified of patients arrival on unit.     A: Vital signs stable, afebrile.  Patient stable at this time.  Complaining of shoulder pain, foot pain, other generalized pains.  PICC line to be placed, then will begin IL-2.     P: Implement plan of care when available. Continue to monitor patient. Nursing interventions as appropriate. Notify md with changes in pt status.

## 2017-06-26 NOTE — LETTER
"6/26/2017      RE: Julio John  [address]  [facility] [address]       DIAGNOSIS:  Metastatic renal cell carcinoma.  For full history of present illness and prior treatment, please see Ludwin Painting MD, dictations.  The patient came to the [facility] for high-dose IL-2.  Cycle 1 was 04/17 and received 8 doses.  Cycle 2 was 05/03 and received 4 doses.  Cycle 3 was 05/31 and received 5 doses.  Due for cycle #4 today.     INTERVAL HISTORY:  Mr. Baron John comes to the clinic today for followup of his metastatic renal cell carcinoma.  He is accompanied by an .  He continues to have bone pain since the second cycle of the high-dose IL-2.  It is most prominently in his feet, fingers and shoulders.  It is both of his shoulders.  He does state stretching helps some.  He notes slightly worsening of the pain.  The shoulder is thought related to a rotator cuff.  No injury or trauma to any of these sites.  Bone scan done about a month ago showed nothing to suggest metastatic disease.  Over the last couple of days, he has noticed a \"strange sensation.\"  He states that he feels like he drank too much alcohol, but he was not drinking.  He states that this is dizziness or unsteadiness.  He denies any syncopal episodes, falls, vision changes, headaches, focal weakness.  He states he is drinking enough fluids.  He denies any fevers, chills, bleeding from any site, chest pain, shortness of breath, cough, nausea, vomiting, abdominal pain, difficulty with urination or change in bowel habits.     MEDICATIONS:   Current Outpatient Prescriptions   Medication Sig Dispense Refill     hydrOXYzine (ATARAX) 50 MG tablet Take 1 tablet (50 mg) by mouth every 4 hours as needed for itching or anxiety 30 tablet 1     cetirizine (ZYRTEC) 10 MG tablet Take 1 tablet (10 mg) by mouth daily 30 tablet 0     diphenhydrAMINE (BENADRYL) 50 MG capsule Take 1 capsule (50 mg) by mouth every 6 hours as needed " "for itching 56 capsule 0     acetaminophen (TYLENOL) 500 MG tablet Take 1 tablet (500 mg) by mouth every 6 hours as needed for mild pain 1 Bottle 1     oxyCODONE (ROXICODONE) 5 MG IR tablet Take 1 tablet (5 mg) by mouth every 4 hours as needed for moderate to severe pain 10 tablet 0     Skin Protectants, Misc. (EUCERIN) cream Apply topically 2 times daily Reported on 5/12/2017       prochlorperazine (COMPAZINE) 10 MG tablet Take 1 tablet (10 mg) by mouth every 6 hours as needed (Breakthrough Nausea/Vomiting) 30 tablet 0     ondansetron (ZOFRAN) 4 MG tablet Take 2 tablets (8 mg) by mouth every 8 hours as needed for nausea 18 tablet 0     ranitidine (ZANTAC) 150 MG tablet Take 1 tablet (150 mg) by mouth 2 times daily (Patient not taking: Reported on 6/7/2017) 60 tablet 0         ALLERGIES:  No Known Allergies    PHYSICAL EXAMINATION:  Vitals: /86  Pulse 77  Temp 97.3  F (36.3  C) (Oral)  Resp 16  Ht 1.676 m (5' 5.98\")  Wt 102.1 kg (225 lb)  SpO2 96%  BMI 36.33 kg/m2  GENERAL:  A pleasant person in no acute distress.   HEENT:  Sclerae are nonicteric.  Mouth is without mucositis or thrush.   NECK:  Supple.   LYMPH NODES:  No peripheral lymphadenopathy noted in the axillary, supraclavicular, or cervical regions.   LUNGS:  Clear to auscultation bilaterally.   HEART:  Regular rate and rhythm, with no murmur appreciated.   ABDOMEN:  Bowel sounds are active.  Soft and nontender.  No hepatosplenomegaly or other masses appreciated.  LOWER EXTREMITIES:  Without pitting edema to the knees bilaterally.   NEUROLOGICAL:  Alert/orientated/able to answer all questions. Pupils equal, round and reactive to light. Extraocular movements are intact.  Facial movements are full and symmetric. Palate elevates symmetrically. Tongue is midline. Shoulder shrug is 5/5 bilaterally.      LAB:      6/26/2017 07:37   Sodium 138   Potassium 4.0   Chloride 106   Carbon Dioxide 23   Urea Nitrogen 15   Creatinine 1.15   GFR Estimate 68 "   GFR Estimate If Black 82   Calcium 8.3 (L)   Anion Gap 9   Albumin 3.7   Protein Total 7.3   Bilirubin Total 0.6   Alkaline Phosphatase 58   ALT 32   AST 16   Glucose 104 (H)   WBC 6.3   Hemoglobin 15.1   Hematocrit 44.8   Platelet Count 161   RBC Count 5.27   MCV 85   MCH 28.7   MCHC 33.7   RDW 14.5   Diff Method Automated Method   % Neutrophils 36.5   % Lymphocytes 37.4   % Monocytes 7.0   % Eosinophils 18.0   % Basophils 0.8   % Immature Granulocytes 0.3   Nucleated RBCs 0   Absolute Neutrophil 2.3   Absolute Lymphocytes 2.4   Absolute Monocytes 0.4   Absolute Eosinophils 1.1 (H)   Absolute Basophils 0.1   Abs Immature Granulocytes 0.0   Absolute Nucleated RBC 0.0       IMPRESSION/PLAN:   1.  Metastatic renal cell carcinoma.  Mr. Baron John continues to tolerate the high-dose IL-2 well at this time.  His laboratory work is adequate and he will be admitted into the hospital for his fourth and final cycle of the treatment.  I will plan to discuss with Dr. Painting the followup, but we will tentatively schedule an appointment with Dr. Painting in about 4 weeks.  We will decide whether scan need to be obtained in 4 weeks or later.  The patient also is going to return to his medical oncologist at [facility].   2.  Bone pains.  Unclear etiology.  Bone scan about a month ago was negative for metastatic disease.  Previous notes indicated that the shoulders could be from a rotator cuff tear.      If there are no interval concerns, tentatively scheduled an appointment in 4 weeks with Dr. Painting.     ADDENDUM:  Plan for visit and CT CAP in 4 weeks and see Dr. Painting.  SHEREEN Bill PA-C

## 2017-06-26 NOTE — PROGRESS NOTES
"DIAGNOSIS:  Metastatic renal cell carcinoma.  For full history of present illness and prior treatment, please see Ludwin Painting MD, dictations.  The patient came to the [facility] for high-dose IL-2.  Cycle 1 was 04/17 and received 8 doses.  Cycle 2 was 05/03 and received 4 doses.  Cycle 3 was 05/31 and received 5 doses.  Due for cycle #4 today.     INTERVAL HISTORY:  Mr. Baron John comes to the clinic today for followup of his metastatic renal cell carcinoma.  He is accompanied by an .  He continues to have bone pain since the second cycle of the high-dose IL-2.  It is most prominently in his feet, fingers and shoulders.  It is both of his shoulders.  He does state stretching helps some.  He notes slightly worsening of the pain.  The shoulder is thought related to a rotator cuff.  No injury or trauma to any of these sites.  Bone scan done about a month ago showed nothing to suggest metastatic disease.  Over the last couple of days, he has noticed a \"strange sensation.\"  He states that he feels like he drank too much alcohol, but he was not drinking.  He states that this is dizziness or unsteadiness.  He denies any syncopal episodes, falls, vision changes, headaches, focal weakness.  He states he is drinking enough fluids.  He denies any fevers, chills, bleeding from any site, chest pain, shortness of breath, cough, nausea, vomiting, abdominal pain, difficulty with urination or change in bowel habits.     MEDICATIONS:   Current Outpatient Prescriptions   Medication Sig Dispense Refill     hydrOXYzine (ATARAX) 50 MG tablet Take 1 tablet (50 mg) by mouth every 4 hours as needed for itching or anxiety 30 tablet 1     cetirizine (ZYRTEC) 10 MG tablet Take 1 tablet (10 mg) by mouth daily 30 tablet 0     diphenhydrAMINE (BENADRYL) 50 MG capsule Take 1 capsule (50 mg) by mouth every 6 hours as needed for itching 56 capsule 0     acetaminophen (TYLENOL) 500 MG tablet Take 1 tablet (500 mg) by mouth " "every 6 hours as needed for mild pain 1 Bottle 1     oxyCODONE (ROXICODONE) 5 MG IR tablet Take 1 tablet (5 mg) by mouth every 4 hours as needed for moderate to severe pain 10 tablet 0     Skin Protectants, Misc. (EUCERIN) cream Apply topically 2 times daily Reported on 5/12/2017       prochlorperazine (COMPAZINE) 10 MG tablet Take 1 tablet (10 mg) by mouth every 6 hours as needed (Breakthrough Nausea/Vomiting) 30 tablet 0     ondansetron (ZOFRAN) 4 MG tablet Take 2 tablets (8 mg) by mouth every 8 hours as needed for nausea 18 tablet 0     ranitidine (ZANTAC) 150 MG tablet Take 1 tablet (150 mg) by mouth 2 times daily (Patient not taking: Reported on 6/7/2017) 60 tablet 0         ALLERGIES:  No Known Allergies    PHYSICAL EXAMINATION:  Vitals: /86  Pulse 77  Temp 97.3  F (36.3  C) (Oral)  Resp 16  Ht 1.676 m (5' 5.98\")  Wt 102.1 kg (225 lb)  SpO2 96%  BMI 36.33 kg/m2  GENERAL:  A pleasant person in no acute distress.   HEENT:  Sclerae are nonicteric.  Mouth is without mucositis or thrush.   NECK:  Supple.   LYMPH NODES:  No peripheral lymphadenopathy noted in the axillary, supraclavicular, or cervical regions.   LUNGS:  Clear to auscultation bilaterally.   HEART:  Regular rate and rhythm, with no murmur appreciated.   ABDOMEN:  Bowel sounds are active.  Soft and nontender.  No hepatosplenomegaly or other masses appreciated.  LOWER EXTREMITIES:  Without pitting edema to the knees bilaterally.   NEUROLOGICAL:  Alert/orientated/able to answer all questions. Pupils equal, round and reactive to light. Extraocular movements are intact.  Facial movements are full and symmetric. Palate elevates symmetrically. Tongue is midline. Shoulder shrug is 5/5 bilaterally.      LAB:      6/26/2017 07:37   Sodium 138   Potassium 4.0   Chloride 106   Carbon Dioxide 23   Urea Nitrogen 15   Creatinine 1.15   GFR Estimate 68   GFR Estimate If Black 82   Calcium 8.3 (L)   Anion Gap 9   Albumin 3.7   Protein Total 7.3 "   Bilirubin Total 0.6   Alkaline Phosphatase 58   ALT 32   AST 16   Glucose 104 (H)   WBC 6.3   Hemoglobin 15.1   Hematocrit 44.8   Platelet Count 161   RBC Count 5.27   MCV 85   MCH 28.7   MCHC 33.7   RDW 14.5   Diff Method Automated Method   % Neutrophils 36.5   % Lymphocytes 37.4   % Monocytes 7.0   % Eosinophils 18.0   % Basophils 0.8   % Immature Granulocytes 0.3   Nucleated RBCs 0   Absolute Neutrophil 2.3   Absolute Lymphocytes 2.4   Absolute Monocytes 0.4   Absolute Eosinophils 1.1 (H)   Absolute Basophils 0.1   Abs Immature Granulocytes 0.0   Absolute Nucleated RBC 0.0       IMPRESSION/PLAN:   1.  Metastatic renal cell carcinoma.  Mr. Baron John continues to tolerate the high-dose IL-2 well at this time.  His laboratory work is adequate and he will be admitted into the hospital for his fourth and final cycle of the treatment.  I will plan to discuss with Dr. Painting the followup, but we will tentatively schedule an appointment with Dr. Painting in about 4 weeks.  We will decide whether scan need to be obtained in 4 weeks or later.  The patient also is going to return to his medical oncologist at [facility].   2.  Bone pains.  Unclear etiology.  Bone scan about a month ago was negative for metastatic disease.  Previous notes indicated that the shoulders could be from a rotator cuff tear.      If there are no interval concerns, tentatively scheduled an appointment in 4 weeks with Dr. Painting.     ADDENDUM:  Plan for visit and CT CAP in 4 weeks and see Dr. Painting.  SHEREEN

## 2017-06-26 NOTE — IP AVS SNAPSHOT
MRN:[id number]                      After Visit Summary   6/26/2017    Julio John    MRN: [id number]           Thank you!     Thank you for choosing [facility] for your care. Our goal is always to provide you with excellent care. Hearing back from our patients is one way we can continue to improve our services. Please take a few minutes to complete the written survey that you may receive in the mail after you visit with us. Thank you!        Patient Information     Date Of Birth          1970        Designated Caregiver       Most Recent Value    Caregiver    Will someone help with your care after discharge? yes    Name of designated caregiver Alda    Phone number of caregiver same as pt    Caregiver address same as pt      About your hospital stay     You were admitted on:  June 26, 2017 You last received care in the:  [address] [facility]    You were discharged on:  June 28, 2017        Reason for your hospital stay       You were here for cycle 4 of IL-2 for treatment of renal cell carcinoma.                  Who to Call     For medical emergencies, please call 911.  For non-urgent questions about your medical care, please call your primary care provider or clinic, [phone]          Attending Provider     Provider Specialty    Meche Banks MD Hematology & Oncology       Primary Care Provider Office Phone # Fax #    Uchemadu MD Doc [phone]       When to contact your care team       Please call the [facility] at [phone] for temperature above 100.4, shortness of breath, chest pain, headaches, vision changes, bleeding, uncontrolled nausea, vomiting, diarrhea, or pain.                  After Care Instructions     Activity       Your activity upon discharge: activity as tolerated            Diet       Follow this diet upon discharge: regular diet                  Follow-up Appointments      Adult [facility] Follow-up and recommended labs and tests       You will need to continue to see physical therapy. The appointment you missed while in the hospital should be rescheduled.    Appointments on [place] and/or [facility] (with [facility] or [facility] provider or service). Call [phone] if you haven't heard regarding these appointments within 7 days of discharge.    Already scheduled appointments are listed below.                  Your next 10 appointments already scheduled     Jun 30, 2017  2:30 PM CDT   [facility] Draw with [facility] LAB DRAW   [facility] Lab Draw ([facility])    [address]  2nd Floor  [facility] [address]   [phone]            Jun 30, 2017  3:00 PM CDT   (Arrive by 2:45 PM)   Return Visit with Jennie Malik PA-C   [facility] ([facility])    [address] [place] [address]  [address] [facility] [address]   [phone]            Jul 26, 2017 11:20 AM CDT   (Arrive by 11:05 AM)   CT CHEST/ABDOMEN/PELVIS W CONTRAST with UCCT2   [facility] [place] [facility] ([facility])    [address]  1st Floor  [facility] [address] [phone]           Please bring any scans or X-rays taken at other hospitals, if similar tests were done. Also bring a list of your medicines, including vitamins, minerals and over-the-counter drugs. It is safest to leave personal items at home.  Be sure to tell your doctor:   If you have any allergies.   If there s any chance you are pregnant.   If you are breastfeeding.   If you have any special needs.  You may have contrast for this exam. To prepare:   Do not eat or drink for 2 hours before your exam. If you need to take medicine, you may take it with small sips of water. (We may ask you to take liquid medicine as well.)   The day before your exam, drink extra fluids at least six 8-ounce glasses (unless your doctor tells you to restrict  your fluids).  Patients over 70 or patients with diabetes or kidney problems:   If you haven t had a blood test (creatinine test) within the last 30 days, go to your clinic or Diagnostic Imaging Department for this test.  If you have diabetes:   If your kidney function is normal, continue taking your metformin (Avandamet, Glucophage, Glucovance, Metaglip) on the day of your exam.   If your kidney function is abnormal, wait 48 hours before restarting this medicine.  You will have oral contrast for this exam:   You will drink the contrast at home. Get this from your clinic or Diagnostic Imaging Department. Please follow the directions given.  Please wear loose clothing, such as a sweat suit or jogging clothes. Avoid snaps, zippers and other metal. We may ask you to undress and put on a hospital gown.  If you have any questions, please call the Imaging Department where you will have your exam.            Jul 26, 2017  1:45 PM CDT   [facility] Lab Draw with  [facility] LAB DRAW   [facility] Lab Draw ([facility])    [address] [place] [id number] [phone]            Jul 26, 2017  2:15 PM CDT   (Arrive by 2:00 PM)   Return Visit with Ludwin Painting MD   [facility] ([facility])    [address] [place] [id number] [phone]              Future tests that were ordered for you     Basic metabolic panel           CBC with platelets differential       Last Lab Result: Hemoglobin (g/dL)       Date                     Value                 06/28/2017               13.2 (L)         ----------                  Pending Results     Date and Time Order Name Status Description    6/27/2017 2330 Blood culture Preliminary     6/26/2017 2330 Blood culture Preliminary     6/26/2017 0937 IR PICC Vascular In process             Statement of Approval     Ordered          06/28/17 1601  I have reviewed  "and agree with all the recommendations and orders detailed in this document.  EFFECTIVE NOW     Approved and electronically signed by:  Tere Mayes PA-C             Admission Information     Date & Time Provider Department Dept. Phone    2017 Meche Banks MD [address] [facility] [phone]      Your Vitals Were     Blood Pressure Pulse Temperature Respirations Height Weight    112/58 (BP Location: Left arm) 94 96.8  F (36  C) (Oral) 18 1.626 m (5' 4\") 104.2 kg (229 lb 12.8 oz)    Pulse Oximetry BMI (Body Mass Index)                98% 39.45 kg/m2          MyChart Information     [facility] lets you send messages to your doctor, view your test results, renew your prescriptions, schedule appointments and more. To sign up, go to [email or web address] . Click on \"Log in\" on the left side of the screen, which will take you to the Welcome page. Then click on \"Sign up Now\" on the right side of the page.     You will be asked to enter the access code listed below, as well as some personal information. Please follow the directions to create your username and password.     Your access code is: [email or web address]  Expires: 2017 10:24 AM     Your access code will  in 90 days. If you need help or a new code, please call your [place] clinic or [phone].        Care EveryWhere ID     This is your Care EveryWhere ID. This could be used by other organizations to access your [place] medical records  [id number]        Equal Access to Services     [facility]: Hadii dallin hassan Sowoodrow, waaxda luqadaha, qaybta kaalmalokesh rizvi, erin bass . So [facility] [phone].    ATENCIÓN: Si habla español, tiene a martinez disposición servicios gratuitos de asistencia lingüística. Llame al [phone].    We comply with applicable federal civil rights laws and [place] laws. We do not discriminate on the basis of race, color, national origin, age, disability sex, sexual " orientation or gender identity.               Review of your medicines      CONTINUE these medicines which have NOT CHANGED        Dose / Directions    acetaminophen 500 MG tablet   Commonly known as:  TYLENOL   Used for:  Malignant neoplasm of left kidney (H)        Dose:  500 mg   Take 1 tablet (500 mg) by mouth every 6 hours as needed for mild pain   Quantity:  1 Bottle   Refills:  1       cetirizine 10 MG tablet   Commonly known as:  zyrTEC   Used for:  Clear cell carcinoma (H)        Dose:  10 mg   Take 1 tablet (10 mg) by mouth daily   Quantity:  30 tablet   Refills:  0       diphenhydrAMINE 50 MG capsule   Commonly known as:  BENADRYL   Used for:  Clear cell carcinoma (H)        Dose:  50 mg   Take 1 capsule (50 mg) by mouth every 6 hours as needed for itching   Quantity:  56 capsule   Refills:  0       eucerin cream   Used for:  Malignant neoplasm of left kidney (H)        Apply topically 2 times daily Reported on 5/12/2017   Refills:  0       hydrOXYzine 50 MG tablet   Commonly known as:  ATARAX   Used for:  Clear cell carcinoma (H)        Dose:  50 mg   Take 1 tablet (50 mg) by mouth every 4 hours as needed for itching or anxiety   Quantity:  30 tablet   Refills:  1       ondansetron 4 MG tablet   Commonly known as:  ZOFRAN   Used for:  Malignant neoplasm of left kidney (H)        Dose:  8 mg   Take 2 tablets (8 mg) by mouth every 8 hours as needed for nausea   Quantity:  18 tablet   Refills:  0       oxyCODONE 5 MG IR tablet   Commonly known as:  ROXICODONE   Used for:  Malignant neoplasm of left kidney (H)        Dose:  5 mg   Take 1 tablet (5 mg) by mouth every 4 hours as needed for moderate to severe pain   Quantity:  10 tablet   Refills:  0       prochlorperazine 10 MG tablet   Commonly known as:  COMPAZINE   Used for:  Malignant neoplasm of left kidney (H)        Dose:  10 mg   Take 1 tablet (10 mg) by mouth every 6 hours as needed (Breakthrough Nausea/Vomiting)   Quantity:  30 tablet   Refills:  0        ranitidine 150 MG tablet   Commonly known as:  ZANTAC   Used for:  Clear cell carcinoma (H)        Dose:  150 mg   Take 1 tablet (150 mg) by mouth 2 times daily   Quantity:  60 tablet   Refills:  0                Protect others around you: Learn how to safely use, store and throw away your medicines at www.disposemymeds.org.             Medication List: This is a list of all your medications and when to take them. Check marks below indicate your daily home schedule. Keep this list as a reference.      Medications           Morning Afternoon Evening Bedtime As Needed    acetaminophen 500 MG tablet   Commonly known as:  TYLENOL   Take 1 tablet (500 mg) by mouth every 6 hours as needed for mild pain   Last time this was given:  650 mg on 6/28/2017  3:58 PM                                   cetirizine 10 MG tablet   Commonly known as:  zyrTEC   Take 1 tablet (10 mg) by mouth daily   Last time this was given:  10 mg on 6/26/2017 11:30 AM                                   diphenhydrAMINE 50 MG capsule   Commonly known as:  BENADRYL   Take 1 capsule (50 mg) by mouth every 6 hours as needed for itching                                   eucerin cream   Apply topically 2 times daily Reported on 5/12/2017   Last time this was given:  6/27/2017  7:36 PM                                      hydrOXYzine 50 MG tablet   Commonly known as:  ATARAX   Take 1 tablet (50 mg) by mouth every 4 hours as needed for itching or anxiety                                   ondansetron 4 MG tablet   Commonly known as:  ZOFRAN   Take 2 tablets (8 mg) by mouth every 8 hours as needed for nausea                                   oxyCODONE 5 MG IR tablet   Commonly known as:  ROXICODONE   Take 1 tablet (5 mg) by mouth every 4 hours as needed for moderate to severe pain   Last time this was given:  5 mg on 6/28/2017  2:25 AM                                   prochlorperazine 10 MG tablet   Commonly known as:  COMPAZINE   Take 1 tablet (10 mg) by  mouth every 6 hours as needed (Breakthrough Nausea/Vomiting)                                   ranitidine 150 MG tablet   Commonly known as:  ZANTAC   Take 1 tablet (150 mg) by mouth 2 times daily   Last time this was given:  150 mg on 6/28/2017  8:14 AM

## 2017-06-26 NOTE — H&P
[facility], [place]    History & Physical  Hematology / Oncology     Date of Admission:  6/26/2017  Date of Service (when I saw the patient):  06/26/2017    Assessment & Plan   Julio John is a 47 year old man with metastatic renal cell carcinoma to the lungs. He is admitted for cycle 4 HD IL-2 therapy.       #Metastatic RCC. S/p L nephrectomy, XRT to L lung, and 3 cycles of HD IL-2. Stage CT scan 5/30 revealed stable disease. Admitted for cycle 4 IL-2. Pt feeling well overall. Labs and vitals reviewed, found to be adequate to proceed with treatment.       Treatment Plan: IL-2 Cycle 4. (Day 1=6/26/17)  -Aldesleukin 64 million units IV q8 hours x14 doses or as many as tolerated.   -D5 NS + KCl 20meq at 100cc/hr.  -NS bolus prn SBP <85 or UOP <30cc/hr (<240 cc/8 hr), may repeat x1.  -Premedicate with zofran q8h and tylenol q4h.   -Zantac bid, keflex bid.   -PRN: benadryl, demerol, naprosyn, lomotil, compazine, ativan.   -Avoid steroids and diuretics.       #Bilateral shoulder pain. Reports continued shoulder pain since cycle 2 of IL-2. Pain is located on superior aspect of bilateral shoulders that is worse with movement, particularly full flexion, and tends to increase at night. Bone scan 5/30 negative for metastatic disease, but did show degenerative uptake in AC joints. He is currently set up for outpatient PT and was supposed to have an appointment with them tomorrow.   -PT   -Oxycodone prn     #Elevated eosinophils. Admitted 5/14-5/16 for rash, fever and pruritis. Believed to be an allergic reaction vs exposure to irritant. Symptoms improved with medication control. He currently takes the medication on prn basis. Absolute eosinophil count stable compared to previous admission.  -Continue Zyrtec and Atarax prn.      FEN   -IVF per treatment plan   -PRN lyte replacement  -RDAT      PPx  -DVT: Mechanical 2/2 expected thrombocytopenia with treatment  -GI: Ranitidine 150 mg  BID     Code status: FULL  Disposition: Admit to hospital for Cycle 4 of IL-2.    Above plan discussed with staff physician, Dr. Qamar Mayes PA-C  Hematology/Oncology  Pager: [phone]    Code Status : Full Code    Primary Care Physician   Tao Nwaononiwu    History of Present Illness   History obtained from chart and discussed with the patient.    Julio John is a 47 year old man with PMH of CVA (2010), Afib cardiovert (2012), and metastatic RCC to the lungs s/p L nephrectomy & XRT to L lung. He is admitted for cycle 4 HD IL-2 therapy. He initially presented with back pain, fevers, BRBPR with hemorrhoids. He had a CT scan at the end of January 2016 that revealed left kidney cancer. He had left nephrectomy 3/2016. He was noted to have lung nodules on left lung on surveillance scan. He had stereotactic radiation to lung. Follow-up scans showed new nodules in right lung. He was referred to Dr. Painting for consideration of IL-2. Dr. Painting deemed him a good candidate. He completed 8 doses of IL2 in cycle 1, 4 doses in cycle 2, and 5 doses with cycle 3. Restaging CT after cycle 2 revealed stable disease. Plan to proceed with cycle 4 with restaging following treatment.      On admission,Julio feels well overall. He says he continues to have b/l shoulder pain since cycle 2. The pain seems to be worse with motion and at night. He is set up for outpatient PT. He takes oxycodone as needed for pain, but notes he does not like to take too much as he is concerned about damaging his remaining kidney. He has noticed some lower back pain after urination sometimes. No dysuria, hematuria. He also says at night he sometimes notices some wheezing when he is sleeping. Pt denies cough, dyspnea, N/V, changes in bowel or bladder, and LE edema.    Past Medical History    Past Medical History:   Diagnosis Date     Metastatic renal cell carcinoma (H)        Past Surgical History   Past Surgical History:    Procedure Laterality Date     PICC INSERTION Left 05/31/2017    5fr DL BioFlo PICC, 45cm (3cm external) in the L medial brachial vein w/ tip in the SVC RA junction.       Prior to Admission Medications   Prior to Admission Medications   Prescriptions Last Dose Informant Patient Reported? Taking?   Skin Protectants, Misc. (EUCERIN) cream Past Month at Unknown time  Yes Yes   Sig: Apply topically 2 times daily Reported on 5/12/2017   acetaminophen (TYLENOL) 500 MG tablet Past Month at Unknown time  No Yes   Sig: Take 1 tablet (500 mg) by mouth every 6 hours as needed for mild pain   cetirizine (ZYRTEC) 10 MG tablet Past Month at Unknown time  No Yes   Sig: Take 1 tablet (10 mg) by mouth daily   diphenhydrAMINE (BENADRYL) 50 MG capsule Past Month at Unknown time  No Yes   Sig: Take 1 capsule (50 mg) by mouth every 6 hours as needed for itching   hydrOXYzine (ATARAX) 50 MG tablet Past Month at Unknown time  No Yes   Sig: Take 1 tablet (50 mg) by mouth every 4 hours as needed for itching or anxiety   ondansetron (ZOFRAN) 4 MG tablet Past Month at Unknown time  No Yes   Sig: Take 2 tablets (8 mg) by mouth every 8 hours as needed for nausea   oxyCODONE (ROXICODONE) 5 MG IR tablet Past Week at Unknown time  No Yes   Sig: Take 1 tablet (5 mg) by mouth every 4 hours as needed for moderate to severe pain   prochlorperazine (COMPAZINE) 10 MG tablet Past Month at Unknown time  No Yes   Sig: Take 1 tablet (10 mg) by mouth every 6 hours as needed (Breakthrough Nausea/Vomiting)   ranitidine (ZANTAC) 150 MG tablet Past Month at Unknown time  No Yes   Sig: Take 1 tablet (150 mg) by mouth 2 times daily      Facility-Administered Medications: None     Allergies   No Known Allergies    Social History   Social History     Social History     Marital status:      Spouse name: N/A     Number of children: N/A     Years of education: N/A     Occupational History     Not on file.     Social History Main Topics     Smoking status:  Never Smoker     Smokeless tobacco: Never Used     Alcohol use No     Drug use: No     Sexual activity: Not on file     Other Topics Concern     Not on file     Social History Narrative       Family History   No family history on file.    Review of Systems   A 10 point ROS is negative unless otherwise noted above in the HPI.    Vital Signs with Ranges  Temp:  [97.3  F (36.3  C)-98.3  F (36.8  C)] 98.3  F (36.8  C)  Pulse:  [77-91] 91  Resp:  [16-18] 18  BP: (129)/(84-86) 129/84  SpO2:  [95 %-96 %] 95 %  224 lbs 4.8 oz    Physical Exam   Constitutional: Pleasant and cooperative male seen lying comfortably in bed. Awake, alert, NAD.  HEENT: NC/AT, EOMI, sclera clear, conjunctiva normal, OP with MMM  Respiratory: No increased work of breathing, CTAB, no crackles or wheezing.  Cardiovascular: RRR, no murmur noted. No peripheral edema.  GI: Normal bowel sounds, soft, non-distended and non-tender.  Skin: Warm, dry, well-perfused. No bruising, bleeding, rashes, or lesions on limited exam.  MSK: Tenderness on superior b/l shoulders with flexion. Good strength, ROM slightly limited 2/2 discomfort.  Neurologic: A&O. Answers questions appropriately. Moves all extremities spontaneously.  Neuropsychiatric: Calm, appropriate affect  Vascular access: PICC on R arm CDI, non-tender, no surrounding erythema.      Recent Labs  CBC   Recent Labs  Lab 06/26/17  1001 06/26/17  0737   WBC 6.1 6.3   RBC 5.12 5.27   HGB 14.7 15.1   HCT 43.6 44.8   MCV 85 85   MCH 28.7 28.7   MCHC 33.7 33.7   RDW 14.8 14.5    161       CMP   Recent Labs  Lab 06/26/17  0931 06/26/17  0737    138   POTASSIUM 4.0 4.0   CHLORIDE 109 106   CO2 26 23   ANIONGAP 6 9   GLC 99 104*   BUN 15 15   CR 1.14 1.15   GFRESTIMATED 69 68   GFRESTBLACK 83 82   KVNG 8.5 8.3*   PROTTOTAL 7.2 7.3   ALBUMIN 3.6 3.7   BILITOTAL 0.4 0.6   ALKPHOS 56 58   AST 6 16   ALT 34 32       LFTs:   Recent Labs  Lab 06/26/17  0931   PROTTOTAL 7.2   ALBUMIN 3.6   BILITOTAL 0.4    ALKPHOS 56   AST 6   ALT 34       Coagulation Studies: No lab results found in last 7 days.

## 2017-06-26 NOTE — NURSING NOTE
"Oncology Rooming Note    June 26, 2017 7:37 AM   Julio John is a 47 year old male who presents for:    Chief Complaint   Patient presents with     Oncology Clinic Visit     Return: Renal Cell      Initial Vitals: /86  Pulse 77  Temp 97.3  F (36.3  C) (Oral)  Resp 16  Ht 1.676 m (5' 5.98\")  Wt 102.1 kg (225 lb)  SpO2 96%  BMI 36.33 kg/m2 Estimated body mass index is 36.33 kg/(m^2) as calculated from the following:    Height as of this encounter: 1.676 m (5' 5.98\").    Weight as of this encounter: 102.1 kg (225 lb). Body surface area is 2.18 meters squared.  Extreme Pain (8) Comment: joints    No LMP for male patient.  Allergies reviewed: Yes  Medications reviewed: Yes    Medications: Medication refills not needed today.  Pharmacy name entered into EPIC: [facility] - [place], [place] - [address] [place] [address]    Clinical concerns: pain level 8 in joints especially shoulder right side      6 minutes for nursing intake (face to face time)     Susi Sanchez CMA                  "

## 2017-06-26 NOTE — MR AVS SNAPSHOT
After Visit Summary   6/26/2017    Julio John    MRN: [id number]           Patient Information     Date Of Birth          1970        Visit Information        Provider Department      6/26/2017 7:00 AM Sridevi Bill PA-C; [facility] [employer] [facility]        Today's Diagnoses     Metastatic renal cell carcinoma, left (H)    -  1    Pain of multiple sites        Malignant neoplasm of left kidney (H)           Follow-ups after your visit        Your next 10 appointments already scheduled     Jun 30, 2017  2:30 PM CDT   [facility] Lab Draw with  [facility] DRAW   [facility] Draw ([facility])    [address]  2nd Floor  [facility] [address]   [phone]            Jun 30, 2017  3:00 PM CDT   (Arrive by 2:45 PM)   Return Visit with Jennie Malik PA-C   [facility] ([facility])    [address] [place] [address]  [address] [facility] [address]   [phone]            Jul 26, 2017 11:20 AM CDT   (Arrive by 11:05 AM)   CT CHEST/ABDOMEN/PELVIS W CONTRAST with UCCT2   [facility] ([facility])    [address]  1st Floor  [facility] [address]   [phone]           Please bring any scans or X-rays taken at other hospitals, if similar tests were done. Also bring a list of your medicines, including vitamins, minerals and over-the-counter drugs. It is safest to leave personal items at home.  Be sure to tell your doctor:   If you have any allergies.   If there s any chance you are pregnant.   If you are breastfeeding.   If you have any special needs.  You may have contrast for this exam. To prepare:   Do not eat or drink for 2 hours before your exam. If you need to take medicine, you may take it with small sips of water. (We may ask you to take liquid medicine as well.)   The day before your exam, drink extra fluids at  least six 8-ounce glasses (unless your doctor tells you to restrict your fluids).  Patients over 70 or patients with diabetes or kidney problems:   If you haven t had a blood test (creatinine test) within the last 30 days, go to your clinic or Diagnostic Imaging Department for this test.  If you have diabetes:   If your kidney function is normal, continue taking your metformin (Avandamet, Glucophage, Glucovance, Metaglip) on the day of your exam.   If your kidney function is abnormal, wait 48 hours before restarting this medicine.  You will have oral contrast for this exam:   You will drink the contrast at home. Get this from your clinic or Diagnostic Imaging Department. Please follow the directions given.  Please wear loose clothing, such as a sweat suit or jogging clothes. Avoid snaps, zippers and other metal. We may ask you to undress and put on a hospital gown.  If you have any questions, please call the Imaging Department where you will have your exam.            Jul 26, 2017  1:45 PM CDT   [facility] Lab Draw with  [facility] LAB DRAW   [facility] Draw ([facility])    [address] [place] [address] [phone]            Jul 26, 2017  2:15 PM CDT   (Arrive by 2:00 PM)   Return Visit with Ludwin Painting MD   [facility] ([facility])    [address] [place] [address] [phone]              Who to contact     If you have questions or need follow up information about today's clinic visit or your schedule please contact [facility] directly at [phone].  Normal or non-critical lab and imaging results will be communicated to you by MyChart, letter or phone within 4 business days after the clinic has received the results. If you do not hear from us within 7 days, please contact the clinic through MyChart or phone. If you have a critical or abnormal lab  "result, we will notify you by phone as soon as possible.  Submit refill requests through [employer] or call your pharmacy and they will forward the refill request to us. Please allow 3 business days for your refill to be completed.          Additional Information About Your Visit        Sp[employer] Information     [employer] lets you send messages to your doctor, view your test results, renew your prescriptions, schedule appointments and more. To sign up, go to [email or web address] . Click on \"Log in\" on the left side of the screen, which will take you to the Welcome page. Then click on \"Sign up Now\" on the right side of the page.     You will be asked to enter the access code listed below, as well as some personal information. Please follow the directions to create your username and password.     Your access code is: [email or web address]  Expires: 2017 10:24 AM     Your access code will  in 90 days. If you need help or a new code, please call your [facility] clinic or [phone].        Care EveryWhere ID     This is your Care EveryWhere ID. This could be used by other organizations to access your [facility] medical records  [id number]        Your Vitals Were     Pulse Temperature Respirations Height Pulse Oximetry BMI (Body Mass Index)    77 97.3  F (36.3  C) (Oral) 16 1.676 m (5' 5.98\") 96% 36.33 kg/m2       Blood Pressure from Last 3 Encounters:   17 112/58   17 129/86   17 131/90    Weight from Last 3 Encounters:   17 104.2 kg (229 lb 12.8 oz)   17 102.1 kg (225 lb)   17 100.6 kg (221 lb 12.8 oz)              We Performed the Following     *CBC with platelets differential     Comprehensive metabolic panel        Primary Care Provider Office Phone # Fax #    Tao Roach [phone]       [facility] [address] [place] [address]        Equal Access to Services     MACIE MCGEE AH: natalie Larios qaybta " erin cisneros hayhenry garciavalery bass ah. So [facility] [phone].    ATENCIÓN: Si ken powell, tiene a martinez disposición servicios gratuitos de asistencia lingüística. Giuseppe al [phone].    We comply with applicable federal civil rights laws and [place] laws. We do not discriminate on the basis of race, color, national origin, age, disability sex, sexual orientation or gender identity.            Thank you!     Thank you for choosing [facility]  for your care. Our goal is always to provide you with excellent care. Hearing back from our patients is one way we can continue to improve our services. Please take a few minutes to complete the written survey that you may receive in the mail after your visit with us. Thank you!             Your Updated Medication List - Protect others around you: Learn how to safely use, store and throw away your medicines at www.disposemymeds.org.          This list is accurate as of: 6/26/17  9:02 AM.  Always use your most recent med list.                   Brand Name Dispense Instructions for use Diagnosis    acetaminophen 500 MG tablet    TYLENOL    1 Bottle    Take 1 tablet (500 mg) by mouth every 6 hours as needed for mild pain    Malignant neoplasm of left kidney (H)       cetirizine 10 MG tablet    zyrTEC    30 tablet    Take 1 tablet (10 mg) by mouth daily    Clear cell carcinoma (H)       diphenhydrAMINE 50 MG capsule    BENADRYL    56 capsule    Take 1 capsule (50 mg) by mouth every 6 hours as needed for itching    Clear cell carcinoma (H)       eucerin cream      Apply topically 2 times daily Reported on 5/12/2017    Malignant neoplasm of left kidney (H)       hydrOXYzine 50 MG tablet    ATARAX    30 tablet    Take 1 tablet (50 mg) by mouth every 4 hours as needed for itching or anxiety    Clear cell carcinoma (H)       ondansetron 4 MG tablet    ZOFRAN    18 tablet    Take 2 tablets (8 mg) by mouth every 8 hours as needed for nausea     Malignant neoplasm of left kidney (H)       oxyCODONE 5 MG IR tablet    ROXICODONE    10 tablet    Take 1 tablet (5 mg) by mouth every 4 hours as needed for moderate to severe pain    Malignant neoplasm of left kidney (H)       prochlorperazine 10 MG tablet    COMPAZINE    30 tablet    Take 1 tablet (10 mg) by mouth every 6 hours as needed (Breakthrough Nausea/Vomiting)    Malignant neoplasm of left kidney (H)       ranitidine 150 MG tablet    ZANTAC    60 tablet    Take 1 tablet (150 mg) by mouth 2 times daily    Clear cell carcinoma (H)

## 2017-06-27 LAB
ALBUMIN SERPL-MCNC: 2.8 G/DL (ref 3.4–5)
ALP SERPL-CCNC: 40 U/L (ref 40–150)
ALT SERPL W P-5'-P-CCNC: 29 U/L (ref 0–70)
ANION GAP SERPL CALCULATED.3IONS-SCNC: 7 MMOL/L (ref 3–14)
AST SERPL W P-5'-P-CCNC: 15 U/L (ref 0–45)
BASOPHILS # BLD AUTO: 0 10E9/L (ref 0–0.2)
BASOPHILS NFR BLD AUTO: 0.2 %
BILIRUB SERPL-MCNC: 1.4 MG/DL (ref 0.2–1.3)
BUN SERPL-MCNC: 15 MG/DL (ref 7–30)
CALCIUM SERPL-MCNC: 7.5 MG/DL (ref 8.5–10.1)
CHLORIDE SERPL-SCNC: 110 MMOL/L (ref 94–109)
CK SERPL-CCNC: 53 U/L (ref 30–300)
CO2 SERPL-SCNC: 22 MMOL/L (ref 20–32)
CREAT SERPL-MCNC: 1.33 MG/DL (ref 0.66–1.25)
DIFFERENTIAL METHOD BLD: ABNORMAL
EOSINOPHIL # BLD AUTO: 2.4 10E9/L (ref 0–0.7)
EOSINOPHIL NFR BLD AUTO: 24.5 %
ERYTHROCYTE [DISTWIDTH] IN BLOOD BY AUTOMATED COUNT: 15.5 % (ref 10–15)
GFR SERPL CREATININE-BSD FRML MDRD: 58 ML/MIN/1.7M2
GLUCOSE SERPL-MCNC: 130 MG/DL (ref 70–99)
HCT VFR BLD AUTO: 42.4 % (ref 40–53)
HGB BLD-MCNC: 14.5 G/DL (ref 13.3–17.7)
IMM GRANULOCYTES # BLD: 0 10E9/L (ref 0–0.4)
IMM GRANULOCYTES NFR BLD: 0.1 %
LACTATE BLD-SCNC: 2 MMOL/L (ref 0.7–2.1)
LDH SERPL L TO P-CCNC: 129 U/L (ref 85–227)
LYMPHOCYTES # BLD AUTO: 0.2 10E9/L (ref 0.8–5.3)
LYMPHOCYTES NFR BLD AUTO: 2.2 %
MAGNESIUM SERPL-MCNC: 1.7 MG/DL (ref 1.6–2.3)
MCH RBC QN AUTO: 29.2 PG (ref 26.5–33)
MCHC RBC AUTO-ENTMCNC: 34.2 G/DL (ref 31.5–36.5)
MCV RBC AUTO: 85 FL (ref 78–100)
MONOCYTES # BLD AUTO: 0.5 10E9/L (ref 0–1.3)
MONOCYTES NFR BLD AUTO: 5.4 %
NEUTROPHILS # BLD AUTO: 6.5 10E9/L (ref 1.6–8.3)
NEUTROPHILS NFR BLD AUTO: 67.6 %
NRBC # BLD AUTO: 0 10*3/UL
NRBC BLD AUTO-RTO: 0 /100
PHOSPHATE SERPL-MCNC: 2.9 MG/DL (ref 2.5–4.5)
PLATELET # BLD AUTO: 93 10E9/L (ref 150–450)
POTASSIUM SERPL-SCNC: 4 MMOL/L (ref 3.4–5.3)
PROT SERPL-MCNC: 5.7 G/DL (ref 6.8–8.8)
RBC # BLD AUTO: 4.97 10E12/L (ref 4.4–5.9)
SODIUM SERPL-SCNC: 138 MMOL/L (ref 133–144)
WBC # BLD AUTO: 9.6 10E9/L (ref 4–11)

## 2017-06-27 PROCEDURE — 80053 COMPREHEN METABOLIC PANEL: CPT | Performed by: PHYSICIAN ASSISTANT

## 2017-06-27 PROCEDURE — 40000802 ZZH SITE CHECK

## 2017-06-27 PROCEDURE — 36592 COLLECT BLOOD FROM PICC: CPT | Performed by: PHYSICIAN ASSISTANT

## 2017-06-27 PROCEDURE — 12000003 ZZH R&B CRITICAL UMMC

## 2017-06-27 PROCEDURE — 40000893 ZZH STATISTIC PT IP EVAL DEFER

## 2017-06-27 PROCEDURE — 83615 LACTATE (LD) (LDH) ENZYME: CPT | Performed by: PHYSICIAN ASSISTANT

## 2017-06-27 PROCEDURE — 25000128 H RX IP 250 OP 636: Performed by: PHYSICIAN ASSISTANT

## 2017-06-27 PROCEDURE — 25000132 ZZH RX MED GY IP 250 OP 250 PS 637: Performed by: PHYSICIAN ASSISTANT

## 2017-06-27 PROCEDURE — 99232 SBSQ HOSP IP/OBS MODERATE 35: CPT | Performed by: INTERNAL MEDICINE

## 2017-06-27 PROCEDURE — 82550 ASSAY OF CK (CPK): CPT | Performed by: PHYSICIAN ASSISTANT

## 2017-06-27 PROCEDURE — 87040 BLOOD CULTURE FOR BACTERIA: CPT | Performed by: PHYSICIAN ASSISTANT

## 2017-06-27 PROCEDURE — 25000128 H RX IP 250 OP 636: Performed by: INTERNAL MEDICINE

## 2017-06-27 PROCEDURE — T1013 SIGN LANG/ORAL INTERPRETER: HCPCS | Mod: U3

## 2017-06-27 PROCEDURE — 85025 COMPLETE CBC W/AUTO DIFF WBC: CPT | Performed by: PHYSICIAN ASSISTANT

## 2017-06-27 PROCEDURE — 84100 ASSAY OF PHOSPHORUS: CPT | Performed by: PHYSICIAN ASSISTANT

## 2017-06-27 PROCEDURE — 83735 ASSAY OF MAGNESIUM: CPT | Performed by: PHYSICIAN ASSISTANT

## 2017-06-27 PROCEDURE — 25800025 ZZH RX 258: Performed by: PHYSICIAN ASSISTANT

## 2017-06-27 RX ADMIN — PROCHLORPERAZINE EDISYLATE 10 MG: 5 INJECTION INTRAMUSCULAR; INTRAVENOUS at 20:14

## 2017-06-27 RX ADMIN — ACETAMINOPHEN 650 MG: 325 TABLET, FILM COATED ORAL at 08:41

## 2017-06-27 RX ADMIN — CEPHALEXIN 500 MG: 500 CAPSULE ORAL at 19:35

## 2017-06-27 RX ADMIN — SODIUM CHLORIDE 250 ML: 9 INJECTION, SOLUTION INTRAVENOUS at 19:58

## 2017-06-27 RX ADMIN — ACETAMINOPHEN 650 MG: 325 TABLET, FILM COATED ORAL at 04:42

## 2017-06-27 RX ADMIN — MEPERIDINE HYDROCHLORIDE 25 MG: 25 INJECTION, SOLUTION INTRAMUSCULAR; INTRAVENOUS; SUBCUTANEOUS at 17:35

## 2017-06-27 RX ADMIN — ONDANSETRON 8 MG: 2 INJECTION INTRAMUSCULAR; INTRAVENOUS at 11:10

## 2017-06-27 RX ADMIN — DIPHENOXYLATE HYDROCHLORIDE AND ATROPINE SULFATE 1 TABLET: 2.5; .025 TABLET ORAL at 15:33

## 2017-06-27 RX ADMIN — ACETAMINOPHEN 650 MG: 325 TABLET, FILM COATED ORAL at 15:31

## 2017-06-27 RX ADMIN — SODIUM CHLORIDE 250 ML: 9 INJECTION, SOLUTION INTRAVENOUS at 12:11

## 2017-06-27 RX ADMIN — LORAZEPAM 1 MG: 2 INJECTION INTRAMUSCULAR; INTRAVENOUS at 16:07

## 2017-06-27 RX ADMIN — PROCHLORPERAZINE EDISYLATE 10 MG: 5 INJECTION INTRAMUSCULAR; INTRAVENOUS at 13:35

## 2017-06-27 RX ADMIN — LORAZEPAM 0.5 MG: 0.5 TABLET ORAL at 00:07

## 2017-06-27 RX ADMIN — POTASSIUM CHLORIDE, DEXTROSE MONOHYDRATE AND SODIUM CHLORIDE: 150; 5; 900 INJECTION, SOLUTION INTRAVENOUS at 07:31

## 2017-06-27 RX ADMIN — ACETAMINOPHEN 650 MG: 325 TABLET, FILM COATED ORAL at 00:07

## 2017-06-27 RX ADMIN — RANITIDINE 150 MG: 150 TABLET ORAL at 08:41

## 2017-06-27 RX ADMIN — SODIUM CHLORIDE 250 ML: 9 INJECTION, SOLUTION INTRAVENOUS at 13:17

## 2017-06-27 RX ADMIN — NAPROXEN 250 MG: 250 TABLET ORAL at 19:55

## 2017-06-27 RX ADMIN — DIPHENOXYLATE HYDROCHLORIDE AND ATROPINE SULFATE 1 TABLET: 2.5; .025 TABLET ORAL at 20:52

## 2017-06-27 RX ADMIN — PROCHLORPERAZINE EDISYLATE 10 MG: 5 INJECTION INTRAMUSCULAR; INTRAVENOUS at 07:18

## 2017-06-27 RX ADMIN — RANITIDINE 150 MG: 150 TABLET ORAL at 19:35

## 2017-06-27 RX ADMIN — SODIUM CHLORIDE 250 ML: 9 INJECTION, SOLUTION INTRAVENOUS at 20:56

## 2017-06-27 RX ADMIN — MEPERIDINE HYDROCHLORIDE 25 MG: 25 INJECTION, SOLUTION INTRAMUSCULAR; INTRAVENOUS; SUBCUTANEOUS at 06:52

## 2017-06-27 RX ADMIN — CEPHALEXIN 500 MG: 500 CAPSULE ORAL at 08:41

## 2017-06-27 RX ADMIN — ACETAMINOPHEN 650 MG: 325 TABLET, FILM COATED ORAL at 11:44

## 2017-06-27 RX ADMIN — POTASSIUM CHLORIDE, DEXTROSE MONOHYDRATE AND SODIUM CHLORIDE: 150; 5; 900 INJECTION, SOLUTION INTRAVENOUS at 18:58

## 2017-06-27 RX ADMIN — OXYCODONE HYDROCHLORIDE 5 MG: 5 TABLET ORAL at 05:22

## 2017-06-27 RX ADMIN — WHITE PETROLATUM: 1.75 OINTMENT TOPICAL at 19:36

## 2017-06-27 RX ADMIN — ACETAMINOPHEN 650 MG: 325 TABLET, FILM COATED ORAL at 19:36

## 2017-06-27 RX ADMIN — ALDESLEUKIN 64 MILLION UNITS: 1.1 INJECTION, POWDER, LYOPHILIZED, FOR SOLUTION INTRAVENOUS at 16:00

## 2017-06-27 RX ADMIN — ALDESLEUKIN 64 MILLION UNITS: 1.1 INJECTION, POWDER, LYOPHILIZED, FOR SOLUTION INTRAVENOUS at 04:49

## 2017-06-27 RX ADMIN — OXYCODONE HYDROCHLORIDE 5 MG: 5 TABLET ORAL at 16:00

## 2017-06-27 RX ADMIN — ONDANSETRON 8 MG: 2 INJECTION INTRAMUSCULAR; INTRAVENOUS at 03:20

## 2017-06-27 RX ADMIN — LORAZEPAM 0.5 MG: 2 INJECTION INTRAMUSCULAR; INTRAVENOUS at 03:17

## 2017-06-27 RX ADMIN — ONDANSETRON 8 MG: 2 INJECTION INTRAMUSCULAR; INTRAVENOUS at 18:59

## 2017-06-27 ASSESSMENT — PAIN DESCRIPTION - DESCRIPTORS: DESCRIPTORS: ACHING

## 2017-06-27 NOTE — PROGRESS NOTES
Nursing Focus: Chemotherapy  D: Positive blood return via PICC. Insertion site is clean/dry/intact, dressing intact with no complaints of pain.  Urine output is recorded in intake in Doc Flowsheet.    I: Premedications given per order (see electronic medical administration record). Dose #4 of IL-2 started to infuse over 15 minutes. Reviewed pt teaching on chemotherapy side effects.  Pt denies need for further teaching. Chemotherapy double checked per protocol by two chemotherapy competent RN's.   A: Tolerating procedure well. Denies nausea and or pain.   P: Continue to monitor urine output and symptoms of nausea. Screen for symptoms of toxicity.

## 2017-06-27 NOTE — PLAN OF CARE
Problem: Chemotherapy Effects (Adult)  Goal: Signs and Symptoms of Listed Potential Problems Will be Absent or Manageable (Chemotherapy Effects)  Signs and symptoms of listed potential problems will be absent or manageable by discharge/transition of care (reference Chemotherapy Effects (Adult) CPG).      Did have a BP of 104/37 at the beginning of the shift. Prior to his dose # 2 of IL-2 @ 2010, BP was 118/69. Tmax 102.3 @ 2258 tonight. Did have the kesha hugger on due to chills post infusion. Has daily blood cultures ordered. Demerol given @ 2155 for mild rigors and was effective. Compazine given @ 2235. Patient had no nausea or emesis for this IL-2 dose. Requested oxy @ 2030 for complaints of shoulder pain, but stated that it did not help. Able to rest after given the demerol. Patient has periodic anxiety, voicing various concerns. Was worried about his fluxuating temperatures but seemed reassured when this nurse told him that is a normal response with this therapy. No appetite. Did not order any dinner but is drinking fluids. Did trigger the sepsis protocol @ 2300. Lactic acid ordered.

## 2017-06-27 NOTE — PLAN OF CARE
Problem: Individualization  Goal: Patient Preferences  Outcome: No Change  Nausea/emesis after drink water/ginger ale. Pt will try to eat dry toast when he is ready.250 ml bolus ivf given for low uo.uo 130ml since 0800.report to bob SAMANO.PA want me to give dose 4 IL2 despite of low uo( pt says he voided x1 awhile taking shower). Afeb.vss.compazine x1 given for n/v.

## 2017-06-27 NOTE — PROGRESS NOTES
[facility], [place]    Progress Note  Hematology / Oncology     Date of Admission:  6/26/2017  Date of Service:  6/26/2017  Hospital Day #: 1     Assessment & Plan   Julio John is a 47 year old man with metastatic renal cell carcinoma to the lungs. He is admitted for cycle 4 HD IL-2 therapy.       #Metastatic RCC. S/p L nephrectomy, XRT to L lung, and 3 cycles of HD IL-2. Stage CT scan 5/30 revealed stable disease. Admitted for cycle 4 IL-2. Pt feeling well overall. Labs and vitals reviewed, found to be adequate to proceed with treatment.   -Pt urine not collected this AM as he urinated in the shower and therefore 4th dose delayed. Gave 250ml bolus. Has had 210cc UOP so far this shift after bolus, ok to proceed with next dose.      Treatment Plan: IL-2 Cycle 4. (Day 1=6/26/17) s/p 3 doses of IL-2  -Aldesleukin 64 million units IV q8 hours x14 doses or as many as tolerated.   -D5 NS + KCl 20meq at 100cc/hr.  -NS bolus prn SBP <85 or UOP <30cc/hr (<240 cc/8 hr), may repeat x1.  -Premedicate with zofran q8h and tylenol q4h.   -Zantac bid, keflex bid.   -PRN: benadryl, demerol, naprosyn, lomotil, compazine, ativan.   -Avoid steroids and diuretics.       #Bilateral shoulder pain. Reports continued shoulder pain since cycle 2 of IL-2. Pain is located on superior aspect of bilateral shoulders that is worse with movement, particularly full flexion, and tends to increase at night. Bone scan 5/30 negative for metastatic disease, but did show degenerative uptake in AC joints. He is currently set up for outpatient PT and was supposed to have an appointment with them tomorrow.   -PT   -Oxycodone prn     #Elevated eosinophils. Admitted 5/14-5/16 for rash, fever and pruritis. Believed to be an allergic reaction vs exposure to irritant. Symptoms improved with medication control. He currently takes the medication on prn basis. Absolute eosinophil count stable compared to previous  admission.  -Continue Zyrtec and Atarax prn.      FEN   -IVF per treatment plan   -PRN lyte replacement  -RDAT      PPx  -DVT: Mechanical 2/2 expected thrombocytopenia with treatment  -GI: Ranitidine 150 mg BID     Code status: FULL  Disposition: Admit to hospital for Cycle 4 of IL-2.    Above plan discussed with staff physician, Dr. Qamar Mayes PA-C  Hematology/Oncology  Pager: [phone]    Interval History   Expected toxicities with fever, and rigors overnight. Some nausea as well, Julio reports he is not feeling up to eating much today because of fear of nausea. Otherwise he says he is feeling very good. He is determined to get through more doses of IL-2.    Vital Signs with Ranges  Temp:  [96.2  F (35.7  C)-102.3  F (39.1  C)] 96.2  F (35.7  C)  Pulse:  [110-142] 111  Resp:  [18-20] 18  BP: (101-118)/(37-77) 109/54  SpO2:  [95 %-99 %] 97 %    I/O last 3 completed shifts:  In: 3336 [P.O.:300; I.V.:2918; IV Piggyback:118]  Out: 780 [Urine:780]    Vitals:    06/26/17 0910 06/27/17 0932   Weight: 101.7 kg (224 lb 4.8 oz) 103.1 kg (227 lb 4.8 oz)       Physical Exam   Constitutional: Pleasant and cooperative male seen lying comfortably in bed. Awake, alert, NAD.  HEENT: NC/AT, EOMI, sclera clear, conjunctiva normal, OP with MMM  Respiratory: No increased work of breathing, CTAB, no crackles or wheezing.  Cardiovascular: RRR, no murmur noted. No peripheral edema.  GI: Normal bowel sounds, soft, non-distended and non-tender.  Skin: Warm, dry, well-perfused. No bruising, bleeding, rashes, or lesions on limited exam.  Neurologic: A&O. Answers questions appropriately. Moves all extremities spontaneously.  Neuropsychiatric: Calm, appropriate affect    Medications     dextrose 5% and 0.9% NaCl with potassium chloride 20 mEq 100 mL/hr at 06/27/17 0731     D5W 1,000 mL (06/26/17 1420)     - MEDICATION INSTRUCTIONS -       NaCl       - MEDICATION INSTRUCTIONS -           eucerin   Topical BID      ondansetron  8 mg Intravenous Q8H     acetaminophen  650 mg Oral Q4H     ranitidine  150 mg Oral BID     cephalexin  500 mg Oral BID     aldesleukin (PROLEUKIN) infusion  600,000 Units/kg (Treatment Plan Recorded) Intravenous Q8H     sodium chloride (PF)  10 mL Intracatheter Q7 Days       Data   CBC   Recent Labs  Lab 06/27/17  0537 06/26/17  1001 06/26/17  0737   WBC 9.6 6.1 6.3   RBC 4.97 5.12 5.27   HGB 14.5 14.7 15.1   HCT 42.4 43.6 44.8   MCV 85 85 85   MCH 29.2 28.7 28.7   MCHC 34.2 33.7 33.7   RDW 15.5* 14.8 14.5   PLT 93* 150 161       CMP   Recent Labs  Lab 06/27/17  0537 06/26/17  0931 06/26/17  0737    141 138   POTASSIUM 4.0 4.0 4.0   CHLORIDE 110* 109 106   CO2 22 26 23   ANIONGAP 7 6 9   * 99 104*   BUN 15 15 15   CR 1.33* 1.14 1.15   GFRESTIMATED 58* 69 68   GFRESTBLACK 70 83 82   KVNG 7.5* 8.5 8.3*   MAG 1.7  --   --    PHOS 2.9  --   --    PROTTOTAL 5.7* 7.2 7.3   ALBUMIN 2.8* 3.6 3.7   BILITOTAL 1.4* 0.4 0.6   ALKPHOS 40 56 58   AST 15 6 16   ALT 29 34 32       LFTs   Recent Labs  Lab 06/27/17  0537   PROTTOTAL 5.7*   ALBUMIN 2.8*   BILITOTAL 1.4*   ALKPHOS 40   AST 15   ALT 29       Coagulation Studies No lab results found in last 7 days.

## 2017-06-27 NOTE — PLAN OF CARE
Problem: Goal Outcome Summary  Goal: Goal Outcome Summary  Outcome: No Change  D/I: Dose #3 Aldesleukin given. Urine output/Blood pressure parameters met.  See flowsheet. Aldesleukin infused at 0440 over 15 minutes. No rigors or chilling so far for this dose.  Acetaminophen given as scheduled every four hours. Intravenous fluid support. PO ativan for nausea.   A: Tolerating Aldesleukin thus far. Large emesis x 1, IV ativan and scheduled zofran given with evidence of relief. Pt requested oxycodone for pain in joints associated with rigors.  P: Continue to monitor fluid status and vital signs for evidence of capillary leak syndrome. Continue IV fluid support and scheduled acetaminophen. Notify MD if patient falls below blood pressure or urine output parameters.      T max this shift 101.0, down to 98.3. Pt discouraged from using Willian hugger for too much time due to high temperature spikes. Pt agrees, he will call when chills/rigors start.     Update: pt rigoring at 0700, demerol x 1, compazine x 1 for sudden onset of emesis and nausea. Willian hugger on again for more chilling at 0730.

## 2017-06-27 NOTE — PLAN OF CARE
Problem: Goal Outcome Summary  Goal: Goal Outcome Summary  PT 7D: PT orders received and appreciated. This patient is known to writer from previous physical therapy eval during last admission (6/1/17). At that time recommended patient to OP PT to further evaluate and treat MATTHIAS shoulder pain. Spoke with patient today, who stated he had first PT appointment set up for this week, plans to reschedule when out of hospital. Pt would like to wait to work with physical therapy until outpatient. Will DEFER PT evaluation and complete orders.

## 2017-06-28 ENCOUNTER — OFFICE VISIT (OUTPATIENT)
Dept: INTERPRETER SERVICES | Facility: CLINIC | Age: 47
End: 2017-06-28

## 2017-06-28 VITALS
HEIGHT: 64 IN | RESPIRATION RATE: 18 BRPM | OXYGEN SATURATION: 98 % | HEART RATE: 94 BPM | DIASTOLIC BLOOD PRESSURE: 58 MMHG | SYSTOLIC BLOOD PRESSURE: 112 MMHG | TEMPERATURE: 96.8 F | BODY MASS INDEX: 39.23 KG/M2 | WEIGHT: 229.8 LBS

## 2017-06-28 LAB
ALBUMIN SERPL-MCNC: 2.5 G/DL (ref 3.4–5)
ALBUMIN UR-MCNC: 100 MG/DL
ALP SERPL-CCNC: 33 U/L (ref 40–150)
ALT SERPL W P-5'-P-CCNC: 43 U/L (ref 0–70)
ANION GAP SERPL CALCULATED.3IONS-SCNC: 9 MMOL/L (ref 3–14)
APPEARANCE UR: ABNORMAL
AST SERPL W P-5'-P-CCNC: 27 U/L (ref 0–45)
BASOPHILS # BLD AUTO: 0 10E9/L (ref 0–0.2)
BASOPHILS NFR BLD AUTO: 0 %
BILIRUB SERPL-MCNC: 2 MG/DL (ref 0.2–1.3)
BILIRUB UR QL STRIP: ABNORMAL
BUN SERPL-MCNC: 18 MG/DL (ref 7–30)
CALCIUM SERPL-MCNC: 7 MG/DL (ref 8.5–10.1)
CHLORIDE SERPL-SCNC: 116 MMOL/L (ref 94–109)
CK SERPL-CCNC: 55 U/L (ref 30–300)
CO2 SERPL-SCNC: 18 MMOL/L (ref 20–32)
COLOR UR AUTO: ABNORMAL
CREAT SERPL-MCNC: 1.69 MG/DL (ref 0.66–1.25)
DIFFERENTIAL METHOD BLD: ABNORMAL
EOSINOPHIL # BLD AUTO: 1.4 10E9/L (ref 0–0.7)
EOSINOPHIL NFR BLD AUTO: 22.9 %
ERYTHROCYTE [DISTWIDTH] IN BLOOD BY AUTOMATED COUNT: 15.9 % (ref 10–15)
GFR SERPL CREATININE-BSD FRML MDRD: 44 ML/MIN/1.7M2
GLUCOSE SERPL-MCNC: 110 MG/DL (ref 70–99)
GLUCOSE UR STRIP-MCNC: NEGATIVE MG/DL
GRAN CASTS #/AREA URNS LPF: 1 /LPF
HCT VFR BLD AUTO: 39.8 % (ref 40–53)
HGB BLD-MCNC: 13.2 G/DL (ref 13.3–17.7)
HGB UR QL STRIP: ABNORMAL
HYALINE CASTS #/AREA URNS LPF: 1 /LPF (ref 0–2)
IMM GRANULOCYTES # BLD: 0 10E9/L (ref 0–0.4)
IMM GRANULOCYTES NFR BLD: 0.2 %
KETONES UR STRIP-MCNC: NEGATIVE MG/DL
LACTATE BLD-SCNC: 1.2 MMOL/L (ref 0.7–2.1)
LDH SERPL L TO P-CCNC: 166 U/L (ref 85–227)
LEUKOCYTE ESTERASE UR QL STRIP: NEGATIVE
LYMPHOCYTES # BLD AUTO: 0.1 10E9/L (ref 0.8–5.3)
LYMPHOCYTES NFR BLD AUTO: 1.8 %
MAGNESIUM SERPL-MCNC: 1.5 MG/DL (ref 1.6–2.3)
MAGNESIUM SERPL-MCNC: 3.1 MG/DL (ref 1.6–2.3)
MCH RBC QN AUTO: 28.5 PG (ref 26.5–33)
MCHC RBC AUTO-ENTMCNC: 33.2 G/DL (ref 31.5–36.5)
MCV RBC AUTO: 86 FL (ref 78–100)
MONOCYTES # BLD AUTO: 0.2 10E9/L (ref 0–1.3)
MONOCYTES NFR BLD AUTO: 2.9 %
MUCOUS THREADS #/AREA URNS LPF: PRESENT /LPF
NEUTROPHILS # BLD AUTO: 4.4 10E9/L (ref 1.6–8.3)
NEUTROPHILS NFR BLD AUTO: 72.2 %
NITRATE UR QL: NEGATIVE
NRBC # BLD AUTO: 0 10*3/UL
NRBC BLD AUTO-RTO: 0 /100
PH UR STRIP: 6 PH (ref 5–7)
PHOSPHATE SERPL-MCNC: 1.8 MG/DL (ref 2.5–4.5)
PHOSPHATE SERPL-MCNC: 2.2 MG/DL (ref 2.5–4.5)
PLATELET # BLD AUTO: 54 10E9/L (ref 150–450)
POTASSIUM SERPL-SCNC: 4.2 MMOL/L (ref 3.4–5.3)
PROT SERPL-MCNC: 5.2 G/DL (ref 6.8–8.8)
RBC # BLD AUTO: 4.63 10E12/L (ref 4.4–5.9)
RBC #/AREA URNS AUTO: 6 /HPF (ref 0–2)
SODIUM SERPL-SCNC: 143 MMOL/L (ref 133–144)
SP GR UR STRIP: 1.03 (ref 1–1.03)
SQUAMOUS #/AREA URNS AUTO: <1 /HPF (ref 0–1)
TRANS CELLS #/AREA URNS HPF: 1 /HPF (ref 0–1)
URN SPEC COLLECT METH UR: ABNORMAL
UROBILINOGEN UR STRIP-MCNC: 2 MG/DL (ref 0–2)
WBC # BLD AUTO: 6.1 10E9/L (ref 4–11)
WBC #/AREA URNS AUTO: 3 /HPF (ref 0–2)

## 2017-06-28 PROCEDURE — 80053 COMPREHEN METABOLIC PANEL: CPT | Performed by: PHYSICIAN ASSISTANT

## 2017-06-28 PROCEDURE — 83735 ASSAY OF MAGNESIUM: CPT | Performed by: PHYSICIAN ASSISTANT

## 2017-06-28 PROCEDURE — 25000132 ZZH RX MED GY IP 250 OP 250 PS 637: Performed by: PHYSICIAN ASSISTANT

## 2017-06-28 PROCEDURE — 83735 ASSAY OF MAGNESIUM: CPT | Performed by: INTERNAL MEDICINE

## 2017-06-28 PROCEDURE — 36592 COLLECT BLOOD FROM PICC: CPT | Performed by: INTERNAL MEDICINE

## 2017-06-28 PROCEDURE — 25800025 ZZH RX 258: Performed by: PHYSICIAN ASSISTANT

## 2017-06-28 PROCEDURE — 83605 ASSAY OF LACTIC ACID: CPT | Performed by: INTERNAL MEDICINE

## 2017-06-28 PROCEDURE — 12000003 ZZH R&B CRITICAL UMMC

## 2017-06-28 PROCEDURE — 81001 URINALYSIS AUTO W/SCOPE: CPT | Performed by: PHYSICIAN ASSISTANT

## 2017-06-28 PROCEDURE — 82550 ASSAY OF CK (CPK): CPT | Performed by: PHYSICIAN ASSISTANT

## 2017-06-28 PROCEDURE — 36592 COLLECT BLOOD FROM PICC: CPT | Performed by: PHYSICIAN ASSISTANT

## 2017-06-28 PROCEDURE — 84100 ASSAY OF PHOSPHORUS: CPT | Performed by: PHYSICIAN ASSISTANT

## 2017-06-28 PROCEDURE — 85025 COMPLETE CBC W/AUTO DIFF WBC: CPT | Performed by: PHYSICIAN ASSISTANT

## 2017-06-28 PROCEDURE — 87040 BLOOD CULTURE FOR BACTERIA: CPT | Performed by: PHYSICIAN ASSISTANT

## 2017-06-28 PROCEDURE — 25000128 H RX IP 250 OP 636: Performed by: PHYSICIAN ASSISTANT

## 2017-06-28 PROCEDURE — 25000125 ZZHC RX 250: Performed by: PHYSICIAN ASSISTANT

## 2017-06-28 PROCEDURE — 84100 ASSAY OF PHOSPHORUS: CPT | Performed by: INTERNAL MEDICINE

## 2017-06-28 PROCEDURE — 83615 LACTATE (LD) (LDH) ENZYME: CPT | Performed by: PHYSICIAN ASSISTANT

## 2017-06-28 PROCEDURE — 87086 URINE CULTURE/COLONY COUNT: CPT | Performed by: PHYSICIAN ASSISTANT

## 2017-06-28 PROCEDURE — 25000132 ZZH RX MED GY IP 250 OP 250 PS 637: Performed by: INTERNAL MEDICINE

## 2017-06-28 PROCEDURE — T1013 SIGN LANG/ORAL INTERPRETER: HCPCS | Mod: U3

## 2017-06-28 PROCEDURE — 99239 HOSP IP/OBS DSCHRG MGMT >30: CPT | Performed by: INTERNAL MEDICINE

## 2017-06-28 RX ADMIN — ACETAMINOPHEN 650 MG: 325 TABLET, FILM COATED ORAL at 04:35

## 2017-06-28 RX ADMIN — NAPROXEN 250 MG: 250 TABLET ORAL at 05:12

## 2017-06-28 RX ADMIN — ACETAMINOPHEN 650 MG: 325 TABLET, FILM COATED ORAL at 15:58

## 2017-06-28 RX ADMIN — POTASSIUM & SODIUM PHOSPHATES POWDER PACK 280-160-250 MG 1 PACKET: 280-160-250 PACK at 16:48

## 2017-06-28 RX ADMIN — DIPHENOXYLATE HYDROCHLORIDE AND ATROPINE SULFATE 1 TABLET: 2.5; .025 TABLET ORAL at 16:48

## 2017-06-28 RX ADMIN — ONDANSETRON 8 MG: 2 INJECTION INTRAMUSCULAR; INTRAVENOUS at 03:00

## 2017-06-28 RX ADMIN — OXYCODONE HYDROCHLORIDE 5 MG: 5 TABLET ORAL at 02:25

## 2017-06-28 RX ADMIN — SODIUM PHOSPHATE, MONOBASIC, MONOHYDRATE AND SODIUM PHOSPHATE, DIBASIC, ANHYDROUS 20 MMOL: 276; 142 INJECTION, SOLUTION INTRAVENOUS at 08:56

## 2017-06-28 RX ADMIN — POTASSIUM CHLORIDE, DEXTROSE MONOHYDRATE AND SODIUM CHLORIDE: 150; 5; 900 INJECTION, SOLUTION INTRAVENOUS at 04:35

## 2017-06-28 RX ADMIN — ONDANSETRON 8 MG: 2 INJECTION INTRAMUSCULAR; INTRAVENOUS at 12:36

## 2017-06-28 RX ADMIN — CEPHALEXIN 500 MG: 500 CAPSULE ORAL at 08:14

## 2017-06-28 RX ADMIN — LORAZEPAM 1 MG: 2 INJECTION INTRAMUSCULAR; INTRAVENOUS at 03:59

## 2017-06-28 RX ADMIN — PROCHLORPERAZINE EDISYLATE 10 MG: 5 INJECTION INTRAMUSCULAR; INTRAVENOUS at 05:00

## 2017-06-28 RX ADMIN — ACETAMINOPHEN 650 MG: 325 TABLET, FILM COATED ORAL at 12:36

## 2017-06-28 RX ADMIN — SODIUM CHLORIDE 250 ML: 9 INJECTION, SOLUTION INTRAVENOUS at 04:45

## 2017-06-28 RX ADMIN — ALDESLEUKIN 64 MILLION UNITS: 1.1 INJECTION, POWDER, LYOPHILIZED, FOR SOLUTION INTRAVENOUS at 01:52

## 2017-06-28 RX ADMIN — MEPERIDINE HYDROCHLORIDE 25 MG: 25 INJECTION, SOLUTION INTRAMUSCULAR; INTRAVENOUS; SUBCUTANEOUS at 03:25

## 2017-06-28 RX ADMIN — DIPHENOXYLATE HYDROCHLORIDE AND ATROPINE SULFATE 1 TABLET: 2.5; .025 TABLET ORAL at 04:44

## 2017-06-28 RX ADMIN — RANITIDINE 150 MG: 150 TABLET ORAL at 08:14

## 2017-06-28 RX ADMIN — ACETAMINOPHEN 650 MG: 325 TABLET, FILM COATED ORAL at 00:02

## 2017-06-28 RX ADMIN — DIPHENOXYLATE HYDROCHLORIDE AND ATROPINE SULFATE 1 TABLET: 2.5; .025 TABLET ORAL at 12:40

## 2017-06-28 RX ADMIN — MAGNESIUM SULFATE IN WATER 4 G: 40 INJECTION, SOLUTION INTRAVENOUS at 08:13

## 2017-06-28 RX ADMIN — ACETAMINOPHEN 650 MG: 325 TABLET, FILM COATED ORAL at 08:14

## 2017-06-28 NOTE — PROGRESS NOTES
Nursing Focus: Chemotherapy  D: Positive blood return via PICC. Insertion site is clean/dry/intact, dressing intact with no complaints of pain.  Urine output is recorded in intake in Doc Flowsheet.    I: Premedications given per order (see electronic medical administration record). Dose #5 of IL-2  started to infuse over 15 minutes.  Chemotherapy double checked per protocol by two chemotherapy competent RN's.   A: Tolerating procedure well. Denies nausea and or pain.   P: Continue to monitor urine output and symptoms of nausea. Screen for symptoms of toxicity.

## 2017-06-28 NOTE — PLAN OF CARE
Problem: Chemotherapy Effects (Adult)  Goal: Signs and Symptoms of Listed Potential Problems Will be Absent or Manageable (Chemotherapy Effects)  Signs and symptoms of listed potential problems will be absent or manageable by discharge/transition of care (reference Chemotherapy Effects (Adult) CPG).   Outcome: Declining     Received dose #4 IL-2 at 1600 after meeting parameters (see chemo note). Oxycodone given w/ dose per pt request. Pt reports having 2 loose BM's this afternoon, lomotil given, hat placed in toilet. Pt had emesis immediately following IL-2 infusion, extremely anxious, IV ativan x1. Rigors about 1.5hrs post IL-2 relieved by demerol x1 and kesha hugger. Pt continues w/ persistent nausea, scheduled zofran given. Reporting dizziness when getting out of bed, bed alarm on. Tmax 102.3, , BP 94/37. MD notified. Naproxen and 250mL NS bolus given. Pt had another emesis, compazine x1. Recheck BP 81/38, MD notified, another 250mL NS bolus given. Pt had another loose BM, 75mL, lomotil given again. Temp trending down, BP recheck 90/40. Had another loose stool 200mL, cannot have more lomotil yet. Has 365mL freddy urine output. Next dose IL-2 due at 0000 if meeting parameters. Continue to monitor closely.

## 2017-06-28 NOTE — PLAN OF CARE
Problem: Goal Outcome Summary  Goal: Goal Outcome Summary  Outcome: Declining  00:00-07:00  Temp Max 101 Soft stable BP 90's/40's  Tachycardia 120-130's with fever  On schedule Tylenol and Naproxen given x 1  Dose # 5 of IL-2 given at around 02:00 am  Tolerating with expected toxicities  Nausea without emesis  Ativan/Zofran/Compazine given  Demerol 25 mg x 1 for rigors/chills with good relief   Continue having diarrhea  Lomotil given   cc IVF x 1 for low UOP  Pt does not want to proceed with dose # 6 which will be due at 10:00 am today d/t worsening complications  Otherwise no new acute issues overnight  Continue w/POC and offer support

## 2017-06-28 NOTE — PLAN OF CARE
Problem: Goal Outcome Summary  Goal: Goal Outcome Summary  Outcome: Adequate for Discharge Date Met:  06/28/17  Discharge  D: Orders for discharge and outpatient medications written.  I: Home medications and return to clinic schedule reviewed with patient. Discharge instructions and parameters for calling Health Care Provider reviewed. Patient left at 1656 accompanied by self in stable condition. Wife waiting outside to bring pt home.  A: Patient/family verbalized understanding and was ready for discharge. Lomotil given at discharge for loose stools. Phos recheck came back at 2.2; PO neutra-phos given prior to d/c. Mg recheck came back WNL. MD aware. VS. PICC removed without complications. Pt instructed to level coban in place until tomorrow AM unless it became uncomfortable. He verbalized understanding to monitor bleeding. UA/UC sent for freddy urine. Up ad paco.  P: No medications were needed to  in pharmacy. Follow up as scheduled per AVS.

## 2017-06-28 NOTE — PLAN OF CARE
AVSS, afebrile. Denies pain. Intermittent nausea, zofran 8mg given, relief provided. Phos was 1.8, replacement given, recheck ordered. Mg was 1.5, replacement given, recheck ordered. Pt refused dose # 6 of IL-2 and urine parameters not met. PLT count of 54, MD notified and aware. Lomitil given for diarrhea. Awaiting better urine output before discharge. Urine is dark and looks like blood is present. Urine samples ordered and need to be collected. Pt will d/c this evening. Continue to monitor and w/ POC.

## 2017-06-28 NOTE — DISCHARGE SUMMARY
[facility], [place]    Discharge Summary  Hematology / Oncology    Date of Admission:  6/26/2017  Date of Discharge:  06/28/2017  Discharging Provider: Tere Mayes  Date of Service (when I saw the patient): 06/28/2017    Discharge Diagnoses   Metastatic RCC    History of Present Illness   ### Adopted from H&P ###    Julio John is a 47 year old man with PMH of CVA (2010), Afib cardiovert (2012), and metastatic RCC to the lungs s/p L nephrectomy & XRT to L lung. He is admitted for cycle 4 HD IL-2 therapy. He initially presented with back pain, fevers, BRBPR with hemorrhoids. He had a CT scan at the end of January 2016 that revealed left kidney cancer. He had left nephrectomy 3/2016. He was noted to have lung nodules on left lung on surveillance scan. He had stereotactic radiation to lung. Follow-up scans showed new nodules in right lung. He was referred to Dr. Painting for consideration of IL-2. Dr. Painting deemed him a good candidate. He completed 8 doses of IL2 in cycle 1, 4 doses in cycle 2, and 5 doses with cycle 3. Restaging CT after cycle 2 revealed stable disease. Plan to proceed with cycle 4 with restaging following treatment.       On admission,Julio feels well overall. He says he continues to have b/l shoulder pain since cycle 2. The pain seems to be worse with motion and at night. He is set up for outpatient PT. He takes oxycodone as needed for pain, but notes he does not like to take too much as he is concerned about damaging his remaining kidney. He has noticed some lower back pain after urination sometimes. No dysuria, hematuria. He also says at night he sometimes notices some wheezing when he is sleeping. Pt denies cough, dyspnea, N/V, changes in bowel or bladder, and LE edema.    Hospital Course   Julio John was admitted on 6/26/2017 for cycle 4 IL-2 for metastatic RCC.  The following problems were addressed during his hospitalization:    #Metastatic  RCC. S/p L nephrectomy, XRT to L lung, and 3 cycles of HD IL-2. Stage CT scan 5/30 revealed stable disease. Admitted for cycle 4 IL-2. Pt feeling well overall. Labs and vitals reviewed, found to be adequate to proceed with treatment. Patient tolerated the treatment relatively well with expected toxicities. Pt urine not collected on 6/27 as he urinated in the shower. Gave 250ml bolus and had a total of 210cc UOP after bolus so 4th dose was given. He had one more dose early on 6/28 and then reported feeling terrible, worse than with other cycles. He says he is experiencing decreased urin output and dysuria as well as unsteadiness when he walks. Treatment was stopped after the 5th dose. This afternoon Julio says he is starting to feel a little bit better. He is still struggling with nausea. Declines need for refill of any meds.      #Bilateral shoulder pain. Reports continued shoulder pain since cycle 2 of IL-2. Pain is located on superior aspect of bilateral shoulders that is worse with movement, particularly full flexion, and tends to increase at night. Bone scan 5/30 negative for metastatic disease, but did show degenerative uptake in AC joints. He is currently set up for outpatient PT and was supposed to have an appointment with them during his admission. He will have the appointment rescheduled and plan to follow-up with them as outpatient. Continue Oxycodone prn.      #Elevated eosinophils. Admitted 5/14-5/16 for rash, fever and pruritis. Believed to be an allergic reaction vs exposure to irritant. Symptoms improved with medication control. He currently takes the medication on prn basis. Absolute eosinophil count stable compared to previous admission. No acute issues, did not use prn Zyrtec and Atarax.      Tere Mayes PA-C  Hematology/Oncology  Pager: [phone]    Code Status   Full Code    Primary Care Physician   Tao Nwaononiwu    Vital Signs with Ranges  Temp:  [96.6  F (35.9  C)-102.3  F  (39.1  C)] 97  F (36.1  C)  Pulse:  [] 99  Resp:  [18-24] 18  BP: ()/(35-58) 108/47  SpO2:  [95 %-97 %] 95 %  229 lbs 12.8 oz    Physical Exam   Constitutional: Pleasant and cooperative male seen walking in the [place]. Awake, alert, NAD.  HEENT: NC/AT, EOMI, sclera clear, conjunctiva normal  Respiratory: No increased work of breathing, CTAB, no crackles or wheezing.  Cardiovascular: RRR, no murmur noted. No peripheral edema.  GI: Normal bowel sounds, soft, non-distended and non-tender.  Skin: Warm, dry, well-perfused. No bruising, bleeding, rashes, or lesions on limited exam.  Neurologic: A&O. Answers questions appropriately. Moves all extremities spontaneously.  Neuropsychiatric: Calm, appropriate affect       Discharge Disposition   Discharged to home  Condition at discharge: Stable    Consultations This Hospital Stay   PHYSICAL THERAPY ADULT IP CONSULT    Discharge Orders     CBC with platelets differential   Last Lab Result: Hemoglobin (g/dL)      Date                     Value                06/28/2017               13.2 (L)         ----------     Basic metabolic panel     Reason for your hospital stay   You were here for cycle 4 of IL-2 for treatment of renal cell carcinoma.     Adult [facility] Follow-up and recommended labs and tests   You will need to continue to see physical therapy. The appointment you missed while in the hospital should be rescheduled.    Appointments on [place] and/or [facility] (with [facility] or [facility] provider or service). Call [phone] if you haven't heard regarding these appointments within 7 days of discharge.    Already scheduled appointments are listed below.     Activity   Your activity upon discharge: activity as tolerated     When to contact your care team   Please call the [facility] at [phone] for temperature above 100.4, shortness of breath, chest pain, headaches, vision changes, bleeding, uncontrolled nausea,  vomiting, diarrhea, or pain.     Full Code     Diet   Follow this diet upon discharge: regular diet       Discharge Medications   Current Discharge Medication List      CONTINUE these medications which have NOT CHANGED    Details   hydrOXYzine (ATARAX) 50 MG tablet Take 1 tablet (50 mg) by mouth every 4 hours as needed for itching or anxiety  Qty: 30 tablet, Refills: 1    Associated Diagnoses: Clear cell carcinoma (H)      cetirizine (ZYRTEC) 10 MG tablet Take 1 tablet (10 mg) by mouth daily  Qty: 30 tablet, Refills: 0    Associated Diagnoses: Clear cell carcinoma (H)      diphenhydrAMINE (BENADRYL) 50 MG capsule Take 1 capsule (50 mg) by mouth every 6 hours as needed for itching  Qty: 56 capsule, Refills: 0    Associated Diagnoses: Clear cell carcinoma (H)      ranitidine (ZANTAC) 150 MG tablet Take 1 tablet (150 mg) by mouth 2 times daily  Qty: 60 tablet, Refills: 0    Associated Diagnoses: Clear cell carcinoma (H)      acetaminophen (TYLENOL) 500 MG tablet Take 1 tablet (500 mg) by mouth every 6 hours as needed for mild pain  Qty: 1 Bottle, Refills: 1    Associated Diagnoses: Malignant neoplasm of left kidney (H)      oxyCODONE (ROXICODONE) 5 MG IR tablet Take 1 tablet (5 mg) by mouth every 4 hours as needed for moderate to severe pain  Qty: 10 tablet, Refills: 0    Associated Diagnoses: Malignant neoplasm of left kidney (H)      Skin Protectants, Misc. (EUCERIN) cream Apply topically 2 times daily Reported on 5/12/2017    Associated Diagnoses: Malignant neoplasm of left kidney (H)      prochlorperazine (COMPAZINE) 10 MG tablet Take 1 tablet (10 mg) by mouth every 6 hours as needed (Breakthrough Nausea/Vomiting)  Qty: 30 tablet, Refills: 0    Associated Diagnoses: Malignant neoplasm of left kidney (H)      ondansetron (ZOFRAN) 4 MG tablet Take 2 tablets (8 mg) by mouth every 8 hours as needed for nausea  Qty: 18 tablet, Refills: 0    Associated Diagnoses: Malignant neoplasm of left kidney (H)           Allergies    No Known Allergies    Data   Most Recent 3 CBC's:  Recent Labs   Lab Test  06/28/17   0537  06/27/17   0537  06/26/17   1001   WBC  6.1  9.6  6.1   HGB  13.2*  14.5  14.7   MCV  86  85  85   PLT  54*  93*  150      Most Recent 3 BMP's:  Recent Labs   Lab Test  06/28/17   0537  06/27/17   0537  06/26/17   0931   NA  143  138  141   POTASSIUM  4.2  4.0  4.0   CHLORIDE  116*  110*  109   CO2  18*  22  26   BUN  18  15  15   CR  1.69*  1.33*  1.14   ANIONGAP  9  7  6   KVNG  7.0*  7.5*  8.5   GLC  110*  130*  99     Most Recent 2 LFT's:  Recent Labs   Lab Test  06/28/17   0537  06/27/17   0537   AST  27  15   ALT  43  29   ALKPHOS  33*  40   BILITOTAL  2.0*  1.4*

## 2017-06-29 ENCOUNTER — CARE COORDINATION (OUTPATIENT)
Dept: CARE COORDINATION | Facility: CLINIC | Age: 47
End: 2017-06-29

## 2017-06-29 LAB
BACTERIA SPEC CULT: NO GROWTH
Lab: NORMAL
MICRO REPORT STATUS: NORMAL
SPECIMEN SOURCE: NORMAL

## 2017-06-29 NOTE — PROGRESS NOTES
Patient has clinic visit within 24-48 hours of Discharge so no post DC follow up call is needed          Jun 30, 2017  2:30 PM CDT   [facility] Draw [facility]  [facility]   [facility] Draw ([facility])     [address] [place] [id number] [phone]                 Jun 30, 2017  3:00 PM CDT   (Arrive by 2:45 PM)   Return Visit with Jennie Malik PA-C   [facility] ([facility])     [address] [place] [address] [id number] [phone]

## 2017-06-30 ENCOUNTER — TELEPHONE (OUTPATIENT)
Dept: ONCOLOGY | Facility: CLINIC | Age: 47
End: 2017-06-30

## 2017-06-30 ENCOUNTER — APPOINTMENT (OUTPATIENT)
Dept: LAB | Facility: CLINIC | Age: 47
End: 2017-06-30
Attending: INTERNAL MEDICINE
Payer: MEDICAID

## 2017-06-30 ENCOUNTER — ONCOLOGY VISIT (OUTPATIENT)
Dept: ONCOLOGY | Facility: CLINIC | Age: 47
End: 2017-06-30
Attending: PHYSICIAN ASSISTANT
Payer: MEDICAID

## 2017-06-30 VITALS
HEIGHT: 64 IN | WEIGHT: 226.3 LBS | RESPIRATION RATE: 18 BRPM | HEART RATE: 82 BPM | BODY MASS INDEX: 38.64 KG/M2 | DIASTOLIC BLOOD PRESSURE: 92 MMHG | OXYGEN SATURATION: 98 % | SYSTOLIC BLOOD PRESSURE: 146 MMHG | TEMPERATURE: 98 F

## 2017-06-30 DIAGNOSIS — C64.9 CLEAR CELL RENAL CELL CARCINOMA, UNSPECIFIED LATERALITY (H): ICD-10-CM

## 2017-06-30 DIAGNOSIS — C64.2 MALIGNANT NEOPLASM OF LEFT KIDNEY (H): Primary | ICD-10-CM

## 2017-06-30 LAB
ANION GAP SERPL CALCULATED.3IONS-SCNC: 8 MMOL/L (ref 3–14)
BASOPHILS # BLD AUTO: 0 10E9/L (ref 0–0.2)
BASOPHILS NFR BLD AUTO: 0.3 %
BUN SERPL-MCNC: 14 MG/DL (ref 7–30)
CALCIUM SERPL-MCNC: 7.8 MG/DL (ref 8.5–10.1)
CHLORIDE SERPL-SCNC: 109 MMOL/L (ref 94–109)
CO2 SERPL-SCNC: 20 MMOL/L (ref 20–32)
CREAT SERPL-MCNC: 1.09 MG/DL (ref 0.66–1.25)
DIFFERENTIAL METHOD BLD: ABNORMAL
EOSINOPHIL # BLD AUTO: 1.8 10E9/L (ref 0–0.7)
EOSINOPHIL NFR BLD AUTO: 23.7 %
ERYTHROCYTE [DISTWIDTH] IN BLOOD BY AUTOMATED COUNT: 14.6 % (ref 10–15)
GFR SERPL CREATININE-BSD FRML MDRD: 72 ML/MIN/1.7M2
GLUCOSE SERPL-MCNC: 96 MG/DL (ref 70–99)
HCT VFR BLD AUTO: 39.1 % (ref 40–53)
HGB BLD-MCNC: 13.2 G/DL (ref 13.3–17.7)
IMM GRANULOCYTES # BLD: 0 10E9/L (ref 0–0.4)
IMM GRANULOCYTES NFR BLD: 0.3 %
LDH SERPL L TO P-CCNC: 175 U/L (ref 85–227)
LYMPHOCYTES # BLD AUTO: 3.4 10E9/L (ref 0.8–5.3)
LYMPHOCYTES NFR BLD AUTO: 44.3 %
MAGNESIUM SERPL-MCNC: 2.1 MG/DL (ref 1.6–2.3)
MCH RBC QN AUTO: 28.3 PG (ref 26.5–33)
MCHC RBC AUTO-ENTMCNC: 33.8 G/DL (ref 31.5–36.5)
MCV RBC AUTO: 84 FL (ref 78–100)
MONOCYTES # BLD AUTO: 0.3 10E9/L (ref 0–1.3)
MONOCYTES NFR BLD AUTO: 4.3 %
NEUTROPHILS # BLD AUTO: 2.1 10E9/L (ref 1.6–8.3)
NEUTROPHILS NFR BLD AUTO: 27.1 %
NRBC # BLD AUTO: 0 10*3/UL
NRBC BLD AUTO-RTO: 0 /100
PLATELET # BLD AUTO: 96 10E9/L (ref 150–450)
POTASSIUM SERPL-SCNC: 4 MMOL/L (ref 3.4–5.3)
RBC # BLD AUTO: 4.66 10E12/L (ref 4.4–5.9)
SODIUM SERPL-SCNC: 137 MMOL/L (ref 133–144)
WBC # BLD AUTO: 7.6 10E9/L (ref 4–11)

## 2017-06-30 PROCEDURE — 80048 BASIC METABOLIC PNL TOTAL CA: CPT | Performed by: PHYSICIAN ASSISTANT

## 2017-06-30 PROCEDURE — 83615 LACTATE (LD) (LDH) ENZYME: CPT | Performed by: PHYSICIAN ASSISTANT

## 2017-06-30 PROCEDURE — 83735 ASSAY OF MAGNESIUM: CPT | Performed by: PHYSICIAN ASSISTANT

## 2017-06-30 PROCEDURE — 99212 OFFICE O/P EST SF 10 MIN: CPT | Mod: ZF

## 2017-06-30 PROCEDURE — 99214 OFFICE O/P EST MOD 30 MIN: CPT | Mod: ZP | Performed by: PHYSICIAN ASSISTANT

## 2017-06-30 PROCEDURE — 36415 COLL VENOUS BLD VENIPUNCTURE: CPT | Performed by: PHYSICIAN ASSISTANT

## 2017-06-30 PROCEDURE — 85025 COMPLETE CBC W/AUTO DIFF WBC: CPT | Performed by: PHYSICIAN ASSISTANT

## 2017-06-30 ASSESSMENT — PAIN SCALES - GENERAL: PAINLEVEL: NO PAIN (0)

## 2017-06-30 NOTE — NURSING NOTE
"Oncology Rooming Note    June 30, 2017 3:12 PM   Julio John is a 47 year old male who presents for:    Chief Complaint   Patient presents with     Blood Draw     Right AC butterfly  X 1, labs drawn and sent, vitals completed, checked into next appointment.     Oncology Clinic Visit     Hospital F/U     Initial Vitals: BP (!) 146/92 (BP Location: Right arm)  Pulse 82  Temp 98  F (36.7  C) (Oral)  Resp 18  Ht 1.626 m (5' 4.02\")  Wt 102.6 kg (226 lb 4.8 oz)  SpO2 98%  BMI 38.82 kg/m2 Estimated body mass index is 38.82 kg/(m^2) as calculated from the following:    Height as of this encounter: 1.626 m (5' 4.02\").    Weight as of this encounter: 102.6 kg (226 lb 4.8 oz). Body surface area is 2.15 meters squared.  No Pain (0) Comment: Data Unavailable   No LMP for male patient.  Allergies reviewed: Yes  Medications reviewed: Yes    Medications: Medication refills not needed today.  Pharmacy name entered into [facility]: [facility] - [place], [place] - [address] [place] [address]    Clinical concerns: no clinical concerns  provider was notified.    7 minutes for nursing intake (face to face time)     Angelina Ibarra CMA              "

## 2017-06-30 NOTE — NURSING NOTE
Chief Complaint   Patient presents with     Blood Draw     Right AC butterfly  X 1, labs drawn and sent, vitals completed, checked into next appointment.   Kendra Doran RN

## 2017-06-30 NOTE — LETTER
6/30/2017      RE: Julio John  [address]  [facility] [address]       Oncology/Hematology Visit Note  Jun 30, 2017    DIAGNOSIS:  Metastatic renal cell carcinoma.  For full history of present illness and prior treatment, please see Ludwin Painting MD, dictations.  The patient came to the [facility] for high-dose IL-2.  Cycle 1 was 04/17 and received 8 doses.  Cycle 2 was 05/03 and received 4 doses.  Cycle 3 was 05/31 and received 5 doses.  Cycle 4 was on 6/26 and received 5 doses. Here for hospital follow up today.     INTERVAL HISTORY:  Mr. Baron John comes to the clinic today for followup of his metastatic renal cell carcinoma.  He is accompanied by his youngest son and an .  He reports that this cycle was tough in the hospital. He had difficulty with low BP and low platelets with blood in his urine, which has since resolved. He reports eating and drinking okay. He feels itchy, but is not taking any medication for this. He has had loose stools bid, managed with 1 Imodium/day. He has pain in his shoulders and head, managed with 1-2 oxycodone per week. He reports a bad taste in his mouth. He denies any dyspnea. He has an intermittent dry cough. He denies other concerns.     MEDICATIONS:   Current Outpatient Prescriptions   Medication Sig Dispense Refill     hydrOXYzine (ATARAX) 50 MG tablet Take 1 tablet (50 mg) by mouth every 4 hours as needed for itching or anxiety 30 tablet 1     cetirizine (ZYRTEC) 10 MG tablet Take 1 tablet (10 mg) by mouth daily 30 tablet 0     diphenhydrAMINE (BENADRYL) 50 MG capsule Take 1 capsule (50 mg) by mouth every 6 hours as needed for itching 56 capsule 0     ranitidine (ZANTAC) 150 MG tablet Take 1 tablet (150 mg) by mouth 2 times daily 60 tablet 0     acetaminophen (TYLENOL) 500 MG tablet Take 1 tablet (500 mg) by mouth every 6 hours as needed for mild pain 1 Bottle 1     oxyCODONE (ROXICODONE) 5 MG IR tablet Take 1 tablet (5 mg) by  "mouth every 4 hours as needed for moderate to severe pain 10 tablet 0     Skin Protectants, Misc. (EUCERIN) cream Apply topically 2 times daily Reported on 5/12/2017       prochlorperazine (COMPAZINE) 10 MG tablet Take 1 tablet (10 mg) by mouth every 6 hours as needed (Breakthrough Nausea/Vomiting) 30 tablet 0     ondansetron (ZOFRAN) 4 MG tablet Take 2 tablets (8 mg) by mouth every 8 hours as needed for nausea 18 tablet 0       ALLERGIES:  No Known Allergies    PHYSICAL EXAMINATION:  Vitals: BP (!) 146/92 (BP Location: Right arm)  Pulse 82  Temp 98  F (36.7  C) (Oral)  Resp 18  Ht 1.626 m (5' 4.02\")  Wt 102.6 kg (226 lb 4.8 oz)  SpO2 98%  BMI 38.82 kg/m2  GENERAL:  A pleasant person in no acute distress.   HEENT:  PERRL. EOMI. Sclerae are nonicteric.  Mouth is without mucositis or thrush.   NECK:  Supple.   LYMPH NODES:  No peripheral lymphadenopathy noted in the supraclavicular or cervical regions.   LUNGS:  Clear to auscultation bilaterally.   HEART:  Regular rate and rhythm.   ABDOMEN:  Bowel sounds are active.  Soft and nontender.  No hepatosplenomegaly or other masses appreciated.  LOWER EXTREMITIES:  Without pitting edema to the knees bilaterally.   NEUROLOGICAL:  Alert/orientated/able to answer all questions. CN II-XII grossly intact.     LAB:   Results for ALIA VASQUEZ (MRN [id number]) as of 7/9/2017 23:07   6/28/2017 05:37 6/30/2017 14:53   Sodium 143 137   Potassium 4.2 4.0   Chloride 116 (H) 109   Carbon Dioxide 18 (L) 20   Urea Nitrogen 18 14   Creatinine 1.69 (H) 1.09   GFR Estimate 44 (L) 72   GFR Estimate If Black 53 (L) 88   Calcium 7.0 (L) 7.8 (L)   Anion Gap 9 8   Magnesium 1.5 (L) 2.1   Phosphorus 1.8 (L)    Albumin 2.5 (L)    Protein Total 5.2 (L)    Bilirubin Total 2.0 (H)    Alkaline Phosphatase 33 (L)    ALT 43    AST 27    CK Total 55    Lactic Acid     Lactate Dehydrogenase 166 175   Glucose 110 (H) 96   WBC 6.1 7.6   Hemoglobin 13.2 (L) 13.2 (L)   Hematocrit 39.8 (L) 39.1 " (L)   Platelet Count 54 (L) 96 (L)   RBC Count 4.63 4.66   MCV 86 84   MCH 28.5 28.3   MCHC 33.2 33.8   RDW 15.9 (H) 14.6   Diff Method Automated Method Automated Method   % Neutrophils 72.2 27.1   % Lymphocytes 1.8 44.3   % Monocytes 2.9 4.3   % Eosinophils 22.9 23.7   % Basophils 0.0 0.3   % Immature Granulocytes 0.2 0.3   Nucleated RBCs 0 0   Absolute Neutrophil 4.4 2.1   Absolute Lymphocytes 0.1 (L) 3.4   Absolute Monocytes 0.2 0.3   Absolute Eosinophils 1.4 (H) 1.8 (H)   Absolute Basophils 0.0 0.0   Abs Immature Granulocytes 0.0 0.0   Absolute Nucleated RBC 0.0 0.0     IMPRESSION/PLAN:   1.  Metastatic renal cell carcinoma.  Mr. Baron John continues to tolerate the high-dose IL-2 fairly well at this time.  He has completed 4 cycles. Renal insufficiency has resolved and platelets are improving. He will see Dr. Painting in 4 weeks with a CT CAP. He will call sooner for concerns.     Jennie Malik PA-C  [facility]  [address] [place] [address] [phone]

## 2017-06-30 NOTE — MR AVS SNAPSHOT
After Visit Summary   6/30/2017    Julio John    MRN: [id number]           Patient Information     Date Of Birth          1970        Visit Information        Provider Department      6/30/2017 2:45 PM Jennie Malik PA-C; LANGUAGE [facility]        Today's Diagnoses     Malignant neoplasm of left kidney (H)    -  1    Clear cell renal cell carcinoma, unspecified laterality (H)           Follow-ups after your visit        Your next 10 appointments already scheduled     Jul 26, 2017 11:20 AM CDT   (Arrive by 11:05 AM)   CT CHEST/ABDOMEN/PELVIS W CONTRAST with UCCT2   [facility] CT ([facility])    [address]  1st Floor  [facility] [address] [phone]           Please bring any scans or X-rays taken at other hospitals, if similar tests were done. Also bring a list of your medicines, including vitamins, minerals and over-the-counter drugs. It is safest to leave personal items at home.  Be sure to tell your doctor:   If you have any allergies.   If there s any chance you are pregnant.   If you are breastfeeding.   If you have any special needs.  You may have contrast for this exam. To prepare:   Do not eat or drink for 2 hours before your exam. If you need to take medicine, you may take it with small sips of water. (We may ask you to take liquid medicine as well.)   The day before your exam, drink extra fluids at least six 8-ounce glasses (unless your doctor tells you to restrict your fluids).  Patients over 70 or patients with diabetes or kidney problems:   If you haven t had a blood test (creatinine test) within the last 30 days, go to your clinic or Diagnostic Imaging Department for this test.  If you have diabetes:   If your kidney function is normal, continue taking your metformin (Avandamet, Glucophage, Glucovance, Metaglip) on the day of your exam.   If your kidney function is abnormal, wait 48  "hours before restarting this medicine.  You will have oral contrast for this exam:   You will drink the contrast at home. Get this from your clinic or Diagnostic Imaging Department. Please follow the directions given.  Please wear loose clothing, such as a sweat suit or jogging clothes. Avoid snaps, zippers and other metal. We may ask you to undress and put on a hospital gown.  If you have any questions, please call the Imaging Department where you will have your exam.            Jul 26, 2017  1:45 PM CDT   [facility] Lab Draw with  [facility] LAB DRAW   [facility] Lab Draw ([facility])    [address] [place] [address] [phone]            Jul 26, 2017  2:15 PM CDT   (Arrive by 2:00 PM)   Return Visit with Ludwin Painting MD   [facility] ([facility])    [address] [place] [address] [phone]              Who to contact     If you have questions or need follow up information about today's clinic visit or your schedule please contact [facility] directly at [phone].  Normal or non-critical lab and imaging results will be communicated to you by MyChart, letter or phone within 4 business days after the clinic has received the results. If you do not hear from us within 7 days, please contact the clinic through [facility] or phone. If you have a critical or abnormal lab result, we will notify you by phone as soon as possible.  Submit refill requests through [facility] or call your pharmacy and they will forward the refill request to us. Please allow 3 business days for your refill to be completed.          Additional Information About Your Visit        [facility] Information     [facility] lets you send messages to your doctor, view your test results, renew your prescriptions, schedule appointments and more. To sign up, go to [email or web address] . Click on \"Log " "in\" on the left side of the screen, which will take you to the Welcome page. Then click on \"Sign up Now\" on the right side of the page.     You will be asked to enter the access code listed below, as well as some personal information. Please follow the directions to create your username and password.     Your access code is: [email or web address]  Expires: 10/7/2017 11:12 PM     Your access code will  in 90 days. If you need help or a new code, please call your [facility] clinic or [phone].        Care EveryWhere ID     This is your Care EveryWhere ID. This could be used by other organizations to access your [facility] medical records  [id number]        Your Vitals Were     Pulse Temperature Respirations Height Pulse Oximetry BMI (Body Mass Index)    82 98  F (36.7  C) (Oral) 18 1.626 m (5' 4.02\") 98% 38.82 kg/m2       Blood Pressure from Last 3 Encounters:   17 (!) 146/92   17 112/58   17 129/86    Weight from Last 3 Encounters:   17 102.6 kg (226 lb 4.8 oz)   17 104.2 kg (229 lb 12.8 oz)   17 102.1 kg (225 lb)              We Performed the Following     Basic metabolic panel     CBC with platelets differential     Lactate Dehydrogenase     Magnesium        Primary Care Provider Office Phone # Fax #    Uchprzdu MD Doc [phone]       [facility] ([facility]) [address] [place] [address]        Equal Access to Services     CAROLYNN MCGEE AH: Hadii dallin bermeo hadbella Sowoodrow, waaxda luqadaha, qaybta kaalmada belkys, erin razo. So [facility] [phone].    ATENCIÓN: Si habla español, tiene a martinez disposición servicios gratuitos de asistencia lingüística. Llame al [phone].    We comply with applicable federal civil rights laws and [place] laws. We do not discriminate on the basis of race, color, national origin, age, disability sex, sexual orientation or gender identity.            Thank you!     Thank you for choosing [facility]  for your care. Our goal is always to provide you with excellent care. Hearing back from our patients is one way we can continue to improve our services. Please take a few minutes to complete the written survey that you may receive in the mail after your visit with us. Thank you!             Your Updated Medication List - Protect others around you: Learn how to safely use, store and throw away your medicines at www.disposemymeds.org.          This list is accurate as of: 6/30/17 11:59 PM.  Always use your most recent med list.                   Brand Name Dispense Instructions for use Diagnosis    acetaminophen 500 MG tablet    TYLENOL    1 Bottle    Take 1 tablet (500 mg) by mouth every 6 hours as needed for mild pain    Malignant neoplasm of left kidney (H)       cetirizine 10 MG tablet    zyrTEC    30 tablet    Take 1 tablet (10 mg) by mouth daily    Clear cell carcinoma (H)       diphenhydrAMINE 50 MG capsule    BENADRYL    56 capsule    Take 1 capsule (50 mg) by mouth every 6 hours as needed for itching    Clear cell carcinoma (H)       eucerin cream      Apply topically 2 times daily Reported on 5/12/2017    Malignant neoplasm of left kidney (H)       hydrOXYzine 50 MG tablet    ATARAX    30 tablet    Take 1 tablet (50 mg) by mouth every 4 hours as needed for itching or anxiety    Clear cell carcinoma (H)       ondansetron 4 MG tablet    ZOFRAN    18 tablet    Take 2 tablets (8 mg) by mouth every 8 hours as needed for nausea    Malignant neoplasm of left kidney (H)       oxyCODONE 5 MG IR tablet    ROXICODONE    10 tablet    Take 1 tablet (5 mg) by mouth every 4 hours as needed for moderate to severe pain    Malignant neoplasm of left kidney (H)       prochlorperazine 10 MG tablet    COMPAZINE    30 tablet    Take 1 tablet (10 mg) by mouth every 6 hours as needed (Breakthrough Nausea/Vomiting)    Malignant neoplasm of left kidney (H)       ranitidine 150 MG tablet    ZANTAC    60  tablet    Take 1 tablet (150 mg) by mouth 2 times daily    Clear cell carcinoma (H)

## 2017-06-30 NOTE — Clinical Note
6/30/2017       RE: Julio John  [address]  [facility] [address]     Dear Colleague,    Thank you for referring your patient, Julio John, to the [facility]. Please see a copy of my visit note below.    No notes on file    Again, thank you for allowing me to participate in the care of your patient.      Sincerely,    Jennie Malik PA-C

## 2017-06-30 NOTE — TELEPHONE ENCOUNTER
Social work was asked by RNCC to contact patient to assist with transportation.  SW called patient over the phone with .  Patient stated he does not have a car at home today and did not know how he would be able to come to his appointment.  He had requested that his appointments be moved to Monday. SW explained to patient that there were no available appointments on Monday and that his providers had indicated preference for him to be seen sooner.  He expressed understanding of this information and stated he would ask his nephew for a ride to his appointments today. SW gave patient this worker's contact information if he requires further assistance with transportation.  No other needs indicated at this time. RNCC and PA sent in basket message with updates on this worker's conversation with patient.    Soo Yeon Han, [facility]  [phone]

## 2017-07-03 LAB
BACTERIA SPEC CULT: NO GROWTH
MICRO REPORT STATUS: NORMAL
SPECIMEN SOURCE: NORMAL

## 2017-07-04 LAB
BACTERIA SPEC CULT: NO GROWTH
MICRO REPORT STATUS: NORMAL
SPECIMEN SOURCE: NORMAL

## 2017-07-10 NOTE — PROGRESS NOTES
Oncology/Hematology Visit Note  Jun 30, 2017    DIAGNOSIS:  Metastatic renal cell carcinoma.  For full history of present illness and prior treatment, please see Ludwin Painting MD, dictations.  The patient came to the [facility] for high-dose IL-2.  Cycle 1 was 04/17 and received 8 doses.  Cycle 2 was 05/03 and received 4 doses.  Cycle 3 was 05/31 and received 5 doses.  Cycle 4 was on 6/26 and received 5 doses. Here for hospital follow up today.     INTERVAL HISTORY:  Mr. Baron John comes to the clinic today for followup of his metastatic renal cell carcinoma.  He is accompanied by his youngest son and an .  He reports that this cycle was tough in the hospital. He had difficulty with low BP and low platelets with blood in his urine, which has since resolved. He reports eating and drinking okay. He feels itchy, but is not taking any medication for this. He has had loose stools bid, managed with 1 Imodium/day. He has pain in his shoulders and head, managed with 1-2 oxycodone per week. He reports a bad taste in his mouth. He denies any dyspnea. He has an intermittent dry cough. He denies other concerns.     MEDICATIONS:   Current Outpatient Prescriptions   Medication Sig Dispense Refill     hydrOXYzine (ATARAX) 50 MG tablet Take 1 tablet (50 mg) by mouth every 4 hours as needed for itching or anxiety 30 tablet 1     cetirizine (ZYRTEC) 10 MG tablet Take 1 tablet (10 mg) by mouth daily 30 tablet 0     diphenhydrAMINE (BENADRYL) 50 MG capsule Take 1 capsule (50 mg) by mouth every 6 hours as needed for itching 56 capsule 0     ranitidine (ZANTAC) 150 MG tablet Take 1 tablet (150 mg) by mouth 2 times daily 60 tablet 0     acetaminophen (TYLENOL) 500 MG tablet Take 1 tablet (500 mg) by mouth every 6 hours as needed for mild pain 1 Bottle 1     oxyCODONE (ROXICODONE) 5 MG IR tablet Take 1 tablet (5 mg) by mouth every 4 hours as needed for moderate to severe pain 10 tablet 0     Skin Protectants,  "Misc. (EUCERIN) cream Apply topically 2 times daily Reported on 5/12/2017       prochlorperazine (COMPAZINE) 10 MG tablet Take 1 tablet (10 mg) by mouth every 6 hours as needed (Breakthrough Nausea/Vomiting) 30 tablet 0     ondansetron (ZOFRAN) 4 MG tablet Take 2 tablets (8 mg) by mouth every 8 hours as needed for nausea 18 tablet 0       ALLERGIES:  No Known Allergies    PHYSICAL EXAMINATION:  Vitals: BP (!) 146/92 (BP Location: Right arm)  Pulse 82  Temp 98  F (36.7  C) (Oral)  Resp 18  Ht 1.626 m (5' 4.02\")  Wt 102.6 kg (226 lb 4.8 oz)  SpO2 98%  BMI 38.82 kg/m2  GENERAL:  A pleasant person in no acute distress.   HEENT:  PERRL. EOMI. Sclerae are nonicteric.  Mouth is without mucositis or thrush.   NECK:  Supple.   LYMPH NODES:  No peripheral lymphadenopathy noted in the supraclavicular or cervical regions.   LUNGS:  Clear to auscultation bilaterally.   HEART:  Regular rate and rhythm.   ABDOMEN:  Bowel sounds are active.  Soft and nontender.  No hepatosplenomegaly or other masses appreciated.  LOWER EXTREMITIES:  Without pitting edema to the knees bilaterally.   NEUROLOGICAL:  Alert/orientated/able to answer all questions. CN II-XII grossly intact.     LAB:   Results for ALIA VASQUEZ (MRN [id number]) as of 7/9/2017 23:07   6/28/2017 05:37 6/30/2017 14:53   Sodium 143 137   Potassium 4.2 4.0   Chloride 116 (H) 109   Carbon Dioxide 18 (L) 20   Urea Nitrogen 18 14   Creatinine 1.69 (H) 1.09   GFR Estimate 44 (L) 72   GFR Estimate If Black 53 (L) 88   Calcium 7.0 (L) 7.8 (L)   Anion Gap 9 8   Magnesium 1.5 (L) 2.1   Phosphorus 1.8 (L)    Albumin 2.5 (L)    Protein Total 5.2 (L)    Bilirubin Total 2.0 (H)    Alkaline Phosphatase 33 (L)    ALT 43    AST 27    CK Total 55    Lactic Acid     Lactate Dehydrogenase 166 175   Glucose 110 (H) 96   WBC 6.1 7.6   Hemoglobin 13.2 (L) 13.2 (L)   Hematocrit 39.8 (L) 39.1 (L)   Platelet Count 54 (L) 96 (L)   RBC Count 4.63 4.66   MCV 86 84   MCH 28.5 28.3   MCHC " 33.2 33.8   RDW 15.9 (H) 14.6   Diff Method Automated Method Automated Method   % Neutrophils 72.2 27.1   % Lymphocytes 1.8 44.3   % Monocytes 2.9 4.3   % Eosinophils 22.9 23.7   % Basophils 0.0 0.3   % Immature Granulocytes 0.2 0.3   Nucleated RBCs 0 0   Absolute Neutrophil 4.4 2.1   Absolute Lymphocytes 0.1 (L) 3.4   Absolute Monocytes 0.2 0.3   Absolute Eosinophils 1.4 (H) 1.8 (H)   Absolute Basophils 0.0 0.0   Abs Immature Granulocytes 0.0 0.0   Absolute Nucleated RBC 0.0 0.0     IMPRESSION/PLAN:   1.  Metastatic renal cell carcinoma.  Mr. Baron John continues to tolerate the high-dose IL-2 fairly well at this time.  He has completed 4 cycles. Renal insufficiency has resolved and platelets are improving. He will see Dr. Painting in 4 weeks with a CT CAP. He will call sooner for concerns.     Jennie Malik PA-C  [facility] [address] [address] [phone]

## 2017-07-26 ENCOUNTER — ONCOLOGY VISIT (OUTPATIENT)
Dept: ONCOLOGY | Facility: CLINIC | Age: 47
End: 2017-07-26
Attending: INTERNAL MEDICINE
Payer: MEDICAID

## 2017-07-26 VITALS
OXYGEN SATURATION: 95 % | SYSTOLIC BLOOD PRESSURE: 116 MMHG | DIASTOLIC BLOOD PRESSURE: 80 MMHG | BODY MASS INDEX: 38.41 KG/M2 | TEMPERATURE: 99 F | RESPIRATION RATE: 16 BRPM | HEART RATE: 80 BPM | WEIGHT: 223.9 LBS

## 2017-07-26 DIAGNOSIS — C64.9 CLEAR CELL RENAL CELL CARCINOMA, UNSPECIFIED LATERALITY (H): ICD-10-CM

## 2017-07-26 DIAGNOSIS — C64.2 METASTATIC RENAL CELL CARCINOMA, LEFT (H): ICD-10-CM

## 2017-07-26 DIAGNOSIS — C64.2 MALIGNANT NEOPLASM OF LEFT KIDNEY (H): ICD-10-CM

## 2017-07-26 LAB
ALBUMIN SERPL-MCNC: 3.8 G/DL (ref 3.4–5)
ALP SERPL-CCNC: 61 U/L (ref 40–150)
ALT SERPL W P-5'-P-CCNC: 32 U/L (ref 0–70)
ANION GAP SERPL CALCULATED.3IONS-SCNC: 8 MMOL/L (ref 3–14)
AST SERPL W P-5'-P-CCNC: 16 U/L (ref 0–45)
BASOPHILS # BLD AUTO: 0.1 10E9/L (ref 0–0.2)
BASOPHILS NFR BLD AUTO: 0.8 %
BILIRUB SERPL-MCNC: 0.4 MG/DL (ref 0.2–1.3)
BUN SERPL-MCNC: 16 MG/DL (ref 7–30)
CALCIUM SERPL-MCNC: 8.6 MG/DL (ref 8.5–10.1)
CHLORIDE SERPL-SCNC: 105 MMOL/L (ref 94–109)
CO2 SERPL-SCNC: 24 MMOL/L (ref 20–32)
CREAT SERPL-MCNC: 1.04 MG/DL (ref 0.66–1.25)
DIFFERENTIAL METHOD BLD: ABNORMAL
EOSINOPHIL # BLD AUTO: 0.9 10E9/L (ref 0–0.7)
EOSINOPHIL NFR BLD AUTO: 13.3 %
ERYTHROCYTE [DISTWIDTH] IN BLOOD BY AUTOMATED COUNT: 14.2 % (ref 10–15)
GFR SERPL CREATININE-BSD FRML MDRD: 76 ML/MIN/1.7M2
GLUCOSE SERPL-MCNC: 95 MG/DL (ref 70–99)
HCT VFR BLD AUTO: 45.8 % (ref 40–53)
HGB BLD-MCNC: 15.4 G/DL (ref 13.3–17.7)
IMM GRANULOCYTES # BLD: 0 10E9/L (ref 0–0.4)
IMM GRANULOCYTES NFR BLD: 0.3 %
LDH SERPL L TO P-CCNC: 156 U/L (ref 85–227)
LYMPHOCYTES # BLD AUTO: 2.3 10E9/L (ref 0.8–5.3)
LYMPHOCYTES NFR BLD AUTO: 35.7 %
MAGNESIUM SERPL-MCNC: 2.1 MG/DL (ref 1.6–2.3)
MCH RBC QN AUTO: 28.8 PG (ref 26.5–33)
MCHC RBC AUTO-ENTMCNC: 33.6 G/DL (ref 31.5–36.5)
MCV RBC AUTO: 86 FL (ref 78–100)
MONOCYTES # BLD AUTO: 0.4 10E9/L (ref 0–1.3)
MONOCYTES NFR BLD AUTO: 5.7 %
NEUTROPHILS # BLD AUTO: 2.9 10E9/L (ref 1.6–8.3)
NEUTROPHILS NFR BLD AUTO: 44.2 %
NRBC # BLD AUTO: 0 10*3/UL
NRBC BLD AUTO-RTO: 0 /100
PLATELET # BLD AUTO: 160 10E9/L (ref 150–450)
POTASSIUM SERPL-SCNC: 3.8 MMOL/L (ref 3.4–5.3)
PROT SERPL-MCNC: 7.6 G/DL (ref 6.8–8.8)
RBC # BLD AUTO: 5.34 10E12/L (ref 4.4–5.9)
SODIUM SERPL-SCNC: 137 MMOL/L (ref 133–144)
WBC # BLD AUTO: 6.5 10E9/L (ref 4–11)

## 2017-07-26 PROCEDURE — 83615 LACTATE (LD) (LDH) ENZYME: CPT | Performed by: INTERNAL MEDICINE

## 2017-07-26 PROCEDURE — T1013 SIGN LANG/ORAL INTERPRETER: HCPCS | Mod: U3,ZF

## 2017-07-26 PROCEDURE — 83735 ASSAY OF MAGNESIUM: CPT | Performed by: INTERNAL MEDICINE

## 2017-07-26 PROCEDURE — 85025 COMPLETE CBC W/AUTO DIFF WBC: CPT | Performed by: INTERNAL MEDICINE

## 2017-07-26 PROCEDURE — 36592 COLLECT BLOOD FROM PICC: CPT

## 2017-07-26 PROCEDURE — 80053 COMPREHEN METABOLIC PANEL: CPT | Performed by: INTERNAL MEDICINE

## 2017-07-26 PROCEDURE — 99214 OFFICE O/P EST MOD 30 MIN: CPT | Mod: ZP | Performed by: INTERNAL MEDICINE

## 2017-07-26 PROCEDURE — T1013 SIGN LANG/ORAL INTERPRETER: HCPCS | Mod: U3

## 2017-07-26 ASSESSMENT — PAIN SCALES - GENERAL: PAINLEVEL: NO PAIN (1)

## 2017-07-26 NOTE — LETTER
7/26/2017       RE: Julio John  [id number]  [facility] [id number]     Dear Colleague,    Thank you for referring your patient, Julio John, to the [facility]. Please see a copy of my visit note below.    Oncology/Hematology Visit Note  Jul 26, 2017    DIAGNOSIS:  Metastatic renal cell carcinoma.  For full history of present illness and prior treatment, please see Ludwin Painting MD, dictations.  The patient came to the [facility] for high-dose IL-2.  Cycle 1 was 04/17 and received 8 doses.  Cycle 2 was 05/03 and received 4 doses.  Cycle 3 was 05/31 and received 5 doses.  Cycle 4 was on 6/26 and received 5 doses. Here for follow up today.     INTERVAL HISTORY:  Mr. Baron John comes to the clinic today for followup of his metastatic renal cell carcinoma.      He is accompanied by his family and an .  He has recovered from his IL2 therapy for the most part. He does not have any significant complains. He missed his CT scan appointment prior to this visit and has it scheduled after this visit.     MEDICATIONS:   Recent Labs   Lab Test  07/26/17   1417  06/30/17   1453  06/28/17   0537  06/27/17   0537  06/26/17   0931   NA  137  137  143  138  141   POTASSIUM  3.8  4.0  4.2  4.0  4.0   CHLORIDE  105  109  116*  110*  109   CO2  24  20  18*  22  26   ANIONGAP  8  8  9  7  6   BUN  16  14  18  15  15   CR  1.04  1.09  1.69*  1.33*  1.14   GLC  95  96  110*  130*  99   KVNG  8.6  7.8*  7.0*  7.5*  8.5     Recent Labs   Lab Test  07/26/17   1417  06/30/17   1453  06/28/17   1536  06/28/17   0537  06/27/17   0537   06/02/17   0634   06/01/17   0928   MAG  2.1  2.1  3.1*  1.5*  1.7   < >  2.2   < >  1.5*   PHOS   --    --   2.2*  1.8*  2.9   --   1.3*   --   2.7    < > = values in this interval not displayed.     Recent Labs   Lab Test  07/26/17   1417  06/30/17   1453  06/28/17   0537  06/27/17   0537  06/26/17   1001   WBC  6.5  7.6  6.1  9.6  6.1    HGB  15.4  13.2*  13.2*  14.5  14.7   PLT  160  96*  54*  93*  150   MCV  86  84  86  85  85   NEUTROPHIL  44.2  27.1  72.2  67.6  43.0     Recent Labs   Lab Test  07/26/17   1417  06/30/17   1453  06/28/17   0537  06/27/17   0537   BILITOTAL  0.4   --   2.0*  1.4*   ALKPHOS  61   --   33*  40   ALT  32   --   43  29   AST  16   --   27  15   ALBUMIN  3.8   --   2.5*  2.8*   LDH  156  175  166  129     No results found for: TSH  No results for input(s): CEA in the last 28863 hours.  Results for orders placed or performed during the hospital encounter of 06/26/17   IR PICC Vascular    Impression    IMPRESSION: Fluoroscopic assistance was provided to the vascular  access nursing service for documentation of the tip position of a  peripherally inserted central venous catheter. The tip is in the  superior atriocaval junction.    RENUKA GALVAN MD         ALLERGIES:  No Known Allergies    PHYSICAL EXAMINATION:  Vitals: /80  Pulse 80  Temp 99  F (37.2  C)  Resp 16  Wt 101.6 kg (223 lb 14.4 oz)  SpO2 95%  BMI 38.41 kg/m2  GENERAL:  A pleasant person in no acute distress.   HEENT:  PERRL. EOMI. Sclerae are nonicteric.  Mouth is without mucositis or thrush.   NECK:  Supple.   LYMPH NODES:  No peripheral lymphadenopathy noted in the supraclavicular or cervical regions.   LUNGS:  Clear to auscultation bilaterally.   HEART:  Regular rate and rhythm.   ABDOMEN:  Bowel sounds are active.  Soft and nontender.  No hepatosplenomegaly or other masses appreciated.  LOWER EXTREMITIES:  Without pitting edema to the knees bilaterally.   NEUROLOGICAL:  Alert/orientated/able to answer all questions. CN II-XII grossly intact.     LAB:     Recent Labs   Lab Test  07/26/17   1417  06/30/17   1453  06/28/17   0537  06/27/17   0537  06/26/17   0931   NA  137  137  143  138  141   POTASSIUM  3.8  4.0  4.2  4.0  4.0   CHLORIDE  105  109  116*  110*  109   CO2  24  20  18*  22  26   ANIONGAP  8  8  9  7  6   BUN  16  14  18  15  15    CR  1.04  1.09  1.69*  1.33*  1.14   GLC  95  96  110*  130*  99   KVNG  8.6  7.8*  7.0*  7.5*  8.5     Recent Labs   Lab Test  07/26/17   1417  06/30/17   1453  06/28/17   1536  06/28/17   0537  06/27/17   0537   06/02/17   0634   06/01/17   0928   MAG  2.1  2.1  3.1*  1.5*  1.7   < >  2.2   < >  1.5*   PHOS   --    --   2.2*  1.8*  2.9   --   1.3*   --   2.7    < > = values in this interval not displayed.     Recent Labs   Lab Test  07/26/17   1417  06/30/17   1453  06/28/17   0537  06/27/17   0537  06/26/17   1001   WBC  6.5  7.6  6.1  9.6  6.1   HGB  15.4  13.2*  13.2*  14.5  14.7   PLT  160  96*  54*  93*  150   MCV  86  84  86  85  85   NEUTROPHIL  44.2  27.1  72.2  67.6  43.0     Recent Labs   Lab Test  07/26/17   1417  06/30/17   1453  06/28/17   0537  06/27/17   0537   BILITOTAL  0.4   --   2.0*  1.4*   ALKPHOS  61   --   33*  40   ALT  32   --   43  29   AST  16   --   27  15   ALBUMIN  3.8   --   2.5*  2.8*   LDH  156  175  166  129       IMPRESSION/PLAN:   Metastatic renal cell carcinoma.    Mr. Baron John was referred to [facility] for high-dose IL-2.  Cycle 1 was 04/17 and received 8 doses.  Cycle 2 was 05/03 and received 4 doses.  Cycle 3 was 05/31 and received 5 doses.  Cycle 4 was on 6/26 and received 5 doses.     He has completed his therapy with high dose IL2 with 4 admissions. He had scans scheduled prior to this visit which he missed and is rescheduled after this visit.     His scans have remained stable. I reviewed options of surveillance without additional treatment vs immunotherapy with nivolumab. Since he has stable disease it would acceptable to follow him without intervention and give him a treatment break.     Alternately we could try to consolidate on the gains of current immunotherapy with nivolumab. Ideally I would have loved to have a clinical trial with nivolumab + high dose IL2 but have not been able to get this going as yet. Sequential therapy would be the next  best.     I would transition his care back to [facility]. I would be happy to speak his oncologist. I had offered speaking to Dr. Rodriguez but he notes that Dr. Rodriguez has moved to [facility] and is no more at [facility].     Over 25 min of direct face to face time spent with patient with more than 50% time spent in counseling and coordinating care.        Again, thank you for allowing me to participate in the care of your patient.      Sincerely,    Ludwin Painting MD

## 2017-07-26 NOTE — MR AVS SNAPSHOT
"              After Visit Summary   2017    Julio John    MRN: [id number]           Patient Information     Date Of Birth          1970        Visit Information        Provider Department      2017 2:00 PM Adalgisa Marinelli; Ludwin Painting MD [facility]        Today's Diagnoses     Malignant neoplasm of left kidney (H)        Clear cell renal cell carcinoma, unspecified laterality (H)        Metastatic renal cell carcinoma, left (H)           Follow-ups after your visit        Who to contact     If you have questions or need follow up information about today's clinic visit or your schedule please contact [facility] directly at [phone].  Normal or non-critical lab and imaging results will be communicated to you by MyChart, letter or phone within 4 business days after the clinic has received the results. If you do not hear from us within 7 days, please contact the clinic through MyChart or phone. If you have a critical or abnormal lab result, we will notify you by phone as soon as possible.  Submit refill requests through [email or web address] or call your pharmacy and they will forward the refill request to us. Please allow 3 business days for your refill to be completed.          Additional Information About Your Visit        MyChart Information     [email or web address] lets you send messages to your doctor, view your test results, renew your prescriptions, schedule appointments and more. To sign up, go to [email or web address] . Click on \"Log in\" on the left side of the screen, which will take you to the Welcome page. Then click on \"Sign up Now\" on the right side of the page.     You will be asked to enter the access code listed below, as well as some personal information. Please follow the directions to create your username and password.     Your access code is: [email or web address]  Expires: 10/7/2017 11:12 PM     Your access code will  in 90 days. If you need " help or a new code, please call your [facility] or [phone].        Care EveryWhere ID     This is your Care EveryWhere ID. This could be used by other organizations to access your [facility] medical records  [id number]        Your Vitals Were     Pulse Temperature Respirations Pulse Oximetry BMI (Body Mass Index)       80 99  F (37.2  C) 16 95% 38.41 kg/m2        Blood Pressure from Last 3 Encounters:   07/26/17 116/80   06/30/17 (!) 146/92   06/28/17 112/58    Weight from Last 3 Encounters:   07/26/17 101.6 kg (223 lb 14.4 oz)   06/30/17 102.6 kg (226 lb 4.8 oz)   06/28/17 104.2 kg (229 lb 12.8 oz)              We Performed the Following     CBC with platelets and differential     Comprehensive metabolic panel (BMP + Alb, Alk Phos, ALT, AST, Total. Bili, TP)     Lactate Dehydrogenase     Magnesium        Primary Care Provider Office Phone # Fax #    Uchemadu MD Doc [phone]       [facility] [address] [place] [address]        Equal Access to Services     MACIE MCGEE AH: Hadii aad ku hadasho Soomaali, waaxda luqadaha, qaybta kaalmada adeegyada, erin castilloin hayinesn jerome razo. So [facility] [phone].    ATENCIÓN: Si habla español, tiene a martinez disposición servicios gratuitos de asistencia lingüística. Neptaliame al [phone].    We comply with applicable federal civil rights laws and [place] laws. We do not discriminate on the basis of race, color, national origin, age, disability sex, sexual orientation or gender identity.            Thank you!     Thank you for choosing [facility]  for your care. Our goal is always to provide you with excellent care. Hearing back from our patients is one way we can continue to improve our services. Please take a few minutes to complete the written survey that you may receive in the mail after your visit with us. Thank you!             Your Updated Medication List - Protect others around you: Learn how  to safely use, store and throw away your medicines at www.disposemymeds.org.          This list is accurate as of: 7/26/17 11:59 PM.  Always use your most recent med list.                   Brand Name Dispense Instructions for use Diagnosis    acetaminophen 500 MG tablet    TYLENOL    1 Bottle    Take 1 tablet (500 mg) by mouth every 6 hours as needed for mild pain    Malignant neoplasm of left kidney (H)       cetirizine 10 MG tablet    zyrTEC    30 tablet    Take 1 tablet (10 mg) by mouth daily    Clear cell carcinoma (H)       diphenhydrAMINE 50 MG capsule    BENADRYL    56 capsule    Take 1 capsule (50 mg) by mouth every 6 hours as needed for itching    Clear cell carcinoma (H)       eucerin cream      Apply topically 2 times daily Reported on 5/12/2017    Malignant neoplasm of left kidney (H)       hydrOXYzine 50 MG tablet    ATARAX    30 tablet    Take 1 tablet (50 mg) by mouth every 4 hours as needed for itching or anxiety    Clear cell carcinoma (H)       ondansetron 4 MG tablet    ZOFRAN    18 tablet    Take 2 tablets (8 mg) by mouth every 8 hours as needed for nausea    Malignant neoplasm of left kidney (H)       oxyCODONE 5 MG IR tablet    ROXICODONE    10 tablet    Take 1 tablet (5 mg) by mouth every 4 hours as needed for moderate to severe pain    Malignant neoplasm of left kidney (H)       prochlorperazine 10 MG tablet    COMPAZINE    30 tablet    Take 1 tablet (10 mg) by mouth every 6 hours as needed (Breakthrough Nausea/Vomiting)    Malignant neoplasm of left kidney (H)       ranitidine 150 MG tablet    ZANTAC    60 tablet    Take 1 tablet (150 mg) by mouth 2 times daily    Clear cell carcinoma (H)

## 2017-07-26 NOTE — NURSING NOTE
Chief Complaint   Patient presents with     Labs Only     venipuncture, vitlas checked     PIV placed for labs and use in imaging

## 2017-07-30 NOTE — PROGRESS NOTES
Oncology/Hematology Visit Note  Jul 26, 2017    DIAGNOSIS:  Metastatic renal cell carcinoma.  For full history of present illness and prior treatment, please see Ludwin Painting MD, dictations.  The patient came to the [facility] for high-dose IL-2.  Cycle 1 was 04/17 and received 8 doses.  Cycle 2 was 05/03 and received 4 doses.  Cycle 3 was 05/31 and received 5 doses.  Cycle 4 was on 6/26 and received 5 doses. Here for follow up today.     INTERVAL HISTORY:  Mr. Baron John comes to the clinic today for followup of his metastatic renal cell carcinoma.      He is accompanied by his family and an .  He has recovered from his IL2 therapy for the most part. He does not have any significant complains. He missed his CT scan appointment prior to this visit and has it scheduled after this visit.     MEDICATIONS:   Recent Labs   Lab Test  07/26/17   1417  06/30/17   1453  06/28/17   0537  06/27/17   0537  06/26/17   0931   NA  137  137  143  138  141   POTASSIUM  3.8  4.0  4.2  4.0  4.0   CHLORIDE  105  109  116*  110*  109   CO2  24  20  18*  22  26   ANIONGAP  8  8  9  7  6   BUN  16  14  18  15  15   CR  1.04  1.09  1.69*  1.33*  1.14   GLC  95  96  110*  130*  99   KVNG  8.6  7.8*  7.0*  7.5*  8.5     Recent Labs   Lab Test  07/26/17   1417  06/30/17   1453  06/28/17   1536  06/28/17   0537  06/27/17   0537   06/02/17   0634   06/01/17   0928   MAG  2.1  2.1  3.1*  1.5*  1.7   < >  2.2   < >  1.5*   PHOS   --    --   2.2*  1.8*  2.9   --   1.3*   --   2.7    < > = values in this interval not displayed.     Recent Labs   Lab Test  07/26/17   1417  06/30/17   1453  06/28/17   0537  06/27/17   0537  06/26/17   1001   WBC  6.5  7.6  6.1  9.6  6.1   HGB  15.4  13.2*  13.2*  14.5  14.7   PLT  160  96*  54*  93*  150   MCV  86  84  86  85  85   NEUTROPHIL  44.2  27.1  72.2  67.6  43.0     Recent Labs   Lab Test  07/26/17   1417  06/30/17   1453  06/28/17   0537  06/27/17   0537   BILITOTAL  0.4   --    2.0*  1.4*   ALKPHOS  61   --   33*  40   ALT  32   --   43  29   AST  16   --   27  15   ALBUMIN  3.8   --   2.5*  2.8*   LDH  156  175  166  129     No results found for: TSH  No results for input(s): CEA in the last 91972 hours.  Results for orders placed or performed during the hospital encounter of 06/26/17   IR PICC Vascular    Impression    IMPRESSION: Fluoroscopic assistance was provided to the vascular  access nursing service for documentation of the tip position of a  peripherally inserted central venous catheter. The tip is in the  superior atriocaval junction.    RENUKA GALVAN MD         ALLERGIES:  No Known Allergies    PHYSICAL EXAMINATION:  Vitals: /80  Pulse 80  Temp 99  F (37.2  C)  Resp 16  Wt 101.6 kg (223 lb 14.4 oz)  SpO2 95%  BMI 38.41 kg/m2  GENERAL:  A pleasant person in no acute distress.   HEENT:  PERRL. EOMI. Sclerae are nonicteric.  Mouth is without mucositis or thrush.   NECK:  Supple.   LYMPH NODES:  No peripheral lymphadenopathy noted in the supraclavicular or cervical regions.   LUNGS:  Clear to auscultation bilaterally.   HEART:  Regular rate and rhythm.   ABDOMEN:  Bowel sounds are active.  Soft and nontender.  No hepatosplenomegaly or other masses appreciated.  LOWER EXTREMITIES:  Without pitting edema to the knees bilaterally.   NEUROLOGICAL:  Alert/orientated/able to answer all questions. CN II-XII grossly intact.     LAB:     Recent Labs   Lab Test  07/26/17   1417  06/30/17   1453  06/28/17   0537  06/27/17   0537  06/26/17   0931   NA  137  137  143  138  141   POTASSIUM  3.8  4.0  4.2  4.0  4.0   CHLORIDE  105  109  116*  110*  109   CO2  24  20  18*  22  26   ANIONGAP  8  8  9  7  6   BUN  16  14  18  15  15   CR  1.04  1.09  1.69*  1.33*  1.14   GLC  95  96  110*  130*  99   KVNG  8.6  7.8*  7.0*  7.5*  8.5     Recent Labs   Lab Test  07/26/17   1417  06/30/17   1453  06/28/17   1536  06/28/17   0537  06/27/17   0537   06/02/17   0634   06/01/17   0928   MAG   2.1  2.1  3.1*  1.5*  1.7   < >  2.2   < >  1.5*   PHOS   --    --   2.2*  1.8*  2.9   --   1.3*   --   2.7    < > = values in this interval not displayed.     Recent Labs   Lab Test  07/26/17   1417  06/30/17   1453  06/28/17   0537  06/27/17   0537  06/26/17   1001   WBC  6.5  7.6  6.1  9.6  6.1   HGB  15.4  13.2*  13.2*  14.5  14.7   PLT  160  96*  54*  93*  150   MCV  86  84  86  85  85   NEUTROPHIL  44.2  27.1  72.2  67.6  43.0     Recent Labs   Lab Test  07/26/17   1417  06/30/17   1453  06/28/17   0537  06/27/17   0537   BILITOTAL  0.4   --   2.0*  1.4*   ALKPHOS  61   --   33*  40   ALT  32   --   43  29   AST  16   --   27  15   ALBUMIN  3.8   --   2.5*  2.8*   LDH  156  175  166  129       IMPRESSION/PLAN:   Metastatic renal cell carcinoma.    Mr. Baron John was referred to [facility] for high-dose IL-2.  Cycle 1 was 04/17 and received 8 doses.  Cycle 2 was 05/03 and received 4 doses.  Cycle 3 was 05/31 and received 5 doses.  Cycle 4 was on 6/26 and received 5 doses.     He has completed his therapy with high dose IL2 with 4 admissions. He had scans scheduled prior to this visit which he missed and is rescheduled after this visit.     His scans have remained stable. I reviewed options of surveillance without additional treatment vs immunotherapy with nivolumab. Since he has stable disease it would acceptable to follow him without intervention and give him a treatment break.     Alternately we could try to consolidate on the gains of current immunotherapy with nivolumab. Ideally I would have loved to have a clinical trial with nivolumab + high dose IL2 but have not been able to get this going as yet. Sequential therapy would be the next best.     I would transition his care back to [facility]. I would be happy to speak his oncologist. I had offered speaking to Dr. Rodriguez but he notes that Dr. Rodriguez has moved to [facility] and is no more at [facility].     Over 25 min of direct face to face time  spent with patient with more than 50% time spent in counseling and coordinating care.

## 2017-12-25 ENCOUNTER — HOSPITAL ENCOUNTER (EMERGENCY)
Facility: CLINIC | Age: 47
Discharge: HOME | End: 2017-12-25
Attending: EMERGENCY MEDICINE | Admitting: EMERGENCY MEDICINE
Payer: MEDICAID

## 2017-12-25 ENCOUNTER — NURSE TRIAGE (OUTPATIENT)
Dept: NURSING | Facility: CLINIC | Age: 47
End: 2017-12-25

## 2017-12-25 VITALS
OXYGEN SATURATION: 99 % | SYSTOLIC BLOOD PRESSURE: 131 MMHG | HEART RATE: 93 BPM | DIASTOLIC BLOOD PRESSURE: 90 MMHG | RESPIRATION RATE: 18 BRPM | TEMPERATURE: 98.3 F

## 2017-12-25 DIAGNOSIS — K04.7 DENTAL INFECTION: ICD-10-CM

## 2017-12-25 PROCEDURE — 99284 EMERGENCY DEPT VISIT MOD MDM: CPT | Mod: 25 | Performed by: EMERGENCY MEDICINE

## 2017-12-25 PROCEDURE — 64400 NJX AA&/STRD TRIGEMINAL NRV: CPT | Mod: Z6 | Performed by: EMERGENCY MEDICINE

## 2017-12-25 PROCEDURE — 64400 NJX AA&/STRD TRIGEMINAL NRV: CPT | Performed by: EMERGENCY MEDICINE

## 2017-12-25 PROCEDURE — 25000132 ZZH RX MED GY IP 250 OP 250 PS 637: Performed by: EMERGENCY MEDICINE

## 2017-12-25 RX ORDER — PENICILLIN V POTASSIUM 500 MG/1
500 TABLET, FILM COATED ORAL 3 TIMES DAILY
Qty: 21 TABLET | Refills: 0 | Status: SHIPPED | OUTPATIENT
Start: 2017-12-25 | End: 2018-01-01

## 2017-12-25 RX ORDER — BUPIVACAINE HYDROCHLORIDE 5 MG/ML
INJECTION, SOLUTION EPIDURAL; INTRACAUDAL
Status: DISCONTINUED
Start: 2017-12-25 | End: 2017-12-25 | Stop reason: HOSPADM

## 2017-12-25 RX ORDER — OXYCODONE HYDROCHLORIDE 5 MG/1
5 TABLET ORAL EVERY 6 HOURS PRN
Qty: 10 TABLET | Refills: 0 | Status: SHIPPED | OUTPATIENT
Start: 2017-12-25 | End: 2020-11-29

## 2017-12-25 RX ORDER — PENICILLIN V POTASSIUM 500 MG/1
500 TABLET, FILM COATED ORAL ONCE
Status: COMPLETED | OUTPATIENT
Start: 2017-12-25 | End: 2017-12-25

## 2017-12-25 RX ADMIN — PENICILLIN V POTASSIUM 500 MG: 500 TABLET, FILM COATED ORAL at 19:47

## 2017-12-25 NOTE — ED AVS SNAPSHOT
[facility] [place], Emergency Department    [address] [place] [id number]    Phone:  [phone]                                       Julio John   MRN: [id number]    Department:  [facility], Emergency Department   Date of Visit:  12/25/2017           After Visit Summary Signature Page     I have received my discharge instructions, and my questions have been answered. I have discussed any challenges I see with this plan with the nurse or doctor.    ..........................................................................................................................................  Patient/Patient Representative Signature      ..........................................................................................................................................  Patient Representative Print Name and Relationship to Patient    ..................................................               ................................................  Date                                            Time    ..........................................................................................................................................  Reviewed by Signature/Title    ...................................................              ..............................................  Date                                                            Time

## 2017-12-25 NOTE — ED AVS SNAPSHOT
[facility], [place], Emergency Department    [address] [address]    Phone:  [phone]                                       Julio John   MRN: [id number]    Department:  [facility], [place], Emergency Department   Date of Visit:  12/25/2017           Patient Information     Date Of Birth          1970        Your diagnoses for this visit were:     Dental infection        You were seen by Judit Charlton MD.        Discharge Instructions       Orajel or Anbesol to affected area. (You may obtain this from any pharmacy)  Tylenol or Ibuprofen for pain.  Use prescription medication as directed.  Follow up with your Dentist or a dental clinic listed below.    Many of these clinics offer a sliding fee option for patients that qualify, and see patients on a walk-in or same day basis. Please call each clinic directly. As services, hours, fees and policies vary greatly.    [place]:  [facility]     [phone]  [facility]) [phone]  [facility]  [phone]  [facility]      [phone]   [facility]    [phone]  [facility]  [phone]  [facility] [phone]  [facility]     [phone]  [facility]   [phone]  [facility] (cash only)   [phone]  [facility]    [phone] (adults)          [phone] (children)    [place]:  [facility]     [phone]; [phone]  [facility]     [phone]  [facility]     [phone]  [facility] (free, limited)    [phone]    Multiple Locations:  [facility]       [phone]        24 Hour Appointment Hotline       To make an appointment at any [facility], call [phone] If you  don't have a family doctor or clinic, we will help you find one. [place] clinics are conveniently located to serve the needs of you and your family.             Review of your medicines      START taking        Dose / Directions Last dose taken    penicillin V potassium 500 MG tablet   Commonly known as:  VEETID   Dose:  500 mg   Quantity:  21 tablet        Take 1 tablet (500 mg) by mouth 3 times daily for 7 days   Refills:  0          CONTINUE these medicines which may have CHANGED, or have new prescriptions. If we are uncertain of the size of tablets/capsules you have at home, strength may be listed as something that might have changed.        Dose / Directions Last dose taken    oxyCODONE IR 5 MG tablet   Commonly known as:  ROXICODONE   Dose:  5 mg   What changed:    - when to take this  - reasons to take this   Quantity:  10 tablet        Take 1 tablet (5 mg) by mouth every 6 hours as needed for pain   Refills:  0          Our records show that you are taking the medicines listed below. If these are incorrect, please call your family doctor or clinic.        Dose / Directions Last dose taken    acetaminophen 500 MG tablet   Commonly known as:  TYLENOL   Dose:  500 mg   Quantity:  1 Bottle        Take 1 tablet (500 mg) by mouth every 6 hours as needed for mild pain   Refills:  1        cetirizine 10 MG tablet   Commonly known as:  zyrTEC   Dose:  10 mg   Quantity:  30 tablet        Take 1 tablet (10 mg) by mouth daily   Refills:  0        diphenhydrAMINE 50 MG capsule   Commonly known as:  BENADRYL   Dose:  50 mg   Quantity:  56 capsule        Take 1 capsule (50 mg) by mouth every 6 hours as needed for itching   Refills:  0        eucerin cream        Apply topically 2 times daily Reported on 5/12/2017   Refills:  0        hydrOXYzine 50 MG tablet   Commonly known as:  ATARAX   Dose:  50 mg   Quantity:  30 tablet        Take 1 tablet (50 mg) by mouth every 4 hours as needed for itching or anxiety   Refills:  1         ondansetron 4 MG tablet   Commonly known as:  ZOFRAN   Dose:  8 mg   Quantity:  18 tablet        Take 2 tablets (8 mg) by mouth every 8 hours as needed for nausea   Refills:  0        prochlorperazine 10 MG tablet   Commonly known as:  COMPAZINE   Dose:  10 mg   Quantity:  30 tablet        Take 1 tablet (10 mg) by mouth every 6 hours as needed (Breakthrough Nausea/Vomiting)   Refills:  0        ranitidine 150 MG tablet   Commonly known as:  ZANTAC   Dose:  150 mg   Quantity:  60 tablet        Take 1 tablet (150 mg) by mouth 2 times daily   Refills:  0                Prescriptions were sent or printed at these locations (2 Prescriptions)                   Other Prescriptions                Printed at Department/Unit printer (2 of 2)         oxyCODONE IR (ROXICODONE) 5 MG tablet               penicillin V potassium (VEETID) 500 MG tablet                Orders Needing Specimen Collection     None      Pending Results     No orders found from 12/23/2017 to 12/26/2017.            Pending Culture Results     No orders found from 12/23/2017 to 12/26/2017.            Pending Results Instructions     If you had any lab results that were not finalized at the time of your Discharge, you can call the ED Lab Result RN at [phone]. You will be contacted by this team for any positive Lab results or changes in treatment. The nurses are available 7 days a week from 10A to 6:30P.  You can leave a message 24 hours per day and they will return your call.        Thank you for choosing [facility]       Thank you for choosing [facility] for your care. Our goal is always to provide you with excellent care. Hearing back from our patients is one way we can continue to improve our services. Please take a few minutes to complete the written survey that you may receive in the mail after you visit with us. Thank you!        [facility] Information     [employer] lets you send messages to your doctor, view your test results, renew your  "prescriptions, schedule appointments and more. To sign up, go to [email or web address] . Click on \"Log in\" on the left side of the screen, which will take you to the Welcome page. Then click on \"Sign up Now\" on the right side of the page.     You will be asked to enter the access code listed below, as well as some personal information. Please follow the directions to create your username and password.     Your access code is: [email or web address]  Expires: 3/25/2018  8:32 PM     Your access code will  in 90 days. If you need help or a new code, please call your [place] clinic or [phone].        Care EveryWhere ID     This is your Care EveryWhere ID. This could be used by other organizations to access your [facility] medical records  [id number]        Equal Access to Services     MACIE MCGEE : Irlanda Huffman, natalie william, camille rizvi, erin bass . So [facility] [phone].    ATENCIÓN: Si habla español, tiene a martinez disposición servicios gratuitos de asistencia lingüística. Llame al [phone].    We comply with applicable federal civil rights laws and [place] laws. We do not discriminate on the basis of race, color, national origin, age, disability, sex, sexual orientation, or gender identity.            After Visit Summary       This is your record. Keep this with you and show to your community pharmacist(s) and doctor(s) at your next visit.                  "

## 2017-12-25 NOTE — TELEPHONE ENCOUNTER
Reason for Disposition    Sounds like a serious injury to the triager    Protocols used: TOOTH INJURY-ADULT-AH

## 2017-12-26 ENCOUNTER — HOSPITAL ENCOUNTER (EMERGENCY)
Facility: CLINIC | Age: 47
Discharge: LEFT BEFORE DISCHARGE | End: 2017-12-26
Payer: MEDICAID

## 2017-12-26 VITALS
RESPIRATION RATE: 22 BRPM | SYSTOLIC BLOOD PRESSURE: 138 MMHG | DIASTOLIC BLOOD PRESSURE: 100 MMHG | OXYGEN SATURATION: 100 % | TEMPERATURE: 97.5 F | HEART RATE: 78 BPM

## 2017-12-26 NOTE — ED NOTES
"Patient arrives complaining of SOB, \"racing heart\" and \"feeling really bad\" after taking PCN and oxycodone this morning which was prescribed to him yesterday for dental pain. Alert and oriented, ABCs intact.  "

## 2017-12-26 NOTE — DISCHARGE INSTRUCTIONS
Orajel or Anbesol to affected area. (You may obtain this from any pharmacy)  Tylenol or Ibuprofen for pain.  Use prescription medication as directed.  Follow up with your Dentist or a dental clinic listed below.    Many of these clinics offer a sliding fee option for patients that qualify, and see patients on a walk-in or same day basis. Please call each clinic directly. As services, hours, fees and policies vary greatly.    [place]:  [facility]     [phone]  [facility]) [phone]  [facility]  [phone]  [facility]      [phone]   [facility] Clinic    [phone]  [facility]  [phone]  [facility] [phone]  [facility]     [phone]  [facility]   [phone]  [facility] (cash only)   [phone]  [facility]    [phone] (adults)          [phone] (children)    [place]:  [facility]     [phone]; [phone]  [facility]     [phone]  [facility]     [phone]  [facility] (free, limited)    [phone]    Multiple Locations:  [facility]       [phone]

## 2017-12-26 NOTE — ED PROVIDER NOTES
History     Chief Complaint   Patient presents with     Dental Pain     HPI  Julio John is a 47 year old male with a past medical history of CVA (2010), Afib cardiovert (2012), and metastatic renal cell cancer to the lungs s/p L nephrectomy & radiotherapy to L lung. The patient presents to the Emergency Department today for evaluation of dental pain. The patient states that he started having pain in a left lower molar last night around 11 PM. He states that it has progressed throughout the day today and decided to come in when his pain continued to get worse. He says that he has also started to experience left sided facial and head itchiness. He notes that his pain is much worse when his mouth is closed. He has taken Tylenol for his pain at 11 AM, 2 PM, and 5 PM today but says that he is worried to continue to take Tylenol due to concern for issues with his kidney. The patient is scheduled to start a new cycle of chemotherapy this Friday, in 4 days.  He has not received any chemotherapy in the past 3 months-he has been on a chemo break.     I have reviewed the Medications, Allergies, Past Medical and Surgical History, and Social History in the cisimple system.    Past Medical History:   Diagnosis Date     Metastatic renal cell carcinoma (H)        Past Surgical History:   Procedure Laterality Date     PICC INSERTION Left 05/31/2017    5fr DL BioFlo PICC, 45cm (3cm external) in the L medial brachial vein w/ tip in the SVC RA junction.       No family history on file.    Social History   Substance Use Topics     Smoking status: Never Smoker     Smokeless tobacco: Never Used     Alcohol use No       Current Facility-Administered Medications   Medication     bupivacaine (PF) (MARCAINE) 0.5 % injection     Current Outpatient Prescriptions   Medication     oxyCODONE IR (ROXICODONE) 5 MG tablet     penicillin V potassium (VEETID) 500 MG tablet     acetaminophen (TYLENOL) 500 MG tablet     hydrOXYzine (ATARAX) 50 MG  tablet     cetirizine (ZYRTEC) 10 MG tablet     diphenhydrAMINE (BENADRYL) 50 MG capsule     ranitidine (ZANTAC) 150 MG tablet     Skin Protectants, Misc. (EUCERIN) cream     prochlorperazine (COMPAZINE) 10 MG tablet     ondansetron (ZOFRAN) 4 MG tablet      No Known Allergies     Review of Systems   HENT: Positive for dental problem (Pain in left lower molar).    All other systems reviewed and are negative.      Physical Exam   BP: (!) 141/101  Heart Rate: 93  Temp: 98.4  F (36.9  C)  Resp: 16  SpO2: 96 %      Physical Exam   HENT:   Tooth #20 tender to touch; no abscess; no trismus; other left lower molars have been extracted    Physical Exam   Constitutional: oriented to person, place, and time. appears well-developed and well-nourished.   HENT:   Head: Normocephalic and atraumatic.   Neck: Normal range of motion.   Pulmonary/Chest: Effort normal. No respiratory distress.   Neurological: alert and oriented to person, place, and time.   Skin: Skin is warm and dry.   Psychiatric:  normal mood and affect.  behavior is normal. Thought content normal.       ED Course   7:28 PM  The patient was seen and examined by Dr. Charlton in Room 08.    ED Course     Procedures             Critical Care time:  none             Labs Ordered and Resulted from Time of ED Arrival Up to the Time of Departure from the ED - No data to display  No results found for this or any previous visit (from the past 24 hour(s)).          Assessments & Plan (with Medical Decision Making)   Patient presented to the ER due to dental pain in tooth #20.  Patient had a dental block performed with 0.5% bupivacaine and 2 mL's of medication injected.  Patient's pain is improved.  Patient will be discharged home with penicillin and with some oxycodone.  Due to him not currently begin chemotherapy am not worried about him being immunosuppressed.  Patient given information for dental follow-up.  Patient agrees to follow-up tomorrow for further care.    I have  reviewed the nursing notes.    I have reviewed the findings, diagnosis, plan and need for follow up with the patient.    New Prescriptions    No medications on file       Final diagnoses:   Dental infection   IChano, am serving as a trained medical scribe to document services personally performed by Judit Charlton MD, based on the provider's statements to me.   Judit WILDER MD, was physically present and have reviewed and verified the accuracy of this note documented by [facility].     12/25/2017   [facility], [place], EMERGENCY DEPARTMENT     Judit Charlton MD  12/25/17 2021

## 2017-12-26 NOTE — ED NOTES
Pt comes in with dental pain that began last night, worsening throughout the day. Pt worried he has an infection--currently in the middle of treatment for kidney/lung ca. Alert and oriented, vss.

## 2019-02-04 NOTE — PLAN OF CARE
Problem: Chemotherapy Effects (Adult)  Goal: Signs and Symptoms of Listed Potential Problems Will be Absent or Manageable (Chemotherapy Effects)  Signs and symptoms of listed potential problems will be absent or manageable by discharge/transition of care (reference Chemotherapy Effects (Adult) CPG).   Outcome: No Change     Tmax 101.7, PA notified, naproxen given x1. Temp trending down, 100.7. Mildly tachycardic, AOVSS. Received dose #3 IL-2 at 1000, delayed 1hr as AM labs were not available until then. Oxycodone given w/ IL-2 per pt request, c/o shoulder and rib pain. Chills/rigors about 1.5hrs after dose, relieved by kesha hugger and demerol x1. IV compazine given w/ demerol to try to prevent nausea. Still had significant dry heaves, relieved by 1mg IV ativan x1. Not eating much, still w/ some fluid intake. Mag replaced for 1.5, recheck 2.6. Pt reports 1 formed BM this AM. Has 200mL U/O towards next dose IL-2, due at 1800. Continue to monitor and w/ POC.        ADD BABY B DELIVERY DATE & TIME...

## 2020-11-28 ENCOUNTER — APPOINTMENT (OUTPATIENT)
Dept: CT IMAGING | Facility: CLINIC | Age: 50
End: 2020-11-28
Attending: PHYSICIAN ASSISTANT
Payer: MEDICAID

## 2020-11-28 ENCOUNTER — HOSPITAL ENCOUNTER (EMERGENCY)
Facility: CLINIC | Age: 50
Discharge: HOME | End: 2020-11-29
Attending: PHYSICIAN ASSISTANT | Admitting: PHYSICIAN ASSISTANT
Payer: MEDICAID

## 2020-11-28 DIAGNOSIS — R51.9 HEADACHE: ICD-10-CM

## 2020-11-28 DIAGNOSIS — C64.9 METASTATIC RENAL CELL CARCINOMA (H): ICD-10-CM

## 2020-11-28 DIAGNOSIS — R10.9 RIGHT FLANK PAIN: ICD-10-CM

## 2020-11-28 LAB
ALBUMIN UR-MCNC: 50 MG/DL
APPEARANCE UR: CLEAR
BACTERIA #/AREA URNS HPF: ABNORMAL /HPF
BASOPHILS # BLD AUTO: 0 10E9/L (ref 0–0.2)
BASOPHILS NFR BLD AUTO: 0.5 %
BILIRUB UR QL STRIP: NEGATIVE
COLOR UR AUTO: ABNORMAL
CREAT BLD-MCNC: 1.4 MG/DL (ref 0.66–1.25)
DIFFERENTIAL METHOD BLD: ABNORMAL
EOSINOPHIL # BLD AUTO: 0.8 10E9/L (ref 0–0.7)
EOSINOPHIL NFR BLD AUTO: 10.1 %
ERYTHROCYTE [DISTWIDTH] IN BLOOD BY AUTOMATED COUNT: 13.4 % (ref 10–15)
GFR SERPL CREATININE-BSD FRML MDRD: 54 ML/MIN/{1.73_M2}
GLUCOSE UR STRIP-MCNC: NEGATIVE MG/DL
HCT VFR BLD AUTO: 44.6 % (ref 40–53)
HGB BLD-MCNC: 14.8 G/DL (ref 13.3–17.7)
HGB UR QL STRIP: ABNORMAL
IMM GRANULOCYTES # BLD: 0 10E9/L (ref 0–0.4)
IMM GRANULOCYTES NFR BLD: 0.5 %
KETONES UR STRIP-MCNC: NEGATIVE MG/DL
LEUKOCYTE ESTERASE UR QL STRIP: NEGATIVE
LYMPHOCYTES # BLD AUTO: 1.8 10E9/L (ref 0.8–5.3)
LYMPHOCYTES NFR BLD AUTO: 23.4 %
MCH RBC QN AUTO: 28.9 PG (ref 26.5–33)
MCHC RBC AUTO-ENTMCNC: 33.2 G/DL (ref 31.5–36.5)
MCV RBC AUTO: 87 FL (ref 78–100)
MONOCYTES # BLD AUTO: 0.5 10E9/L (ref 0–1.3)
MONOCYTES NFR BLD AUTO: 6.3 %
MUCOUS THREADS #/AREA URNS LPF: PRESENT /LPF
NEUTROPHILS # BLD AUTO: 4.6 10E9/L (ref 1.6–8.3)
NEUTROPHILS NFR BLD AUTO: 59.2 %
NITRATE UR QL: NEGATIVE
NRBC # BLD AUTO: 0 10*3/UL
NRBC BLD AUTO-RTO: 0 /100
PH UR STRIP: 5.5 PH (ref 5–7)
PLATELET # BLD AUTO: 225 10E9/L (ref 150–450)
RBC # BLD AUTO: 5.12 10E12/L (ref 4.4–5.9)
RBC #/AREA URNS AUTO: <1 /HPF (ref 0–2)
SOURCE: ABNORMAL
SP GR UR STRIP: 1.01 (ref 1–1.03)
UROBILINOGEN UR STRIP-MCNC: NORMAL MG/DL (ref 0–2)
WBC # BLD AUTO: 7.8 10E9/L (ref 4–11)
WBC #/AREA URNS AUTO: <1 /HPF (ref 0–5)

## 2020-11-28 PROCEDURE — 85025 COMPLETE CBC W/AUTO DIFF WBC: CPT | Performed by: PHYSICIAN ASSISTANT

## 2020-11-28 PROCEDURE — 83690 ASSAY OF LIPASE: CPT | Performed by: PHYSICIAN ASSISTANT

## 2020-11-28 PROCEDURE — 96361 HYDRATE IV INFUSION ADD-ON: CPT

## 2020-11-28 PROCEDURE — 250N000009 HC RX 250: Performed by: PHYSICIAN ASSISTANT

## 2020-11-28 PROCEDURE — 250N000011 HC RX IP 250 OP 636: Performed by: PHYSICIAN ASSISTANT

## 2020-11-28 PROCEDURE — 93005 ELECTROCARDIOGRAM TRACING: CPT

## 2020-11-28 PROCEDURE — 70450 CT HEAD/BRAIN W/O DYE: CPT

## 2020-11-28 PROCEDURE — 82565 ASSAY OF CREATININE: CPT | Mod: 59

## 2020-11-28 PROCEDURE — 84484 ASSAY OF TROPONIN QUANT: CPT | Performed by: PHYSICIAN ASSISTANT

## 2020-11-28 PROCEDURE — U0003 INFECTIOUS AGENT DETECTION BY NUCLEIC ACID (DNA OR RNA); SEVERE ACUTE RESPIRATORY SYNDROME CORONAVIRUS 2 (SARS-COV-2) (CORONAVIRUS DISEASE [COVID-19]), AMPLIFIED PROBE TECHNIQUE, MAKING USE OF HIGH THROUGHPUT TECHNOLOGIES AS DESCRIBED BY CMS-2020-01-R: HCPCS | Performed by: PHYSICIAN ASSISTANT

## 2020-11-28 PROCEDURE — 80053 COMPREHEN METABOLIC PANEL: CPT | Performed by: PHYSICIAN ASSISTANT

## 2020-11-28 PROCEDURE — 74177 CT ABD & PELVIS W/CONTRAST: CPT

## 2020-11-28 PROCEDURE — 87804 INFLUENZA ASSAY W/OPTIC: CPT | Performed by: PHYSICIAN ASSISTANT

## 2020-11-28 PROCEDURE — C9803 HOPD COVID-19 SPEC COLLECT: HCPCS

## 2020-11-28 PROCEDURE — 99285 EMERGENCY DEPT VISIT HI MDM: CPT | Mod: 25

## 2020-11-28 PROCEDURE — 250N000013 HC RX MED GY IP 250 OP 250 PS 637: Performed by: PHYSICIAN ASSISTANT

## 2020-11-28 PROCEDURE — 96374 THER/PROPH/DIAG INJ IV PUSH: CPT

## 2020-11-28 PROCEDURE — 81001 URINALYSIS AUTO W/SCOPE: CPT | Performed by: PHYSICIAN ASSISTANT

## 2020-11-28 PROCEDURE — 258N000003 HC RX IP 258 OP 636: Performed by: PHYSICIAN ASSISTANT

## 2020-11-28 PROCEDURE — 84443 ASSAY THYROID STIM HORMONE: CPT | Performed by: PHYSICIAN ASSISTANT

## 2020-11-28 RX ORDER — IOPAMIDOL 755 MG/ML
90 INJECTION, SOLUTION INTRAVASCULAR ONCE
Status: COMPLETED | OUTPATIENT
Start: 2020-11-28 | End: 2020-11-29

## 2020-11-28 RX ORDER — ONDANSETRON 2 MG/ML
4 INJECTION INTRAMUSCULAR; INTRAVENOUS EVERY 30 MIN PRN
Status: DISCONTINUED | OUTPATIENT
Start: 2020-11-28 | End: 2020-11-29 | Stop reason: HOSPADM

## 2020-11-28 RX ORDER — ACETAMINOPHEN 325 MG/1
650 TABLET ORAL ONCE
Status: COMPLETED | OUTPATIENT
Start: 2020-11-28 | End: 2020-11-28

## 2020-11-28 RX ADMIN — ACETAMINOPHEN 650 MG: 325 TABLET, FILM COATED ORAL at 23:28

## 2020-11-28 RX ADMIN — SODIUM CHLORIDE 1000 ML: 9 INJECTION, SOLUTION INTRAVENOUS at 23:27

## 2020-11-28 RX ADMIN — ONDANSETRON 4 MG: 2 INJECTION INTRAMUSCULAR; INTRAVENOUS at 23:27

## 2020-11-28 RX ADMIN — SODIUM CHLORIDE 90 ML: 9 INJECTION, SOLUTION INTRAVENOUS at 23:58

## 2020-11-28 NOTE — ED AVS SNAPSHOT
[facility] Emergency Dept  [address]  [facility] [address]  Phone: [phone]  Fax: [phone]                                    Julio John   MRN: [id number]    Department: [facility] Emergency Dept   Date of Visit: 11/28/2020           After Visit Summary Signature Page    I have received my discharge instructions, and my questions have been answered. I have discussed any challenges I see with this plan with the nurse or doctor.    ..........................................................................................................................................  Patient/Patient Representative Signature      ..........................................................................................................................................  Patient Representative Print Name and Relationship to Patient    ..................................................               ................................................  Date                                   Time    ..........................................................................................................................................  Reviewed by Signature/Title    ...................................................              ..............................................  Date                                               Time          22EPIC Rev 08/18

## 2020-11-29 VITALS
TEMPERATURE: 98.4 F | HEART RATE: 87 BPM | DIASTOLIC BLOOD PRESSURE: 94 MMHG | OXYGEN SATURATION: 94 % | RESPIRATION RATE: 16 BRPM | SYSTOLIC BLOOD PRESSURE: 125 MMHG

## 2020-11-29 LAB
ALBUMIN SERPL-MCNC: 3.3 G/DL (ref 3.4–5)
ALP SERPL-CCNC: 54 U/L (ref 40–150)
ALT SERPL W P-5'-P-CCNC: 32 U/L (ref 0–70)
ANION GAP SERPL CALCULATED.3IONS-SCNC: 2 MMOL/L (ref 3–14)
AST SERPL W P-5'-P-CCNC: 23 U/L (ref 0–45)
BILIRUB SERPL-MCNC: 0.3 MG/DL (ref 0.2–1.3)
BUN SERPL-MCNC: 19 MG/DL (ref 7–30)
CALCIUM SERPL-MCNC: 8.7 MG/DL (ref 8.5–10.1)
CHLORIDE SERPL-SCNC: 106 MMOL/L (ref 94–109)
CO2 SERPL-SCNC: 29 MMOL/L (ref 20–32)
CREAT SERPL-MCNC: 1.33 MG/DL (ref 0.66–1.25)
FLUAV+FLUBV AG SPEC QL: NEGATIVE
FLUAV+FLUBV AG SPEC QL: NEGATIVE
GFR SERPL CREATININE-BSD FRML MDRD: 62 ML/MIN/{1.73_M2}
GLUCOSE SERPL-MCNC: 117 MG/DL (ref 70–99)
INTERPRETATION ECG - MUSE: NORMAL
LIPASE SERPL-CCNC: 173 U/L (ref 73–393)
POTASSIUM SERPL-SCNC: 3.9 MMOL/L (ref 3.4–5.3)
PROT SERPL-MCNC: 7.1 G/DL (ref 6.8–8.8)
SARS-COV-2 RNA SPEC QL NAA+PROBE: NOT DETECTED
SODIUM SERPL-SCNC: 137 MMOL/L (ref 133–144)
SPECIMEN SOURCE: NORMAL
SPECIMEN SOURCE: NORMAL
TROPONIN I SERPL-MCNC: <0.015 UG/L (ref 0–0.04)
TSH SERPL DL<=0.005 MIU/L-ACNC: 2.96 MU/L (ref 0.4–4)

## 2020-11-29 PROCEDURE — 250N000011 HC RX IP 250 OP 636: Performed by: PHYSICIAN ASSISTANT

## 2020-11-29 RX ORDER — OXYCODONE HYDROCHLORIDE 5 MG/1
5 TABLET ORAL EVERY 6 HOURS PRN
Qty: 10 TABLET | Refills: 0 | Status: SHIPPED | OUTPATIENT
Start: 2020-11-29 | End: 2022-01-01

## 2020-11-29 RX ADMIN — IOPAMIDOL 72 ML: 755 INJECTION, SOLUTION INTRAVENOUS at 00:00

## 2020-11-29 NOTE — DISCHARGE INSTRUCTIONS
Your diagnosis is: worsening metastatic disease. Right flank pain.   You plan going forward is: Please follow up with the oncology clinic as scheduled on 12/4. Although, please call the clinic on Monday to ask if they would like to see you earlier. They should follow up with you regarding the CT results of the head and abdomen/pelvis and get a plan regarding those findings. MRI may be considered for further characterization of the CT head findings.   Things to come back to the emergency department are: fevers, vomiting, uncontrolled pain or any other concerning symptoms.   It was a pleasure caring for you, Julio. I hope you feel better soon! Please return if you have any ongoing concerns.

## 2020-11-29 NOTE — ED TRIAGE NOTES
Pt states headache, palpitations, and cough for one day. Also notes palpitations and intermittent lightheadedness. States hx of renal cancer and lung cancer. ABCs intact GCS 15

## 2020-11-29 NOTE — ED PROVIDER NOTES
History     Chief Complaint:  Flu Symptoms       HPI   Julio John is a 50 year old male who presents for evaluation of right-sided flank pain, chest pain, and palpitations.  Patient states that he had a upper respiratory illness for approximately 1 month and was tested for Covid several times with negative results.  He states that now those symptoms are improving although yesterday developed right-sided flank pain that wrapped around into his abdomen that has been persistent, prompting his evaluation.  He also notes that he has had a mild frontal headache and some intermittent dizziness.  He also notes that he had some mild chest pressure today associated with palpitations.  He denies any chest pain or palpitations currently and notes only a mild headache currently.  He denies any dizziness at the present moment.  He denies any associated dysuria or hematuria.  He denies any associated nausea vomiting, or diarrhea.  He denies any Covid exposure.  He does note that he does have a history of metastatic renal cell carcinoma for which he takes oral chemotherapy which he has not taken for 1 week secondary to his oncologist instructions.  He did however just finished a Z-Mario given his cough a couple of days ago.    Allergies:  No Known Allergies     Medications:    Atarax   Zantac     Past Medical History:    Metastatic renal cell carcinoma   CKD  Hydrocele  Anxiety   CVA  Afib  Hypertension   Alcohol abuse   Cocaine abuse    Past Surgical History:    PICC insertion   Nephrectomy   Inguinal hernia repair   GI colon with snare    Family History:    Arthritis     Social History:  Smoking Status: Never Smoker  Smokeless Tobacco: Never Used  Alcohol Use: No  Drug Use: No  PCP: Tao Roach     Review of Systems   Cardiovascular: Positive for palpitations.   Genitourinary: Positive for flank pain.   Neurological: Positive for dizziness and headaches.   All other systems reviewed and are negative.    Physical  Exam     Patient Vitals for the past 24 hrs:   BP Temp Temp src Pulse Resp SpO2   11/28/20 2215 (!) 167/115 98.4  F (36.9  C) Oral 109 20 93 %        Physical Exam  General:          Resting comfortably.  Alert and oriented.   Head:              The scalp, face, and head appear normal   Eyes:               Conjunctivae and sclerae are normal               ENT:                The oropharynx is normal                          Uvula is in the midline               Neck:              No lymphadenopathy  CV:                  Regular rate and rhythm                           Normal S1/S2                          Peripheral pulses intact in all 4 extremities.  No peripheral edema.  Resp:              Lungs are clear to auscultation                          Non-labored                          No rales or wheezing   GI:                   There is tenderness to the right lateral abdomen.  There is no rebound or guarding.  No additional tenderness to the abdomen.  There is mild right-sided flank tenderness.                            Normal bowel sounds   MS:                  Normal muscular tone   Skin:               No rash or acute skin lesions noted   Neuro: Speech is normal and fluent.     Emergency Department Course   ECG:  ECG taken at 2230, ECG read at 2238  Normal sinus rhythm  Nonspecific T wave abnormality   Abnormal ECG  Rate 99 bpm. DC interval 180 ms. QRS duration 90 ms. QT/QTc 352/451 ms. P-R-T axes 68 22 23.     Imaging:  Radiology findings were communicated with the patient who voiced understanding of the findings.    Head CT w/o contrast   Final Result   IMPRESSION:   Subtle rounded focal hyperdensity measuring 2 mm involving the left inferior frontal lobe cortex. No associated vasogenic edema is noted. No mass effect. This finding could reflect a small intracranial metastasis, prominent cortical vessel or artifact.    Recommend MRI brain without and with contrast for further assessment.      CT Chest (PE)  Abdomen Pelvis w Contrast   Final Result   IMPRESSION:   1.  Interval development of lesions within the right kidney, right adrenal and new pulmonary nodules/lesions consistent with progressive metastatic disease.   2.  Nonspecific sclerosis manubrium of sternum and pelvis. Bone scan follow-up could be obtained to exclude osseous metastases.          Laboratory:  Laboratory findings were communicated with the patient who voiced understanding of the findings.    Labs Ordered and Resulted from Time of ED Arrival Up to the Time of Departure from the ED   CBC WITH PLATELETS DIFFERENTIAL - Abnormal; Notable for the following components:       Result Value    Absolute Eosinophils 0.8 (*)     All other components within normal limits   COMPREHENSIVE METABOLIC PANEL - Abnormal; Notable for the following components:    Anion Gap 2 (*)     Glucose 117 (*)     Creatinine 1.33 (*)     Albumin 3.3 (*)     All other components within normal limits   ROUTINE UA WITH MICROSCOPIC - Abnormal; Notable for the following components:    Blood Urine Small (*)     Protein Albumin Urine 50 (*)     Bacteria Urine Few (*)     Mucous Urine Present (*)     All other components within normal limits   CREATININE POCT - Abnormal; Notable for the following components:    Creatinine 1.4 (*)     GFR Estimate 54 (*)     All other components within normal limits   LIPASE   TROPONIN I   TSH WITH FREE T4 REFLEX   ISTAT CREATININE NURSING POCT   PERIPHERAL IV CATHETER   INFLUENZA A/B ANTIGEN      Interventions:  Medications   ondansetron (ZOFRAN) injection 4 mg (4 mg Intravenous Given 11/28/20 2327)   0.9% sodium chloride BOLUS (1,000 mLs Intravenous New Bag 11/28/20 2327)   acetaminophen (TYLENOL) tablet 650 mg (650 mg Oral Given 11/28/20 2328)   CT scan flush (90 mLs Intravenous Given 11/28/20 2358)   iopamidol (ISOVUE-370) solution 90 mL (72 mLs Intravenous Given 11/29/20 0000)        Emergency Department Course:  Past medical records, nursing notes,  and vitals reviewed.    2236 I performed an exam of the patient as documented above.    IV was inserted and blood was drawn for laboratory testing, results above.     The patient was sent for imaging while in the emergency department, results above.       Patient rechecked and updated.       Findings and plan explained to the Patient. Patient discharged home with instructions regarding supportive care, medications, and reasons to return. The importance of close follow-up was reviewed.        Impression & Plan   Covid-19  Julio John was evaluated during a global COVID-19 pandemic, which necessitated consideration that the patient might be at risk for infection with the SARS-CoV-2 virus that causes COVID-19.   Applicable protocols for evaluation were followed during the patient's care.   COVID-19 was considered as part of the patient's evaluation. The plan for testing is:  a test was obtained during this visit.     Medical Decision Making:  Julio John is a 50 year old male who presents to the emergency department today with the symptoms noted above.  He essentially has had a URI illness for the last month, which is now improved.  He has had several negative Covid swabs and recently stopped a prescription for azithromycin secondary to his ongoing URI illness.  He is a cancer patient and does have metastatic renal cell carcinoma and is on oral chemotherapy, which she has not taken for the last week secondary to his oncologist instructions.  He states that today he had right-sided flank/abdominal pain and some chest pain and palpitations, prompting his evaluation.  On exam, he does have some mild tenderness to the right abdomen.  Vital signs are reassuring.  He is afebrile.  Lab work here is reassuring.  EKG demonstrates normal sinus rhythm without signs of ischemia or arrhythmia.  Troponin is normal.  UA is normal without evidence of infection.  TSH is normal.  CT of the chest abdomen pelvis  demonstrate findings consistent with worsening metastatic disease without signs of PE.  The patient is also complaining of a headache and dizziness.  Head CT was obtained and demonstrates a nonspecific area to the left inferior frontal lobe which is artifact versus prominent cortical vessel versus small intracranial metastases.  There is no surrounding edema or mass-effect.  Given these findings, I did consult with the on call doctor of [facility] who thought based on patient pain being managed here and having a follow up appointment scheduled in the near future, she thinks he is reasonable for discharge home. She recommended the patient call the clinic tomorrow to see if the scheduled appointment is appropriate or whether he should be seen sooner. She does not think further imaging or laboratory evaluation is indicated at this time. I agree with this.  This was all communicated with the patient and he expresses understanding. Red flag symptoms, and reasons for return were discussed and understood. All questions were answered prior to discharge. The patient understands and agrees to this plan.        Discharge Diagnosis:    ICD-10-CM    1. Metastatic renal cell carcinoma (H)  C64.9 Symptomatic COVID-19 Virus (Coronavirus) by PCR   2. Right flank pain  R10.9    3. Headache  R51.9        Disposition:  The patient is discharged to home.    Discharge Medications:  Discharge Medication List as of 11/29/2020  3:25 AM          Scribe Disclosure:  I, Lexa Houser, am serving as a scribe at 10:36 PM on 11/28/2020 to document services personally performed by Nisha Bah PA based on my observations and the provider's statements to me.      11/28/2020   Nisha Bah PA Huber, Catherine Rae, PA-C  12/05/20 1131

## 2020-12-05 ASSESSMENT — ENCOUNTER SYMPTOMS
HEADACHES: 1
DIZZINESS: 1
FLANK PAIN: 1
PALPITATIONS: 1

## 2022-01-01 ENCOUNTER — PATIENT OUTREACH (OUTPATIENT)
Dept: ONCOLOGY | Facility: CLINIC | Age: 52
End: 2022-01-01

## 2022-01-01 ENCOUNTER — HOSPITAL ENCOUNTER (INPATIENT)
Facility: CLINIC | Age: 52
LOS: 1 days | Discharge: HOME | End: 2022-09-01
Attending: RADIOLOGY | Admitting: INTERNAL MEDICINE
Payer: MEDICAID

## 2022-01-01 ENCOUNTER — APPOINTMENT (OUTPATIENT)
Dept: CT IMAGING | Facility: CLINIC | Age: 52
End: 2022-01-01
Attending: EMERGENCY MEDICINE
Payer: MEDICAID

## 2022-01-01 ENCOUNTER — OFFICE VISIT (OUTPATIENT)
Dept: RADIATION ONCOLOGY | Facility: CLINIC | Age: 52
End: 2022-01-01
Attending: RADIOLOGY
Payer: MEDICAID

## 2022-01-01 ENCOUNTER — PRE VISIT (OUTPATIENT)
Dept: ONCOLOGY | Facility: CLINIC | Age: 52
End: 2022-01-01

## 2022-01-01 ENCOUNTER — APPOINTMENT (OUTPATIENT)
Dept: GENERAL RADIOLOGY | Facility: CLINIC | Age: 52
End: 2022-01-01
Attending: EMERGENCY MEDICINE
Payer: MEDICAID

## 2022-01-01 ENCOUNTER — APPOINTMENT (OUTPATIENT)
Dept: ULTRASOUND IMAGING | Facility: CLINIC | Age: 52
End: 2022-01-01
Attending: STUDENT IN AN ORGANIZED HEALTH CARE EDUCATION/TRAINING PROGRAM
Payer: MEDICAID

## 2022-01-01 ENCOUNTER — OFFICE VISIT (OUTPATIENT)
Dept: NEUROSURGERY | Facility: CLINIC | Age: 52
End: 2022-01-01
Payer: MEDICAID

## 2022-01-01 ENCOUNTER — ONCOLOGY VISIT (OUTPATIENT)
Dept: RADIATION ONCOLOGY | Facility: CLINIC | Age: 52
End: 2022-01-01

## 2022-01-01 ENCOUNTER — APPOINTMENT (OUTPATIENT)
Dept: CARDIOLOGY | Facility: CLINIC | Age: 52
End: 2022-01-01
Attending: EMERGENCY MEDICINE
Payer: MEDICAID

## 2022-01-01 ENCOUNTER — OFFICE VISIT (OUTPATIENT)
Dept: RADIATION ONCOLOGY | Facility: CLINIC | Age: 52
End: 2022-01-01
Attending: NEUROLOGICAL SURGERY
Payer: MEDICAID

## 2022-01-01 ENCOUNTER — TELEPHONE (OUTPATIENT)
Dept: RADIATION ONCOLOGY | Facility: CLINIC | Age: 52
End: 2022-01-01

## 2022-01-01 ENCOUNTER — TELEPHONE (OUTPATIENT)
Dept: NEUROSURGERY | Facility: CLINIC | Age: 52
End: 2022-01-01

## 2022-01-01 ENCOUNTER — APPOINTMENT (OUTPATIENT)
Dept: GENERAL RADIOLOGY | Facility: CLINIC | Age: 52
End: 2022-01-01
Attending: RADIOLOGY
Payer: MEDICAID

## 2022-01-01 ENCOUNTER — TELEPHONE (OUTPATIENT)
Dept: ONCOLOGY | Facility: CLINIC | Age: 52
End: 2022-01-01

## 2022-01-01 ENCOUNTER — HOSPITAL ENCOUNTER (OUTPATIENT)
Facility: CLINIC | Age: 52
Setting detail: OBSERVATION
Discharge: HOME | End: 2022-10-26
Attending: EMERGENCY MEDICINE | Admitting: STUDENT IN AN ORGANIZED HEALTH CARE EDUCATION/TRAINING PROGRAM
Payer: MEDICAID

## 2022-01-01 ENCOUNTER — PRE VISIT (OUTPATIENT)
Dept: NEUROSURGERY | Facility: CLINIC | Age: 52
End: 2022-01-01

## 2022-01-01 ENCOUNTER — APPOINTMENT (OUTPATIENT)
Dept: ULTRASOUND IMAGING | Facility: CLINIC | Age: 52
End: 2022-01-01
Payer: MEDICAID

## 2022-01-01 ENCOUNTER — OFFICE VISIT (OUTPATIENT)
Dept: FAMILY MEDICINE | Facility: CLINIC | Age: 52
End: 2022-01-01
Payer: MEDICAID

## 2022-01-01 ENCOUNTER — ONCOLOGY VISIT (OUTPATIENT)
Dept: ONCOLOGY | Facility: CLINIC | Age: 52
End: 2022-01-01
Attending: INTERNAL MEDICINE
Payer: MEDICAID

## 2022-01-01 ENCOUNTER — HOSPITAL ENCOUNTER (OUTPATIENT)
Dept: MRI IMAGING | Facility: CLINIC | Age: 52
Discharge: HOME | End: 2022-08-31
Attending: RADIOLOGY
Payer: MEDICAID

## 2022-01-01 ENCOUNTER — HOSPITAL ENCOUNTER (EMERGENCY)
Facility: CLINIC | Age: 52
Discharge: HOME | End: 2022-09-12
Attending: EMERGENCY MEDICINE
Payer: MEDICAID

## 2022-01-01 ENCOUNTER — HOSPITAL ENCOUNTER (OUTPATIENT)
Dept: MRI IMAGING | Facility: CLINIC | Age: 52
Discharge: HOME | End: 2022-12-30
Attending: RADIOLOGY | Admitting: RADIOLOGY
Payer: MEDICAID

## 2022-01-01 ENCOUNTER — HOSPITAL ENCOUNTER (INPATIENT)
Facility: CLINIC | Age: 52
LOS: 3 days | Discharge: HOME | End: 2022-09-28
Attending: EMERGENCY MEDICINE | Admitting: INTERNAL MEDICINE
Payer: MEDICAID

## 2022-01-01 ENCOUNTER — PRE VISIT (OUTPATIENT)
Dept: RADIATION ONCOLOGY | Facility: CLINIC | Age: 52
End: 2022-01-01

## 2022-01-01 ENCOUNTER — APPOINTMENT (OUTPATIENT)
Dept: MRI IMAGING | Facility: CLINIC | Age: 52
End: 2022-01-01
Attending: EMERGENCY MEDICINE
Payer: MEDICAID

## 2022-01-01 VITALS
RESPIRATION RATE: 20 BRPM | SYSTOLIC BLOOD PRESSURE: 120 MMHG | HEART RATE: 94 BPM | WEIGHT: 200 LBS | DIASTOLIC BLOOD PRESSURE: 71 MMHG | BODY MASS INDEX: 34.15 KG/M2 | OXYGEN SATURATION: 97 % | HEIGHT: 64 IN

## 2022-01-01 VITALS
DIASTOLIC BLOOD PRESSURE: 73 MMHG | HEART RATE: 89 BPM | SYSTOLIC BLOOD PRESSURE: 115 MMHG | RESPIRATION RATE: 16 BRPM | TEMPERATURE: 98.9 F | OXYGEN SATURATION: 96 % | HEIGHT: 64 IN | WEIGHT: 192.6 LBS | BODY MASS INDEX: 32.88 KG/M2

## 2022-01-01 VITALS
HEIGHT: 64 IN | HEART RATE: 82 BPM | BODY MASS INDEX: 33.72 KG/M2 | RESPIRATION RATE: 18 BRPM | TEMPERATURE: 97.1 F | WEIGHT: 197.53 LBS | DIASTOLIC BLOOD PRESSURE: 68 MMHG | OXYGEN SATURATION: 98 % | SYSTOLIC BLOOD PRESSURE: 113 MMHG

## 2022-01-01 VITALS
RESPIRATION RATE: 18 BRPM | WEIGHT: 202.6 LBS | HEART RATE: 77 BPM | DIASTOLIC BLOOD PRESSURE: 86 MMHG | OXYGEN SATURATION: 96 % | BODY MASS INDEX: 34.59 KG/M2 | TEMPERATURE: 98.6 F | HEIGHT: 64 IN | SYSTOLIC BLOOD PRESSURE: 117 MMHG

## 2022-01-01 VITALS
HEART RATE: 93 BPM | WEIGHT: 193 LBS | DIASTOLIC BLOOD PRESSURE: 89 MMHG | RESPIRATION RATE: 18 BRPM | BODY MASS INDEX: 32.95 KG/M2 | HEIGHT: 64 IN | OXYGEN SATURATION: 95 % | TEMPERATURE: 98.1 F | SYSTOLIC BLOOD PRESSURE: 131 MMHG

## 2022-01-01 VITALS
OXYGEN SATURATION: 100 % | SYSTOLIC BLOOD PRESSURE: 135 MMHG | BODY MASS INDEX: 33.66 KG/M2 | WEIGHT: 199.6 LBS | DIASTOLIC BLOOD PRESSURE: 84 MMHG | TEMPERATURE: 97.5 F | HEART RATE: 95 BPM

## 2022-01-01 VITALS
SYSTOLIC BLOOD PRESSURE: 135 MMHG | OXYGEN SATURATION: 97 % | BODY MASS INDEX: 34.84 KG/M2 | DIASTOLIC BLOOD PRESSURE: 94 MMHG | HEART RATE: 77 BPM | WEIGHT: 203 LBS | RESPIRATION RATE: 16 BRPM

## 2022-01-01 VITALS
DIASTOLIC BLOOD PRESSURE: 89 MMHG | RESPIRATION RATE: 16 BRPM | HEART RATE: 94 BPM | OXYGEN SATURATION: 97 % | SYSTOLIC BLOOD PRESSURE: 135 MMHG

## 2022-01-01 VITALS
SYSTOLIC BLOOD PRESSURE: 137 MMHG | WEIGHT: 192.5 LBS | TEMPERATURE: 96.4 F | BODY MASS INDEX: 32.07 KG/M2 | RESPIRATION RATE: 18 BRPM | OXYGEN SATURATION: 96 % | HEART RATE: 99 BPM | DIASTOLIC BLOOD PRESSURE: 92 MMHG | HEIGHT: 65 IN

## 2022-01-01 VITALS
BODY MASS INDEX: 34.62 KG/M2 | HEART RATE: 82 BPM | WEIGHT: 201.7 LBS | DIASTOLIC BLOOD PRESSURE: 91 MMHG | SYSTOLIC BLOOD PRESSURE: 141 MMHG

## 2022-01-01 DIAGNOSIS — C79.31 BRAIN METASTASIS: Primary | ICD-10-CM

## 2022-01-01 DIAGNOSIS — B37.0 THRUSH: ICD-10-CM

## 2022-01-01 DIAGNOSIS — C64.2 MALIGNANT NEOPLASM OF LEFT KIDNEY (H): ICD-10-CM

## 2022-01-01 DIAGNOSIS — C78.01 MALIGNANT NEOPLASM METASTATIC TO BOTH LUNGS (H): ICD-10-CM

## 2022-01-01 DIAGNOSIS — K12.1 MOUTH ULCERS: ICD-10-CM

## 2022-01-01 DIAGNOSIS — C78.02 MALIGNANT NEOPLASM METASTATIC TO BOTH LUNGS (H): ICD-10-CM

## 2022-01-01 DIAGNOSIS — R05.9 COUGH: ICD-10-CM

## 2022-01-01 DIAGNOSIS — I10 ESSENTIAL HYPERTENSION: Primary | ICD-10-CM

## 2022-01-01 DIAGNOSIS — K21.9 GASTROESOPHAGEAL REFLUX DISEASE WITHOUT ESOPHAGITIS: ICD-10-CM

## 2022-01-01 DIAGNOSIS — L27.1 HAND FOOT SYNDROME: Primary | ICD-10-CM

## 2022-01-01 DIAGNOSIS — C79.31 METASTASIS TO BRAIN (H): ICD-10-CM

## 2022-01-01 DIAGNOSIS — C64.9 METASTATIC RENAL CELL CARCINOMA (H): ICD-10-CM

## 2022-01-01 DIAGNOSIS — C64.2 RENAL CELL CARCINOMA OF LEFT KIDNEY METASTATIC TO OTHER SITE (H): ICD-10-CM

## 2022-01-01 DIAGNOSIS — C78.02 MALIGNANT NEOPLASM METASTATIC TO BOTH LUNGS (H): Primary | ICD-10-CM

## 2022-01-01 DIAGNOSIS — D63.8 ANEMIA OF CHRONIC ILLNESS: ICD-10-CM

## 2022-01-01 DIAGNOSIS — C79.31 BRAIN METASTASIS: ICD-10-CM

## 2022-01-01 DIAGNOSIS — C64.2 METASTATIC RENAL CELL CARCINOMA, LEFT (H): Primary | ICD-10-CM

## 2022-01-01 DIAGNOSIS — C79.31 METASTASIS TO BRAIN (H): Primary | ICD-10-CM

## 2022-01-01 DIAGNOSIS — L08.0 PUSTULAR RASH: ICD-10-CM

## 2022-01-01 DIAGNOSIS — Z11.52 ENCOUNTER FOR SCREENING LABORATORY TESTING FOR SEVERE ACUTE RESPIRATORY SYNDROME CORONAVIRUS 2 (SARS-COV-2): ICD-10-CM

## 2022-01-01 DIAGNOSIS — Z23 NEED FOR VACCINATION: ICD-10-CM

## 2022-01-01 DIAGNOSIS — I26.94 MULTIPLE SUBSEGMENTAL PULMONARY EMBOLI WITHOUT ACUTE COR PULMONALE (H): ICD-10-CM

## 2022-01-01 DIAGNOSIS — R00.2 PALPITATIONS: ICD-10-CM

## 2022-01-01 DIAGNOSIS — R06.02 SOB (SHORTNESS OF BREATH): Primary | ICD-10-CM

## 2022-01-01 DIAGNOSIS — C78.01 MALIGNANT NEOPLASM METASTATIC TO BOTH LUNGS (H): Primary | ICD-10-CM

## 2022-01-01 DIAGNOSIS — R07.9 CHEST PAIN, UNSPECIFIED TYPE: ICD-10-CM

## 2022-01-01 DIAGNOSIS — C79.31 SECONDARY MALIGNANT NEOPLASM OF BRAIN (H): Primary | ICD-10-CM

## 2022-01-01 DIAGNOSIS — C64.9 METASTATIC RENAL CELL CARCINOMA, UNSPECIFIED LATERALITY (H): ICD-10-CM

## 2022-01-01 DIAGNOSIS — R06.02 SHORTNESS OF BREATH: ICD-10-CM

## 2022-01-01 DIAGNOSIS — I26.99 MULTIPLE PULMONARY EMBOLI (H): ICD-10-CM

## 2022-01-01 DIAGNOSIS — C78.01: ICD-10-CM

## 2022-01-01 DIAGNOSIS — R07.89 OTHER CHEST PAIN: ICD-10-CM

## 2022-01-01 DIAGNOSIS — N17.9 ACUTE KIDNEY INJURY (H): ICD-10-CM

## 2022-01-01 DIAGNOSIS — C64.2 METASTATIC RENAL CELL CARCINOMA, LEFT (H): ICD-10-CM

## 2022-01-01 LAB
ALBUMIN SERPL BCG-MCNC: 3.2 G/DL (ref 3.5–5.2)
ALBUMIN SERPL BCG-MCNC: 3.3 G/DL (ref 3.5–5.2)
ALBUMIN SERPL BCG-MCNC: 3.4 G/DL (ref 3.5–5.2)
ALBUMIN SERPL BCG-MCNC: 3.6 G/DL (ref 3.5–5.2)
ALBUMIN UR-MCNC: 100 MG/DL
ALBUMIN UR-MCNC: 20 MG/DL
ALBUMIN UR-MCNC: 50 MG/DL
ALP SERPL-CCNC: 41 U/L (ref 40–129)
ALP SERPL-CCNC: 42 U/L (ref 40–129)
ALP SERPL-CCNC: 45 U/L (ref 40–129)
ALP SERPL-CCNC: 46 U/L (ref 40–129)
ALP SERPL-CCNC: 50 U/L (ref 40–129)
ALP SERPL-CCNC: 53 U/L (ref 40–129)
ALP SERPL-CCNC: 53 U/L (ref 40–129)
ALP SERPL-CCNC: 68 U/L (ref 40–129)
ALT SERPL W P-5'-P-CCNC: 17 U/L (ref 10–50)
ALT SERPL W P-5'-P-CCNC: 22 U/L (ref 10–50)
ALT SERPL W P-5'-P-CCNC: 23 U/L (ref 10–50)
ALT SERPL W P-5'-P-CCNC: 27 U/L (ref 10–50)
ALT SERPL W P-5'-P-CCNC: 28 U/L (ref 10–50)
ALT SERPL W P-5'-P-CCNC: 28 U/L (ref 10–50)
ALT SERPL W P-5'-P-CCNC: 31 U/L (ref 10–50)
ALT SERPL W P-5'-P-CCNC: 35 U/L (ref 10–50)
ANION GAP SERPL CALCULATED.3IONS-SCNC: 10 MMOL/L (ref 7–15)
ANION GAP SERPL CALCULATED.3IONS-SCNC: 10 MMOL/L (ref 7–15)
ANION GAP SERPL CALCULATED.3IONS-SCNC: 11 MMOL/L (ref 7–15)
ANION GAP SERPL CALCULATED.3IONS-SCNC: 6 MMOL/L (ref 7–15)
ANION GAP SERPL CALCULATED.3IONS-SCNC: 6 MMOL/L (ref 7–15)
ANION GAP SERPL CALCULATED.3IONS-SCNC: 7 MMOL/L (ref 7–15)
ANION GAP SERPL CALCULATED.3IONS-SCNC: 9 MMOL/L (ref 7–15)
ANION GAP SERPL CALCULATED.3IONS-SCNC: 9 MMOL/L (ref 7–15)
APPEARANCE UR: CLEAR
AST SERPL W P-5'-P-CCNC: 14 U/L (ref 10–50)
AST SERPL W P-5'-P-CCNC: 15 U/L (ref 10–50)
AST SERPL W P-5'-P-CCNC: 16 U/L (ref 10–50)
AST SERPL W P-5'-P-CCNC: 17 U/L (ref 10–50)
AST SERPL W P-5'-P-CCNC: 21 U/L (ref 10–50)
AST SERPL W P-5'-P-CCNC: 27 U/L (ref 10–50)
AST SERPL W P-5'-P-CCNC: 29 U/L (ref 10–50)
AST SERPL W P-5'-P-CCNC: 9 U/L (ref 10–50)
AT III ACT/NOR PPP CHRO: 103 % (ref 85–135)
ATRIAL RATE - MUSE: 124 BPM
ATRIAL RATE - MUSE: 56 BPM
ATRIAL RATE - MUSE: 69 BPM
BACTERIA #/AREA URNS HPF: ABNORMAL /HPF
BACTERIA #/AREA URNS HPF: ABNORMAL /HPF
BACTERIA BLD CULT: NO GROWTH
BACTERIA SPEC CULT: NORMAL
BASOPHILS # BLD AUTO: 0 10E3/UL (ref 0–0.2)
BASOPHILS # BLD AUTO: 0 10E3/UL (ref 0–0.2)
BASOPHILS # BLD MANUAL: 0 10E3/UL (ref 0–0.2)
BASOPHILS NFR BLD AUTO: 0 %
BASOPHILS NFR BLD AUTO: 0 %
BASOPHILS NFR BLD MANUAL: 0 %
BILIRUB SERPL-MCNC: 0.2 MG/DL
BILIRUB SERPL-MCNC: 0.3 MG/DL
BILIRUB SERPL-MCNC: 0.4 MG/DL
BILIRUB SERPL-MCNC: 0.5 MG/DL
BILIRUB UR QL STRIP: NEGATIVE
BUN SERPL-MCNC: 21.6 MG/DL (ref 6–20)
BUN SERPL-MCNC: 24.2 MG/DL (ref 6–20)
BUN SERPL-MCNC: 25.2 MG/DL (ref 6–20)
BUN SERPL-MCNC: 25.9 MG/DL (ref 6–20)
BUN SERPL-MCNC: 28.9 MG/DL (ref 6–20)
BUN SERPL-MCNC: 30.1 MG/DL (ref 6–20)
BUN SERPL-MCNC: 31 MG/DL (ref 6–20)
BUN SERPL-MCNC: 33.4 MG/DL (ref 6–20)
BUN SERPL-MCNC: 34.8 MG/DL (ref 6–20)
BUN SERPL-MCNC: 38.2 MG/DL (ref 6–20)
C DIFF TOX B STL QL: NEGATIVE
CALCIUM SERPL-MCNC: 7.9 MG/DL (ref 8.6–10)
CALCIUM SERPL-MCNC: 8 MG/DL (ref 8.6–10)
CALCIUM SERPL-MCNC: 8.1 MG/DL (ref 8.6–10)
CALCIUM SERPL-MCNC: 8.2 MG/DL (ref 8.6–10)
CALCIUM SERPL-MCNC: 8.2 MG/DL (ref 8.6–10)
CALCIUM SERPL-MCNC: 8.5 MG/DL (ref 8.6–10)
CALCIUM SERPL-MCNC: 8.7 MG/DL (ref 8.6–10)
CALCIUM SERPL-MCNC: 8.9 MG/DL (ref 8.6–10)
CALCIUM SERPL-MCNC: 9.2 MG/DL (ref 8.6–10)
CALCIUM SERPL-MCNC: 9.3 MG/DL (ref 8.6–10)
CHLORIDE SERPL-SCNC: 102 MMOL/L (ref 98–107)
CHLORIDE SERPL-SCNC: 103 MMOL/L (ref 98–107)
CHLORIDE SERPL-SCNC: 103 MMOL/L (ref 98–107)
CHLORIDE SERPL-SCNC: 104 MMOL/L (ref 98–107)
CHLORIDE SERPL-SCNC: 106 MMOL/L (ref 98–107)
CHLORIDE SERPL-SCNC: 106 MMOL/L (ref 98–107)
CHLORIDE SERPL-SCNC: 107 MMOL/L (ref 98–107)
CHLORIDE SERPL-SCNC: 107 MMOL/L (ref 98–107)
COLOR UR AUTO: ABNORMAL
COLOR UR AUTO: ABNORMAL
COLOR UR AUTO: YELLOW
CREAT BLD-MCNC: 1.5 MG/DL (ref 0.7–1.3)
CREAT SERPL-MCNC: 0.99 MG/DL (ref 0.67–1.17)
CREAT SERPL-MCNC: 1.1 MG/DL (ref 0.67–1.17)
CREAT SERPL-MCNC: 1.11 MG/DL (ref 0.67–1.17)
CREAT SERPL-MCNC: 1.16 MG/DL (ref 0.67–1.17)
CREAT SERPL-MCNC: 1.27 MG/DL (ref 0.67–1.17)
CREAT SERPL-MCNC: 1.28 MG/DL (ref 0.67–1.17)
CREAT SERPL-MCNC: 1.32 MG/DL (ref 0.67–1.17)
CREAT SERPL-MCNC: 1.33 MG/DL (ref 0.67–1.17)
CREAT SERPL-MCNC: 1.55 MG/DL (ref 0.67–1.17)
CREAT SERPL-MCNC: 1.84 MG/DL (ref 0.67–1.17)
CRP SERPL-MCNC: 15.3 MG/L
CRP SERPL-MCNC: 19.9 MG/L
D DIMER PPP FEU-MCNC: <0.27 UG/ML FEU (ref 0–0.5)
DEPRECATED HCO3 PLAS-SCNC: 21 MMOL/L (ref 22–29)
DEPRECATED HCO3 PLAS-SCNC: 21 MMOL/L (ref 22–29)
DEPRECATED HCO3 PLAS-SCNC: 22 MMOL/L (ref 22–29)
DEPRECATED HCO3 PLAS-SCNC: 22 MMOL/L (ref 22–29)
DEPRECATED HCO3 PLAS-SCNC: 23 MMOL/L (ref 22–29)
DEPRECATED HCO3 PLAS-SCNC: 24 MMOL/L (ref 22–29)
DIASTOLIC BLOOD PRESSURE - MUSE: NORMAL MMHG
EOSINOPHIL # BLD AUTO: 0 10E3/UL (ref 0–0.7)
EOSINOPHIL # BLD AUTO: 0.1 10E3/UL (ref 0–0.7)
EOSINOPHIL # BLD MANUAL: 0 10E3/UL (ref 0–0.7)
EOSINOPHIL NFR BLD AUTO: 0 %
EOSINOPHIL NFR BLD AUTO: 2 %
EOSINOPHIL NFR BLD MANUAL: 0 %
ERYTHROCYTE [DISTWIDTH] IN BLOOD BY AUTOMATED COUNT: 18.4 % (ref 10–15)
ERYTHROCYTE [DISTWIDTH] IN BLOOD BY AUTOMATED COUNT: 18.4 % (ref 10–15)
ERYTHROCYTE [DISTWIDTH] IN BLOOD BY AUTOMATED COUNT: 18.6 % (ref 10–15)
ERYTHROCYTE [DISTWIDTH] IN BLOOD BY AUTOMATED COUNT: 18.7 % (ref 10–15)
ERYTHROCYTE [DISTWIDTH] IN BLOOD BY AUTOMATED COUNT: 19 % (ref 10–15)
ERYTHROCYTE [DISTWIDTH] IN BLOOD BY AUTOMATED COUNT: 19.1 % (ref 10–15)
ERYTHROCYTE [DISTWIDTH] IN BLOOD BY AUTOMATED COUNT: 19.3 % (ref 10–15)
ERYTHROCYTE [DISTWIDTH] IN BLOOD BY AUTOMATED COUNT: 21.3 % (ref 10–15)
ERYTHROCYTE [DISTWIDTH] IN BLOOD BY AUTOMATED COUNT: 21.4 % (ref 10–15)
ERYTHROCYTE [DISTWIDTH] IN BLOOD BY AUTOMATED COUNT: 22.3 % (ref 10–15)
FLUAV RNA SPEC QL NAA+PROBE: NEGATIVE
FLUBV RNA RESP QL NAA+PROBE: NEGATIVE
GFR SERPL CREATININE-BSD FRML MDRD: 44 ML/MIN/1.73M2
GFR SERPL CREATININE-BSD FRML MDRD: 54 ML/MIN/1.73M2
GFR SERPL CREATININE-BSD FRML MDRD: 56 ML/MIN/1.73M2
GFR SERPL CREATININE-BSD FRML MDRD: 64 ML/MIN/1.73M2
GFR SERPL CREATININE-BSD FRML MDRD: 65 ML/MIN/1.73M2
GFR SERPL CREATININE-BSD FRML MDRD: 67 ML/MIN/1.73M2
GFR SERPL CREATININE-BSD FRML MDRD: 68 ML/MIN/1.73M2
GFR SERPL CREATININE-BSD FRML MDRD: 76 ML/MIN/1.73M2
GFR SERPL CREATININE-BSD FRML MDRD: 80 ML/MIN/1.73M2
GFR SERPL CREATININE-BSD FRML MDRD: 81 ML/MIN/1.73M2
GFR SERPL CREATININE-BSD FRML MDRD: >90 ML/MIN/1.73M2
GLUCOSE SERPL-MCNC: 104 MG/DL (ref 70–99)
GLUCOSE SERPL-MCNC: 115 MG/DL (ref 70–99)
GLUCOSE SERPL-MCNC: 121 MG/DL (ref 70–99)
GLUCOSE SERPL-MCNC: 121 MG/DL (ref 70–99)
GLUCOSE SERPL-MCNC: 125 MG/DL (ref 70–99)
GLUCOSE SERPL-MCNC: 138 MG/DL (ref 70–99)
GLUCOSE SERPL-MCNC: 139 MG/DL (ref 70–99)
GLUCOSE SERPL-MCNC: 158 MG/DL (ref 70–99)
GLUCOSE SERPL-MCNC: 178 MG/DL (ref 70–99)
GLUCOSE SERPL-MCNC: 93 MG/DL (ref 70–99)
GLUCOSE UR STRIP-MCNC: NEGATIVE MG/DL
GROUP A STREP BY PCR: NOT DETECTED
HCT VFR BLD AUTO: 31.9 % (ref 40–53)
HCT VFR BLD AUTO: 34 % (ref 40–53)
HCT VFR BLD AUTO: 34.2 % (ref 40–53)
HCT VFR BLD AUTO: 35.5 % (ref 40–53)
HCT VFR BLD AUTO: 36.1 % (ref 40–53)
HCT VFR BLD AUTO: 36.5 % (ref 40–53)
HCT VFR BLD AUTO: 37 % (ref 40–53)
HCT VFR BLD AUTO: 38.6 % (ref 40–53)
HCT VFR BLD AUTO: 43.5 % (ref 40–53)
HCT VFR BLD AUTO: 43.8 % (ref 40–53)
HGB BLD-MCNC: 10.6 G/DL (ref 13.3–17.7)
HGB BLD-MCNC: 11 G/DL (ref 13.3–17.7)
HGB BLD-MCNC: 11.1 G/DL (ref 13.3–17.7)
HGB BLD-MCNC: 11.7 G/DL (ref 13.3–17.7)
HGB BLD-MCNC: 11.9 G/DL (ref 13.3–17.7)
HGB BLD-MCNC: 12.1 G/DL (ref 13.3–17.7)
HGB BLD-MCNC: 12.1 G/DL (ref 13.3–17.7)
HGB BLD-MCNC: 12.6 G/DL (ref 13.3–17.7)
HGB BLD-MCNC: 13.8 G/DL (ref 13.3–17.7)
HGB BLD-MCNC: 14 G/DL (ref 13.3–17.7)
HGB UR QL STRIP: ABNORMAL
HGB UR QL STRIP: ABNORMAL
HGB UR QL STRIP: NEGATIVE
HOLD SPECIMEN: NORMAL
HOLD SPECIMEN: NORMAL
IMM GRANULOCYTES # BLD: 0 10E3/UL
IMM GRANULOCYTES # BLD: 0.2 10E3/UL
IMM GRANULOCYTES NFR BLD: 1 %
IMM GRANULOCYTES NFR BLD: 2 %
INR PPP: 0.92 (ref 0.85–1.15)
INR PPP: 0.94 (ref 0.85–1.15)
INTERPRETATION ECG - MUSE: NORMAL
KETONES UR STRIP-MCNC: NEGATIVE MG/DL
LACTATE SERPL-SCNC: 0.8 MMOL/L (ref 0.7–2)
LACTATE SERPL-SCNC: 1.1 MMOL/L (ref 0.7–2)
LEUKOCYTE ESTERASE UR QL STRIP: NEGATIVE
LYMPHOCYTES # BLD AUTO: 0.1 10E3/UL (ref 0.8–5.3)
LYMPHOCYTES # BLD AUTO: 1.2 10E3/UL (ref 0.8–5.3)
LYMPHOCYTES # BLD MANUAL: 1.7 10E3/UL (ref 0.8–5.3)
LYMPHOCYTES NFR BLD AUTO: 2 %
LYMPHOCYTES NFR BLD AUTO: 32 %
LYMPHOCYTES NFR BLD MANUAL: 21 %
MAGNESIUM SERPL-MCNC: 1.6 MG/DL (ref 1.7–2.3)
MCH RBC QN AUTO: 26.8 PG (ref 26.5–33)
MCH RBC QN AUTO: 27 PG (ref 26.5–33)
MCH RBC QN AUTO: 27 PG (ref 26.5–33)
MCH RBC QN AUTO: 27.1 PG (ref 26.5–33)
MCH RBC QN AUTO: 27.4 PG (ref 26.5–33)
MCH RBC QN AUTO: 27.4 PG (ref 26.5–33)
MCH RBC QN AUTO: 27.6 PG (ref 26.5–33)
MCH RBC QN AUTO: 28.7 PG (ref 26.5–33)
MCH RBC QN AUTO: 28.7 PG (ref 26.5–33)
MCH RBC QN AUTO: 28.9 PG (ref 26.5–33)
MCHC RBC AUTO-ENTMCNC: 31.7 G/DL (ref 31.5–36.5)
MCHC RBC AUTO-ENTMCNC: 32 G/DL (ref 31.5–36.5)
MCHC RBC AUTO-ENTMCNC: 32.2 G/DL (ref 31.5–36.5)
MCHC RBC AUTO-ENTMCNC: 32.6 G/DL (ref 31.5–36.5)
MCHC RBC AUTO-ENTMCNC: 32.6 G/DL (ref 31.5–36.5)
MCHC RBC AUTO-ENTMCNC: 32.7 G/DL (ref 31.5–36.5)
MCHC RBC AUTO-ENTMCNC: 33 G/DL (ref 31.5–36.5)
MCHC RBC AUTO-ENTMCNC: 33 G/DL (ref 31.5–36.5)
MCHC RBC AUTO-ENTMCNC: 33.2 G/DL (ref 31.5–36.5)
MCHC RBC AUTO-ENTMCNC: 33.2 G/DL (ref 31.5–36.5)
MCV RBC AUTO: 82 FL (ref 78–100)
MCV RBC AUTO: 83 FL (ref 78–100)
MCV RBC AUTO: 84 FL (ref 78–100)
MCV RBC AUTO: 84 FL (ref 78–100)
MCV RBC AUTO: 85 FL (ref 78–100)
MCV RBC AUTO: 87 FL (ref 78–100)
MCV RBC AUTO: 87 FL (ref 78–100)
MCV RBC AUTO: 88 FL (ref 78–100)
METAMYELOCYTES # BLD MANUAL: 0.2 10E3/UL
METAMYELOCYTES NFR BLD MANUAL: 3 %
MONOCYTES # BLD AUTO: 0.2 10E3/UL (ref 0–1.3)
MONOCYTES # BLD AUTO: 0.2 10E3/UL (ref 0–1.3)
MONOCYTES # BLD MANUAL: 0.7 10E3/UL (ref 0–1.3)
MONOCYTES NFR BLD AUTO: 3 %
MONOCYTES NFR BLD AUTO: 5 %
MONOCYTES NFR BLD AUTO: NEGATIVE %
MONOCYTES NFR BLD MANUAL: 9 %
MYELOCYTES # BLD MANUAL: 0.6 10E3/UL
MYELOCYTES NFR BLD MANUAL: 7 %
NEUTROPHILS # BLD AUTO: 2.2 10E3/UL (ref 1.6–8.3)
NEUTROPHILS # BLD AUTO: 7.1 10E3/UL (ref 1.6–8.3)
NEUTROPHILS # BLD MANUAL: 4.7 10E3/UL (ref 1.6–8.3)
NEUTROPHILS NFR BLD AUTO: 60 %
NEUTROPHILS NFR BLD AUTO: 93 %
NEUTROPHILS NFR BLD MANUAL: 60 %
NITRATE UR QL: NEGATIVE
NRBC # BLD AUTO: 0 10E3/UL
NRBC # BLD AUTO: 0 10E3/UL
NRBC BLD AUTO-RTO: 0 /100
NRBC BLD AUTO-RTO: 0 /100
NT-PROBNP SERPL-MCNC: 218 PG/ML (ref 0–900)
P AXIS - MUSE: 49 DEGREES
P AXIS - MUSE: 61 DEGREES
P AXIS - MUSE: 63 DEGREES
PH UR STRIP: 5.5 [PH] (ref 5–7)
PH UR STRIP: 5.5 [PH] (ref 5–7)
PH UR STRIP: 6 [PH] (ref 5–7)
PHOSPHATE SERPL-MCNC: 3.1 MG/DL (ref 2.5–4.5)
PLAT MORPH BLD: ABNORMAL
PLATELET # BLD AUTO: 104 10E3/UL (ref 150–450)
PLATELET # BLD AUTO: 106 10E3/UL (ref 150–450)
PLATELET # BLD AUTO: 118 10E3/UL (ref 150–450)
PLATELET # BLD AUTO: 126 10E3/UL (ref 150–450)
PLATELET # BLD AUTO: 126 10E3/UL (ref 150–450)
PLATELET # BLD AUTO: 127 10E3/UL (ref 150–450)
PLATELET # BLD AUTO: 128 10E3/UL (ref 150–450)
PLATELET # BLD AUTO: 136 10E3/UL (ref 150–450)
PLATELET # BLD AUTO: 143 10E3/UL (ref 150–450)
PLATELET # BLD AUTO: 74 10E3/UL (ref 150–450)
POTASSIUM SERPL-SCNC: 3.8 MMOL/L (ref 3.4–5.3)
POTASSIUM SERPL-SCNC: 3.8 MMOL/L (ref 3.4–5.3)
POTASSIUM SERPL-SCNC: 3.9 MMOL/L (ref 3.4–5.3)
POTASSIUM SERPL-SCNC: 4.1 MMOL/L (ref 3.4–5.3)
POTASSIUM SERPL-SCNC: 4.1 MMOL/L (ref 3.4–5.3)
POTASSIUM SERPL-SCNC: 4.3 MMOL/L (ref 3.4–5.3)
POTASSIUM SERPL-SCNC: 4.3 MMOL/L (ref 3.4–5.3)
POTASSIUM SERPL-SCNC: 4.6 MMOL/L (ref 3.4–5.3)
POTASSIUM SERPL-SCNC: 4.6 MMOL/L (ref 3.4–5.3)
POTASSIUM SERPL-SCNC: 4.8 MMOL/L (ref 3.4–5.3)
PR INTERVAL - MUSE: 148 MS
PR INTERVAL - MUSE: 156 MS
PR INTERVAL - MUSE: 156 MS
PROT SERPL-MCNC: 5.1 G/DL (ref 6.4–8.3)
PROT SERPL-MCNC: 5.2 G/DL (ref 6.4–8.3)
PROT SERPL-MCNC: 5.5 G/DL (ref 6.4–8.3)
PROT SERPL-MCNC: 5.7 G/DL (ref 6.4–8.3)
PROT SERPL-MCNC: 5.8 G/DL (ref 6.4–8.3)
PROT SERPL-MCNC: 5.9 G/DL (ref 6.4–8.3)
QRS DURATION - MUSE: 78 MS
QRS DURATION - MUSE: 86 MS
QRS DURATION - MUSE: 86 MS
QT - MUSE: 286 MS
QT - MUSE: 338 MS
QT - MUSE: 396 MS
QTC - MUSE: 362 MS
QTC - MUSE: 382 MS
QTC - MUSE: 410 MS
R AXIS - MUSE: 26 DEGREES
R AXIS - MUSE: 28 DEGREES
R AXIS - MUSE: 39 DEGREES
RADIOLOGIST FLAGS: ABNORMAL
RADIOLOGIST FLAGS: ABNORMAL
RBC # BLD AUTO: 3.87 10E6/UL (ref 4.4–5.9)
RBC # BLD AUTO: 4.05 10E6/UL (ref 4.4–5.9)
RBC # BLD AUTO: 4.07 10E6/UL (ref 4.4–5.9)
RBC # BLD AUTO: 4.07 10E6/UL (ref 4.4–5.9)
RBC # BLD AUTO: 4.12 10E6/UL (ref 4.4–5.9)
RBC # BLD AUTO: 4.21 10E6/UL (ref 4.4–5.9)
RBC # BLD AUTO: 4.38 10E6/UL (ref 4.4–5.9)
RBC # BLD AUTO: 4.66 10E6/UL (ref 4.4–5.9)
RBC # BLD AUTO: 5.14 10E6/UL (ref 4.4–5.9)
RBC # BLD AUTO: 5.17 10E6/UL (ref 4.4–5.9)
RBC MORPH BLD: ABNORMAL
RBC URINE: 1 /HPF
RBC URINE: 22 /HPF
RBC URINE: <1 /HPF
RSV RNA SPEC NAA+PROBE: NEGATIVE
SARS-COV-2 RNA RESP QL NAA+PROBE: NEGATIVE
SARS-COV-2 RNA RESP QL NAA+PROBE: NEGATIVE
SODIUM SERPL-SCNC: 133 MMOL/L (ref 136–145)
SODIUM SERPL-SCNC: 134 MMOL/L (ref 136–145)
SODIUM SERPL-SCNC: 135 MMOL/L (ref 136–145)
SODIUM SERPL-SCNC: 136 MMOL/L (ref 136–145)
SODIUM SERPL-SCNC: 137 MMOL/L (ref 136–145)
SODIUM SERPL-SCNC: 138 MMOL/L (ref 136–145)
SODIUM SERPL-SCNC: 138 MMOL/L (ref 136–145)
SODIUM SERPL-SCNC: 140 MMOL/L (ref 136–145)
SP GR UR STRIP: 1.02 (ref 1–1.03)
SP GR UR STRIP: 1.02 (ref 1–1.03)
SP GR UR STRIP: 1.04 (ref 1–1.03)
SQUAMOUS EPITHELIAL: <1 /HPF
SQUAMOUS EPITHELIAL: <1 /HPF
SYSTOLIC BLOOD PRESSURE - MUSE: NORMAL MMHG
T AXIS - MUSE: 19 DEGREES
T AXIS - MUSE: 2 DEGREES
T AXIS - MUSE: 23 DEGREES
TROPONIN T SERPL HS-MCNC: 10 NG/L
TROPONIN T SERPL HS-MCNC: 10 NG/L
TROPONIN T SERPL HS-MCNC: 20 NG/L
UROBILINOGEN UR STRIP-MCNC: NORMAL MG/DL
VENTRICULAR RATE- MUSE: 124 BPM
VENTRICULAR RATE- MUSE: 56 BPM
VENTRICULAR RATE- MUSE: 69 BPM
WBC # BLD AUTO: 10.7 10E3/UL (ref 4–11)
WBC # BLD AUTO: 12.8 10E3/UL (ref 4–11)
WBC # BLD AUTO: 2.7 10E3/UL (ref 4–11)
WBC # BLD AUTO: 2.9 10E3/UL (ref 4–11)
WBC # BLD AUTO: 3.7 10E3/UL (ref 4–11)
WBC # BLD AUTO: 6.9 10E3/UL (ref 4–11)
WBC # BLD AUTO: 7.4 10E3/UL (ref 4–11)
WBC # BLD AUTO: 7.6 10E3/UL (ref 4–11)
WBC # BLD AUTO: 7.9 10E3/UL (ref 4–11)
WBC # BLD AUTO: 9.2 10E3/UL (ref 4–11)
WBC URINE: 1 /HPF
WBC URINE: 4 /HPF
WBC URINE: <1 /HPF

## 2022-01-01 PROCEDURE — 93970 EXTREMITY STUDY: CPT

## 2022-01-01 PROCEDURE — 36415 COLL VENOUS BLD VENIPUNCTURE: CPT | Performed by: EMERGENCY MEDICINE

## 2022-01-01 PROCEDURE — 77373 STRTCTC BDY RAD THER TX DLVR: CPT | Performed by: RADIOLOGY

## 2022-01-01 PROCEDURE — 99233 SBSQ HOSP IP/OBS HIGH 50: CPT | Performed by: STUDENT IN AN ORGANIZED HEALTH CARE EDUCATION/TRAINING PROGRAM

## 2022-01-01 PROCEDURE — 36415 COLL VENOUS BLD VENIPUNCTURE: CPT | Performed by: PHYSICIAN ASSISTANT

## 2022-01-01 PROCEDURE — 120N000005 HC R&B MS OVERFLOW UMMC

## 2022-01-01 PROCEDURE — 250N000011 HC RX IP 250 OP 636: Performed by: EMERGENCY MEDICINE

## 2022-01-01 PROCEDURE — 93976 VASCULAR STUDY: CPT | Mod: 26 | Performed by: RADIOLOGY

## 2022-01-01 PROCEDURE — G0463 HOSPITAL OUTPT CLINIC VISIT: HCPCS | Mod: 25

## 2022-01-01 PROCEDURE — 77263 THER RADIOLOGY TX PLNG CPLX: CPT | Performed by: RADIOLOGY

## 2022-01-01 PROCEDURE — 36415 COLL VENOUS BLD VENIPUNCTURE: CPT | Performed by: STUDENT IN AN ORGANIZED HEALTH CARE EDUCATION/TRAINING PROGRAM

## 2022-01-01 PROCEDURE — 93010 ELECTROCARDIOGRAM REPORT: CPT | Performed by: EMERGENCY MEDICINE

## 2022-01-01 PROCEDURE — 250N000013 HC RX MED GY IP 250 OP 250 PS 637: Performed by: EMERGENCY MEDICINE

## 2022-01-01 PROCEDURE — G0378 HOSPITAL OBSERVATION PER HR: HCPCS

## 2022-01-01 PROCEDURE — 258N000003 HC RX IP 258 OP 636: Performed by: EMERGENCY MEDICINE

## 2022-01-01 PROCEDURE — 96376 TX/PRO/DX INJ SAME DRUG ADON: CPT

## 2022-01-01 PROCEDURE — 70553 MRI BRAIN STEM W/O & W/DYE: CPT

## 2022-01-01 PROCEDURE — 99239 HOSP IP/OBS DSCHRG MGMT >30: CPT | Performed by: INTERNAL MEDICINE

## 2022-01-01 PROCEDURE — 99285 EMERGENCY DEPT VISIT HI MDM: CPT | Mod: 25

## 2022-01-01 PROCEDURE — C9803 HOPD COVID-19 SPEC COLLECT: HCPCS

## 2022-01-01 PROCEDURE — 120N000002 HC R&B MED SURG/OB UMMC

## 2022-01-01 PROCEDURE — 99226 PR SUBSEQUENT OBSERVATION CARE,LEVEL III: CPT | Performed by: STUDENT IN AN ORGANIZED HEALTH CARE EDUCATION/TRAINING PROGRAM

## 2022-01-01 PROCEDURE — 93010 ELECTROCARDIOGRAM REPORT: CPT | Performed by: INTERNAL MEDICINE

## 2022-01-01 PROCEDURE — A9585 GADOBUTROL INJECTION: HCPCS | Performed by: EMERGENCY MEDICINE

## 2022-01-01 PROCEDURE — 99220 PR INITIAL OBSERVATION CARE,LEVEL III: CPT | Mod: FS | Performed by: INTERNAL MEDICINE

## 2022-01-01 PROCEDURE — 85379 FIBRIN DEGRADATION QUANT: CPT | Performed by: EMERGENCY MEDICINE

## 2022-01-01 PROCEDURE — 99223 1ST HOSP IP/OBS HIGH 75: CPT | Mod: AI | Performed by: INTERNAL MEDICINE

## 2022-01-01 PROCEDURE — 36415 COLL VENOUS BLD VENIPUNCTURE: CPT | Performed by: INTERNAL MEDICINE

## 2022-01-01 PROCEDURE — 87637 SARSCOV2&INF A&B&RSV AMP PRB: CPT | Performed by: EMERGENCY MEDICINE

## 2022-01-01 PROCEDURE — 99205 OFFICE O/P NEW HI 60 MIN: CPT | Performed by: NEUROLOGICAL SURGERY

## 2022-01-01 PROCEDURE — 77334 RADIATION TREATMENT AID(S): CPT | Mod: 26 | Performed by: RADIOLOGY

## 2022-01-01 PROCEDURE — 84100 ASSAY OF PHOSPHORUS: CPT | Performed by: EMERGENCY MEDICINE

## 2022-01-01 PROCEDURE — 80048 BASIC METABOLIC PNL TOTAL CA: CPT | Performed by: STUDENT IN AN ORGANIZED HEALTH CARE EDUCATION/TRAINING PROGRAM

## 2022-01-01 PROCEDURE — 82040 ASSAY OF SERUM ALBUMIN: CPT | Performed by: STUDENT IN AN ORGANIZED HEALTH CARE EDUCATION/TRAINING PROGRAM

## 2022-01-01 PROCEDURE — 250N000013 HC RX MED GY IP 250 OP 250 PS 637: Performed by: STUDENT IN AN ORGANIZED HEALTH CARE EDUCATION/TRAINING PROGRAM

## 2022-01-01 PROCEDURE — 80053 COMPREHEN METABOLIC PANEL: CPT | Performed by: STUDENT IN AN ORGANIZED HEALTH CARE EDUCATION/TRAINING PROGRAM

## 2022-01-01 PROCEDURE — 85027 COMPLETE CBC AUTOMATED: CPT

## 2022-01-01 PROCEDURE — 77334 RADIATION TREATMENT AID(S): CPT | Performed by: RADIOLOGY

## 2022-01-01 PROCEDURE — G0463 HOSPITAL OUTPT CLINIC VISIT: HCPCS

## 2022-01-01 PROCEDURE — 93005 ELECTROCARDIOGRAM TRACING: CPT

## 2022-01-01 PROCEDURE — 86308 HETEROPHILE ANTIBODY SCREEN: CPT | Performed by: EMERGENCY MEDICINE

## 2022-01-01 PROCEDURE — 99213 OFFICE O/P EST LOW 20 MIN: CPT | Performed by: RADIOLOGY

## 2022-01-01 PROCEDURE — 36415 COLL VENOUS BLD VENIPUNCTURE: CPT

## 2022-01-01 PROCEDURE — 258N000001 HC RX 258: Performed by: INTERNAL MEDICINE

## 2022-01-01 PROCEDURE — 99214 OFFICE O/P EST MOD 30 MIN: CPT | Performed by: PHYSICIAN ASSISTANT

## 2022-01-01 PROCEDURE — 77336 RADIATION PHYSICS CONSULT: CPT | Performed by: RADIOLOGY

## 2022-01-01 PROCEDURE — 84484 ASSAY OF TROPONIN QUANT: CPT | Performed by: EMERGENCY MEDICINE

## 2022-01-01 PROCEDURE — 85027 COMPLETE CBC AUTOMATED: CPT | Performed by: STUDENT IN AN ORGANIZED HEALTH CARE EDUCATION/TRAINING PROGRAM

## 2022-01-01 PROCEDURE — 82310 ASSAY OF CALCIUM: CPT | Performed by: INTERNAL MEDICINE

## 2022-01-01 PROCEDURE — 70450 CT HEAD/BRAIN W/O DYE: CPT

## 2022-01-01 PROCEDURE — 77295 3-D RADIOTHERAPY PLAN: CPT | Performed by: RADIOLOGY

## 2022-01-01 PROCEDURE — 93970 EXTREMITY STUDY: CPT | Mod: XS

## 2022-01-01 PROCEDURE — 83880 ASSAY OF NATRIURETIC PEPTIDE: CPT | Performed by: EMERGENCY MEDICINE

## 2022-01-01 PROCEDURE — 250N000013 HC RX MED GY IP 250 OP 250 PS 637: Performed by: PHYSICIAN ASSISTANT

## 2022-01-01 PROCEDURE — 81001 URINALYSIS AUTO W/SCOPE: CPT | Performed by: EMERGENCY MEDICINE

## 2022-01-01 PROCEDURE — 87651 STREP A DNA AMP PROBE: CPT | Performed by: EMERGENCY MEDICINE

## 2022-01-01 PROCEDURE — 250N000013 HC RX MED GY IP 250 OP 250 PS 637: Performed by: INTERNAL MEDICINE

## 2022-01-01 PROCEDURE — A9585 GADOBUTROL INJECTION: HCPCS | Performed by: RADIOLOGY

## 2022-01-01 PROCEDURE — 99207 PR SC NO CHARGE VISIT: CPT | Performed by: INTERNAL MEDICINE

## 2022-01-01 PROCEDURE — 99207 PR APP CREDIT; MD BILLING SHARED VISIT: CPT | Performed by: PHYSICIAN ASSISTANT

## 2022-01-01 PROCEDURE — 70553 MRI BRAIN STEM W/O & W/DYE: CPT | Mod: 26 | Performed by: RADIOLOGY

## 2022-01-01 PROCEDURE — G0463 HOSPITAL OUTPT CLINIC VISIT: HCPCS | Performed by: RADIOLOGY

## 2022-01-01 PROCEDURE — 255N000002 HC RX 255 OP 636: Performed by: EMERGENCY MEDICINE

## 2022-01-01 PROCEDURE — 250N000011 HC RX IP 250 OP 636: Performed by: PHYSICIAN ASSISTANT

## 2022-01-01 PROCEDURE — 85300 ANTITHROMBIN III ACTIVITY: CPT | Performed by: INTERNAL MEDICINE

## 2022-01-01 PROCEDURE — 99204 OFFICE O/P NEW MOD 45 MIN: CPT | Performed by: FAMILY MEDICINE

## 2022-01-01 PROCEDURE — 250N000009 HC RX 250: Performed by: EMERGENCY MEDICINE

## 2022-01-01 PROCEDURE — 250N000011 HC RX IP 250 OP 636

## 2022-01-01 PROCEDURE — 74177 CT ABD & PELVIS W/CONTRAST: CPT | Mod: 26 | Performed by: RADIOLOGY

## 2022-01-01 PROCEDURE — 99232 SBSQ HOSP IP/OBS MODERATE 35: CPT | Performed by: NURSE PRACTITIONER

## 2022-01-01 PROCEDURE — 93244 EXT ECG>48HR<7D REV&INTERPJ: CPT | Performed by: INTERNAL MEDICINE

## 2022-01-01 PROCEDURE — 96374 THER/PROPH/DIAG INJ IV PUSH: CPT

## 2022-01-01 PROCEDURE — 80053 COMPREHEN METABOLIC PANEL: CPT | Performed by: EMERGENCY MEDICINE

## 2022-01-01 PROCEDURE — 999N000197 HC STATISTIC WOC PT EDUCATION, 0-15 MIN

## 2022-01-01 PROCEDURE — 99221 1ST HOSP IP/OBS SF/LOW 40: CPT | Mod: GC | Performed by: STUDENT IN AN ORGANIZED HEALTH CARE EDUCATION/TRAINING PROGRAM

## 2022-01-01 PROCEDURE — 85027 COMPLETE CBC AUTOMATED: CPT | Performed by: INTERNAL MEDICINE

## 2022-01-01 PROCEDURE — 85048 AUTOMATED LEUKOCYTE COUNT: CPT | Performed by: EMERGENCY MEDICINE

## 2022-01-01 PROCEDURE — U0005 INFEC AGEN DETEC AMPLI PROBE: HCPCS | Performed by: EMERGENCY MEDICINE

## 2022-01-01 PROCEDURE — 86140 C-REACTIVE PROTEIN: CPT | Performed by: PHYSICIAN ASSISTANT

## 2022-01-01 PROCEDURE — 250N000013 HC RX MED GY IP 250 OP 250 PS 637

## 2022-01-01 PROCEDURE — 77300 RADIATION THERAPY DOSE PLAN: CPT | Performed by: RADIOLOGY

## 2022-01-01 PROCEDURE — 71275 CT ANGIOGRAPHY CHEST: CPT | Mod: 26 | Performed by: RADIOLOGY

## 2022-01-01 PROCEDURE — 77435 SBRT MANAGEMENT: CPT | Performed by: RADIOLOGY

## 2022-01-01 PROCEDURE — 99205 OFFICE O/P NEW HI 60 MIN: CPT | Performed by: INTERNAL MEDICINE

## 2022-01-01 PROCEDURE — 258N000003 HC RX IP 258 OP 636: Performed by: PHYSICIAN ASSISTANT

## 2022-01-01 PROCEDURE — 70450 CT HEAD/BRAIN W/O DYE: CPT | Mod: 26 | Performed by: RADIOLOGY

## 2022-01-01 PROCEDURE — 255N000002 HC RX 255 OP 636: Performed by: RADIOLOGY

## 2022-01-01 PROCEDURE — 83735 ASSAY OF MAGNESIUM: CPT | Performed by: EMERGENCY MEDICINE

## 2022-01-01 PROCEDURE — 85007 BL SMEAR W/DIFF WBC COUNT: CPT | Performed by: EMERGENCY MEDICINE

## 2022-01-01 PROCEDURE — 99285 EMERGENCY DEPT VISIT HI MDM: CPT | Performed by: EMERGENCY MEDICINE

## 2022-01-01 PROCEDURE — 99233 SBSQ HOSP IP/OBS HIGH 50: CPT | Performed by: INTERNAL MEDICINE

## 2022-01-01 PROCEDURE — 77370 RADIATION PHYSICS CONSULT: CPT | Performed by: RADIOLOGY

## 2022-01-01 PROCEDURE — 99285 EMERGENCY DEPT VISIT HI MDM: CPT | Mod: 25 | Performed by: EMERGENCY MEDICINE

## 2022-01-01 PROCEDURE — 87040 BLOOD CULTURE FOR BACTERIA: CPT

## 2022-01-01 PROCEDURE — 74177 CT ABD & PELVIS W/CONTRAST: CPT

## 2022-01-01 PROCEDURE — 82040 ASSAY OF SERUM ALBUMIN: CPT | Performed by: EMERGENCY MEDICINE

## 2022-01-01 PROCEDURE — 93005 ELECTROCARDIOGRAM TRACING: CPT | Mod: XU

## 2022-01-01 PROCEDURE — 77290 THER RAD SIMULAJ FIELD CPLX: CPT | Performed by: RADIOLOGY

## 2022-01-01 PROCEDURE — 93242 EXT ECG>48HR<7D RECORDING: CPT

## 2022-01-01 PROCEDURE — 93970 EXTREMITY STUDY: CPT | Mod: 26 | Performed by: RADIOLOGY

## 2022-01-01 PROCEDURE — 250N000011 HC RX IP 250 OP 636: Performed by: INTERNAL MEDICINE

## 2022-01-01 PROCEDURE — 71045 X-RAY EXAM CHEST 1 VIEW: CPT

## 2022-01-01 PROCEDURE — 77290 THER RAD SIMULAJ FIELD CPLX: CPT | Mod: 26 | Performed by: RADIOLOGY

## 2022-01-01 PROCEDURE — 85027 COMPLETE CBC AUTOMATED: CPT | Performed by: EMERGENCY MEDICINE

## 2022-01-01 PROCEDURE — 70552 MRI BRAIN STEM W/DYE: CPT

## 2022-01-01 PROCEDURE — 81001 URINALYSIS AUTO W/SCOPE: CPT

## 2022-01-01 PROCEDURE — 85041 AUTOMATED RBC COUNT: CPT | Performed by: PHYSICIAN ASSISTANT

## 2022-01-01 PROCEDURE — 87493 C DIFF AMPLIFIED PROBE: CPT | Performed by: INTERNAL MEDICINE

## 2022-01-01 PROCEDURE — 85610 PROTHROMBIN TIME: CPT | Performed by: EMERGENCY MEDICINE

## 2022-01-01 PROCEDURE — 86140 C-REACTIVE PROTEIN: CPT | Performed by: EMERGENCY MEDICINE

## 2022-01-01 PROCEDURE — 99024 POSTOP FOLLOW-UP VISIT: CPT | Mod: 95 | Performed by: NURSE PRACTITIONER

## 2022-01-01 PROCEDURE — 99221 1ST HOSP IP/OBS SF/LOW 40: CPT | Mod: GC | Performed by: NEUROLOGICAL SURGERY

## 2022-01-01 PROCEDURE — 85004 AUTOMATED DIFF WBC COUNT: CPT | Performed by: EMERGENCY MEDICINE

## 2022-01-01 PROCEDURE — 99217 PR OBSERVATION CARE DISCHARGE: CPT | Performed by: STUDENT IN AN ORGANIZED HEALTH CARE EDUCATION/TRAINING PROGRAM

## 2022-01-01 PROCEDURE — 71275 CT ANGIOGRAPHY CHEST: CPT

## 2022-01-01 PROCEDURE — 71045 X-RAY EXAM CHEST 1 VIEW: CPT | Mod: 26 | Performed by: RADIOLOGY

## 2022-01-01 PROCEDURE — 84484 ASSAY OF TROPONIN QUANT: CPT | Mod: 91 | Performed by: EMERGENCY MEDICINE

## 2022-01-01 PROCEDURE — 84450 TRANSFERASE (AST) (SGOT): CPT | Performed by: PHYSICIAN ASSISTANT

## 2022-01-01 PROCEDURE — 70552 MRI BRAIN STEM W/DYE: CPT | Mod: 26 | Performed by: RADIOLOGY

## 2022-01-01 PROCEDURE — 83605 ASSAY OF LACTIC ACID: CPT | Performed by: INTERNAL MEDICINE

## 2022-01-01 PROCEDURE — 85027 COMPLETE CBC AUTOMATED: CPT | Performed by: PHYSICIAN ASSISTANT

## 2022-01-01 PROCEDURE — 87081 CULTURE SCREEN ONLY: CPT | Performed by: STUDENT IN AN ORGANIZED HEALTH CARE EDUCATION/TRAINING PROGRAM

## 2022-01-01 PROCEDURE — 96360 HYDRATION IV INFUSION INIT: CPT

## 2022-01-01 PROCEDURE — 93976 VASCULAR STUDY: CPT

## 2022-01-01 PROCEDURE — 82565 ASSAY OF CREATININE: CPT

## 2022-01-01 PROCEDURE — 80053 COMPREHEN METABOLIC PANEL: CPT

## 2022-01-01 PROCEDURE — 99238 HOSP IP/OBS DSCHRG MGMT 30/<: CPT | Mod: GC | Performed by: INTERNAL MEDICINE

## 2022-01-01 PROCEDURE — 87040 BLOOD CULTURE FOR BACTERIA: CPT | Performed by: EMERGENCY MEDICINE

## 2022-01-01 PROCEDURE — 83605 ASSAY OF LACTIC ACID: CPT | Performed by: EMERGENCY MEDICINE

## 2022-01-01 PROCEDURE — 77300 RADIATION THERAPY DOSE PLAN: CPT | Mod: 26 | Performed by: RADIOLOGY

## 2022-01-01 PROCEDURE — 77295 3-D RADIOTHERAPY PLAN: CPT | Mod: 26 | Performed by: RADIOLOGY

## 2022-01-01 PROCEDURE — 96361 HYDRATE IV INFUSION ADD-ON: CPT

## 2022-01-01 RX ORDER — IBUPROFEN 200 MG
200 TABLET ORAL EVERY 4 HOURS PRN
Status: ON HOLD | COMMUNITY
End: 2022-01-01

## 2022-01-01 RX ORDER — LIDOCAINE 40 MG/G
CREAM TOPICAL
Qty: 5 G | Refills: 0 | Status: SHIPPED | OUTPATIENT
Start: 2022-01-01 | End: 2022-01-01

## 2022-01-01 RX ORDER — DEXAMETHASONE 2 MG/1
2 TABLET ORAL EVERY 12 HOURS
Qty: 60 TABLET | Refills: 0 | Status: ON HOLD | OUTPATIENT
Start: 2022-01-01 | End: 2023-01-01

## 2022-01-01 RX ORDER — NALOXONE HYDROCHLORIDE 0.4 MG/ML
0.2 INJECTION, SOLUTION INTRAMUSCULAR; INTRAVENOUS; SUBCUTANEOUS
Status: DISCONTINUED | OUTPATIENT
Start: 2022-01-01 | End: 2022-01-01 | Stop reason: HOSPADM

## 2022-01-01 RX ORDER — AMOXICILLIN 250 MG
1 CAPSULE ORAL DAILY
COMMUNITY
End: 2023-01-01

## 2022-01-01 RX ORDER — NALOXONE HYDROCHLORIDE 0.4 MG/ML
0.4 INJECTION, SOLUTION INTRAMUSCULAR; INTRAVENOUS; SUBCUTANEOUS
Status: DISCONTINUED | OUTPATIENT
Start: 2022-01-01 | End: 2022-01-01 | Stop reason: HOSPADM

## 2022-01-01 RX ORDER — SENNOSIDES 8.6 MG
1 TABLET ORAL 2 TIMES DAILY
Status: DISCONTINUED | OUTPATIENT
Start: 2022-01-01 | End: 2022-01-01 | Stop reason: HOSPADM

## 2022-01-01 RX ORDER — PANTOPRAZOLE SODIUM 40 MG/1
40 TABLET, DELAYED RELEASE ORAL
Status: DISCONTINUED | OUTPATIENT
Start: 2022-01-01 | End: 2022-01-01 | Stop reason: HOSPADM

## 2022-01-01 RX ORDER — TRAMADOL HYDROCHLORIDE 50 MG/1
50 TABLET ORAL EVERY 6 HOURS PRN
Status: ON HOLD | COMMUNITY
End: 2023-01-01

## 2022-01-01 RX ORDER — AMOXICILLIN 250 MG
2 CAPSULE ORAL 2 TIMES DAILY PRN
Status: DISCONTINUED | OUTPATIENT
Start: 2022-01-01 | End: 2022-01-01 | Stop reason: HOSPADM

## 2022-01-01 RX ORDER — LIDOCAINE 40 MG/G
CREAM TOPICAL
Status: DISCONTINUED | OUTPATIENT
Start: 2022-01-01 | End: 2022-01-01 | Stop reason: HOSPADM

## 2022-01-01 RX ORDER — GADOBUTROL 604.72 MG/ML
9 INJECTION INTRAVENOUS ONCE
Status: COMPLETED | OUTPATIENT
Start: 2022-01-01 | End: 2022-01-01

## 2022-01-01 RX ORDER — AMLODIPINE BESYLATE 10 MG/1
10 TABLET ORAL DAILY
Qty: 90 TABLET | Refills: 3 | Status: SHIPPED | OUTPATIENT
Start: 2022-01-01

## 2022-01-01 RX ORDER — LISINOPRIL 40 MG/1
40 TABLET ORAL DAILY
COMMUNITY
End: 2022-01-01

## 2022-01-01 RX ORDER — OXYCODONE HYDROCHLORIDE 5 MG/1
5 TABLET ORAL EVERY 6 HOURS PRN
Status: ON HOLD | COMMUNITY
End: 2023-01-01

## 2022-01-01 RX ORDER — AZITHROMYCIN 250 MG/1
TABLET, FILM COATED ORAL
Qty: 6 TABLET | Refills: 0 | Status: SHIPPED | OUTPATIENT
Start: 2022-01-01 | End: 2022-01-01

## 2022-01-01 RX ORDER — CYCLOBENZAPRINE HCL 5 MG
5 TABLET ORAL 3 TIMES DAILY PRN
Status: DISCONTINUED | OUTPATIENT
Start: 2022-01-01 | End: 2022-01-01 | Stop reason: HOSPADM

## 2022-01-01 RX ORDER — PROCHLORPERAZINE MALEATE 5 MG
10 TABLET ORAL EVERY 6 HOURS PRN
Status: DISCONTINUED | OUTPATIENT
Start: 2022-01-01 | End: 2022-01-01 | Stop reason: HOSPADM

## 2022-01-01 RX ORDER — ONDANSETRON 2 MG/ML
4 INJECTION INTRAMUSCULAR; INTRAVENOUS EVERY 6 HOURS PRN
Status: DISCONTINUED | OUTPATIENT
Start: 2022-01-01 | End: 2022-01-01 | Stop reason: HOSPADM

## 2022-01-01 RX ORDER — LANOLIN ALCOHOL/MO/W.PET/CERES
3 CREAM (GRAM) TOPICAL
Status: DISCONTINUED | OUTPATIENT
Start: 2022-01-01 | End: 2022-01-01 | Stop reason: HOSPADM

## 2022-01-01 RX ORDER — BENZOCAINE/MENTHOL 6 MG-10 MG
LOZENGE MUCOUS MEMBRANE 2 TIMES DAILY
Status: DISCONTINUED | OUTPATIENT
Start: 2022-01-01 | End: 2022-01-01 | Stop reason: HOSPADM

## 2022-01-01 RX ORDER — TETRAHYDROZOLINE HCL 0.05 %
1 DROPS OPHTHALMIC (EYE) 4 TIMES DAILY PRN
Status: DISCONTINUED | OUTPATIENT
Start: 2022-01-01 | End: 2022-01-01 | Stop reason: HOSPADM

## 2022-01-01 RX ORDER — CYCLOBENZAPRINE HCL 5 MG
10 TABLET ORAL EVERY 8 HOURS PRN
Status: DISCONTINUED | OUTPATIENT
Start: 2022-01-01 | End: 2022-01-01 | Stop reason: HOSPADM

## 2022-01-01 RX ORDER — HYDROMORPHONE HYDROCHLORIDE 1 MG/ML
0.5 INJECTION, SOLUTION INTRAMUSCULAR; INTRAVENOUS; SUBCUTANEOUS ONCE
Status: COMPLETED | OUTPATIENT
Start: 2022-01-01 | End: 2022-01-01

## 2022-01-01 RX ORDER — GABAPENTIN 600 MG/1
600 TABLET ORAL 3 TIMES DAILY
Status: ON HOLD | COMMUNITY
End: 2023-01-01

## 2022-01-01 RX ORDER — OXYCODONE HYDROCHLORIDE 5 MG/1
5 TABLET ORAL ONCE
Status: COMPLETED | OUTPATIENT
Start: 2022-01-01 | End: 2022-01-01

## 2022-01-01 RX ORDER — CETIRIZINE HYDROCHLORIDE 10 MG/1
10 TABLET ORAL DAILY
Status: DISCONTINUED | OUTPATIENT
Start: 2022-01-01 | End: 2022-01-01 | Stop reason: HOSPADM

## 2022-01-01 RX ORDER — IOPAMIDOL 755 MG/ML
119 INJECTION, SOLUTION INTRAVASCULAR ONCE
Status: COMPLETED | OUTPATIENT
Start: 2022-01-01 | End: 2022-01-01

## 2022-01-01 RX ORDER — DIPHENHYDRAMINE HYDROCHLORIDE AND LIDOCAINE HYDROCHLORIDE AND ALUMINUM HYDROXIDE AND MAGNESIUM HYDRO
5-10 KIT EVERY 6 HOURS PRN
Qty: 237 ML | Refills: 1 | Status: ON HOLD | OUTPATIENT
Start: 2022-01-01 | End: 2023-01-01

## 2022-01-01 RX ORDER — SENNOSIDES 8.6 MG
1 TABLET ORAL 2 TIMES DAILY
Status: DISCONTINUED | OUTPATIENT
Start: 2022-01-01 | End: 2022-01-01

## 2022-01-01 RX ORDER — OXYCODONE HYDROCHLORIDE 5 MG/1
5 TABLET ORAL EVERY 6 HOURS PRN
Status: DISCONTINUED | OUTPATIENT
Start: 2022-01-01 | End: 2022-01-01

## 2022-01-01 RX ORDER — CETIRIZINE HYDROCHLORIDE 10 MG/1
10 TABLET ORAL DAILY
Status: ON HOLD | COMMUNITY
End: 2023-01-01

## 2022-01-01 RX ORDER — IPRATROPIUM BROMIDE AND ALBUTEROL SULFATE 2.5; .5 MG/3ML; MG/3ML
1 SOLUTION RESPIRATORY (INHALATION) EVERY 4 HOURS PRN
COMMUNITY
End: 2022-01-01

## 2022-01-01 RX ORDER — ONDANSETRON 4 MG/1
8 TABLET, ORALLY DISINTEGRATING ORAL EVERY 8 HOURS PRN
Status: DISCONTINUED | OUTPATIENT
Start: 2022-01-01 | End: 2022-01-01 | Stop reason: HOSPADM

## 2022-01-01 RX ORDER — TAMSULOSIN HYDROCHLORIDE 0.4 MG/1
0.4 CAPSULE ORAL DAILY
Status: ON HOLD | COMMUNITY
End: 2022-01-01

## 2022-01-01 RX ORDER — GABAPENTIN 300 MG/1
300 CAPSULE ORAL 3 TIMES DAILY
Status: DISCONTINUED | OUTPATIENT
Start: 2022-01-01 | End: 2022-01-01 | Stop reason: HOSPADM

## 2022-01-01 RX ORDER — NYSTATIN 100000/ML
500000 SUSPENSION, ORAL (FINAL DOSE FORM) ORAL 4 TIMES DAILY
Status: DISCONTINUED | OUTPATIENT
Start: 2022-01-01 | End: 2022-01-01 | Stop reason: HOSPADM

## 2022-01-01 RX ORDER — TAMSULOSIN HYDROCHLORIDE 0.4 MG/1
0.4 CAPSULE ORAL DAILY
Status: DISCONTINUED | OUTPATIENT
Start: 2022-01-01 | End: 2022-01-01 | Stop reason: HOSPADM

## 2022-01-01 RX ORDER — DEXAMETHASONE 1 MG
1 TABLET ORAL
Status: DISCONTINUED | OUTPATIENT
Start: 2022-01-01 | End: 2022-01-01 | Stop reason: HOSPADM

## 2022-01-01 RX ORDER — CALCIUM CARBONATE 500 MG/1
1 TABLET, CHEWABLE ORAL 2 TIMES DAILY
Status: ON HOLD | COMMUNITY
End: 2023-01-01

## 2022-01-01 RX ORDER — TRAMADOL HYDROCHLORIDE 50 MG/1
50 TABLET ORAL EVERY 6 HOURS PRN
Status: DISCONTINUED | OUTPATIENT
Start: 2022-01-01 | End: 2022-01-01 | Stop reason: HOSPADM

## 2022-01-01 RX ORDER — LOPERAMIDE HCL 2 MG
2 CAPSULE ORAL 4 TIMES DAILY PRN
Status: DISCONTINUED | OUTPATIENT
Start: 2022-01-01 | End: 2022-01-01 | Stop reason: HOSPADM

## 2022-01-01 RX ORDER — CYCLOBENZAPRINE HCL 5 MG
TABLET ORAL
Status: ON HOLD | COMMUNITY
Start: 2020-09-18 | End: 2023-01-01

## 2022-01-01 RX ORDER — ALBUTEROL SULFATE 90 UG/1
1-2 AEROSOL, METERED RESPIRATORY (INHALATION) EVERY 4 HOURS PRN
Qty: 18 G | Refills: 4 | Status: SHIPPED | OUTPATIENT
Start: 2022-01-01

## 2022-01-01 RX ORDER — CARBOXYMETHYLCELLULOSE SODIUM 5 MG/ML
1 SOLUTION/ DROPS OPHTHALMIC 4 TIMES DAILY PRN
Status: DISCONTINUED | OUTPATIENT
Start: 2022-01-01 | End: 2022-01-01 | Stop reason: HOSPADM

## 2022-01-01 RX ORDER — BENZOCAINE/MENTHOL 6 MG-10 MG
LOZENGE MUCOUS MEMBRANE 2 TIMES DAILY PRN
Status: DISCONTINUED | OUTPATIENT
Start: 2022-01-01 | End: 2022-01-01 | Stop reason: HOSPADM

## 2022-01-01 RX ORDER — SODIUM CHLORIDE, SODIUM LACTATE, POTASSIUM CHLORIDE, CALCIUM CHLORIDE 600; 310; 30; 20 MG/100ML; MG/100ML; MG/100ML; MG/100ML
INJECTION, SOLUTION INTRAVENOUS CONTINUOUS
Status: ACTIVE | OUTPATIENT
Start: 2022-01-01 | End: 2022-01-01

## 2022-01-01 RX ORDER — PANTOPRAZOLE SODIUM 40 MG/1
40 TABLET, DELAYED RELEASE ORAL DAILY
Status: DISCONTINUED | OUTPATIENT
Start: 2022-01-01 | End: 2022-01-01 | Stop reason: HOSPADM

## 2022-01-01 RX ORDER — ONDANSETRON 8 MG/1
8 TABLET, ORALLY DISINTEGRATING ORAL
Status: ON HOLD | COMMUNITY
Start: 2022-08-03 | End: 2023-01-01

## 2022-01-01 RX ORDER — SENNOSIDES 8.6 MG
1 TABLET ORAL 2 TIMES DAILY
Status: ON HOLD | COMMUNITY
End: 2023-01-01

## 2022-01-01 RX ORDER — ACETAMINOPHEN 325 MG/1
325-650 TABLET ORAL EVERY 6 HOURS PRN
Status: DISCONTINUED | OUTPATIENT
Start: 2022-01-01 | End: 2022-01-01 | Stop reason: HOSPADM

## 2022-01-01 RX ORDER — DIPHENHYDRAMINE HYDROCHLORIDE AND LIDOCAINE HYDROCHLORIDE AND ALUMINUM HYDROXIDE AND MAGNESIUM HYDRO
5-10 KIT EVERY 6 HOURS PRN
Status: DISCONTINUED | OUTPATIENT
Start: 2022-01-01 | End: 2022-01-01 | Stop reason: HOSPADM

## 2022-01-01 RX ORDER — CALCIUM CARBONATE 500 MG/1
500 TABLET, CHEWABLE ORAL 2 TIMES DAILY
Status: DISCONTINUED | OUTPATIENT
Start: 2022-01-01 | End: 2022-01-01 | Stop reason: HOSPADM

## 2022-01-01 RX ORDER — EVEROLIMUS 5 MG/1
TABLET ORAL
Status: ON HOLD | COMMUNITY
End: 2022-01-01

## 2022-01-01 RX ORDER — DEXAMETHASONE 2 MG/1
2 TABLET ORAL EVERY 12 HOURS SCHEDULED
Status: DISCONTINUED | OUTPATIENT
Start: 2022-01-01 | End: 2022-01-01 | Stop reason: HOSPADM

## 2022-01-01 RX ORDER — ALBUTEROL SULFATE 90 UG/1
1-2 AEROSOL, METERED RESPIRATORY (INHALATION) EVERY 4 HOURS PRN
Status: DISCONTINUED | OUTPATIENT
Start: 2022-01-01 | End: 2022-01-01 | Stop reason: HOSPADM

## 2022-01-01 RX ORDER — DEXTRAN 70 AND HYPROMELLOSE 2910 1; 3 MG/ML; MG/ML
1 SOLUTION/ DROPS OPHTHALMIC
Status: ON HOLD | COMMUNITY
End: 2023-01-01

## 2022-01-01 RX ORDER — AMOXICILLIN 250 MG
1 CAPSULE ORAL DAILY
Status: DISCONTINUED | OUTPATIENT
Start: 2022-01-01 | End: 2022-01-01 | Stop reason: HOSPADM

## 2022-01-01 RX ORDER — DOXEPIN HYDROCHLORIDE 10 MG/ML
10 SOLUTION ORAL AT BEDTIME
Status: DISCONTINUED | OUTPATIENT
Start: 2022-01-01 | End: 2022-01-01 | Stop reason: HOSPADM

## 2022-01-01 RX ORDER — ONDANSETRON 4 MG/1
4 TABLET, ORALLY DISINTEGRATING ORAL EVERY 6 HOURS PRN
Status: DISCONTINUED | OUTPATIENT
Start: 2022-01-01 | End: 2022-01-01 | Stop reason: HOSPADM

## 2022-01-01 RX ORDER — ACETAMINOPHEN 500 MG
1000 TABLET ORAL EVERY 6 HOURS PRN
Status: DISCONTINUED | OUTPATIENT
Start: 2022-01-01 | End: 2022-01-01 | Stop reason: HOSPADM

## 2022-01-01 RX ORDER — TRIAMCINOLONE ACETONIDE 0.25 MG/ML
LOTION TOPICAL
Status: ON HOLD | COMMUNITY
Start: 2021-10-19 | End: 2023-01-01

## 2022-01-01 RX ORDER — BENZOCAINE/MENTHOL 6 MG-10 MG
LOZENGE MUCOUS MEMBRANE 2 TIMES DAILY PRN
Qty: 2 G | Refills: 0 | Status: SHIPPED | OUTPATIENT
Start: 2022-01-01 | End: 2022-01-01

## 2022-01-01 RX ORDER — DEXAMETHASONE 4 MG/1
4 TABLET ORAL 4 TIMES DAILY
Status: ON HOLD | COMMUNITY
Start: 2022-01-01 | End: 2022-01-01

## 2022-01-01 RX ORDER — OXYCODONE HYDROCHLORIDE 5 MG/1
5-10 TABLET ORAL EVERY 4 HOURS PRN
Status: DISCONTINUED | OUTPATIENT
Start: 2022-01-01 | End: 2022-01-01 | Stop reason: HOSPADM

## 2022-01-01 RX ORDER — LIDOCAINE HYDROCHLORIDE 20 MG/ML
5 SOLUTION OROPHARYNGEAL
Status: DISCONTINUED | OUTPATIENT
Start: 2022-01-01 | End: 2022-01-01 | Stop reason: HOSPADM

## 2022-01-01 RX ORDER — DEXAMETHASONE 4 MG/1
4 TABLET ORAL 4 TIMES DAILY
Status: DISCONTINUED | OUTPATIENT
Start: 2022-01-01 | End: 2022-01-01

## 2022-01-01 RX ORDER — AMLODIPINE BESYLATE 10 MG/1
10 TABLET ORAL DAILY
Status: DISCONTINUED | OUTPATIENT
Start: 2022-01-01 | End: 2022-01-01 | Stop reason: HOSPADM

## 2022-01-01 RX ORDER — GADOBUTROL 604.72 MG/ML
10 INJECTION INTRAVENOUS ONCE
Status: COMPLETED | OUTPATIENT
Start: 2022-01-01 | End: 2022-01-01

## 2022-01-01 RX ORDER — PIPERACILLIN SODIUM, TAZOBACTAM SODIUM 4; .5 G/20ML; G/20ML
4.5 INJECTION, POWDER, LYOPHILIZED, FOR SOLUTION INTRAVENOUS EVERY 6 HOURS
Status: DISCONTINUED | OUTPATIENT
Start: 2022-01-01 | End: 2022-01-01 | Stop reason: HOSPADM

## 2022-01-01 RX ORDER — AMOXICILLIN 250 MG
1 CAPSULE ORAL 2 TIMES DAILY PRN
Status: DISCONTINUED | OUTPATIENT
Start: 2022-01-01 | End: 2022-01-01 | Stop reason: HOSPADM

## 2022-01-01 RX ORDER — ACETAMINOPHEN 325 MG/1
325-650 TABLET ORAL EVERY 8 HOURS PRN
COMMUNITY

## 2022-01-01 RX ORDER — PROCHLORPERAZINE MALEATE 10 MG
10 TABLET ORAL EVERY 6 HOURS PRN
COMMUNITY

## 2022-01-01 RX ORDER — ALBUTEROL SULFATE 90 UG/1
1-2 AEROSOL, METERED RESPIRATORY (INHALATION)
COMMUNITY
Start: 2020-10-23 | End: 2022-01-01

## 2022-01-01 RX ORDER — HYDROCORTISONE 2.5 %
CREAM (GRAM) TOPICAL 2 TIMES DAILY
COMMUNITY
End: 2022-01-01

## 2022-01-01 RX ORDER — ACETAMINOPHEN 650 MG/1
650 SUPPOSITORY RECTAL EVERY 6 HOURS PRN
Status: DISCONTINUED | OUTPATIENT
Start: 2022-01-01 | End: 2022-01-01 | Stop reason: HOSPADM

## 2022-01-01 RX ORDER — DEXAMETHASONE 4 MG/1
4 TABLET ORAL 4 TIMES DAILY
Status: DISCONTINUED | OUTPATIENT
Start: 2022-01-01 | End: 2022-01-01 | Stop reason: HOSPADM

## 2022-01-01 RX ORDER — MINERAL OIL/HYDROPHIL PETROLAT
OINTMENT (GRAM) TOPICAL
Qty: 50 G | Refills: 0 | Status: SHIPPED | OUTPATIENT
Start: 2022-01-01 | End: 2022-01-01

## 2022-01-01 RX ORDER — DEXAMETHASONE 2 MG/1
2 TABLET ORAL EVERY 12 HOURS
Status: DISCONTINUED | OUTPATIENT
Start: 2022-01-01 | End: 2022-01-01

## 2022-01-01 RX ORDER — ERGOCALCIFEROL 1.25 MG/1
50000 CAPSULE, LIQUID FILLED ORAL WEEKLY
Status: DISCONTINUED | OUTPATIENT
Start: 2022-01-01 | End: 2022-01-01 | Stop reason: HOSPADM

## 2022-01-01 RX ORDER — OXYCODONE HYDROCHLORIDE 5 MG/1
5 TABLET ORAL EVERY 6 HOURS PRN
Status: DISCONTINUED | OUTPATIENT
Start: 2022-01-01 | End: 2022-01-01 | Stop reason: HOSPADM

## 2022-01-01 RX ORDER — NYSTATIN 100000/ML
500000 SUSPENSION, ORAL (FINAL DOSE FORM) ORAL 4 TIMES DAILY
Qty: 100 ML | Refills: 0 | Status: ON HOLD | OUTPATIENT
Start: 2022-01-01 | End: 2022-01-01

## 2022-01-01 RX ORDER — POLYETHYLENE GLYCOL 3350 17 G/17G
17 POWDER, FOR SOLUTION ORAL DAILY PRN
Status: DISCONTINUED | OUTPATIENT
Start: 2022-01-01 | End: 2022-01-01 | Stop reason: HOSPADM

## 2022-01-01 RX ORDER — MINERAL OIL/HYDROPHIL PETROLAT
OINTMENT (GRAM) TOPICAL
Status: DISCONTINUED | OUTPATIENT
Start: 2022-01-01 | End: 2022-01-01 | Stop reason: HOSPADM

## 2022-01-01 RX ORDER — ERGOCALCIFEROL 1.25 MG/1
50000 CAPSULE ORAL
Status: ON HOLD | COMMUNITY
Start: 2022-04-20 | End: 2023-01-01

## 2022-01-01 RX ORDER — OXYCODONE HYDROCHLORIDE 5 MG/1
5 TABLET ORAL EVERY 4 HOURS PRN
Status: DISCONTINUED | OUTPATIENT
Start: 2022-01-01 | End: 2022-01-01 | Stop reason: HOSPADM

## 2022-01-01 RX ORDER — ERGOCALCIFEROL 1.25 MG/1
50000 CAPSULE ORAL WEEKLY
Status: DISCONTINUED | OUTPATIENT
Start: 2022-01-01 | End: 2022-01-01

## 2022-01-01 RX ORDER — LISINOPRIL 20 MG/1
40 TABLET ORAL DAILY
Status: DISCONTINUED | OUTPATIENT
Start: 2022-01-01 | End: 2022-01-01 | Stop reason: HOSPADM

## 2022-01-01 RX ORDER — ACETAMINOPHEN 325 MG/1
650 TABLET ORAL EVERY 6 HOURS PRN
Status: DISCONTINUED | OUTPATIENT
Start: 2022-01-01 | End: 2022-01-01 | Stop reason: HOSPADM

## 2022-01-01 RX ORDER — KETOCONAZOLE 20 MG/ML
SHAMPOO TOPICAL
Status: ON HOLD | COMMUNITY
Start: 2021-10-19 | End: 2023-01-01

## 2022-01-01 RX ORDER — CYCLOBENZAPRINE HCL 5 MG
5 TABLET ORAL 3 TIMES DAILY PRN
COMMUNITY
End: 2022-01-01

## 2022-01-01 RX ORDER — TAMSULOSIN HYDROCHLORIDE 0.4 MG/1
0.4 CAPSULE ORAL DAILY
Status: ON HOLD
Start: 2022-01-01 | End: 2023-01-01

## 2022-01-01 RX ORDER — NYSTATIN 100000/ML
500000 SUSPENSION, ORAL (FINAL DOSE FORM) ORAL 4 TIMES DAILY
Qty: 400 ML | Refills: 3 | Status: ON HOLD | OUTPATIENT
Start: 2022-01-01 | End: 2023-01-01

## 2022-01-01 RX ORDER — AMLODIPINE BESYLATE 5 MG/1
10 TABLET ORAL DAILY
Status: DISCONTINUED | OUTPATIENT
Start: 2022-01-01 | End: 2022-01-01 | Stop reason: HOSPADM

## 2022-01-01 RX ORDER — AMLODIPINE BESYLATE 10 MG/1
10 TABLET ORAL DAILY
COMMUNITY
End: 2022-01-01

## 2022-01-01 RX ORDER — DEXAMETHASONE 2 MG/1
2 TABLET ORAL 2 TIMES DAILY WITH MEALS
Qty: 20 TABLET | Refills: 0 | Status: ON HOLD | OUTPATIENT
Start: 2022-01-01 | End: 2022-01-01

## 2022-01-01 RX ORDER — ALBUTEROL SULFATE 90 UG/1
1-2 AEROSOL, METERED RESPIRATORY (INHALATION) EVERY 6 HOURS PRN
Status: DISCONTINUED | OUTPATIENT
Start: 2022-01-01 | End: 2022-01-01 | Stop reason: HOSPADM

## 2022-01-01 RX ORDER — PROCHLORPERAZINE MALEATE 10 MG
10 TABLET ORAL EVERY 6 HOURS PRN
Status: DISCONTINUED | OUTPATIENT
Start: 2022-01-01 | End: 2022-01-01 | Stop reason: HOSPADM

## 2022-01-01 RX ORDER — PROCHLORPERAZINE 25 MG
25 SUPPOSITORY, RECTAL RECTAL EVERY 12 HOURS PRN
Status: DISCONTINUED | OUTPATIENT
Start: 2022-01-01 | End: 2022-01-01 | Stop reason: HOSPADM

## 2022-01-01 RX ORDER — MINERAL OIL AND WHITE PETROLATUM 150; 830 MG/G; MG/G
1 OINTMENT OPHTHALMIC
Status: DISCONTINUED | OUTPATIENT
Start: 2022-01-01 | End: 2022-01-01 | Stop reason: HOSPADM

## 2022-01-01 RX ORDER — CLINDAMYCIN PHOSPHATE 10 MG/G
GEL TOPICAL 2 TIMES DAILY
COMMUNITY
End: 2022-01-01

## 2022-01-01 RX ORDER — ALBUTEROL SULFATE 90 UG/1
1 AEROSOL, METERED RESPIRATORY (INHALATION) EVERY 4 HOURS PRN
COMMUNITY
End: 2022-01-01

## 2022-01-01 RX ORDER — LOPERAMIDE HCL 2 MG
2 CAPSULE ORAL
Status: ON HOLD | COMMUNITY
End: 2023-01-01

## 2022-01-01 RX ADMIN — AMLODIPINE BESYLATE 10 MG: 10 TABLET ORAL at 20:35

## 2022-01-01 RX ADMIN — DEXAMETHASONE 2 MG: 2 TABLET ORAL at 20:38

## 2022-01-01 RX ADMIN — Medication 1 TABLET: at 09:32

## 2022-01-01 RX ADMIN — PIPERACILLIN AND TAZOBACTAM 4.5 G: 4; .5 INJECTION, POWDER, FOR SOLUTION INTRAVENOUS at 00:34

## 2022-01-01 RX ADMIN — NYSTATIN 500000 UNITS: 100000 SUSPENSION ORAL at 16:30

## 2022-01-01 RX ADMIN — Medication 1 DROP: at 16:30

## 2022-01-01 RX ADMIN — NYSTATIN 500000 UNITS: 100000 SUSPENSION ORAL at 14:06

## 2022-01-01 RX ADMIN — PIPERACILLIN AND TAZOBACTAM 4.5 G: 4; .5 INJECTION, POWDER, FOR SOLUTION INTRAVENOUS at 13:55

## 2022-01-01 RX ADMIN — GABAPENTIN 300 MG: 300 CAPSULE ORAL at 21:06

## 2022-01-01 RX ADMIN — Medication 1 TABLET: at 08:15

## 2022-01-01 RX ADMIN — LIDOCAINE HYDROCHLORIDE 30 ML: 20 SOLUTION ORAL; TOPICAL at 10:04

## 2022-01-01 RX ADMIN — GABAPENTIN 300 MG: 300 CAPSULE ORAL at 15:03

## 2022-01-01 RX ADMIN — TAMSULOSIN HYDROCHLORIDE 0.4 MG: 0.4 CAPSULE ORAL at 08:52

## 2022-01-01 RX ADMIN — GABAPENTIN 300 MG: 300 CAPSULE ORAL at 20:38

## 2022-01-01 RX ADMIN — NYSTATIN 500000 UNITS: 100000 SUSPENSION ORAL at 10:22

## 2022-01-01 RX ADMIN — GABAPENTIN 300 MG: 300 CAPSULE ORAL at 14:06

## 2022-01-01 RX ADMIN — GADOBUTROL 9 ML: 604.72 INJECTION INTRAVENOUS at 09:07

## 2022-01-01 RX ADMIN — NYSTATIN 500000 UNITS: 100000 SUSPENSION ORAL at 09:15

## 2022-01-01 RX ADMIN — GABAPENTIN 300 MG: 300 CAPSULE ORAL at 07:46

## 2022-01-01 RX ADMIN — LISINOPRIL 40 MG: 20 TABLET ORAL at 08:15

## 2022-01-01 RX ADMIN — GABAPENTIN 300 MG: 300 CAPSULE ORAL at 13:38

## 2022-01-01 RX ADMIN — PANTOPRAZOLE SODIUM 40 MG: 40 TABLET, DELAYED RELEASE ORAL at 09:15

## 2022-01-01 RX ADMIN — TETRAHYDROZOLINE HCL 1 DROP: 0.05 LIQUID OPHTHALMIC at 22:57

## 2022-01-01 RX ADMIN — PIPERACILLIN AND TAZOBACTAM 4.5 G: 4; .5 INJECTION, POWDER, FOR SOLUTION INTRAVENOUS at 11:41

## 2022-01-01 RX ADMIN — HYDROMORPHONE HYDROCHLORIDE 0.5 MG: 1 INJECTION, SOLUTION INTRAMUSCULAR; INTRAVENOUS; SUBCUTANEOUS at 23:34

## 2022-01-01 RX ADMIN — OMEPRAZOLE 20 MG: 20 CAPSULE, DELAYED RELEASE ORAL at 07:42

## 2022-01-01 RX ADMIN — NYSTATIN 500000 UNITS: 100000 SUSPENSION ORAL at 07:40

## 2022-01-01 RX ADMIN — GABAPENTIN 300 MG: 300 CAPSULE ORAL at 20:36

## 2022-01-01 RX ADMIN — IOPAMIDOL 119 ML: 755 INJECTION, SOLUTION INTRAVENOUS at 21:37

## 2022-01-01 RX ADMIN — PANTOPRAZOLE SODIUM 40 MG: 40 TABLET, DELAYED RELEASE ORAL at 20:35

## 2022-01-01 RX ADMIN — SODIUM CHLORIDE, POTASSIUM CHLORIDE, SODIUM LACTATE AND CALCIUM CHLORIDE: 600; 310; 30; 20 INJECTION, SOLUTION INTRAVENOUS at 21:49

## 2022-01-01 RX ADMIN — ERGOCALCIFEROL 50000 UNITS: 1.25 CAPSULE, LIQUID FILLED ORAL at 12:55

## 2022-01-01 RX ADMIN — LISINOPRIL 40 MG: 20 TABLET ORAL at 07:46

## 2022-01-01 RX ADMIN — PANTOPRAZOLE SODIUM 40 MG: 40 TABLET, DELAYED RELEASE ORAL at 08:15

## 2022-01-01 RX ADMIN — OXYCODONE HYDROCHLORIDE 5 MG: 5 TABLET ORAL at 10:25

## 2022-01-01 RX ADMIN — GABAPENTIN 300 MG: 300 CAPSULE ORAL at 10:20

## 2022-01-01 RX ADMIN — TAMSULOSIN HYDROCHLORIDE 0.4 MG: 0.4 CAPSULE ORAL at 09:15

## 2022-01-01 RX ADMIN — Medication 1 TABLET: at 20:08

## 2022-01-01 RX ADMIN — AMLODIPINE BESYLATE 10 MG: 10 TABLET ORAL at 21:05

## 2022-01-01 RX ADMIN — GABAPENTIN 300 MG: 300 CAPSULE ORAL at 07:40

## 2022-01-01 RX ADMIN — DEXTROSE AND SODIUM CHLORIDE: 5; 450 INJECTION, SOLUTION INTRAVENOUS at 01:40

## 2022-01-01 RX ADMIN — GABAPENTIN 300 MG: 300 CAPSULE ORAL at 08:36

## 2022-01-01 RX ADMIN — DEXAMETHASONE 4 MG: 4 TABLET ORAL at 13:55

## 2022-01-01 RX ADMIN — APIXABAN 5 MG: 5 TABLET, FILM COATED ORAL at 09:15

## 2022-01-01 RX ADMIN — GABAPENTIN 300 MG: 300 CAPSULE ORAL at 20:08

## 2022-01-01 RX ADMIN — OMEPRAZOLE 20 MG: 20 CAPSULE, DELAYED RELEASE ORAL at 08:53

## 2022-01-01 RX ADMIN — DEXAMETHASONE 2 MG: 2 TABLET ORAL at 07:40

## 2022-01-01 RX ADMIN — ACETAMINOPHEN 650 MG: 325 TABLET, FILM COATED ORAL at 05:26

## 2022-01-01 RX ADMIN — DEXAMETHASONE 1 MG: 1 TABLET ORAL at 09:15

## 2022-01-01 RX ADMIN — GABAPENTIN 300 MG: 300 CAPSULE ORAL at 08:52

## 2022-01-01 RX ADMIN — OMEPRAZOLE 20 MG: 20 CAPSULE, DELAYED RELEASE ORAL at 08:36

## 2022-01-01 RX ADMIN — APIXABAN 10 MG: 5 TABLET, FILM COATED ORAL at 03:19

## 2022-01-01 RX ADMIN — SODIUM CHLORIDE 1000 ML: 9 INJECTION, SOLUTION INTRAVENOUS at 22:45

## 2022-01-01 RX ADMIN — APIXABAN 5 MG: 5 TABLET, FILM COATED ORAL at 20:08

## 2022-01-01 RX ADMIN — AMLODIPINE BESYLATE 10 MG: 5 TABLET ORAL at 08:36

## 2022-01-01 RX ADMIN — Medication 1 TABLET: at 20:38

## 2022-01-01 RX ADMIN — AMLODIPINE BESYLATE 10 MG: 10 TABLET ORAL at 08:15

## 2022-01-01 RX ADMIN — Medication 1 TABLET: at 08:52

## 2022-01-01 RX ADMIN — DEXAMETHASONE 2 MG: 2 TABLET ORAL at 20:08

## 2022-01-01 RX ADMIN — CETIRIZINE HYDROCHLORIDE 10 MG: 10 TABLET, FILM COATED ORAL at 20:35

## 2022-01-01 RX ADMIN — HYDROCORTISONE: 1 CREAM TOPICAL at 20:16

## 2022-01-01 RX ADMIN — SODIUM CHLORIDE, POTASSIUM CHLORIDE, SODIUM LACTATE AND CALCIUM CHLORIDE 1000 ML: 600; 310; 30; 20 INJECTION, SOLUTION INTRAVENOUS at 20:39

## 2022-01-01 RX ADMIN — HYDROCORTISONE: 1 CREAM TOPICAL at 20:38

## 2022-01-01 RX ADMIN — GABAPENTIN 300 MG: 300 CAPSULE ORAL at 14:53

## 2022-01-01 RX ADMIN — ACETAMINOPHEN 1000 MG: 500 TABLET, FILM COATED ORAL at 03:19

## 2022-01-01 RX ADMIN — HYDROCORTISONE: 1 CREAM TOPICAL at 09:17

## 2022-01-01 RX ADMIN — GABAPENTIN 300 MG: 300 CAPSULE ORAL at 09:15

## 2022-01-01 RX ADMIN — NYSTATIN 500000 UNITS: 100000 SUSPENSION ORAL at 12:56

## 2022-01-01 RX ADMIN — PANTOPRAZOLE SODIUM 40 MG: 40 TABLET, DELAYED RELEASE ORAL at 07:45

## 2022-01-01 RX ADMIN — ACETAMINOPHEN 1000 MG: 500 TABLET, FILM COATED ORAL at 15:03

## 2022-01-01 RX ADMIN — DEXAMETHASONE 2 MG: 2 TABLET ORAL at 16:14

## 2022-01-01 RX ADMIN — APIXABAN 5 MG: 5 TABLET, FILM COATED ORAL at 07:40

## 2022-01-01 RX ADMIN — NYSTATIN 500000 UNITS: 100000 SUSPENSION ORAL at 20:08

## 2022-01-01 RX ADMIN — GABAPENTIN 300 MG: 300 CAPSULE ORAL at 13:55

## 2022-01-01 RX ADMIN — TAMSULOSIN HYDROCHLORIDE 0.4 MG: 0.4 CAPSULE ORAL at 07:45

## 2022-01-01 RX ADMIN — Medication 1 TABLET: at 07:46

## 2022-01-01 RX ADMIN — PROCHLORPERAZINE MALEATE 10 MG: 10 TABLET ORAL at 01:12

## 2022-01-01 RX ADMIN — DOXEPIN HYDROCHLORIDE 10 MG: 10 SOLUTION ORAL at 22:00

## 2022-01-01 RX ADMIN — Medication 1 TABLET: at 18:16

## 2022-01-01 RX ADMIN — APIXABAN 5 MG: 5 TABLET, FILM COATED ORAL at 08:52

## 2022-01-01 RX ADMIN — DEXAMETHASONE 4 MG: 4 TABLET ORAL at 21:06

## 2022-01-01 RX ADMIN — CALCIUM CARBONATE (ANTACID) CHEW TAB 500 MG 500 MG: 500 CHEW TAB at 10:20

## 2022-01-01 RX ADMIN — NYSTATIN 500000 UNITS: 100000 SUSPENSION ORAL at 15:03

## 2022-01-01 RX ADMIN — HYDROMORPHONE HYDROCHLORIDE 0.5 MG: 1 INJECTION, SOLUTION INTRAMUSCULAR; INTRAVENOUS; SUBCUTANEOUS at 21:15

## 2022-01-01 RX ADMIN — SENNOSIDES 1 TABLET: 8.6 TABLET, FILM COATED ORAL at 20:44

## 2022-01-01 RX ADMIN — DEXAMETHASONE 4 MG: 4 TABLET ORAL at 07:45

## 2022-01-01 RX ADMIN — GADOBUTROL 8.5 ML: 604.72 INJECTION INTRAVENOUS at 00:23

## 2022-01-01 RX ADMIN — PIPERACILLIN AND TAZOBACTAM 4.5 G: 4; .5 INJECTION, POWDER, FOR SOLUTION INTRAVENOUS at 18:16

## 2022-01-01 RX ADMIN — GABAPENTIN 300 MG: 300 CAPSULE ORAL at 08:15

## 2022-01-01 RX ADMIN — OXYCODONE HYDROCHLORIDE 5 MG: 5 TABLET ORAL at 17:39

## 2022-01-01 RX ADMIN — CETIRIZINE HYDROCHLORIDE 10 MG: 10 TABLET, FILM COATED ORAL at 10:20

## 2022-01-01 RX ADMIN — OXYCODONE HYDROCHLORIDE 5 MG: 5 TABLET ORAL at 02:31

## 2022-01-01 RX ADMIN — TAMSULOSIN HYDROCHLORIDE 0.4 MG: 0.4 CAPSULE ORAL at 08:15

## 2022-01-01 RX ADMIN — AMLODIPINE BESYLATE 10 MG: 5 TABLET ORAL at 08:52

## 2022-01-01 RX ADMIN — DEXAMETHASONE 2 MG: 2 TABLET ORAL at 08:53

## 2022-01-01 RX ADMIN — CALCIUM CARBONATE (ANTACID) CHEW TAB 500 MG 500 MG: 500 CHEW TAB at 20:35

## 2022-01-01 RX ADMIN — NYSTATIN 500000 UNITS: 100000 SUSPENSION ORAL at 12:40

## 2022-01-01 RX ADMIN — GADOBUTROL 9 ML: 604.72 INJECTION INTRAVENOUS at 08:37

## 2022-01-01 RX ADMIN — AMLODIPINE BESYLATE 10 MG: 10 TABLET ORAL at 10:20

## 2022-01-01 RX ADMIN — NYSTATIN 500000 UNITS: 100000 SUSPENSION ORAL at 20:36

## 2022-01-01 RX ADMIN — AMLODIPINE BESYLATE 10 MG: 10 TABLET ORAL at 09:15

## 2022-01-01 RX ADMIN — DEXAMETHASONE 4 MG: 4 TABLET ORAL at 13:10

## 2022-01-01 RX ADMIN — DEXAMETHASONE 1 MG: 1 TABLET ORAL at 10:27

## 2022-01-01 RX ADMIN — OXYCODONE HYDROCHLORIDE 5 MG: 5 TABLET ORAL at 11:49

## 2022-01-01 RX ADMIN — HYDROCORTISONE: 1 CREAM TOPICAL at 10:23

## 2022-01-01 RX ADMIN — APIXABAN 5 MG: 5 TABLET, FILM COATED ORAL at 20:11

## 2022-01-01 RX ADMIN — NYSTATIN 500000 UNITS: 100000 SUSPENSION ORAL at 20:38

## 2022-01-01 RX ADMIN — CYCLOBENZAPRINE 10 MG: 5 TABLET, FILM COATED ORAL at 01:12

## 2022-01-01 RX ADMIN — SODIUM CHLORIDE, POTASSIUM CHLORIDE, SODIUM LACTATE AND CALCIUM CHLORIDE: 600; 310; 30; 20 INJECTION, SOLUTION INTRAVENOUS at 02:41

## 2022-01-01 RX ADMIN — CALCIUM CARBONATE 500 MG: 500 TABLET, CHEWABLE ORAL at 21:06

## 2022-01-01 RX ADMIN — NYSTATIN 500000 UNITS: 100000 SUSPENSION ORAL at 20:11

## 2022-01-01 RX ADMIN — APIXABAN 10 MG: 5 TABLET, FILM COATED ORAL at 15:03

## 2022-01-01 RX ADMIN — CETIRIZINE HYDROCHLORIDE 10 MG: 10 TABLET, FILM COATED ORAL at 09:15

## 2022-01-01 RX ADMIN — APIXABAN 5 MG: 5 TABLET, FILM COATED ORAL at 20:35

## 2022-01-01 RX ADMIN — DEXAMETHASONE 4 MG: 4 TABLET ORAL at 15:58

## 2022-01-01 RX ADMIN — GABAPENTIN 300 MG: 300 CAPSULE ORAL at 20:11

## 2022-01-01 RX ADMIN — APIXABAN 5 MG: 5 TABLET, FILM COATED ORAL at 10:21

## 2022-01-01 RX ADMIN — NYSTATIN 500000 UNITS: 100000 SUSPENSION ORAL at 17:18

## 2022-01-01 RX ADMIN — AMLODIPINE BESYLATE 10 MG: 5 TABLET ORAL at 07:40

## 2022-01-01 RX ADMIN — PANTOPRAZOLE SODIUM 40 MG: 40 TABLET, DELAYED RELEASE ORAL at 10:21

## 2022-01-01 RX ADMIN — PIPERACILLIN AND TAZOBACTAM 4.5 G: 4; .5 INJECTION, POWDER, FOR SOLUTION INTRAVENOUS at 05:58

## 2022-01-01 RX ADMIN — DEXAMETHASONE 4 MG: 4 TABLET ORAL at 08:15

## 2022-01-01 RX ADMIN — TAMSULOSIN HYDROCHLORIDE 0.4 MG: 0.4 CAPSULE ORAL at 07:40

## 2022-01-01 ASSESSMENT — ACTIVITIES OF DAILY LIVING (ADL)
ADLS_ACUITY_SCORE: 20
ADLS_ACUITY_SCORE: 37
ADLS_ACUITY_SCORE: 20
ADLS_ACUITY_SCORE: 18
ADLS_ACUITY_SCORE: 35
FALL_HISTORY_WITHIN_LAST_SIX_MONTHS: NO
ADLS_ACUITY_SCORE: 18
ADLS_ACUITY_SCORE: 20
ADLS_ACUITY_SCORE: 18
ADLS_ACUITY_SCORE: 20
ADLS_ACUITY_SCORE: 35
TOILETING_ISSUES: NO
SWALLOWING: 0-->SWALLOWS FOODS/LIQUIDS WITHOUT DIFFICULTY (DEVELOPMENTALLY APPROPRIATE)
DRESSING/BATHING_DIFFICULTY: NO
WALKING_OR_CLIMBING_STAIRS_DIFFICULTY: NO
ADLS_ACUITY_SCORE: 18
ADLS_ACUITY_SCORE: 18
ADLS_ACUITY_SCORE: 20
ADLS_ACUITY_SCORE: 18
WEAR_GLASSES_OR_BLIND: NO
ADLS_ACUITY_SCORE: 18
ADLS_ACUITY_SCORE: 35
ADLS_ACUITY_SCORE: 20
ADLS_ACUITY_SCORE: 18
CHANGE_IN_FUNCTIONAL_STATUS_SINCE_ONSET_OF_CURRENT_ILLNESS/INJURY: NO
ADLS_ACUITY_SCORE: 35
ADLS_ACUITY_SCORE: 18
ADLS_ACUITY_SCORE: 18
ADLS_ACUITY_SCORE: 35
ADLS_ACUITY_SCORE: 31
FALL_HISTORY_WITHIN_LAST_SIX_MONTHS: NO
ADLS_ACUITY_SCORE: 20
ADLS_ACUITY_SCORE: 18
SWALLOWING: 0-->SWALLOWS FOODS/LIQUIDS WITHOUT DIFFICULTY
DRESSING/BATHING_DIFFICULTY: NO
DOING_ERRANDS_INDEPENDENTLY_DIFFICULTY: NO
ADLS_ACUITY_SCORE: 35
ADLS_ACUITY_SCORE: 18
CHANGE_IN_FUNCTIONAL_STATUS_SINCE_ONSET_OF_CURRENT_ILLNESS/INJURY: NO
ADLS_ACUITY_SCORE: 18
ADLS_ACUITY_SCORE: 20
ADLS_ACUITY_SCORE: 18
ADLS_ACUITY_SCORE: 20
ADLS_ACUITY_SCORE: 18
ADLS_ACUITY_SCORE: 18
WALKING_OR_CLIMBING_STAIRS_DIFFICULTY: NO
ADLS_ACUITY_SCORE: 20
ADLS_ACUITY_SCORE: 20
ADLS_ACUITY_SCORE: 18
ADLS_ACUITY_SCORE: 33
ADLS_ACUITY_SCORE: 18
ADLS_ACUITY_SCORE: 33
ADLS_ACUITY_SCORE: 18
ADLS_ACUITY_SCORE: 18
DIFFICULTY_EATING/SWALLOWING: NO
EATING: 0-->INDEPENDENT
ADLS_ACUITY_SCORE: 20
CONCENTRATING,_REMEMBERING_OR_MAKING_DECISIONS_DIFFICULTY: NO
ADLS_ACUITY_SCORE: 18
DIFFICULTY_COMMUNICATING: NO
TOILETING_ISSUES: NO
ADLS_ACUITY_SCORE: 16
ADLS_ACUITY_SCORE: 18
DIFFICULTY_EATING/SWALLOWING: NO
ADLS_ACUITY_SCORE: 33
ADLS_ACUITY_SCORE: 18
ADLS_ACUITY_SCORE: 18
ADLS_ACUITY_SCORE: 35
ADLS_ACUITY_SCORE: 20
ADLS_ACUITY_SCORE: 18
EATING: 0-->INDEPENDENT
CONCENTRATING,_REMEMBERING_OR_MAKING_DECISIONS_DIFFICULTY: NO
ADLS_ACUITY_SCORE: 18
ADLS_ACUITY_SCORE: 20
HEARING_DIFFICULTY_OR_DEAF: NO
ADLS_ACUITY_SCORE: 18
ADLS_ACUITY_SCORE: 18
WEAR_GLASSES_OR_BLIND: NO
ADLS_ACUITY_SCORE: 20
ADLS_ACUITY_SCORE: 18
ADLS_ACUITY_SCORE: 18
DOING_ERRANDS_INDEPENDENTLY_DIFFICULTY: NO
ADLS_ACUITY_SCORE: 18

## 2022-01-01 ASSESSMENT — ENCOUNTER SYMPTOMS
COUGH: 1
NAUSEA: 1
NUMBNESS: 0
SORE THROAT: 0
BACK PAIN: 1
EYE PAIN: 1
NAUSEA: 1
WEAKNESS: 0
DIZZINESS: 1
DIFFICULTY URINATING: 0
TINGLING: 1
ABDOMINAL PAIN: 1
VOMITING: 0
HEMATURIA: 0
DOUBLE VISION: 1
HEADACHES: 1
CONSTIPATION: 1
DIZZINESS: 1
COUGH: 1
PALPITATIONS: 1
INSOMNIA: 1
SEIZURES: 0
HEADACHES: 1
RHINORRHEA: 0
SHORTNESS OF BREATH: 1
DIARRHEA: 0
PALPITATIONS: 0

## 2022-01-01 ASSESSMENT — COLUMBIA-SUICIDE SEVERITY RATING SCALE - C-SSRS
3. HAVE YOU BEEN THINKING ABOUT HOW YOU MIGHT KILL YOURSELF?: NO
6. HAVE YOU EVER DONE ANYTHING, STARTED TO DO ANYTHING, OR PREPARED TO DO ANYTHING TO END YOUR LIFE?: NO
4. HAVE YOU HAD THESE THOUGHTS AND HAD SOME INTENTION OF ACTING ON THEM?: NO
1. IN THE PAST MONTH, HAVE YOU WISHED YOU WERE DEAD OR WISHED YOU COULD GO TO SLEEP AND NOT WAKE UP?: NO
5. HAVE YOU STARTED TO WORK OUT OR WORKED OUT THE DETAILS OF HOW TO KILL YOURSELF? DO YOU INTEND TO CARRY OUT THIS PLAN?: NO
2. HAVE YOU ACTUALLY HAD ANY THOUGHTS OF KILLING YOURSELF IN THE PAST MONTH?: NO

## 2022-01-01 ASSESSMENT — PAIN SCALES - GENERAL
PAINLEVEL: MODERATE PAIN (4)
PAINLEVEL: NO PAIN (0)

## 2022-07-27 ENCOUNTER — TRANSFERRED RECORDS (OUTPATIENT)
Dept: HEALTH INFORMATION MANAGEMENT | Facility: CLINIC | Age: 52
End: 2022-07-27

## 2022-08-11 ENCOUNTER — MEDICAL CORRESPONDENCE (OUTPATIENT)
Dept: HEALTH INFORMATION MANAGEMENT | Facility: CLINIC | Age: 52
End: 2022-08-11

## 2022-08-17 NOTE — TELEPHONE ENCOUNTER
With the assistance of a  I called both Justin John at [phone] and spouse, Alda Billings, at [phone] to schedule a gamma knife consult with Dr. Schaefer. No answer on either lines, and I left messages on both lines. I can be reach at [phone].

## 2022-08-17 NOTE — NURSING NOTE
Date: 2022   Age: 52 year old  Ethnicity:   Sex: male  : 1970   Lives In: [place], [place]    Diagnosis: renal cell carcinoma; left     Prior radiation therapy:   Site Treated: left hilar nodule (5000 cGy in 5 fx); left upper lobe nodule (5400 cGy in 3 fx); left lower lobe nodule (5400 cGy in 3 fx)  Facility: [facility]  Dates: 16- 17  Dose: noted above     Site Treated: Rt humerus  Facility: [facility] [place] [facility]  Dates: 20- 20  Dose: 3000 cGy in 10 fx    Site Treated: clivus  Facility: [facility]  Dates: 2/10/21- 21  Dose: 3900 cGy in 13 fx     Site treated: skull right parietal  Facility: [facility] [place] [facility]  Dates: 3/30/21- 21  Dose: 3000 cGy    Site treated: skull right parietal; retreatment  Facility: [facility] [place] [facility]  Dates: 8/3/22- 8/15/22  Dose: completed 10 of 13 (Planned 3900 cGy in 13 fractions)     Prior chemotherapy:   See below    Pain at time of consult?  Does patient have a living will?  Does patient have an implanted cardiac device?    RN time with patient:  Educated on gamma knife?    Fall Screen:  Have you fallen in the past week?   Have you felt unsteady when walking or standing in the past week?     Physicians: Dr. Brielle Gilliland (PCP); Dr. Lucho Calixto (rad onc [facility])    Review Since Diagnosis:    2016: left kidney mass found incidentally during work-up for abdominal pain.    CT chest shows presence of multiple, but very small pulmonary nodules concerning for metastatic disease.     Brain MRI and bone scan were negative for metastatic disease    3/9/16: left sided nephrectomy. Pathology showed renal cell carcinoma.     Followed with serial CT scans    10/2016: CT showed increase in size of pulmonary nodules concerning for metastatic disease    16: biopsy LLL nodule positive for malignancy; consistent with metastatic carcinoma of renal origin    2016-2017: stereotactic radiation to pulmonary nodules as noted above    April-2017-s/p # 4 cycles of Interleukin-2 therapy  at [facility]    1/5/18: C1 D1 Nivolumab; palliative treatment started due to progressing pulmonary mets    Jan-Feb 2019: CT CAP showed interval enlargement of 2 right renal masses consistent with RCC. Stable pulmonary metastatic disease.     Jan 2019: one right renal mass was biopsied but benign but MD felt this was still metastatic disease since the kidney masses were growing    4/23/19: microwave ablation to larger right renal mass.    5/3/19: # 1 ipilumumab and nivolumab. Switched given disease progression in right kidney    7/26/19: started maintenance nivolumab    10/21/19: CT chest showed pulmonary nodules, mediastinal lymphadenopathy and other masses have increased in size when compared to previous exam.     10/29/19: CT A/P showed worsening right kidney masses consistent with metastasis; increase in size of right adrenal nodule, pancreatic nodule consistent with mets.     11/1/19: switched to second line carbozantinib    11/21/19: carbozantinib held due to cough/cramps. Hospitalized     11/26/19: bronchoscopy shows airways erythematous and inflamed. Given steroids, nebs; opioids to better control cough    01/2020: reported bony pain in right elbow which he first noticed four months earlier after a fall.     1/13/20: right elbow XR with findings consistent with metastatic destructive lesion of the distal humerus without acute traumatic abnormality    1/13/20 CT AP> right renal tumor slightly smaller; pancreatic nodule not significantly changed; liver lesion appears unchanged and most likely represents a benign hemangioma; lung metastasis appear similar to recent chest CT; consolidation in the right lower lobe has improved    1/22/20: started carbozantinib at reduced dose rt cough    1/23/20: bone scan only showed right distal humerus met and initiated on palliative RT as noted above    2/24/20: increased dose of carbozanitinib. Doing well    10/23/20: changed treatment to axitinib since CT CAP showed  progression in lung nodules, right adrenal gland and right kidney    11/29/20: [facility] ridges with right flank pain. CT CAP shows worsening disease in lungs, kidney, adrenals compared to 2017. Head CT commented on possible 2 mm left frontal lobe cortex    12/2020: brain mri shows no mets    12/11/20: axitinib reduced due to cough    1/25/21: CT CAP showed stable disease.     Bone scan showed possible increased uptake clivus     2/26/21 completed radiation to clivus as noted above    3/21/21: ED for headache. CT head showed no parietal bone mets. CT chest showed left hilar mass increased in size    3/23/21: brain MRI showed new parietal skull mets    4/12/21: completed parietal skull radiation as noted above    4/15/21: given some increase in left hilar mass from 3/2021 CT and abdominal pain/intolerance to axitinib, started everolimus, awaiting lenvatinib approval    5/4/21: started lenvatinib    1/1/22: changed lenvatinib and everolimus doses to every other day due to intolerable rash.Patient feels better on dose reduction    4/2022: brain MRI stable    5/2022 CT CAP and bone scan stable    7/14/22: MRI brain shows progression of right parietal and clivus mets.Nodular anterior intracranial component measures 11 x 5 mm (previously 7 x 3 mm), in the nodular extracranial component measures 12 x 4 mm (previously 10 x 3 mm)    7/27/22: COVID +    8/3/22: radiation as noted above; holding oral chemo rt radiation.     8/9/22: endorses pain on the right side of head. Issues with vision from the right eye., with double vision. (noted to have 6th nerve palsy).  Also notes right periorbital pain. Continue radiation.    8/9/22: brain MRI showed overall stable metastatic mass     8/10/22: review of brain MRI. 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

## 2022-08-18 NOTE — PROGRESS NOTES
HPI  Date: 2022   Age: 52 year old  Ethnicity:   Sex: male  : 1970   Lives In: [place], [place]    Diagnosis: renal cell carcinoma; left     Prior radiation therapy:   Site Treated: left hilar nodule (5000 cGy in 5 fx); left upper lobe nodule (5400 cGy in 3 fx); left lower lobe nodule (5400 cGy in 3 fx)  Facility: [facility]  Dates: 16- 17  Dose: noted above     Site Treated: Rt humerus  Facility: [facility]  Dates: 20- 20  Dose: 3000 cGy in 10 fx    Site Treated: clivus  Facility: [facility]  Dates: 2/10/21- 21  Dose: 3900 cGy in 13 fx     Site treated: skull right parietal  Facility: [facility]  Dates: 3/30/21- 21  Dose: 3000 cGy    Site treated: skull right parietal; retreatment  Facility: [facility]  Dates: 8/3/22- 8/15/22  Dose: completed 10 of 13 (Planned 3900 cGy in 13 fractions)     Prior chemotherapy:   See below    Pain at time of consult? 4; top of head  Does patient have a living will? no  Does patient have an implanted cardiac device? no    RN time with patient: 40 minutes  Educated on gamma knife? Yes    Fall Screen:  Have you fallen in the past week? No  Have you felt unsteady when walking or standing in the past week? Yes rt vision    Physicians: Dr. Brielle Gilliland (PCP); Dr. Lucho Calixto (rad onc [facility])    Review Since Diagnosis:    2016: left kidney mass found incidentally during work-up for abdominal pain.    CT chest shows presence of multiple, but very small pulmonary nodules concerning for metastatic disease.     Brain MRI and bone scan were negative for metastatic disease    3/9/16: left sided nephrectomy. Pathology showed renal cell carcinoma.     Followed with serial CT scans    10/2016: CT showed increase in size of pulmonary nodules concerning for metastatic disease    16: biopsy LLL nodule positive for malignancy; consistent with metastatic carcinoma of renal origin    2016-2017: stereotactic radiation to pulmonary nodules as noted  above    April-June 2017-s/p # 4 cycles of Interleukin-2 therapy at [facility]    1/5/18: C1 D1 Nivolumab; palliative treatment started due to progressing pulmonary mets    Jan-Feb 2019: CT CAP showed interval enlargement of 2 right renal masses consistent with RCC. Stable pulmonary metastatic disease.     Jan 2019: one right renal mass was biopsied but benign but MD felt this was still metastatic disease since the kidney masses were growing    4/23/19: microwave ablation to larger right renal mass.    5/3/19: # 1 ipilumumab and nivolumab. Switched given disease progression in right kidney    7/26/19: started maintenance nivolumab    10/21/19: CT chest showed pulmonary nodules, mediastinal lymphadenopathy and other masses have increased in size when compared to previous exam.     10/29/19: CT A/P showed worsening right kidney masses consistent with metastasis; increase in size of right adrenal nodule, pancreatic nodule consistent with mets.     11/1/19: switched to second line carbozantinib    11/21/19: carbozantinib held due to cough/cramps. Hospitalized     11/26/19: bronchoscopy shows airways erythematous and inflamed. Given steroids, nebs; opioids to better control cough    01/2020: reported bony pain in right elbow which he first noticed four months earlier after a fall.     1/13/20: right elbow XR with findings consistent with metastatic destructive lesion of the distal humerus without acute traumatic abnormality    1/13/20 CT AP> right renal tumor slightly smaller; pancreatic nodule not significantly changed; liver lesion appears unchanged and most likely represents a benign hemangioma; lung metastasis appear similar to recent chest CT; consolidation in the right lower lobe has improved    1/22/20: started carbozantinib at reduced dose rt cough    1/23/20: bone scan only showed right distal humerus met and initiated on palliative RT as noted above    2/24/20: increased dose of carbozanitinib. Doing  well    10/23/20: changed treatment to axitinib since CT CAP showed progression in lung nodules, right adrenal gland and right kidney    11/29/20: [facility] ridges with right flank pain. CT CAP shows worsening disease in lungs, kidney, adrenals compared to 2017. Head CT commented on possible 2 mm left frontal lobe cortex    12/2020: brain mri shows no mets    12/11/20: axitinib reduced due to cough    1/25/21: CT CAP showed stable disease.     Bone scan showed possible increased uptake clivus     2/26/21 completed radiation to clivus as noted above    3/21/21: ED for headache. CT head showed no parietal bone mets. CT chest showed left hilar mass increased in size    3/23/21: brain MRI showed new parietal skull mets    4/12/21: completed parietal skull radiation as noted above    4/15/21: given some increase in left hilar mass from 3/2021 CT and abdominal pain/intolerance to axitinib, started everolimus, awaiting lenvatinib approval    5/4/21: started lenvatinib    1/1/22: changed lenvatinib and everolimus doses to every other day due to intolerable rash.Patient feels better on dose reduction    4/2022: brain MRI stable    5/2022 CT CAP and bone scan stable    7/14/22: MRI brain shows progression of right parietal and clivus mets.Nodular anterior intracranial component measures 11 x 5 mm (previously 7 x 3 mm), in the nodular extracranial component measures 12 x 4 mm (previously 10 x 3 mm)    7/27/22: COVID +    8/3/22: radiation as noted above; holding oral chemo rt radiation.     8/9/22: endorses pain on the right side of head. Issues with vision from the right eye., with double vision. (noted to have 6th nerve palsy).  Also notes right periorbital pain. Continue radiation.    8/9/22: brain MRI showed overall stable metastatic mass     8/10/22: review of brain MRI. 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        COVID + 7/27/22.   Poor energy. \"Not Normal\"   HENT: Positive for tinnitus.         Tinnitus bilat; started 3-4 weeks ago. Gets worse when laying in bed and also bending forward. Right now hears \"buzzing\" noise.    Eyes: Positive for double vision and pain.        Right eye: double vision. Eye floats in towards nose. If tries to use right eye to see he gets dizzy   Left eye: blue lights flashing constant. Can not read far away since yesterday (> 5ft). Can read if close up.  Pain in right eye; gets worse as day goes on.    Respiratory: Positive for cough and shortness of breath.         Episode of coughing after using Lysol 8/17/22. Continues with cough. \"Feels heavy in (sternal) area.\" Worse has bends forward. States he did not have cough prior to this episode   Cardiovascular: Positive for palpitations.        Describes palpitations after urinating and laying flat   Gastrointestinal: Positive for constipation and nausea.        Appetite fair to poor  Nausea in morning   Genitourinary: Negative.    Musculoskeletal: Positive for back pain.        Describes upper back/shoulder pain and pain in back of mid-lower skull.   Skin: Negative.    Neurological: Positive for dizziness, tingling and headaches. Negative for seizures.   Endo/Heme/Allergies: Positive for environmental allergies.        Runny nose with pollen   Psychiatric/Behavioral: The patient has insomnia.                "

## 2022-08-18 NOTE — PROGRESS NOTES
Department of Radiation Oncology  [address] [address]  [phone]       Consultation Note    Name: Julio John MRN: [id number]   : 1970   Date of Service: 2022  Referring: Dr. Lucho Calixto [place] [place]     Reason for consultation: Metastatic renal cell carcinoma with progressive symptomatic disease involving the right skull base    History of Present Illness   2016: Diagnosed with metastatic renal cell carcinoma after presenting with abdominal pain. Imaging demonstrates a 7.8 x 6.7 x 6.7 cm left kidney mass with multiple small pulmonary nodules.    3/9/2016: Cytoreductive surgery with a left-sided nephrectomy. 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    10/2016: Repeat CT imaging demonstrates increased size of the pulmonary nodules.    2016: CT-guided biopsy of a left lower lobe nodule is positive for metastatic disease    2016-2017: Receives SBRT to pulmonary nodules at [facility]    2017: Restaging imaging demonstrates several new pulmonary nodules    2017-2017: 4 cycles of IL-2 therapy delivered at the [facility]    1/15/2018: Nivolumab started due to progressing pulmonary metastases    2019: RF ablation of a progressive right renal mass.     5/3/2019: Systemic therapy switched to ipilimumab and nivolumab due to disease progression.    2019: Began on maintenance nivolumab    2019: Started on cabozantinib secondary to disease progression with enlarging pulmonary nodules, mediastinal adenopathy and right kidney masses    2019: Cabozantinib held secondary to cough/cramps    2020: Imaging with a destructive metastatic lesion involving the distal right humerus. Cabozantinib restarted at a reduced dose (20 mg daily).    2020 - 2020: 30 Gy[place]10 [place] [facility] [place]: Increased  dose of cabozantinib to 40 mg daily    10/23/2020: Imaging with progressive disease involving the right adrenal gland, right kidney and pulmonary nodules. Systemic therapy changed to axitinib.    12/11/2020: Axitinib reduced to 3 mg twice daily secondary to a cough    2/10/2021 - 2/26/2021: 39 [place]13 [place] [facility] [place]: Imaging with evidence of disease progression as well as a new parietal skull met    3/30/2021 - 4/12/2021: 30 Gy[place]10 [place] parietal skull [place] [facility] [place]    4/2021 - 5/2021: Progressive pulmonary disease. Axitinib discontinued. Started on everolimus and lenvatinib.    7/14/2022: MRI brain demonstrates progression of the right parietal lesion as well as progressive disease within the clivus and adjacent soft tissues    8/3/2022 - 8/15/2022: 30 Gy/10 fractions delivered to the progressive right parietal metastasis (re-treatment). Initially planned for 39 Gy/13 fractions however treatment was discontinued early after Mr. Baron John developed progressive right cranial nerve VI palsy felt to be attributed to the progressive right skull base disease.    Past Medical History:    Hypertension    CVA    Metastatic renal cell carcinoma    Ethanol abuse    Cocaine abuse    Past Surgical History:    Left radical nephrectomy    Chemotherapy History:  See HPI    Radiation History:  See HPI    Pregnant: Not Applicable  Implanted Cardiac Devices: No    Medications:  Current Outpatient Medications   Medication Sig Dispense Refill     acetaminophen (TYLENOL) 325 MG tablet Take 325-650 mg by mouth every 6 hours as needed for mild pain Take 2 tablets once every 6 hours as needed for mild pain       amLODIPine (NORVASC) 10 MG tablet Take 10 mg by mouth daily       axitinib (INLYTA) 1 MG tablet Take by mouth 2 times daily Take 3 tablets (3 mg) twice daily. Indication: oral chemotherapy       calcium carbonate (TUMS) 500 MG chewable tablet Take 1 chew tab by  mouth 2 times daily Chew and swallow 1 tablet by mouth twice daily       calcium-vitamin D (OSCAL) 250-125 MG-UNIT TABS per tablet Take 1 tablet by mouth 2 times daily       cetirizine (ZYRTEC) 10 MG tablet Take 10 mg by mouth daily       everolimus (AFINITOR) 5 MG tablet One tablet every 48 hours       gabapentin (NEURONTIN) 300 MG capsule Take 300 mg by mouth 3 times daily Take 2 capsules 3 times daily       guaiFENesin (ROBITUSSIN) 100 MG/5ML SYRP Take 10 mLs by mouth every 4 hours as needed for cough       hypromellose-dextran (GENTEAL TEARS) 0.1-0.3 % ophthalmic solution 1 drop One drop in right eye 4 times daily       ibuprofen (ADVIL/MOTRIN) 200 MG tablet Take 200 mg by mouth every 4 hours as needed for mild pain       lisinopril (ZESTRIL) 40 MG tablet Take 40 mg by mouth daily       loperamide (IMODIUM) 2 MG capsule Take 2 mg by mouth Take 2 capsules at the onset of symptoms, then take 1 capsule as needed for diarrhea. DO NOT take more than 8 capsules in 24 hours.       omeprazole (PRILOSEC) 20 MG DR capsule Take 20 mg by mouth daily       oxyCODONE (ROXICODONE) 5 MG tablet Take 5 mg by mouth every 6 hours as needed for severe pain       prochlorperazine (COMPAZINE) 10 MG tablet Take 10 mg by mouth every 6 hours as needed for nausea       senna-docusate (SENOKOT-S/PERICOLACE) 8.6-50 MG tablet Take 1 tablet by mouth daily Take 1-2 tablets twice daily as needed as needed for constipation       sennosides (SENOKOT) 8.6 MG tablet Take 1 tablet by mouth daily       tamsulosin (FLOMAX) 0.4 MG capsule Take 0.4 mg by mouth daily One tablet after each meal       traMADol (ULTRAM) 50 MG tablet Take 50 mg by mouth every 6 hours as needed for severe pain        Allergies:    NKDA    Social History:  Tobacco: Never smoker  Alcohol: No current use  Lives in [place], [place]    Family History:    Noncontributory    Review of Systems   A 10-point review of systems was performed. Pertinent positives include:      Fatigue    Diplopia    Nonproductive cough    Constipation    Nausea without vomiting    Back pain    Headaches    Physical Exam   ECOG Status: 0    Vitals:  Weight: 90.7 kg  B/P: 120/71  P: 94  Pain: 4/10    General: Healthy-appearing 52-year-old gentleman seated comfortably in a chair in no acute distress  HEENT: NC/AT. No rhinorrhea or epistaxis. Moist mucous membranes. No visible oral cavity lesions.  Pulmonary: No wheezing, stridor or respiratory distress  Skin: Normal color and turgor  Neuro: A/O x3. 5/5 motor strength in bilateral upper/lower extremities. 1+ patellar reflexes bilaterally. No clonus. Normal gait.  Cranial Nerve Exam  I: Not tested  II: Visual fields full by confrontation. 20/25 vision in the right eye and 20/20 on the left.  III/IV/VI: PERRL. EOMI full on the left with failure to abduct beyond midline on the right.  V: Decreased sensation within the right V1-V3 distribution  VII: No facial weakness or asymmetry.   VIII: Hearing is grossly intact and symmetric.  IX/X: Palate elevates symmetrically. Normal phonation.  XI: Strength is 5/5 in bilateral trapezius and SCM musculature.  XII: Tongue protrudes in the midline. No atrophy or fasciculations.     Imaging/Path/Labs   Imaging: Reviewed    Path: Reviewed    Labs: Reviewed    Assessment    Mr. Baron John is a 52 year old male with a metastatic renal cell carcinoma with symptomatic disease progression involving the right cavernous sinus. He is being referred by Dr. Calixto at [facility] [place] for consideration of skull base reirradiation using the Gamma Knife unit available at the [employer].    Plan   I discussed consideration of skull base radiosurgery for the treatment of Mr. Baron John's recurrent right cavernous sinus disease. Complicating his clinical picture is the fact that he has previously received radiotherapy to this region and likely has some low-dose contribution from prior treatment of his right parietal disease. To this end, I  concur with the assessment made by my colleague, Dr. Calixto, at [facility] [place] for consideration of radiosurgery to provide dose escalation of the tumor while attempting to minimize dose to the surrounding organs at risk to the greatest extent possible. I reviewed the risk/benefits of the skull base reirradiation with Mr. Baron John at length. He had several pertinent questions which were answered to his stated satisfaction and he was amenable to proceeding with treatment. Informed consent was obtained in clinic.    I will plan to have Mr. Baron John return to clinic in the near future for a MRI and CT simulation and mask fabrication for planning of his skull base reirradiation. In the meantime, I will also discuss his case with my neurosurgery and ENT colleagues, Dr. Chowdhury and Be, to determine if he would be an appropriate candidate for surgical debulking prior to radiosurgery. If they feel that surgical decompression would be feasible and would maximize Mr. Baron John's odds of obtaining more robust local disease control, I discussed with Mr. Baron John that we could then move forward with surgery followed by adjuvant Gamma Knife SRS to any residual tumor.    Mr. Baron John indicated that he understood and was in agreement with this plan and I provided him with my contact information should he have any additional questions following our visit today.    Chano Schaefer MD/PhD    Dept of Radiation Oncology  [facility]

## 2022-08-18 NOTE — LETTER
2022         RE: Julio John  [address]  [facility] [address]         Department of Radiation Oncology  [facility]  [address] [id number]  [phone]       Consultation Note    Name: Julio John MRN: [id number]   : 1970   Date of Service: 2022  Referring: Dr. Lucho Calixto / radiation oncology ([facility])     Reason for consultation: Metastatic renal cell carcinoma with progressive symptomatic disease involving the right skull base    History of Present Illness   2016: Diagnosed with metastatic renal cell carcinoma after presenting with abdominal pain. Imaging demonstrates a 7.8 x 6.7 x 6.7 cm left kidney mass with multiple small pulmonary nodules.    3/9/2016: Cytoreductive surgery with a left-sided nephrectomy. 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    10/2016: Repeat CT imaging demonstrates increased size of the pulmonary nodules.    2016: CT-guided biopsy of a left lower lobe nodule is positive for metastatic disease    2016-2017: Receives SBRT to pulmonary nodules at [facility]    2017: Restaging imaging demonstrates several new pulmonary nodules    2017-2017: 4 cycles of IL-2 therapy delivered at the [facility]    1/15/2018: Nivolumab started due to progressing pulmonary metastases    2019: RF ablation of a progressive right renal mass.     5/3/2019: Systemic therapy switched to ipilimumab and nivolumab due to disease progression.    2019: Began on maintenance nivolumab    2019: Started on cabozantinib secondary to disease progression with enlarging pulmonary nodules, mediastinal adenopathy and right kidney masses    2019: Cabozantinib held secondary to cough/cramps    2020: Imaging with a destructive metastatic lesion involving the distal right humerus. Cabozantinib restarted at a reduced dose (20 mg  daily).    1/27/2020 - 2/7/2020: 30 Gy/10 fractions [place] [facility]    2/24/2020: Increased dose of cabozantinib to 40 mg daily    10/23/2020: Imaging with progressive disease involving the right adrenal gland, right kidney and pulmonary nodules. Systemic therapy changed to axitinib.    12/11/2020: Axitinib reduced to 3 mg twice daily secondary to a cough    2/10/2021 - 2/26/2021: 39 Gy/13 fractions [place] the [place] [facility] [place]    3/2021: Imaging with evidence of disease progression as well as a new parietal skull met    3/30/2021 - 4/12/2021: 30 Gy/10 fractions delivered to a right parietal skull metastasis [place] [facility] [place]    4/2021 - 5/2021: Progressive pulmonary disease. Axitinib discontinued. Started on everolimus and lenvatinib.    7/14/2022: MRI brain demonstrates progression of the right parietal lesion as well as progressive disease within the clivus and adjacent soft tissues    8/3/2022 - 8/15/2022: 30 Gy/10 fractions delivered to the progressive right parietal metastasis (re-treatment). Initially planned for 39 Gy/13 fractions however treatment was discontinued early after Mr. Baron John developed progressive right cranial nerve VI palsy felt to be attributed to the progressive right skull base disease.    Past Medical History:    Hypertension    CVA    Metastatic renal cell carcinoma    Ethanol abuse    Cocaine abuse    Past Surgical History:    Left radical nephrectomy    Chemotherapy History:  See HPI    Radiation History:  See HPI    Pregnant: Not Applicable  Implanted Cardiac Devices: No    Medications:  Current Outpatient Medications   Medication Sig Dispense Refill     acetaminophen (TYLENOL) 325 MG tablet Take 325-650 mg by mouth every 6 hours as needed for mild pain Take 2 tablets once every 6 hours as needed for mild pain       amLODIPine (NORVASC) 10 MG tablet Take 10 mg by mouth daily       axitinib (INLYTA) 1 MG tablet Take by mouth 2 times daily Take 3 tablets (3 mg)  twice daily. Indication: oral chemotherapy       calcium carbonate (TUMS) 500 MG chewable tablet Take 1 chew tab by mouth 2 times daily Chew and swallow 1 tablet by mouth twice daily       calcium-vitamin D (OSCAL) 250-125 MG-UNIT TABS per tablet Take 1 tablet by mouth 2 times daily       cetirizine (ZYRTEC) 10 MG tablet Take 10 mg by mouth daily       everolimus (AFINITOR) 5 MG tablet One tablet every 48 hours       gabapentin (NEURONTIN) 300 MG capsule Take 300 mg by mouth 3 times daily Take 2 capsules 3 times daily       guaiFENesin (ROBITUSSIN) 100 MG/5ML SYRP Take 10 mLs by mouth every 4 hours as needed for cough       hypromellose-dextran (GENTEAL TEARS) 0.1-0.3 % ophthalmic solution 1 drop One drop in right eye 4 times daily       ibuprofen (ADVIL/MOTRIN) 200 MG tablet Take 200 mg by mouth every 4 hours as needed for mild pain       lisinopril (ZESTRIL) 40 MG tablet Take 40 mg by mouth daily       loperamide (IMODIUM) 2 MG capsule Take 2 mg by mouth Take 2 capsules at the onset of symptoms, then take 1 capsule as needed for diarrhea. DO NOT take more than 8 capsules in 24 hours.       omeprazole (PRILOSEC) 20 MG DR capsule Take 20 mg by mouth daily       oxyCODONE (ROXICODONE) 5 MG tablet Take 5 mg by mouth every 6 hours as needed for severe pain       prochlorperazine (COMPAZINE) 10 MG tablet Take 10 mg by mouth every 6 hours as needed for nausea       senna-docusate (SENOKOT-S/PERICOLACE) 8.6-50 MG tablet Take 1 tablet by mouth daily Take 1-2 tablets twice daily as needed as needed for constipation       sennosides (SENOKOT) 8.6 MG tablet Take 1 tablet by mouth daily       tamsulosin (FLOMAX) 0.4 MG capsule Take 0.4 mg by mouth daily One tablet after each meal       traMADol (ULTRAM) 50 MG tablet Take 50 mg by mouth every 6 hours as needed for severe pain        Allergies:    NKDA    Social History:  Tobacco: Never smoker  Alcohol: No current use  [facility] in [facility]  History:    Noncontributory    Review of Systems   A 10-point review of systems was performed. Pertinent positives include:     Fatigue    Diplopia    Nonproductive cough    Constipation    Nausea without vomiting    Back pain    Headaches    Physical Exam   ECOG Status: 0    Vitals:  Weight: 90.7 kg  B/P: 120/71  P: 94  Pain: 4/10    General: Healthy-appearing 52-year-old gentleman seated comfortably in a chair in no acute distress  HEENT: NC/AT. No rhinorrhea or epistaxis. Moist mucous membranes. No visible oral cavity lesions.  Pulmonary: No wheezing, stridor or respiratory distress  Skin: Normal color and turgor  Neuro: A/O x3. 5/5 motor strength in bilateral upper/lower extremities. 1+ patellar reflexes bilaterally. No clonus. Normal gait.  Cranial Nerve Exam  I: Not tested  II: Visual fields full by confrontation. 20/25 vision in the right eye and 20/20 on the left.  III/IV/VI: PERRL. EOMI full on the left with failure to abduct beyond midline on the right.  V: Decreased sensation within the right V1-V3 distribution  VII: No facial weakness or asymmetry.   VIII: Hearing is grossly intact and symmetric.  IX/X: Palate elevates symmetrically. Normal phonation.  XI: Strength is 5/5 in bilateral trapezius and SCM musculature.  XII: Tongue protrudes in the midline. No atrophy or fasciculations.     Imaging/Path/Labs   Imaging: Reviewed    Path: Reviewed    Labs: Reviewed    Assessment    Mr. Baron John is a 52 year old male with a metastatic renal cell carcinoma with symptomatic disease progression involving the right cavernous sinus. He is being referred by Dr. Calixto at [facility] for consideration of skull base reirradiation using the Gamma Knife unit available at the [facility].    Plan   I discussed consideration of skull base radiosurgery for the treatment of Mr. Baron John's recurrent right cavernous sinus disease. Complicating his clinical picture is the fact that he has previously received radiotherapy to  this region and likely has some low-dose contribution from prior treatment of his right parietal disease. To this end, I concur with the assessment made by my colleague, Dr. Calixto, at [facility] for consideration of radiosurgery to provide dose escalation of the tumor while attempting to minimize dose to the surrounding organs at risk to the greatest extent possible. I reviewed the risk/benefits of the skull base reirradiation with Mr. Baron John at length. He had several pertinent questions which were answered to his stated satisfaction and he was amenable to proceeding with treatment. Informed consent was obtained in clinic.    I will plan to have Mr. Baron John return to clinic in the near future for a MRI and CT simulation and mask fabrication for planning of his skull base reirradiation. In the meantime, I will also discuss his case with my neurosurgery and ENT colleagues, Dr. Chowdhury and Be, to determine if he would be an appropriate candidate for surgical debulking prior to radiosurgery. If they feel that surgical decompression would be feasible and would maximize Mr. Baron John's odds of obtaining more robust local disease control, I discussed with Mr. Baron John that we could then move forward with surgery followed by adjuvant Gamma Knife SRS to any residual tumor.    Mr. Baron John indicated that he understood and was in agreement with this plan and I provided him with my contact information should he have any additional questions following our visit today.    Chano Schaefer MD/PhD    Dept of Radiation Oncology  [facility]       HPI  Date: 2022   Age: 52 year old  Ethnicity:   Sex: male  : 1970   Lives In: [place], [place]    Diagnosis: renal cell carcinoma; left     Prior radiation therapy:   Site Treated: left hilar nodule (5000 cGy in 5 fx); left upper lobe nodule (5400 cGy in 3 fx); left lower lobe nodule (5400 cGy in 3  fx)  Facility: [facility]  Dates: 12/27/16- 1/27/17  Dose: noted above     Site Treated: Rt humerus  [place] [facility]  Dates: 1/27/20- 2/7/20  Dose: 3000 cGy in 10 fx    Site Treated: clivus  Facility: [facility]  Dates: 2/10/21- 2/26/21  Dose: 3900 cGy in 13 fx     Site treated: skull right parietal  Facility: [facility]  Dates: 3/30/21- 4/12/21  Dose: 3000 cGy    Site treated: skull right parietal; retreatment  Facility: [facility]  Dates: 8/3/22- 8/15/22  Dose: completed 10 of 13 (Planned 3900 cGy in 13 fractions)     Prior chemotherapy:   See below    Pain at time of consult? 4; top of head  Does patient have a living will? no  Does patient have an implanted cardiac device? no    RN time with patient: 40 minutes  Educated on gamma knife? Yes    Fall Screen:  Have you fallen in the past week? No  Have you felt unsteady when walking or standing in the past week? Yes rt vision    Physicians: Dr. Brielle Gilliland (PCP); Dr. Lucho Calixto (rad onc [facility])    Review Since Diagnosis:    January 2016: left kidney mass found incidentally during work-up for abdominal pain.    CT chest shows presence of multiple, but very small pulmonary nodules concerning for metastatic disease.     Brain MRI and bone scan were negative for metastatic disease    3/9/16: left sided nephrectomy. Pathology showed renal cell carcinoma.     Followed with serial CT scans    10/2016: CT showed increase in size of pulmonary nodules concerning for metastatic disease    11/14/16: biopsy LLL nodule positive for malignancy; consistent with metastatic carcinoma of renal origin    12/2016-1/2017: stereotactic radiation to pulmonary nodules as noted above    April-June 2017-s/p # 4 cycles of Interleukin-2 therapy at [facility]    1/5/18: C1 D1 Nivolumab; palliative treatment started due to progressing pulmonary mets    Jan-Feb 2019: CT CAP showed interval enlargement of 2 right renal masses consistent with RCC. Stable pulmonary metastatic disease.     Jan 2019: one right  renal mass was biopsied but benign but MD felt this was still metastatic disease since the kidney masses were growing    4/23/19: microwave ablation to larger right renal mass.    5/3/19: # 1 ipilumumab and nivolumab. Switched given disease progression in right kidney    7/26/19: started maintenance nivolumab    10/21/19: CT chest showed pulmonary nodules, mediastinal lymphadenopathy and other masses have increased in size when compared to previous exam.     10/29/19: CT A/P showed worsening right kidney masses consistent with metastasis; increase in size of right adrenal nodule, pancreatic nodule consistent with mets.     11/1/19: switched to second line carbozantinib    11/21/19: carbozantinib held due to cough/cramps. Hospitalized     11/26/19: bronchoscopy shows airways erythematous and inflamed. Given steroids, nebs; opioids to better control cough    01/2020: reported bony pain in right elbow which he first noticed four months earlier after a fall.     1/13/20: right elbow XR with findings consistent with metastatic destructive lesion of the distal humerus without acute traumatic abnormality    1/13/20 CT AP> right renal tumor slightly smaller; pancreatic nodule not significantly changed; liver lesion appears unchanged and most likely represents a benign hemangioma; lung metastasis appear similar to recent chest CT; consolidation in the right lower lobe has improved    1/22/20: started carbozantinib at reduced dose rt cough    1/23/20: bone scan only showed right distal humerus met and initiated on palliative RT as noted above    2/24/20: increased dose of carbozanitinib. Doing well    10/23/20: changed treatment to axitinib since CT CAP showed progression in lung nodules, right adrenal gland and right kidney    11/29/20: [facility] with right flank pain. CT CAP shows worsening disease in lungs, kidney, adrenals compared to 2017. Head CT commented on possible 2 mm left frontal lobe cortex    12/2020: brain mri  "shows no mets    12/11/20: axitinib reduced due to cough    1/25/21: CT CAP showed stable disease.     Bone scan showed possible increased uptake clivus     2/26/21 completed radiation to clivus as noted above    3/21/21: ED for headache. CT head showed no parietal bone mets. CT chest showed left hilar mass increased in size    3/23/21: brain MRI showed new parietal skull mets    4/12/21: completed parietal skull radiation as noted above    4/15/21: given some increase in left hilar mass from 3/2021 CT and abdominal pain/intolerance to axitinib, started everolimus, awaiting lenvatinib approval    5/4/21: started lenvatinib    1/1/22: changed lenvatinib and everolimus doses to every other day due to intolerable rash.Patient feels better on dose reduction    4/2022: brain MRI stable    5/2022 CT CAP and bone scan stable    7/14/22: MRI brain shows progression of right parietal and clivus mets.Nodular anterior intracranial component measures 11 x 5 mm (previously 7 x 3 mm), in the nodular extracranial component measures 12 x 4 mm (previously 10 x 3 mm)    7/27/22: COVID +    8/3/22: radiation as noted above; holding oral chemo rt radiation.     8/9/22: endorses pain on the right side of head. Issues with vision from the right eye., with double vision. (noted to have 6th nerve palsy).  Also notes right periorbital pain. Continue radiation.    8/9/22: brain MRI showed overall stable metastatic mass     8/10/22: review of brain MRI. Discussed with rad onc; will consider referring to gamma knife to clival met since he has already received large dose palliative radiation to this area and clival met is causing right 6th nerve palsy     8/11/22: referred to [facility] rad onc    Chief Complaint: pain    Review of Systems   Constitutional: Positive for malaise/fatigue.        COVID + 7/27/22.   Poor energy. \"Not Normal\"   HENT: Positive for tinnitus.         Tinnitus bilat; started 3-4 weeks ago. Gets worse when laying in bed and " "also bending forward. Right now hears \"buzzing\" noise.    Eyes: Positive for double vision and pain.        Right eye: double vision. Eye floats in towards nose. If tries to use right eye to see he gets dizzy   Left eye: blue lights flashing constant. Can not read far away since yesterday (> 5ft). Can read if close up.  Pain in right eye; gets worse as day goes on.    Respiratory: Positive for cough and shortness of breath.         Episode of coughing after using Lysol 8/17/22. Continues with cough. \"Feels heavy in (sternal) area.\" Worse has bends forward. States he did not have cough prior to this episode   Cardiovascular: Positive for palpitations.        Describes palpitations after urinating and laying flat   Gastrointestinal: Positive for constipation and nausea.        Appetite fair to poor  Nausea in morning   Genitourinary: Negative.    Musculoskeletal: Positive for back pain.        Describes upper back/shoulder pain and pain in back of mid-lower skull.   Skin: Negative.    Neurological: Positive for dizziness, tingling and headaches. Negative for seizures.   Endo/Heme/Allergies: Positive for environmental allergies.        Runny nose with pollen   Psychiatric/Behavioral: The patient has insomnia.          Chano Schaefer MD  "

## 2022-08-19 NOTE — TELEPHONE ENCOUNTER
Writer MORENO for pt to call back and schedule a consult    Please schedule a new, video visit, with Dr. Chowdhury on 8/23 at 1pm. Please note and overbook has been approved. If not able to schedule, please send TE to writer Julita Silveira

## 2022-08-19 NOTE — TELEPHONE ENCOUNTER
[facility] Call Center    Phone Message    May a detailed message be left on voicemail: yes     Reason for Call: Other: Pt called back to schedule an appt with Dr. Chowdhury on 08/23 at 1pm. Writer was unable to schedule as this appt was not available. Pt stated that the date and time does work for him he would just like a call back to verify that it was scheduled.      Action Taken: Message routed to:  [facility] ([facility]    Travel Screening: Not Applicable

## 2022-08-23 NOTE — TELEPHONE ENCOUNTER
Action 8/23/22 MV 11am   Action Taken Imaging request faxed to [facility]    8/24/22 MV 8.30am  Images resolved in PACS         RECORDS RECEIVED FROM: internal   REASON FOR VISIT: Gamma Knife consult   Date of Appt: 8/24/22   NOTES (FOR ALL VISITS) STATUS DETAILS   OFFICE NOTE from referring provider Internal Dr Chano Schaefer @ [facility] Rad Onc:  8/18/22   OTHER providers Care Everywhere Dr Mani Mejia @ [facility] Neurology:  2/14/22    Kathleen De León @ [facility] Neurology:  1/14/22    **Additional [facility] Onc notes in Care Everywhere**   MEDICATION LIST Internal    IMAGING  (FOR ALL VISITS)     MRI (HEAD, NECK, SPINE) Received/Internal [facility]:  MRI Brain 8/9/22  MRI Brain 7/27/22  MRI Brain 7/14/22  MRI Brain 4/15/22  MRI Brain 1/7/22*  MRI Brain 11/8/21  MRI Brain 7/9/21  MRI Brain 3/23/21  MRI Brain 12/7/20  MRI Brain 10/9/18  MRI Brain 12/3/16  MRI Brain 2/15/16    [facility]:  MRI Brain 4/12/17   CT (HEAD, NECK, SPINE) Received/Internal [facility]:  CT Head 7/27/22  CT Head 6/16/22  CT Head 6/1/22  CTA Head Neck 10/1/21  CT Head 9/7/21  CT Head 7/9/21  CT Head 4/17/21  CT Head 3/21/21    [facility]:  CT Head 11/28/20

## 2022-08-24 NOTE — PROGRESS NOTES
[facility]      Name: Julio John  : 1970  Referring provider: Dr. Roach  2022      Reason for visit: metastatic renal cell carcinoma with symptomatic disease progression involving the right cavernous sinus/clivus, new patient visit    Dear Dr. Roach,     It was a pleasure to see Mr. Baron John in the [facility] today as a new patient.  As you recall, Mr. Baron John is a 52 year old male who has a history of metastatic renal cell carcinoma with symptomatic disease progression involving the right cavernous sinus and clivus. He has received 2 courses of radiation therapy to his right parietal skull with the most recent of 30 Gy/10 fractions from 8/3/22-8/15/22. This was initially planned for 39 Gy/13 fractions however treatment was discontinued early after Mr. Baron John developed progressive right cranial nerve VI palsy felt to be attributed to the progressive right clival/cavernous sinus disease. Given his complicated history and past radiation to the area he is referred for consideration of radiotherapy versus surgical debulking. Today he tells me that he has experienced pain in the right side of his head and on top of his head as well as double vision. He is being evaluated by our Oncology team for clinical trials for his metastatic renal cell carcinoma.    Review of Systems:   Pertinent items are noted in HPI or as in patient entered ROS below, remainder of complete ROS is negative.      Active Medications: tylenol, norvasc, axitinib, calcium carbonate, vitamin D, zyrtec, afinitor, gabapentin, lisinopril, loperamide, Prilosec, oxycodone, compazine, senokot, tamsulosin, ultram      Allergies: no known allergies     Past Medical History: hypertension, CVA, metastatic renal cell carcinoma, ethanol abuse, cocaine abuse     Past Surgical History: left radical nephrectomy     Family History:  noncontributory     Social History: He is  and has 3 children      Physical Exam:   General: No acute distress.   Head: No signs of trauma.    Eyes: Conjunctivae are normal.  Mouth/Throat: Oropharynx moist.  Neck: Normal range of motion.    Resp: No respiratory distress.   MSK: Moves all extremities.  No obvious deformity.  Neuro: The patient is fully oriented and quite pleasant. Speech normal. Complete right sixth nerve palsy. 20/20 corrected visual acuity bilaterally. Facial sensation is intact in V1, V2, V3 distributions. Facial nerve function is intact. Palate is symmetric. Shoulders are full strength. Tongue is midline. Full strength in all extremities. Sensation intact throughout. No dysmetria with finger-nose-finger testing.   Psych: Normal mood and affect. Behavior is normal.      Imaging:  We reviewed his recent MRI dated 8/9/22.  This demonstrates Overall stable metastatic mass lesion to the clivus with extension into the sella, nasopharynx and ventral epidural space along the clivus. There is asymmetric involvement of the right cavernous sinus, which likely explains patient's new right eye symptoms Stable right temporal lobe dural based metastatic lesion with extension to the right temporal lobe medially and into the scalp laterally. Associated dural thickening. No new metastatic lesion.      Assessment:  1. 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 Metastatic renal cell carcinoma with symptomatic disease progression     Plan:  1. We discussed the results of his MRI and potential options for treatment including surgery and radiation. Given the multiple tumor locations and his past medical history I do not feel taking him off of chemotherapy for surgical intervention would be wise. I expressed that aggressive radiation therapy would be appropriate and he is comfortable proceeding forward with this plan. We reviewed the risks of radiotherapy including failure  to obtain local control, permanent sixth nerve palsy, swelling, radiation injury, bleeding, need for further procedures.  2. Repeat MRI prior to starting radiation therapy for the clival/cavernous sinus mass  3. I encouraged him to contact us if any questions or concerns arise advance our next appointment     It has been a pleasure to participate in the care of your patient. Please feel free to contact us if we may be of any assistance for Mr. Baron John.      Ming Chowdhury MD   Department of Neurosurgery  [facility]  [facility]         60 minutes spent on the date of the encounter doing chart review, review of outside records, review of test results, interpretation of tests, patient visit, documentation and discussion with other provider(s)      Scribe Disclosure:  I, Sadie Hernandez, am serving as a scribe to document services personally performed by Ming Chowdhury MD at this visit, based upon the provider's statements to me. All documentation has been reviewed by the aforementioned provider prior to being entered into the official medical record.    Scribe Preparation Attestation:  Sadie WILDER, a scribe, prepared the chart for today's encounter.

## 2022-08-24 NOTE — LETTER
[facility]  [facility] [address]  3RD FLOOR  [facility] [address]  Phone: [phone]  Fax: [phone]          2022    RE:   Julio John  [address]  [place] [address]      Dear Colleague,    Thank you for referring your patient, Julio John, to the [facility]. Please see a copy of my visit note below.        [facility]      Name: Julio John  : 1970  Referring provider: Dr. Roach  2022      Reason for visit: metastatic renal cell carcinoma with symptomatic disease progression involving the right cavernous sinus/clivus, new patient visit    Dear Dr. Roach,     It was a pleasure to see Mr. Baron John in the [facility] today as a new patient.  As you recall, Mr. Baron John is a 52 year old male who has a history of metastatic renal cell carcinoma with symptomatic disease progression involving the right cavernous sinus and clivus. He has received 2 courses of radiation therapy to his right parietal skull with the most recent of 30 Gy/10 fractions from 8/3/22-8/15/22. This was initially planned for 39 Gy/13 fractions however treatment was discontinued early after Mr. Baron John developed progressive right cranial nerve VI palsy felt to be attributed to the progressive right clival/cavernous sinus disease. Given his complicated history and past radiation to the area he is referred for consideration of radiotherapy versus surgical debulking. Today he tells me that he has experienced pain in the right side of his head and on top of his head as well as double vision. He is being evaluated by our Oncology team for clinical trials for his metastatic renal cell carcinoma.    Review of Systems:   Pertinent items are noted in HPI or as in patient  entered ROS below, remainder of complete ROS is negative.      Active Medications: tylenol, norvasc, axitinib, calcium carbonate, vitamin D, zyrtec, afinitor, gabapentin, lisinopril, loperamide, Prilosec, oxycodone, compazine, senokot, tamsulosin, ultram      Allergies: no known allergies     Past Medical History: hypertension, CVA, metastatic renal cell carcinoma, ethanol abuse, cocaine abuse     Past Surgical History: left radical nephrectomy     Family History: noncontributory     Social History: He is  and has 3 children      Physical Exam:   General: No acute distress.   Head: No signs of trauma.    Eyes: Conjunctivae are normal.  Mouth/Throat: Oropharynx moist.  Neck: Normal range of motion.    Resp: No respiratory distress.   MSK: Moves all extremities.  No obvious deformity.  Neuro: The patient is fully oriented and quite pleasant. Speech normal. Complete right sixth nerve palsy. 20/20 corrected visual acuity bilaterally. Facial sensation is intact in V1, V2, V3 distributions. Facial nerve function is intact. Palate is symmetric. Shoulders are full strength. Tongue is midline. Full strength in all extremities. Sensation intact throughout. No dysmetria with finger-nose-finger testing.   Psych: Normal mood and affect. Behavior is normal.      Imaging:  We reviewed his recent MRI dated 8/9/22.  This demonstrates Overall stable metastatic mass lesion to the clivus with extension into the sella, nasopharynx and ventral epidural space along the clivus. There is asymmetric involvement of the right cavernous sinus, which likely explains patient's new right eye symptoms Stable right temporal lobe dural based metastatic lesion with extension to the right temporal lobe medially and into the scalp laterally. Associated dural thickening. No new metastatic lesion.      Assessment:  1. 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 Metastatic renal cell carcinoma with symptomatic  disease progression     Plan:  1. We discussed the results of his MRI and potential options for treatment including surgery and radiation. Given the multiple tumor locations and his past medical history I do not feel taking him off of chemotherapy for surgical intervention would be wise. I expressed that aggressive radiation therapy would be appropriate and he is comfortable proceeding forward with this plan. We reviewed the risks of radiotherapy including failure to obtain local control, permanent sixth nerve palsy, swelling, radiation injury, bleeding, need for further procedures.  2. Repeat MRI prior to starting radiation therapy for the clival/cavernous sinus mass  3. I encouraged him to contact us if any questions or concerns arise advance our next appointment     It has been a pleasure to participate in the care of your patient. Please feel free to contact us if we may be of any assistance for Mr. Baron John.      Ming Chowdhury MD   Department of Neurosurgery  [facility]  [facility]         60 minutes spent on the date of the encounter doing chart review, review of outside records, review of test results, interpretation of tests, patient visit, documentation and discussion with other provider(s)      Scribe Disclosure:  ISadie, am serving as a scribe to document services personally performed by Ming Chowdhury MD at this visit, based upon the provider's statements to me. All documentation has been reviewed by the aforementioned provider prior to being entered into the official medical record.    Scribe Preparation Attestation:  Sadie WILDER, a scribe, prepared the chart for today's encounter.               Again, thank you for allowing me to participate in the care of your patient.      Sincerely,    Ming Chowdhury MD

## 2022-08-24 NOTE — Clinical Note
2022       RE: Julio John  [address]  [place] [facility] [address]     Dear Colleague,    Thank you for referring your patient, Julio John, to the [facility] [place] [facility]. Please see a copy of my visit note below.      [facility]      Name: Julio John  : 1970  Referring provider: Dr. Roach  2022      Reason for visit: metastatic renal cell carcinoma with symptomatic disease progression involving the right cavernous sinus/clivus, new patient visit    Dear Dr. Roach,     It was a pleasure to see Mr. Baron John in the [facility] today as a new patient.  As you recall, Mr. Baron John is a 52 year old male who has a history of metastatic renal cell carcinoma with symptomatic disease progression involving the right cavernous sinus and clivus. He has received 2 courses of radiation therapy to his right parietal skull with the most recent of 30 Gy/10 fractions from 8/3/22-8/15/22. This was initially planned for 39 Gy/13 fractions however treatment was discontinued early after Mr. Baron John developed progressive right cranial nerve VI palsy felt to be attributed to the progressive right clival/cavernous sinus disease. Given his complicated history and past radiation to the area he is referred for consideration of radiotherapy versus surgical debulking. Today he tells me that he has experienced pain in the right side of his head and on top of his head as well as double vision. He is being evaluated by our Oncology team for clinical trials for his metastatic renal cell carcinoma.    Review of Systems:   Pertinent items are noted in HPI or as in patient entered ROS below, remainder of complete ROS is negative.      Active Medications: tylenol, norvasc, axitinib, calcium carbonate, vitamin D, zyrtec,  afinitor, gabapentin, lisinopril, loperamide, Prilosec, oxycodone, compazine, senokot, tamsulosin, ultram      Allergies: no known allergies     Past Medical History: hypertension, CVA, metastatic renal cell carcinoma, ethanol abuse, cocaine abuse     Past Surgical History: left radical nephrectomy     Family History: noncontributory     Social History: He is  and has 3 children      Physical Exam:   General: No acute distress.   Head: No signs of trauma.    Eyes: Conjunctivae are normal.  Mouth/Throat: Oropharynx moist.  Neck: Normal range of motion.    Resp: No respiratory distress.   MSK: Moves all extremities.  No obvious deformity.  Neuro: The patient is fully oriented and quite pleasant. Speech normal. Complete right sixth nerve palsy. 20/20 corrected visual acuity bilaterally. Facial sensation is intact in V1, V2, V3 distributions. Facial nerve function is intact. Palate is symmetric. Shoulders are full strength. Tongue is midline. Full strength in all extremities. Sensation intact throughout. No dysmetria with finger-nose-finger testing.   Psych: Normal mood and affect. Behavior is normal.      Imaging:  We reviewed his recent MRI dated 8/9/22.  This demonstrates Overall stable metastatic mass lesion to the clivus with extension into the sella, nasopharynx and ventral epidural space along the clivus. There is asymmetric involvement of the right cavernous sinus, which likely explains patient's new right eye symptoms Stable right temporal lobe dural based metastatic lesion with extension to the right temporal lobe medially and into the scalp laterally. Associated dural thickening. No new metastatic lesion.      Assessment:  1. 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 Metastatic renal cell carcinoma with symptomatic disease progression     Plan:  1. We discussed the results of his MRI and potential options for treatment including surgery and radiation. Given the  multiple tumor locations and his past medical history I do not feel taking him off of chemotherapy for surgical intervention would be wise. I expressed that aggressive radiation therapy would be appropriate and he is comfortable proceeding forward with this plan. We reviewed the risks of radiotherapy including failure to obtain local control, permanent sixth nerve palsy, swelling, radiation injury, bleeding, need for further procedures.  2. Repeat MRI prior to starting radiation therapy for the clival/cavernous sinus mass  3. I encouraged him to contact us if any questions or concerns arise advance our next appointment     It has been a pleasure to participate in the care of your patient. Please feel free to contact us if we may be of any assistance for Mr. Baron John.      Ming Chowdhury MD   Department of Neurosurgery  [facility]  [facility]         60 minutes spent on the date of the encounter doing chart review, review of outside records, review of test results, interpretation of tests, patient visit, documentation and discussion with other provider(s)      Scribe Disclosure:  ISadie, am serving as a scribe to document services personally performed by Ming Chowdhury MD at this visit, based upon the provider's statements to me. All documentation has been reviewed by the aforementioned provider prior to being entered into the official medical record.    Scribe Preparation Attestation:  Sadie WILDER, a scribe, prepared the chart for today's encounter.             Again, thank you for allowing me to participate in the care of your patient.      Sincerely,    Ming Chowdhury MD

## 2022-08-24 NOTE — LETTER
Date:August 25, 2022      Provider requested that no letter be sent. Do not send.       [facility]

## 2022-08-29 NOTE — PROGRESS NOTES
[id number] Adult  Hospitalist Group                                                                                          Hospitalist Progress Note  Roberto Finnegan MD  Answering service: [phone] OR [phone] from in house phone        Date of Service:  2022  NAME:  Gold DOWD:  1960  MRN:  [id number]      Admission Summary: This 45-year-old man with past medical history significant for peripheral artery disease, status post left femoral/peroneal bypass presented at the emergency room with left shoulder pain. This started about 3 weeks ago. The patient also complained of left-sided chest pain. It is not clear whether the left shoulder pain is as a result of radiation from the left-sided chest pain. The pain is constant sharp pain, 9/10 in severity, worse with breathing. No known relieving factors. The patient also complained of left leg pain. He stated that he has had left leg pain since after his surgery which was in April. The patient also stated that he has been losing weight which was unintentional.  Overall, the patient stated that he feels weak and it is becoming difficult to carry out activity of daily living. Interval history / Subjective:   Seen post scapular biopsy. Still has significant pain to his L shoulder    Plan for possible palliative radiation tomorrow     Assessment & Plan:     R lung mass with hilar LAD, and widely metastatic bony disease  - s/p IR guided biopsy of scapula   - Oncology and pulmonary consulted and following  - pain control with oxycodone, and added decadron as well   - Palliative care following   - Likely will need brain MRI also for staging     L Shoulder pain - secondary to bony metastasis likely (in glenoid, scapula)  - IV morphine  - Add PO oxycodone PRN   - Added decadron per palliative  - Rad Onc consult for possible radiation   - Will benefit from palliative care     Elevated troponin - flat.  Given presence Nursing Focus: Chemotherapy  D: Positive blood return via PICC. Insertion site is clean/dry/intact, dressing intact with no complaints of pain.  Urine output is recorded in intake in Doc Flowsheet.    I: Premedications given per order (see electronic medical administration record). Pt met urine parameters, VSS wnl, labs WNL. Dose #7 of IL-2 started to infuse over 15 minutes. Reviewed pt teaching on chemotherapy side effects.  Pt denies need for further teaching. Chemotherapy double checked per protocol by two chemotherapy competent RN's.   A: Tolerating procedure well. Denies nausea and or pain.   P: Continue to monitor urine output and symptoms of nausea. Screen for symptoms of toxicity.     of malignancy and location suspect CP and shoulder pain related to above. - Discussed with Dr. Jose Velazco. DC heparin gtt  - Echo pending     PVD - s/p L fem-perineal bypass   - Contiue ASA    Hyponatremia - mild, monitor   Tobacco abuse - current smoker. Declined nicotine patch     Code status: FULL  Prophylaxis: add lovenox   Care Plan discussed with: pt, RN   Anticipated Disposition: 24-48 hours, pending pain control and DC plan. Suspect will need to go home. PT/OT     Hospital Problems  Date Reviewed: 8/28/2022            Codes Class Noted POA    Severe protein-calorie malnutrition ([facility] [address] ICD-10-CM: N50  ICD-9-CM: 269  8/29/2022 Yes        * (Principal) NSTEMI (non-ST elevated myocardial infarction) [facility]) ICD-10-CM: I21.4  ICD-9-CM: 410.70  8/27/2022 Yes           Review of Systems:   A comprehensive review of systems was negative except for that written in the HPI. Vital Signs:    Last 24hrs VS reviewed since prior progress note. Most recent are:  Visit Vitals  /63 (BP 1 Location: Left arm, BP Patient Position: Lying)   Pulse 93   Temp 97.6 °F (36.4 °C)   Resp 29   Ht 5' 8\" (1.727 m)   Wt 46.3 kg (102 lb)   SpO2 93%   BMI 15.51 kg/m²         Intake/Output Summary (Last 24 hours) at 8/29/2022 1646  Last data filed at 8/29/2022 1518  Gross per 24 hour   Intake 2220 ml   Output 1925 ml   Net 295 ml          Physical Examination:     I had a face to face encounter with this patient and independently examined them on 8/29/2022 as outlined below:          Constitutional:  No acute distress, cooperative, pleasant, chronically ill appearing, cachectic    ENT:  Oral mucosa moist, oropharynx benign. Resp:  CTA bilaterally. No wheezing/rhonchi/rales. No accessory muscle use. CV:  Regular rhythm, normal rate, no murmurs, gallops, rubs    GI:  Soft, non distended, non tender.  normoactive bowel sounds, no hepatosplenomegaly     Musculoskeletal:  No edema, warm, 2+ pulses throughout    Neurologic:  Moves all extremities. AAOx3, CN II-XII reviewed            Data Review:    Review and/or order of clinical lab test  Review and/or order of tests in the radiology section of CPT  Review and/or order of tests in the medicine section of CPT      Labs:     Recent Labs     08/29/22  0307 08/28/22  0525   WBC 14.5* 14.4*   HGB 9.2* 9.8*   HCT 27.1* 29.4*   * 408*       Recent Labs     08/29/22  0307 08/28/22  0523 08/27/22  1639   *  --  133*   K 4.4  --  4.0   CL 99  --  100   CO2 24  --  26   BUN 12  --  14   CREA 0.42*  --  0.59*   GLU 95  --  99   CA 8.5  --  9.2   MG 1.7 1.9  --    PHOS 3.9  --   --        Recent Labs     08/27/22  1639   ALT 18   *   TBILI 0.4   TP 7.7   ALB 2.7*   GLOB 5.0*       Recent Labs     08/28/22  0523 08/27/22  2345   APTT 35.4* 35.3*        Recent Labs     08/28/22  0529 08/28/22  0523   TIBC  --  202*   PSAT  --  17*   FERR 1,008*  --         Lab Results   Component Value Date/Time    Folate 11.1 08/28/2022 05:23 AM        No results for input(s): PH, PCO2, PO2 in the last 72 hours. No results for input(s): CPK, CKNDX, TROIQ in the last 72 hours.     No lab exists for component: CPKMB  No results found for: CHOL, CHOLX, CHLST, CHOLV, HDL, HDLP, LDL, LDLC, DLDLP, TGLX, TRIGL, TRIGP, CHHD, CHHDX  No results found for: GLUCPOC  No results found for: COLOR, APPRN, SPGRU, REFSG, IRINA, PROTU, GLUCU, KETU, BILU, UROU, ROLANDO, LEUKU, GLUKE, EPSU, BACTU, WBCU, RBCU, CASTS, UCRY      Medications Reviewed:     Current Facility-Administered Medications   Medication Dose Route Frequency    naloxone (NARCAN) injection 1 mg  1 mg IntraVENous ONCE    albuterol-ipratropium (DUO-NEB) 2.5 MG-0.5 MG/3 ML  3 mL Nebulization QID RT    oxyCODONE IR (ROXICODONE) tablet 5 mg  5 mg Oral Q3H PRN    oxyCODONE IR (ROXICODONE) tablet 10 mg  10 mg Oral Q3H PRN    [START ON 8/30/2022] pantoprazole (PROTONIX) tablet 40 mg  40 mg Oral ACB    dexAMETHasone (DECADRON) tablet 4 mg  4 mg Oral DAILY    acetaminophen (TYLENOL) tablet 650 mg  650 mg Oral TID    [START ON 8/30/2022] senna-docusate (PERICOLACE) 8.6-50 mg per tablet 2 Tablet  2 Tablet Oral DAILY    aspirin delayed-release tablet 81 mg  81 mg Oral 7am    sodium chloride (NS) flush 5-40 mL  5-40 mL IntraVENous Q8H    sodium chloride (NS) flush 5-40 mL  5-40 mL IntraVENous PRN    acetaminophen (TYLENOL) tablet 650 mg  650 mg Oral Q6H PRN    Or    acetaminophen (TYLENOL) suppository 650 mg  650 mg Rectal Q6H PRN    polyethylene glycol (MIRALAX) packet 17 g  17 g Oral DAILY PRN    ondansetron (ZOFRAN ODT) tablet 4 mg  4 mg Oral Q8H PRN    Or    ondansetron (ZOFRAN) injection 4 mg  4 mg IntraVENous Q6H PRN    L.acidophilus-paracasei-S.thermophil-bifidobacter (RISAQUAD) 8 billion cell capsule  1 Capsule Oral DAILY    metoprolol tartrate (LOPRESSOR) tablet 25 mg  25 mg Oral Q12H    enoxaparin (LOVENOX) injection 30 mg  30 mg SubCUTAneous Q24H    lactated Ringers infusion  100 mL/hr IntraVENous CONTINUOUS    nitroglycerin (NITROSTAT) tablet 0.4 mg  0.4 mg SubLINGual Q5MIN PRN    morphine injection 2 mg  2 mg IntraVENous Q4H PRN     ______________________________________________________________________  EXPECTED LENGTH OF STAY: 3d 16h  ACTUAL LENGTH OF STAY:          2                 Ankit Llanos MD

## 2022-08-31 PROBLEM — R78.81 POSITIVE BLOOD CULTURE: Status: ACTIVE | Noted: 2022-01-01

## 2022-08-31 NOTE — PLAN OF CARE
[place] [facility]  2 [place] full   skin assessment completed by Radha Paige, RN and NOMAN Roe.  Skin assessment finding: skin intact, no problems   Interventions/actions: other continue to monitor     Bedside Emergency Equipment Present:  Suction Regulator: Yes  Suction Canister: Yes  Tubing between Regulator and Canister: Yes  O2 Regulator with Tree: Yes  Ambu Bag: Yes

## 2022-08-31 NOTE — PROGRESS NOTES
[facility]    Progress Note - Medicine Service, BRIT TEAM 5       Date of Admission:  8/31/2022    Assessment & Plan        Julio John is a 52 year old male admitted on 8/31/2022. 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     Today:  -blood cultures pending  -MRI brain  -Gamma knife radiation  -Albuterol Inhaler PRN wheezing     #Gram Negative Bacteremia, resolved likely contaminate with gram positive bacilli per [facility] lab  Pt presented to [facility] for dyspnea and chest pain. He was prescribed Augmentin for possible pneumonia and was discharged home. His blood culture drawn at 08/29 grew gram negative rods and was called to return to [facility] ED. He was started on zosyn at [facility]. Pt transferred to [facility] for IV abx, MRI brain, and gamma knife radiation. Further discussion with [facility] lab revealed that the bacteria was actually a gram positive bacilli that was a CONTAMINATE. Requested [facility] to fax results. Blood cultures at [facility] pending. Will continue patient on zosyn until [facility] cultures return or [facility] fax results return. Pt remains afebrile. Mild leukocytosis to 12.8.      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  Status post left nephrectomy on 03/2016.  Patient has metastases to bilateral lungs.  Status post stereotactic radiation for lung metastases on 12/2016 to 1/2017.  Status post IL 2  therapy at [facility] on 04/2017-06/2017.  He has received 2 courses of radiation therapy to his right parietal skull with the most recent of 30 Gy/10 fractions from 8/3/22-8/15/22. This was initially planned for 39 Gy/13 fractions however treatment was discontinued early after Mr. Baron John developed progressive right cranial nerve VI palsy felt to be attributed to the progressive right clival/cavernous sinus disease. Pt was recently seen by neurosurgery for consideration of radiotherapy vs surgical debulking. Received repeat MRI today and started gamma knife today     -Dr. Schmitz ([facility] [place] would like patient to receive second opinion in outpatient setting for progressive renal cell carcinoma through [facility] within the next week   - continue decadron 4 mg daily   - MRI Brain completed   - Continue PTA oxycodone for pain   - continue PTA zofran for nausea   - Can restart PTA compazine and tramadol if pt's symptoms are not controlled      #Chest Pain, improving   #Dyspnea   #Hx of A. Fib   Possible pneumonia contributing. Symptoms improving on abx. Has diffuse wheezing on right lung. EKG within normal limits on admission. Will trial albuterol inhaler     -Albuterol inhaler PRN wheezing   - monitor vital signs      #Peripheral neuropathy  Symptoms could be from radiation to head earlier this year. No acute weakness in limbs or face to suggest stroke although does have facial numbness. CT head without IV contrast 10/1 without acute findings and symptoms have been going on for longer than that. He stated his rad.onc provider talked to him about these symptoms and prescribed gabapentin.  - Continue PTA gabapentin      #HTN 2/2 RCC  - continue PTA amlodipine   - Continue PTA lisinopril      #BPH  - continue tamsulosin      #GERD  - Continue PTA pantoprazole      Diet: 2 Gram Sodium Diet    DVT Prophylaxis: Low Risk/Ambulatory with no VTE prophylaxis indicated  Randall Catheter: Not present  Fluids: none  Central  Lines: None  Cardiac Monitoring: None  Code Status: Full Code      Disposition Plan      Expected Discharge Date: 09/06/2022                The patient's care was discussed with the Attending Physician, Dr. Mata.    TOI JACOBS MS4     Resident/Fellow Attestation   I, Hector Fu MD, was present with the medical/ANA student who participated in the service and in the documentation of the note.  I have verified the history and personally performed the physical exam and medical decision making.  I agree with the assessment and plan of care as documented in the note.      Admitted for Gram negative bacteremia, on zosyn. Per [facility] lab likely a contaminate. Also receiving radiation therapy.    Emiliano Fu MD  Med-Peds PGY2  ______________________________________________________________________    Interval History   No acute events. Transferred from [facility]. Feels better this morning. Endorses cough with sputum. No blood. Burning with urination and mild left flank pain. Mild chills.    Discussion with [facility] lab revealed that the bacteria isolated was likely a contaminate of gram positive bacilli. Reported that it was misread on intial read.    No fever, chest pain, palpitations, abdominal pain, nausea, vomiting, diarrhea, constipation, joint pain.    10 point ROS negative except for what is listed in HPI     Data reviewed today: I reviewed all medications, new labs and imaging results over the last 24 hours.     Physical Exam   Vital Signs: Temp: (!) 96.3  F (35.7  C) Temp src: Oral BP: 127/80 Pulse: 61   Resp: 18 SpO2: 98 % O2 Device: None (Room air)    Weight: 197 lbs 8.51 oz  Constitutional: awake, alert, cooperative, no apparent distress  Eyes: sclera clear and conjunctiva normal  ENT: normocepalic, without obvious abnormality  Respiratory: no increased work of breathing, good air exchange, and clear to auscultation on left, diffuse right lung wheeze   Cardiovascular: regular rate and rhythm  GI: soft, nontender,  nondistended. +left CVA tenderness  Skin: No rashes on exposed skin  Musculoskeletal: no lower extremity pitting edema present  Neurologic: awake, alert, orientedx4, CN6 palsy       Data   Recent Labs   Lab 08/31/22  0732   WBC 12.8*   HGB 14.0   MCV 85   *   *   POTASSIUM 4.6   CHLORIDE 103   CO2 24   BUN 38.2*   CR 1.32*   ANIONGAP 6*   KVNG 7.9*   *   ALBUMIN 3.3*   PROTTOTAL 5.2*   BILITOTAL 0.4   ALKPHOS 41   ALT 17   AST 9*     No results found for this or any previous visit (from the past 24 hour(s)).  Medications       amLODIPine  10 mg Oral Daily     calcium carbonate  500 mg Oral BID     calcium carbonate-vitamin D  1 tablet Oral BID w/meals     dexamethasone  4 mg Oral 4x Daily     gabapentin  300 mg Oral TID     lisinopril  40 mg Oral Daily     pantoprazole  40 mg Oral QAM AC     piperacillin-tazobactam  4.5 g Intravenous Q6H     sodium chloride (PF)  3 mL Intracatheter Q8H     tamsulosin  0.4 mg Oral Daily

## 2022-08-31 NOTE — PROVIDER NOTIFICATION
LIP on call paged about pt not having orders and that they were asking for his decadron due to the fact he has not had it since yesterday.

## 2022-08-31 NOTE — PLAN OF CARE
Reason for admission: positive blood cultures, brain MRI  Patient cares assumed from [id number]  VS: VSS  Pain: denies pain  Neuro: intact, alert and oriented x 4  Cardiac: WNL denies chest pain  Resp: RA LS inspiratory wheezing, denies SOB  GI/: continent of bowl and bladder last BM 8/30  Skin: intact  Diet: 2 gram na  Activity: independent in room  LDA: PIV SL, IV abx  Labs: reviewed   Goal Outcome Evaluation:    Plan of Care Reviewed With: patient     Overall Patient Progress: improving

## 2022-08-31 NOTE — PROGRESS NOTES
A MRI simulation was performed under my direction. Please see the [facility] documentation for complete details regarding this procedure.    Chano Schaefer MD/PhD    Dept of Radiation Oncology  [facility]

## 2022-08-31 NOTE — PLAN OF CARE
"Goal Outcome Evaluation:    Plan of Care Reviewed With: patient   /80 (BP Location: Right arm)   Pulse 61   Temp (!) 96.3  F (35.7  C) (Oral)   Resp 18   Ht 1.626 m (5' 4\")   Wt 89.6 kg (197 lb 8.5 oz)   SpO2 98%   BMI 33.91 kg/m       NEURO:  A&O x4. Calls appropriately.   RESPIRATORY: Inspiratory wheezes heard in all fields. Denies SOB. Sating adequately on RA.   CARDIAC: WDL. Denies cardiac chest pain.   GI/: WDL. Voiding adequately without difficulty, not saving. +BS, passing flatus, last BM before admission.   DIET: Low sodium diet.   PAIN: Denies pain.   SKIN: No skin deficits.   INCISION/DRAINS/IV ACCESS: R PIV SL.   ACTIVITY: Up ad paco.   PLAN: Continue POC.       "

## 2022-08-31 NOTE — PLAN OF CARE
A&O times 4. Pt denies SOB, nausea, and dizziness. Pt states that his breathing problems and chest pain have gone away. Pt states that he has a mild HA. Pt states he has numbness on the top part of his head and on the distal part of his hands radiating into his forearm. Pt states he is also having double vision in his R eye going on for a while. Pt has no further needs and is safe with call light in reach.     Plan of Care Reviewed With: patient     Overall Patient Progress: no change

## 2022-08-31 NOTE — H&P
[facility]    History and Physical - Hospitalist Service, GOLD TEAM        Date of Admission:  8/31/2022    Assessment & Plan      Julio John is a 52 year old male admitted on 8/31/2022. 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 and is admitted for positive blood cultures growing gram negative rods and for MRI Brain w/ contrast.     #Gram Negative Bacteremia, unclear source   #PNA   Pt [place] to [facility] for dyspnea and chest pain. He was prescribed Augmentin for possible pneumonia and was discharged home. His blood culture drawn at 08/29 grew gram negative rods and was called to return to ED. He was started on zosyn at [facility]. C/o dry cough, dysuria, increased urinary frequency, and flank pain for 1 week. Chest pain and dyspnea starting on 08/29. Denies abdominal pain, nausea, vomiting, diarrhea, constipation, abdominal pain, SOB, or fevers. On physical exam there is no suprapubic tenderness, CVA tenderness, or abdominal tenderness. Right lung has diffuse wheezing. Pt was started on Decadron 2 weeks ago for his metastatic RCC to cavernous sinus/clivus leading to pain.   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  Status post left nephrectomy on 03/2016.  Patient has metastases to bilateral lungs.   Status post stereotactic radiation for lung metastases on 12/2016 to 1/2017.  Status post IL 2 therapy at [facility] on 04/2017-06/2017.  He has received 2 courses of radiation therapy to his right parietal skull with the most recent of 30 Gy/10 fractions from 8/3/22-8/15/22. This was initially planned for 39 Gy/13 fractions however treatment was discontinued early after Mr. Baron John developed progressive right cranial nerve VI palsy felt to be attributed to the progressive right clival/cavernous sinus disease. Pt was recently seen by neurosurgery for consideration of radiotherapy vs surgical debulking. He had a repeat MRI ordered prior to starting radiation therapy for the clival/cavernous sinus mass for 08/31/2022. Pt requested to be transferred to [facility] [place].   - continue decadron   - MRI Brain scheduled for AM   - Continue PTA oxycodone for pain   - continue PTA zofran for nausea   - Can restart PTA compazine and tramadol if pt's symptoms are not controlled     #Chest Pain   #Dyspnea   #Hx of A. Fib   Pt states he has had substernal, non radiating chest pain for 1 week. He states it improved with Augmentin given at [facility] [place]. Chest pain worse with leaning forward and relieved by laying down. Pt states he has been having dizziness for 1 week since his chest pain started. Denies SOB, LE edema, orthopnea, PND, or palpitations. He has a history of A. Fib. Currently pt seems to be in sinus rhythm. Denies syncope. Currently not on anticoagulation.   - EKG ordered   - monitor vital signs     #Peripheral neuropathy  Symptoms could be from radiation to head earlier this year. No acute weakness in limbs or face to suggest stroke although does have facial numbness. CT head without IV contrast 10/1 without acute findings and symptoms have been going on for longer than that. He stated his rad.onc provider talked to him about these symptoms and prescribed gabapentin.  - Continue PTA gabapentin     #HTN 2/2  RCC  - continue PTA amlodipine   - Continue PTA lisinopril     #BPH  - continue tamsulosin     #GERD  - Continue PTA pantoprazole         Diet: 2 Gram Sodium Diet    DVT Prophylaxis: Defer to primary service  Randall Catheter: Not present  Fluids: none   Central Lines: None  Cardiac Monitoring: None  Code Status: Full Code       Patient to be staffed in AM     Liz Escobar MD  Hospitalist Service, [facility]  Securely message with the Vocera Web Console (learn more here)  Text page via [facility] Paging/Directory   Please see signed in provider for up to date coverage information    ______________________________________________________________________    Chief Complaint   Transfer [place] [facility] [place]     History is obtained from the patient    History of Present Illness   Julio John is a 52 year old male who was transferred from [facility] [place] where he was asked to return today for a positive blood culture from ER visit on 8/29/2022.  Patient initially went to [facility] ED for chest discomfort and dyspnea.  Patient states that he has had chills since Sunday night.  Denies fevers.  He started experiencing chest substernal chest discomfort and dyspnea on Monday prompting him to go to the ED.  CTA was negative for PE.  Chest x-ray negative.  EKG showed no signs of ischemia.  Patient was discharged home with Augmentin.  He was contacted to the ED for positive blood cultures growing gram-negative rods.  Patient states that his chest discomfort and dyspnea resolved with Augmentin.  He was started on Zosyn.  He requested transfer to [facility] for his scheduled MRI brain 08/31.  This MRI was scheduled to evaluate cavernous sinus/previous disease prior to radiation.  Currently patient still complains of chest discomfort.  His pain is nonradiating, worsened by sitting up and leaning forward, and relieved by laying down.  Complains of dysuria, bilateral  flank pain, increased urinary frequency, increased urinary emptying.  Patient denies nausea, vomiting, abdominal pain, constipation, diarrhea, hemoptysis, incomplete bladder emptying, urinary hesitancy, or lower extremity swelling.  Patient denies recent illnesses or sick contacts.    Review of Systems    The 10 point Review of Systems is negative other than noted in the HPI or here.     Past Medical History    I have reviewed this patient's medical history and updated it with pertinent information if needed.   Past Medical History:   Diagnosis Date     Alcohol abuse     in remission     Benign essential hypertension      Cerebrovascular accident (H)     2010/2011     Cocaine abuse (H)     in remission     Metastatic renal cell carcinoma (H)     2016        Past Surgical History   I have reviewed this patient's surgical history and updated it with pertinent information if needed.  Past Surgical History:   Procedure Laterality Date     PICC INSERTION Left 05/31/2017    5fr DL BioFlo PICC, 45cm (3cm external) in the L medial brachial vein w/ tip in the SVC RA junction.     radical left nephrectomy Left 03/09/2016 2016        Social History   I have reviewed this patient's social history and updated it with pertinent information if needed. Julio Draper John  reports that he has never smoked. He has never used smokeless tobacco. He reports previous alcohol use. He reports previous drug use. Drug: Cocaine.    Prior to Admission Medications   Prior to Admission Medications   Prescriptions Last Dose Informant Patient Reported? Taking?   acetaminophen (TYLENOL) 325 MG tablet Unknown at Unknown time  Yes Yes   Sig: Take 325-650 mg by mouth every 6 hours as needed for mild pain Take 2 tablets once every 6 hours as needed for mild pain   albuterol (PROAIR HFA/PROVENTIL HFA/VENTOLIN HFA) 108 (90 Base) MCG/ACT inhaler Unknown at Unknown time  Yes Yes   Sig: Inhale 1-2 puffs into the lungs   amLODIPine (NORVASC) 10 MG  tablet 2022 at Unknown time  Yes Yes   Sig: Take 10 mg by mouth daily   amoxicillin-clavulanate (AUGMENTIN) 875-125 MG tablet 2022 at Unknown time  Yes Yes   Sig: Take 1 tablet by mouth 2 times daily   calcium carbonate (TUMS) 500 MG chewable tablet Unknown at Unknown time  Yes Yes   Sig: Take 1 chew tab by mouth 2 times daily Chew and swallow 1 tablet by mouth twice daily   calcium-vitamin D (OSCAL) 250-125 MG-UNIT TABS per tablet 2022 at Unknown time  Yes Yes   Sig: Take 1 tablet by mouth 2 times daily   cetirizine (ZYRTEC) 10 MG tablet Unknown at Unknown time  Yes Yes   Sig: Take 10 mg by mouth daily   cyclobenzaprine (FLEXERIL) 5 MG tablet   Yes No   Sig: Tale 1-2 tablets (5-10 mg) by mouth 3 times daily as needed for muscle cramps.   dexamethasone (DECADRON) 4 MG tablet 2022 at Unknown time  Yes Yes   Sig: Take 4 mg by mouth 4 times daily   ergocalciferol (ERGOCALCIFEROL) 1.25 MG (35704 UT) capsule   Yes No   Sig: Take 50,000 Units by mouth   gabapentin (NEURONTIN) 300 MG capsule 2022 at Unknown time  Yes Yes   Sig: Take 300 mg by mouth 3 times daily Take 2 capsules 3 times daily   hypromellose-dextran (GENTEAL TEARS) 0.1-0.3 % ophthalmic solution   Yes No   Si drop One drop in right eye 4 times daily   ibuprofen (ADVIL/MOTRIN) 200 MG tablet   Yes No   Sig: Take 200 mg by mouth every 4 hours as needed for mild pain   ketoconazole (NIZORAL) 2 % external shampoo   Yes No   Sig: Use 3 times per week. Apply to affected area, leave on for 20 minutes, then rinse.   lisinopril (ZESTRIL) 40 MG tablet   Yes No   Sig: Take 40 mg by mouth daily   loperamide (IMODIUM) 2 MG capsule   Yes No   Sig: Take 2 mg by mouth Take 2 capsules at the onset of symptoms, then take 1 capsule as needed for diarrhea. DO NOT take more than 8 capsules in 24 hours.   omeprazole (PRILOSEC) 20 MG DR capsule   Yes No   Sig: Take 20 mg by mouth daily   ondansetron (ZOFRAN ODT) 8 MG ODT tab   Yes No   Sig: Take 8 mg by  mouth   oxyCODONE (ROXICODONE) 5 MG tablet   Yes No   Sig: Take 5 mg by mouth every 6 hours as needed for severe pain   prochlorperazine (COMPAZINE) 10 MG tablet   Yes No   Sig: Take 10 mg by mouth every 6 hours as needed for nausea   senna-docusate (SENOKOT-S/PERICOLACE) 8.6-50 MG tablet   Yes No   Sig: Take 1 tablet by mouth daily Take 1-2 tablets twice daily as needed as needed for constipation   sennosides (SENOKOT) 8.6 MG tablet 8/30/2022 at Unknown time  Yes Yes   Sig: Take 1 tablet by mouth 2 times daily   tamsulosin (FLOMAX) 0.4 MG capsule   Yes No   Sig: Take 0.4 mg by mouth daily One tablet after each meal   traMADol (ULTRAM) 50 MG tablet   Yes No   Sig: Take 50 mg by mouth every 6 hours as needed for severe pain   triamcinolone acetonide 0.025 % LOTN   Yes No   Sig: Mix with ketoconazole cream and apply to face twice daily      Facility-Administered Medications: None     Allergies   Allergies   Allergen Reactions     Carvedilol Other (See Comments)     Per pt, it gave him back pain       Physical Exam   Vital Signs: Temp: 96.9  F (36.1  C) Temp src: Oral BP: 129/78 Pulse: 63   Resp: 18 SpO2: 98 % O2 Device: None (Room air)    Weight: 197 lbs 8.51 oz    Constitutional: Well appearing, NAD, appears stated age  HEENT: NC/AT, anicteric sclera, pupils round and equal, EOMI, no oral lesions/ulcers, no cervical lymphadenopathy  CV: RRR, normal S1/S2, no murmurs/gallops/rubs, intact distal pulses  Pulm: non-labored respirations on room air, left lung sounds mildy diminished, right lung wheezing diffusely   Abdomen: normoactive bowel sounds, soft, non-distended, non-tender, no CVA tenderness, no suprapubic tenderness   Extremities: warm and well perfused, no peripheral edema, cyanosis, or clubbing  Skin: no jaundice, no rashes on exposed skin  Neuro: alert and oriented x3, Muscle strength 5/5 bilateral UE and LE, CN 6 nerve palsy  Psych: normal affect

## 2022-09-01 NOTE — DISCHARGE SUMMARY
[facility]  Discharge Summary - Medicine & Pediatrics       Date of Admission:  8/31/2022  Date of Discharge:  09/01/2022  Discharging Provider: Dr. Jonh Mata   Discharge Service: Medicine Service, BRIT TEAM 5    Discharge Diagnoses   Blood culture contamination  Metastatic Renal Cell Carcinoma   Hypertension   Peripheral Neuropathy   BPH  GERD    Follow-ups Needed After Discharge   Follow-up Appointments     Adult [facility] Follow-up and recommended labs and tests     1) Follow up with oncology (referral placed) in ~1 week.  2) Follow up for ongoing radiation.    Appointments on [place] and/or [facility] (with [facility] or [facility]   provider or service). Call [phone] if you haven't heard regarding   these appointments within 7 days of discharge.           Unresulted Labs Ordered in the Past 30 Days of this Admission     Date and Time Order Name Status Description    8/31/2022  5:52 AM Blood Culture Arm, Left Preliminary     8/31/2022  5:52 AM Blood Culture Hand, Right Preliminary       These results will be followed up by PCP     Discharge Disposition   Discharged to home  Condition at discharge: Stable    Hospital Course   Julio John is a 52 year old male who was admitted on 8/31/2022. 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      The following problems were addressed during his hospitalization:    Gram Negative Bacteremia, resolved found to be contaminate with gram positive bacilli per [facility] micro lab  Pt initially presented to [facility] ED on 8/29/2022 with dyspnea and chest pain. Prescribed Augmentin for possible pneumonia and discharged home. Blood cultures from 8/29/2022 returned with positive  result and pt was called to return [facility] for abx. He was started on zosyn at [facility] on 8/30. He was transferred to [facility] for IV Zosyn, MRI brain, and for gamma knife radiation.     In an attempt to receive blood culture final results and sensitivities, it was revealed that the initial gram negative vicente was actually a gram positive bacillus that was a contaminate. This result did not show in care everywhere. However, 2 separate lab personal from [facility] confirmed the result over the phone. Blood cultures at [facility] returned with no growth after 24 hours. Patient was clinically stable and discharged home.     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 Patient received first gamma knife radiation session on 8/31/2022. Discharged with plans to have follow up radiation on Tuesday Sept. 6, 2022.     Discussed patients care with his [facility] oncologist. She would like him to receive second opinion for progressive renal cell carcinoma. Outpatient oncology follow up was placed for 3-5 days from discharge.    -Follow up with Gamma Knife Radiation on Sept. 6, 2022  -Follow up with Outpatient Oncology in ~1 week (referral placed)     Chronic Medical Conditions    Peripheral neuropathy  Continued PTA gabapentin    HTN 2/2 RCC  - continued PTA amlodipine   - Continued PTA lisinopril      BPH  - continued tamsulosin      GERD  - Continued PTA pantoprazole      Consultations This Hospital Stay   None    Code Status   Full Code       The patient was discussed with Dr. Adolfo Fu MD  Med-Peds PGY2  ______________________________________________________________________    Physical Exam   Vital Signs: Temp: 97.1   F (36.2  C) Temp src: Oral BP: 113/68 Pulse: 82   Resp: 18 SpO2: 98 % O2 Device: None (Room air)    Weight: 197 lbs 8.51 oz  Constitutional: awake, alert, cooperative, no apparent distress  Eyes: sclera clear and conjunctiva normal  ENT: normocepalic, without obvious abnormality  Respiratory: no increased work of breathing, good air exchange, and clear to auscultation bilaterally  Cardiovascular: regular rate and rhythm  GI: soft, nontender, nondistended. +left CVA tenderness  Skin: No rashes on exposed skin  Musculoskeletal: no lower extremity pitting edema present  Neurologic: awake, alert, orientedx4, CN6 palsy       Primary Care Physician   Physician No Ref-Primary    Discharge Orders      Adult Oncology/Hematology  Referral      Reason for your hospital stay    You were admitted to the hospital for concern for an infection in your blood. We treated you with antibiotics. The lab reported that the original blood culture was actually a contaminant, not a true infection. The blood cultures we checked here also did not show an infection. Therefore you were discharged home. You also had your radiation treatments done while here.    It was a pleasure working with you and caring for you at the [facility].     Activity    Your activity upon discharge: activity as tolerated     When to contact your care team    Call your primary doctor if you have any of the following:   - Having fevers or chills  - Having confusion or altered mental status     Adult [facility] Follow-up and recommended labs and tests    Follow up with oncology (referral placed) in ~1 week.    Follow up for ongoing radiation.    Appointments on [place] and/or [facility] (with [facility] or [facility] provider or service). Call [phone] if you haven't heard regarding these appointments within 7 days of discharge.     Diet    Follow this diet upon discharge: Orders Placed This Encounter      2 Gram Sodium Diet       Significant Results  and Procedures   Most Recent 3 CBC's:Recent Labs   Lab Test 09/01/22  0723 08/31/22  0732 11/28/20  2320   WBC 10.7 12.8* 7.8   HGB 13.8 14.0 14.8   MCV 85 85 87   * 106* 225     Most Recent 3 BMP's:Recent Labs   Lab Test 09/01/22  0723 08/31/22  0732 11/28/20  2320   * 133* 137   POTASSIUM 4.8 4.6 3.9   CHLORIDE 104 103 106   CO2 21* 24 29   BUN 33.4* 38.2* 19   CR 1.28* 1.32* 1.33*   ANIONGAP 9 6* 2*   KVNG 8.2* 7.9* 8.7   * 115* 117*     Most Recent 3 Troponin's:Recent Labs   Lab Test 11/28/20  2320 05/04/17  1801   TROPI <0.015 <0.015  The 99th percentile for upper reference range is 0.045 ug/L.  Troponin values in   the range of 0.045 - 0.120 ug/L may be associated with risks of adverse   clinical events.       Most Recent Urinalysis:Recent Labs   Lab Test 08/31/22  1407   COLOR Light Yellow   APPEARANCE Clear   URINEGLC Negative   URINEBILI Negative   URINEKETONE Negative   SG 1.020   UBLD Negative   URINEPH 5.5   PROTEIN 50 *   NITRITE Negative   LEUKEST Negative   RBCU <1   WBCU <1     Blood Cultures 8/31/2022 - no growth in 24 hours        Results for orders placed or performed during the hospital encounter of 08/31/22   XR Chest Port 1 View    Narrative    Exam: XR CHEST PORT 1 VIEW, 8/31/2022 1:45 PM    Indication: chest pain    Comparison: CT 11/28/2020    Findings:   Portable semiupright frontal view of the chest. Trachea is midline.  Heart size is normal. Left upper lung opacity likely representative of  left upper lobe mass as seen on 11/28/2020 and described in the  medical record on 8/29/2022. No appreciable pleural effusion or  pneumothorax. No acute osseous abnormality.      Impression    Impression:   1. No acute airspace disease.  2. Left upper lobe pulmonary opacity likely representative mass as  described in the medical record 8/29/2022 and demonstrated on CT  11/28/2020.    I have personally reviewed the examination and initial interpretation  and I agree with the  findings.    LUTHER PEREZ MD         SYSTEM ID:  [id number]       Discharge Medications   Current Discharge Medication List      CONTINUE these medications which have NOT CHANGED    Details   acetaminophen (TYLENOL) 325 MG tablet Take 325-650 mg by mouth every 6 hours as needed for mild pain Take 2 tablets once every 6 hours as needed for mild pain      albuterol (PROAIR HFA/PROVENTIL HFA/VENTOLIN HFA) 108 (90 Base) MCG/ACT inhaler Inhale 1-2 puffs into the lungs      amLODIPine (NORVASC) 10 MG tablet Take 10 mg by mouth daily      amoxicillin-clavulanate (AUGMENTIN) 875-125 MG tablet Take 1 tablet by mouth 2 times daily      calcium carbonate (TUMS) 500 MG chewable tablet Take 1 chew tab by mouth 2 times daily Chew and swallow 1 tablet by mouth twice daily      calcium-vitamin D (OSCAL) 250-125 MG-UNIT TABS per tablet Take 1 tablet by mouth 2 times daily      cetirizine (ZYRTEC) 10 MG tablet Take 10 mg by mouth daily      dexamethasone (DECADRON) 4 MG tablet Take 4 mg by mouth 4 times daily      gabapentin (NEURONTIN) 300 MG capsule Take 300 mg by mouth 3 times daily Take 2 capsules 3 times daily      sennosides (SENOKOT) 8.6 MG tablet Take 1 tablet by mouth 2 times daily      cyclobenzaprine (FLEXERIL) 5 MG tablet Tale 1-2 tablets (5-10 mg) by mouth 3 times daily as needed for muscle cramps.      ergocalciferol (ERGOCALCIFEROL) 1.25 MG (63603 UT) capsule Take 50,000 Units by mouth      hypromellose-dextran (GENTEAL TEARS) 0.1-0.3 % ophthalmic solution 1 drop One drop in right eye 4 times daily      ibuprofen (ADVIL/MOTRIN) 200 MG tablet Take 200 mg by mouth every 4 hours as needed for mild pain      ketoconazole (NIZORAL) 2 % external shampoo Use 3 times per week. Apply to affected area, leave on for 20 minutes, then rinse.      lisinopril (ZESTRIL) 40 MG tablet Take 40 mg by mouth daily      loperamide (IMODIUM) 2 MG capsule Take 2 mg by mouth Take 2 capsules at the onset of symptoms, then take 1 capsule as  needed for diarrhea. DO NOT take more than 8 capsules in 24 hours.      omeprazole (PRILOSEC) 20 MG DR capsule Take 20 mg by mouth daily      ondansetron (ZOFRAN ODT) 8 MG ODT tab Take 8 mg by mouth      oxyCODONE (ROXICODONE) 5 MG tablet Take 5 mg by mouth every 6 hours as needed for severe pain      prochlorperazine (COMPAZINE) 10 MG tablet Take 10 mg by mouth every 6 hours as needed for nausea      senna-docusate (SENOKOT-S/PERICOLACE) 8.6-50 MG tablet Take 1 tablet by mouth daily Take 1-2 tablets twice daily as needed as needed for constipation      tamsulosin (FLOMAX) 0.4 MG capsule Take 0.4 mg by mouth daily One tablet after each meal      traMADol (ULTRAM) 50 MG tablet Take 50 mg by mouth every 6 hours as needed for severe pain      triamcinolone acetonide 0.025 % LOTN Mix with ketoconazole cream and apply to face twice daily           Allergies   Allergies   Allergen Reactions     Carvedilol Other (See Comments)     Per pt, it gave him back pain

## 2022-09-01 NOTE — PROGRESS NOTES
New Patient Oncology Nurse Navigator Note     Referring provider: Dr. 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  I have a message out to Dr. Painting to see if he has availability for add on.  Patient currently in hospital at [facility], plan to discharge today, gamma knife 9/6/22.    9/2/22  Dr. Painting is able to add on to clinic to see him on 9/6/22, 3:45 pm arrival time.  I called with use of  to discuss referral.  I reviewed what will happen at this visit as well as location and phone number.  He was expecting this and has no questions at this time.  I conference called our new patient scheduling to finalize appointment.

## 2022-09-01 NOTE — PLAN OF CARE
"/72 (BP Location: Right arm)   Pulse 72   Temp (!) 96.7  F (35.9  C) (Oral)   Resp 16   Ht 1.626 m (5' 4\")   Wt 89.6 kg (197 lb 8.5 oz)   SpO2 98%   BMI 33.91 kg/m      Status: Bacteremia  Activity: Up ad paco  Neuros: A&Ox4, no deficits noted ex drooping R eye, unchanged since admission; team aware.  Cardiac: WDL, denies chest pain.  Respiratory: WDL on RA, dneies SOB.  GI/: +BS, LBM 8/30, voids spont/not saving.  Diet: Tolerating 2g sodium diet.  Skin/Incisions: WDL.  Lines/Drains: R PIV TKO + abx.  Pain/Nausea: Denies.  New Changes: No acute changes this shift.  Plan: Possible discharge home today.    "

## 2022-09-01 NOTE — PLAN OF CARE
Pt discharged at 1500  Left with wife to go home   Meds from pharmacy and belongings returned   AVS printed and signed     Goal Outcome Evaluation:    Plan of Care Reviewed With: patient     Overall Patient Progress: improving

## 2022-09-02 NOTE — TELEPHONE ENCOUNTER
Action September 4, 2022 2:42 PM ABT   Action Taken Images from [facility] received and resolved to PACS       RECORDS STATUS - ALL OTHER DIAGNOSIS      RECORDS RECEIVED FROM: [facility]   DATE RECEIVED: 09/04/22   NOTES STATUS DETAILS   OFFICE NOTE from referring provider EPIC 08/31/22: Dr. Hector Fu/ Dr. Jonh Mata   OFFICE NOTE from medical oncologist [facility] 08/24/22: Dr. Josef Schmitz    05/31/17: Dr. Ludwin Painting   OFFICE NOTE from radiation oncologist Epic 08/31/22: Dr. Chano Schaefer   OFFICE NOTE from other specialist Epic 08/24/22: Dr. Ming Chowdhury   DISCHARGE SUMMARY from hospital [facility] 08/31/22, 06/26/17, 05/31/17, 05/14/17, 05/03/17, 04/17/17: [facility]  12/13/19, 05/12/19, 03/09/16: [facility]   DISCHARGE REPORT from the ER [facility] 08/30/22, 07/07/22, 10/01/21, 03/20/21, 07/02/16, 05/27/16: [facility] ED    11/28/20: [facility] ED  05/09/17, 05/07/17: [facility] ED   OPERATIVE REPORT [facility] 03/09/16: LAP NEPHRECTOMY (NON-DONOR)    MEDICATION LIST [facility]    LABS     PATHOLOGY REPORTS Report in EPIC Path Consult:  03/20/17: [id number] (03/11/16: [id number] & 11/14/16: [id number])   ANYTHING RELATED TO DIAGNOSIS [facility] Most recent 08/30/22   IMAGING (NEED IMAGES & REPORT)     CT SCANS PACS 07/08/22-02/28/17: CT Chest Abd Pel    [facility]:  08/29/22: CT Chest  08/23/22: CT Chest Abd Pel   MRI PACS 08/31/22-02/15/16: MR Brain   NM PACS 08/04/22-12/02/16: NM Bone Scan   ULTRASOUND PACS 12/02/16: US Abd   XRAYS PACS 08/31/22-02/25/17: XR Chest    [facility]:  08/29/22: XR Chest

## 2022-09-06 PROBLEM — C78.02 MALIGNANT NEOPLASM METASTATIC TO BOTH LUNGS (H): Status: ACTIVE | Noted: 2022-01-01

## 2022-09-06 PROBLEM — C78.01 MALIGNANT NEOPLASM METASTATIC TO BOTH LUNGS (H): Status: ACTIVE | Noted: 2022-01-01

## 2022-09-06 NOTE — LETTER
9/6/2022         RE: Julio John  [address]  [place] [address]        Dear Colleague,    Thank you for referring your patient, Julio John, to the [facility]. Please see a copy of my visit note below.    [facility]    NEW PATIENT VISIT NOTE    PATIENT NAME: Julio John MRN # [id number]  DATE OF VISIT: September 6, 2022 YOB: 1970    REFERRING PROVIDER: No referring provider defined for this encounter.    CANCER TYPE: Clear cell cancer left kidney  STAGE: IV (biopsy proven metastasis to lungs, contralateral kidney)     TREATMENT SUMMARY:   patient originally from [place] who was diagnosed left sided RCC in 2016 s/p nephrectomy March 2016.  December 2016-January 2017 - Underwent stereotactic radiation therapy to pulmonary nodules @ [facility].  2/28/17 - CT C/A/P New small-multiple pulmonary nodules seen in bilateral lungs.   April -June 2017 - s/p #4 cycles of IL-2 therapy at [facility]   C1 4/17/17 (10 doses)  C2 5/3/17 (4 doses)  C3 5/31/17 (5 doses)   C4 06/21/17 (4 doses)    1/5/18 c1 d1 Nivolumab, palliative treatment started due to progressing pulmonary mets. Patient remained asymptomatic.    Jan - Feb 2019 CT CAP showed interval enlargement of 2 right renal masses consistent with RCC. Stable pulmonary metastatic disease.  4/23/19 microwave ablation to larger right renal mass performed by IR. Decided to switch to ipilumumab and nivolumab given disease progression right kidney  5/3/19 ipilumumab and nivolumab #1  7/26/19 started maintenance nivolumab  11/1/19 switched to 2nd line cabozantinib due to progression in right kidney masses, right adrenal nodule, pancreatic nodule  1/22/20 re started cabozantinib at reduced dose of 20 mg po daily due to grade 3 cough    1/13/20 right elbow XR with findings consistent with metastatic destructive lesion of the distal humerus without acute  traumatic abnormality.  10/23/20 changed treatment to axitinib since ct c/a/p showed progression in lung nodules (minimal, not meeting RECIST criteria) but greater progression in right adrenal gland (now 4.5 cm versus 3.0 cm previously) and right kidney (now 3.0 cm versus 2.2 cm previously)  12/11/20 dose reduced axitinib to 3 mg po bid due to cough  1/25/21 CT c/a/p showed stable disease. Bone scan showed possible increased uptake clivus and per d/w radiology, clivus lesion present in 2019.   2/26/21 completed radiation to clivus    3/21/21 seen in ED for headache. CT head showed no parietal bone mets. Ct chest showed left hilar mass increased in size  3/23/21 MRI done for headache and this showed new parietal skull mets  4/12/21 completed parietal skull radiation    4/15/21 given some increase in left hilar mass on march 2021 CT chest and abdominal pain/intolerance to axitinib, started everolimus,    5/4/21 started lenvatinib   Jan 2022 changed lenvatinib to 14 mg po daily and everolimus to 5 mg every other day due to intolerable grade 2 rash.   July 2022 brain MRI showed progression of right pareital and clivus mets.   CT c/a/p 8/23/22 showed progression in lung nodules, right kidney and right adrenal.       CURRENT INTERVENTIONS:  Lenvatinib with everolimus     HISTORY OF PRESENT ILLNESS   Julio John is 52 year old male with recurrent metastatic RCC who has been referred again for progessive disease.     He is here with his wife. History is provided by patient and per charts. Julio has a long standing history of kidney cancer. He had left nephrectomy in 2016 and since then he has progressed on several lines of therapy. About 2 weeks ago he reviewed with his doctor and was told that the radiation is not working for the brain and chemotherapy is not working for the lungs.   He has shortness of breath. He has some headaches, nausea and fatigue. He has right 6th nerve palsy.      PAST MEDICAL HISTORY      Past Medical History:   Diagnosis Date     Alcohol abuse     in remission     Benign essential hypertension      Cerebrovascular accident (H)     2010/2011     Cocaine abuse (H)     in remission     Metastatic renal cell carcinoma (H)     2016          CURRENT OUTPATIENT MEDICATIONS     Current Outpatient Medications   Medication Sig     acetaminophen (TYLENOL) 325 MG tablet Take 325-650 mg by mouth every 6 hours as needed for mild pain Take 2 tablets once every 6 hours as needed for mild pain     albuterol (PROAIR HFA/PROVENTIL HFA/VENTOLIN HFA) 108 (90 Base) MCG/ACT inhaler Inhale 1-2 puffs into the lungs     amLODIPine (NORVASC) 10 MG tablet Take 10 mg by mouth daily     calcium carbonate (TUMS) 500 MG chewable tablet Take 1 chew tab by mouth 2 times daily Chew and swallow 1 tablet by mouth twice daily     calcium-vitamin D (OSCAL) 250-125 MG-UNIT TABS per tablet Take 1 tablet by mouth 2 times daily     cetirizine (ZYRTEC) 10 MG tablet Take 10 mg by mouth daily     cyclobenzaprine (FLEXERIL) 5 MG tablet Tale 1-2 tablets (5-10 mg) by mouth 3 times daily as needed for muscle cramps.     dexamethasone (DECADRON) 4 MG tablet Take 4 mg by mouth 4 times daily     ergocalciferol (ERGOCALCIFEROL) 1.25 MG (01926 UT) capsule Take 50,000 Units by mouth     gabapentin (NEURONTIN) 300 MG capsule Take 300 mg by mouth 3 times daily Take 2 capsules 3 times daily     hypromellose-dextran (GENTEAL TEARS) 0.1-0.3 % ophthalmic solution 1 drop One drop in right eye 4 times daily     ibuprofen (ADVIL/MOTRIN) 200 MG tablet Take 200 mg by mouth every 4 hours as needed for mild pain     ketoconazole (NIZORAL) 2 % external shampoo Use 3 times per week. Apply to affected area, leave on for 20 minutes, then rinse.     lisinopril (ZESTRIL) 40 MG tablet Take 40 mg by mouth daily     loperamide (IMODIUM) 2 MG capsule Take 2 mg by mouth Take 2 capsules at the onset of symptoms, then take 1 capsule as needed for diarrhea. DO NOT take more  than 8 capsules in 24 hours.     omeprazole (PRILOSEC) 20 MG DR capsule Take 20 mg by mouth daily     ondansetron (ZOFRAN ODT) 8 MG ODT tab Take 8 mg by mouth     oxyCODONE (ROXICODONE) 5 MG tablet Take 5 mg by mouth every 6 hours as needed for severe pain     prochlorperazine (COMPAZINE) 10 MG tablet Take 10 mg by mouth every 6 hours as needed for nausea     senna-docusate (SENOKOT-S/PERICOLACE) 8.6-50 MG tablet Take 1 tablet by mouth daily Take 1-2 tablets twice daily as needed as needed for constipation     sennosides (SENOKOT) 8.6 MG tablet Take 1 tablet by mouth 2 times daily     tamsulosin (FLOMAX) 0.4 MG capsule Take 0.4 mg by mouth daily One tablet after each meal     traMADol (ULTRAM) 50 MG tablet Take 50 mg by mouth every 6 hours as needed for severe pain     triamcinolone acetonide 0.025 % LOTN Mix with ketoconazole cream and apply to face twice daily     No current facility-administered medications for this visit.        ALLERGIES      Allergies   Allergen Reactions     Carvedilol Other (See Comments)     Per pt, it gave him back pain        SOCIAL HISTORY   He is  and has 7 kids and 3 young kids living with her current wife (4 from previous marriage living with his ex-wife). He is not working at this time. 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   He does not smoke. He smoked about 2 packs every time he was at [employer]. He stopped [employer] and stopped everything including cigarette and alcohol. He would visit the [employer] about 4 times a week. He did a lot of cocaine and tequila in the past. He denies any active drug use.         FAMILY HISTORY   None  Maternal grandmother had uterine cancer      REVIEW OF SYSTEMS   As above in the HPI, o/w complete 12-point ROS was negative.     PHYSICAL EXAM   There were no vitals taken for this visit.   Wt Readings from Last 3 Encounters:   08/31/22 89.6 kg (197 lb 8.5 oz)   08/24/22 92.1  kg (203 lb)   08/18/22 90.7 kg (200 lb)     GEN: NAD  HEENT: PERRL, EOMI, no icterus, injection or pallor. Oropharynx is clear.  NECK: no cervical or supraclavicular lymphadenopathy  LUNGS: clear bilaterally  CV: regular, no murmurs, rubs, or gallops  ABDOMEN: soft, non-tender, non-distended, normal bowel sounds, no hepatosplenomegaly by percussion or palpation  EXT: warm, well perfused, no edema  NEURO: alert  SKIN: no rashes     LABORATORY AND IMAGING STUDIES     Recent Labs   Lab Test 09/01/22  0723 08/31/22  0732 11/28/20  2320 07/26/17  1417 06/30/17  1453   * 133* 137 137 137   POTASSIUM 4.8 4.6 3.9 3.8 4.0   CHLORIDE 104 103 106 105 109   CO2 21* 24 29 24 20   ANIONGAP 9 6* 2* 8 8   BUN 33.4* 38.2* 19 16 14   CR 1.28* 1.32* 1.33* 1.04 1.09   * 115* 117* 95 96   KVNG 8.2* 7.9* 8.7 8.6 7.8*     Recent Labs   Lab Test 07/26/17  1417 06/30/17  1453 06/28/17  1536 06/28/17  0537 06/27/17  0537 06/07/17  1439 06/02/17  0634 06/01/17  1352 06/01/17  0928   MAG 2.1 2.1 3.1* 1.5* 1.7   < > 2.2   < > 1.5*   PHOS  --   --  2.2* 1.8* 2.9  --  1.3*  --  2.7    < > = values in this interval not displayed.     Recent Labs   Lab Test 09/01/22  0723 08/31/22  0732 11/28/20 2320 07/26/17  1417 06/30/17  1453 06/28/17  0537 06/27/17  0537   WBC 10.7 12.8* 7.8 6.5 7.6 6.1 9.6   HGB 13.8 14.0 14.8 15.4 13.2* 13.2* 14.5   * 106* 225 160 96* 54* 93*   MCV 85 85 87 86 84 86 85   NEUTROPHIL  --   --  59.2 44.2 27.1 72.2 67.6     Recent Labs   Lab Test 08/31/22  0732 11/28/20  2320 07/26/17  1417 06/30/17  1453 06/28/17  0537   BILITOTAL 0.4 0.3 0.4  --  2.0*   ALKPHOS 41 54 61  --  33*   ALT 17 32 32  --  43   AST 9* 23 16  --  27   ALBUMIN 3.3* 3.3* 3.8  --  2.5*   LDH  --   --  156 175 166     TSH   Date Value Ref Range Status   11/28/2020 2.96 0.40 - 4.00 mU/L Final     No results for input(s): CEA in the last 36713 hours.  Results for orders placed or performed during the hospital encounter of 08/31/22   XR  Chest Port 1 View    Narrative    Exam: XR CHEST PORT 1 VIEW, 8/31/2022 1:45 PM    Indication: chest pain    Comparison: CT 11/28/2020    Findings:   Portable semiupright frontal view of the chest. Trachea is midline.  Heart size is normal. Left upper lung opacity likely representative of  left upper lobe mass as seen on 11/28/2020 and described in the  medical record on 8/29/2022. No appreciable pleural effusion or  pneumothorax. No acute osseous abnormality.      Impression    Impression:   1. No acute airspace disease.  2. Left upper lobe pulmonary opacity likely representative mass as  described in the medical record 8/29/2022 and demonstrated on CT  11/28/2020.    I have personally reviewed the examination and initial interpretation  and I agree with the findings.    LUTHER PEREZ MD         SYSTEM ID:  [id number]       Lab Results   Component Value Date    PATH  03/20/2017     Patient Name: ALIA VASQUEZ  MR#: [id number]  Specimen #: [id number]  Collected: 3/20/2017  Received: 3/20/2017  Reported: 3/20/2017 17:39  Ordering Phy(s): KHUSHBU PACK  Additional Phy(s): [facility], Department of  Pathology    For improved result formatting, select 'View Enhanced Report Format'  under Linked Documents section.    TEST(S):  A: 12 Slides, case #[id number]  B: 11 Slides, case #[id number]    FINAL DIAGNOSIS:  I. CASE FROM [place], [place]  ([id number], OBTAINED 03/11/2016):  Kidney, left, nephrectomy:  - Clear cell renal cell carcinoma, ISUP grade 3  - Tumor size: 7.5 cm  - Tumor extent: Carcinoma involves renal sinus fat and segmental  branches of renal vein  - Margins: Negative for carcinoma  - Pathologic stage: pT3a  - Non neoplastic kidney with no significant pathology    II. CASE FROM [place], [place]  ([id number], OBTAINED 11/14/2016):  Lung, left, nodule, CT-guided needle core biopsy:  - Metastatic clear cell  renal carcinoma    COMMENT:  Immunohistochemical stains performed with appropriate controls at an  outside institution were sent for our review.  The lung metastasis is  positive for PAX-8 and negative for CK7, CK20, TTF-1, consistent with  the known renal primary.    I have personally reviewed all specimens and or slides, including the  listed special stains, and used them with my medical judgement to  determine the final diagnosis.    Electronically signed out by:    Jen Gillette M.D., [facility]    CLINICAL HISTORY:  The patient is a 47-year-old male.    GROSS:  Received from [facility] in [place], [place] are 12  stained slides labeled [id number] (obtained 03/11/16), 11 stained  slides labeled [id number] (obtained 11/14/16), and copies of the  referring pathologist's reports with patient identifying information.  All slides are returned.    MICROSCOPIC:  Microscopic examination was performed.    CPT Codes:  A: 55966-FPN4  B: 78567-ZWF0    TESTING LAB LOCATION:  [address], [address]   [address]  [phone]    COLLECTION SITE:  Client:  [facility]  Location:  [id number] (B)             ASSESSMENT    1. RCC from left kidney - clear cell with metastasis to lungs, brain, adrenals, bone  2. 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 Shortness of breath, fatigue likely related to recent radiation to brain and recent COVID infection  4. No other significant medical comorbidity    DISCUSSION   I had a lengthy discussion with patient in presence of his wife. We had interpretor over iPad.     Patient has been quite worried due to his recent disease progression. He understands that he has disease progression in his lungs, kidney and brain. The systemic therapy of lenvatinib and everolimus does not seem to be working and has been discontinued. He had radiation for his brain  metastasis but this has not worked either and he has progressive disease. He has been told that he has no good treatment options which has him worried. He expresses that he is doing well mentally. He has kept a very positive mindset through all of this. He has conner in God and has to live for his wife and kids. He is not ready to throw down the towel as yet.     I reviewed that we do have multiple treatment options as yet. We could keep going as long as he maintains his performance status and continues to enjoy life. He has progressed on high dose IL2, cabozantinib, ipilimumab with nivolumab and recently lenvatinib with everolimus. He has not yet received sunitinib, pazopanib, tivozanib, sorafenib amongst the FDA approved therapies for kidney cancer. Beyond this we might have options of clinical trials.     I reviewed actual images from his CT scans with him. He has subtle disease progression since his previous scan in May. His lung lesions are quite similar. His kidney lesions are harder to compare as the previous scan was without contrast. There are no new lesions. He has no critical site of disease other than his brain. I am a little worried about his brain metastasis which can have profound effect on his quality of life, cause a lot of morbidity and would be hardest to control. The oral VEGF-TKI do not cross blood brain barrier very effectively and have limited effectiveness on brain metastasis. I offered consultation in neurosurgery. He has already been assessed by neurosurgery and has been explained that surgery would have much higher morbidity than radiation therapy which has been currently planned but would be an option if he does not respond to radiation.     I assured him that his care in medical oncology would not be different here at [facility] or with Dr. Schmitz at [facility] who has been managing his care for over 5 yrs now. He would like to follow with me and wished to schedule a follow up  with me.     He is on dexamethasone 4 mg q6h. We should soon start tapering this. I have encouraged him to bring this up at his radiation oncology consult visit.     I have refilled his albuterol. He has mouth sores. His sores almost appear as apthous ulcers. I suspect these are related to his brain metastasis with high dose dexamethasone being used. I have recommended mouth rinses with salt water and nystatin.      PLAN   I will review his care with Dr. Schmitz  I recommend sunitinib as the next oral VEGF-TKI.   We should soon start to taper his dexamethasone. Will review this with Dr. Schmitz and Radiation Oncology  To see me in 2 weeks with labs prior to visit (same day)    60 minutes spent on the date of the encounter doing chart review, history and exam, documentation and further activities as noted above         Again, thank you for allowing me to participate in the care of your patient.      Sincerely,    Ludwin Painting MD

## 2022-09-06 NOTE — NURSING NOTE
"Oncology Rooming Note    September 6, 2022 3:34 PM   Julio John is a 52 year old male who presents for:  Chief Complaint   Patient presents with     New Patient     Metastatic renal cell carcinoma    Initial Vitals: /73 (BP Location: Right arm, Patient Position: Sitting, Cuff Size: Adult Large)   Pulse 89   Temp 98.9  F (37.2  C) (Oral)   Resp 16   Ht 1.626 m (5' 4\")   Wt 87.4 kg (192 lb 9.6 oz)   SpO2 96%   BMI 33.06 kg/m   Estimated body mass index is 33.06 kg/m  as calculated from the following:    Height as of this encounter: 1.626 m (5' 4\").    Weight as of this encounter: 87.4 kg (192 lb 9.6 oz). Body surface area is 1.99 meters squared.  No Pain (0) Comment: Data Unavailable   No LMP for male patient.  Allergies reviewed: Yes  Medications reviewed: Yes    Medications: Medication refills not needed today.  Pharmacy name entered into b5media:    [facility] [place], [place] - [address] [id number] - [place], [place] - [address] [facility] [id number]  [facility] [place], [place] - [id number]    Clinical concerns: Has questions on BP medication and if  He still needs to take them. Has also questions on CT scan and what treatment is looking forward. Has also been having neuropathy in both hands, has been going on for a week.     Yesica Hinton CMA on 9/6/2022 at 3:35 PM  "

## 2022-09-06 NOTE — PROGRESS NOTES
[facility]  Department of Neurosurgery  Operative Report        DATE OF INITIAL TREATMENT: 9/6/2022     PROCEDURES:  1. Stereotactic MRI of the Brain  2. Generation of Stereotactic Treatment Plan  3. Fractionated Gamma Knife Treatment, Complex (petroclival/cavernous sinus metastasis)     RADIATION ONCOLOGIST: Nolan Schaefer MD     RADIATION PHYSICIST:  Brit Arellano PhD     NEUROSURGEON: Ming Chowdhury MD      ASSISTANT: Alma Martins MD     INDICATIONS FOR PROCEDURE:  Patient is a 52 year old male with history of metastatic renal cell carcinoma who presented with a right cranial nerve 6 palsy. Imaging demonstrated a clival/cavernous sinus metastasis. We discussed the role of medical management, surgery or radiation therapy for the lesion. Given his advanced systemic disease, we felt that primary radiation therapy was the most appropriate.  After a full discussion of risks, benefits and alternatives, patient agreed to proceed.     DESCRIPTION OF PROCEDURE:    After obtaining informed consent, the patient had an Aquaplast mask generated for fractionated stereotactic radiotherapy.  We obtained an initial stereotactic MRI on 8/31/2022 with thin sections and used this to generate our initial treatment plan. The stereotactic MRI was transferred to the Gamma Knife treatment planning station, and a three-dimensional stereotactic treatment plan was generated for the clival/cavernous sinus metastasis by the radiation oncology and neurosurgery teams. Treatment volume was 19.27cm3.     Treatment Site Dose Isodose Modality Collimator (mm) Shots Fractions   Skull lesion 27.5 Gy 44% Cobalt 60 4,8,16mm 64 shots 5         The plan was then turned over to our radiation physicist for .  Our radiation oncology colleagues then prescribed the radiation doses.       I was present for all neurosurgical portions of the procedure.     Ming Chowdhury MD

## 2022-09-06 NOTE — LETTER
Date:September 7, 2022      Patient was self referred, no letter generated. Do not send.        [facility] Information

## 2022-09-06 NOTE — PROGRESS NOTES
[facility] CANCER CLINIC    NEW PATIENT VISIT NOTE    PATIENT NAME: Julio John MRN # [id number]  DATE OF VISIT: September 6, 2022 YOB: 1970    REFERRING PROVIDER: No referring provider defined for this encounter.    CANCER TYPE: Clear cell cancer left kidney  STAGE: IV (biopsy proven metastasis to lungs, contralateral kidney)     TREATMENT SUMMARY:   patient originally from [place] who was diagnosed left sided RCC in 2016 s/p nephrectomy March 2016.  December 2016-January 2017 - Underwent stereotactic radiation therapy to pulmonary nodules @ [facility].  2/28/17 - CT C/A/P New small-multiple pulmonary nodules seen in bilateral lungs.   April -June 2017 - s/p #4 cycles of IL-2 therapy at [facility]   C1 4/17/17 (10 doses)  C2 5/3/17 (4 doses)  C3 5/31/17 (5 doses)   C4 06/21/17 (4 doses)    1/5/18 c1 d1 Nivolumab, palliative treatment started due to progressing pulmonary mets. Patient remained asymptomatic.    Jan - Feb 2019 CT CAP showed interval enlargement of 2 right renal masses consistent with RCC. Stable pulmonary metastatic disease.  4/23/19 microwave ablation to larger right renal mass performed by IR. Decided to switch to ipilumumab and nivolumab given disease progression right kidney  5/3/19 ipilumumab and nivolumab #1  7/26/19 started maintenance nivolumab  11/1/19 switched to 2nd line cabozantinib due to progression in right kidney masses, right adrenal nodule, pancreatic nodule  1/22/20 re started cabozantinib at reduced dose of 20 mg po daily due to grade 3 cough    1/13/20 right elbow XR with findings consistent with metastatic destructive lesion of the distal humerus without acute traumatic abnormality.  10/23/20 changed treatment to axitinib since ct c/a/p showed progression in lung nodules (minimal, not meeting RECIST criteria) but greater progression in right adrenal gland (now 4.5 cm versus 3.0 cm previously) and right kidney (now 3.0 cm versus  2.2 cm previously)  12/11/20 dose reduced axitinib to 3 mg po bid due to cough  1/25/21 CT c/a/p showed stable disease. Bone scan showed possible increased uptake clivus and per d/w radiology, clivus lesion present in 2019.   2/26/21 completed radiation to clivus    3/21/21 seen in ED for headache. CT head showed no parietal bone mets. Ct chest showed left hilar mass increased in size  3/23/21 MRI done for headache and this showed new parietal skull mets  4/12/21 completed parietal skull radiation    4/15/21 given some increase in left hilar mass on march 2021 CT chest and abdominal pain/intolerance to axitinib, started everolimus,    5/4/21 started lenvatinib   Jan 2022 changed lenvatinib to 14 mg po daily and everolimus to 5 mg every other day due to intolerable grade 2 rash.   July 2022 brain MRI showed progression of right pareital and clivus mets.   CT c/a/p 8/23/22 showed progression in lung nodules, right kidney and right adrenal.       CURRENT INTERVENTIONS:  Lenvatinib with everolimus     HISTORY OF PRESENT ILLNESS   Julio John is 52 year old male with recurrent metastatic RCC who has been referred again for progessive disease.     He is here with his wife. History is provided by patient and per charts. Julio has a long standing history of kidney cancer. He had left nephrectomy in 2016 and since then he has progressed on several lines of therapy. About 2 weeks ago he reviewed with his doctor and was told that the radiation is not working for the brain and chemotherapy is not working for the lungs.   He has shortness of breath. He has some headaches, nausea and fatigue. He has right 6th nerve palsy.      PAST MEDICAL HISTORY     Past Medical History:   Diagnosis Date     Alcohol abuse     in remission     Benign essential hypertension      Cerebrovascular accident (H)     2010/2011     Cocaine abuse (H)     in remission     Metastatic renal cell carcinoma (H)     2016          CURRENT OUTPATIENT  MEDICATIONS     Current Outpatient Medications   Medication Sig     acetaminophen (TYLENOL) 325 MG tablet Take 325-650 mg by mouth every 6 hours as needed for mild pain Take 2 tablets once every 6 hours as needed for mild pain     albuterol (PROAIR HFA/PROVENTIL HFA/VENTOLIN HFA) 108 (90 Base) MCG/ACT inhaler Inhale 1-2 puffs into the lungs     amLODIPine (NORVASC) 10 MG tablet Take 10 mg by mouth daily     calcium carbonate (TUMS) 500 MG chewable tablet Take 1 chew tab by mouth 2 times daily Chew and swallow 1 tablet by mouth twice daily     calcium-vitamin D (OSCAL) 250-125 MG-UNIT TABS per tablet Take 1 tablet by mouth 2 times daily     cetirizine (ZYRTEC) 10 MG tablet Take 10 mg by mouth daily     cyclobenzaprine (FLEXERIL) 5 MG tablet Tale 1-2 tablets (5-10 mg) by mouth 3 times daily as needed for muscle cramps.     dexamethasone (DECADRON) 4 MG tablet Take 4 mg by mouth 4 times daily     ergocalciferol (ERGOCALCIFEROL) 1.25 MG (68957 UT) capsule Take 50,000 Units by mouth     gabapentin (NEURONTIN) 300 MG capsule Take 300 mg by mouth 3 times daily Take 2 capsules 3 times daily     hypromellose-dextran (GENTEAL TEARS) 0.1-0.3 % ophthalmic solution 1 drop One drop in right eye 4 times daily     ibuprofen (ADVIL/MOTRIN) 200 MG tablet Take 200 mg by mouth every 4 hours as needed for mild pain     ketoconazole (NIZORAL) 2 % external shampoo Use 3 times per week. Apply to affected area, leave on for 20 minutes, then rinse.     lisinopril (ZESTRIL) 40 MG tablet Take 40 mg by mouth daily     loperamide (IMODIUM) 2 MG capsule Take 2 mg by mouth Take 2 capsules at the onset of symptoms, then take 1 capsule as needed for diarrhea. DO NOT take more than 8 capsules in 24 hours.     omeprazole (PRILOSEC) 20 MG DR capsule Take 20 mg by mouth daily     ondansetron (ZOFRAN ODT) 8 MG ODT tab Take 8 mg by mouth     oxyCODONE (ROXICODONE) 5 MG tablet Take 5 mg by mouth every 6 hours as needed for severe pain     prochlorperazine  (COMPAZINE) 10 MG tablet Take 10 mg by mouth every 6 hours as needed for nausea     senna-docusate (SENOKOT-S/PERICOLACE) 8.6-50 MG tablet Take 1 tablet by mouth daily Take 1-2 tablets twice daily as needed as needed for constipation     sennosides (SENOKOT) 8.6 MG tablet Take 1 tablet by mouth 2 times daily     tamsulosin (FLOMAX) 0.4 MG capsule Take 0.4 mg by mouth daily One tablet after each meal     traMADol (ULTRAM) 50 MG tablet Take 50 mg by mouth every 6 hours as needed for severe pain     triamcinolone acetonide 0.025 % LOTN Mix with ketoconazole cream and apply to face twice daily     No current facility-administered medications for this visit.        ALLERGIES      Allergies   Allergen Reactions     Carvedilol Other (See Comments)     Per pt, it gave him back pain        SOCIAL HISTORY   He is  and has 7 kids and 3 young kids living with her current wife (4 from previous marriage living with his ex-wife). He is not working at this time. 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   He does not smoke. He smoked about 2 packs every time he was at [employer]. He stopped [employer] and stopped everything including cigarette and alcohol. He would visit the [employer] about 4 times a week. He did a lot of cocaine and tequila in the past. He denies any active drug use.         FAMILY HISTORY   None  Maternal grandmother had uterine cancer      REVIEW OF SYSTEMS   As above in the HPI, o/w complete 12-point ROS was negative.     PHYSICAL EXAM   There were no vitals taken for this visit.   Wt Readings from Last 3 Encounters:   08/31/22 89.6 kg (197 lb 8.5 oz)   08/24/22 92.1 kg (203 lb)   08/18/22 90.7 kg (200 lb)     GEN: NAD  HEENT: PERRL, EOMI, no icterus, injection or pallor. Oropharynx is clear.  NECK: no cervical or supraclavicular lymphadenopathy  LUNGS: clear bilaterally  CV: regular, no murmurs, rubs, or gallops  ABDOMEN: soft,  non-tender, non-distended, normal bowel sounds, no hepatosplenomegaly by percussion or palpation  EXT: warm, well perfused, no edema  NEURO: alert  SKIN: no rashes     LABORATORY AND IMAGING STUDIES     Recent Labs   Lab Test 09/01/22  0723 08/31/22  0732 11/28/20  2320 07/26/17  1417 06/30/17  1453   * 133* 137 137 137   POTASSIUM 4.8 4.6 3.9 3.8 4.0   CHLORIDE 104 103 106 105 109   CO2 21* 24 29 24 20   ANIONGAP 9 6* 2* 8 8   BUN 33.4* 38.2* 19 16 14   CR 1.28* 1.32* 1.33* 1.04 1.09   * 115* 117* 95 96   KVNG 8.2* 7.9* 8.7 8.6 7.8*     Recent Labs   Lab Test 07/26/17  1417 06/30/17  1453 06/28/17  1536 06/28/17  0537 06/27/17  0537 06/07/17  1439 06/02/17  0634 06/01/17  1352 06/01/17  0928   MAG 2.1 2.1 3.1* 1.5* 1.7   < > 2.2   < > 1.5*   PHOS  --   --  2.2* 1.8* 2.9  --  1.3*  --  2.7    < > = values in this interval not displayed.     Recent Labs   Lab Test 09/01/22  0723 08/31/22  0732 11/28/20 2320 07/26/17 1417 06/30/17  1453 06/28/17  0537 06/27/17  0537   WBC 10.7 12.8* 7.8 6.5 7.6 6.1 9.6   HGB 13.8 14.0 14.8 15.4 13.2* 13.2* 14.5   * 106* 225 160 96* 54* 93*   MCV 85 85 87 86 84 86 85   NEUTROPHIL  --   --  59.2 44.2 27.1 72.2 67.6     Recent Labs   Lab Test 08/31/22  0732 11/28/20  2320 07/26/17 1417 06/30/17  1453 06/28/17  0537   BILITOTAL 0.4 0.3 0.4  --  2.0*   ALKPHOS 41 54 61  --  33*   ALT 17 32 32  --  43   AST 9* 23 16  --  27   ALBUMIN 3.3* 3.3* 3.8  --  2.5*   LDH  --   --  156 175 166     TSH   Date Value Ref Range Status   11/28/2020 2.96 0.40 - 4.00 mU/L Final     No results for input(s): CEA in the last 03080 hours.  Results for orders placed or performed during the hospital encounter of 08/31/22   XR Chest Port 1 View    Narrative    Exam: XR CHEST PORT 1 VIEW, 8/31/2022 1:45 PM    Indication: chest pain    Comparison: CT 11/28/2020    Findings:   Portable semiupright frontal view of the chest. Trachea is midline.  Heart size is normal. Left upper lung opacity  likely representative of  left upper lobe mass as seen on 11/28/2020 and described in the  medical record on 8/29/2022. No appreciable pleural effusion or  pneumothorax. No acute osseous abnormality.      Impression    Impression:   1. No acute airspace disease.  2. Left upper lobe pulmonary opacity likely representative mass as  described in the medical record 8/29/2022 and demonstrated on CT  11/28/2020.    I have personally reviewed the examination and initial interpretation  and I agree with the findings.    LUTHER PEREZ MD         SYSTEM ID:  [id number]       Lab Results   Component Value Date    PATH  03/20/2017     Patient Name: ALIA VASQUEZ  MR#: [id number]  Specimen #: [id number]  Collected: 3/20/2017  Received: 3/20/2017  Reported: 3/20/2017 17:39  Ordering Phy(s): KHUSHBU PACK  Additional Phy(s): [facility], Department of  Pathology    For improved result formatting, select 'View Enhanced Report Format'  under Linked Documents section.    TEST(S):  A: 12 Slides, case #[id number]  B: 11 Slides, case #[id number]    FINAL DIAGNOSIS:  I. CASE FROM [place], [place]  ([id number], OBTAINED 03/11/2016):  Kidney, left, nephrectomy:  - Clear cell renal cell carcinoma, ISUP grade 3  - Tumor size: 7.5 cm  - Tumor extent: Carcinoma involves renal sinus fat and segmental  branches of renal vein  - Margins: Negative for carcinoma  - Pathologic stage: pT3a  - Non neoplastic kidney with no significant pathology    II. CASE FROM [place], [place]  ([id number], OBTAINED 11/14/2016):  Lung, left, nodule, CT-guided needle core biopsy:  - Metastatic clear cell renal carcinoma    COMMENT:  Immunohistochemical stains performed with appropriate controls at an  outside institution were sent for our review.  The lung metastasis is  positive for PAX-8 and negative for CK7, CK20, TTF-1, consistent with  the known renal  primary.    I have personally reviewed all specimens and or slides, including the  listed special stains, and used them with my medical judgement to  determine the final diagnosis.    Electronically signed out by:    Jen Gillette M.D., [facility]    CLINICAL HISTORY:  The patient is a 47-year-old male.    GROSS:  Received from [facility] in [place], [place] are 12  stained slides labeled [id number] (obtained 03/11/16), 11 stained  slides labeled [id number] (obtained 11/14/16), and copies of the  referring pathologist's reports with patient identifying information.  All slides are returned.    MICROSCOPIC:  Microscopic examination was performed.    CPT Codes:  A: 00130-CSU5  B: 56828-KLD1    TESTING LAB LOCATION:  [address], [address]   [address]  [phone]    COLLECTION SITE:  Client:  [facility]  Location:  [id number] (B)             ASSESSMENT    1. RCC from left kidney - clear cell with metastasis to lungs, brain, adrenals, bone  2. 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 Shortness of breath, fatigue likely related to recent radiation to brain and recent COVID infection  4. No other significant medical comorbidity    DISCUSSION   I had a lengthy discussion with patient in presence of his wife. We had interpretor over iPad.     Patient has been quite worried due to his recent disease progression. He understands that he has disease progression in his lungs, kidney and brain. The systemic therapy of lenvatinib and everolimus does not seem to be working and has been discontinued. He had radiation for his brain metastasis but this has not worked either and he has progressive disease. He has been told that he has no good treatment options which has him worried. He expresses that he is doing well mentally. He has kept a very positive mindset through all of this. He  has conner in God and has to live for his wife and kids. He is not ready to throw down the towel as yet.     I reviewed that we do have multiple treatment options as yet. We could keep going as long as he maintains his performance status and continues to enjoy life. He has progressed on high dose IL2, cabozantinib, ipilimumab with nivolumab and recently lenvatinib with everolimus. He has not yet received sunitinib, pazopanib, tivozanib, sorafenib amongst the FDA approved therapies for kidney cancer. Beyond this we might have options of clinical trials.     I reviewed actual images from his CT scans with him. He has subtle disease progression since his previous scan in May. His lung lesions are quite similar. His kidney lesions are harder to compare as the previous scan was without contrast. There are no new lesions. He has no critical site of disease other than his brain. I am a little worried about his brain metastasis which can have profound effect on his quality of life, cause a lot of morbidity and would be hardest to control. The oral VEGF-TKI do not cross blood brain barrier very effectively and have limited effectiveness on brain metastasis. I offered consultation in neurosurgery. He has already been assessed by neurosurgery and has been explained that surgery would have much higher morbidity than radiation therapy which has been currently planned but would be an option if he does not respond to radiation.     I assured him that his care in medical oncology would not be different here at [facility] or with Dr. Schmitz at [facility] who has been managing his care for over 5 yrs now. He would like to follow with me and wished to schedule a follow up with me.     He is on dexamethasone 4 mg q6h. We should soon start tapering this. I have encouraged him to bring this up at his radiation oncology consult visit.     I have refilled his albuterol. He has mouth sores. His sores almost appear as apthous  ulcers. I suspect these are related to his brain metastasis with high dose dexamethasone being used. I have recommended mouth rinses with salt water and nystatin.      PLAN   I will review his care with Dr. Schmitz  I recommend sunitinib as the next oral VEGF-TKI.   We should soon start to taper his dexamethasone. Will review this with Dr. Schmitz and Radiation Oncology  To see me in 2 weeks with labs prior to visit (same day)    60 minutes spent on the date of the encounter doing chart review, history and exam, documentation and further activities as noted above     [employer]  Adj ,  Division of Hematology, Oncology & Transplantation  [facility].

## 2022-09-06 NOTE — LETTER
[facility]  [facility] RADIATION ONCOLOGY GAMMA KNIFE  [address] [address]  Phone: [phone]  Fax: [phone]          9/6/2022    RE:   Julio John  [address]  [place] [address]      Dear Colleague,    Thank you for referring your patient, Julio John, to the [facility]. Please see a copy of my visit note below.      [facility]  Department of Neurosurgery  Operative Report        DATE OF INITIAL TREATMENT: 9/6/2022     PROCEDURES:  1. Stereotactic MRI of the Brain  2. Generation of Stereotactic Treatment Plan  3. Fractionated Gamma Knife Treatment, Complex (petroclival/cavernous sinus metastasis)     RADIATION ONCOLOGIST: Nolan Schaefer MD     RADIATION PHYSICIST:  Brit Arellano PhD     NEUROSURGEON: Ming Chowdhury MD      ASSISTANT: Alma Martins MD     INDICATIONS FOR PROCEDURE:  Patient is a 52 year old male with history of metastatic renal cell carcinoma who presented with a right cranial nerve 6 palsy. Imaging demonstrated a clival/cavernous sinus metastasis. We discussed the role of medical management, surgery or radiation therapy for the lesion. Given his advanced systemic disease, we felt that primary radiation therapy was the most appropriate.  After a full discussion of risks, benefits and alternatives, patient agreed to proceed.     DESCRIPTION OF PROCEDURE:    After obtaining informed consent, the patient had an Aquaplast mask generated for fractionated stereotactic radiotherapy.  We obtained an initial stereotactic MRI on 8/31/2022 with thin sections and used this to generate our initial treatment plan. The stereotactic MRI was transferred to the Gamma Knife treatment planning station, and a three-dimensional stereotactic treatment plan was generated for the clival/cavernous sinus metastasis by the radiation oncology and neurosurgery teams.  Treatment volume was 19.27cm3.     Treatment Site Dose Isodose Modality Collimator (mm) Shots Fractions   Skull lesion 27.5 Gy 44% Cobalt 60 4,8,16mm 64 shots 5         The plan was then turned over to our radiation physicist for .  Our radiation oncology colleagues then prescribed the radiation doses.       I was present for all neurosurgical portions of the procedure.     Ming Chowhdury MD          Again, thank you for allowing me to participate in the care of your patient.      Sincerely,    Ming Chowdhury MD

## 2022-09-06 NOTE — Clinical Note
9/6/2022         RE: Julio John  [address]  [place] [address]        Dear Colleague,    Thank you for referring your patient, Julio John, to the [facility] RADIATION ONCOLOGY GAMMA KNIFE. Please see a copy of my visit note below.    [facility]  Department of Neurosurgery  Operative Report        DATE OF INITIAL TREATMENT: 9/6/2022     PROCEDURES:  1. Stereotactic MRI of the Brain  2. Generation of Stereotactic Treatment Plan  3. Fractionated Gamma Knife Treatment, Complex (petroclival/cavernous sinus metastasis)     RADIATION ONCOLOGIST: Nolan Schaefer MD     RADIATION PHYSICIST:  Brit Arellano PhD     NEUROSURGEON: Ming Chowdhury MD      ASSISTANT: Alma Martins MD     INDICATIONS FOR PROCEDURE:  Patient is a 52 year old male with history of metastatic renal cell carcinoma who presented with a right cranial nerve 6 palsy. Imaging demonstrated a clival/cavernous sinus metastasis. We discussed the role of medical management, surgery or radiation therapy for the lesion. Given his advanced systemic disease, we felt that primary radiation therapy was the most appropriate.  After a full discussion of risks, benefits and alternatives, patient agreed to proceed.     DESCRIPTION OF PROCEDURE:    After obtaining informed consent, the patient had an Aquaplast mask generated for fractionated stereotactic radiotherapy.  We obtained an initial stereotactic MRI on 8/31/2022 with thin sections and used this to generate our initial treatment plan. The stereotactic MRI was transferred to the Gamma Knife treatment planning station, and a three-dimensional stereotactic treatment plan was generated for the clival/cavernous sinus metastasis by the radiation oncology and neurosurgery teams. Treatment volume was 19.27cm3.     Treatment Site Dose Isodose Modality Collimator (mm) Shots Fractions   Skull lesion 27.5 Gy 44% Cobalt 60 4,8,16mm 64 shots 5         The plan was then  turned over to our radiation physicist for .  Our radiation oncology colleagues then prescribed the radiation doses.       I was present for all neurosurgical portions of the procedure.     Ming Chowdhury MD        Again, thank you for allowing me to participate in the care of your patient.        Sincerely,        Ming Chowdhury MD

## 2022-09-07 NOTE — PROGRESS NOTES
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  Skull base 1,100 cGy Cobalt 60 22 1     DESCRIPTION OF PROCEDURE:    On 2022, the patient was brought to the [employer] Gamma Knife IconTM suite [facility] and treated with fractionated stereotactic radiotherapy.     The [employer] Gamma Knife IconTM Plan software was used to create a highly conformal dose distribution using the number and size collimators detailed above.     TREATMENT:    The patient was brought to the Gamma Knife suite. A timeout was performed to confirm the correct patient and correct procedure.    Patient was identified by 2 methods.  Site was verified.    The mask was placed and a cone beam CT was done and fused to the previously approved plan.        The physicist and I evaluated the fusion and confirmed the target coverage after auto-registration.      The treatment was delivered using the [employer] Gamma Knife IconTM without complication.     The mask was removed and the patient was discharged home in stable condition.    The patient tolerated the treatment well and had no complications.    Approved by:  Tere Gomez MD

## 2022-09-07 NOTE — PROGRESS NOTES
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   Skull base 550 cGy Cobalt 60 22 0             DESCRIPTION OF PROCEDURE:    On 2022 the patient was brought to the [facility] Gamma Knife IconTM suite at [facility] and treated with fractionated stereotactic radiotherapy.     The [employer] Gamma Knife IconTM Plan software was used to create a highly conformal dose distribution using the number and size collimators detailed above.     TREATMENT:    The patient was brought to the Gamma Knife suite. A timeout was performed to confirm the correct patient and correct procedure.    Patient was identified by 2 methods.  Site was verified.    The mask was placed and a cone beam CT was done and fused to the previously approved plan.        The physicist and I evaluated the fusion and confirmed the target coverage after auto-registration.      The treatment was delivered using the [employer] Gamma Knife IconTM without complication.      The mask was removed and the patient was discharged home in stable condition.    The patient tolerated the treatment well and had no complications.    Approved by:  Chano Schaefer MD/PhD

## 2022-09-07 NOTE — TELEPHONE ENCOUNTER
Dyana Guillen CPhTOncology Pharmacy Liaison     [facility] [place] [address]  Office: [phone]  Fax: [phone]  [email or web address]

## 2022-09-07 NOTE — LETTER
2022         RE: Julio John  [address]  [facility] [address]        Dear Colleague,    Thank you for referring your patient, Julio John, to the [facility] RADIATION ONCOLOGY GAMMA KNIFE. Please see a copy of my visit note below.      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  Skull base 1,100 cGy Cobalt 60 22 1     DESCRIPTION OF PROCEDURE:    On 2022, the patient was brought to the [facility] Gamma Knife IconTM suite at [facility] and treated with fractionated stereotactic radiotherapy.     The [employer] Gamma Knife IconTM Plan software was used to create a highly conformal dose distribution using the number and size collimators detailed above.     TREATMENT:    The patient was brought to the Gamma Knife suite. A timeout was performed to confirm the correct patient and correct procedure.    Patient was identified by 2 methods.  Site was verified.    The mask was placed and a cone beam CT was done and fused to the previously approved plan.        The physicist and I evaluated the fusion and confirmed the target coverage after auto-registration.      The treatment was delivered using the [employer] Gamma Knife IconTM without complication.     The mask was removed and the patient was discharged home in stable condition.    The patient tolerated the treatment well and had no complications.    Approved by:  Tere Gomez MD      Again, thank you for allowing me to participate in the care of your patient.        Sincerely,        Tere Gomez MD

## 2022-09-07 NOTE — LETTER
Date:September 8, 2022      Patient was self referred, no letter generated. Do not send.        [facility] Information

## 2022-09-08 NOTE — PROGRESS NOTES
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  Skull base 1,650 cGy Cobalt 60 22 2 3/5    DESCRIPTION OF PROCEDURE:  On 2022, the patient was brought to the [facility] Gamma Knife IconTM suite at [facility] and treated with fractionated stereotactic radiotherapy.     The [employer] Gamma Knife IconTM Plan software was used to create a highly conformal dose distribution using the number and size collimators detailed above.     TREATMENT:    The patient was brought to the Gamma Knife suite. A timeout was performed to confirm the correct patient and correct procedure.    Patient was identified by 2 methods.  Site was verified.    The mask was placed and a cone beam CT was done and fused to the previously approved plan.        The physicist and I evaluated the fusion and confirmed the target coverage after auto-registration.      The treatment was delivered using the [employer] Gamma Knife IconTM without complication.     The mask was removed and the patient was discharged home in stable condition.    The patient tolerated the treatment well and had no complications.    Approved by:  Chano Schaefer MD/PhD

## 2022-09-08 NOTE — PATIENT INSTRUCTIONS
DECADRON SCHEDULE    Patient Name: Julio John    Decadron (Dexamethasone) is a drug, a type of steroid, which is often used in patients with brain tumors to reduce swelling in the brain.  It must be taken carefully and cannot be stopped abruptly.  The dose must be gradually reduced or tapered.  The table below provides exact instructions regarding how to take it.      Day Date Total Daily Dose (mg) Number of 4 mg mg tablets to take at      Breakfast Lunch Supper Bedtime   1-3 9/13/22 thru 9/15/22 8 mg 1 0 1 0   4-6 9/16/22 thru 9/18/22 4 mg 1 0 0 0   7-9 9/19/22 thru 9/21/22  2 mg 1/2 0 0 0   10-12 9/22/22 thru 9/24/22 1 mg 1/4      13 9/25/22 None 0      14 9/26/22 1 mg 1/4      15 9/27/22 None 0      16 9/28/22 1 mg 1/4      17 9/29/22 None 0      18 9/30/22 1 mg 1/4      19 10/1/22 DONE                                    Other Instructions: Call with questions      PABLITO Salinas

## 2022-09-09 NOTE — PROGRESS NOTES
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  Skull base 2,200 cGy Cobalt 60 22 3     DESCRIPTION OF PROCEDURE:  On 2022, the patient was brought to the [facility] Gamma Knife IconTM suite at [facility] and treated with fractionated stereotactic radiotherapy.     The [employer] Gamma Knife IconTM Plan software was used to create a highly conformal dose distribution using the number and size collimators detailed above.     TREATMENT:    The patient was brought to the Gamma Knife suite. A timeout was performed to confirm the correct patient and correct procedure.    Patient was identified by 2 methods.  Site was verified.    The mask was placed and a cone beam CT was done and fused to the previously approved plan.        The physicist and I evaluated the fusion and confirmed the target coverage after auto-registration.      The treatment was delivered using the [employer] Gamma Knife IconTM without complication.     The mask was removed and the patient was discharged home in stable condition.    The patient tolerated the treatment well and had no complications.    Approved by:  Chano Schaefer MD/PhD

## 2022-09-12 NOTE — PROGRESS NOTES
Mr. Baron John presented today to undergo his 5th and final fraction of Gamma Knife radiosurgery to his skull base disease. Shortly after starting therapy, Mr. Baron John complained of chest tightness/fluttering and nausea. Treatment was immediately halted and examination of his vital signs revealed BPs in the 130s/80s although with a labile pulse ranging from . Physical examination was negative aside for frequent PVCs. The decision was made to have Mr. Baron John transported to the ED for urgent cardiac work-up given these new symptoms. We will plan to resume his radiosurgery tomorrow (9/13/2022) if his cardiac work-up is negative.    Chano Schaefer MD/PhD    Dept of Radiation Oncology  [facility]

## 2022-09-12 NOTE — LETTER
Date:September 13, 2022      Patient was self referred, no letter generated. Do not send.        [facility] Information

## 2022-09-12 NOTE — ED PROVIDER NOTES
[place] EMERGENCY DEPARTMENT ([facility])  9/12/22  History     Chief Complaint   Patient presents with     Chest Pain     The history is provided by the patient and medical records.     Julio John is a 52 year old male with a history notable for metastatic renal cell carcinoma s/p radiation therapy who presents from oncology clinic to the ED for evaluation of palpitations.  Earlier this morning the patient was undergoing his final gamma knife radiosurgery when he developed  coughing, shortness of breath, pain in his throat and mouth as well as heart palpitations.  Patient notes feeling his heart was fluttering yesterday yesterday but seem to resolve.  Patient reports developing a cough last Wednesday, 5 days ago, and states it has been worsening since.  One week ago the patient noticed 5 oral sores but states this number has increased significantly.  He states the sores or painful and noticed they were bleeding yesterday.  Patient notes that has been difficult to brush his teeth.  Patient states he had sufficient intake of food but notes yesterday he vomited after eating pizza which he attributes to the pasta sauce.  Patient reports antibiotics for infection over a week ago. No other symptoms noted,    Per review of Care Everywhere, patient [place] [facility] [place] with shortness of breath and chest pain.  Patient was prescribed Augmentin for possible pneumonia discharge.  That same day blood cultures returned positive and patient was started on Zosyn on 8/30.  He was transferred to [facility] for IV Zosyn.  Initial gram-negative vicente was actually gram-positive bacilli that was a contaminant.  Blood cultures at [facility] were negative.      I have reviewed the Medications, Allergies, Past Medical and Surgical History, and Social History in the [employer] system.  PAST MEDICAL HISTORY:   Past Medical History:   Diagnosis Date     Alcohol abuse     in remission     Benign essential hypertension       Cerebrovascular accident (H)     2010/2011     Cocaine abuse (H)     in remission     Metastatic renal cell carcinoma (H)     2016       PAST SURGICAL HISTORY:   Past Surgical History:   Procedure Laterality Date     PICC INSERTION Left 05/31/2017    5fr DL BioFlo PICC, 45cm (3cm external) in the L medial brachial vein w/ tip in the SVC RA junction.     radical left nephrectomy Left 03/09/2016 2016       Past medical history, past surgical history, medications, and allergies were reviewed with the patient. Additional pertinent items: None    FAMILY HISTORY: No family history on file.    SOCIAL HISTORY:   Social History     Tobacco Use     Smoking status: Never Smoker     Smokeless tobacco: Never Used   Substance Use Topics     Alcohol use: Not Currently     Social history was reviewed with the patient. Additional pertinent items: None      Patient's Medications   New Prescriptions    No medications on file   Previous Medications    ACETAMINOPHEN (TYLENOL) 325 MG TABLET    Take 325-650 mg by mouth every 6 hours as needed for mild pain Take 2 tablets once every 6 hours as needed for mild pain    ALBUTEROL (PROAIR HFA/PROVENTIL HFA/VENTOLIN HFA) 108 (90 BASE) MCG/ACT INHALER    Inhale 1-2 puffs into the lungs every 4 hours as needed for shortness of breath / dyspnea or wheezing    AMLODIPINE (NORVASC) 10 MG TABLET    Take 10 mg by mouth daily    CALCIUM CARBONATE (TUMS) 500 MG CHEWABLE TABLET    Take 1 chew tab by mouth 2 times daily Chew and swallow 1 tablet by mouth twice daily    CALCIUM-VITAMIN D (OSCAL) 250-125 MG-UNIT TABS PER TABLET    Take 1 tablet by mouth 2 times daily    CETIRIZINE (ZYRTEC) 10 MG TABLET    Take 10 mg by mouth daily    CYCLOBENZAPRINE (FLEXERIL) 5 MG TABLET    Tale 1-2 tablets (5-10 mg) by mouth 3 times daily as needed for muscle cramps.    DEXAMETHASONE (DECADRON) 2 MG TABLET    Take 1 tablet (2 mg) by mouth 2 times daily (with meals)    DEXAMETHASONE (DECADRON) 4 MG TABLET    Take 4 mg  by mouth 4 times daily    ERGOCALCIFEROL (ERGOCALCIFEROL) 1.25 MG (19566 UT) CAPSULE    Take 50,000 Units by mouth    GABAPENTIN (NEURONTIN) 300 MG CAPSULE    Take 300 mg by mouth 3 times daily Take 2 capsules 3 times daily    HYPROMELLOSE-DEXTRAN (GENTEAL TEARS) 0.1-0.3 % OPHTHALMIC SOLUTION    1 drop One drop in right eye 4 times daily    IBUPROFEN (ADVIL/MOTRIN) 200 MG TABLET    Take 200 mg by mouth every 4 hours as needed for mild pain    KETOCONAZOLE (NIZORAL) 2 % EXTERNAL SHAMPOO    Use 3 times per week. Apply to affected area, leave on for 20 minutes, then rinse.    LISINOPRIL (ZESTRIL) 40 MG TABLET    Take 40 mg by mouth daily    LOPERAMIDE (IMODIUM) 2 MG CAPSULE    Take 2 mg by mouth Take 2 capsules at the onset of symptoms, then take 1 capsule as needed for diarrhea. DO NOT take more than 8 capsules in 24 hours.    MAGIC MOUTHWASH SUSPENSION, DIPHENHYDRAMINE, LIDOCAINE, ALUMINUM-MAGNESIUM & SIMETHICONE, (FIRST-MOUTHWASH BLM) COMPOUNDING KIT    Swish and swallow 5-10 mLs in mouth every 6 hours as needed    NYSTATIN (MYCOSTATIN) 718221 UNIT/ML SUSPENSION    Take 5 mLs (500,000 Units) by mouth 4 times daily    NYSTATIN (MYCOSTATIN) 338966 UNIT/ML SUSPENSION    Take 5 mLs (500,000 Units) by mouth 4 times daily    OMEPRAZOLE (PRILOSEC) 20 MG DR CAPSULE    Take 20 mg by mouth daily    ONDANSETRON (ZOFRAN ODT) 8 MG ODT TAB    Take 8 mg by mouth    OXYCODONE (ROXICODONE) 5 MG TABLET    Take 5 mg by mouth every 6 hours as needed for severe pain    PROCHLORPERAZINE (COMPAZINE) 10 MG TABLET    Take 10 mg by mouth every 6 hours as needed for nausea    SENNA-DOCUSATE (SENOKOT-S/PERICOLACE) 8.6-50 MG TABLET    Take 1 tablet by mouth daily Take 1-2 tablets twice daily as needed as needed for constipation    SENNOSIDES (SENOKOT) 8.6 MG TABLET    Take 1 tablet by mouth 2 times daily    TAMSULOSIN (FLOMAX) 0.4 MG CAPSULE    Take 0.4 mg by mouth daily One tablet after each meal    TRAMADOL (ULTRAM) 50 MG TABLET    Take 50 mg  "by mouth every 6 hours as needed for severe pain    TRIAMCINOLONE ACETONIDE 0.025 % LOTN    Mix with ketoconazole cream and apply to face twice daily   Modified Medications    No medications on file   Discontinued Medications    No medications on file          Allergies   Allergen Reactions     Carvedilol Other (See Comments)     Per pt, it gave him back pain        Review of Systems     A complete review of systems was performed with pertinent positives and negatives noted in the HPI, and all other systems negative.    Physical Exam   BP: 129/84  Pulse: 58  Temp: 98.1  F (36.7  C)  Resp: 18  Height: 162.6 cm (5' 4\")  Weight: 87.5 kg (193 lb)  SpO2: 97 %      Physical Exam  Vitals and nursing note reviewed.   Constitutional:       General: He is not in acute distress.     Appearance: He is well-developed. He is not diaphoretic.   HENT:      Head: Normocephalic and atraumatic.      Mouth/Throat:      Mouth: Oral lesions present.      Pharynx: No oropharyngeal exudate.      Comments: Multiple ulcers in mouth.  Eyes:      General: No scleral icterus.        Right eye: No discharge.         Left eye: No discharge.      Pupils: Pupils are equal, round, and reactive to light.   Cardiovascular:      Rate and Rhythm: Normal rate and regular rhythm.      Heart sounds: Normal heart sounds. No murmur heard.    No friction rub. No gallop.   Pulmonary:      Effort: Pulmonary effort is normal. No respiratory distress.      Breath sounds: Normal breath sounds. No wheezing.   Chest:      Chest wall: No tenderness.   Abdominal:      General: Bowel sounds are normal. There is no distension.      Palpations: Abdomen is soft.      Tenderness: There is no abdominal tenderness.   Musculoskeletal:         General: No tenderness or deformity. Normal range of motion.      Cervical back: Normal range of motion and neck supple.   Skin:     General: Skin is warm and dry.      Coloration: Skin is not pale.      Findings: No erythema or rash. "   Neurological:      Mental Status: He is alert and oriented to person, place, and time.      Cranial Nerves: No cranial nerve deficit.         ED Course   9:52 AM  The patient was seen and examined by Dr. Ming Rodriguez in Room ED 05.        Procedures            EKG Interpretation:      Interpreted by Ming Rodriguez DO  Time reviewed: 0935  Symptoms at time of EKG: None   Rhythm: normal sinus   Rate: 69  Axis: Normal  Ectopy: premature ventricular contractions (frequent)  Conduction: normal  ST Segments/ T Waves: No ST-T wave changes and No acute ischemic changes  Q Waves: none  Comparison to prior: Unchanged from 7/28/2020    Clinical Impression: no acute changes                    No results found for this or any previous visit (from the past 24 hour(s)).  Medications - No data to display          Assessments & Plan (with Medical Decision Making)   This is a 52-year-old male who is currently undergoing radiation therapy for metastatic renal cell carcinoma who presents with palpitations, shortness of breath, chest/throat pain and oral lesions.  Patient began having shortness of breath and palpitations while undergoing radiation therapy today.  On exam patient has multiple oral lesions which patient states have been causing considerable pain.  ECG shows PVCs no other acute abnormalities.  Lab work shows no acute abnormalities.  Troponins not elevated.  D-dimer is not elevated.  Chest x-ray shows no acute abnormalities.  Patient was given GI cocktail.  He had some minimal relief with this.  He states he has been on antifungals for mouth ulcers and concern for esophageal candidiasis secondary to treatment.  He states this has not given any relief.  Due to the cough and known lung mass we will start patient on antibiotics.  We will also place patient on Zio patch because of his PVCs. Will discharge home with return precautions. Discussed reasons to return to the emergency department.  Patient understands and agrees  with this plan.    I have reviewed the nursing notes.    I have reviewed the findings, diagnosis, plan and need for follow up with the patient.    New Prescriptions    No medications on file       Final diagnoses:   None     I, Mike Sierra, am serving as a trained medical scribe to document services personally performed by Ming Rodriguez DO, based on the provider's statements to me.      Ming WILDER DO, was physically present and have reviewed and verified the accuracy of this note documented by Mike Sierra.     9/12/2022   [facility] EMERGENCY DEPARTMENT     Ming Rodriguez DO  09/12/22 4523

## 2022-09-12 NOTE — ED TRIAGE NOTES
Pt arrives w/ c/o chest pain, feels like fluttering. Started last night, feels intermittent fluttering. Also states he has had a cough and was recently on amoxicillin for possible pneumonia. VSS on RA, A&O. Was here for an OP procedure for brain mets, has renal cell cancer.

## 2022-09-12 NOTE — ED NOTES
Bed: ED05  Expected date: 9/12/22  Expected time:   Means of arrival:   Comments:  Gamma patient coming

## 2022-09-12 NOTE — LETTER
9/12/2022         RE: Julio John  [address]  [facility] [address]        Dear Colleague,    Thank you for referring your patient, Julio John, to the [facility] RADIATION ONCOLOGY GAMMA KNIFE. Please see a copy of my visit note below.    Mr. Baron John presented today to undergo his 5th and final fraction of Gamma Knife radiosurgery to his skull base disease. Shortly after starting therapy, Mr. Baron John complained of chest tightness/fluttering and nausea. Treatment was immediately halted and examination of his vital signs revealed BPs in the 130s/80s although with a labile pulse ranging from . Physical examination was negative aside for frequent PVCs. The decision was made to have Mr. Baron John transported to the ED for urgent cardiac work-up given these new symptoms. We will plan to resume his radiosurgery tomorrow (9/13/2022) if his cardiac work-up is negative.    Chano Schaefer MD/PhD    Dept of Radiation Oncology  [facility]       Again, thank you for allowing me to participate in the care of your patient.        Sincerely,        Chano Schaefer MD

## 2022-09-13 NOTE — PATIENT INSTRUCTIONS
DECADRON SCHEDULE    Patient Name: Julio John    Decadron (Dexamethasone) is a drug, a type of steroid, which is often used in patients with brain tumors to reduce swelling in the brain.  It must be taken carefully and cannot be stopped abruptly.  The dose must be gradually reduced or tapered.  The table below provides exact instructions regarding how to take it.      Day Date Total Daily Dose (mg) Number of 4 mg tablets to take at      Breakfast Lunch Supper Bedtime   1-3 9/13/22 thru 9/15/22 8 mg 1 0 1 0   4-6 9/16/22 thru 9/18/22 4 mg 1 0 0 0   7-9 Switch to 2 mg pill  9/19/22 thru 9/21/22 2 mg 1 0 0 0   10-12 9/22/22 thru 9/24/22 1 mg 1/2 0 0 0   13 9/25/22 None 0 0 0 0   14 9/26/22 1 mg 1/2 0 0 0   15 9/27/22 None 0 0 0 0   16 9/28/22 1 mg 1/2 0 0 0   17 9/29/22 None 0 0 0 0   18 9/30/22 1 mg 1/2 0 0 0   19 10/1/22 Done 0 0 0 0                                Other Instructions: Call with questions      Chano Richter

## 2022-09-13 NOTE — LETTER
2022         RE: Julio John  [address]  [place] [address]        Dear Colleague,    Thank you for referring your patient, Julio John, to the [facility] RADIATION ONCOLOGY GAMMA KNIFE. Please see a copy of my visit note below.      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  Skull base 2,750 cGy Cobalt 60 22 7 / (completion)    DESCRIPTION OF PROCEDURE:  On 2022, the patient was brought to the [facility] Gamma Knife IconTM suite at [facility] and treated with fractionated stereotactic radiotherapy.     The [employer] Gamma Knife IconTM Plan software was used to create a highly conformal dose distribution using the number and size collimators detailed above.     TREATMENT:    The patient was brought to the Gamma Knife suite. A timeout was performed to confirm the correct patient and correct procedure.    Patient was identified by 2 methods.  Site was verified.    The mask was placed and a cone beam CT was done and fused to the previously approved plan.        The physicist and I evaluated the fusion and confirmed the target coverage after auto-registration.     Shortly after starting therapy, Mr. Baron John developed chest pain/palpitations and treatment was halted after delivery of 138 cGy. He was noted to have a labile pulse with occasional ectopic beats on cardiac auscultation and was sent to the ED for urgent cardiac work-up.    Approved by:  TESS Crowe MD      Again, thank you for allowing me to participate in the care of your patient.        Sincerely,        Brady Watson  MD Sebastien

## 2022-09-13 NOTE — PROGRESS NOTES
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  Skull base 2,750 cGy Cobalt 60 22 7 5/5 (completion)    DESCRIPTION OF PROCEDURE:  On 2022, the patient was brought to the [facility] [employer] Gamma Knife IconTM suite at [facility] and treated with fractionated stereotactic radiotherapy.     The [employer] Gamma Knife IconTM Plan software was used to create a highly conformal dose distribution using the number and size collimators detailed above.     TREATMENT:    The patient was brought to the Gamma Knife suite. A timeout was performed to confirm the correct patient and correct procedure.    Patient was identified by 2 methods.  Site was verified.    The mask was placed and a cone beam CT was done and fused to the previously approved plan.        The physicist and I evaluated the fusion and confirmed the target coverage after auto-registration.     Shortly after starting therapy, Mr. Baron John developed chest pain/palpitations and treatment was halted after delivery of 138 cGy. He was noted to have a labile pulse with occasional ectopic beats on cardiac auscultation and was sent to the ED for urgent cardiac work-up.    Approved by:  TESS Crowe MD

## 2022-09-13 NOTE — LETTER
Date:September 14, 2022      Patient was self referred, no letter generated. Do not send.        [facility] Health Information

## 2022-09-25 PROBLEM — R19.7 DIARRHEA: Status: ACTIVE | Noted: 2022-01-01

## 2022-09-25 PROBLEM — K59.00 CONSTIPATION: Status: ACTIVE | Noted: 2022-01-01

## 2022-09-25 PROBLEM — I26.99 MULTIPLE PULMONARY EMBOLI (H): Status: ACTIVE | Noted: 2022-01-01

## 2022-09-25 PROBLEM — D63.8 ANEMIA IN OTHER CHRONIC DISEASES CLASSIFIED ELSEWHERE: Status: ACTIVE | Noted: 2022-01-01

## 2022-09-25 PROBLEM — N18.2 ACUTE RENAL FAILURE SUPERIMPOSED ON STAGE 2 CHRONIC KIDNEY DISEASE (H): Status: ACTIVE | Noted: 2022-01-01

## 2022-09-25 PROBLEM — K21.9 GASTROESOPHAGEAL REFLUX DISEASE WITHOUT ESOPHAGITIS: Status: ACTIVE | Noted: 2022-01-01

## 2022-09-25 PROBLEM — I10 ESSENTIAL HYPERTENSION: Status: ACTIVE | Noted: 2022-01-01

## 2022-09-25 PROBLEM — C64.9 METASTATIC RENAL CELL CARCINOMA, UNSPECIFIED LATERALITY (H): Status: ACTIVE | Noted: 2022-01-01

## 2022-09-25 PROBLEM — R11.2 N&V (NAUSEA AND VOMITING): Status: ACTIVE | Noted: 2022-01-01

## 2022-09-25 PROBLEM — G89.29 OTHER CHRONIC PAIN: Status: ACTIVE | Noted: 2022-01-01

## 2022-09-25 PROBLEM — N17.9 ACUTE RENAL FAILURE SUPERIMPOSED ON STAGE 2 CHRONIC KIDNEY DISEASE (H): Status: ACTIVE | Noted: 2022-01-01

## 2022-09-26 PROBLEM — I26.99 PULMONARY EMBOLISM (H): Status: ACTIVE | Noted: 2022-01-01

## 2022-09-26 PROBLEM — R80.9 NEPHROTIC RANGE PROTEINURIA: Status: ACTIVE | Noted: 2022-01-01

## 2022-09-26 PROBLEM — I48.0 PAF (PAROXYSMAL ATRIAL FIBRILLATION) (H): Status: ACTIVE | Noted: 2022-01-01

## 2022-09-26 NOTE — ASSESSMENT & PLAN NOTE
Unclear etiology.  Baseline Cr 0.99 - with BUN ~ 30-35.  Admission Cr 9/25/2022 1.5 while on lisinopril.

## 2022-09-26 NOTE — CONSULTS
[facility]       NEUROSURGERY CONSULTATION NOTE    This consultation was requested by Dr. Bacon from the Emergency service.    Reason for Consultation: R increased dural enhancement vs subdural hematoma with possible need for anticoagulation    HPI: Julio John is a 52 year old male with PMHx significant for metastatic renal cell carcinoma, previously receiving chemo, now getting a course of radiation therapy for dural based metastases by Dr. Schaefer (fraction 5/5 on 9/13) who now presents to the Emergency Department for evaluation of headache found to have bilateral pulmonary emboli and CT demonstrating right hyperdensity along temporal lobe for which Neurosurgery was consulted.    Patient states that he started experiencing a global constant frontal headache, with distribution over both temporal regions as well, on Friday afternoon which is worse on the right. This evening it progressed to a 10/10 without relief from pain medication, which prompted him to come to ED. He states that he has been having some upper and lower extremity intermittent tremulousness over the past two days which feels like chills. These episodes seem to occur when he uses the restroom or lifts his hand to his mouth during eating. He denies any associate nausea, vomiting, new vision changes, weakness, numbness, tingling, or other sensory changes. He denies any recent falls or head trauma. He denies taking any antiplatelet or anticoagulation medications.      Of note, he completed chemotherapy 4-5 weeks ago, radiation therapy on 9/13 and started weaning off of his decadron on Friday, 2 days ago.        PAST MEDICAL HISTORY:   Past Medical History:   Diagnosis Date    Alcohol abuse     in remission    Benign essential hypertension     Cerebrovascular accident (H)     2010/2011    Cocaine abuse (H)     in remission    Metastatic renal cell carcinoma (H)     2016       PAST SURGICAL  HISTORY:   Past Surgical History:   Procedure Laterality Date    PICC INSERTION Left 05/31/2017    5fr DL BioFlo PICC, 45cm (3cm external) in the L medial brachial vein w/ tip in the SVC RA junction.    radical left nephrectomy Left 03/09/2016 2016       FAMILY HISTORY: No family history on file.    SOCIAL HISTORY:   Social History     Tobacco Use    Smoking status: Never Smoker    Smokeless tobacco: Never Used   Substance Use Topics    Alcohol use: Not Currently       MEDICATIONS:  (Not in a hospital admission)      Allergies:  Allergies   Allergen Reactions    Carvedilol Other (See Comments)     Per pt, it gave him back pain       ROS: 10 point ROS were all negative except for pertinent positives noted in my HPI.    Physical exam:   Blood pressure 119/76, pulse 118, temperature 99.6  F (37.6  C), temperature source Oral, resp. rate 20, SpO2 97 %.  CV: HR and BP as noted above  PULM: breathing comfortably on room air  ABD: soft, non-distended  NEUROLOGIC:  -- Awake; Alert; oriented x 3  -- Follows commands briskly  -- +repetition, calculation, and naming  -- Speech fluent, spontaneous. No aphasia or dysarthria.  -- no gaze preference. No apparent hemineglect.  Cranial Nerves:  -- visual fields full to confrontation, PERRL 3-2mm bilat and brisk, extraocular movements intact with exception of R CN VI palsy (baseline)  -- face symmetrical, tongue midline  -- sensory V1-V3 intact bilaterally with exception of reduced sensation in R V1,2,3 (baseline per patient)  -- palate elevates symmetrically, uvula midline  -- endorses less hearing to finger rub on right side (baseline per patient)  -- Trapezii 5/5 strength bilat symmetric  -- Cerebellar: Finger nose finger without dysmetria, intact rapid alternating motions bilaterally    Motor:  Normal bulk / tone; no tremor, rigidity, or bradykinesia.  No muscle wasting or fasciculations  No Pronator Drift     Delt Bi Tri Hand Flexion/  Extension Iliopsoas Quadriceps  Hamstrings Tibialis Anterior Gastroc    C5 C6 C7 C8/T1 L2 L3 L4-S1 L4 S1   R 5 5 5 5 5 5 5 5 5   L 5 5 5 5 5 5 5 5 5   Sensory:  intact to LT x 4 extremities     Reflexes:     Bi Tri BR Jorge Pat Ach Bab    C5-6 C7-8 C6 UMN L2-4 S1 UMN   R 2+ 2+ 2+ Norm 2+ 2+ Norm   L 2+ 2+ 2+ Norm 2+ 2+ Norm     Gait: Deferred      LABS:  Recent Labs   Lab 09/25/22 2115 09/25/22 2105   NA  --  136   POTASSIUM  --  3.9   CHLORIDE  --  102   CO2  --  23   ANIONGAP  --  11   GLC  --  125*   BUN  --  30.1*   CR 1.5* 1.55*   KVNG  --  8.7       Recent Labs   Lab 09/25/22 2105   WBC 7.9   RBC 4.38*   HGB 12.1*   HCT 37.0*   MCV 85   MCH 27.6   MCHC 32.7   RDW 19.3*   *     INR: 0.94    IMAGING:  CT Head 9/25/2022  IMPRESSION:   Overall findings are favored to represent increased dural based  metastasis along the right temporal lobe since 7/27/2022 although it  is difficult to entirely exclude a small subdural hematoma.  Extra-axial hyperdensity along the peripheral right temporal lobe  corresponding with the area of dural enhancement in the comparison  brain MRI however does appear increased in size since the comparison  outside head CT on 7/27/2022.    CT Chest PE 9/25/2022                                                  IMPRESSION:   1. Exam is positive for acute pulmonary embolism. Multiple segmental pulmonary emboli in both lower lobes.       Evidence for right heart strain or increased right heart pressures? is not present.      2. Increased metastatic disease burden in the chest with multiple new and enlarging pulmonary nodules including a new cavitary lesion in the left lung apex.  3. Increased bilateral hilar lymphadenopathy concerning for metastatic disease.  4. Unchanged sclerotic foci posterior aspect right seventh rib.    ASSESSMENT:  Julio John is a 52 year old male with PMHx significant for metastatic renal cell carcinoma, previously receiving chemo, now getting a course of radiation therapy for dural  based metastases by Dr. Schaefer (fraction 5/5 on 9/13), on decadron wean to off who now presents to the Emergency Department with severe progressive headache since Friday with CT head demonstrating right hyperdensity along temporal lobe and CT PE demonstrating acute bilateral pulmonary emboli, neurologically at baseline and vitally stable.      Clinically Significant Risk Factors Present on Admission             # Hypoalbuminemia: Albumin = 3.3 g/dL (Ref range: 3.5 - 5.2 g/dL) on admission, will monitor as appropriate      # CKD, Stage 3a (GFR 45-59): Will monitor and treat as appropriate  - last Cr =  1.5 mg/dL (Ref range: 0.7 - 1.3 mg/dL)  - last GFR = 56 mL/min/1.73m2 (Ref range: >60 mL/min/1.73m2)      RECOMMENDATIONS:  - No neurosurgical intervention indicated at this time   - Repeat head CT in 6 hours from prior acquistion (04:00)  - MRI Brain w/ and w/out  - Hold any anticoagulation for now given respiratory stability and concern for possible hemorrhage in the setting of RCC  - No need for seizure prophylaxis  - Neurosurgery will follow closely    The patient was discussed with Dr. Tillman, neurosurgery chief resident, and Dr. Lanza, neurosurgery staff, and they agree with the above.    Pancho Batista MD, PhD  PGY-2 Neurosurgery  Please page NSGY on call with questions    I have discussed this patient with the resident and formulated a plan and agree with this note.  AMP

## 2022-09-26 NOTE — H&P
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  He is not unstable however he is tachycardic.  Oxygen saturation(s) are good.  I don't see any contraindication to chronic anticoagulation.  For quality of life, would prefer DOAC - less monitoring and possibly less bleeding risks.  Apixaban (Eliquis ) has least issue with CKD.  Plan as below for primary and other diagnoses.    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  Plan:  Consider IVC filter and chronic anticoagulation if neurosurgery  "ok's that plan.  Neurosurgery has been consulted.    I've initiated apixaban (Eliquis ) 10mg BID X 1 week ----> then 5mg BID thereafter.  No contraindications other than his brain metastasis(ses) - as mentioned above, untreated PE's are likely to be more lethal than his brain metastasis(ses).    He is in agreement with initiating oral medications chronic anticoagulation    He was under the understanding that his brain metastasis(ses) lesions were \"gone\" after his XRT (radiation therapy) - I informed him otherwise.    I've sent an AT3/AT level - given his history of nephrotic range proteinuria - ? Loss of anticoagulant in urine.    Metastatic renal cell carcinoma, unspecified laterality (H) - seems to be poor prognosis in the near < 1 year time frame however be has already lived 6+ years since diagnosis(es) and possibly new immunotherapies can have some benefit.     patient originally from [place] who was diagnosed left sided RCC in 2016 s/p nephrectomy March 2016.  December 2016-January 2017 - Underwent stereotactic radiation therapy to pulmonary nodules @ [facility].  2/28/17 - CT C/A/P New small-multiple pulmonary nodules seen in bilateral lungs.   April -June 2017 - s/p #4 cycles of IL-2 therapy at [facility]   C1 4/17/17 (10 doses)  C2 5/3/17 (4 doses)  C3 5/31/17 (5 doses)   C4 06/21/17 (4 doses)    Since 2017 - has been receiving other multiple different immunotherapies  4/23/19 microwave ablation to larger right renal mass performed by IR. Decided to switch to ipilumumab and nivolumab given disease progression right kidney  5/3/19 ipilumumab and nivolumab #1  7/26/19 started maintenance nivolumab  11/1/19 switched to 2nd line cabozantinib due to progression in right kidney masses, right adrenal nodule, pancreatic nodule  1/22/20 re started cabozantinib at reduced dose of 20 mg po daily due to grade 3 cough  12/11/20 dose reduced axitinib to 3 mg po bid due to cough  4/15/21 given some increase in left " hilar mass on march 2021 CT chest and abdominal pain/intolerance to axitinib, started everolimus,    5/4/21 started lenvatinib  Jan 2022 changed lenvatinib to 14 mg po daily and everolimus to 5 mg every other day due to intolerable grade 2 rash.    Continuing to receive(s) XRT (radiation therapy) to brain lesions.    Essential hypertension  Prior to admission medications:  Lisinopril, amlodipine (Norvasc )  Plan:  continue medications as listed above.    Constipation  Prior to admission medications:  Senna  Plan:  continue medications as listed above.    Gastroesophageal reflux disease without esophagitis  Prior to admission medications:  Omeprazole, PRN TUMS  Plan:  continue medications as listed above.     N&V (nausea and vomiting)  Prior to admission medications:  Prochlorperazine  Plan:  continue medications as listed above.     Diarrhea  Prior to admission medications:  Occasional(ly) use of loperamide  Plan:  continue medications as listed above.     Other chronic pain  Intermittent variable and migratory locations secondary to multiple locations of metastatic RCCA to brain, skull base, lungs, ribs.  Prior to admission medications:  Oxycodone, Flexeril, tramadol  Plan:  continue medications as listed above.     Anemia in other chronic diseases classified elsewhere - secondary to RCCA and chemotherapy  New baseline Hgb 12.1 (9/25/22) <------- was 14 / normal 8/31/22    Acute renal failure superimposed on stage 2 chronic kidney disease (H)  Unclear etiology.  Baseline Cr 0.99 - with BUN ~ 30-35.  Admission Cr 9/25/2022 1.5 while on lisinopril.    With proteinuria - Felt to be drug induced from everolimus or lenvatinib, or both    Additional diagnoses and notable risk factors for complications during hospitalization:  Clinically Significant Risk Factors Present on Admission             # Hypoalbuminemia: Albumin = 3.3 g/dL (Ref range: 3.5 - 5.2 g/dL) on admission, will monitor as appropriate     # Hypertension:  "home medication list includes antihypertensive(s)    # Obesity: Estimated body mass index is 32.84 kg/m  as calculated from the following:    Height as of this encounter: 1.64 m (5' 4.57\").    Weight as of this encounter: 88.3 kg (194 lb 11.2 oz).      PLAN / OTHER ORDERS ENTERED:    1. Neurosurgery following for possible / possibility subdural hematoma (SDH) and monitoring if anticoagulation initiated --- MRI completed --- no bleed.  2. Consult Oncology  3. Holding lisinopril - continue other usual medications  4. Following labs in the morning  5. IV fluid(s) overnight - 10hrs - 100cc/hr  6. Telemetry / inpatient medical surgical unit  7. Check AT3 (antithrombin) level given nephrotic range proteinuria  8. Initiate apixaban (Eliquis ) loading ---> maintenance dosing  9. Blood culture(s) X 2 now --- patient spiked temperature to 101.4 upon arrival to the medical surgical unit      Admitted: Inpatient    Current disposition: Admit to the Adult Internal Medicine Service under Inpatient status    Scheduled and PRN medication(s):  amLODIPine, 10 mg, Oral, Daily  apixaban ANTICOAGULANT, 10 mg, Oral, BID   Followed by  [START ON 10/3/2022] apixaban ANTICOAGULANT, 5 mg, Oral, BID  calcium carbonate, 500 mg, Oral, BID  calcium carbonate-vitamin D, 1 tablet, Oral, BID  cetirizine, 10 mg, Oral, Daily  ergocalciferol, 50,000 Units, Oral, Weekly  gabapentin, 300 mg, Oral, TID  nystatin, 500,000 Units, Oral, 4x Daily  omeprazole, 20 mg, Oral, Daily  senna-docusate, 1 tablet, Oral, Daily  tamsulosin, 0.4 mg, Oral, Daily    acetaminophen, 1,000 mg, Q6H PRN  albuterol, 1-2 puff, Q4H PRN  carboxymethylcellulose PF, 1 drop, 4x Daily PRN  cyclobenzaprine, 10 mg, Q8H PRN  loperamide, 2 mg, 4x Daily PRN  magic mouthwash suspension (diphenhydrAMINE, lidocaine, aluminum-magnesium & simethicone), 5-10 mL, Q6H PRN  naloxone, 0.2 mg, Q2 Min PRN   Or  naloxone, 0.4 mg, Q2 Min PRN   Or  naloxone, 0.2 mg, Q2 Min PRN   Or  naloxone, 0.4 mg, Q2 " Min PRN  ondansetron, 8 mg, Q8H PRN  oxyCODONE, 5 mg, Q4H PRN  prochlorperazine, 10 mg, Q6H PRN  traMADol, 50 mg, Q6H PRN      Restraints: Not indicated    Discharge disposition: To be determined  Diet order:  Orders Placed This Encounter      Regular Diet Adult    DVT Prophylaxis: Pneumatic Compression Devices  Randall Catheter: Not present  Central Lines: None  Cardiac Monitoring: None  Code Status: Full Code      Patient / Family Communication: I discussed the details of his diagnoses and my recommendations with the patient.    HISTORY AND EXAM     Location seen: Emergency Room  Date of service:  9/25/22    The patient's history is provided by the patient and is compromised by nothing.    CHIEF COMPLAINT: short(ness) of breath    HISTORY OF PRESENT ILLNESS    The patient is a 52 year old male, with complain(s) of few days of cough and mild short(ness) of breath and fevers.  Denies much sputum production.  Short(ness) of breath worsens with activity.  Also complains of midsternal chest pain that is sharp in nature.  Also has been still having flank pain that he's had chronically since his cancer diagnosis(es).  He's never had venous thromboembolism disorder - denies also any family history of clots in legs or lungs.  He complains of lower extremity edema - right(R)> left(L).  Has underlying nephrotic range proteinuria.  Is to start new chemotherapy next week - ? Another immunotherapy drug.  ---------------------------------------------------------------------------------------------------------------  The patient's presentation and clinical findings were reviewed and/or discussed with Dr. Bacon and Abelardo in the ER. In addition, I reviewed the prior care documentation notes (e.g. ER staff / nursing, clinic, referring hospital, consultants) for this hospitalization.  Patient receives his Oncology care at  [facility].  ---------------------------------------------------------------------------------------------------------------    REVIEW OF SYSTEMS  The remainder of the 14 system / comprehensive review of systems (ROS) was reviewed with the patient and/or representative providing the history and is negative and otherwise as documented in my HPI above.    Past Medical History:   Diagnosis Date     Alcohol abuse     in remission     Benign essential hypertension      Cerebrovascular accident (H)     2010/2011     Cocaine abuse (H)     in remission     Metastatic renal cell carcinoma (H)     2016     Past Surgical History:   Procedure Laterality Date     PICC INSERTION Left 05/31/2017    5fr DL BioFlo PICC, 45cm (3cm external) in the L medial brachial vein w/ tip in the SVC RA junction.     radical left nephrectomy Left 03/09/2016    2016     Family History   Problem Relation Age of Onset     Family History Negative Other         No family history of cancer, kidney cancer     Cancer No family hx of         Social History     Social History Narrative    .  Prior tobacco abuse.    No ETOH.    2 children.    Grew up in [place] in [place].    Lives in [place], [place].    Hasn't worked in employment for years.     HOME MEDICATION(S): Home medication(s) personally reviewed as below.  Prior to Admission medications    Medication Sig Last Dose Taking? Auth Provider Long Term End Date   acetaminophen (TYLENOL) 325 MG tablet Take 325-650 mg by mouth every 6 hours as needed for mild pain Take 2 tablets once every 6 hours as needed for mild pain   Reported, Patient     albuterol (PROAIR HFA/PROVENTIL HFA/VENTOLIN HFA) 108 (90 Base) MCG/ACT inhaler Inhale 1-2 puffs into the lungs every 4 hours as needed for shortness of breath / dyspnea or wheezing   Ludwin Painting MD Yes    amLODIPine (NORVASC) 10 MG tablet Take 10 mg by mouth daily   Reported, Patient Yes    calcium carbonate (TUMS) 500 MG chewable tablet  Take 1 chew tab by mouth 2 times daily Chew and swallow 1 tablet by mouth twice daily   Reported, Patient     calcium-vitamin D (OSCAL) 250-125 MG-UNIT TABS per tablet Take 1 tablet by mouth 2 times daily   Reported, Patient     cetirizine (ZYRTEC) 10 MG tablet Take 10 mg by mouth daily   Reported, Patient     cyclobenzaprine (FLEXERIL) 5 MG tablet Tale 1-2 tablets (5-10 mg) by mouth 3 times daily as needed for muscle cramps.   Reported, Patient     dexamethasone (DECADRON) 2 MG tablet Take 1 tablet (2 mg) by mouth 2 times daily (with meals)   Chano Schaefer MD Yes    dexamethasone (DECADRON) 4 MG tablet Take 4 mg by mouth 4 times daily   Reported, Patient Yes    ergocalciferol (ERGOCALCIFEROL) 1.25 MG (84789 UT) capsule Take 50,000 Units by mouth   Reported, Patient     gabapentin (NEURONTIN) 300 MG capsule Take 300 mg by mouth 3 times daily Take 2 capsules 3 times daily   Reported, Patient Yes    hypromellose-dextran (GENTEAL TEARS) 0.1-0.3 % ophthalmic solution 1 drop One drop in right eye 4 times daily  Patient not taking: No sig reported   Reported, Patient     ibuprofen (ADVIL/MOTRIN) 200 MG tablet Take 200 mg by mouth every 4 hours as needed for mild pain   Reported, Patient     ketoconazole (NIZORAL) 2 % external shampoo Use 3 times per week. Apply to affected area, leave on for 20 minutes, then rinse.   Reported, Patient     lisinopril (ZESTRIL) 40 MG tablet Take 40 mg by mouth daily   Reported, Patient Yes    loperamide (IMODIUM) 2 MG capsule Take 2 mg by mouth Take 2 capsules at the onset of symptoms, then take 1 capsule as needed for diarrhea. DO NOT take more than 8 capsules in 24 hours.   Reported, Patient     magic mouthwash suspension, diphenhydrAMINE, lidocaine, aluminum-magnesium & simethicone, (FIRST-MOUTHWASH BLM) compounding kit Swish and swallow 5-10 mLs in mouth every 6 hours as needed   Chano Schaefer MD     nystatin (MYCOSTATIN) 745705 UNIT/ML suspension Take 5 mLs  "(500,000 Units) by mouth 4 times daily   Chano Schaefer MD     nystatin (MYCOSTATIN) 248621 UNIT/ML suspension Take 5 mLs (500,000 Units) by mouth 4 times daily   Ludwin Painting MD     omeprazole (PRILOSEC) 20 MG DR capsule Take 20 mg by mouth daily   Reported, Patient     ondansetron (ZOFRAN ODT) 8 MG ODT tab Take 8 mg by mouth   Reported, Patient     oxyCODONE (ROXICODONE) 5 MG tablet Take 5 mg by mouth every 6 hours as needed for severe pain   Reported, Patient No    prochlorperazine (COMPAZINE) 10 MG tablet Take 10 mg by mouth every 6 hours as needed for nausea   Reported, Patient     senna-docusate (SENOKOT-S/PERICOLACE) 8.6-50 MG tablet Take 1 tablet by mouth daily Take 1-2 tablets twice daily as needed as needed for constipation   Reported, Patient     sennosides (SENOKOT) 8.6 MG tablet Take 1 tablet by mouth 2 times daily   Reported, Patient     tamsulosin (FLOMAX) 0.4 MG capsule Take 0.4 mg by mouth daily One tablet after each meal  Patient not taking: No sig reported   Reported, Patient     traMADol (ULTRAM) 50 MG tablet Take 50 mg by mouth every 6 hours as needed for severe pain   Reported, Patient     triamcinolone acetonide 0.025 % LOTN Mix with ketoconazole cream and apply to face twice daily   Reported, Patient       ALLERGIES  Carvedilol    PHYSICAL EXAM  Vital signs reviewed as below.    Patient Vitals for the past 6 hrs:   BP Pulse Resp Temp Temp src SpO2 Weight Height   09/25/22 2300 -- 119 14 -- -- -- -- --   09/25/22 2315 -- -- 19 -- -- -- -- --   09/25/22 2330 132/81 -- 16 -- -- -- -- --   09/25/22 2345 120/71 -- 15 -- -- 95 % -- --   09/26/22 0000 122/73 -- 16 98.3  F (36.8  C) Oral 95 % -- --   09/26/22 0100 -- -- -- 100.3  F (37.9  C) Oral -- -- --   09/26/22 0148 115/88 (!) 129 17 100.4  F (38  C) Oral 98 % -- --   09/26/22 0204 129/85 (!) 125 17 (!) 101.4  F (38.6  C) Oral 97 % 88.3 kg (194 lb 11.2 oz) 1.64 m (5' 4.57\")     Body mass index is 32.84 kg/m .  The exam is " compromised by obesity and debilitated state    GENERAL APPEARANCE: tired appearing, chilling and sleepy  HEENT / EYES / EARS / OROPHARYNX: Normal external appearance of head, eyes, ears, and nose. Oropharynx with moist mucous membranes. No signs of trauma to head or neck. Eyes nonicteric, conjunctiva clear.  NECK: Supple, no palpable lymphadenopathy, no masses, no thyromegaly. Neck veins not distended. ROM normal.  LUNGS: Clear to auscultation with good airflow. No wheezes, rales or rhonchi.  HEART / CV / CHEST: Normal S1S2. Tachycardic without rubs, gallops or clicks. No murmur noted. No edema. Peripheral pulses intact in lower extremities. No clubbing or cyanosis.  GI: Normal active bowel sounds. Soft and nontender.  No hepatosplenomegaly or palpable masses. No hernias appreciated.  HEME / LYMPH / SKIN / EXTREMITIES  SKIN: Exposed areas grossly normal, warm and dry without unusual signs of bleeding, bruising or petechiae. No rashes. No mottling.  EXTREMITIES / JOINTS / BACK: Normal range of motion grossly and moving all extremity(ies) to command. No significant deformities of systemic diseases or other abnormalities. Joints without active inflammation or findings of synovitis. Back: not examined.  NEUROLOGIC EXAM  PSYCH / MENTAL STATUS: alert, oriented to person, place, and time. Insight, judgement and interactions with others appears normal.  CRANIAL NERVES: PERRL except left EOM appears dyscongugate at times.  EOM's intact. Normal fluent speech. Normal symmetric appearing face.  MOTOR: Grossly normal muscle tone and strength in all extremities.  SENSATION: Grossly normal in all extremity(ies) to light touch  CEREBELLAR: within normal limits on upper extremities coordination  GAIT: Not tested    LABS / XRAYS / RESULTS  Recent Labs   Lab 09/25/22 2115 09/25/22 2105   WBC  --  7.9   HGB  --  12.1*   MCV  --  85   PLT  --  136*   INR  --  0.94   NA  --  136   POTASSIUM  --  3.9   CHLORIDE  --  102   CO2  --  23    BUN  --  30.1*   CR 1.5* 1.55*   ANIONGAP  --  11   KVNG  --  8.7   GLC  --  125*   ALBUMIN  --  3.3*   PROTTOTAL  --  5.8*   BILITOTAL  --  0.3   ALKPHOS  --  53   ALT  --  27   AST  --  17     I personally reviewed the XRAY(s) listed below, including radiology images and corresponding radiologist reports if available.    IMPRESSION:  MRI brain  1. Stable right temporal calvarial and epidural metastasis with also involvement of the scalp and slightly within adjacent sulci.  2. Stable metastasis involving clivus, right cavernous sinus, sphenoid sinuses and the nasal pharynx.  3. No new focus of enhancement or edema.  4. No evidence of hemorrhage, midline shift or focal area of acute ischemia.    CT chest / PE study  IMPRESSION:   1. Exam is positive for acute pulmonary embolism. Multiple segmental  pulmonary emboli in both lower lobes.     Evidence for right heart strain or increased right heart pressures?   is not present.    2. Increased metastatic disease burden in the chest with multiple new  and enlarging pulmonary nodules including a new cavitary lesion in the  left lung apex.  3. Increased bilateral hilar lymphadenopathy concerning for metastatic  disease.  4. Unchanged sclerotic foci posterior aspect right seventh rib.  RESIDENT PRELIMINARY INTERPRETATION    IMPRESSION: Brain CT  Overall findings are favored to represent increased dural based  metastasis along the right temporal lobe since 7/27/2022 although it  is difficult to entirely exclude a small subdural hematoma.  Extra-axial hyperdensity along the peripheral right temporal lobe  corresponding with the area of dural enhancement in the comparison  brain MRI however does appear increased in size since the comparison  outside head CT on 7/27/2022.     IMPRESSION:  CT abdomen  1. Continued appearance of left radical nephrectomy. No local  recurrence.  2. Unchanged multiple right renal masses.  3. Interval growth of right adrenal likely metastasis.  4.  Mostly stable apparent metastatic pulmonary nodules although the  wire just nodule has grown from 8 mm to 9 mm in approximately 4 weeks.  5. Small bowel enteritis without evidence of acute infectious focus.  6. No discrete new bone lesions.    Color Urine (no units)   Date Value   09/25/2022 Yellow   11/28/2020 Straw     Appearance Urine (no units)   Date Value   09/25/2022 Clear   11/28/2020 Clear     Glucose Urine (mg/dL)   Date Value   09/25/2022 Negative   11/28/2020 Negative     Bilirubin Urine (no units)   Date Value   09/25/2022 Negative   11/28/2020 Negative     Ketones Urine (mg/dL)   Date Value   09/25/2022 Negative   11/28/2020 Negative     Specific Gravity Urine (no units)   Date Value   09/25/2022 1.037 (H)   11/28/2020 1.010     pH Urine   Date Value   09/25/2022 5.5   11/28/2020 5.5 pH     Protein Albumin Urine (mg/dL)   Date Value   09/25/2022 20  (A)   11/28/2020 50 (A)     Nitrite Urine (no units)   Date Value   09/25/2022 Negative   11/28/2020 Negative     Leukocyte Esterase Urine (no units)   Date Value   09/25/2022 Negative   11/28/2020 Negative     Elkin Redmond MD

## 2022-09-26 NOTE — PROGRESS NOTES
[facility]    Medicine Progress Note - Hospitalist Service, GOLD TEAM 9    Date of Admission:  9/25/2022    Assessment & Plan         53 Y/O M with metastatic renal cell carcinoma to the brain with mulitple prior chemotherapy and immunotherapy regimes and s/p recent radiation therapy for brain metastasis ( last session on 9/13) currently admitted for headache and was found to have PE       # Worsening headaches in the setting of  # Established brains mets from Renal cell carcinoma stage IV with recent radiation of the brain  - Recent 5 cycles of gamma knife last session 09/13. S/p steroid taper. Imaging shows stable lesions. Pt has abducent nerve palsy well documented on prior clinical visits. Had multiple chemo and immunotherapy in the past.    Plan:  - Onc consulted and recommending a trial of steroids. Ordered 2 mg BID  - Continue pain meds as below  - NSGY consulted and nothing surgical from their end  - Patient is planned to start Tivozanib in his next oncology visit for his underlying metastatic RCC  - Follow up with oncologist karstenpt Josef Schmitz       # B/L segmental PE: Acute Non provoked without RV strain   HDS with no O2 requirement. NSGY consulted of ok to start and ok from their end. Started on Loading dose of Apixaban overnight      Plan:  - Onc consulted as RCC is higher risk for bleeding will skip the loading dose and start the 5 BID and monitor pt for any bleeding for 1-2 days. Also recommending bilateral ultrasound of all extremities, as evidence of DVT might help future decision on planning of management of clots ( such as placement of IVC filter).      # Essential hypertension  - Continue amlodipine and hold ACE for now      # GERD  - Continue PTA Omeprazole, PRN TUMS    # N&V (nausea and vomiting)  # Chronic Diarrhea  - Continue PTA loperamide  - Continue PTA Prochlorperazine     # Chronic pain  Intermittent variable and migratory locations  "secondary to multiple locations of metastatic RCCA to brain, skull base, lungs, ribs.  -Continue PTA  Oxycodone, Flexeril, tramadol     # Anemia Acute  - Could be dilutional. IF persists can follow up outpt for work up        # LEO on CKD stage 2: Resolving  Unclear etiology. Baseline Cr 0.99 could be pre renal s/p fluids  - CTM      # Hypoalbuminemia: Albumin = 3.3 g/dL (Ref range: 3.5 - 5.2 g/dL) on admission, will monitor as appropriate    # Thrombocytopenia: Plts = 118 10e3/uL (Ref range: 150 - 450 10e3/uL) on admission, will monitor for bleeding  # Obesity: Estimated body mass index is 32.84 kg/m  as calculated from the following:    Height as of this encounter: 1.64 m (5' 4.57\").    Weight as of this encounter: 88.3 kg (194 lb 11.2 oz).          Diet: Regular Diet Adult    DVT Prophylaxis: DOAC  Randall Catheter: Not present  Central Lines: None  Cardiac Monitoring: None  Code Status: Full Code      Disposition Plan      Expected Discharge Date: 09/28/2022                The patient's care was discussed with the Patient and Oncology Consultant.    KAUR SEARS MD  Hospitalist Service, [address]  Securely message with the Vocera Web Console (learn more here)  Text page via [employer] Paging/Directory   Please see signed in provider for up to date coverage information      Clinically Significant Risk Factors Present on Admission             # Hypoalbuminemia: Albumin = 3.3 g/dL (Ref range: 3.5 - 5.2 g/dL) on admission, will monitor as appropriate    # Thrombocytopenia: Plts = 118 10e3/uL (Ref range: 150 - 450 10e3/uL) on admission, will monitor for bleeding  # Hypertension: home medication list includes antihypertensive(s)    # Obesity: Estimated body mass index is 32.84 kg/m  as calculated from the following:    Height as of this encounter: 1.64 m (5' 4.57\").    Weight as of this encounter: 88.3 kg (194 lb 11.2 oz).   "   ______________________________________________________________________    Interval History   Pt mainly was talking about hs headaches and not PE. Says headaches now 4/10 but was 10/10 previously. Told him will consult onc this hospitalization. No SOB or CP     Data reviewed today: I reviewed all medications, new labs and imaging results over the last 24 hours. I reviewed CT and MRI as above    Physical Exam   Vital Signs: Temp: 98.5  F (36.9  C) Temp src: Axillary BP: 135/78 Pulse: 102   Resp: 18 SpO2: 97 % O2 Device: None (Room air)    Weight: 194 lbs 11.2 oz  Constitutional: Lying in bed with mild distress due to headaches  GI: NTTP with mild distension     Data

## 2022-09-26 NOTE — ASSESSMENT & PLAN NOTE
Assessment:  Difficult clinical situation - unable to anticoagulate given possibility of subdural hematoma (SDH).  Plan:  Consider IVC filter and chronic anticoagulation if neurosurgery ok's that plan.  Neurosurgery has been consulted.

## 2022-09-26 NOTE — ASSESSMENT & PLAN NOTE
Prior to admission medications:  Occasional(ly) use of loperamide  Plan:  continue medications as listed above.

## 2022-09-26 NOTE — PROGRESS NOTES
"[facility], [place]   Neurosurgery Progress Note:    Date of service: 9/26/2022    Assessment: Julio John is a 52 year old male with PMHx significant for metastatic renal cell carcinoma, previously receiving chemo, now getting a course of radiation therapy for dural based metastases by Dr. Schaefer (fraction 5/5 on 9/13), on decadron wean to off who now presents to the Emergency Department with severe progressive headache since Friday with CT head demonstrating right hyperdensity along temporal lobe and CT PE demonstrating acute bilateral pulmonary emboli, neurologically at baseline and vitally stable.  MRI brain obtained and revealed stable, known metastasis without acute hemorrhage.      Clinically Significant Risk Factors Present on Admission              # Hypoalbuminemia: Albumin = 3.3 g/dL (Ref range: 3.5 - 5.2 g/dL) on admission, will monitor as appropriate    # Thrombocytopenia: Plts = 118 10e3/uL (Ref range: 150 - 450 10e3/uL) on admission, will monitor for bleeding   # Obesity: Estimated body mass index is 32.84 kg/m  as calculated from the following:    Height as of this encounter: 1.64 m (5' 4.57\").    Weight as of this encounter: 88.3 kg (194 lb 11.2 oz).        Plan:  - MRI confirms no hemorrhage is present.  Stable known metastasis noted.  Due to lack of acute blood, ok for anticoagulation from a neurosurgery perspective.   - If neurologic change noted while on anticoagulation, please obtain stat imaging.    - No neurosurgical intervention warranted at this time.  Neurosurgery will sign off at this time, please do not hesitate to call with questions/concerns.          DARINEL Brumfield, CNP  9/26/2022  Department of Neurosurgery  Pager: [phone]    I have spent a total of 25 minutes total time counseling, coordination, and chart review, over 50% of the time was spent in direct patient care.       Interval History:  MRI brain completed yesterday, no acute " hemorrhage noted.            Objective:   Temp:  [97.3  F (36.3  C)-101.4  F (38.6  C)] 97.3  F (36.3  C)  Pulse:  [] 98  Resp:  [14-20] 18  BP: (105-132)/(71-88) 119/78  SpO2:  [94 %-98 %] 97 %  I/O last 3 completed shifts:  In: 33.33 [I.V.:33.33]  Out: 300 [Urine:300]    Gen: Appears comfortable, NAD  Neurologic:  - Alert & Oriented to person, place, time, and situation  - Follows commands briskly  - Speech fluent, spontaneous. No aphasia or dysarthria.  - No gaze preference. No apparent hemineglect.  - PERRL, EOMI  - Strong eye closure, jaw clench, and cheek puff  - Face symmetric with sensation intact to light touch  - Palate elevates symmetrically, uvula midline, tongue protrudes midline  - Trapezii and sternocleidomastoid muscles 5/5 bilaterally  - No pronator drift     Del Tr Bi WE WF Gr   R 5 5 5 5 5 5   L 5 5 5 5 5 5    HF KE KF DF PF EHL   R 5 5 5 5 5 5   L 5 5 5 5 5 5     Reflexes 2+ throughout    Sensation intact and symmetric to light touch throughout    LABS  Recent Labs   Lab Test 09/26/22 0622 09/25/22 2105 09/12/22  0943   WBC 7.4 7.9 7.6   HGB 11.1* 12.1* 12.6*   MCV 84 85 83   * 136* 74*       Recent Labs   Lab Test 09/26/22 0622 09/25/22 2115 09/25/22 2105 09/12/22  0943     --  136 137   POTASSIUM 4.3  --  3.9 4.1   CHLORIDE 106  --  102 107   CO2 22  --  23 24   BUN 25.9*  --  30.1* 34.8*   CR 1.33* 1.5* 1.55* 0.99   ANIONGAP 9  --  11 6*   KVNG 8.0*  --  8.7 8.1*   *  --  125* 139*       IMAGING    Recent Results (from the past 24 hour(s))   Head CT w/o contrast   Result Value    Radiologist flags (Urgent)     Stable dural based brain metastasis versus subdural    Narrative    EXAM: CT HEAD W/O CONTRAST  9/25/2022 9:54 PM     HISTORY:  HA       Additional information from EMR: 52 year old male?with a past medical  history significant for?metastatic renal cell carcinoma, previously  receiving chemo, now getting a course of?radiation therapy for dural  based  metastases?who presents to the Emergency Department for  evaluation of?headache. Patient states he is experiencing headache  which started on Friday afternoon?(2 days ago).?It is located in the  bilateral temporal lobes but worse on the right. He does have  associated nausea but has not vomited. ?Today when he was ambulating  to the bathroom he felt dizzy/unbalanced. ?He has not fallen. ?Patient  does have some baseline visual disturbance (baseline right sided  lateral rectus palsy) which impacts his vision, but does not believe  that there is any change.   ?    COMPARISON:  Brain MRI 8/31/2022, outside head CT 7/27/2022    TECHNIQUE: Using multidetector thin collimation helical acquisition  technique, axial, coronal and sagittal CT images from the skull base  to the vertex were obtained without intravenous contrast.   (topogram) image(s) also obtained and reviewed.    FINDINGS:  Extra-axial hyperdense thickening along the lateral right temporal  lobe with a maximum thickness of 4 mm resulting in mild mass effect  with sulcal effacement. This corresponds with the area of dural-based  enhancement on the comparison brain MRI, however does appear increased  in size since the comparison head CT on 7/27/2022. No midline shift.  No acute loss of gray-white matter differentiation in the cerebral  hemispheres. Ventricles are proportionate to the cerebral sulci.     There is asymmetric right cavernous sinus fullness and soft tissue  thickening extending along the retroclival region with maximum  thickness of 4.6 mm. There are erosive changes of the right side of  the clivus and there is similar soft tissue density to the retroclival  lesion appear to be extending to the sphenoid sinus. These changes are  better seen on the previous MRI. The extent of the retroclival lesion  is not progressed since 8/31/2022 dated MRI.     Bilateral mastoid air cells are clear. The rest of the paranasal  sinuses are clear.        Impression    IMPRESSION:   Overall findings are favored to represent increased dural based  metastasis along the right temporal lobe better seen on MRI. No  progression in size of this lesion since radiation planning MRI  allowing the differences in technique.   Additional known metastatic lesions in the retroclival region, in the  right cavernous sinus, within the sphenoid sinus and in the sella.  These are better seen on recent MRI.    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     Dr. Bacon was contacted by Dr. Jefferson at 9/25/2022 10:14 PM and  verbalized understanding of the urgent finding.     Findings were also discussed via the telephone with Dr. Batista by  Dr. Jefferson on 9/25/2022 at approximately 2300 hours.    I have personally reviewed the examination and initial interpretation  and I agree with the findings.    APPLE GIBBS MD         SYSTEM ID:  [id number]   CT Chest Pulmonary Embolism w Contrast   Result Value    Radiologist flags Segmental and subsegmental pulmonary emboli (Urgent)    Narrative    EXAMINATION: CTA pulmonary angiogram, 9/25/2022 9:54 PM     COMPARISON: Outside chest CT 8/29/2022 and chest abdomen pelvis CT  8/23/2022    HISTORY: Known metastatic RCC, now shortness of breath, evaluate for  pulmonary embolism/increasing tumor burden/acute change    TECHNIQUE: Volumetric helical acquisition of CT images of the chest  from the lung apices to the kidneys were acquired after the  administration of 119 mL of Isovue-370 IV contrast. Flash technique  with free breathing acquisition.  Post-processed multiplanar and/or  MIP reformations were obtained, archived to PACS and used in  interpretation of this study.     FINDINGS:  Contrast bolus is: suboptimal.  Exam is positive for acute  pulmonary embolism. There are multiple scattered segmental an  subsegmental filling  defects within bilateral lower lobes.    The largest right ventricle transaxial diameter is (measured from  endocardium to endocardium): 4.3 cm   The largest left ventricle transaxial diameter is (measured from  endocardium to endocardium): 5.6 cm  RV/LV ratio is: 0.76 (if ratio greater than 1.1 then sign is  suspicious for right heart strain)  Reflux of contrast into the IVC? no  Paradoxical bowing of the interventricular septum to the left? no  Pericardial effusion?:not present    Heart size is within normal limits. Ascending aorta and main pulmonary  artery diameters are within normal limits. Bilateral hilar  lymphadenopathy for example at the left hilum measuring 2.6 x 2.4 cm,  previously 1.6 x 2.3 cm. No suspicious axillary lymph nodes.    The trachea and central airways are patent. No pneumothorax or pleural  effusion. Increased number of numerous solid pulmonary nodules  including a cavitary nodule in the left lung apex which measures 2.1 x  2.2 cm with multiple adjacent smaller satellite solid and groundglass  nodules. Increased size of a peribronchovascular nodule in the lingula  measuring 1.4 x 1.4 cm (series 10, image 107) previously 0.9 x 1.1 cm.  Increased size of the peribronchial vascular solid nodule in the right  lower lobe measuring 1.8 x 2.2 cm (series 10, image 149).    Patulous and fluid-filled esophagus. Thyroid is within normal limits.    Partially visualized heterogeneously enhancing right adrenal mass.  Please see the same day abdomen and pelvis CT for additional findings  regarding the abdomen and pelvis. Right kidney incompletely imaged.    Unchanged sclerotic foci in the posterior aspect of the right seventh  rib.. Scattered degenerative changes of the spine.      Impression    IMPRESSION:   1. Exam is positive for acute pulmonary embolism. Multiple segmental  pulmonary emboli in both lower lobes.      Evidence for right heart strain or increased right heart pressures?   is not present.      2. Increased metastatic disease burden in the chest with multiple new  and enlarging pulmonary nodules including a new cavitary lesion in the  left lung apex.  3. Increased bilateral hilar lymphadenopathy concerning for metastatic  disease.  4. Unchanged sclerotic foci posterior aspect right seventh rib.    [Urgent Result: Segmental and subsegmental pulmonary emboli]    Finding was identified on 9/25/2022 10:00 PM.     Dr. Bacon was contacted by Dr. Jefferson at 9/25/2022 10:16 PM and  verbalized understanding of the urgent finding.     In the event of a positive result for acute pulmonary embolism  resulting in right heart strain, consider calling the   [facility] patient placement ([phone]) for PERT (Pulmonary Embolism  Response Team) Activation?    PERT -- Pulmonary Embolism Response Team (Multidisciplinary team  including cardiology, interventional radiology, critical care,  hematology)    I have personally reviewed the examination and initial interpretation  and I agree with the findings.    LUTHER PEREZ MD         SYSTEM ID:  [id number]   CT Abdomen Pelvis w Contrast    Narrative    Abdomen pelvis CT with contrast indication: Known metastatic renal  cell carcinoma. Increasing lateral quadrant pain.    COMPARISON: Outside CT 8/23/2022    Contrast: 119 mL intravenous Isovue-370    FINDINGS: Multiple sub-6 mm pulmonary nodules are present bilaterally  in the lower lungs similar previous and again concerning for  metastatic disease. The largest nodule is posteriorly located at the  right lung base measuring 9 mm, previously 8 mm. Subsegmental  atelectasis in the lingula.  Within the abdomen and pelvis, the liver, spleen coronal pancreas  Gallbladder appear grossly unremarkable. Left radical nephrectomy.  Multiple heterogeneous right renal masses appears similar. Right  adrenal probable metastatic focus measures 7.1 cm in greatest  dimension previously 5.2 cm. Right renal vein appears grossly  unremarkable.  Residual medial aspect of the left renal vein appears  unremarkable. No free fluid. No free air. Probable prominent  hydroceles noted in the scrotum partially imaged. No enlarged inguinal  common iliac chain, other deep pelvic no retroperitoneal or mesenteric  lymph nodes.  IVC appears unremarkable but is effaced by the right adrenal mass. No  inflammatory changes. There is some fluid filled pelvic small bowel  suggestive of enteritis.  Bone windows reason normal mineralization throughout with degenerative  spurring throughout the lower thoracic and upper lumbar spine regions.  Minimal gentle mid lumbar centered levocurvature is present. No  suspicious new sclerotic or lytic/destructive lesions. Unchanged small  sclerotic focus in the right iliac bone just lateral to the sacroiliac  joint in other sclerotic focus in the left iliac bone also unchanged.  Significance of these is uncertain.      Impression    IMPRESSION:  1. Continued appearance of left radical nephrectomy. No local  recurrence.  2. Unchanged multiple right renal masses.  3. Interval growth of right adrenal likely metastasis.  4. Mostly stable apparent metastatic pulmonary nodules although the  wire just nodule has grown from 8 mm to 9 mm in approximately 4 weeks.  5. Small bowel enteritis without evidence of acute infectious focus.  6. No discrete new bone lesions.    LUTHER PEREZ MD         SYSTEM ID:  [id number]   MR Brain w/o & w Contrast    Narrative    EXAM: MR BRAIN W/O and W CONTRAST  LOCATION: [facility]  DATE/TIME: 9/26/2022 12:56 AM    INDICATION: known metastatic RCC, concern for worsening brain lesions with headache.  COMPARISON: 08/31/2022.  CONTRAST: 8.5mL gadavist  TECHNIQUE: MRI brain without and with contrast.    FINDINGS: On the diffusion-weighted images there is no evidence of acute ischemia or restricted diffusion. There is no evidence of a midline shift. There are appropriate flow  voids within the cavernous portions of the internal carotid arteries and the   basilar artery. Again visualized is the dural thickening and enhancement of the right lateral temporal region with extension into the right temporal bone and scalp. There continues to be enhancement into the sulci of the right temporal lobe at this level   which was seen previously. There is no significant edema in the adjacent brain parenchyma. Also again visualized is the enhancing mass extending along the posterior clivus into the prepontine cistern along with the suprasellar cistern and anteriorly to   the sella turcica and the sphenoid sinuses right greater than left. There is also extension into the right cavernous sinus region and the right side of the nasopharynx. This is stable in the interval. There is no narrowing of the cavernous portion of the   right internal carotid artery. No new area of enhancement or edema is visualized. There is no significant abnormal signal noted within the brain parenchyma. The ventricular system, basal cisterns and the cortical sulci are within normal limits for the   patient's age.    There is no evidence of cerebellar tonsillar ectopia. Corpus callosum is within normal limits for age. The orbit regions are unremarkable. There is mass involving the sphenoid sinuses with associated air-fluid levels. The mastoid air cells and the middle   ear regions are clear. There is again a cyst involving the superficial portion of the left parotid gland that measures approximately 1.2 cm x 1.4 cm. This was seen previously and has no abnormal enhancement.      Impression    IMPRESSION:  1. Stable right temporal calvarial and epidural metastasis with also involvement of the scalp and slightly within adjacent sulci.  2. Stable metastasis involving clivus, right cavernous sinus, sphenoid sinuses and the nasal pharynx.  3. No new focus of enhancement or edema.  4. No evidence of hemorrhage, midline shift or focal  area of acute ischemia.

## 2022-09-26 NOTE — PLAN OF CARE
"/80 (BP Location: Left arm)   Pulse 99   Temp 98.5  F (36.9  C) (Oral)   Resp 17   Ht 1.64 m (5' 4.57\")   Wt 88.3 kg (194 lb 11.2 oz)   SpO2 94%   BMI 32.84 kg/m    [id number]  Pt was admitted from ER R/T PE, fever, headache and nausea. Tmax was 101.4, Tachycardic, , OVSS. Pt also complained headache as level 9/10. RLQ abdomen mild pain. PRN Tylenol x 1, effective, VS back to WNL. At the same time, sepsis triggered and BC drawn, LA was 0.8. Pt alert and oriented x 3, Stand by assist. Overnight, pt used bedside urinal for urination, no BM, C.diff stool sample still in need. PIV on right forearm, D5W + 1/2 NS running at 100 ml/hr until 1034. Rash on chest and back, pt stated caused by radiation treatment. In the morning, BMP still in pending, otherwise, no replacement needed. Continue with current POC.   Problem: Pain Acute  Goal: Acceptable Pain Control and Functional Ability  Outcome: Ongoing, Progressing  Intervention: Develop Pain Management Plan  Recent Flowsheet Documentation  Taken 9/26/2022 0404 by Inna Cortes RN  Pain Management Interventions: medication (see MAR)  Intervention: Prevent or Manage Pain  Recent Flowsheet Documentation  Taken 9/26/2022 0200 by Inna Cortes, NOMAN  Medication Review/Management: medications reviewed   Goal Outcome Evaluation:                      "

## 2022-09-26 NOTE — CONSULTS
Oncology Resident Consult Note   Date of Service: 09/26/2022    Patient: Julio John  MRN: [id number]  Admission Date: 9/25/2022  Hospital Day # 1  Cancer Diagnosis: Metastatic renal cell carcinoma (Stage IV, Lung, pancreas, Bone, adrenal, brain, and right kidney)  Primary Outpatient Oncologist: Josef Schmitz  Current Treatment Plan: Initiation of Tivozanib ( not yet started); 5th line therapy    Reason for Consult: Persistent headache in a patient with renal cell carcinoma     Assessment:   Julio John is a 52 year old male  with past medical history of metastatic renal cell carcinoma with mulitple prior chemotherapy and immunotherapy regimes ( as listed below ) and recent hx of radiation therapy for brain metastasis ( last session on 9/13) currently admitted for headache and PE on CT-A imaging.     1. #Headache (Worsening)        #Established brain metastases to temporal lobes s/p 5 cycles of radiation     Patient received 5 cycles of gamma knife radiosurgery to his skull base metastases, last session being on 9/13. He was started on tapering dose of steroid with the radiotherapy, possibility of headache cause that can be considered is one associated with steroid tapering. Trial of steroid dose escalation can be attempted to see response of the headache clinically. No evidence of bleeding noted on imaging, nor no features of new mets or expansion of mets. Abducens nerve palsy is stable and well documented on prior clinical visits.     2. #Bilateral segmental PE    Patient was found to have multiple bilateral segmental PE, though he is hemodynamically stable and sating well on room air. Given his CT at the end of August did not show features of PE, it is prudent to treat these new findings, especially with the underlying cancer. However, caution with anticoagulation recommended as the cancer type is RCC, which carries a higher risk of bleeding with intracranial metastases. Would recommend  inpatient monitoring of symptoms over 1-2 days, and strong advice for ED presentation in case symptoms worsen at home. Would also recommend getting bilateral ultrasound of lower extremities, as evidence of DVT might help future decision on planning of management of clots ( such as placement of IVC filter).     3. Metastatic renal cell carcinoma    Patient is planned to start Tivozanib in his next oncology visit for his underlying metastatic RCC. The initial preferred drug, Sutent is not available readily and decision to use Tivozumab is made by his primary oncologist. Would recommend to follow up on that on discharge.     Plan & Recommendations:   -   - Increase dose of dexamethasone to 2mg BID and see clinical response (ordered for you)  -Keep Apixaban at 5 mg BID and forgo loading dose, monitor for any worsening of intracranial symptoms   -All 4 extremity US to exclude DVT  -Discuss risks of anticoagulation with the patient vis-a-vie anticoagulation and brain mets (Discussed risks already)  -Optimize pain control regimen  -Oncology treatment to be continued per primary plan as an outpatient      We will continue to follow this patient. Please do not hesitate to page with any questions or concerns.  Patient was seen and plan of care was discussed with attending physician Dr. Benjamin Mao MD  Internal Medicine Resident (PGY1)  Oncology Consult Service    History of Present Illness:    Julio John is a 52 year old male with past medical history of metastatic renal cell carcinoma with mulitple prior chemotherapy and immunotherapy regimes ( as listed below ) and recent hx of radiation therapy for brain metastasis ( last session on 9/13) currently admitted for headache and PE on CT-A imaging.     He presents with worsening of headache over 2 days with associated nausea. No report of loss of consciousness or alteration of mentation. No fever reported. No bleeding from any site. He states his  headache was not responding to his usual pain medications. No head trauma reported. He also mentions he had chest pain on leaning forward, which is not associated with breathing, No leg swelling noted by him. No other new symptom.     Oncologic History:  Per review of chart    Diagnosed left sided RCC in 2016 s/p nephrectomy March 2016.    December 2016-January 2017 - Underwent stereotactic radiation therapy to pulmonary nodules @ [facility].    2/28/17 - CT C/A/P New small-multiple pulmonary nodules seen in bilateral lungs.     April -June 2017 - s/p #4 cycles of IL-2 therapy at [facility] - Dr. Painting.   C1 4/17/17 (10 doses)  C2 5/3/17 (4 doses)  C3 5/31/17 (5 doses)   C4 06/21/17 (4 doses)    1/5/18 c1 d1 Nivolumab, palliative treatment started due to progressing pulmonary mets. Patient remained asymptomatic. Case was previously discussed with Dr. Painting at [facility] who recommended Nivolumab upon disease progression.    Jan - Feb 2019 CT CAP showed interval enlargement of 2 right renal masses consistent with RCC. Stable pulmonary metastatic disease. One right renal mass was biopsied jan 2019 but benign but we felt this was still metastatic disease since the kidney masses were growing.    4/23/19 microwave ablation to larger right renal mass performed by IR. Decided to switch to ipilumumab and nivolumab given disease progression right kidney    5/3/19 ipilumumab and nivolumab #1    7/26/19 started maintenance nivolumab    11/1/19 switched to 2nd line cabozantinib due to progression in right kidney masses, right adrenal nodule, pancreatic nodule    1/22/20 re started cabozantinib at reduced dose of 20 mg po daily due to grade 3 cough after discussing with [facility] who recommended re challenging at reduced dose.    1/13/20 right elbow XR with findings consistent with metastatic destructive lesion of the distal humerus without acute traumatic abnormality.    10/23/20 changed treatment to axitinib since ct c/a/p showed  progression in lung nodules (minimal, not meeting RECIST criteria) but greater progression in right adrenal gland (now 4.5 cm versus 3.0 cm previously) and right kidney (now 3.0 cm versus 2.2 cm previously)    12/11/20 dose reduced axitinib to 3 mg po bid due to cough    1/25/21 CT c/a/p showed stable disease. Bone scan showed possible increased uptake clivus and per d/w radiology, clivus lesion present in 2019.     2/26/21 completed radiation to clivus    3/21/21 seen in ED for headache. CT head showed no parietal bone mets. Ct chest showed left hilar mass increased in size    3/23/21 MRI done for headache and this showed new parietal skull mets  4/12/21 completed parietal skull radiation    4/15/21 given some increase in left hilar mass on march 2021 CT chest and abdominal pain/intolerance to axitinib, started everolimus, awaiting lenvatinib approval  5/4/21 started lenvatinib    7/2022 Brain MRI showed progression of R parietal and clivus mets  -pt has progressive R cranial ner VI palsy  -pt  referred to the [facility] for gamma knife treatment.   -completed gamma knife 9/13/2022  -Recommendation of  starting sutent given disease progression per ct august 2022  -Sutent not available, plan to initiate Tivozanib as an alternative.     ECOG Performance Status:   1 - restricted in physically strenuous activity, but ambulatory and able to carry out work of a light or sedentary nature.       Subjective    Review of Systems:  A comprehensive ROS was performed and found to be negative or non-contributory with the exception of that noted in the HPI above.    Past Medical History:   Diagnosis Date     Alcohol abuse     in remission     Benign essential hypertension      Cerebrovascular accident (H)     2010/2011     Cocaine abuse (H)     in remission     Metastatic renal cell carcinoma (H)     2016     Past Surgical History:   Procedure Laterality Date     PICC INSERTION Left 05/31/2017    5fr DL BioFlo PICC, 45cm (3cm  external) in the L medial brachial vein w/ tip in the SVC RA junction.     radical left nephrectomy Left 03/09/2016 2016     Social History     Socioeconomic History     Marital status:      Spouse name: Alda     Number of children: 7     Years of education: None     Highest education level: None   Tobacco Use     Smoking status: Never Smoker     Smokeless tobacco: Never Used   Substance and Sexual Activity     Alcohol use: Not Currently     Drug use: Not Currently     Types: Cocaine   Social History Narrative    .  Prior tobacco abuse.    No ETOH.    2 children.    Grew up in [place].    Lives in [place], [place].    Hasn't worked in employment for years.      Family History   Problem Relation Age of Onset     Family History Negative Other         No family history of cancer, kidney cancer     Cancer No family hx of      Medications Prior to Admission   Medication Sig Dispense Refill Last Dose     acetaminophen (TYLENOL) 325 MG tablet Take 325-650 mg by mouth every 6 hours as needed for mild pain Take 2 tablets once every 6 hours as needed for mild pain        albuterol (PROAIR HFA/PROVENTIL HFA/VENTOLIN HFA) 108 (90 Base) MCG/ACT inhaler Inhale 1-2 puffs into the lungs every 4 hours as needed for shortness of breath / dyspnea or wheezing 18 g 4      amLODIPine (NORVASC) 10 MG tablet Take 10 mg by mouth daily        calcium carbonate (TUMS) 500 MG chewable tablet Take 1 chew tab by mouth 2 times daily Chew and swallow 1 tablet by mouth twice daily        calcium-vitamin D (OSCAL) 250-125 MG-UNIT TABS per tablet Take 1 tablet by mouth 2 times daily        cetirizine (ZYRTEC) 10 MG tablet Take 10 mg by mouth daily        cyclobenzaprine (FLEXERIL) 5 MG tablet Tale 1-2 tablets (5-10 mg) by mouth 3 times daily as needed for muscle cramps.        dexamethasone (DECADRON) 2 MG tablet Take 1 tablet (2 mg) by mouth 2 times daily (with meals) 20 tablet 0      dexamethasone  (DECADRON) 4 MG tablet Take 4 mg by mouth 4 times daily        ergocalciferol (ERGOCALCIFEROL) 1.25 MG (86176 UT) capsule Take 50,000 Units by mouth        gabapentin (NEURONTIN) 300 MG capsule Take 300 mg by mouth 3 times daily Take 2 capsules 3 times daily        hypromellose-dextran (GENTEAL TEARS) 0.1-0.3 % ophthalmic solution 1 drop One drop in right eye 4 times daily (Patient not taking: No sig reported)        ibuprofen (ADVIL/MOTRIN) 200 MG tablet Take 200 mg by mouth every 4 hours as needed for mild pain        ketoconazole (NIZORAL) 2 % external shampoo Use 3 times per week. Apply to affected area, leave on for 20 minutes, then rinse.        lisinopril (ZESTRIL) 40 MG tablet Take 40 mg by mouth daily        loperamide (IMODIUM) 2 MG capsule Take 2 mg by mouth Take 2 capsules at the onset of symptoms, then take 1 capsule as needed for diarrhea. DO NOT take more than 8 capsules in 24 hours.        magic mouthwash suspension, diphenhydrAMINE, lidocaine, aluminum-magnesium & simethicone, (FIRST-MOUTHWASH BLM) compounding kit Swish and swallow 5-10 mLs in mouth every 6 hours as needed 237 mL 1      nystatin (MYCOSTATIN) 755199 UNIT/ML suspension Take 5 mLs (500,000 Units) by mouth 4 times daily 100 mL 0      nystatin (MYCOSTATIN) 847633 UNIT/ML suspension Take 5 mLs (500,000 Units) by mouth 4 times daily 400 mL 3      omeprazole (PRILOSEC) 20 MG DR capsule Take 20 mg by mouth daily        ondansetron (ZOFRAN ODT) 8 MG ODT tab Take 8 mg by mouth        oxyCODONE (ROXICODONE) 5 MG tablet Take 5 mg by mouth every 6 hours as needed for severe pain        prochlorperazine (COMPAZINE) 10 MG tablet Take 10 mg by mouth every 6 hours as needed for nausea        senna-docusate (SENOKOT-S/PERICOLACE) 8.6-50 MG tablet Take 1 tablet by mouth daily Take 1-2 tablets twice daily as needed as needed for constipation        sennosides (SENOKOT) 8.6 MG tablet Take 1 tablet by mouth 2 times daily        tamsulosin (FLOMAX) 0.4 MG  "capsule Take 0.4 mg by mouth daily One tablet after each meal (Patient not taking: No sig reported)        traMADol (ULTRAM) 50 MG tablet Take 50 mg by mouth every 6 hours as needed for severe pain        triamcinolone acetonide 0.025 % LOTN Mix with ketoconazole cream and apply to face twice daily        No current outpatient medications on file.     Objective   Physical Exam:    Blood pressure 119/78, pulse 98, temperature 97.3  F (36.3  C), temperature source Axillary, resp. rate 18, height 1.64 m (5' 4.57\"), weight 88.3 kg (194 lb 11.2 oz), SpO2 97 %.  Physical Exam   HEENT - Pink conjunctiva, non icteric sclera  Chest - Clear and resonant  CVS- S1 and S2 well heard, no murmur or gallop  Abd - no tenderness noted  Extremities - No edema or swelling   CNS- CN -6 palsy on EOM examination on the right side, power on extremities is intact    Labs & Studies: I personally reviewed the following studies:  ROUTINE LABS (Last four results):  CMP  Recent Labs   Lab 09/26/22 0622 09/25/22 2115 09/25/22 2105     --  136   POTASSIUM 4.3  --  3.9   CHLORIDE 106  --  102   CO2 22  --  23   ANIONGAP 9  --  11   *  --  125*   BUN 25.9*  --  30.1*   CR 1.33* 1.5* 1.55*   GFRESTIMATED 64 56* 54*   KVNG 8.0*  --  8.7   PROTTOTAL  --   --  5.8*   ALBUMIN  --   --  3.3*   BILITOTAL  --   --  0.3   ALKPHOS  --   --  53   AST  --   --  17   ALT  --   --  27     CBC  Recent Labs   Lab 09/26/22 0622 09/25/22 2105   WBC 7.4 7.9   RBC 4.05* 4.38*   HGB 11.1* 12.1*   HCT 34.0* 37.0*   MCV 84 85   MCH 27.4 27.6   MCHC 32.6 32.7   RDW 19.1* 19.3*   * 136*     INR  Recent Labs   Lab 09/25/22 2105   INR 0.94       Imaging:  MR Brain w/o & w Contrast   Final Result   IMPRESSION:   1. Stable right temporal calvarial and epidural metastasis with also involvement of the scalp and slightly within adjacent sulci.   2. Stable metastasis involving clivus, right cavernous sinus, sphenoid sinuses and the nasal pharynx.   3. No " new focus of enhancement or edema.   4. No evidence of hemorrhage, midline shift or focal area of acute ischemia.      CT Abdomen Pelvis w Contrast   Final Result   IMPRESSION:   1. Continued appearance of left radical nephrectomy. No local   recurrence.   2. Unchanged multiple right renal masses.   3. Interval growth of right adrenal likely metastasis.   4. Mostly stable apparent metastatic pulmonary nodules although the   wire just nodule has grown from 8 mm to 9 mm in approximately 4 weeks.   5. Small bowel enteritis without evidence of acute infectious focus.   6. No discrete new bone lesions.      LUTHER PEREZ MD            SYSTEM ID:  [id number]      CT Chest Pulmonary Embolism w Contrast   Final Result   Abnormal   IMPRESSION:    1. Exam is positive for acute pulmonary embolism. Multiple segmental   pulmonary emboli in both lower lobes.        Evidence for right heart strain or increased right heart pressures?    is not present.       2. Increased metastatic disease burden in the chest with multiple new   and enlarging pulmonary nodules including a new cavitary lesion in the   left lung apex.   3. Increased bilateral hilar lymphadenopathy concerning for metastatic   disease.   4. Unchanged sclerotic foci posterior aspect right seventh rib.      [Urgent Result: Segmental and subsegmental pulmonary emboli]      Finding was identified on 9/25/2022 10:00 PM.       Dr. Bacon was contacted by Dr. Jefferson at 9/25/2022 10:16 PM and   verbalized understanding of the urgent finding.       In the event of a positive result for acute pulmonary embolism   resulting in right heart strain, consider calling the    [facility] patient placement ([phone]) for PERT (Pulmonary Embolism   Response Team) Activation?      PERT -- Pulmonary Embolism Response Team (Multidisciplinary team   including cardiology, interventional radiology, critical care,   hematology)      I have personally reviewed the examination and initial  interpretation   and I agree with the findings.      LUTHER PEREZ MD            SYSTEM ID:  [id number]      Head CT w/o contrast   Final Result   Abnormal   IMPRESSION:    Overall findings are favored to represent increased dural based   metastasis along the right temporal lobe better seen on MRI. No   progression in size of this lesion since radiation planning MRI   allowing the differences in technique.    Additional known metastatic lesions in the retroclival region, in the   right cavernous sinus, within the sphenoid sinus and in the sella.   These are better seen on recent MRI.      [Urgent Result: Stable dural based brain metastasis versus subdural   hematoma since the most recent comparison brain MRI]      Finding was identified on 9/25/2022 9:55 PM.       Dr. Bacon was contacted by Dr. Jefferson at 9/25/2022 10:14 PM and   verbalized understanding of the urgent finding.       Findings were also discussed via the telephone with Dr. Batista by   Dr. Jefferson on 9/25/2022 at approximately 2300 hours.      I have personally reviewed the examination and initial interpretation   and I agree with the findings.      APPLE GIBBS MD            SYSTEM ID:  [id number]

## 2022-09-26 NOTE — ASSESSMENT & PLAN NOTE
patient originally from [place] who was diagnosed left sided RCC in 2016 s/p nephrectomy March 2016.  December 2016-January 2017 - Underwent stereotactic radiation therapy to pulmonary nodules @ [facility].  2/28/17 - CT C/A/P New small-multiple pulmonary nodules seen in bilateral lungs.   April -June 2017 - s/p #4 cycles of IL-2 therapy at [facility]   C1 4/17/17 (10 doses)  C2 5/3/17 (4 doses)  C3 5/31/17 (5 doses)   C4 06/21/17 (4 doses)    Since 2017 - has been receiving other multiple different immunotherapies  4/23/19 microwave ablation to larger right renal mass performed by IR. Decided to switch to ipilumumab and nivolumab given disease progression right kidney  5/3/19 ipilumumab and nivolumab #1  7/26/19 started maintenance nivolumab  11/1/19 switched to 2nd line cabozantinib due to progression in right kidney masses, right adrenal nodule, pancreatic nodule  1/22/20 re started cabozantinib at reduced dose of 20 mg po daily due to grade 3 cough  12/11/20 dose reduced axitinib to 3 mg po bid due to cough  4/15/21 given some increase in left hilar mass on march 2021 CT chest and abdominal pain/intolerance to axitinib, started everolimus,    5/4/21 started lenvatinib  Jan 2022 changed lenvatinib to 14 mg po daily and everolimus to 5 mg every other day due to intolerable grade 2 rash.    Continuing to receive(s) XRT (radiation therapy) to brain lesions.

## 2022-09-26 NOTE — UTILIZATION REVIEW
Admission Status; Secondary Review Determination       Under the authority of the Utilization Management Committee, the utilization review process indicated a secondary review on the above patient. The review outcome is based on review of the medical records, discussions with staff, and applying clinical experience noted on the date of the review.     (x) Inpatient Status Appropriate - This patient's medical care is consistent with medical management for inpatient care and reasonable inpatient medical practice.     RATIONALE FOR DETERMINATION     Pt is a 52 year old male with  history of hypertension, stroke, alcohol and cocaine abuse (both in remission), and metastatic renal cell carcinoma (RCCA) to brain and lung.  He presented to the emergency department on 9/26/2022 for evaluation of headache.  He also had some right lower quadrant abdominal pain.  Emergency department evaluation showed tachycardia and low-grade fever with temperature around 100.  Laboratory evaluation showed creatinine of 1.55, albumin 3.3, hemoglobin 12.1, and otherwise unremarkable labs.  CT of chest suggested right temporal lobe metastasis, increased in size from prior.  CT of chest showed multiple segmental pulmonary emboli in both lower lobes.  Also noted was increased metastatic disease burden in the chest with multiple new enlarging pulmonary nodules and increased bilateral hilar lymphadenopathy.  CT of abdomen pelvis showed unchanged multiple right renal masses and likely right adrenal metastasis.  Patient was admitted to the hospital for further cares.  He was seen by neurosurgery.  MRI of brain was obtained and showed no hemorrhage.  He was started on Eliquis for pulmonary emboli.  Hospital course has been complicated by fever of 101.4 and persistent tachycardia.  Oncology has been consulted for headaches in the setting of known metastatic renal carcinoma with right temporal lobe metastasis that seems to have enlarged in size.   Inpatient admission is appropriate for ongoing evaluation and treatment of these acute issues including pulmonary emboli, progression of metastatic renal carcinoma, and headaches likely due to frontal lobe metastasis.        At the time of admission with the information available to the attending physician more than 2 nights Hospital complex care was anticipated, based on patient risk of adverse outcome if treated as outpatient and complex care required. Inpatient admission is appropriate based on the Medicare guidelines.     This document was produced using voice recognition software       The information on this document is developed by the utilization review team in order for the business office to ensure compliance. This only denotes the appropriateness of proper admission status and does not reflect the quality of care rendered.   The definitions of Inpatient Status and Observation Status used in making the determination above are those provided in the CMS Coverage Manual, Chapter 1 and Chapter 6, section 70.4.   Sincerely,     Mike Souza MD    Utilization Review  Physician Advisor  [facility].

## 2022-09-26 NOTE — ED PROVIDER NOTES
"      [place] EMERGENCY DEPARTMENT ([facility])  September 25, 2022      History   No chief complaint on file.    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 Patient states he is experiencing headache which started on Friday afternoon (2 days ago). It is located in the bilateral temporal lobes but worse on the right. He does have associated nausea but has not vomited.  Today when he was ambulating to the bathroom he felt dizzy/unbalanced.  He has not fallen.  Patient does have some baseline visual disturbance (baseline right sided lateral rectus palsy) which impacts his vision, but does not believe that there is any change.     He was on a course of chemo which finished about 4-5 weeks ago. He then started brain radiation and is on his decadron taper now (per taper instructions he is on an \"off\" day today, took 1 mg PO decadron yesterday and is due for 1 mg decadron tomorrow). He is set to start a new chemo regimen this week.     Patient denies any documented fevers but has felt warm and chilled at times.  He also endorses some right lower quadrant abdominal pain which is gradually worsening (this is the site of a known tumor) as well as some central chest pain and slight nonproductive cough.  He is denying shortness of breath at this time.  He has noted occasional issues with both hands where he will have spasms/contortions of his hands and fingers.  There is no loss of consciousness or shaking with these episodes.  He has noted some difficulty with walking in the past week or so, stating he sometimes cannot move his legs when he walks too.  He denies any dysuria or hematuria.      Past Medical History  Past Medical History:   Diagnosis Date     Alcohol abuse  "    in remission     Benign essential hypertension      Cerebrovascular accident (H)     2010/2011     Cocaine abuse (H)     in remission     Metastatic renal cell carcinoma (H)     2016     Past Surgical History:   Procedure Laterality Date     PICC INSERTION Left 05/31/2017    5fr DL BioFlo PICC, 45cm (3cm external) in the L medial brachial vein w/ tip in the SVC RA junction.     radical left nephrectomy Left 03/09/2016 2016     acetaminophen (TYLENOL) 325 MG tablet  albuterol (PROAIR HFA/PROVENTIL HFA/VENTOLIN HFA) 108 (90 Base) MCG/ACT inhaler  amLODIPine (NORVASC) 10 MG tablet  calcium carbonate (TUMS) 500 MG chewable tablet  calcium-vitamin D (OSCAL) 250-125 MG-UNIT TABS per tablet  cetirizine (ZYRTEC) 10 MG tablet  cyclobenzaprine (FLEXERIL) 5 MG tablet  dexamethasone (DECADRON) 2 MG tablet  dexamethasone (DECADRON) 4 MG tablet  ergocalciferol (ERGOCALCIFEROL) 1.25 MG (09917 UT) capsule  gabapentin (NEURONTIN) 300 MG capsule  hypromellose-dextran (GENTEAL TEARS) 0.1-0.3 % ophthalmic solution  ibuprofen (ADVIL/MOTRIN) 200 MG tablet  ketoconazole (NIZORAL) 2 % external shampoo  lisinopril (ZESTRIL) 40 MG tablet  loperamide (IMODIUM) 2 MG capsule  magic mouthwash suspension, diphenhydrAMINE, lidocaine, aluminum-magnesium & simethicone, (FIRST-MOUTHWASH BLM) compounding kit  nystatin (MYCOSTATIN) 171684 UNIT/ML suspension  nystatin (MYCOSTATIN) 115118 UNIT/ML suspension  omeprazole (PRILOSEC) 20 MG DR capsule  ondansetron (ZOFRAN ODT) 8 MG ODT tab  oxyCODONE (ROXICODONE) 5 MG tablet  prochlorperazine (COMPAZINE) 10 MG tablet  senna-docusate (SENOKOT-S/PERICOLACE) 8.6-50 MG tablet  sennosides (SENOKOT) 8.6 MG tablet  tamsulosin (FLOMAX) 0.4 MG capsule  traMADol (ULTRAM) 50 MG tablet  triamcinolone acetonide 0.025 % LOTN      Allergies   Allergen Reactions     Carvedilol Other (See Comments)     Per pt, it gave him back pain     Family History  No family history on file.  Social History   Social History      Tobacco Use     Smoking status: Never Smoker     Smokeless tobacco: Never Used   Substance Use Topics     Alcohol use: Not Currently     Drug use: Not Currently     Types: Cocaine      Past medical history, past surgical history, medications, allergies, family history, and social history were reviewed with the patient. No additional pertinent items.       Review of Systems   Constitutional: Fever: subjective.   HENT: Negative for rhinorrhea and sore throat.    Eyes: Positive for visual disturbance.   Respiratory: Positive for cough.    Cardiovascular: Positive for chest pain. Negative for palpitations and leg swelling.   Gastrointestinal: Positive for abdominal pain and nausea. Negative for diarrhea and vomiting.   Genitourinary: Negative for difficulty urinating and hematuria.   Neurological: Positive for dizziness and headaches. Negative for weakness and numbness.   All other systems reviewed and are negative.    A complete review of systems was performed with pertinent positives and negatives noted in the HPI, and all other systems negative.    Physical Exam   BP: 119/76  Pulse: 118  Temp: 99.6  F (37.6  C)  Resp: 20  SpO2: 97 %  Physical Exam  Vitals and nursing note reviewed.   Constitutional:       General: He is in acute distress.      Appearance: He is well-developed.      Comments: Pleasant adult male, alert, cooperative, appears uncomfortable   HENT:      Head: Normocephalic.   Eyes:      Pupils: Pupils are equal, round, and reactive to light.      Comments: Baseline R lateral rectus palsy   Cardiovascular:      Rate and Rhythm: Regular rhythm. Tachycardia present.      Pulses: Normal pulses.      Heart sounds: Normal heart sounds. No murmur heard.    No gallop.   Pulmonary:      Effort: Pulmonary effort is normal. No respiratory distress.      Breath sounds: Normal breath sounds. No wheezing or rales.   Abdominal:      General: Bowel sounds are normal. There is no distension.      Palpations: Abdomen  is soft.      Tenderness: There is abdominal tenderness. There is no guarding or rebound.      Comments: Abdomen is soft, TTP in RLQ without guarding or rebound. Normal bowel sounds.    Skin:     General: Skin is warm and dry.   Neurological:      Mental Status: He is alert and oriented to person, place, and time.      GCS: GCS eye subscore is 4. GCS verbal subscore is 5. GCS motor subscore is 6.      Cranial Nerves: Cranial nerve deficit present.      Sensory: Sensation is intact.      Motor: Motor function is intact.      Comments: Baseline R lateral rectus palsy    Decreased sensation to light touch over R sided V1, V2 and V3 distributions    Some difficulty with finger-nose-finger testing (possibly 2/2 baseline diplopia)         ED Course      Procedures        8:35 PM  The patient was seen and examined by Stacey Bacon MD in Room ED15.        EKG Interpretation:      Interpreted by Stacey Bacon MD  Time reviewed:2240   Symptoms at time of EKG: chest pain   Rhythm: Sinus tachycardia  Rate: 120-130  Axis: Normal  Ectopy: None  Conduction: Normal  ST Segments/ T Waves: No ST-T wave changes and No acute ischemic changes  Q Waves: None  Comparison to prior: sinus tach new    Clinical Impression: sinus tachycardia    The medical record was reviewed and interpreted.  Current labs reviewed and interpreted.  Previous labs reviewed and interpreted.          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 Patient states he is experiencing headache  which started on Friday afternoon?(2 days ago).?It is located in the  bilateral temporal lobes but worse on the right. He does have  associated nausea but has not vomited. ?Today when he was ambulating  to the bathroom he felt dizzy/unbalanced. ?He has not fallen. ?Patient  does have some baseline visual disturbance (baseline right sided  lateral rectus palsy) which impacts his vision, but does not believe  that there is any change.   ?    COMPARISON:  Brain MRI 8/31/2022, outside head CT 7/27/2022    TECHNIQUE: Using multidetector thin collimation helical acquisition  technique, axial, coronal and sagittal CT images from the skull base  to the vertex were obtained without intravenous contrast.   (topogram) image(s) also obtained and reviewed.    FINDINGS:  Extra-axial hyperdense thickening along the lateral right temporal  lobe with a maximum thickness of 4 mm resulting in mild mass effect  with sulcal effacement. This corresponds with the area of dural-based  enhancement on the comparison brain MRI, however does appear increased  in size since the comparison head CT on 7/27/2022. No midline shift.  No acute loss of gray-white matter differentiation in the cerebral  hemispheres. Ventricles are proportionate to the cerebral sulci.     There is asymmetric right cavernous sinus fullness and soft tissue  thickening extending along the retroclival region with maximum  thickness of 4.6 mm. There are erosive changes of the right side of  the clivus and there is similar soft tissue density to the retroclival  lesion appear to be extending to the sphenoid sinus. These changes are  better seen on the previous MRI. The extent of the retroclival lesion  is not progressed since 8/31/2022 dated MRI.     Bilateral mastoid air cells are clear. The rest of the paranasal  sinuses are clear.       Impression    IMPRESSION:    Overall findings are favored to represent increased dural based  metastasis along the right temporal lobe better seen on MRI. No  progression in size of this lesion since radiation planning MRI  allowing the differences in technique.   Additional known metastatic lesions in the retroclival region, in the  right cavernous sinus, within the sphenoid sinus and in the sella.  These are better seen on recent MRI.    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     Dr. Bacon was contacted by Dr. Jefferson at 9/25/2022 10:14 PM and  verbalized understanding of the urgent finding.     Findings were also discussed via the telephone with Dr. Batista by  Dr. Jefferson on 9/25/2022 at approximately 2300 hours.    I have personally reviewed the examination and initial interpretation  and I agree with the findings.    APPLE GIBBS MD         SYSTEM ID:  [id number]   CT Chest Pulmonary Embolism w Contrast     Status: Abnormal   Result Value Ref Range    Radiologist flags Segmental and subsegmental pulmonary emboli (Urgent)     Narrative    EXAMINATION: CTA pulmonary angiogram, 9/25/2022 9:54 PM     COMPARISON: Outside chest CT 8/29/2022 and chest abdomen pelvis CT  8/23/2022    HISTORY: Known metastatic RCC, now shortness of breath, evaluate for  pulmonary embolism/increasing tumor burden/acute change    TECHNIQUE: Volumetric helical acquisition of CT images of the chest  from the lung apices to the kidneys were acquired after the  administration of 119 mL of Isovue-370 IV contrast. Flash technique  with free breathing acquisition.  Post-processed multiplanar and/or  MIP reformations were obtained, archived to PACS and used in  interpretation of this study.     FINDINGS:  Contrast bolus is: suboptimal.  Exam is positive for acute  pulmonary embolism. There are multiple scattered segmental an  subsegmental  filling defects within bilateral lower lobes.    The largest right ventricle transaxial diameter is (measured from  endocardium to endocardium): 4.3 cm   The largest left ventricle transaxial diameter is (measured from  endocardium to endocardium): 5.6 cm  RV/LV ratio is: 0.76 (if ratio greater than 1.1 then sign is  suspicious for right heart strain)  Reflux of contrast into the IVC? no  Paradoxical bowing of the interventricular septum to the left? no  Pericardial effusion?:not present    Heart size is within normal limits. Ascending aorta and main pulmonary  artery diameters are within normal limits. Bilateral hilar  lymphadenopathy for example at the left hilum measuring 2.6 x 2.4 cm,  previously 1.6 x 2.3 cm. No suspicious axillary lymph nodes.    The trachea and central airways are patent. No pneumothorax or pleural  effusion. Increased number of numerous solid pulmonary nodules  including a cavitary nodule in the left lung apex which measures 2.1 x  2.2 cm with multiple adjacent smaller satellite solid and groundglass  nodules. Increased size of a peribronchovascular nodule in the lingula  measuring 1.4 x 1.4 cm (series 10, image 107) previously 0.9 x 1.1 cm.  Increased size of the peribronchial vascular solid nodule in the right  lower lobe measuring 1.8 x 2.2 cm (series 10, image 149).    Patulous and fluid-filled esophagus. Thyroid is within normal limits.    Partially visualized heterogeneously enhancing right adrenal mass.  Please see the same day abdomen and pelvis CT for additional findings  regarding the abdomen and pelvis. Right kidney incompletely imaged.    Unchanged sclerotic foci in the posterior aspect of the right seventh  rib.. Scattered degenerative changes of the spine.      Impression    IMPRESSION:   1. Exam is positive for acute pulmonary embolism. Multiple segmental  pulmonary emboli in both lower lobes.      Evidence for right heart strain or increased right heart pressures?   is not  present.     2. Increased metastatic disease burden in the chest with multiple new  and enlarging pulmonary nodules including a new cavitary lesion in the  left lung apex.  3. Increased bilateral hilar lymphadenopathy concerning for metastatic  disease.  4. Unchanged sclerotic foci posterior aspect right seventh rib.    [Urgent Result: Segmental and subsegmental pulmonary emboli]    Finding was identified on 9/25/2022 10:00 PM.     Dr. Bacon was contacted by Dr. Jefferson at 9/25/2022 10:16 PM and  verbalized understanding of the urgent finding.     In the event of a positive result for acute pulmonary embolism  resulting in right heart strain, consider calling the   [facility] patient placement ([phone]) for PERT (Pulmonary Embolism  Response Team) Activation?    PERT -- Pulmonary Embolism Response Team (Multidisciplinary team  including cardiology, interventional radiology, critical care,  hematology)    I have personally reviewed the examination and initial interpretation  and I agree with the findings.    LUTHER PEREZ MD         SYSTEM ID:  [id number]   CT Abdomen Pelvis w Contrast     Status: None    Narrative    Abdomen pelvis CT with contrast indication: Known metastatic renal  cell carcinoma. Increasing lateral quadrant pain.    COMPARISON: Outside CT 8/23/2022    Contrast: 119 mL intravenous Isovue-370    FINDINGS: Multiple sub-6 mm pulmonary nodules are present bilaterally  in the lower lungs similar previous and again concerning for  metastatic disease. The largest nodule is posteriorly located at the  right lung base measuring 9 mm, previously 8 mm. Subsegmental  atelectasis in the lingula.  Within the abdomen and pelvis, the liver, spleen coronal pancreas  Gallbladder appear grossly unremarkable. Left radical nephrectomy.  Multiple heterogeneous right renal masses appears similar. Right  adrenal probable metastatic focus measures 7.1 cm in greatest  dimension previously 5.2 cm. Right renal vein  appears grossly  unremarkable. Residual medial aspect of the left renal vein appears  unremarkable. No free fluid. No free air. Probable prominent  hydroceles noted in the scrotum partially imaged. No enlarged inguinal  common iliac chain, other deep pelvic no retroperitoneal or mesenteric  lymph nodes.  IVC appears unremarkable but is effaced by the right adrenal mass. No  inflammatory changes. There is some fluid filled pelvic small bowel  suggestive of enteritis.  Bone windows reason normal mineralization throughout with degenerative  spurring throughout the lower thoracic and upper lumbar spine regions.  Minimal gentle mid lumbar centered levocurvature is present. No  suspicious new sclerotic or lytic/destructive lesions. Unchanged small  sclerotic focus in the right iliac bone just lateral to the sacroiliac  joint in other sclerotic focus in the left iliac bone also unchanged.  Significance of these is uncertain.      Impression    IMPRESSION:  1. Continued appearance of left radical nephrectomy. No local  recurrence.  2. Unchanged multiple right renal masses.  3. Interval growth of right adrenal likely metastasis.  4. Mostly stable apparent metastatic pulmonary nodules although the  wire just nodule has grown from 8 mm to 9 mm in approximately 4 weeks.  5. Small bowel enteritis without evidence of acute infectious focus.  6. No discrete new bone lesions.    LUTHER PEREZ MD         SYSTEM ID:  [id number]   Comprehensive metabolic panel     Status: Abnormal   Result Value Ref Range    Sodium 136 136 - 145 mmol/L    Potassium 3.9 3.4 - 5.3 mmol/L    Chloride 102 98 - 107 mmol/L    Carbon Dioxide (CO2) 23 22 - 29 mmol/L    Anion Gap 11 7 - 15 mmol/L    Urea Nitrogen 30.1 (H) 6.0 - 20.0 mg/dL    Creatinine 1.55 (H) 0.67 - 1.17 mg/dL    Calcium 8.7 8.6 - 10.0 mg/dL    Glucose 125 (H) 70 - 99 mg/dL    Alkaline Phosphatase 53 40 - 129 U/L    AST 17 10 - 50 U/L    ALT 27 10 - 50 U/L    Protein Total 5.8 (L) 6.4 -  8.3 g/dL    Albumin 3.3 (L) 3.5 - 5.2 g/dL    Bilirubin Total 0.3 <=1.2 mg/dL    GFR Estimate 54 (L) >60 mL/min/1.73m2   INR     Status: Normal   Result Value Ref Range    INR 0.94 0.85 - 1.15   Lactic acid whole blood     Status: Normal   Result Value Ref Range    Lactic Acid 1.1 0.7 - 2.0 mmol/L   UA with Microscopic reflex to Culture     Status: Abnormal    Specimen: Urine, Midstream   Result Value Ref Range    Color Urine Yellow Colorless, Straw, Light Yellow, Yellow    Appearance Urine Clear Clear    Glucose Urine Negative Negative mg/dL    Bilirubin Urine Negative Negative    Ketones Urine Negative Negative mg/dL    Specific Gravity Urine 1.037 (H) 1.003 - 1.035    Blood Urine Trace (A) Negative    pH Urine 5.5 5.0 - 7.0    Protein Albumin Urine 20  (A) Negative mg/dL    Urobilinogen Urine Normal Normal, 2.0 mg/dL    Nitrite Urine Negative Negative    Leukocyte Esterase Urine Negative Negative    Bacteria Urine Few (A) None Seen /HPF    RBC Urine 22 (H) <=2 /HPF    WBC Urine 4 <=5 /HPF    Squamous Epithelials Urine <1 <=1 /HPF    Narrative    Urine Culture not indicated   Asymptomatic COVID-19 Virus (Coronavirus) by PCR Nasopharyngeal     Status: Normal    Specimen: Nasopharyngeal; Swab   Result Value Ref Range    SARS CoV2 PCR Negative Negative    Narrative    Testing was performed using the Xpert Xpress SARS-CoV-2 Assay on the  Cepheid Gene-Xpert Instrument Systems. Additional information about  this Emergency Use Authorization (EUA) assay can be found via the Lab  Guide. This test should be ordered for the detection of SARS-CoV-2 in  individuals who meet SARS-CoV-2 clinical and/or epidemiological  criteria. Test performance is unknown in asymptomatic patients. This  test is for in vitro diagnostic use under the FDA EUA for  laboratories certified under CLIA to perform high complexity testing.  This test has not been FDA cleared or approved. A negative result  does not rule out the presence of PCR inhibitors  in the specimen or  target RNA in concentration below the limit of detection for the  assay. The possibility of a false negative should be considered if  the patient's recent exposure or clinical presentation suggests  COVID-19. This test was validated by the [facility]. This laboratory is certified under  the Clinical Laboratory Improvement Amendments of 1988 (CLIA-88) as  qualified to perform high complexity laboratory testing.     CBC with platelets and differential     Status: Abnormal   Result Value Ref Range    WBC Count 7.9 4.0 - 11.0 10e3/uL    RBC Count 4.38 (L) 4.40 - 5.90 10e6/uL    Hemoglobin 12.1 (L) 13.3 - 17.7 g/dL    Hematocrit 37.0 (L) 40.0 - 53.0 %    MCV 85 78 - 100 fL    MCH 27.6 26.5 - 33.0 pg    MCHC 32.7 31.5 - 36.5 g/dL    RDW 19.3 (H) 10.0 - 15.0 %    Platelet Count 136 (L) 150 - 450 10e3/uL   Creatinine POCT     Status: Abnormal   Result Value Ref Range    Creatinine POCT 1.5 (H) 0.7 - 1.3 mg/dL    GFR, ESTIMATED POCT 56 (L) >60 mL/min/1.73m2   Manual Differential     Status: Abnormal   Result Value Ref Range    % Neutrophils 60 %    % Lymphocytes 21 %    % Monocytes 9 %    % Eosinophils 0 %    % Basophils 0 %    % Metamyelocytes 3 %    % Myelocytes 7 %    Absolute Neutrophils 4.7 1.6 - 8.3 10e3/uL    Absolute Lymphocytes 1.7 0.8 - 5.3 10e3/uL    Absolute Monocytes 0.7 0.0 - 1.3 10e3/uL    Absolute Eosinophils 0.0 0.0 - 0.7 10e3/uL    Absolute Basophils 0.0 0.0 - 0.2 10e3/uL    Absolute Metamyelocytes 0.2 (H) <=0.0 10e3/uL    Absolute Myelocytes 0.6 (H) <=0.0 10e3/uL    RBC Morphology Confirmed RBC Indices     Platelet Assessment  Automated Count Confirmed. Platelet morphology is normal.     Automated Count Confirmed. Platelet morphology is normal.   Troponin T, High Sensitivity     Status: Normal   Result Value Ref Range    Troponin T, High Sensitivity 20 <=22 ng/L   Nt probnp inpatient     Status: Normal   Result Value Ref Range    N  terminal Pro BNP Inpatient 218 0 - 900 pg/mL   EKG 12 lead     Status: None (Preliminary result)   Result Value Ref Range    Systolic Blood Pressure  mmHg    Diastolic Blood Pressure  mmHg    Ventricular Rate 124 BPM    Atrial Rate 124 BPM    UT Interval 156 ms    QRS Duration 78 ms     ms    QTc 410 ms    P Axis 49 degrees    R AXIS 39 degrees    T Axis 2 degrees    Interpretation ECG Sinus tachycardia  Otherwise normal ECG      CBC with platelets differential     Status: Abnormal    Narrative    The following orders were created for panel order CBC with platelets differential.  Procedure                               Abnormality         Status                     ---------                               -----------         ------                     CBC with platelets and d...[[id number]]  Abnormal            Final result               Manual Differential[[id number]]          Abnormal            Final result                 Please view results for these tests on the individual orders.     Medications   albuterol (PROVENTIL HFA/VENTOLIN HFA) inhaler (has no administration in time range)   amLODIPine (NORVASC) tablet 10 mg (has no administration in time range)   calcium carbonate (TUMS) chewable tablet 500 mg (has no administration in time range)   calcium-vitamin D (OSCAL) 250-125 MG-UNIT per tablet 1 tablet (has no administration in time range)   cetirizine (zyrTEC) tablet 10 mg (has no administration in time range)   cyclobenzaprine (FLEXERIL) tablet 10 mg (has no administration in time range)   ergocalciferol capsule 50,000 Units (has no administration in time range)   gabapentin (NEURONTIN) capsule 300 mg (has no administration in time range)   hypromellose-dextran (ARTIFICAL TEARS) 0.1-0.3 % ophthalmic solution 1 drop (has no administration in time range)   loperamide (IMODIUM) capsule 2 mg (has no administration in time range)   magic mouthwash suspension (diphenhydramine, lidocaine, aluminum-magnesium &  simethicone) (has no administration in time range)   nystatin (MYCOSTATIN) suspension 500,000 Units (has no administration in time range)   omeprazole (priLOSEC) CR capsule 20 mg (has no administration in time range)   ondansetron (ZOFRAN ODT) ODT tab 8 mg (has no administration in time range)   oxyCODONE (ROXICODONE) tablet 5 mg (has no administration in time range)   prochlorperazine (COMPAZINE) tablet 10 mg (has no administration in time range)   senna-docusate (SENOKOT-S/PERICOLACE) 8.6-50 MG per tablet 1 tablet (has no administration in time range)   sennosides (SENOKOT) tablet 1 tablet (has no administration in time range)   tamsulosin (FLOMAX) capsule 0.4 mg (has no administration in time range)   traMADol (ULTRAM) tablet 50 mg (has no administration in time range)   dextrose 5% and 0.45% NaCl infusion (has no administration in time range)   HYDROmorphone (PF) (DILAUDID) injection 0.5 mg (0.5 mg Intravenous Given 9/25/22 2115)   0.9% sodium chloride BOLUS (1,000 mLs Intravenous New Bag 9/25/22 2245)   iopamidol (ISOVUE-370) solution 119 mL (119 mLs Intravenous Given 9/25/22 2137)   sodium chloride (PF) 0.9% PF flush 90 mL (90 mLs Intravenous Given 9/25/22 2137)   HYDROmorphone (PF) (DILAUDID) injection 0.5 mg (0.5 mg Intravenous Given 9/25/22 2334)   gadobutrol (GADAVIST) injection 10 mL (8.5 mLs Intravenous Given 9/26/22 0023)        Assessments & Plan (with Medical Decision Making)   Patient presents to the emergency department today with concern for increased headache.  Patient has a known history of metastatic renal cell carcinoma with known dural mets, and this is certainly very concerning.  Additionally he is reporting chest pain and abdominal pain with known metastatic disease inside both the abdomen and the chest.    On exam he does have a right lateral rectus palsy which does appear to be baseline for him.  Need to consider the possibility of worsening intracranial process/mets, however other  etiologies could include swelling from recent radiation, and recent down tapering of his Decadron taper.    With regard to his chest discomfort, he is high risk for thromboembolic disease this certainly needs to be considered.  With regard to his abdominal discomfort, he has known intra-abdominal mets and worsening disease burden could be responsible for his symptoms but also need to consider other etiologies of abdominal discomfort including intra-abdominal infection, abscess, diverticulitis, appendicitis, constipation, bowel obstruction, amongst many others.    EKG demonstrates  sinus tachycardia with a rate of 124.  I do not see any sign of ectopy or ischemia. CBC shows normal WBC, hgb slightly low at 12.1, platelets 136. Lactate and INR WNL. CMP shows normal electrolytes, creatinine is 1.55, SARS-CoV-2 PCR swab is negative.  Troponin is 20, .    We did do a noncontrast head CT which demonstrates increased dural based brain metastases versus subdural hematoma.  Chest CT shows evidence of multiple segmental pulmonary emboli in both lower lobes.  There is no evidence of right heart strain on CT images.  There is also increased metastatic disease burden in the chest with multiple new and enlarging pulmonary nodules and a new cavitary lesion in the left lung apex.  Increased bilateral hilar lymphadenopathy concerning for metastatic disease.  Abdominal CT shows interval growth of right adrenal mets.    Patient does have multiple segmental PEs and is tachycardic with -125. He is not requiring any supplemental oxygen and is saturating 97% on room air, blood pressure is 119/76.  Ordinarily, anticoagulation would absolutely be appropriate.  Unfortunately he has worsening dural based metastases.  It is unclear the safety of anticoagulating him at this point given this known intracranial disease burden.  I did consult neurosurgery who recommends brain MRI and holding off on all anticoagulation, ideally for at  least 24 hours.  I have ordered the brain MRI.  We will need to admit the patient at this time.  We have provided Dilaudid for pain control.  Spoke to Dr. Redmond, oncology moonlighter about this patient.    I have reviewed the nursing notes. I have reviewed the findings, diagnosis, plan and need for follow up with the patient.    New Prescriptions    No medications on file       Final diagnoses:   Multiple pulmonary emboli (H)   Metastatic renal cell carcinoma, unspecified laterality (H)   I, Yeimy Andrade, am serving as a trained medical scribe to document services personally performed by Stacey Bacon MD, based on the provider's statements to me.      I, Stacey Bacon MD, was physically present and have reviewed and verified the accuracy of this note documented by Yeimy Andrade.     --  Stacey Bacon MD  [facility] EMERGENCY DEPARTMENT  9/25/2022     Stacey Bacon MD  09/26/22 0052

## 2022-09-26 NOTE — ED NOTES
ED Triage Provider Note  [facility]  Encounter Date: Sep 25, 2022    History:  No chief complaint on file.    Julio John is a 52 year old male who presents to the ED with headache. Has hx of metastatic renal CA with brain mets. Getting radiation. Onset of HA Friday afternoon, reaching peak severity today. + nausea. Has right eye CN abnormality that is not new. Feels off balance. On decadron taper, took 2mg yesterday, none today, due to take 1mg tomorrow.     Review of Systems:  GEN: chills, no measured fevers  GI/: right flank discomfort    Exam:  There were no vitals taken for this visit.  General: No acute distress. Appears stated age.   Cardio: Regular rate, extremities well perfused  Resp: Normal work of breathing, grossly normal respiratory rate  Neuro: Alert. CN II-XII grossly intact. Grossly intact strength.     Medical Decision Making:  Patient arriving to the ED with problem as above. A medical screening exam was performed. IV access, CT head, UA orders initiated from Triage. The patient is appropriate to be immediately roomed.      Arabella Shrestha MD on 9/25/2022 at 8:27 PM     Arabella Shrestha MD  09/25/22 2030

## 2022-09-26 NOTE — ASSESSMENT & PLAN NOTE
Intermittent variable and migratory locations secondary to multiple locations of metastatic RCCA to brain, skull base, lungs, ribs.  Prior to admission medications:  Oxycodone, Flexeril, tramadol  Plan:  continue medications as listed above.

## 2022-09-26 NOTE — TELEPHONE ENCOUNTER
RADIATION ONCOLOGY WEEKLY ON TREATMENT VISIT   Encounter Date: 2022    Patient Name: Julio John  MRN: [id number]  : 1970     Disease and Stage: metastatic renal cell carcinoma.    Subjective: Mr. Baron John called the triage nurse line with a complaint of greater than 10/10 headache since 3am. He woke to urinte and as he was voiding the headache came on abruptly. There is associated photophobia and nausea with x2 episodes of vomiting. He has only taken tylenol which has not helped. Given these alarm symptoms I recommended he go straight to the [facility] ED for evaluation.     Assessment:    Mr. Baron John is a 52 year old male with metastatic renal cell carcinoma with brain metastases, treated with GK SRS on 22.    Plan:   1. Recommended patient go to [facility] ED for further neurological evaluation.       Sudhakar Poe MD  PGY-2   Department of Radiation Oncology, [facility]

## 2022-09-26 NOTE — ASSESSMENT & PLAN NOTE
Prior to admission medications:  Lisinopril, amlodipine (Norvasc )  Plan:  continue medications as listed above.

## 2022-09-26 NOTE — ASSESSMENT & PLAN NOTE
Prior to admission medications:  Omeprazole, PRN TUMS  Plan:  continue medications as listed above.

## 2022-09-26 NOTE — PROGRESS NOTES
Patient admitted to: 5C  Admitted from: ED   Arrived by: wheelchair  Reason for admission: multiple PE  Patient accompanied by: self   Belongings:clothing, pair of shoes, home medication,  wallet, phone. Wife will be here tomorrow and will take meds and wallet back home   Teaching: room/unit orientation.   Skin double check completed by: Gerry Bateman

## 2022-09-26 NOTE — PROGRESS NOTES
Taken over care of patient at 2300 9/25/2022. Pt remains A&Ox4, calls appropriately. MRI completed overnight. Dilaudid administered for severe headache, with adequate relief per patient. Pt remains on RA, denies chest pain or dyspnea. Pt is urinal voiding, SBA. Patient is now febrile, low grade temp 100.4F, tachycardic 120-130s. /88   Pulse (!) 129   Temp 100.4  F (38  C) (Oral)   Resp 17   SpO2 98%  Provider paged at 0130 that MRI is completed and further orders are needed. Awaiting blood cultures from lab.   Report given to 5C RNSha.

## 2022-09-26 NOTE — PROVIDER NOTIFICATION
"MD paged, \"ED admit on floor. Can  c. diff order be placed per BMT floor protocol? temp 100.4, no tylenol if wanted to give for fever. \"  "

## 2022-09-27 NOTE — PLAN OF CARE
"Assumed cares 3641-6609. Pt rounded on hourly.     ./74 (BP Location: Left arm)   Pulse 89   Temp 98.1  F (36.7  C) (Oral)   Resp 17   Ht 1.64 m (5' 4.57\")   Wt 88.3 kg (194 lb 11.2 oz)   SpO2 96%   BMI 32.84 kg/m      Pt Aox4, able to make needs known. VSS on RA. Denies headaches, pain, chest pain, SOB, N/V. Up ad paco. PIV SL. Ate well and has good oral intake. Voiding spontaneously and adequately. No replacements needed this AM. Sleeping in between cares.     Plan: continue to follow plan of care and notify MD with changes in condition.     Goal Outcome Evaluation:    Plan of Care Reviewed With: patient     Overall Patient Progress: improving           "

## 2022-09-27 NOTE — PLAN OF CARE
Goal Outcome Evaluation:    Plan of Care Reviewed With: patient     Overall Patient Progress: improving    Outcome Evaluation: patient reported decreased intake over the last month with subsequent weight loss- eating 100% currently

## 2022-09-27 NOTE — PROGRESS NOTES
"CLINICAL NUTRITION SERVICES - ASSESSMENT NOTE     Nutrition Prescription    RECOMMENDATIONS FOR MDs/PROVIDERS TO ORDER:  Will defer to team for nutrition supplementation- patient taking [employer] Brand Calcium Carbonate/Zinc/VitaminD  -had questions regarding additional Vitamin D3. Consider checking vitamin D prior to additional supplementation     Malnutrition Status:    Moderate malnutrition in the context of chronic illness     Recommendations already ordered by Registered Dietitian (RD):  None at this time     Future/Additional Recommendations:  Continue to monitor ability to consume PO intake and maintain weight   Monitor mouth sores/healing   Monitor need for additional nutrition education/nutrition support      REASON FOR ASSESSMENT  Julio John is a/an 52 year old male assessed by the dietitian for Admission Nutrition Risk Screen for positive (weight loss 2-13 lbs and poor appetite)     CLINICAL HISTORY   underlying metastatic renal cell carcinoma (RCCA) to brain and lung, presents with pulmonary emboli and symptom(s) associated with that along with his chronic symptom(s) associated with renal cell carcinoma (RCCA) metastasis(ses)  -Left nephrectomy (2016)    NUTRITION HISTORY  Julio declined to speak with ipad  today. He reported that over the last month or so he has decreased intake of pork as well as carbohydrates (minimizing tortillas, rice, breads), sugar from soda due to \"sugar feeding cancer\" and has been focusing on chicken, fish, fruits and vegetables. He has been drinking alkaline water and coconut water. He noted that he has lost weight from 240 lbs over the past few years and confirmed 10 lb weight loss in the past month, with weight loss has noted reduced muscle in legs (not upper body).   Reported he has an ok appetite, thought he was eating 50% of normal. Eating 100% of meals in the hospital.   He does take Ramirez brand calcium carbonate with zinc and vitamin D. " "    CURRENT NUTRITION ORDERS  Diet: Regular  Intake/Tolerance: 100%    LABS  Na 135 (L), Urea Nitrogen 21 (H), Creatinine 1.1 (WNL), Glucose 178 (H)    MEDICATIONS  Calcium Carbonate  Calcium Carbonate- vitamin D   Decadron   Prilosec  Senokot (Held)   PRN: zofran, compazine    ANTHROPOMETRICS  Height: 164 cm (5' 4.567\")  Most Recent Weight: 88 kg (193 lb 14.4 oz)    IBW: 60.6 kg  BMI: Obesity Grade I BMI 30-34.9  Weight History:   Wt Readings from Last 15 Encounters:   09/27/22 88 kg (193 lb 14.4 oz)   09/12/22 87.5 kg (193 lb)   09/06/22 87.4 kg (192 lb 9.6 oz)   08/31/22 89.6 kg (197 lb 8.5 oz)   08/24/22 92.1 kg (203 lb)   08/18/22 90.7 kg (200 lb)   07/26/17 101.6 kg (223 lb 14.4 oz)   06/30/17 102.6 kg (226 lb 4.8 oz)   06/28/17 104.2 kg (229 lb 12.8 oz)   06/26/17 102.1 kg (225 lb)   06/07/17 100.6 kg (221 lb 12.8 oz)   06/02/17 105.6 kg (232 lb 14.4 oz)   05/31/17 102.2 kg (225 lb 6.4 oz)   05/23/17 100.5 kg (221 lb 9.6 oz)   05/16/17 100.8 kg (222 lb 3.6 oz)   4.4.% weight change in one month     Dosing Weight: 67.4 kg (adjusted based on weight 88 kg and IBW)     ASSESSED NUTRITION NEEDS  Estimated Energy Needs: 0617-5045  kcals/day (25 - 30 kcals/kg)  Justification: Maintenance  Estimated Protein Needs: 74-94 grams protein/day (1.1 - 1.4 grams of pro/kg)  Justification: Increased needs/CKD, protein intake at above range 18% of energy   Estimated Fluid Needs: 4781-0260+ mL/day (1 mL/kcal)   Justification: Maintenance and Per provider pending fluid status    PHYSICAL FINDINGS  Patient showed mouth sores (healing) on bottom lip from prior treatment. Reported up to 20 mouth sores during treatment.     MALNUTRITION  % Intake: < 75% for >/= 1 month (moderate)  % Weight Loss: Weight loss does not meet criteria  Subcutaneous Fat Loss: None observed  Muscle Loss: Upper leg (quadricep, hamstring):  mild  Fluid Accumulation/Edema: None noted  Malnutrition Diagnosis: Moderate malnutrition in the context of chronic " illness     NUTRITION DIAGNOSIS  Unintended weight loss related to nutrition changes (reducing intake of carbohydrates/sugar/meats)  as evidenced by 4.4% weight change in one month       INTERVENTIONS  Implementation  Nutrition Education: RD role in care, encouraged intake of wide variety of foods       Goals  Patient to consume % of nutritionally adequate meal trays TID, or the equivalent with supplements/snacks.     Monitoring/Evaluation  Progress toward goals will be monitored and evaluated per protocol.    Brittnee Lee RD, LD  5C/BMT pager: [phone]

## 2022-09-27 NOTE — PLAN OF CARE
VSS. Afebrile. Alert & oriented x4. Up SBA/IND; able to make all needs known. Reminded patient to call for help if he's feeling dizzy + weak. Adequate intake & output although pt. Not saving urine to properly document. 1 medium, formed BM. C. Diff came back negative. Denies nausea, vomiting, or diarrhea. Endorses a 5-9/10 headache; Oxy & Tylenol given x1. PIV on R forearm is patent + saline locked. Will continue plan of care.    Goal: Absence of Hospital-Acquired Illness or Injury  Outcome: Ongoing, Progressing  Intervention: Identify and Manage Fall Risk  Recent Flowsheet Documentation  Taken 9/26/2022 0800 by Sebastien Chand, RN  Safety Promotion/Fall Prevention:   assistive device/personal items within reach   clutter free environment maintained   fall prevention program maintained   nonskid shoes/slippers when out of bed   patient and family education   room organization consistent   safety round/check completed  Intervention: Prevent Skin Injury  Recent Flowsheet Documentation  Taken 9/26/2022 0800 by Sebastien Chand, RN  Body Position: position changed independently  Intervention: Prevent and Manage VTE (Venous Thromboembolism) Risk  Recent Flowsheet Documentation  Taken 9/26/2022 0800 by Sebastien Chand, RN  VTE Prevention/Management: SCDs (sequential compression devices) off  Activity Management:   activity adjusted per tolerance   ambulated to bathroom  Goal: Optimal Comfort and Wellbeing  Outcome: Ongoing, Progressing  Intervention: Monitor Pain and Promote Comfort  Recent Flowsheet Documentation  Taken 9/26/2022 1503 by Sebastien Chand, RN  Pain Management Interventions: medication (see MAR)  Taken 9/26/2022 1149 by Sebastien Chand, RN  Pain Management Interventions: medication (see MAR)  Goal: Readiness for Transition of Care  Outcome: Ongoing, Progressing     Problem: Pain Acute  Goal: Acceptable Pain Control and Functional Ability  Outcome: Ongoing, Progressing  Intervention: Develop Pain Management  Plan  Recent Flowsheet Documentation  Taken 9/26/2022 1503 by Sebastien Chand, RN  Pain Management Interventions: medication (see MAR)  Taken 9/26/2022 1149 by Sebastien Chand, RN  Pain Management Interventions: medication (see MAR)  Intervention: Prevent or Manage Pain  Recent Flowsheet Documentation  Taken 9/26/2022 0800 by Sebastien Chand, RN  Medication Review/Management: medications reviewed     Problem: Fatigue  Goal: Improved Activity Tolerance  Outcome: Ongoing, Progressing  Intervention: Promote Improved Energy  Recent Flowsheet Documentation  Taken 9/26/2022 0800 by Sebastien Chand, RN  Activity Management:   activity adjusted per tolerance   ambulated to bathroom    Goal Outcome Evaluation:

## 2022-09-28 NOTE — PLAN OF CARE
"Neuro: A&Ox4.   Cardiac: VSS.  Afebrile  /80 (BP Location: Left arm)   Pulse 105   Temp 98.1  F (36.7  C) (Oral)   Resp 18   Ht 1.64 m (5' 4.57\")   Wt 88 kg (193 lb 14.4 oz)   SpO2 98%   BMI 32.70 kg/m    Respiratory: Sating >95% on RA.  GI/: Adequate urine output. No BM/loose stool. Denies N.  Diet/appetite: Tolerating regular diet. Eating well.  Activity:  SBA, ambulated to bathroom.   Pain: Denies headache.   Skin: No new deficits noted. Oral mucous swollen.  LDA's: R PIV    Plan: Continue with POC. Notify primary team with changes.       "

## 2022-09-28 NOTE — PROGRESS NOTES
[facility]    Medicine Progress Note - Hospitalist Service, GOLD TEAM 9    Date of Admission:  9/25/2022    Assessment & Plan            53 Y/O M with metastatic renal cell carcinoma to the brain with mulitple prior chemotherapy and immunotherapy regimes and s/p recent radiation therapy for brain metastasis ( last session on 9/13) currently admitted for headache and was found to have PE       # Worsening headaches in the setting of  # Established brains mets from Renal cell carcinoma stage IV with recent radiation of the brain  - Recent 5 cycles of gamma knife last session 09/13. S/p steroid taper. Imaging shows stable lesions. Pt has abducent nerve palsy well documented on prior clinical visits. Had multiple chemo and immunotherapy in the past.    Plan:  - Onc consulted, appreciate recs  - Continue dexamethasone 2 mg BID  - Continue pain meds as below  - NSGY consulted and nothing surgical from their end  - Patient is planned to start Tivozanib in his next oncology visit for his underlying metastatic RCC  - Follow up with oncologist outpt Josef Schmitz     # B/L segmental PE: Acute Non provoked without RV strain   HDS with no O2 requirement. NSGY consulted of ok to start and ok from their end. Doppler USG of all extremities negative.    Plan:  - Continue apixaban 5 mg BID (skipped loading doses per onc's recs)    # Essential hypertension  - Continue amlodipine and hold ACE for now      # GERD   - Continue PTA Omeprazole, PRN TUMS    # N&V (nausea and vomiting)  # Chronic Diarrhea  - Continue PTA loperamide  - Continue PTA Prochlorperazine     # Chronic pain  Intermittent variable and migratory locations secondary to multiple locations of metastatic RCCA to brain, skull base, lungs, ribs.  -Continue PTA  Oxycodone, Flexeril, tramadol     # Anemia Acute  - Could be dilutional. IF persists can follow up outpt for work up        # LEO on CKD stage 2: Resolving  Unclear  "etiology. Baseline Cr 0.99 could be pre renal s/p fluids  - CTM    # Hyponatremia:   - Recheck BMP tomorrow AM  - Nutrition following    # Hypoalbuminemia: Albumin = 3.3 g/dL (Ref range: 3.5 - 5.2 g/dL) on admission, will monitor as appropriate    # Thrombocytopenia: Plts = 118 10e3/uL (Ref range: 150 - 450 10e3/uL) on admission, will monitor for bleeding  # Obesity: Estimated body mass index is 32.84 kg/m  as calculated from the following:    Height as of this encounter: 1.64 m (5' 4.57\").    Weight as of this encounter: 88.3 kg (194 lb 11.2 oz).            Diet: Regular Diet Adult    DVT Prophylaxis: DOAC  Randall Catheter: Not present  Central Lines: None  Cardiac Monitoring: None  Code Status: Full Code      Disposition Plan      Expected Discharge Date: 09/28/2022,  9:00 AM      Discharge Comments: Ensure improvement in headache  Monitor for bleeding in setting of starting anticoagulation        The patient's care was discussed with the Bedside Nurse and Patient.    Romy Ponce MD  Hospitalist Service, [address]  Securely message with the Vocera Web Console (learn more here)  Text page via [facility] Paging/Directory   Please see signed in provider for up to date coverage information      Clinically Significant Risk Factors Present on Admission               # Obesity: Estimated body mass index is 32.7 kg/m  as calculated from the following:    Height as of this encounter: 1.64 m (5' 4.57\").    Weight as of this encounter: 88 kg (193 lb 14.4 oz).    # Moderate Malnutrition: based on nutrition assessment    ______________________________________________________________________    Interval History   Patient reports headache is improved with dexamethasone. He reports some runny nose overnight - now resolved. He also reports his right eye feels irritated - he has had issues with dry eye in the past. He reports eating well without nausea/vomiting. Denies " bleeding. No weakness or other neuro changes.     Data reviewed today: I reviewed all medications, new labs and imaging results over the last 24 hours.    Physical Exam   Vital Signs: Temp: 98.1  F (36.7  C) Temp src: Oral BP: 118/80 Pulse: 105   Resp: 18 SpO2: 98 % O2 Device: None (Room air)    Weight: 193 lbs 14.4 oz  Physical Exam  HENT:      Head: Normocephalic and atraumatic.      Right Ear: External ear normal.      Left Ear: External ear normal.      Mouth/Throat:      Mouth: Mucous membranes are moist.   Eyes:      Conjunctiva/sclera: Conjunctivae normal.      Comments: Right eyelid ptosis and right eye turned in (unchanged)   Cardiovascular:      Rate and Rhythm: Normal rate and regular rhythm.      Heart sounds: Normal heart sounds.   Pulmonary:      Effort: Pulmonary effort is normal.      Breath sounds: Normal breath sounds.   Abdominal:      General: Bowel sounds are normal. There is no distension.      Palpations: Abdomen is soft.      Tenderness: There is no abdominal tenderness.   Musculoskeletal:         General: Normal range of motion.      Right lower leg: No edema.      Left lower leg: No edema.   Skin:     General: Skin is warm and dry.      Capillary Refill: Capillary refill takes less than 2 seconds.      Findings: No rash.   Neurological:      Mental Status: He is alert.      Comments: No focal deficit except for right eye findings

## 2022-09-28 NOTE — PLAN OF CARE
Goal Outcome Evaluation:  VSS,A&Ox4 up indep.denied pain and nausea ,regular diet ,good appetite.LS clear,BS+,reported x1 BM and adequate urinary output.PIV taken out ,discharge instructions reviewed with pt., he verbalized understanding.Pt will  medication from discharge pharmacy.Pt discharge home in stable condition with his wife.

## 2022-09-28 NOTE — PLAN OF CARE
"Assumed cares 7702-4118. Pt rounded on hourly.     ./63 (BP Location: Left arm)   Pulse 90   Temp 97.8  F (36.6  C) (Oral)   Resp 17   Ht 1.64 m (5' 4.57\")   Wt 88 kg (193 lb 14.4 oz)   SpO2 99%   BMI 32.70 kg/m      Pt Aox4, able to make needs known. VSS on RA. Endorses 3/10 headache, but declined medications. Endorsed some R eye pain PRN eye drops administered with relief. PIV SL. Denies chest pains, SOB, and N/V. Up ad paco. Voiding spontaneously and adequately. Sleeping in between cares.     Plan: per MD note, discharge today. Continue to follow plan of care and notify MD with changes in condition.     Goal Outcome Evaluation:    Plan of Care Reviewed With: patient     Overall Patient Progress: improving           "

## 2022-09-29 NOTE — DISCHARGE SUMMARY
[facility]  Hospitalist Discharge Summary      Date of Admission:  9/25/2022  Date of Discharge:  9/28/2022  1:02 PM  Discharging Provider: Romy Ponce MD  Discharge Service: Hospitalist Service, GOLD TEAM 9    Discharge Diagnoses   Acute bilateral segmental PE  Worsening headaches in setting of established brains mets from Renal cell carcinoma stage IV with recent radiation of the brain (resolved)    Follow-ups Needed After Discharge   Follow-up Appointments     Follow Up and recommended labs and tests      Follow up with primary care provider, Physician No Ref-Primary, within 7   days for hospital follow- up.     Follow up with your oncology doctor on 10/6/2022 as scheduled.             Unresulted Labs Ordered in the Past 30 Days of this Admission     No orders found from 8/26/2022 to 9/26/2022.          Discharge Disposition   Discharged to home  Condition at discharge: Stable    Hospital Course              51 Y/O M with metastatic renal cell carcinoma to the brain with mulitple prior chemotherapy and immunotherapy regimes and s/p recent radiation therapy for brain metastasis ( last session on 9/13) currently admitted for headache and was found to have PE       # Worsening headaches in the setting of established brains mets from Renal cell carcinoma stage IV with recent radiation of the brain (resolved)  - Recent 5 cycles of gamma knife last session 09/13. S/p steroid taper. Imaging shows stable lesions. Pt has abducent nerve palsy well documented on prior clinical visits. Had multiple chemo and immunotherapy in the past. During hospitalization, oncology consulted. NSGY consulted and determined no surgical intervention warranted. Started on dexamethasone 2 mg BID with improvement in headaches per oncology. Patient was discharged home on dexamethasone 2 mg BID to follow up with outpatient oncologist to determine total duration of treatment. Other outpatient  pain medications were continued.    # B/L segmental PE: Acute Non provoked without RV strain   HDS with no O2 requirement. NSGY consulted and were ok with starting anticoagulation. Discussed case with oncology, who agreed with anticoagulation but recommended Eliquis 5 mg BID (skip loading doses) due to brain mets/increased risk of bleeding. Patient was monitored for 48 hrs after initiation for bleeding and remained stable. He was discharged to continue Eliquis 5 mg BID.  Doppler USG of all extremities negative for DVT.     # Essential hypertension  Continued amlodipine and held ACE due to LEO at presentation. BP remained stable on amlodipine only during hospitalization. Patient was advised to monitor BP outpatient and discuss with PCP/oncologist when to restart ACE.      # GERD    Continued PTA Omeprazole, PRN TUMS    # N&V (nausea and vomiting)  # Chronic Diarrhea  - Continued PTA loperamide  - Continued PTA Prochlorperazine     # Chronic pain  Intermittent variable and migratory locations secondary to multiple locations of metastatic RCCA to brain, skull base, lungs, ribs.  -Continued PTA  Oxycodone, Flexeril, tramadol     # Anemia Acute  - Hgb trended 10-12 during admission, recommend outpatient workup        # LEO on CKD stage 2: Resolved  Baseline Cr appears to be around 1 to 1.1. Cr returned to baseline prior to discharge.    Consultations This Hospital Stay   MEDICATION HISTORY IP PHARMACY CONSULT  ONCOLOGY ADULT IP CONSULT    Code Status   Prior    Time Spent on this Encounter   IRomy MD, personally saw the patient today and spent greater than 30 minutes discharging this patient.       Romy Ponce MD  [facility] [address] [place] [id number]  Phone: [phone]  Fax: [phone]  ______________________________________________________________________    Physical Exam   Vital Signs: Temp: (!) 96.4  F (35.8  C) Temp src: Axillary BP:  (!) 137/92 Pulse: 99   Resp: 18 SpO2: 96 % O2 Device: None (Room air)    Weight: 192 lbs 8 oz  Physical Exam  HENT:      Head: Normocephalic and atraumatic.      Right Ear: External ear normal.      Left Ear: External ear normal.      Mouth/Throat:      Mouth: Mucous membranes are moist.   Eyes:      Conjunctiva/sclera: Conjunctivae normal.      Comments: Right eyelid ptosis and right eye turned in (unchanged)   Cardiovascular:      Rate and Rhythm: Normal rate and regular rhythm.      Heart sounds: Normal heart sounds.   Pulmonary:      Effort: Pulmonary effort is normal.      Breath sounds: Normal breath sounds.   Abdominal:      General: Bowel sounds are normal. There is no distension.      Palpations: Abdomen is soft.      Tenderness: There is no abdominal tenderness.   Musculoskeletal:         General: Normal range of motion.      Right lower leg: No edema.      Left lower leg: No edema.   Skin:     General: Skin is warm and dry.      Capillary Refill: Capillary refill takes less than 2 seconds.      Findings: No rash.   Neurological:      Mental Status: He is alert.      Comments: No focal deficit except for right eye findings            Primary Care Physician   Physician No Ref-Primary    Discharge Orders      Reason for your hospital stay    Dear Julio John    Your were hospitalized at [facility] with headache and treated with steroids. You were also found to have pulmonary emboli (blood clots in the lungs).  Over your hospitalization your headache improved and today you are ready to be discharged.  If you continue therapy you should continue to improve but if you develop fever, shortness of breath, light headedness, chest pain, or worsening headache please seek medical attention.    We are suggesting the following medication changes:  - Continue Eliquis (blood thinner) 5 mg two times per day.  - Continue the decadron 2 mg two times per day until your oncologist (cancer  doctor) tells you to stop.   - Continue the amlodipine 10 mg every day for your high blood pressure, but don't restart the lisinopril until your doctor tells you to.  - Take your blood pressure every day and call your doctor if the top number is 120 or greater to see if you should restart your lisinopril.    Please go to your appointment with:  - Oncology on 10/6/2022    It was a pleasure meeting with you today. Thank you for allowing me and my team the privilege of caring for you today. You are the reason we are here, and I truly hope we provided you with the excellent service you deserve. Please let us know if there is anything else we can do for you so that we can be sure you are leaving completely satisfied with your care experience.    Your hospital unit at the time of discharge is 7C so if you have any questions please call the hospital at [phone] and ask to talk to a nurse on 7C.    Take care!  Romy Ponce MD  Department of Medicine  [facility]     Activity    Your activity upon discharge: activity as tolerated     Follow Up and recommended labs and tests    Follow up with primary care provider, Physician No Ref-Primary, within 7 days for hospital follow- up.     Follow up with your oncology doctor on 10/6/2022 as scheduled.     Diet    Follow this diet upon discharge: Regular Diet Adult       Significant Results and Procedures   Results for orders placed or performed during the hospital encounter of 09/25/22   Head CT w/o contrast     Value    Radiologist flags (Urgent)     Stable dural based brain metastasis versus subdural    Narrative    EXAM: CT HEAD W/O CONTRAST  9/25/2022 9:54 PM     HISTORY:  HA       Additional information from EMR: 52 year old male?with a past medical  history significant for?metastatic renal cell carcinoma, previously  receiving chemo, now getting a course of?radiation therapy for dural  based metastases?who presents to the Emergency Department  for  evaluation of?headache. Patient states he is experiencing headache  which started on Friday afternoon?(2 days ago).?It is located in the  bilateral temporal lobes but worse on the right. He does have  associated nausea but has not vomited. ?Today when he was ambulating  to the bathroom he felt dizzy/unbalanced. ?He has not fallen. ?Patient  does have some baseline visual disturbance (baseline right sided  lateral rectus palsy) which impacts his vision, but does not believe  that there is any change.   ?    COMPARISON:  Brain MRI 8/31/2022, outside head CT 7/27/2022    TECHNIQUE: Using multidetector thin collimation helical acquisition  technique, axial, coronal and sagittal CT images from the skull base  to the vertex were obtained without intravenous contrast.   (topogram) image(s) also obtained and reviewed.    FINDINGS:  Extra-axial hyperdense thickening along the lateral right temporal  lobe with a maximum thickness of 4 mm resulting in mild mass effect  with sulcal effacement. This corresponds with the area of dural-based  enhancement on the comparison brain MRI, however does appear increased  in size since the comparison head CT on 7/27/2022. No midline shift.  No acute loss of gray-white matter differentiation in the cerebral  hemispheres. Ventricles are proportionate to the cerebral sulci.     There is asymmetric right cavernous sinus fullness and soft tissue  thickening extending along the retroclival region with maximum  thickness of 4.6 mm. There are erosive changes of the right side of  the clivus and there is similar soft tissue density to the retroclival  lesion appear to be extending to the sphenoid sinus. These changes are  better seen on the previous MRI. The extent of the retroclival lesion  is not progressed since 8/31/2022 dated MRI.     Bilateral mastoid air cells are clear. The rest of the paranasal  sinuses are clear.       Impression    IMPRESSION:   Overall findings are favored to  represent increased dural based  metastasis along the right temporal lobe better seen on MRI. No  progression in size of this lesion since radiation planning MRI  allowing the differences in technique.   Additional known metastatic lesions in the retroclival region, in the  right cavernous sinus, within the sphenoid sinus and in the sella.  These are better seen on recent MRI.    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     Dr. Bacon was contacted by Dr. Jefferson at 9/25/2022 10:14 PM and  verbalized understanding of the urgent finding.     Findings were also discussed via the telephone with Dr. Batista by  Dr. Jefferson on 9/25/2022 at approximately 2300 hours.    I have personally reviewed the examination and initial interpretation  and I agree with the findings.    APPLE GIBBS MD         SYSTEM ID:  [id number]   CT Chest Pulmonary Embolism w Contrast     Value    Radiologist flags Segmental and subsegmental pulmonary emboli (Urgent)    Narrative    EXAMINATION: CTA pulmonary angiogram, 9/25/2022 9:54 PM     COMPARISON: Outside chest CT 8/29/2022 and chest abdomen pelvis CT  8/23/2022    HISTORY: Known metastatic RCC, now shortness of breath, evaluate for  pulmonary embolism/increasing tumor burden/acute change    TECHNIQUE: Volumetric helical acquisition of CT images of the chest  from the lung apices to the kidneys were acquired after the  administration of 119 mL of Isovue-370 IV contrast. Flash technique  with free breathing acquisition.  Post-processed multiplanar and/or  MIP reformations were obtained, archived to PACS and used in  interpretation of this study.     FINDINGS:  Contrast bolus is: suboptimal.  Exam is positive for acute  pulmonary embolism. There are multiple scattered segmental an  subsegmental filling defects within bilateral lower lobes.    The largest right ventricle  transaxial diameter is (measured from  endocardium to endocardium): 4.3 cm   The largest left ventricle transaxial diameter is (measured from  endocardium to endocardium): 5.6 cm  RV/LV ratio is: 0.76 (if ratio greater than 1.1 then sign is  suspicious for right heart strain)  Reflux of contrast into the IVC? no  Paradoxical bowing of the interventricular septum to the left? no  Pericardial effusion?:not present    Heart size is within normal limits. Ascending aorta and main pulmonary  artery diameters are within normal limits. Bilateral hilar  lymphadenopathy for example at the left hilum measuring 2.6 x 2.4 cm,  previously 1.6 x 2.3 cm. No suspicious axillary lymph nodes.    The trachea and central airways are patent. No pneumothorax or pleural  effusion. Increased number of numerous solid pulmonary nodules  including a cavitary nodule in the left lung apex which measures 2.1 x  2.2 cm with multiple adjacent smaller satellite solid and groundglass  nodules. Increased size of a peribronchovascular nodule in the lingula  measuring 1.4 x 1.4 cm (series 10, image 107) previously 0.9 x 1.1 cm.  Increased size of the peribronchial vascular solid nodule in the right  lower lobe measuring 1.8 x 2.2 cm (series 10, image 149).    Patulous and fluid-filled esophagus. Thyroid is within normal limits.    Partially visualized heterogeneously enhancing right adrenal mass.  Please see the same day abdomen and pelvis CT for additional findings  regarding the abdomen and pelvis. Right kidney incompletely imaged.    Unchanged sclerotic foci in the posterior aspect of the right seventh  rib.. Scattered degenerative changes of the spine.      Impression    IMPRESSION:   1. Exam is positive for acute pulmonary embolism. Multiple segmental  pulmonary emboli in both lower lobes.      Evidence for right heart strain or increased right heart pressures?   is not present.     2. Increased metastatic disease burden in the chest with multiple  new  and enlarging pulmonary nodules including a new cavitary lesion in the  left lung apex.  3. Increased bilateral hilar lymphadenopathy concerning for metastatic  disease.  4. Unchanged sclerotic foci posterior aspect right seventh rib.    [Urgent Result: Segmental and subsegmental pulmonary emboli]    Finding was identified on 9/25/2022 10:00 PM.     Dr. Bacon was contacted by Dr. Jefferson at 9/25/2022 10:16 PM and  verbalized understanding of the urgent finding.     In the event of a positive result for acute pulmonary embolism  resulting in right heart strain, consider calling the   [facility] patient placement ([phone]) for PERT (Pulmonary Embolism  Response Team) Activation?    PERT -- Pulmonary Embolism Response Team (Multidisciplinary team  including cardiology, interventional radiology, critical care,  hematology)    I have personally reviewed the examination and initial interpretation  and I agree with the findings.    LUTHER PEREZ MD         SYSTEM ID:  [id number]   CT Abdomen Pelvis w Contrast    Narrative    Abdomen pelvis CT with contrast indication: Known metastatic renal  cell carcinoma. Increasing lateral quadrant pain.    COMPARISON: Outside CT 8/23/2022    Contrast: 119 mL intravenous Isovue-370    FINDINGS: Multiple sub-6 mm pulmonary nodules are present bilaterally  in the lower lungs similar previous and again concerning for  metastatic disease. The largest nodule is posteriorly located at the  right lung base measuring 9 mm, previously 8 mm. Subsegmental  atelectasis in the lingula.  Within the abdomen and pelvis, the liver, spleen coronal pancreas  Gallbladder appear grossly unremarkable. Left radical nephrectomy.  Multiple heterogeneous right renal masses appears similar. Right  adrenal probable metastatic focus measures 7.1 cm in greatest  dimension previously 5.2 cm. Right renal vein appears grossly  unremarkable. Residual medial aspect of the left renal vein appears  unremarkable.  No free fluid. No free air. Probable prominent  hydroceles noted in the scrotum partially imaged. No enlarged inguinal  common iliac chain, other deep pelvic no retroperitoneal or mesenteric  lymph nodes.  IVC appears unremarkable but is effaced by the right adrenal mass. No  inflammatory changes. There is some fluid filled pelvic small bowel  suggestive of enteritis.  Bone windows reason normal mineralization throughout with degenerative  spurring throughout the lower thoracic and upper lumbar spine regions.  Minimal gentle mid lumbar centered levocurvature is present. No  suspicious new sclerotic or lytic/destructive lesions. Unchanged small  sclerotic focus in the right iliac bone just lateral to the sacroiliac  joint in other sclerotic focus in the left iliac bone also unchanged.  Significance of these is uncertain.      Impression    IMPRESSION:  1. Continued appearance of left radical nephrectomy. No local  recurrence.  2. Unchanged multiple right renal masses.  3. Interval growth of right adrenal likely metastasis.  4. Mostly stable apparent metastatic pulmonary nodules although the  wire just nodule has grown from 8 mm to 9 mm in approximately 4 weeks.  5. Small bowel enteritis without evidence of acute infectious focus.  6. No discrete new bone lesions.    LUTHER PEREZ MD         SYSTEM ID:  [id number]   MR Brain w/o & w Contrast    Narrative    EXAM: MR BRAIN W/O and W CONTRAST  LOCATION: [facility]  DATE/TIME: 9/26/2022 12:56 AM    INDICATION: known metastatic RCC, concern for worsening brain lesions with headache.  COMPARISON: 08/31/2022.  CONTRAST: 8.5mL gadavist  TECHNIQUE: MRI brain without and with contrast.    FINDINGS: On the diffusion-weighted images there is no evidence of acute ischemia or restricted diffusion. There is no evidence of a midline shift. There are appropriate flow voids within the cavernous portions of the internal carotid arteries  and the   basilar artery. Again visualized is the dural thickening and enhancement of the right lateral temporal region with extension into the right temporal bone and scalp. There continues to be enhancement into the sulci of the right temporal lobe at this level   which was seen previously. There is no significant edema in the adjacent brain parenchyma. Also again visualized is the enhancing mass extending along the posterior clivus into the prepontine cistern along with the suprasellar cistern and anteriorly to   the sella turcica and the sphenoid sinuses right greater than left. There is also extension into the right cavernous sinus region and the right side of the nasopharynx. This is stable in the interval. There is no narrowing of the cavernous portion of the   right internal carotid artery. No new area of enhancement or edema is visualized. There is no significant abnormal signal noted within the brain parenchyma. The ventricular system, basal cisterns and the cortical sulci are within normal limits for the   patient's age.    There is no evidence of cerebellar tonsillar ectopia. Corpus callosum is within normal limits for age. The orbit regions are unremarkable. There is mass involving the sphenoid sinuses with associated air-fluid levels. The mastoid air cells and the middle   ear regions are clear. There is again a cyst involving the superficial portion of the left parotid gland that measures approximately 1.2 cm x 1.4 cm. This was seen previously and has no abnormal enhancement.      Impression    IMPRESSION:  1. Stable right temporal calvarial and epidural metastasis with also involvement of the scalp and slightly within adjacent sulci.  2. Stable metastasis involving clivus, right cavernous sinus, sphenoid sinuses and the nasal pharynx.  3. No new focus of enhancement or edema.  4. No evidence of hemorrhage, midline shift or focal area of acute ischemia.   US Lower Extremity Venous Bilateral Port     Narrative    EXAMINATION: US LOWER EXTREMITY VENOUS DUPLEX BILATERAL PORT   9/26/2022 5:50 PM      CLINICAL HISTORY: Evaluate for DVT    COMPARISON: None        PROCEDURE COMMENTS: Ultrasound was performed of the deep venous system  of the right and left lower extremity using grayscale, color, and  spectral Doppler.    FINDINGS:  The common femoral, greater saphenous origin, femoral, popliteal, and  deep calf veins are visualized and are patent. Venous waveforms are  normal. There is normal response to compression.      Impression    IMPRESSION:.  No deep vein thrombosis in the right or left lower extremity.    I have personally reviewed the examination and initial interpretation  and I agree with the findings.    SMOOTH SAM MD         SYSTEM ID:  [id number]   US Upper Extremity Venous Duplex Bilat    Narrative    EXAMINATION: DOPPLER VENOUS ULTRASOUND OF BILATERAL UPPER EXTREMTIES,  9/26/2022 5:50 PM     COMPARISON: Left upper extremity venous ultrasound 5/3/2017    HISTORY: Evaluate for DVT    TECHNIQUE:  Gray-scale evaluation with compression, spectral flow, and  color Doppler assessment of the deep venous system of both upper  extremities.    FINDINGS:  Normal blood flow and waveforms are demonstrated in the internal  jugular, innominate, subclavian, and axillary veins bilaterally. There  is normal compressibility of the brachial, basilic and cephalic veins  bilaterally.      Impression    IMPRESSION:  No evidence of deep venous thrombosis in either upper extremity.    I have personally reviewed the examination and initial interpretation  and I agree with the findings.    SMOOTH SAM MD         SYSTEM ID:  [id number]       Discharge Medications   Discharge Medication List as of 9/28/2022 12:11 PM      START taking these medications    Details   apixaban ANTICOAGULANT (ELIQUIS) 5 MG tablet Take 1 tablet (5 mg) by mouth 2 times daily, Disp-60 tablet, R-0, E-Prescribe         CONTINUE these medications which  have CHANGED    Details   dexamethasone (DECADRON) 2 MG tablet Take 1 tablet (2 mg) by mouth every 12 hours, Disp-60 tablet, R-0, E-Prescribe         CONTINUE these medications which have NOT CHANGED    Details   acetaminophen (TYLENOL) 325 MG tablet Take 325-650 mg by mouth every 6 hours as needed for mild pain Take 2 tablets once every 6 hours as needed for mild pain, Historical      albuterol (PROAIR HFA/PROVENTIL HFA/VENTOLIN HFA) 108 (90 Base) MCG/ACT inhaler Inhale 1-2 puffs into the lungs every 4 hours as needed for shortness of breath / dyspnea or wheezing, Disp-18 g, R-4, E-PrescribePharmacy may dispense brand covered by insurance (Proair, or proventil or ventolin or generic albuterol inhaler)      amLODIPine (NORVASC) 10 MG tablet Take 10 mg by mouth daily, Historical      calcium carbonate (TUMS) 500 MG chewable tablet Take 1 chew tab by mouth 2 times daily Chew and swallow 1 tablet by mouth twice daily, Historical      calcium-vitamin D (OSCAL) 250-125 MG-UNIT TABS per tablet Take 1 tablet by mouth 2 times daily, Historical      cetirizine (ZYRTEC) 10 MG tablet Take 10 mg by mouth daily, Historical      cyclobenzaprine (FLEXERIL) 5 MG tablet Tale 1-2 tablets (5-10 mg) by mouth 3 times daily as needed for muscle cramps., Historical      ergocalciferol (ERGOCALCIFEROL) 1.25 MG (67646 UT) capsule Take 50,000 Units by mouth, Historical      gabapentin (NEURONTIN) 300 MG capsule Take 300 mg by mouth 3 times daily Take 2 capsules 3 times daily, Historical      hypromellose-dextran (GENTEAL TEARS) 0.1-0.3 % ophthalmic solution 1 drop One drop in right eye 4 times daily, Historical      ketoconazole (NIZORAL) 2 % external shampoo Use 3 times per week. Apply to affected area, leave on for 20 minutes, then rinse.Historical      lisinopril (ZESTRIL) 40 MG tablet Take 40 mg by mouth daily, Historical      loperamide (IMODIUM) 2 MG capsule Take 2 mg by mouth Take 2 capsules at the onset of symptoms, then take 1  capsule as needed for diarrhea. DO NOT take more than 8 capsules in 24 hours., Historical      magic mouthwash suspension, diphenhydrAMINE, lidocaine, aluminum-magnesium & simethicone, (FIRST-MOUTHWASH BLM) compounding kit Swish and swallow 5-10 mLs in mouth every 6 hours as needed, Disp-237 mL, R-1, E-Prescribe      nystatin (MYCOSTATIN) 566996 UNIT/ML suspension Take 5 mLs (500,000 Units) by mouth 4 times dailyDisp-400 mL, [id number] change the size as per availability and patient preference      omeprazole (PRILOSEC) 20 MG DR capsule Take 20 mg by mouth daily, Historical      ondansetron (ZOFRAN ODT) 8 MG ODT tab Take 8 mg by mouth, Historical      oxyCODONE (ROXICODONE) 5 MG tablet Take 5 mg by mouth every 6 hours as needed for severe pain, Historical      prochlorperazine (COMPAZINE) 10 MG tablet Take 10 mg by mouth every 6 hours as needed for nausea, Historical      senna-docusate (SENOKOT-S/PERICOLACE) 8.6-50 MG tablet Take 1 tablet by mouth daily Take 1-2 tablets twice daily as needed as needed for constipation, Historical      sennosides (SENOKOT) 8.6 MG tablet Take 1 tablet by mouth 2 times daily, Historical      traMADol (ULTRAM) 50 MG tablet Take 50 mg by mouth every 6 hours as needed for severe pain, Historical      triamcinolone acetonide 0.025 % LOTN Mix with ketoconazole cream and apply to face twice dailyHistorical      tamsulosin (FLOMAX) 0.4 MG capsule Take 0.4 mg by mouth daily One tablet after each meal, Historical         STOP taking these medications       ibuprofen (ADVIL/MOTRIN) 200 MG tablet Comments:   Reason for Stopping:             Allergies   Allergies   Allergen Reactions     Carvedilol Other (See Comments)     Per pt, it gave him back pain

## 2022-10-03 NOTE — PROGRESS NOTES
Assessment & Plan     Hypertension  is well controlled. He would like a longer refill for amlodipinetial hypertension  Plan:refilled for 1 yr  - amLODIPine (NORVASC) 10 MG tablet; Take 1 tablet (10 mg) by mouth daily    Multiple subsegmental pulmonary emboli without acute cor pulmonale (H)  Plan: admitted for headache and was found to have PE.  No chest pain, no shortness of breath..  Continue anticoagulation    Brain metastasis (H)  Plan: keep appointment with radiation oncologist. He was admitted for headache and its much better    Renal cell carcinoma of left kidney metastatic to other site (H  Carcinoma metastatic to lung, right (H)  - considered this problem when making today's plans  - no interventions today       -followed by specialist.    Gastroesophageal reflux disease without esophagitis  Anemia of chronic illness  - considered this problem when making today's plans  - no interventions today       -encouraged to establish care with  primary care physicain     Need for vaccination  Need for covid booster, flu, pneumonia 20, and tdap booster. He has deferred them today      Prescription drug management  I spent a total of 31 minutes on the day of the visit.   Time spent doing chart review, history and exam, documentation and further activities per the note           Return in about 3 days (around 10/6/2022) for oncology.    Virginie Arzate MD  [facility]    Wendy Kim is a 52 year old presenting for the following health issues:  Hospital F/U      [facility]       Hospital Follow-up Visit:    Hospital/Nursing Home/IP Rehab Facility: [facility]  Date of Admission: 9/25/22  Date of Discharge: 9/28/22  Reason(s) for Admission: Multiple pulmonary emboli    oncologist-Josef Schmitz MD   Worsening headache in setting of established brain mets from renal cell carcinoma Stage IV with recent radiation of the brain    Headache have  resolved now dull spasm.  Has oncology follow up in couple of day.    Patient would like to discuss about possible stopping some medications -  Also has concerns with kidney. States that he has been noticing episodes of flank discomfort   Was your hospitalization related to COVID-19? No   Problems taking medications regularly:  None  Medication changes since discharge: None  Problems adhering to non-medication therapy:  None    Summary of hospitalization:  [facility] discharge summary reviewed  Diagnostic Tests/Treatments reviewed.  Follow up needed: none  Other Healthcare Providers Involved in Patient s Care:         None  Update since discharge: improved.         Post Medication Reconciliation Status:        Plan of care communicated with patient                 Review of Systems   Constitutional, HEENT, cardiovascular, pulmonary, GI, , musculoskeletal, neuro, skin, endocrine and psych systems are negative, except as otherwise noted.      Objective    /84   Pulse 95   Temp 97.5  F (36.4  C) (Temporal)   Wt 90.5 kg (199 lb 9.6 oz)   SpO2 100%   BMI 33.66 kg/m    Body mass index is 33.66 kg/m .  Physical Exam   GENERAL: healthy, alert and no distress  NECK: no adenopathy, no asymmetry, masses, or scars and thyroid normal to palpation  RESP: lungs clear to auscultation - no rales, rhonchi or wheezes  CV: regular rate and rhythm, normal S1 S2, no S3 or S4, no murmur, click or rub, no peripheral edema and peripheral pulses strong  ABDOMEN: soft, nontender, no hepatosplenomegaly, no masses and bowel sounds normal  MS: no gross musculoskeletal defects noted, no edema  NEURO: Normal strength and tone, mentation intact and speech normal  PSYCH: mentation appears normal, affect normal/bright    No results found for any visits on 10/03/22.

## 2022-10-14 NOTE — PROGRESS NOTES
Radiation Oncology Follow-up Visit  2022    Julio John  MRN: [id number]   : 1970     DISEASE TREATED:   Metastatic renal cell carcinoma with brain mets    RADIATION THERAPY DELIVERED:   Skull base           2,750 cGy         [place] 60      22    7                        (completion)    INTERVAL SINCE COMPLETION OF RADIATION THERAPY:   1 month    SUBJECTIVE:   Julio John is a 52 year old male who is here today for routine 1 month follow up after completing radiation therapy.  He notes he had a lot of trouble immediately after gamma knife with a severe headache.  He spent 3 days in the hospital.  MRI was done did not show any significant acute findings.  He uses phone call would have come the day after his treatment.  States that he is feeling better.  His headaches have subsided and now only has a rare 1.  Sometimes he feels cramps in his head.  He says his eyes moving much better.  Otherwise well and does not have any significant complaints.  States he did try to run and felt like his legs were weak which she assumes is from chemotherapy.  He was put on Decadron and has gone from 4 mg a day to 2 mg a day.    ROS:  Complete review of systems is negative except for symptoms discussed in subjective above.    Current Outpatient Medications   Medication     acetaminophen (TYLENOL) 325 MG tablet     albuterol (PROAIR HFA/PROVENTIL HFA/VENTOLIN HFA) 108 (90 Base) MCG/ACT inhaler     amLODIPine (NORVASC) 10 MG tablet     apixaban ANTICOAGULANT (ELIQUIS) 5 MG tablet     calcium carbonate (TUMS) 500 MG chewable tablet     calcium-vitamin D (OSCAL) 250-125 MG-UNIT TABS per tablet     cetirizine (ZYRTEC) 10 MG tablet     cyclobenzaprine (FLEXERIL) 5 MG tablet     dexamethasone (DECADRON) 2 MG tablet     ergocalciferol (ERGOCALCIFEROL) 1.25 MG (54352 UT) capsule     gabapentin (NEURONTIN) 300 MG capsule     hypromellose-dextran (GENTEAL TEARS) 0.1-0.3 % ophthalmic  solution     ketoconazole (NIZORAL) 2 % external shampoo     loperamide (IMODIUM) 2 MG capsule     magic mouthwash suspension, diphenhydrAMINE, lidocaine, aluminum-magnesium & simethicone, (FIRST-MOUTHWASH BLM) compounding kit     nystatin (MYCOSTATIN) 135488 UNIT/ML suspension     omeprazole (PRILOSEC) 20 MG DR capsule     ondansetron (ZOFRAN ODT) 8 MG ODT tab     oxyCODONE (ROXICODONE) 5 MG tablet     prochlorperazine (COMPAZINE) 10 MG tablet     senna-docusate (SENOKOT-S/PERICOLACE) 8.6-50 MG tablet     sennosides (SENOKOT) 8.6 MG tablet     tamsulosin (FLOMAX) 0.4 MG capsule     traMADol (ULTRAM) 50 MG tablet     triamcinolone acetonide 0.025 % LOTN     No current facility-administered medications for this visit.          Allergies   Allergen Reactions     Carvedilol Other (See Comments)     Per pt, it gave him back pain       Past Medical History:   Diagnosis Date     Alcohol abuse     in remission     Benign essential hypertension      Cerebrovascular accident (H)     2010/2011     Cocaine abuse (H)     in remission     Metastatic renal cell carcinoma (H)     2016         PHYSICAL EXAM:  There were no vitals taken for this visit.  Gen: Alert, in NAD.  Psychiatric: Appropriate mood and affect.  Initially he seemed a little angry that I was calling today and not immediately after treatment.      LABS AND IMAGING:  Reviewed.    IMPRESSION:   Mr. Baron John is a 52 year old male with a brain mets s/p gamma knife an doing better now.    PLAN:   Patient has recovered nicely from acute side effects of radiation therapy.  We did discuss he could try to go down to Decadron 1 mg a day with food.  If headaches increase go back to 2 mg/day.  Follow-up with Dr. Painting scheduled for November 1.  Follow-up MRI and appointment with Dr. Schaefer December 30th.  .  Phone time was 11 minutes.  Total time was 20 minutes    Lauren Chand NP  Radiation Oncology  [facility]

## 2022-10-14 NOTE — LETTER
Date:October 17, 2022      Patient was self referred, no letter generated. Do not send.        [facility] Health Information

## 2022-10-14 NOTE — LETTER
10/14/2022         RE: Julio John  [address]  [place] [address]        Dear Colleague,    Thank you for referring your patient, Julio John, to the [facility] RADIATION ONCOLOGY. Please see a copy of my visit note below.    Radiation Oncology Follow-up Visit  2022    Julio John  MRN: [id number]   : 1970     DISEASE TREATED:   Metastatic renal cell carcinoma with brain mets    RADIATION THERAPY DELIVERED:   Skull base           2,750 cGy         [place] 60      22    7                        (completion)    INTERVAL SINCE COMPLETION OF RADIATION THERAPY:   1 month    SUBJECTIVE:   Julio John is a 52 year old male who is here today for routine 1 month follow up after completing radiation therapy.  He notes he had a lot of trouble immediately after gamma knife with a severe headache.  He spent 3 days in the hospital.  MRI was done did not show any significant acute findings.  He uses phone call would have come the day after his treatment.  States that he is feeling better.  His headaches have subsided and now only has a rare 1.  Sometimes he feels cramps in his head.  He says his eyes moving much better.  Otherwise well and does not have any significant complaints.  States he did try to run and felt like his legs were weak which she assumes is from chemotherapy.  He was put on Decadron and has gone from 4 mg a day to 2 mg a day.    ROS:  Complete review of systems is negative except for symptoms discussed in subjective above.    Current Outpatient Medications   Medication     acetaminophen (TYLENOL) 325 MG tablet     albuterol (PROAIR HFA/PROVENTIL HFA/VENTOLIN HFA) 108 (90 Base) MCG/ACT inhaler     amLODIPine (NORVASC) 10 MG tablet     apixaban ANTICOAGULANT (ELIQUIS) 5 MG tablet     calcium carbonate (TUMS) 500 MG chewable tablet     calcium-vitamin D (OSCAL) 250-125 MG-UNIT TABS per tablet      cetirizine (ZYRTEC) 10 MG tablet     cyclobenzaprine (FLEXERIL) 5 MG tablet     dexamethasone (DECADRON) 2 MG tablet     ergocalciferol (ERGOCALCIFEROL) 1.25 MG (26827 UT) capsule     gabapentin (NEURONTIN) 300 MG capsule     hypromellose-dextran (GENTEAL TEARS) 0.1-0.3 % ophthalmic solution     ketoconazole (NIZORAL) 2 % external shampoo     loperamide (IMODIUM) 2 MG capsule     magic mouthwash suspension, diphenhydrAMINE, lidocaine, aluminum-magnesium & simethicone, (FIRST-MOUTHWASH BLM) compounding kit     nystatin (MYCOSTATIN) 483484 UNIT/ML suspension     omeprazole (PRILOSEC) 20 MG DR capsule     ondansetron (ZOFRAN ODT) 8 MG ODT tab     oxyCODONE (ROXICODONE) 5 MG tablet     prochlorperazine (COMPAZINE) 10 MG tablet     senna-docusate (SENOKOT-S/PERICOLACE) 8.6-50 MG tablet     sennosides (SENOKOT) 8.6 MG tablet     tamsulosin (FLOMAX) 0.4 MG capsule     traMADol (ULTRAM) 50 MG tablet     triamcinolone acetonide 0.025 % LOTN     No current facility-administered medications for this visit.          Allergies   Allergen Reactions     Carvedilol Other (See Comments)     Per pt, it gave him back pain       Past Medical History:   Diagnosis Date     Alcohol abuse     in remission     Benign essential hypertension      Cerebrovascular accident (H)     2010/2011     Cocaine abuse (H)     in remission     Metastatic renal cell carcinoma (H)     2016         PHYSICAL EXAM:  There were no vitals taken for this visit.  Gen: Alert, in NAD.  Psychiatric: Appropriate mood and affect.  Initially he seemed a little angry that I was calling today and not immediately after treatment.      LABS AND IMAGING:  Reviewed.    IMPRESSION:   Mr. Baron John is a 52 year old male with a brain mets s/p gamma knife an doing better now.    PLAN:   Patient has recovered nicely from acute side effects of radiation therapy.  We did discuss he could try to go down to Decadron 1 mg a day with food.  If headaches increase go back to 2  mg/day.  Follow-up with Dr. Painting scheduled for November 1.  Follow-up MRI and appointment with Dr. Schaefer December 30th.  .  Phone time was 11 minutes.  Total time was 20 minutes    Lauren Chand NP  Radiation Oncology  [facility]          Again, thank you for allowing me to participate in the care of your patient.        Sincerely,        DARINEL Gore CNP

## 2022-10-24 PROBLEM — N17.9 AKI (ACUTE KIDNEY INJURY) (H): Status: ACTIVE | Noted: 2022-01-01

## 2022-10-24 NOTE — ED PROVIDER NOTES
ED Provider Note  [facility]      History     Chief Complaint   Patient presents with     Back Pain     Pharyngitis     Headache     Rash     HPI Julio John is a 52 year old male with PMH of metastatic renal cell carcinoma (on suntinib) s/p radical L nephrectomy, CVA (on Eliquis) and hypertension who presented to ED with 4 day history of painful rash on soles of feet and hands and cough.    Patient states starting 4 days ago he noticed blisters forming on feet that have become increasingly painful and have spread to his hands over past 2 days. States he has been using a cane to walk due to pain. He has not tried taking any medications or using any creams for rash. Has never had similar rash in past. He recently traveled to [place] with his wife and two young children for a week and went fishing. He denies any mosquito bites, tick bites during his trip. No one he was with had similar symptoms.     Also notes he has had sore throat and cough for past two days. States he had four episodes of emesis on Saturday- none since. Denies diarrhea. Notes he had been drinking less water than normal while in [place] but states he has been able to eat and drink normally since his return. Endorses increased urinary frequency for past week, denies dysuria or hematuria. Notes he has been having increased back pain for past few days.     Patient follows with oncology at [facility] and started on Sunitinib on 9/30/22. He notes he has recently decreased his Decadron dosing and currently takes 1mg daily when he has headache. Also states he has not taken his lisinopril in past 2 days.     Past Medical History  Past Medical History:   Diagnosis Date     Alcohol abuse     in remission     Benign essential hypertension      Cerebrovascular accident (H)     2010/2011     Cocaine abuse (H)     in remission     Metastatic renal cell carcinoma (H)     2016     Past Surgical History:   Procedure Laterality Date      PICC INSERTION Left 05/31/2017    5fr DL BioFlo PICC, 45cm (3cm external) in the L medial brachial vein w/ tip in the SVC RA junction.     radical left nephrectomy Left 03/09/2016 2016     acetaminophen (TYLENOL) 325 MG tablet  albuterol (PROAIR HFA/PROVENTIL HFA/VENTOLIN HFA) 108 (90 Base) MCG/ACT inhaler  amLODIPine (NORVASC) 10 MG tablet  apixaban ANTICOAGULANT (ELIQUIS) 5 MG tablet  calcium carbonate (TUMS) 500 MG chewable tablet  calcium-vitamin D (OSCAL) 250-125 MG-UNIT TABS per tablet  cetirizine (ZYRTEC) 10 MG tablet  cyclobenzaprine (FLEXERIL) 5 MG tablet  dexamethasone (DECADRON) 2 MG tablet  ergocalciferol (ERGOCALCIFEROL) 1.25 MG (01381 UT) capsule  gabapentin (NEURONTIN) 300 MG capsule  hypromellose-dextran (GENTEAL TEARS) 0.1-0.3 % ophthalmic solution  ketoconazole (NIZORAL) 2 % external shampoo  loperamide (IMODIUM) 2 MG capsule  magic mouthwash suspension, diphenhydrAMINE, lidocaine, aluminum-magnesium & simethicone, (FIRST-MOUTHWASH BLM) compounding kit  nystatin (MYCOSTATIN) 350674 UNIT/ML suspension  omeprazole (PRILOSEC) 20 MG DR capsule  ondansetron (ZOFRAN ODT) 8 MG ODT tab  oxyCODONE (ROXICODONE) 5 MG tablet  prochlorperazine (COMPAZINE) 10 MG tablet  senna-docusate (SENOKOT-S/PERICOLACE) 8.6-50 MG tablet  sennosides (SENOKOT) 8.6 MG tablet  tamsulosin (FLOMAX) 0.4 MG capsule  traMADol (ULTRAM) 50 MG tablet  triamcinolone acetonide 0.025 % LOTN      Allergies   Allergen Reactions     Carvedilol Other (See Comments)     Per pt, it gave him back pain     Family History  Family History   Problem Relation Age of Onset     Family History Negative Other         No family history of cancer, kidney cancer     Cancer No family hx of      Social History   Social History     Tobacco Use     Smoking status: Never     Smokeless tobacco: Never   Substance Use Topics     Alcohol use: Not Currently     Drug use: Not Currently     Types: Cocaine      Past medical history, past surgical history,  "medications, allergies, family history, and social history were reviewed with the patient. No additional pertinent items.       Review of Systems  A complete review of systems was performed with pertinent positives and negatives noted in the HPI, and all other systems negative.    Physical Exam   BP: (!) 157/98  Pulse: 90  Temp: 98  F (36.7  C)  Resp: 16  Height: 162.6 cm (5' 4\")  Weight: 90.3 kg (199 lb)  SpO2: 96 %  Physical Exam  Vitals reviewed.   Constitutional:       General: He is not in acute distress.     Appearance: Normal appearance.   HENT:      Head: Normocephalic and atraumatic.      Mouth/Throat:      Mouth: Mucous membranes are dry.      Pharynx: Posterior oropharyngeal erythema present. No oropharyngeal exudate.      Tonsils: No tonsillar exudate or tonsillar abscesses.      Comments: No lesions  Eyes:      Extraocular Movements: Extraocular movements intact.      Conjunctiva/sclera: Conjunctivae normal.   Cardiovascular:      Rate and Rhythm: Normal rate and regular rhythm.      Pulses: Normal pulses.      Heart sounds: Normal heart sounds.   Pulmonary:      Effort: Pulmonary effort is normal.      Breath sounds: Normal breath sounds.   Abdominal:      General: There is distension.      Palpations: Abdomen is soft.      Comments: R CVA tenderness   Musculoskeletal:         General: No swelling.      Right lower leg: No edema.      Left lower leg: No edema.   Skin:     General: Skin is warm and dry.      Capillary Refill: Capillary refill takes less than 2 seconds.      Coloration: Skin is not pale.      Findings: Rash present. No erythema. Rash is pustular and vesicular.      Comments: Pustular rash on bilateral soles of feet predominately on forefoot  Vesicular rash on fingers of bilateral hands   Neurological:      Mental Status: He is alert and oriented to person, place, and time.      Comments: L eye drooping   Psychiatric:         Mood and Affect: Mood normal.         Behavior: Behavior " normal.       ED Course      Procedures       Results for orders placed or performed during the hospital encounter of 10/24/22   CRP inflammation     Status: Abnormal   Result Value Ref Range    CRP Inflammation 19.90 (H) <5.00 mg/L   INR     Status: Normal   Result Value Ref Range    INR 0.92 0.85 - 1.15   Comprehensive metabolic panel     Status: Abnormal   Result Value Ref Range    Sodium 140 136 - 145 mmol/L    Potassium 3.8 3.4 - 5.3 mmol/L    Chloride 107 98 - 107 mmol/L    Carbon Dioxide (CO2) 22 22 - 29 mmol/L    Anion Gap 11 7 - 15 mmol/L    Urea Nitrogen 31.0 (H) 6.0 - 20.0 mg/dL    Creatinine 1.84 (H) 0.67 - 1.17 mg/dL    Calcium 8.9 8.6 - 10.0 mg/dL    Glucose 121 (H) 70 - 99 mg/dL    Alkaline Phosphatase 68 40 - 129 U/L    AST 29 10 - 50 U/L    ALT 35 10 - 50 U/L    Protein Total 5.9 (L) 6.4 - 8.3 g/dL    Albumin 3.6 3.5 - 5.2 g/dL    Bilirubin Total 0.5 <=1.2 mg/dL    GFR Estimate 44 (L) >60 mL/min/1.73m2   Mononucleosis screen     Status: Normal   Result Value Ref Range    Mononucleosis Screen Negative Negative   CBC with platelets and differential     Status: Abnormal   Result Value Ref Range    WBC Count 3.7 (L) 4.0 - 11.0 10e3/uL    RBC Count 4.12 (L) 4.40 - 5.90 10e6/uL    Hemoglobin 11.9 (L) 13.3 - 17.7 g/dL    Hematocrit 36.1 (L) 40.0 - 53.0 %    MCV 88 78 - 100 fL    MCH 28.9 26.5 - 33.0 pg    MCHC 33.0 31.5 - 36.5 g/dL    RDW 21.3 (H) 10.0 - 15.0 %    Platelet Count 127 (L) 150 - 450 10e3/uL    % Neutrophils 60 %    % Lymphocytes 32 %    % Monocytes 5 %    % Eosinophils 2 %    % Basophils 0 %    % Immature Granulocytes 1 %    NRBCs per 100 WBC 0 <1 /100    Absolute Neutrophils 2.2 1.6 - 8.3 10e3/uL    Absolute Lymphocytes 1.2 0.8 - 5.3 10e3/uL    Absolute Monocytes 0.2 0.0 - 1.3 10e3/uL    Absolute Eosinophils 0.1 0.0 - 0.7 10e3/uL    Absolute Basophils 0.0 0.0 - 0.2 10e3/uL    Absolute Immature Granulocytes 0.0 <=0.4 10e3/uL    Absolute NRBCs 0.0 10e3/uL   Asymptomatic Influenza A/B &  SARS-CoV2 (COVID-19) Virus PCR Multiplex Nasopharyngeal     Status: Normal    Specimen: Nasopharyngeal; Swab   Result Value Ref Range    Influenza A PCR Negative Negative    Influenza B PCR Negative Negative    RSV PCR Negative Negative    SARS CoV2 PCR Negative Negative    Narrative    Testing was performed using the Xpert Xpress CoV2/Flu/RSV Assay on the [employer] Instrument. This test should be ordered for the detection of SARS-CoV-2 and influenza viruses in individuals who meet clinical and/or epidemiological criteria. Test performance is unknown in asymptomatic patients. This test is for in vitro diagnostic use under the FDA EUA for laboratories certified under CLIA to perform high or moderate complexity testing. This test has not been FDA cleared or approved. A negative result does not rule out the presence of PCR inhibitors in the specimen or target RNA in concentration below the limit of detection for the assay. If only one viral target is positive but coinfection with multiple targets is suspected, the sample should be re-tested with another FDA cleared, approved, or authorized test, if coinfection would change clinical management. This test was validated by the [facility]. These laboratories are certified under the Clinical Laboratory Improvement Amendments of 1988 (CLIA-88) as qualified to perform high complexity laboratory testing.   Magnesium     Status: Abnormal   Result Value Ref Range    Magnesium 1.6 (L) 1.7 - 2.3 mg/dL   Phosphorus     Status: Normal   Result Value Ref Range    Phosphorus 3.1 2.5 - 4.5 mg/dL   Group A Streptococcus PCR Throat Swab     Status: Normal    Specimen: Throat; Swab   Result Value Ref Range    Group A strep by PCR Not Detected Not Detected    Narrative    The Xpert Xpress Strep A test, performed on the [employer], is a rapid, qualitative in vitro diagnostic test for the detection of Streptococcus pyogenes (Group A ß-hemolytic  Streptococcus, Strep A) in throat swab specimens from patients with signs and symptoms of pharyngitis. The Xpert Xpress Strep A test can be used as an aid in the diagnosis of Group A Streptococcal pharyngitis. The assay is not intended to monitor treatment for Group A Streptococcus infections. The Xpert Xpress Strep A test utilizes an automated real-time polymerase chain reaction (PCR) to detect Streptococcus pyogenes DNA.   CBC with platelets differential     Status: Abnormal    Narrative    The following orders were created for panel order CBC with platelets differential.  Procedure                               Abnormality         Status                     ---------                               -----------         ------                     CBC with platelets and d...[[id number]]  Abnormal            Final result                 Please view results for these tests on the individual orders.     Medications   sodium chloride (PF) 0.9% PF flush 3 mL (has no administration in time range)   sodium chloride (PF) 0.9% PF flush 3 mL (has no administration in time range)   lactated ringers BOLUS 1,000 mL (has no administration in time range)   oxyCODONE (ROXICODONE) tablet 5 mg (5 mg Oral Given 10/24/22 1739)        Assessments & Plan (with Medical Decision Making)     FREDY John is a 52 year old male with PMH of metastatic renal cell carcinoma (on suntinib) s/p radical L nephrectomy, CVA (on Eliquis) and hypertension who presented to ED with rash on soles and palms and found to have LEO.     Patient presented with pustular rash on soles of feet and multiple fingers of hands and cough/ sore throat for past 2 days. Pustular rash noted on ball of sole of bilateral feet and vesicles on multiple fingers of hands. He has never had previous rash before, no contacts with similar rash. Differential includes hand, foot, mouth disease vs drug reaction from chemotherapy medication Sunitinib (14-50% noted to have  palmar-planter erythrodysthesia). Also considered pretty mountain spotted fever however less likely given no noted mosquito or tick bites. Given 5 mg oxycodone for pain.    Patient also found to have LEO on admission. Cr 1.84 from baseline of 1.1. Patient also noting CVA tenderness and polyuria, denies hematuria and dysuria. Likely pre-renal vs intrinsic. Patient endorses vomiting and decreased fluid intake for few days prior to admission. Subitinib also noted to cause increasing serum creatinine in 12-70% of patients. Renal US ordered- results pending. Ordered 1L lactated ringers in ED.     Patient likely to need Oncology consult for further recommendations for managing side effects of chemotherapy.  Admitted to medicine with oncology consult.      I have reviewed the nursing notes. I have reviewed the findings, diagnosis, plan and need for follow up with the patient.    New Prescriptions    No medications on file       Final diagnoses:   Acute kidney injury (H)   Pustular rash     --  Brittnee Mccarthy MD  [facility] EMERGENCY DEPARTMENT  10/24/2022        --    ED Attending Physician Attestation    I Jocy Calixto DO, cared for this patient with the Resident. I have performed a history and physical examination of the patient and discussed management with the resident. I reviewed the resident's documentation above and agree with the documented findings and plan of care.    Summary of HPI, PE, ED Course   Patient is a 52 year old male evaluated in the emergency department for painful rash to hands and feet. Exam notable for blistering/desquamation of distal dorsal fingers and soles of feet.  Negative nikolsky, no hemorrhagic blisters.  Not actively draining. No oropharyngeal involvement.  ED course notable for elevated CRP, LEO.  With his LEO and solo right kidney (and new right CVA tenderness) US renal ordered, not yet performed . After the completion of care in the emergency department, the patient was  admitted to inpatient medicine with oncology consult due to concern for side effects from oncology treatment.  Patient transported in stable condition.    Critical Care & Procedures  Not applicable.    Medical Decision Making  The medical record was reviewed and interpreted.  Current labs reviewed and interpreted.      Jocy Calixto DO  Emergency Medicine          Jocy Calixto MD  10/24/22 2100

## 2022-10-24 NOTE — ED TRIAGE NOTES
"Triage Assessment & Note:    BP (!) 157/98   Pulse 90   Temp 98  F (36.7  C) (Temporal)   Resp 16   Ht 1.626 m (5' 4\")   Wt 90.3 kg (199 lb)   SpO2 96%   BMI 34.16 kg/m        Patient presents with: Pt with recent travel comes to triage with reports of back/hand/ feet pain, rash, headache, and sore throat. No reports of cough, SOB, or CP.    Home Treatments/Remedies: Home medications    Febrile / Afebrile: afebrile    Duration of C/o: 3 days    Josie Chaparro RN  October 24, 2022            "

## 2022-10-24 NOTE — ED NOTES
ED Triage Provider Note  [facility]  Encounter Date: Oct 24, 2022    History:  No chief complaint on file.    Julio John is a 52 year old male from [place] who presents to the ED with a mild sore throat of couple days ago but now has developed blisters on his hands bilaterally and on his feet.  Patient states he has been walking with a cane because it hurts to walk with his feet.  Patient denies any fevers denies any coughing denies any shortness of breath.    Review of Systems:  No shortness of breath    Exam:  There were no vitals taken for this visit.  General: Mild distress walking with a cane. Appears stated age.   Cardio: Regular rate, extremities well perfused  Resp: Normal work of breathing, grossly normal respiratory rate  Neuro: Alert. CN II-XII grossly intact. Grossly intact strength.   Skin: Reveals vesicles on his fingers bilaterally along with some blistering on the soles of his feet  HEENT exam: Slightly erythematous oropharynx with no mucocutaneous involvement.    Medical Decision Making:  Patient arriving to the ED with problem as above. A medical screening exam was performed. Orders initiated from Triage.    Differential diagnosis includes disseminated herpes, disseminated bacterial infection, but most likely this represents hand-foot-and-mouth disease.    Orders Placed This Encounter   Procedures     CRP inflammation     INR     Comprehensive metabolic panel     Mononucleosis screen     Peripheral IV catheter     CBC with platelets differential         The patient is appropriate to wait in triage.      Nelson Zhou MD, MD on 10/24/2022 at 12:45 PM       Nelson Zhou MD  10/24/22 6113

## 2022-10-24 NOTE — H&P
[facility]    History and Physical - Hospitalist Service, GOLD TEAM        Date of Admission:  10/24/2022    Assessment & Plan      Julio John is a 52 year old male with a past medical history of stage IV RCC s/p Left n, bilateral PE, HTN, GERD, and anemia who presents with a several day history of a new, painful rash of his hands, feet, and mouth.  He is admitted to internal medicine for further treatment and monitoring.     Rash of Hand, Foot, and Mouth c/f Sunitinib Hand-Foot Reaction - Patient presents with a several day history of painful, nodular rash on his bilateral feet, hands and mouth.  He was recently in [facility] on vacation. Denies sick contacts, mosquito or tick bites, or going fishing. Notes associated fevers and cough beginning yesterday. Pictures of rash in EMR. Last dose of sunitinib on 10/23.   -Oncology consult  -Hold sunitinib   -Continue dexamethasone 1mg daily   -Magic mouthwash and viscous lidocaine for odynophagia   -Oxycodone for breakthrough pain    LEO on CKD - Creatinine 1.84, baseline 1-1.10. Suspect prerenal 2/2 poor oral intake.   -continue mIVFs  -BMP in AM    Stage IV Renal Cell Carcinoma s/p Left Sided Nephrectomy (3/2016)- [place] [facility] [place]  Known mets to lung, pancreas, bones, adrenal, and right kidney. S/p 5 cycles gamma knife, last 9/13. Recently started sunitinib 9/30.   -Oncology consult as above     Pancytopenia - WBC 3.7, Hgb 11.9, platelets 127. Likely secondary to chemotherapy.   -CBC with platelets daily     Recent Bilateral Segmental PE - Continue Eliquis 5mg BID    HTN - Continue amlodipine, hold lisinopril     GERD - Continue omeprazole          Diet:  Regular diet  DVT Prophylaxis: DOAC  Randall Catheter: Not present  Central Lines: None  Cardiac Monitoring: None  Code Status:  Full code status    Clinically Significant Risk Factors Present on Admission            # Hypomagnesemia: Lowest Mg = 1.6  "mg/dL (Ref range: 1.7-2.3) in last 2 days, will replace as needed    # Drug Induced Coagulation Defect: home medication list includes an anticoagulant medication  # Thrombocytopenia: Lowest platelets = 127 (Ref range: 150-450) in last 2 days, will monitor for bleeding  # Acute Kidney Injury, unspecified: based on a >150% or 0.3 mg/dL increase in last creatinine compared to past 90 day average, will monitor renal function      # Obesity: Estimated body mass index is 34.16 kg/m  as calculated from the following:    Height as of this encounter: 1.626 m (5' 4\").    Weight as of this encounter: 90.3 kg (199 lb).           Disposition Plan      Expected Discharge Date: 10/26/2022                The patient's care was discussed with the Attending Physician, Dr. Chip Masterson.    Rupal Cruz PA-C  Hospitalist Service, [facility]  Securely message with the Vocera Web Console (learn more here)  Text page via [facility] Paging/Directory   Please see signed in provider for up to date coverage information      ______________________________________________________________________    Chief Complaint   Bilateral hand, foot rash    History is obtained from the patient and patient's chart    History of Present Illness   Julio John is a 52 year old male with a PMH as listed above who presents to the ED with a several day history of a progressively worsening painful nodular rash.  Patient states he was in [facility] visiting family with several days of walking several miles.  He notes Thursday 10/20/22 he noticed a few, scattered nodules on his left foot.  He states the painful nodules progressed over the next couple days to include both hands, feet, and mouth.  He states it is now painful to walk and he requires a cane for assistance. He notes it hurts to swallow and feels like food and pills are getting stuck in his throat.  He denies any sick contacts, any " family or friends with similar rash, tick or mosquito bites.  Patient denies having gone fishing while on vacation.   He admits to some fevers as high as 101F at home yesterday.      Review of Systems    The 10 point Review of Systems is negative other than noted in the HPI or here.     Past Medical History    I have reviewed this patient's medical history and updated it with pertinent information if needed.   Past Medical History:   Diagnosis Date     Alcohol abuse     in remission     Benign essential hypertension      Cerebrovascular accident (H)     2010/2011     Cocaine abuse (H)     in remission     Metastatic renal cell carcinoma (H)     2016       Past Surgical History   I have reviewed this patient's surgical history and updated it with pertinent information if needed.  Past Surgical History:   Procedure Laterality Date     PICC INSERTION Left 05/31/2017    5fr DL BioFlo PICC, 45cm (3cm external) in the L medial brachial vein w/ tip in the SVC RA junction.     radical left nephrectomy Left 03/09/2016 2016       Social History   I have reviewed this patient's social history and updated it with pertinent information if needed.  Social History     Tobacco Use     Smoking status: Never     Smokeless tobacco: Never   Substance Use Topics     Alcohol use: Not Currently     Drug use: Not Currently     Types: Cocaine       Family History   I have reviewed this patient's family history and updated it with pertinent information if needed.  Family History   Problem Relation Age of Onset     Family History Negative Other         No family history of cancer, kidney cancer     Cancer No family hx of        Prior to Admission Medications   Prior to Admission Medications   Prescriptions Last Dose Informant Patient Reported? Taking?   acetaminophen (TYLENOL) 325 MG tablet  Other Yes No   Sig: Take 325-650 mg by mouth every 6 hours as needed for mild pain Take 2 tablets once every 6 hours as needed for mild pain    albuterol (PROAIR HFA/PROVENTIL HFA/VENTOLIN HFA) 108 (90 Base) MCG/ACT inhaler  Other No No   Sig: Inhale 1-2 puffs into the lungs every 4 hours as needed for shortness of breath / dyspnea or wheezing   amLODIPine (NORVASC) 10 MG tablet   No No   Sig: Take 1 tablet (10 mg) by mouth daily   apixaban ANTICOAGULANT (ELIQUIS) 5 MG tablet   No No   Sig: Take 1 tablet (5 mg) by mouth 2 times daily   calcium carbonate (TUMS) 500 MG chewable tablet  Other Yes No   Sig: Take 1 chew tab by mouth 2 times daily Chew and swallow 1 tablet by mouth twice daily   calcium-vitamin D (OSCAL) 250-125 MG-UNIT TABS per tablet  Other Yes No   Sig: Take 1 tablet by mouth 2 times daily   cetirizine (ZYRTEC) 10 MG tablet  Other Yes No   Sig: Take 10 mg by mouth daily   cyclobenzaprine (FLEXERIL) 5 MG tablet  Other Yes No   Sig: Tale 1-2 tablets (5-10 mg) by mouth 3 times daily as needed for muscle cramps.   dexamethasone (DECADRON) 2 MG tablet   No No   Sig: Take 1 tablet (2 mg) by mouth every 12 hours   ergocalciferol (ERGOCALCIFEROL) 1.25 MG (17350 UT) capsule  Other Yes No   Sig: Take 50,000 Units by mouth   gabapentin (NEURONTIN) 300 MG capsule  Other Yes No   Sig: Take 300 mg by mouth 3 times daily Take 2 capsules 3 times daily   hypromellose-dextran (GENTEAL TEARS) 0.1-0.3 % ophthalmic solution  Other Yes No   Si drop One drop in right eye 4 times daily   Patient not taking: No sig reported   ketoconazole (NIZORAL) 2 % external shampoo  Other Yes No   Sig: Use 3 times per week. Apply to affected area, leave on for 20 minutes, then rinse.   loperamide (IMODIUM) 2 MG capsule  Other Yes No   Sig: Take 2 mg by mouth Take 2 capsules at the onset of symptoms, then take 1 capsule as needed for diarrhea. DO NOT take more than 8 capsules in 24 hours.   magic mouthwash suspension, diphenhydrAMINE, lidocaine, aluminum-magnesium & simethicone, (FIRST-MOUTHWASH BLM) compounding kit  Other No No   Sig: Swish and swallow 5-10 mLs in mouth  every 6 hours as needed   nystatin (MYCOSTATIN) 706154 UNIT/ML suspension  Other No No   Sig: Take 5 mLs (500,000 Units) by mouth 4 times daily   omeprazole (PRILOSEC) 20 MG DR capsule  Other Yes No   Sig: Take 20 mg by mouth daily   ondansetron (ZOFRAN ODT) 8 MG ODT tab  Other Yes No   Sig: Take 8 mg by mouth   oxyCODONE (ROXICODONE) 5 MG tablet  Other Yes No   Sig: Take 5 mg by mouth every 6 hours as needed for severe pain   prochlorperazine (COMPAZINE) 10 MG tablet  Other Yes No   Sig: Take 10 mg by mouth every 6 hours as needed for nausea   senna-docusate (SENOKOT-S/PERICOLACE) 8.6-50 MG tablet  Other Yes No   Sig: Take 1 tablet by mouth daily Take 1-2 tablets twice daily as needed as needed for constipation   sennosides (SENOKOT) 8.6 MG tablet  Other Yes No   Sig: Take 1 tablet by mouth 2 times daily   tamsulosin (FLOMAX) 0.4 MG capsule   No No   Sig: Take 1 capsule (0.4 mg) by mouth daily   traMADol (ULTRAM) 50 MG tablet  Other Yes No   Sig: Take 50 mg by mouth every 6 hours as needed for severe pain   triamcinolone acetonide 0.025 % LOTN  Other Yes No   Sig: Mix with ketoconazole cream and apply to face twice daily      Facility-Administered Medications: None     Allergies   Allergies   Allergen Reactions     Carvedilol Other (See Comments)     Per pt, it gave him back pain       Physical Exam   Vital Signs: Temp: 98  F (36.7  C) Temp src: Temporal BP: (!) 157/98 Pulse: 90   Resp: 16 SpO2: 96 % O2 Device: None (Room air)    Weight: 199 lbs 0 oz    GENERAL: Alert and oriented x 3. NAD. Ambulatory. Cooperative.   HEENT: Anicteric sclera. Mucous membranes moist. NC. AT.   CV: RRR. S1, S2. No murmurs appreciated.   RESPIRATORY: Effort normal on RA. Lungs CTAB with no wheezing, rales, rhonchi.   GI: Abdomen soft and non distended with normoactive bowel sounds present in all quadrants. No tenderness   NEUROLOGICAL: No focal deficits. Moves all extremities.  CN 2-12 grossly intact.  EXTREMITIES: No peripheral edema.  Intact bilateral pedal pulses.   SKIN: No jaundice. Nodular, painful lesions on bilateral feet, hands and mouth, including palms and soles.         Data   Data reviewed today: I reviewed all medications, new labs and imaging results over the last 24 hours.

## 2022-10-25 NOTE — PLAN OF CARE
"Goal Outcome Evaluation:    0700 - 1930:   BP (!) 145/87 (BP Location: Left arm)   Pulse 86   Temp 98.4  F (36.9  C) (Oral)   Resp 18   Ht 1.626 m (5' 4\")   Wt 93 kg (205 lb)   SpO2 98%   BMI 35.19 kg/m        Observation Goal:   - Pain management (Met) rates pain at 2/10.  - Rash/blister (not met) rash/blister d/t po chemo (on hold). Rash & blister between toes improving with hydrocortisone cream.     Pt switched to observation service.  A&O x 4, calm/cooperative, speaks Maltese but decline  & pt able to let his needs known.  Generalized oral/hand & bilateral foot pain 10/10 d/t rash/blister managed with oxycodone 5 mg x 1 with effect.  On scheduled hydrocortisone cream & aquaphor for feet between toe for blister.   PIV on TKO.  Continue with poc..                          "

## 2022-10-25 NOTE — PLAN OF CARE
[id number]: /95. OVSS on RA. Denies nausea and SOB. Continues to have oral, hand, and foot pain 2/2 blistering. Pain rated 4/10 and 6/10. Pain medications offered multiple times but pt refusing at this time. IVMF infusing into PIV @ 100 mL/Hr. Patients home medications sent to pharmacy. Up independently with cane due to foot pain. Walker ordered for patient to trial.

## 2022-10-25 NOTE — PROGRESS NOTES
Nursing Focus: Admission  Nursing Focus: Admission    D: Patient admitted from ED for ELO, rash of hand, foot and mouth. Patient has a history of Stage IV,HTN, GERD.      I: Upon arrival to the unit patient was oriented to room, unit, and call light. Patient s height, weight, and vital signs were obtained. Allergies reviewed and allergy band applied. MD notified of patient s arrival on the unit. Adult AVS completed. Head to toe assessment completed. Full skin assessment completed by 2 RNS, second RN was Millie.  Education assessment completed. Care plan initiated.    A: Vital signs stable upon admission. Patient rates pain at 9/10. Patient s skin has blisters and scattered/generalized rash.    P: Continue to monitor patient s  status and intervene as needed. Continue with plan of care. Notify MD with any concerns or changes in patient status.      [id number]  Alert and oriented x4, hypertensive within parameters, OVSS on RA. Pain to hands and feet managed with prn Oxycodone. Denies nausea or vomiting. Reports blisters to hands and feet are very painful,declined pain meds at the time. Has LR infusing at 100 ml/hr. Independent in room.  Has oncology consult. Sunitinib on hold. Continue to monitor with POC.

## 2022-10-25 NOTE — UTILIZATION REVIEW
"  Admission Status; Secondary Review Determination         Under the authority of the Utilization Management Committee, the utilization review process indicated a secondary review on the above patient.  The review outcome is based on review of the medical records, discussions with staff, and applying clinical experience noted on the date of the review.        ()      Inpatient Status Appropriate - This patient's medical care is consistent with medical management for inpatient care and reasonable inpatient medical practice.      (xx) Observation Status Appropriate - This patient does not meet hospital inpatient criteria and is placed in observation status. If this patient's primary payer is Medicare and was admitted as an inpatient, Condition Code 44 should be used and patient status changed to \"observation\".   () Admission Status NOT Appropriate - This patient's medical care is not consistent with medical management for Inpatient or Observation Status.          RATIONALE FOR DETERMINATION     52 year old male with a past medical history of stage IV RCC s/p Left n, bilateral PE, HTN, GERD, and anemia who presents with a several day history of a new, painful rash of his hands, feet, and mouth.  He is admitted to internal medicine for further treatment and monitoring. He is not requiring any IV treatment for pain control or treatment of his rash. It is felt his rash is due to a cutaneous effect of his immunotherapy for cancer. He has a mild renal injury with creatinine of 1.8 from a baseline of 1.1 that is improving.          The definitions of Inpatient Status and Observation Status used in making the determination above are those provided in the CMS Coverage Manual, Chapter 1 and Chapter 6, section 70.4.      Sincerely,     Jose Luis Christina DO  Physician Advisor  Utilization Review/ Case Management  [facility].    "

## 2022-10-25 NOTE — CONSULTS
Addendum   To resolve the oncology consult order from 10/24/22.     Please see complete oncology consult note dated earlier today, 10/25/22.    Debbie Olivares PA-C  Hematology/Oncology  Pager: #[phone]

## 2022-10-25 NOTE — CONSULTS
"WOC Service Consult Note     S:WOC service consulted for \"Hands and feet painful lesions without open wound could ne 2/2 Sunitinib\"  B:Julio John is a 52 year old male with a past medical history of stage IV RCC (metastatic to the lung, pancreas, bones, adrenal, and right kidney) most recently on sunitinib (Cycle 1, Day 1=9/28/22), bilateral PE on Eliquis, HTN, GERD, and anemia who presented to the ED on 10/24/22 with a several day history of a new, painful rash of his hands, feet, and mouth and was found to have hand-foot syndrome, mucositis, and mild LEO. He was admitted to observation for further symptom control and hydration. Oncology was consulted for help with management of presumed chemotherapy toxicities.  A:Per chart review of notes and photos appears as if there are no open areas and rash is part of systemic process related to chemotherapy. Moisturizers and steroid creams already ordered, WOC service does not have much more to offer topically at this time unless skin opens. MD Dr. Ramirez brothers.   R: Recommend oncology and dermatology consults for further management    WO service will sign off, please re-consult with further concerns.     Jennifer Perdomo RN, CWOCN     "

## 2022-10-25 NOTE — PROGRESS NOTES
[facility]    Medicine Progress Note - Hospitalist Service, GOLD TEAM 8    Date of Admission:  10/24/2022    Assessment & Plan     52 year old male with a past medical history of stage IV RCC s/p Left n, bilateral PE, HTN, GERD, and anemia admitted for hand/foot reaction related to Sunitinib. Onc consulted and currently on Sx tx.      # Rash of Hand, Foot, and Mouth c/f Sunitinib Hand-Foot Reaction - Patient presents with a several day history of painful, nodular rash on his bilateral feet, hands and mouth.  He was recently in [facility] on vacation. Denies sick contacts, mosquito or tick bites, or going fishing. Pictures of rash in EMR. Last dose of sunitinib on 10/23. Started on Sunitinib 09/30.     Plan:  -Oncology consult  -Hold sunitinib   -Continue dexamethasone 1mg daily   -Magic mouthwash and viscous lidocaine for odynophagia Doxepin solution  - Aquaphor, hydrocortisone cream   -Oxycodone 5-10 q4h for breakthrough pain  - Wound care consult      # LEO on CKD - Creatinine 1.84, baseline 1-1.10. Suspect prerenal 2/2 poor oral intake.   - S/p fluids with improvement  - CTM     # Stage IV Renal Cell Carcinoma s/p Left Sided Nephrectomy (3/2016)-  with mets to brain   - [place] [facility] [place]  Known mets to lung, pancreas, bones, adrenal, and right kidney. S/p 5 cycles gamma knife, last 9/13. Recently started sunitinib 9/30.   -Oncology consult as above   - Continue dex 1 mg daily     # Pancytopenia - WBC 3.7, Hgb 11.9, platelets 127. Likely secondary to chemotherapy.   -CBC with platelets daily      # Recent Bilateral Segmental PE - Continue Eliquis 5mg BID     # HTN - Continue amlodipine, hold lisinopril and start as appropriate     # GERD - Continue omeprazole              # Hypomagnesemia: Lowest Mg = 1.6 mg/dL (Ref range: 1.7-2.3) in last 2 days, will replace as needed   # Hypoalbuminemia: Lowest albumin = 3.3 g/dL (Ref range: 3.5-5.2) at 10/25/2022  7:42  "AM, will monitor as appropriate  # Drug Induced Coagulation Defect: home medication list includes an anticoagulant medication  # Thrombocytopenia: Lowest platelets = 127 (Ref range: 150-450) in last 2 days, will monitor for bleeding       # Obesity: Estimated body mass index is 35.19 kg/m  as calculated from the following:    Height as of this encounter: 1.626 m (5' 4\").    Weight as of this encounter: 93 kg (205 lb).         Diet: Combination Diet Regular Diet Adult    DVT Prophylaxis: DOAC  Randall Catheter: Not present  Central Lines: None  Cardiac Monitoring: None  Code Status: Full Code      Disposition Plan      Expected Discharge Date: 10/26/2022                The patient's care was discussed with the Bedside Nurse, Patient and Oncology Consultant.    KAUR SEARS MD  Hospitalist Service, [address]  Securely message with the Vocera Web Console (learn more here)  Text page via [facility] Paging/Directory   Please see signed in provider for up to date coverage information      Clinically Significant Risk Factors Present on Admission           ______________________________________________________________________    Interval History   Pt started to have Sx almost 3 weeks of starting Sunitinib. Started with feet then hands and mouth mucosa. Pain is ok now and will treat Symptomatically. Onc consulted.     Data reviewed today: I reviewed all medications, new labs and imaging results over the last 24 hours. I personally reviewed no images or EKG's today.    Physical Exam   Vital Signs: Temp: 97.4  F (36.3  C) Temp src: Oral BP: (!) 142/98 Pulse: 77   Resp: 18 SpO2: 98 % O2 Device: None (Room air)    Weight: 205 lbs 0 oz  Constitutional: Awake, Lying in bed with no acute distress  Skin: Nodules and painful lesions to touch on hands and feet. Don't have open wounds.     Data     "

## 2022-10-25 NOTE — CONSULTS
"  Solid Tumor Oncology  Consult Note   Date of Service: 10/25/2022    Patient: Julio John  MRN: [id number]  Admission Date: 10/24/2022  Hospital Day # 1  Cancer Diagnosis: Metastatic RCC   Primary Outpatient Oncologist: Dr. Schmitz [place]. Painting ([facility])  Current Treatment Plan: Sunitinib (C1D1=9/28/22)     Reason for Consult: \"new painful rash, on sunitinib\"    History of Present Illness:    Juloi John is a 52 year old male with a past medical history of stage IV RCC (metastatic to the lung, pancreas, bones, adrenal, and right kidney) most recently on sunitinib (Cycle 1, Day 1=9/28/22), bilateral PE on Eliquis, HTN, GERD, and anemia who presented to the ED on 10/24/22 with a several day history of a new, painful rash of his hands, feet, and mouth and was found to have hand-foot syndrome, mucositis, and mild LEO. He was admitted to observation for further symptom control and hydration. Oncology was consulted for help with management of presumed chemotherapy toxicities.    Julio reports that he has been experiencing progression of a painful rash to his hands and feet for several days. He first noted this last week, roughly 10/20/22, when he was in [facility] visiting family for a few days. Symptoms initially started on his feet and then progressed to involve his hands and mouth/throat as well. He reports it is painful to ambulate and swallow, particularly pills and solid foods. He denies any known ill contacts or other associated signs or symptoms. He notes he has been taking Tylenol for pain, which helps, but admits he was taking this only once per day. He has also tried some Magic Mouthwash, which hasn't helped much.    From an oncologic standpoint, Julio reports he had nearly finished his 4 weeks of sunitinib. He was due to see his oncologist at [facility] tomorrow and requests that I call to cancel his appointment.     Oncologic History:  3/2016 diagnosed with left sided RCC s/p nephrectomy  December " 2016-January 2017 - Underwent stereotactic radiation therapy to pulmonary nodules @ [facility].  2/28/17 - CT C/A/P New small-multiple pulmonary nodules seen in bilateral lungs.   April -June 2017 - s/p #4 cycles of IL-2 therapy at [facility] - Dr. Painting. C1 4/17/17 (10 doses). C2 5/3/17 (4 doses). C3 5/31/17 (5 doses). C4 06/21/17 (4 doses)  1/5/18 C1D1 Nivolumab, palliative treatment started due to progressing pulmonary mets. Patient remained asymptomatic. Case was previously discussed with Dr. Painting at [facility] who recommended Nivolumab upon disease progression.  Jan - Feb 2019 CT CAP showed interval enlargement of 2 right renal masses consistent with RCC. Stable pulmonary metastatic disease. One right renal mass was biopsied jan 2019 but benign but we felt this was still metastatic disease since the kidney masses were growing.  4/23/19 microwave ablation to larger right renal mass performed by IR. Decided to switch to ipilumumab and nivolumab given disease progression right kidney  5/3/19 ipilumumab and nivolumab #1  7/26/19 started maintenance nivolumab  11/1/19 switched to 2nd line cabozantinib due to progression in right kidney masses, right adrenal nodule, pancreatic nodule  1/22/20 re started cabozantinib at reduced dose of 20 mg po daily due to grade 3 cough after discussing with [facility] who recommended re challenging at reduced dose.  1/13/20 right elbow XR with findings consistent with metastatic destructive lesion of the distal humerus without acute traumatic abnormality.  10/23/20 changed treatment to axitinib since ct c/a/p showed progression in lung nodules (minimal, not meeting RECIST criteria) but greater progression in right adrenal gland (now 4.5 cm versus 3.0 cm previously) and right kidney (now 3.0 cm versus 2.2 cm previously)  12/11/20 dose reduced axitinib to 3 mg po bid due to cough  1/25/21 CT c/a/p showed stable disease. Bone scan showed possible increased uptake clivus and per d/w radiology, clivus  lesion present in 2019.   2/26/21 completed radiation to clivus  3/21/21 seen in ED for headache. CT head showed no parietal bone mets. CT chest showed left hilar mass increased in size  3/23/21 MRI done for headache and this showed new parietal skull mets  4/12/21 completed parietal skull radiation  4/15/21 given some increase in left hilar mass on march 2021 CT chest and abdominal pain/intolerance to axitinib, started everolimus, awaiting lenvatinib approval  5/4/21 started lenvatinib  7/2022 Brain MRI showed progression of R parietal and clivus mets with progressive R cranial ner VI palsy. Completed gamma knife 9/13/2022 9/28/2022 Started Sutent given progression on lenvatinib  10/6/22 Presented with URI symptoms, treated with cefuroxime and azithromycin  10/24/22 Admitted to [facility] with HFS, mucositis, and LEO    Review of Systems:  A comprehensive ROS was performed and found to be negative or non-contributory with the exception of that noted in the HPI above.    Past Medical History:  Past Medical History:   Diagnosis Date     Alcohol abuse     in remission     Benign essential hypertension      Cerebrovascular accident (H)     2010/2011     Cocaine abuse (H)     in remission     Metastatic renal cell carcinoma (H)     2016       Past Surgical History:  Past Surgical History:   Procedure Laterality Date     PICC INSERTION Left 05/31/2017    5fr DL BioFlo PICC, 45cm (3cm external) in the L medial brachial vein w/ tip in the SVC RA junction.     radical left nephrectomy Left 03/09/2016    2016       Social History:  Social History     Socioeconomic History     Marital status:      Spouse name: Alda     Number of children: 7   Tobacco Use     Smoking status: Never     Smokeless tobacco: Never   Substance and Sexual Activity     Alcohol use: Not Currently     Drug use: Not Currently     Types: Cocaine   Social History Narrative    .  Prior tobacco abuse.    No ETOH.    2 children.    Grew up in  [place].    Lives in [place], [place].    Hasn't worked in employment for years.        Family History:  Family History   Problem Relation Age of Onset     Family History Negative Other         No family history of cancer, kidney cancer     Cancer No family hx of        Outpatient Medications:  No current facility-administered medications on file prior to encounter.  acetaminophen (TYLENOL) 325 MG tablet, Take 325-650 mg by mouth every 6 hours as needed for mild pain Take 2 tablets once every 6 hours as needed for mild pain  albuterol (PROAIR HFA/PROVENTIL HFA/VENTOLIN HFA) 108 (90 Base) MCG/ACT inhaler, Inhale 1-2 puffs into the lungs every 4 hours as needed for shortness of breath / dyspnea or wheezing  amLODIPine (NORVASC) 10 MG tablet, Take 1 tablet (10 mg) by mouth daily  apixaban ANTICOAGULANT (ELIQUIS) 5 MG tablet, Take 1 tablet (5 mg) by mouth 2 times daily  calcium carbonate (TUMS) 500 MG chewable tablet, Take 1 chew tab by mouth 2 times daily Chew and swallow 1 tablet by mouth twice daily  calcium-vitamin D (OSCAL) 250-125 MG-UNIT TABS per tablet, Take 1 tablet by mouth 2 times daily  cetirizine (ZYRTEC) 10 MG tablet, Take 10 mg by mouth daily  cyclobenzaprine (FLEXERIL) 5 MG tablet, Tale 1-2 tablets (5-10 mg) by mouth 3 times daily as needed for muscle cramps.  dexamethasone (DECADRON) 2 MG tablet, Take 1 tablet (2 mg) by mouth every 12 hours  ergocalciferol (ERGOCALCIFEROL) 1.25 MG (66947 UT) capsule, Take 50,000 Units by mouth  gabapentin (NEURONTIN) 300 MG capsule, Take 300 mg by mouth 3 times daily Take 2 capsules 3 times daily  hypromellose-dextran (GENTEAL TEARS) 0.1-0.3 % ophthalmic solution, 1 drop One drop in right eye 4 times daily  ketoconazole (NIZORAL) 2 % external shampoo, Use 3 times per week. Apply to affected area, leave on for 20 minutes, then rinse.  loperamide (IMODIUM) 2 MG capsule, Take 2 mg by mouth Take 2 capsules at the onset of symptoms, then take 1  "capsule as needed for diarrhea. DO NOT take more than 8 capsules in 24 hours.  magic mouthwash suspension, diphenhydrAMINE, lidocaine, aluminum-magnesium & simethicone, (FIRST-MOUTHWASH BLM) compounding kit, Swish and swallow 5-10 mLs in mouth every 6 hours as needed  nystatin (MYCOSTATIN) 475610 UNIT/ML suspension, Take 5 mLs (500,000 Units) by mouth 4 times daily  omeprazole (PRILOSEC) 20 MG DR capsule, Take 20 mg by mouth daily  ondansetron (ZOFRAN ODT) 8 MG ODT tab, Take 8 mg by mouth  oxyCODONE (ROXICODONE) 5 MG tablet, Take 5 mg by mouth every 6 hours as needed for severe pain  prochlorperazine (COMPAZINE) 10 MG tablet, Take 10 mg by mouth every 6 hours as needed for nausea  senna-docusate (SENOKOT-S/PERICOLACE) 8.6-50 MG tablet, Take 1 tablet by mouth daily Take 1-2 tablets twice daily as needed as needed for constipation  sennosides (SENOKOT) 8.6 MG tablet, Take 1 tablet by mouth 2 times daily  tamsulosin (FLOMAX) 0.4 MG capsule, Take 1 capsule (0.4 mg) by mouth daily  traMADol (ULTRAM) 50 MG tablet, Take 50 mg by mouth every 6 hours as needed for severe pain  triamcinolone acetonide 0.025 % LOTN, Mix with ketoconazole cream and apply to face twice daily       Physical Exam:    Blood pressure (!) 142/98, pulse 77, temperature 97.4  F (36.3  C), temperature source Oral, resp. rate 18, height 1.626 m (5' 4\"), weight 93 kg (205 lb), SpO2 98 %.  General: alert and cooperative, lying in bed, no acute distress, non-toxic appearing  HEENT: sclera anicteric, mo conjunctival irritation, EOMI, mucous membranes tacky, erythema of the posterior pharynx, no tonsillar hypertrophy or exudate, uvula is midline, normal phonation, no drooling/trismus/stridor, few scattered inflammatory lesions to the buccal mucosa  CV: RRR, no murmurs  Resp: normal respiratory effort on ambient air  GI: soft, non-tender, non-distended, bowel sounds present and normoactive  MSK: warm and well-perfused, normal tone  Skin: scattered inflammatory " nodular lesions to the bilateral hands, few hyperkeratotic appearing lesions to the soles of the feet bilaterally, bilateral plantar feet sensitive to light palpation, no desquamation/ulceration noted  Neuro: alert and interactive, moves all extremities equally, no focal deficits    Labs & Studies: I personally reviewed the following studies:  ROUTINE LABS (Last four results):  CMP  Recent Labs   Lab 10/25/22  0742 10/24/22  1252    140   POTASSIUM 3.8 3.8   CHLORIDE 104 107   CO2 24 22   ANIONGAP 10 11   GLC 93 121*   BUN 28.9* 31.0*   CR 1.27* 1.84*   GFRESTIMATED 68 44*   KVNG 9.3 8.9   MAG  --  1.6*   PHOS  --  3.1   PROTTOTAL 5.5* 5.9*   ALBUMIN 3.3* 3.6   BILITOTAL 0.4 0.5   ALKPHOS 50 68   AST 27 29   ALT 28 35     CBC  Recent Labs   Lab 10/25/22  0742 10/24/22  1252   WBC 2.7* 3.7*   RBC 4.07* 4.12*   HGB 11.7* 11.9*   HCT 35.5* 36.1*   MCV 87 88   MCH 28.7 28.9   MCHC 33.0 33.0   RDW 21.4* 21.3*   * 127*     INR  Recent Labs   Lab 10/24/22  1252   INR 0.92       Assessment & Plan:   Julio John is a 52 year old male with a past medical history of stage IV RCC (metastatic to the lung, pancreas, bones, adrenal, and right kidney) most recently on sunitinib (Cycle 1, Day 1=9/28/22), bilateral PE on Eliquis, HTN, GERD, and anemia who presented to the ED on 10/24/22 with a several day history of a new, painful rash of his hands, feet, and mouth and was found to have hand-foot syndrome, mucositis, and mild LEO. He was admitted to observation for further symptom control and hydration. Oncology was consulted for help with management of presumed chemotherapy toxicities.    HEME/ONC   # Palmar-plantar erythrodysesthesia (hand-foot syndrome)  Patient presented with several days of a painful, nodular-type rash to his hands and feet. No other rashes noted on exam. Given timing from initiation of sunitinib (week 4 of C1), coupled with the high incidence of HFS with this drug (14-50%), strongly suspect  "this is TKI-related. Based on the presence of pain, this would be classified as Grade 2 HFS by CTCAE criteria.  - Martin application of topical emollients (Aquaphor, etc.)  - Can use topical corticosteroids PRN for reduction of skin inflammation, etc.  - Can trial topical urea cream to focally hyperkeratotic areas; avoid use on broken skin  - Lidocaine cream/gel may be helpful for symptomatic relief  - Will HOLD sunitinib while inpatient, as detailed below     # Mucositis  # Sore throat  Noted on admission. Patient reports difficulty swallowing pills and limited PO intake secondary to pain. Rapid GAS negative. Monospot also negative. Flu, RSV, COVID all negative. On exam, patient with some mild erythema of the posterior pharynx as well as a few discrete buccal lesions suggestive of mucositis. No red flag symptoms concerning for pharyngeal abscess or deep space neck infection on my exam.   - Continue topical supportive cares with MMW/viscous lidocaine PRN  - Other options for mucositis include doxepin rinses (order high-concentration doxepin solution, change route to swish/spit, set frequency at 4x daily PRN, and write in comments, \"can be further diluted with up to 10 mL of water\") and dexamethasone rinses  - Agree with APAP, oxycodone PO PRN     # Stage IV metastatic RCC (metastatic to the lung, pancreas, bones, adrenal, and right kidney)   Followed by Dr. Schmitz, [facility]. See detailed Oncologic History below. In summary, he was diagnosed with left RCC 1/2016 (clear cell type, 7.5cm). At that time he had very small pulmonary nodules concerning for metastatic disease, brain MRI and NM bone scan were negative. He underwent a left kidney nephrectomy 3/2016. Then noted to have increased size of multiple bilateral lung nodules 12/2016, s/p stereotactic radiation for lung mets 12/2016-1/2017. Completed 4 cycles of IL-2 therapy 4/2017-6/2017. Noted to have disease progression in the lungs, started " nivolumab alone 1/2018. Underwent microwave ablation of right renal mass 4/2019, then ipilumumab +nivolumab followed by maintenance nivolumab which he progressed through and was switched to cabozantinib. He ultimately progressed through cabozantinib, axitinib (which he tolerated poorly with abdominal pain requiring multiple ED visits and cough), and everolimus+lenvatinib (complicated by grade 2 rash). He continued to progress and developed right 6th nerve palsy for which he underwent gamma knife 9/13/22. On 9/28/22 he started Sunitinib 50mg po daily for 4 weeks, then 2 weeks off.  - Today is Cycle 1, Day 28 from sunitnib, which would be his last planned dose (prior to 2 planned weeks off)  - HOLD PTA sunitinib on admission and until next oncology follow-up with Dr. Schmitz. Dose reduction may be considered with C2, given toxicities; however, will defer to primary oncologist.  - Of note, patient was scheduled for follow-up with Dr. Bar tomorrow, 10/26/22. Called [facility] today to discuss; gave medical update and discussed case with NOMAN Simpson, who will pass the message along to Dr. Bar. They will work out when to reschedule him and will reach out to him directly with the date/time.     # Bone metastases  Feb 2020 completed palliative xrt to right humerus. 2/26/21 completed radiation to clivus. 4/2/21 completed right parietal skull radiation (had some headaches prior to radiation). Aug 2022 completed right parietal skull radiation.   - Received Zometa on 10/13/22, continue monthly  - Continue PTA dexamethasone 1 mg daily with breakfast     # Pancytopenia  Secondary to chemotherapy.   - Transfuse to keep Hgb >7 and plt >10K  - Since his ANC >1000, no indication for antimicrobial ppx. He received a flu shot on 10/6/22 and Evusheld on 10/13/22.      # History of bilateral segmental PE (9/25/22)  - Continue PTA Eliquis     RENAL  # LEO on CKD  Follows with nephrology given history of proteinuria, noted in  2/2021, when he was on Everolimus. Baseline creatinine is widely variable, more recently around 1.1 and 1.3 since 8/2022. Clinic continues to monitor his urine prot/creat every 2 months and his urine protein has been decreasing.   - 10/24: Right renal ultrasound did not identify hydronephrosis. Redemonstrated multiple right renal masses that do not appear significantly changed in size from 9/25/2022  - Creatinine is improving with hydration  - Management per primary team     CV  # HTN, drug-induced  Patient has been experiencing HTN with initiation of sunitinib, which is a known side effect.   - Continue PTA antihypertensive    Recommendations:   - Continue topical therapies for mucositis (options include MMW, viscous lidocaine, doxepin, and dexamethasone swishes) and HFS (topical emollients, topical corticosteroids, urea cream, and lidocaine cream/gel can all be trialed, as needed). Okay to also use PO APAP/oxycodone, as needed.  - Reached out [place] [facility] [place] rescheduling oncology appt planned for tomorrow. Updated Dr. Schmitz's nurse, Tatiana. They will reschedule patient and reach out to him with the new appointment date/time.  - Hold sunitinib while inpatient (today would have been the last day of active therapy anyway). Will defer to primary oncologist on whether a dose reduction is indicated for C2.  - Remainder of cares per primary team.    Oncology will continue to follow this patient. Please do not hesitate to page with any questions or concerns.    Patient was seen and recommendations discussed with attending physician, Dr. Martins.    Debbie Olivares PA-C  Hematology/Oncology  Pager: #[phone]

## 2022-10-26 NOTE — PLAN OF CARE
"[id number]    /86 (BP Location: Left arm)   Pulse 77   Temp 98.6  F (37  C) (Oral)   Resp 18   Ht 1.626 m (5' 4\")   Wt 93 kg (205 lb)   SpO2 96%   BMI 35.19 kg/m       Obs goals:   -Pain management (Met) rates pain at 3/10.  -Rash/blister (not met) rash/blister d/t po chemo (on hold). Rash & blister between toes improving with hydrocortisone cream.     Reason for admission: Hand-foot skin reaction from sunitinib  Activity: UAL, uses cane for foot pain  Pain: Rates pain 6/10-2/10, PRN oxycodone given. Otherwise managing discomfort with hydrocortisone cream & rest  Neuro: A&Ox4, R eye deficit, not new, pt states this ts from radiation  Cardiac: Afebrile, denies chest pain, denies dizziness  Respiratory: Room air, denies SOB.  GI/: Voiding spontaneously, Last BM today 10/25. Denies nausea.  Diet: Regular, Ate 100% courtesy meal for dinner  Lines: PIV flushed and SL  Wounds: Rash/blisters on toes, hands, still painful. Rash on back and chest is improving. Mucositis in mouth improving per patient.    Labs/imaging: Reviewed, see chart      New changes this shift: Slept between cares, received oxycodone x1 for foot and mouth pain with some relief. Applied hydrocortisone and Aquaphor x2.  Declines interpretor services, able to make needs known.     Plan: Pain management, Rash control      Continue to follow POC   "

## 2022-10-26 NOTE — PROGRESS NOTES
Obs goals:    -Pain management (Met, fluctuates) rates pain at 4/10.  -Rash/blister (not met) rash/blister d/t po chemo (on hold). Rash & blister between toes improving with hydrocortisone cream.

## 2022-10-26 NOTE — PROGRESS NOTES
Obs goals:    -Pain management (Met, fluctuates) rates pain at 2/10.  -Rash/blister (not met) rash/blister d/t po chemo (on hold). Rash & blister between toes improving with hydrocortisone cream.

## 2022-10-26 NOTE — PLAN OF CARE
Goal Outcome Evaluation:  Julio reports his rash is getting better with the creams and steroids.  Said the pain in the fingers and toes are deceasing.  Discharge to home today.  Discharge medications and instructions reviewed with pt - no questions.  He is aware of his Dr Painting appt on 11-1.  PIV and his home medications where returned to him.

## 2022-10-26 NOTE — DISCHARGE SUMMARY
[facility]  Hospitalist Discharge Summary      Date of Admission:  10/24/2022  Date of Discharge:  10/26/2022  Discharging Provider: KAUR SEARS MD  Discharge Service: Hospitalist Service, GOLD TEAM 8    Discharge Diagnoses   See below    Follow-ups Needed After Discharge   Follow-up Appointments     Adult [facility] Follow-up and recommended labs and tests      Continue your previous medications as instructed except for Sunitinib  STOP Sunitinib  Follow up with oncology outpt. They will call to reschedule   Creams are sent to pharmacy here. You can pick them up. They are going to   help to sooth the pain  Can take Tylenol if you would like for pain    Appointments on [place] and/or [facility] (with [facility] or [facility]   provider or service). Call [phone] if you haven't heard regarding   these appointments within 7 days of discharge.             Unresulted Labs Ordered in the Past 30 Days of this Admission     Date and Time Order Name Status Description    10/24/2022 12:44 PM Blood Culture Peripheral Blood Preliminary       These results will be followed up by Pool    Discharge Disposition   Discharged to home  Condition at discharge: Stable      Hospital Course   52 year old male with a past medical history of stage IV RCC s/p Left n, bilateral PE, HTN, GERD, and anemia admitted for hand/foot reaction related to Sunitinib. Patient presented with a several day history of painful, nodular rash on his bilateral feet, hands and mouth. Pictures of rash in EMR. He Started on Sunitinib 09/30. Likely from the clinical presentation and timing of starting Sunitinib he had Hand and foot syndrome related to it. Treated Symptomatically with onc consulted who recommended to stop Sunitinib for now and follow up with them outpt.        # Rash of Hand, Foot, and Mouth c/f Sunitinib Hand-Foot Reaction     Plan:  - Oncology follow up outpt  - Hold sunitinib   - Aquaphor,  "hydrocortisone cream, Lidocaine cream     # LEO on CKD - Resolved after fluids     # Stage IV Renal Cell Carcinoma s/p Left Sided Nephrectomy (3/2016)-  with mets to brain   - Follows with [facility] oncology.  Known mets to lung, pancreas, bones, adrenal, and right kidney. S/p 5 cycles gamma knife, last 9/13. Recently started sunitinib 9/30 which he was informed to stop upon discharge  - Follow up with onc  - Continue dex 1 mg daily     # Pancytopenia - WBC 3.7, Hgb 11.9, platelets 127. Likely secondary to chemotherapy.      # Recent Bilateral Segmental PE - Continue Eliquis 5mg BID     # HTN - Continue amlodipine  # GERD - Continue omeprazole              # Hypomagnesemia: Lowest Mg = 1.6 mg/dL (Ref range: 1.7-2.3) in last 2 days, will replace as needed   # Hypoalbuminemia: Lowest albumin = 3.3 g/dL (Ref range: 3.5-5.2) at 10/25/2022  7:42 AM, will monitor as appropriate  # Drug Induced Coagulation Defect: home medication list includes an anticoagulant medication  # Thrombocytopenia: Lowest platelets = 127 (Ref range: 150-450) in last 2 days, will monitor for bleeding       # Obesity: Estimated body mass index is 35.19 kg/m  as calculated from the following:    Height as of this encounter: 1.626 m (5' 4\").    Weight as of this encounter: 93 kg (205 lb).        Consultations This Hospital Stay   ONCOLOGY ADULT IP CONSULT  WOUND OSTOMY CONTINENCE NURSE  IP CONSULT    Code Status   Full Code    Time Spent on this Encounter   I, KAUR SEARS MD, personally saw the patient today and spent greater than 30 minutes discharging this patient.       KAUR SEARS MD  [facility] [address] [address]  Phone: [phone]  ______________________________________________________________________    Physical Exam   Vital Signs: Temp: 98.6  F (37  C) Temp src: Oral BP: 117/86 Pulse: 77   Resp: 18 SpO2: 96 % O2 Device: None (Room air)    Weight: 205 lbs 0 oz  Constitutional: Awake, Lying in bed " with no acute distress  Skin: Nodules and painful lesions to touch on hands and feet that has improved in pain and appearance from yesterday       Primary Care Physician   Physician No Ref-Primary    Discharge Orders      Reason for your hospital stay    Skin reaction from Sunitinib     Activity    Your activity upon discharge: As tolerated     Adult [facility] Follow-up and recommended labs and tests    Continue your previous medications as instructed except for Sunitinib  STOP Sunitinib  Follow up with oncology outpt. They will call to reschedule   Creams are sent to pharmacy here. You can pick them up. They are going to help to sooth the pain  Can take Tylenol if you would like for pain    Appointments on [place] and/or [facility] (with [facility] or [facility] provider or service). Call [phone] if you haven't heard regarding these appointments within 7 days of discharge.     Diet    Follow this diet upon discharge: Regular       Significant Results and Procedures   Most Recent 3 CBC's:Recent Labs   Lab Test 10/26/22  0543 10/25/22  0742 10/24/22  1252   WBC 2.9* 2.7* 3.7*   HGB 12.1* 11.7* 11.9*   MCV 87 87 88   * 128* 127*     Most Recent 3 BMP's:Recent Labs   Lab Test 10/26/22  0543 10/25/22  0742 10/24/22  1252    138 140   POTASSIUM 4.1 3.8 3.8   CHLORIDE 103 104 107   CO2 24 24 22   BUN 25.2* 28.9* 31.0*   CR 1.16 1.27* 1.84*   ANIONGAP 11 10 11   KVNG 9.2 9.3 8.9   * 93 121*     Most Recent 2 LFT's:Recent Labs   Lab Test 10/26/22  0543 10/25/22  0742   AST 21 27   ALT 28 28   ALKPHOS 53 50   BILITOTAL 0.4 0.4       Discharge Medications   Current Discharge Medication List      START taking these medications    Details   hydrocortisone (CORTAID) 1 % external cream Apply topically 2 times daily as needed for other (hands and feet for pain management)  Qty: 2 g, Refills: 0    Associated Diagnoses: Hand foot syndrome      lidocaine (LMX4) 4 % external cream Apply topically every hour as  needed for pain (with VAD insertion)  Qty: 5 g, Refills: 0    Associated Diagnoses: Hand foot syndrome      mineral oil-hydrophilic petrolatum (AQUAPHOR) external ointment Apply topically every hour as needed for other (Skin lesions on hand and foot)  Qty: 50 g, Refills: 0    Associated Diagnoses: Hand foot syndrome         CONTINUE these medications which have NOT CHANGED    Details   acetaminophen (TYLENOL) 325 MG tablet Take 325-650 mg by mouth every 6 hours as needed for mild pain Take 2 tablets once every 6 hours as needed for mild pain      albuterol (PROAIR HFA/PROVENTIL HFA/VENTOLIN HFA) 108 (90 Base) MCG/ACT inhaler Inhale 1-2 puffs into the lungs every 4 hours as needed for shortness of breath / dyspnea or wheezing  Qty: 18 g, Refills: 4    Comments: Pharmacy may dispense brand covered by insurance (Proair, or proventil or ventolin or generic albuterol inhaler)  Associated Diagnoses: Metastatic renal cell carcinoma, left (H); SOB (shortness of breath); Malignant neoplasm metastatic to both lungs (H)      amLODIPine (NORVASC) 10 MG tablet Take 1 tablet (10 mg) by mouth daily  Qty: 90 tablet, Refills: 3    Associated Diagnoses: Essential hypertension      apixaban ANTICOAGULANT (ELIQUIS) 5 MG tablet Take 1 tablet (5 mg) by mouth 2 times daily  Qty: 60 tablet, Refills: 0    Associated Diagnoses: Multiple pulmonary emboli (H)      calcium carbonate (TUMS) 500 MG chewable tablet Take 1 chew tab by mouth 2 times daily Chew and swallow 1 tablet by mouth twice daily      calcium-vitamin D (OSCAL) 250-125 MG-UNIT TABS per tablet Take 1 tablet by mouth 2 times daily      cetirizine (ZYRTEC) 10 MG tablet Take 10 mg by mouth daily      cyclobenzaprine (FLEXERIL) 5 MG tablet Tale 1-2 tablets (5-10 mg) by mouth 3 times daily as needed for muscle cramps.      dexamethasone (DECADRON) 2 MG tablet Take 1 tablet (2 mg) by mouth every 12 hours  Qty: 60 tablet, Refills: 0    Associated Diagnoses: Malignant neoplasm metastatic  to both lungs (H)      ergocalciferol (ERGOCALCIFEROL) 1.25 MG (30759 UT) capsule Take 50,000 Units by mouth      gabapentin (NEURONTIN) 300 MG capsule Take 300 mg by mouth 3 times daily Take 2 capsules 3 times daily      hypromellose-dextran (GENTEAL TEARS) 0.1-0.3 % ophthalmic solution 1 drop One drop in right eye 4 times daily      ketoconazole (NIZORAL) 2 % external shampoo Use 3 times per week. Apply to affected area, leave on for 20 minutes, then rinse.      loperamide (IMODIUM) 2 MG capsule Take 2 mg by mouth Take 2 capsules at the onset of symptoms, then take 1 capsule as needed for diarrhea. DO NOT take more than 8 capsules in 24 hours.      magic mouthwash suspension, diphenhydrAMINE, lidocaine, aluminum-magnesium & simethicone, (FIRST-MOUTHWASH BLM) compounding kit Swish and swallow 5-10 mLs in mouth every 6 hours as needed  Qty: 237 mL, Refills: 1    Associated Diagnoses: Thrush      nystatin (MYCOSTATIN) 642118 UNIT/ML suspension Take 5 mLs (500,000 Units) by mouth 4 times daily  Qty: 400 mL, Refills: 3    Comments: Can change the size as per availability and patient preference  Associated Diagnoses: Metastatic renal cell carcinoma, left (H); SOB (shortness of breath); Malignant neoplasm metastatic to both lungs (H)      omeprazole (PRILOSEC) 20 MG DR capsule Take 20 mg by mouth daily      ondansetron (ZOFRAN ODT) 8 MG ODT tab Take 8 mg by mouth      oxyCODONE (ROXICODONE) 5 MG tablet Take 5 mg by mouth every 6 hours as needed for severe pain      prochlorperazine (COMPAZINE) 10 MG tablet Take 10 mg by mouth every 6 hours as needed for nausea      senna-docusate (SENOKOT-S/PERICOLACE) 8.6-50 MG tablet Take 1 tablet by mouth daily Take 1-2 tablets twice daily as needed as needed for constipation      sennosides (SENOKOT) 8.6 MG tablet Take 1 tablet by mouth 2 times daily      tamsulosin (FLOMAX) 0.4 MG capsule Take 1 capsule (0.4 mg) by mouth daily    Associated Diagnoses: Malignant neoplasm of left  kidney (H)      traMADol (ULTRAM) 50 MG tablet Take 50 mg by mouth every 6 hours as needed for severe pain      triamcinolone acetonide 0.025 % LOTN Mix with ketoconazole cream and apply to face twice daily           Allergies   Allergies   Allergen Reactions     Carvedilol Other (See Comments)     Per pt, it gave him back pain

## 2022-11-07 NOTE — LETTER
11/7/2022         RE: Julio John  [address]  [place] [address]        Dear Colleague,    Thank you for referring your patient, Julio John, to the [facility] RADIATION ONCOLOGY. Please see a copy of my visit note below.    No notes on file    Again, thank you for allowing me to participate in the care of your patient.        Sincerely,        Chano Schaefer MD

## 2022-11-08 NOTE — PROCEDURES
Radiotherapy Treatment Summary          Date of Report: 2022     PATIENT: ALIA VASQUEZ  MEDICAL RECORD NO: [id number]  : 1970     DIAGNOSIS: C79.31 Secondary malignant neoplasm of brain  INTENT OF RADIOTHERAPY: Local control  PATHOLOGY: Renal cell carcinoma  STAGE: IV  CONCURRENT SYSTEMIC THERAPY: None     Details of the treatments summarized below are found in records kept in the Department of Radiation Oncology at [facility].     Treatment Summary:  Treatment Site  Dose  Modality From  To  Elapsed Days Fx.  Skull base   2,750 cGy [place] 60 2022   7   5     Dose per Fraction: 550 cGy     COMMENTS:  Mr. Baron John is a 52 year old gentleman with a widely metastatic renal cell carcinoma s/p several course of prior palliative radiotherapy as follows:     2016 - 2017: SBRT to lung nodules at [facility]  2020 - 2020: 30 Gy / 10 fractions to the right humerus at [facility]  12/10/2021 - 2021: 39 Gy / 13 fractions to the clivus at [facility]  3/30/2021 - 2021: 30 Gy / 10 fractions to the right parietal skull at [facility]  8/3/2022 - 8/15/2022: 30 Gy / 10 fractions to the right parietal skull at [facility]     In 2022, he was found to have progressive disease involving the clivus and right skull base. He was subsequently referred to the [facility] for consideration of skull base re-irradiation utilizing the Gamma Knife ICON treatment platform.      The recurrent disease received a dose of 27.5 Gy delivered in 5 fractions to the 44% isodose line utilizing a fractionated mask-based Gamma Knife radiosurgery technique.      Mr. Baron John tolerated treatment well although did have his fifth fraction halted early after he developed chest tightness and palpitations. He was immediately sent to the ED for an urgent cardiac evaluation which was negative for any acute cardiac findings outside of occasional PVCs. He was discharged from the ED with a Zio patch  and recommendations to follow-up with cardiology. Mr. Baron John then returned the following day to radiation oncology clinic to complete the remainder of his fifth fraction of treatment which he did uneventfully.       Acute Toxicity Profile (CTCAE v5.0)  None     PAIN MANAGEMENT:   No symptoms requiring medical management     FOLLOW UP PLAN:   Follow-up in radiation oncology clinic with NP in 1 month for symptom assessment     Staff Physician: Chano Schaefer MD, PhD     CC:   Lucho Calixto MD                                 Radiation Oncology:  [facility] [address] [place] [address]

## 2022-12-09 NOTE — TELEPHONE ENCOUNTER
Patient called this morning to the RN desk asking to discuss an appointment that was originally scheduled for today 12/9/2022 but had been rescheduled to 12/30/2022. Patient states he was not notified of change. Manager called Julio's number [phone] and left a voice message to return call to discuss. Managers direct number was left for return call.

## 2022-12-22 NOTE — TELEPHONE ENCOUNTER
Patient called earlier today was upset his appointment was changed and did not feel he was notified. He was calling to ask when his nest visit was in radiation oncology. The times and dates were given to patient for 12/30 for MRI and return visit with Dr. Schaefer. Patient requested to change his times later in the day on 12/30. Caller left voice message that in order to make later we had to move the appointment to Jan 18th, 2023 at 11:00 for MRI and 12:50 for Dr. Schaefer. Left call back number of [phone] for questions.

## 2022-12-29 NOTE — PROGRESS NOTES
Department of Radiation Oncology  [facility]  [address] [place] [address]  [phone]       Radiation Oncology Follow-up Visit  2022    Julio John  MRN: [id number]  : 1970    DISEASE TREATED:   Renal cell carcinoma, brain metastasis    RADIATION THERAPY DELIVERED:   2016 - 2017: SBRT to lung nodules at [facility]  2020 - 2020: 30 Gy / 10 fractions to the right humerus at [facility]  12/10/2021 - 2021: 39 Gy / 13 fractions to the clivus at [facility]  3/30/2021 - 2021: 30 Gy / 10 fractions to the right parietal skull at [facility]  8/3/2022 - 8/15/2022: 30 Gy / 10 fractions to the right parietal skull at [facility]    2022 - 2022: 2750cGy in 5 fractions via GK-SRS    INTERVAL SINCE COMPLETION OF RADIATION THERAPY:   3.5 months    SUBJECTIVE:   Mr. Baron John is a 52 year old gentleman with a widely metastatic renal cell carcinoma s/p several course of prior palliative radiotherapy as detailed above.    In 2022, he was found to have progressive disease involving the clivus and right skull base. He was subsequently referred to the [facility] for consideration of skull base re-irradiation utilizing the Gamma Knife ICON treatment platform. The recurrent disease received a dose of 27.5 Gy delivered in 5 fractions to the 44% isodose line utilizing a fractionated mask-based Gamma Knife radiosurgery technique.     He presented to ER (22) for headaches. Head imaging was stable with MRI brain (22) showing stable right temporal calvarial and epidural metastasis with also involvement of the scalp and slightly within adjacent sulci, stable metastasis involving clivus, right cavernous sinus, sphenoid sinuses and the nasal pharynx. CT abdomen/pelvis (22) s/p left nephrectomy, unchanged multiple right renal masses with interval growth of right adrenal likely metastasis. CTA chest (22) showed  multiple acute segmental pulmonary emboli in both lower lobes, increased metastatic disease burden in the chest with multiple new and enlarging pulmonary nodules including a new cavitary lesion in the left lung apex, increased bilateral hilar lymphadenopathy, and unchanged sclerotic foci posterior aspect right seventh rib. He was started on Decadron 2mg PO BID with a taper over several weeks. He was started on Eliquis for treatment of PE. He was able to be discharged home.    He presented to ER (10-24-22) with painful, nodular rash on his bilateral feet, hands and mouth related to starting Sunitinib 09/30. Sunitinib was held and he was discharged home. He had a two week break from Sunitinib and was restarted on a lower dose. He then developed shingles, so Sunitinib was held again. He started cycle 2 of Sunitinib (12-5-22) per Dr. Schmitz.    MRI brain (12-30-22) was performed this morning with official radiology read pending at the time of this visit. Today, Julio reports that he is doing well. His headache improved vastly with the steroid taper he was prescribed in the hospital. He is completely off steroids at this time. He continues to have mild occasional headaches, every other week, which are not requiring any pain medications. He has persistent right eye visual deficits which preceded radiotherapy and have not improved since radiation. He occasionally feels off balance when he is walking. He is back on Sunitinib, lower dose, which he is tolerating well with minor neuropathy in his feet and sore throat. He continues to see Dr. Schmitz. He denies nausea and vomiting.     REVIEW OF SYSTEMS:    General:  No fever/chills, no weight loss, no fatigue, no anorexia  HEENT:  + sore throat, no earache, no rhinitis, no vision changes  Cardiac:  No chest pain, no edema  Pulmonary:  No shortness of breath, no cough, no hemoptysis  Gastrointestinal:  No nausea, no vomiting, no diarrhea, no constipation, no melena, no  dysphagia, no odynophagia  Skin:  No rash, no itch, no jaundice  Hematologic:  No palpable lymph nodes, no bruising or bleeding tendencies  Neurologic:  No headache, + occasional off balance, no numbness or tingling, no weakness  Musculoskeletal:  No arthralgias, no myalgias  Psychiatric:  No anxiety, no depression    OBJECTIVE:  BP (!) 141/91   Pulse 82   Wt 91.5 kg (201 lb 11.2 oz)   BMI 34.62 kg/m    PHYSICAL EXAMINATION:    General:  Well-developed, well-appearing, NAD  HEENT:  NCAT, anicteric sclera, moist oral mucosa  Cardiac:  Strong peripheral pulses, no JVD, no peripheral edema  Pulmonary:  Breathing comfortably on room air, no stridor, no audible wheeze  Abdomen:  Nontender, nondistended, no palpable hepatosplenomegaly  Skin:  Pink, warm, no rash  Lymph Nodes:  No palpable lymphadenopathy  Neurologic:    CN:      II,III -- PERRLA, no visual field cut  III,IV,VI --medial deviation of the right eye with inability to abduct beyond midline, left eye with normal EOM, no ptosis  V --mild numbness within the right V3 distribution, otherwise sensation intact to light touch; masseter function intact  VII -- no facial weakness/droop  VIII -- hears finger rub equally bilaterally  IX,X -- voice normal, palate elevates symmetrically  XI -- SCM/trapezii 5/5 strength bilaterally  XII -- tongue protrudes midline  Motor: Strength 5/5 in upper and lower extremities  Alert and oriented x 3  MSK:  normal muscular bulk and tone, no tender bones or joints  Psychiatric:  normal affect, normal attention        LABS AND IMAGING:  CT abdomen/pelvis (9-25-22) showed IMPRESSION:  1. Continued appearance of left radical nephrectomy. No local recurrence.  2. Unchanged multiple right renal masses.  3. Interval growth of right adrenal likely metastasis.  4. Mostly stable apparent metastatic pulmonary nodules although the wire just nodule has grown from 8 mm to 9 mm in approximately 4 weeks.  5. Small bowel enteritis without evidence  of acute infectious focus.  6. No discrete new bone lesions.    CT chest (9-25-22) showed IMPRESSION:   1. Exam is positive for acute pulmonary embolism. Multiple segmental pulmonary emboli in both lower lobes. Evidence for right heart strain or increased right heart pressures is not present.   2. Increased metastatic disease burden in the chest with multiple new and enlarging pulmonary nodules including a new cavitary lesion in the left lung apex.  3. Increased bilateral hilar lymphadenopathy concerning for metastatic disease.  4. Unchanged sclerotic foci posterior aspect right seventh rib.    MRI brain (9-26-22) showed IMPRESSION:  1. Stable right temporal calvarial and epidural metastasis with also involvement of the scalp and slightly within adjacent sulci.  2. Stable metastasis involving clivus, right cavernous sinus, sphenoid sinuses and the nasal pharynx.  3. No new focus of enhancement or edema.  4. No evidence of hemorrhage, midline shift or focal area of acute ischemia.    TSH (11-29-22)  2.76 (normal)    IMPRESSION:   Mr. Baron John is a 53yo man with metastatic renal cell carcinoma who presents just over 3 months s/p GK-SRS reirradiation to the clivus/right skull base. He presents with persistent right eye visual deficits and MRI without obvious disease progression, but official radiologist read is pending at this time.    PLAN:     Follow-up finalized results from today's brain MRI. If no evidence of disease progression, follow-up in clinic in 3 months with a repeat brain MRI prior.    See Dr. Schmitz on 1-11-23    Pituitary screening labs with next lab draw    The patient was seen and examined with Dr. Schaefer who agrees with the above assessment and plan.    Joyce Infante, PGY 3  Radiation Oncology, [facility]      Attending addendum:   I saw and examined the patient with the resident and agree with the documented plan of care.    Chano Schaefer MD/PhD  Assistant    Dept of Radiation Oncology  [facility]

## 2022-12-30 NOTE — PROGRESS NOTES
FOLLOW-UP VISIT    Patient Name: Julio John      : 1970     Age: 52 year old        ______________________________________________________________________________     Chief Complaint   Patient presents with     Cancer     Follow up:Gamma Knife completed 22         Pain  Denies    Labs  Other Labs: No    Imaging  MRI: today    Other Appointments:     MD Name:  Appointment Date:    MD Name: Appointment Date:   MD Name: Appointment Date:   Other Appointment Notes:     Residual Radiation side effect: Some dizziness and headaches

## 2022-12-31 NOTE — Clinical Note
OFFICE VISIT    Patient: Diamond Jenkins   : 1947 MRN: [id number]    SUBJECTIVE:  Chief Complaint   Patient presents with   • Follow-up     Questions about if she is taking her insulin correct,        A 75 year old female presents for follow-up of multiple medical condition.    Patient has given consent to record this visit for documentation in their clinical record.    HISTORY OF PRESENT ILLNESS:  Historian: Self.    1. Type 2 diabetes mellitus with microalbuminuria, without long-term current use of insulin (CMS/[facility]): Her one time blood glucose was 151 mg/dl. Kidney function test is normal. Recent HbA1c is at 7.0%, 6 months before it was 7.7 %. She checks her blood glucose 3 times a day at home, she brings the log, it usually measures under 200 mg/dL. States her blood glucose is 300 mg/dL this morning attributes to eating a lot of [employer] chocolates. She is taking Lantus SoloStar 10 units in the morning and 50 units at night. On metformin 500 mg, 2 tablets twice a day, glipizide 10 mg, 2 tablets twice a day. Inquires about goal of HbA1c. Denies chest pain. Admits dyspnea with exertion, dizziness. States she has sleeping problem, she wakes up late around 7:30 or 8:00 AM. She forgets to take Synthroid, tests her blood, and takes insulin right away. She will not eat right away. She waits for half an hour then take Fosamax 70 mg, and wait half an hour and takes omeprazole 10 mg, and wait half an hour and take glipizide 10 mg, and eat. Sometimes she ends up eating until noon. Denies any concerns with low blood glucose. States cataract surgery was fine, had better vision. She is getting new glasses. She is taking trips with , wears mask when going outside.    2. Hypercholesterolemia: Her liver function is normal. Cholesterol is at 105 mg/dL, Triglycerides is at 108 mg/dL, HDL is 37 mg/dL, previously it was 29 mg/dL. LDL at 46 mg/dL. CMP is stable. Creatinine is 0.90 mg/dL, GFR at 67. Her electrolytes are  Patient leaving stable.     3. Essential hypertension: On metoprolol 100 mg, Hyzaar 100-25 mg. Patient does have an extensive medical history of Ig, Glorsens disease, breast cancer, and diastolic dysfunction. She is f/u with oncology, cardiology, behavioral health.    4. Hypothyroidism due to acquired atrophy of thyroid: On levothyroxine 100 mcg.    5. Low magnesium levels: Due for blood work.    PAST MEDICAL HISTORY:  Past Medical History:   Diagnosis Date   • Adenomatous colon polyp    • Allergic rhinitis    • Binge eating    • Breast cancer (CMS/[facility])     Stage 1 - on right   • Chronic kidney disease     Stage 3   • CPAP (continuous positive airway pressure) dependence    • Depressive disorder, not elsewhere classified    • Diastolic dysfunction    • Dry eye syndrome    • Dry mouth    • Edema extremities    • Epiretinal membrane (ERM) of both eyes    • Episcleritis 2012   • Essential hypertension, benign    • Hypercholesterolemia    • Hypothyroid    • Internal hemorrhoids    • JENNIFER (obstructive sleep apnea) 05/06/2014    CPAP   • Pseudophakia of both eyes    • PVD (posterior vitreous detachment), bilateral    • Type 2 diabetes mellitus with microalbuminuria, without long-term current use of insulin (CMS/[facility]) 01/07/2015    Well controlled on oral Metformin.  Patient does take an ARB, Losartan HCTZ Hemoglobin A1C (%)  Date Value  07/19/2016 6.7 (H)  01/05/2016 7.3 (H)  07/01/2015 6.0 (H)  12/23/2014 6.5 (H)  08/23/2011 5.8  05/16/2011 5.7   MICROALBUMIN/CREATININE (mcg/mg)  Date Value  01/05/2016 148.9 (H)  12/23/2014 39.5 (H)   Creatinine (mg/dL)  Date Value  12/12/2016 1.07 (H)  07/19/2016 0.96 (H)     • Type II or unspecified type diabetes mellitus without mention of complication, not stated as uncontrolled    • Urinary urgency     on meds - stable   • Vitamin D deficiency      MEDICATIONS:  Current Outpatient Medications   Medication Sig Dispense Refill   • omeprazole (PriLOSEC) 10 MG capsule Take 1 capsule by mouth daily. 90  capsule 3   • rosuvastatin (CRESTOR) 10 MG tablet Take 1 tablet by mouth daily. 90 tablet 3   • glipiZIDE (GLUCOTROL) 10 MG tablet Take 2 tablets by mouth in the morning and 2 tablets in the evening. Take before meals. 360 tablet 3   • insulin glargine (Lantus SoloStar) 100 UNIT/ML pen-injector Inject 10 units into the skin in the morning and 50 units into the skin nightly. Prime 2 units before each dose. Dose change 05/03/2022 15 mL 5   • Insulin Pen Needle (Pen Needles 5/16\") 30G X 8 MM Misc 1 each daily. Use with insulin pen injector 30 each 0   • levothyroxine 100 MCG tablet Take 1 tablet daily 6 days a week. 90 tablet 3   • losartan-hydrochlorothiazide (HYZAAR) 100-25 MG per tablet Take 1 tablet by mouth daily. 90 tablet 3   • metFORMIN (GLUCOPHAGE) 500 MG tablet TAKE 2 TABLETS BY MOUTH EVERY MORNING AND EVERY EVENING 360 tablet 3   • metoPROLOL succinate (TOPROL-XL) 100 MG 24 hr tablet Take 1 tablet by mouth daily. 90 tablet 3   • blood glucose test strip Test blood sugar 4 times daily as directed. Diagnosis: E11.21. Meter: ACCU-CHEK VISHNU PLUS TEST STRIPS 300 each 3   • blood glucose lancets Test blood sugar 4 times daily as directed. Diagnosis: E11.21. Meter: ACCU-CHEK PLUS FAST CLICK LANCETS 300 each 3   • Ginkgo Biloba 40 MG Tab Take 40 mg by mouth 2 times daily. Total between 60 and 90 mg 30 tablet 11   • DULoxetine (CYMBALTA) 30 MG capsule Take 1 capsule by mouth daily. Takes with 60mg to equal 90mg daily 90 capsule 1   • DULoxetine (CYMBALTA) 60 MG capsule Take 1 capsule by mouth daily. Takes with 30mg to equal 90mg daily 90 capsule 1   • oxybutynin (DITROPAN) 5 MG tablet Take 1 tablet by mouth in the morning and 1 tablet in the evening. 180 tablet 3   • alendronate (Fosamax) 70 MG tablet Take 1 tablet by mouth every 7 days. 12 tablet 3   • ASHWAGANDHA PO Take 500 mg by mouth daily.     • Sure Comfort Pen Needles 31G X 8 MM Misc      • acetaminophen (TYLENOL) 500 MG tablet Take 500 mg by mouth. Pt takes  two tablets at night before bedtime to prevent pain from waking her up. 7 Sept 21.     • Melatonin 1 MG Tab Take 1 tablet by mouth daily after dinner AND 1 tablet nightly. (Patient taking differently: Take 2 tablet by mouth daily after dinner AND 1 tablet nightly.) 60 tablet 3   • blood glucose meter Test blood sugar 1 times daily as directed. Diagnosis: E11.21. Meter: INSURANCE CHOICE 1 kit 0   • Misc Natural Products (OSTEO BI-FLEX ADV TRIPLE ST PO) Take by mouth 2 times daily.     • DISPENSE Bone builder with boron calcium 626 mg with phosphorus 354mg boron 300mcg     • CARBOXYMethylcellulose (REFRESH PLUS) 0.5 % ophthalmic solution Place 1 drop into both eyes daily.     • Cinnamon 500 MG Cap Take 2,000 mg by mouth 1 time. With Chromium picolinate     • Cholecalciferol (VITAMIN D) 2000 UNITS CAPS Take 3 capsules by mouth daily.     • LevOCARNitine L-Tartrate (L-CARNITINE) 500 MG CAPS Take 1 capsule by mouth daily.     • Lactobacillus (ACIDOPHILUS PO) Take 1 capsule by mouth daily.     • IRON PO Take 65 mg by mouth daily.     • Omega-3 Fatty Acids 1200 MG CAPS Take 5 capsules by mouth daily.      • Coenzyme Q10 200 MG Cap Take 1 capsule by mouth daily.     • Zinc Acetate, Oral, 50 MG CAPS Take 1 capsule by mouth daily.     • fluticasone (FLONASE) 50 MCG/ACT nasal spray Spray 1 spray in each nostril daily as needed.      • Kelp 150 MCG TABS Take 225 mcg by mouth daily.      • Potassium 99 MG TABS Take 2 tablets by mouth daily.      • B Complex Vitamins (VITAMIN B COMPLEX PO) Take 50 mg by mouth 2 times daily.      • vitamin E 400 UNIT capsule Take 400 Units by mouth daily.       No current facility-administered medications for this visit.     ALLERGIES:  Allergies as of 12/30/2022 - Reviewed 12/30/2022   Allergen Reaction Noted   • Candida albicans extract     • Penicillins HIVES    • Elocon  02/21/2014   • Wheat - food allergy       FAMILY HISTORY:  Family History   Problem Relation Age of Onset   • Diabetes  Mother    • Dementia/Alzheimers Father    • Cataracts Father    • Glaucoma Sister    • Diabetes Brother    • Heart disease Brother    • Heart Brother    • Asthma Brother    • Cancer Sister         breast   • Cancer, Breast Other         maternal cousin     SOCIAL HISTORY:  Social History     Tobacco Use   • Smoking status: Never   • Smokeless tobacco: Never   Vaping Use   • Vaping Use: never used   Substance Use Topics   • Alcohol use: No   • Drug use: No     Past Surgical HISTORY  Past Surgical History:   Procedure Laterality Date   • Breast cyst excision  11/23/2009   • Bx axillary lymph node vac assist w device  12/18/2009   • Cataract extraction w/  intraocular lens implant Left 11/11/2022    Dr Valdez   • Cataract extraction w/  intraocular lens implant Right 11/25/2022    BY DR VALDEZ   • Colonoscopy  07/10/2018    Repeat in 7 years   • Colonoscopy diagnostic  08/26/2003   • D and c      D&C x2 - for menorrhagia   • Egd  12/01/2021   • Esophagogastroduodenoscopy  07/10/2018   • Esophagogastroduodenoscopy transoral flex w/bx single or mult  08/26/2003    EGD with Bx   • Gi endoscopic ultrasound  12/01/2021   • Hernia repair  11/23/2015    Epigastric Herniorrhaphy. Dr. Vizciano @ [facility]   • Knee arthroscopy w/ meniscal repair Left 03/17/2014    Partial Medial Menisectomy & Partial Lateral Menisectomy. Dr. aLtham @ [facility]   • Lipoma resection  04/2017    right neck area   • Mastectomy Bilateral 09/27/2021   • Occult blood test tube  12/17/2012   • Past surgical history  07/09/2021    Right breast excision biopsy - Wendy Raygoza MD   • Past surgical history  08/16/2021    RIGHT BREAST INCISIONAL BIOPSY - Yudith Yao MD   • Rotator cuff repair Right 08/12/2010   • Rotator cuff repair Left 03/02/2007   • Umbilical hernia repair  01/28/2019    Dr. Vizcaino       REVIEW OF SYSTEMS:  Eyes: Per HPI.   Respiratory: Per HPI.   Cardiovascular: Per HPI.   Musculoskeletal: Per HPI.   Neurological: Per HPI.    Psychiatric/Behavioral: Per HPI.     ROS was obtained as per above and HPI and all other systems reviewed otherwise negative.    OBJECTIVE:  Visit Vitals  /74   Pulse 78   Ht 5' 1\"   Wt 81.6 kg (179 lb 14.3 oz)   SpO2 98%   BMI 33.99 kg/m²       PHYSICAL EXAM:  Constitutional: In no acute distress. Well-developed.  Eyes: The conjunctiva exhibited no abnormalities. The sclera was normal   Psychiatric: Oriented to time, place, and person. The mood was normal. The effect was normal. The memory was unimpaired.   Skin: Skin moisture and turgor normal.  Pulmonary: Breath sounds clear to auscultation bilaterally, but no respiratory distress and normal respiratory rate and effort.   Cardiovascular: Normal rate, regular rhythm, normal S1, normal S2 and edema was not present in the lower extremities.     DIAGNOSTIC STUDIES:  LAB RESULTS:  Lab Services on 12/23/2022   Component Date Value Ref Range Status   • Fasting Status 12/23/2022 12  0 - 999 Hours Final   • Sodium 12/23/2022 142  135 - 145 mmol/L Final   • Potassium 12/23/2022 3.8  3.4 - 5.1 mmol/L Final   • Chloride 12/23/2022 103  97 - 110 mmol/L Final   • Carbon Dioxide 12/23/2022 30  21 - 32 mmol/L Final   • Anion Gap 12/23/2022 13  7 - 19 mmol/L Final   • Glucose 12/23/2022 151 (A)  70 - 99 mg/dL Final   • BUN 12/23/2022 21 (A)  6 - 20 mg/dL Final   • Creatinine 12/23/2022 0.90  0.51 - 0.95 mg/dL Final   • Glomerular Filtration Rate 12/23/2022 67  >=60 Final   • BUN/ Creatinine Ratio 12/23/2022 23  7 - 25 Final   • Calcium 12/23/2022 9.9  8.4 - 10.2 mg/dL Final   • Bilirubin, Total 12/23/2022 0.5  0.2 - 1.0 mg/dL Final   • GOT/AST 12/23/2022 19  <=37 Units/L Final   • GPT/ALT 12/23/2022 36  <64 Units/L Final   • Alkaline Phosphatase 12/23/2022 57  45 - 117 Units/L Final   • Albumin 12/23/2022 3.4 (A)  3.6 - 5.1 g/dL Final   • Protein, Total 12/23/2022 7.2  6.4 - 8.2 g/dL Final   • Globulin 12/23/2022 3.8  2.0 - 4.0 g/dL Final   • A/G Ratio 12/23/2022 0.9 (A)   1.0 - 2.4 Final   • Fasting Status 12/23/2022 12  0 - 999 Hours Final   • Cholesterol 12/23/2022 105  <=199 mg/dL Final   • Triglycerides 12/23/2022 108  <=149 mg/dL Final   • HDL 12/23/2022 37 (A)  >=50 mg/dL Final   • LDL 12/23/2022 46  <=129 mg/dL Final   • Non-HDL Cholesterol 12/23/2022 68  mg/dL Final   • Cholesterol/ HDL Ratio 12/23/2022 2.8  <=4.4 Final   • Hemoglobin A1C 12/23/2022 7.0 (A)  4.5 - 5.6 % Final       ASSESSMENT AND PLAN:  This 75 year old female presents with :  1. Hypercholesterolemia    2. Type 2 diabetes mellitus with microalbuminuria, without long-term current use of insulin (CMS/[facility])    3. Essential hypertension    4. Hypothyroidism due to acquired atrophy of thyroid    5. Low magnesium levels        Orders Placed This Encounter   • Comprehensive Metabolic Panel   • Lipid Panel Without Reflex   • Glycohemoglobin   • Microalbumin Urine Random   • Thyroid Stimulating Hormone Reflex   • Magnesium   • omeprazole (PriLOSEC) 10 MG capsule   • rosuvastatin (CRESTOR) 10 MG tablet   • glipiZIDE (GLUCOTROL) 10 MG tablet   • insulin glargine (Lantus SoloStar) 100 UNIT/ML pen-injector   • Insulin Pen Needle (Pen Needles 5/16\") 30G X 8 MM Misc   • levothyroxine 100 MCG tablet   • losartan-hydrochlorothiazide (HYZAAR) 100-25 MG per tablet   • metFORMIN (GLUCOPHAGE) 500 MG tablet   • metoPROLOL succinate (TOPROL-XL) 100 MG 24 hr tablet       PLAN:  1. Type 2 diabetes mellitus with microalbuminuria, without long-term current use of insulin (CMS/[facility]):  Currently, well controlled. Stable.  Continue current medication.  Reviewed and discussed previous lab reports.  Refill provided Insulin glargine (Lantus SoloStar) 100 UNIT/ML pen-injector; Inject 10 units into the skin in the morning and 50 units into the skin nightly. Prime 2 units before each dose. Dose change 05/03/2022.  Refill provided insulin pen needle.  Refill provided glipizide 10 mg, metformin 500 mg.  Call if blood sugars are consistently greater  than 200  Ordered COMPREHENSIVE METABOLIC PANEL, GLYCOHEMOGLOBIN, MICROALBUMIN URINE RANDOM.    2. Hypercholesterolemia:  Currently, well controlled. Stable.  Continue current medication.  Reviewed and discussed previous lab reports.  Ordered LIPID PANEL WITHOUT REFLEX.  Refill provided rosuvastatin 10 mg.    3. Essential hypertension:  Currently, well controlled. Stable.  Continue current medication.  Refill provided for metoprolol 100 mg, Hyzaar 100-25 mg, omeprazole 10 mg.    4. Hypothyroidism due to acquired atrophy of thyroid:  Currently, well controlled. Stable.  Continue current medication.  Reviewed and discussed previous lab reports.  Ordered THYROID STIMULATING HORMONE REFLEX.  Refill provided levothyroxine 100 mcg.    5. Low magnesium levels:  Ordered MAGNESIUM.    Follow up in 6 months for wellness visit with fasting labs.  Patient in agreement with plan.  No further questions or concerns noted.    Refer to orders.  Medical compliance with plan discussed and risks of non-compliance reviewed.  Patient education completed on disease process, etiology & prognosis.  Proper usage and side effects of medications reviewed & discussed.  Patient understands and agrees with the plan.  Return to clinic as clinically indicated as discussed with the patient who verbalized understanding of the plan and is in agreement with the plan.    I,  Dr. Jayden Torres, have created a visit summary document based on the audio recording between  Faith Pina NP and this patient for the physician to review, edit as needed, and authenticate.  I have reviewed and edited the visit summary above and attest that it is accurate.    Creation Date: 12/31/2022

## 2023-01-01 ENCOUNTER — TELEPHONE (OUTPATIENT)
Dept: OPHTHALMOLOGY | Facility: CLINIC | Age: 53
End: 2023-01-01
Payer: MEDICAID

## 2023-01-01 ENCOUNTER — TELEPHONE (OUTPATIENT)
Dept: ONCOLOGY | Facility: CLINIC | Age: 53
End: 2023-01-01

## 2023-01-01 ENCOUNTER — THERAPY VISIT (OUTPATIENT)
Dept: OCCUPATIONAL THERAPY | Facility: CLINIC | Age: 53
End: 2023-01-01
Payer: MEDICAID

## 2023-01-01 ENCOUNTER — APPOINTMENT (OUTPATIENT)
Dept: INTERPRETER SERVICES | Facility: CLINIC | Age: 53
End: 2023-01-01
Payer: MEDICAID

## 2023-01-01 ENCOUNTER — APPOINTMENT (OUTPATIENT)
Dept: GENERAL RADIOLOGY | Facility: CLINIC | Age: 53
End: 2023-01-01
Attending: PHYSICIAN ASSISTANT
Payer: MEDICAID

## 2023-01-01 ENCOUNTER — TELEPHONE (OUTPATIENT)
Dept: RADIATION ONCOLOGY | Facility: CLINIC | Age: 53
End: 2023-01-01

## 2023-01-01 ENCOUNTER — OFFICE VISIT (OUTPATIENT)
Dept: OPHTHALMOLOGY | Facility: CLINIC | Age: 53
End: 2023-01-01
Attending: PHYSICIAN ASSISTANT
Payer: MEDICAID

## 2023-01-01 ENCOUNTER — APPOINTMENT (OUTPATIENT)
Dept: GENERAL RADIOLOGY | Facility: CLINIC | Age: 53
End: 2023-01-01
Attending: EMERGENCY MEDICINE
Payer: MEDICAID

## 2023-01-01 ENCOUNTER — APPOINTMENT (OUTPATIENT)
Dept: CT IMAGING | Facility: CLINIC | Age: 53
End: 2023-01-01
Attending: EMERGENCY MEDICINE
Payer: MEDICAID

## 2023-01-01 ENCOUNTER — APPOINTMENT (OUTPATIENT)
Dept: MRI IMAGING | Facility: CLINIC | Age: 53
End: 2023-01-01
Attending: HOSPITALIST
Payer: MEDICAID

## 2023-01-01 ENCOUNTER — HOSPITAL ENCOUNTER (OUTPATIENT)
Facility: CLINIC | Age: 53
Setting detail: OBSERVATION
Discharge: HOME | End: 2023-07-03
Attending: EMERGENCY MEDICINE | Admitting: EMERGENCY MEDICINE
Payer: MEDICAID

## 2023-01-01 ENCOUNTER — OFFICE VISIT (OUTPATIENT)
Dept: RADIATION ONCOLOGY | Facility: CLINIC | Age: 53
End: 2023-01-01
Attending: RADIOLOGY
Payer: MEDICAID

## 2023-01-01 ENCOUNTER — PRE VISIT (OUTPATIENT)
Dept: ORTHOPEDICS | Facility: CLINIC | Age: 53
End: 2023-01-01

## 2023-01-01 ENCOUNTER — TELEPHONE (OUTPATIENT)
Dept: ORTHOPEDICS | Facility: CLINIC | Age: 53
End: 2023-01-01
Payer: MEDICAID

## 2023-01-01 ENCOUNTER — TELEPHONE (OUTPATIENT)
Dept: RADIATION ONCOLOGY | Facility: CLINIC | Age: 53
End: 2023-01-01
Payer: MEDICAID

## 2023-01-01 ENCOUNTER — HOSPITAL ENCOUNTER (OUTPATIENT)
Facility: CLINIC | Age: 53
Setting detail: OBSERVATION
Discharge: HOME | End: 2023-01-11
Attending: EMERGENCY MEDICINE | Admitting: INTERNAL MEDICINE
Payer: MEDICAID

## 2023-01-01 ENCOUNTER — APPOINTMENT (OUTPATIENT)
Dept: ULTRASOUND IMAGING | Facility: CLINIC | Age: 53
End: 2023-01-01
Attending: EMERGENCY MEDICINE
Payer: MEDICAID

## 2023-01-01 ENCOUNTER — OFFICE VISIT (OUTPATIENT)
Dept: ORTHOPEDICS | Facility: CLINIC | Age: 53
End: 2023-01-01
Payer: MEDICAID

## 2023-01-01 ENCOUNTER — HOSPITAL ENCOUNTER (INPATIENT)
Facility: CLINIC | Age: 53
Setting detail: SURGERY ADMIT
End: 2023-01-01
Attending: ORTHOPAEDIC SURGERY | Admitting: ORTHOPAEDIC SURGERY
Payer: MEDICAID

## 2023-01-01 ENCOUNTER — HOSPITAL ENCOUNTER (EMERGENCY)
Facility: CLINIC | Age: 53
Discharge: HOME | End: 2023-08-07
Attending: EMERGENCY MEDICINE | Admitting: EMERGENCY MEDICINE
Payer: MEDICAID

## 2023-01-01 ENCOUNTER — APPOINTMENT (OUTPATIENT)
Dept: CARDIOLOGY | Facility: CLINIC | Age: 53
End: 2023-01-01
Attending: STUDENT IN AN ORGANIZED HEALTH CARE EDUCATION/TRAINING PROGRAM
Payer: MEDICAID

## 2023-01-01 ENCOUNTER — TELEPHONE (OUTPATIENT)
Dept: ORTHOPEDICS | Facility: CLINIC | Age: 53
End: 2023-01-01

## 2023-01-01 VITALS
SYSTOLIC BLOOD PRESSURE: 106 MMHG | DIASTOLIC BLOOD PRESSURE: 61 MMHG | HEIGHT: 66 IN | RESPIRATION RATE: 16 BRPM | WEIGHT: 200 LBS | BODY MASS INDEX: 32.14 KG/M2 | OXYGEN SATURATION: 96 % | HEART RATE: 70 BPM | TEMPERATURE: 97.7 F

## 2023-01-01 VITALS
HEART RATE: 66 BPM | SYSTOLIC BLOOD PRESSURE: 128 MMHG | OXYGEN SATURATION: 96 % | RESPIRATION RATE: 18 BRPM | DIASTOLIC BLOOD PRESSURE: 86 MMHG | TEMPERATURE: 97.3 F

## 2023-01-01 VITALS
RESPIRATION RATE: 15 BRPM | HEART RATE: 87 BPM | TEMPERATURE: 97.6 F | DIASTOLIC BLOOD PRESSURE: 78 MMHG | OXYGEN SATURATION: 97 % | SYSTOLIC BLOOD PRESSURE: 114 MMHG

## 2023-01-01 DIAGNOSIS — H35.363 DEGENERATIVE DRUSEN OF BOTH EYES: ICD-10-CM

## 2023-01-01 DIAGNOSIS — C79.31 BRAIN METASTASIS: Primary | ICD-10-CM

## 2023-01-01 DIAGNOSIS — C79.31 METASTASIS TO BRAIN (H): ICD-10-CM

## 2023-01-01 DIAGNOSIS — N17.9 AKI (ACUTE KIDNEY INJURY) (H): ICD-10-CM

## 2023-01-01 DIAGNOSIS — H49.21 SIXTH CRANIAL NERVE PALSY, RIGHT: ICD-10-CM

## 2023-01-01 DIAGNOSIS — C64.9 METASTATIC RENAL CELL CARCINOMA, UNSPECIFIED LATERALITY (H): ICD-10-CM

## 2023-01-01 DIAGNOSIS — Z11.52 ENCOUNTER FOR SCREENING LABORATORY TESTING FOR SEVERE ACUTE RESPIRATORY SYNDROME CORONAVIRUS 2 (SARS-COV-2): ICD-10-CM

## 2023-01-01 DIAGNOSIS — H53.9 VISUAL DISTURBANCE: ICD-10-CM

## 2023-01-01 DIAGNOSIS — R07.89 OTHER CHEST PAIN: ICD-10-CM

## 2023-01-01 DIAGNOSIS — I48.0 PAF (PAROXYSMAL ATRIAL FIBRILLATION) (H): ICD-10-CM

## 2023-01-01 DIAGNOSIS — C64.2 MALIGNANT NEOPLASM OF LEFT KIDNEY (H): Primary | ICD-10-CM

## 2023-01-01 DIAGNOSIS — R51.9 NONINTRACTABLE HEADACHE, UNSPECIFIED CHRONICITY PATTERN, UNSPECIFIED HEADACHE TYPE: ICD-10-CM

## 2023-01-01 DIAGNOSIS — R06.02 SOB (SHORTNESS OF BREATH): ICD-10-CM

## 2023-01-01 DIAGNOSIS — J18.9 PNEUMONIA DUE TO INFECTIOUS ORGANISM, UNSPECIFIED LATERALITY, UNSPECIFIED PART OF LUNG: ICD-10-CM

## 2023-01-01 DIAGNOSIS — K21.9 GASTROESOPHAGEAL REFLUX DISEASE WITHOUT ESOPHAGITIS: Primary | ICD-10-CM

## 2023-01-01 DIAGNOSIS — C79.51 METASTATIC BONE TUMOR (H): Primary | ICD-10-CM

## 2023-01-01 DIAGNOSIS — R60.0 LOCALIZED EDEMA: ICD-10-CM

## 2023-01-01 DIAGNOSIS — R07.9 CHEST PAIN, UNSPECIFIED TYPE: ICD-10-CM

## 2023-01-01 DIAGNOSIS — K21.9 GASTROESOPHAGEAL REFLUX DISEASE WITHOUT ESOPHAGITIS: ICD-10-CM

## 2023-01-01 DIAGNOSIS — C79.51 METASTASIS TO BONE (H): ICD-10-CM

## 2023-01-01 DIAGNOSIS — H52.203 MYOPIA OF BOTH EYES WITH ASTIGMATISM: ICD-10-CM

## 2023-01-01 DIAGNOSIS — M84.521A PATHOLOGICAL FRACTURE OF RIGHT HUMERUS DUE TO NEOPLASTIC DISEASE, INITIAL ENCOUNTER: ICD-10-CM

## 2023-01-01 DIAGNOSIS — C78.00 MALIGNANT NEOPLASM METASTATIC TO LUNG, UNSPECIFIED LATERALITY (H): ICD-10-CM

## 2023-01-01 DIAGNOSIS — C64.2 MALIGNANT NEOPLASM OF LEFT KIDNEY (H): ICD-10-CM

## 2023-01-01 DIAGNOSIS — C79.31 BRAIN METASTASIS: ICD-10-CM

## 2023-01-01 DIAGNOSIS — M25.521 RIGHT ELBOW PAIN: Primary | ICD-10-CM

## 2023-01-01 DIAGNOSIS — S05.01XA INJURY OF CONJUNCTIVA AND CORNEAL ABRASION OF RIGHT EYE W/O FB, INITIAL ENCOUNTER: ICD-10-CM

## 2023-01-01 DIAGNOSIS — R51.9 FACIAL PAIN: ICD-10-CM

## 2023-01-01 DIAGNOSIS — H04.123 DRY EYES, BILATERAL: ICD-10-CM

## 2023-01-01 DIAGNOSIS — C64.9 RENAL CELL CARCINOMA, UNSPECIFIED LATERALITY (H): ICD-10-CM

## 2023-01-01 DIAGNOSIS — R60.0 LOWER EXTREMITY EDEMA: ICD-10-CM

## 2023-01-01 DIAGNOSIS — C79.31 METASTASIS TO BRAIN (H): Primary | ICD-10-CM

## 2023-01-01 DIAGNOSIS — H52.13 MYOPIA OF BOTH EYES WITH ASTIGMATISM: ICD-10-CM

## 2023-01-01 DIAGNOSIS — I26.99 MULTIPLE PULMONARY EMBOLI (H): ICD-10-CM

## 2023-01-01 DIAGNOSIS — J18.8 OTHER PNEUMONIA, UNSPECIFIED ORGANISM: ICD-10-CM

## 2023-01-01 LAB
ALBUMIN MFR UR ELPH: 74.5 MG/DL (ref 1–14)
ALBUMIN SERPL BCG-MCNC: 3.4 G/DL (ref 3.5–5.2)
ALBUMIN SERPL BCG-MCNC: 4 G/DL (ref 3.5–5.2)
ALBUMIN SERPL BCG-MCNC: 4.2 G/DL (ref 3.5–5.2)
ALBUMIN UR-MCNC: 30 MG/DL
ALBUMIN UR-MCNC: 70 MG/DL
ALP SERPL-CCNC: 39 U/L (ref 40–129)
ALP SERPL-CCNC: 55 U/L (ref 40–129)
ALP SERPL-CCNC: 64 U/L (ref 40–129)
ALT SERPL W P-5'-P-CCNC: 17 U/L (ref 10–50)
ALT SERPL W P-5'-P-CCNC: 18 U/L (ref 0–70)
ALT SERPL W P-5'-P-CCNC: 22 U/L (ref 0–70)
ANION GAP SERPL CALCULATED.3IONS-SCNC: 12 MMOL/L (ref 7–15)
ANION GAP SERPL CALCULATED.3IONS-SCNC: 13 MMOL/L (ref 7–15)
ANION GAP SERPL CALCULATED.3IONS-SCNC: 14 MMOL/L (ref 7–15)
ANION GAP SERPL CALCULATED.3IONS-SCNC: 16 MMOL/L (ref 7–15)
APPEARANCE UR: CLEAR
APPEARANCE UR: CLEAR
APTT PPP: 39 SECONDS (ref 22–38)
AST SERPL W P-5'-P-CCNC: 21 U/L (ref 0–45)
AST SERPL W P-5'-P-CCNC: 23 U/L (ref 0–45)
AST SERPL W P-5'-P-CCNC: 26 U/L (ref 10–50)
ATRIAL RATE - MUSE: 73 BPM
ATRIAL RATE - MUSE: 91 BPM
BACTERIA #/AREA URNS HPF: ABNORMAL /HPF
BASE EXCESS BLDV CALC-SCNC: -0.3 MMOL/L (ref -7.7–1.9)
BASOPHILS # BLD AUTO: 0 10E3/UL (ref 0–0.2)
BASOPHILS NFR BLD AUTO: 0 %
BILIRUB SERPL-MCNC: 0.2 MG/DL
BILIRUB SERPL-MCNC: 0.3 MG/DL
BILIRUB SERPL-MCNC: 0.4 MG/DL
BILIRUB UR QL STRIP: NEGATIVE
BILIRUB UR QL STRIP: NEGATIVE
BUN SERPL-MCNC: 11.4 MG/DL (ref 6–20)
BUN SERPL-MCNC: 11.9 MG/DL (ref 6–20)
BUN SERPL-MCNC: 14.3 MG/DL (ref 6–20)
BUN SERPL-MCNC: 19.9 MG/DL (ref 6–20)
BUN SERPL-MCNC: 24.5 MG/DL (ref 6–20)
BUN SERPL-MCNC: 9.8 MG/DL (ref 6–20)
CALCIUM SERPL-MCNC: 8.5 MG/DL (ref 8.6–10)
CALCIUM SERPL-MCNC: 8.8 MG/DL (ref 8.6–10)
CALCIUM SERPL-MCNC: 8.8 MG/DL (ref 8.6–10)
CALCIUM SERPL-MCNC: 8.9 MG/DL (ref 8.6–10)
CALCIUM SERPL-MCNC: 9.1 MG/DL (ref 8.6–10)
CALCIUM SERPL-MCNC: 9.1 MG/DL (ref 8.6–10)
CHLORIDE SERPL-SCNC: 103 MMOL/L (ref 98–107)
CHLORIDE SERPL-SCNC: 103 MMOL/L (ref 98–107)
CHLORIDE SERPL-SCNC: 105 MMOL/L (ref 98–107)
CHLORIDE SERPL-SCNC: 108 MMOL/L (ref 98–107)
CHLORIDE SERPL-SCNC: 97 MMOL/L (ref 98–107)
CHLORIDE SERPL-SCNC: 99 MMOL/L (ref 98–107)
COLOR UR AUTO: ABNORMAL
COLOR UR AUTO: YELLOW
CREAT SERPL-MCNC: 1.5 MG/DL (ref 0.67–1.17)
CREAT SERPL-MCNC: 1.51 MG/DL (ref 0.67–1.17)
CREAT SERPL-MCNC: 1.56 MG/DL (ref 0.67–1.17)
CREAT SERPL-MCNC: 1.61 MG/DL (ref 0.67–1.17)
CREAT SERPL-MCNC: 1.68 MG/DL (ref 0.67–1.17)
CREAT SERPL-MCNC: 1.9 MG/DL (ref 0.67–1.17)
CREAT UR-MCNC: 82.3 MG/DL
DEPRECATED HCO3 PLAS-SCNC: 18 MMOL/L (ref 22–29)
DEPRECATED HCO3 PLAS-SCNC: 18 MMOL/L (ref 22–29)
DEPRECATED HCO3 PLAS-SCNC: 19 MMOL/L (ref 22–29)
DEPRECATED HCO3 PLAS-SCNC: 20 MMOL/L (ref 22–29)
DEPRECATED HCO3 PLAS-SCNC: 20 MMOL/L (ref 22–29)
DEPRECATED HCO3 PLAS-SCNC: 22 MMOL/L (ref 22–29)
DIASTOLIC BLOOD PRESSURE - MUSE: NORMAL MMHG
DIASTOLIC BLOOD PRESSURE - MUSE: NORMAL MMHG
EOSINOPHIL # BLD AUTO: 0 10E3/UL (ref 0–0.7)
EOSINOPHIL # BLD AUTO: 0.1 10E3/UL (ref 0–0.7)
EOSINOPHIL # BLD AUTO: 0.2 10E3/UL (ref 0–0.7)
EOSINOPHIL NFR BLD AUTO: 0 %
EOSINOPHIL NFR BLD AUTO: 2 %
EOSINOPHIL NFR BLD AUTO: 4 %
ERYTHROCYTE [DISTWIDTH] IN BLOOD BY AUTOMATED COUNT: 14.4 % (ref 10–15)
ERYTHROCYTE [DISTWIDTH] IN BLOOD BY AUTOMATED COUNT: 14.5 % (ref 10–15)
ERYTHROCYTE [DISTWIDTH] IN BLOOD BY AUTOMATED COUNT: 18 % (ref 10–15)
ERYTHROCYTE [DISTWIDTH] IN BLOOD BY AUTOMATED COUNT: 18 % (ref 10–15)
ERYTHROCYTE [DISTWIDTH] IN BLOOD BY AUTOMATED COUNT: 18.1 % (ref 10–15)
FLUAV RNA SPEC QL NAA+PROBE: NEGATIVE
FLUAV RNA SPEC QL NAA+PROBE: NEGATIVE
FLUBV RNA RESP QL NAA+PROBE: NEGATIVE
FLUBV RNA RESP QL NAA+PROBE: NEGATIVE
GFR SERPL CREATININE-BSD FRML MDRD: 42 ML/MIN/1.73M2
GFR SERPL CREATININE-BSD FRML MDRD: 49 ML/MIN/1.73M2
GFR SERPL CREATININE-BSD FRML MDRD: 51 ML/MIN/1.73M2
GFR SERPL CREATININE-BSD FRML MDRD: 53 ML/MIN/1.73M2
GFR SERPL CREATININE-BSD FRML MDRD: 55 ML/MIN/1.73M2
GFR SERPL CREATININE-BSD FRML MDRD: 56 ML/MIN/1.73M2
GLUCOSE BLDC GLUCOMTR-MCNC: 103 MG/DL (ref 70–99)
GLUCOSE BLDC GLUCOMTR-MCNC: 143 MG/DL (ref 70–99)
GLUCOSE BLDC GLUCOMTR-MCNC: 143 MG/DL (ref 70–99)
GLUCOSE BLDC GLUCOMTR-MCNC: 144 MG/DL (ref 70–99)
GLUCOSE SERPL-MCNC: 101 MG/DL (ref 70–99)
GLUCOSE SERPL-MCNC: 201 MG/DL (ref 70–99)
GLUCOSE SERPL-MCNC: 83 MG/DL (ref 70–99)
GLUCOSE SERPL-MCNC: 96 MG/DL (ref 70–99)
GLUCOSE SERPL-MCNC: 97 MG/DL (ref 70–99)
GLUCOSE SERPL-MCNC: 98 MG/DL (ref 70–99)
GLUCOSE UR STRIP-MCNC: NEGATIVE MG/DL
GLUCOSE UR STRIP-MCNC: NEGATIVE MG/DL
HCO3 BLDV-SCNC: 20 MMOL/L (ref 21–28)
HCO3 BLDV-SCNC: 21 MMOL/L (ref 21–28)
HCT VFR BLD AUTO: 25.4 % (ref 40–53)
HCT VFR BLD AUTO: 25.4 % (ref 40–53)
HCT VFR BLD AUTO: 27.8 % (ref 40–53)
HCT VFR BLD AUTO: 39.4 % (ref 40–53)
HCT VFR BLD AUTO: 39.9 % (ref 40–53)
HGB BLD-MCNC: 13.2 G/DL (ref 13.3–17.7)
HGB BLD-MCNC: 13.3 G/DL (ref 13.3–17.7)
HGB BLD-MCNC: 7.8 G/DL (ref 13.3–17.7)
HGB BLD-MCNC: 7.8 G/DL (ref 13.3–17.7)
HGB BLD-MCNC: 8.3 G/DL (ref 13.3–17.7)
HGB UR QL STRIP: ABNORMAL
HGB UR QL STRIP: NEGATIVE
HOLD SPECIMEN: NORMAL
IMM GRANULOCYTES # BLD: 0 10E3/UL
IMM GRANULOCYTES NFR BLD: 0 %
IMM GRANULOCYTES NFR BLD: 0 %
IMM GRANULOCYTES NFR BLD: 1 %
INR PPP: 1.03 (ref 0.85–1.15)
INTERPRETATION ECG - MUSE: NORMAL
INTERPRETATION ECG - MUSE: NORMAL
KETONES UR STRIP-MCNC: NEGATIVE MG/DL
KETONES UR STRIP-MCNC: NEGATIVE MG/DL
LACTATE BLD-SCNC: 0.6 MMOL/L
LEUKOCYTE ESTERASE UR QL STRIP: NEGATIVE
LEUKOCYTE ESTERASE UR QL STRIP: NEGATIVE
LIPASE SERPL-CCNC: 55 U/L (ref 13–60)
LVEF ECHO: NORMAL
LYMPHOCYTES # BLD AUTO: 0.9 10E3/UL (ref 0.8–5.3)
LYMPHOCYTES # BLD AUTO: 1.2 10E3/UL (ref 0.8–5.3)
LYMPHOCYTES # BLD AUTO: 1.3 10E3/UL (ref 0.8–5.3)
LYMPHOCYTES NFR BLD AUTO: 14 %
LYMPHOCYTES NFR BLD AUTO: 22 %
LYMPHOCYTES NFR BLD AUTO: 39 %
MAGNESIUM SERPL-MCNC: 2.2 MG/DL (ref 1.7–2.3)
MCH RBC QN AUTO: 24.6 PG (ref 26.5–33)
MCH RBC QN AUTO: 24.6 PG (ref 26.5–33)
MCH RBC QN AUTO: 24.9 PG (ref 26.5–33)
MCH RBC QN AUTO: 30 PG (ref 26.5–33)
MCH RBC QN AUTO: 30.1 PG (ref 26.5–33)
MCHC RBC AUTO-ENTMCNC: 29.9 G/DL (ref 31.5–36.5)
MCHC RBC AUTO-ENTMCNC: 30.7 G/DL (ref 31.5–36.5)
MCHC RBC AUTO-ENTMCNC: 30.7 G/DL (ref 31.5–36.5)
MCHC RBC AUTO-ENTMCNC: 33.3 G/DL (ref 31.5–36.5)
MCHC RBC AUTO-ENTMCNC: 33.5 G/DL (ref 31.5–36.5)
MCV RBC AUTO: 80 FL (ref 78–100)
MCV RBC AUTO: 80 FL (ref 78–100)
MCV RBC AUTO: 83 FL (ref 78–100)
MCV RBC AUTO: 90 FL (ref 78–100)
MCV RBC AUTO: 90 FL (ref 78–100)
MONOCYTES # BLD AUTO: 0.2 10E3/UL (ref 0–1.3)
MONOCYTES # BLD AUTO: 0.4 10E3/UL (ref 0–1.3)
MONOCYTES # BLD AUTO: 0.5 10E3/UL (ref 0–1.3)
MONOCYTES NFR BLD AUTO: 6 %
MONOCYTES NFR BLD AUTO: 6 %
MONOCYTES NFR BLD AUTO: 9 %
NEUTROPHILS # BLD AUTO: 1.8 10E3/UL (ref 1.6–8.3)
NEUTROPHILS # BLD AUTO: 3.5 10E3/UL (ref 1.6–8.3)
NEUTROPHILS # BLD AUTO: 5.1 10E3/UL (ref 1.6–8.3)
NEUTROPHILS NFR BLD AUTO: 53 %
NEUTROPHILS NFR BLD AUTO: 64 %
NEUTROPHILS NFR BLD AUTO: 80 %
NITRATE UR QL: NEGATIVE
NITRATE UR QL: NEGATIVE
NRBC # BLD AUTO: 0 10E3/UL
NRBC BLD AUTO-RTO: 0 /100
NT-PROBNP SERPL-MCNC: 1000 PG/ML (ref 0–900)
NT-PROBNP SERPL-MCNC: 128 PG/ML (ref 0–900)
O2/TOTAL GAS SETTING VFR VENT: 100 %
P AXIS - MUSE: 46 DEGREES
P AXIS - MUSE: NORMAL DEGREES
PCO2 BLDV: 24 MM HG (ref 40–50)
PCO2 BLDV: 25 MM HG (ref 40–50)
PH BLDV: 7.53 [PH] (ref 7.32–7.43)
PH BLDV: 7.53 [PH] (ref 7.32–7.43)
PH UR STRIP: 6 [PH] (ref 5–7)
PH UR STRIP: 6 [PH] (ref 5–7)
PLATELET # BLD AUTO: 142 10E3/UL (ref 150–450)
PLATELET # BLD AUTO: 151 10E3/UL (ref 150–450)
PLATELET # BLD AUTO: 187 10E3/UL (ref 150–450)
PLATELET # BLD AUTO: 190 10E3/UL (ref 150–450)
PLATELET # BLD AUTO: 190 10E3/UL (ref 150–450)
PO2 BLDV: 71 MM HG (ref 25–47)
PO2 BLDV: 72 MM HG (ref 25–47)
POTASSIUM SERPL-SCNC: 3.9 MMOL/L (ref 3.4–5.3)
POTASSIUM SERPL-SCNC: 4 MMOL/L (ref 3.4–5.3)
POTASSIUM SERPL-SCNC: 4.2 MMOL/L (ref 3.4–5.3)
POTASSIUM SERPL-SCNC: 4.5 MMOL/L (ref 3.4–5.3)
PR INTERVAL - MUSE: 170 MS
PR INTERVAL - MUSE: NORMAL MS
PROT SERPL-MCNC: 6.3 G/DL (ref 6.4–8.3)
PROT SERPL-MCNC: 6.4 G/DL (ref 6.4–8.3)
PROT SERPL-MCNC: 6.8 G/DL (ref 6.4–8.3)
PROT/CREAT 24H UR: 0.91 MG/MG CR (ref 0–0.2)
QRS DURATION - MUSE: 96 MS
QRS DURATION - MUSE: 96 MS
QT - MUSE: 338 MS
QT - MUSE: 368 MS
QTC - MUSE: 375 MS
QTC - MUSE: 452 MS
R AXIS - MUSE: -11 DEGREES
R AXIS - MUSE: 30 DEGREES
RBC # BLD AUTO: 3.17 10E6/UL (ref 4.4–5.9)
RBC # BLD AUTO: 3.17 10E6/UL (ref 4.4–5.9)
RBC # BLD AUTO: 3.34 10E6/UL (ref 4.4–5.9)
RBC # BLD AUTO: 4.38 10E6/UL (ref 4.4–5.9)
RBC # BLD AUTO: 4.43 10E6/UL (ref 4.4–5.9)
RBC URINE: 1 /HPF
RBC URINE: <1 /HPF
RSV RNA SPEC NAA+PROBE: NEGATIVE
RSV RNA SPEC NAA+PROBE: NEGATIVE
SAO2 % BLDV: 96 % (ref 94–100)
SARS-COV-2 RNA RESP QL NAA+PROBE: NEGATIVE
SARS-COV-2 RNA RESP QL NAA+PROBE: NEGATIVE
SODIUM SERPL-SCNC: 130 MMOL/L (ref 136–145)
SODIUM SERPL-SCNC: 133 MMOL/L (ref 136–145)
SODIUM SERPL-SCNC: 136 MMOL/L (ref 136–145)
SODIUM SERPL-SCNC: 142 MMOL/L (ref 136–145)
SP GR UR STRIP: 1.01 (ref 1–1.03)
SP GR UR STRIP: 1.02 (ref 1–1.03)
SYSTOLIC BLOOD PRESSURE - MUSE: NORMAL MMHG
SYSTOLIC BLOOD PRESSURE - MUSE: NORMAL MMHG
T AXIS - MUSE: -21 DEGREES
T AXIS - MUSE: 1 DEGREES
TROPONIN T BLD-MCNC: 0.01 UG/L
TROPONIN T SERPL HS-MCNC: 18 NG/L
TROPONIN T SERPL HS-MCNC: 19 NG/L
TROPONIN T SERPL HS-MCNC: 19 NG/L
TROPONIN T SERPL HS-MCNC: 23 NG/L
TROPONIN T SERPL HS-MCNC: 24 NG/L
UROBILINOGEN UR STRIP-MCNC: NORMAL MG/DL
UROBILINOGEN UR STRIP-MCNC: NORMAL MG/DL
VENTRICULAR RATE- MUSE: 74 BPM
VENTRICULAR RATE- MUSE: 91 BPM
WBC # BLD AUTO: 2.7 10E3/UL (ref 4–11)
WBC # BLD AUTO: 3.4 10E3/UL (ref 4–11)
WBC # BLD AUTO: 5.4 10E3/UL (ref 4–11)
WBC # BLD AUTO: 6.6 10E3/UL (ref 4–11)
WBC # BLD AUTO: 6.6 10E3/UL (ref 4–11)
WBC URINE: 1 /HPF
WBC URINE: 1 /HPF

## 2023-01-01 PROCEDURE — 96367 TX/PROPH/DG ADDL SEQ IV INF: CPT

## 2023-01-01 PROCEDURE — 99285 EMERGENCY DEPT VISIT HI MDM: CPT | Mod: 25 | Performed by: EMERGENCY MEDICINE

## 2023-01-01 PROCEDURE — 250N000011 HC RX IP 250 OP 636: Performed by: EMERGENCY MEDICINE

## 2023-01-01 PROCEDURE — 70450 CT HEAD/BRAIN W/O DYE: CPT | Mod: 26 | Performed by: RADIOLOGY

## 2023-01-01 PROCEDURE — 82962 GLUCOSE BLOOD TEST: CPT

## 2023-01-01 PROCEDURE — 99207 PR APP CREDIT; MD BILLING SHARED VISIT: CPT | Performed by: PHYSICIAN ASSISTANT

## 2023-01-01 PROCEDURE — 93970 EXTREMITY STUDY: CPT | Mod: 26 | Performed by: RADIOLOGY

## 2023-01-01 PROCEDURE — 70553 MRI BRAIN STEM W/O & W/DYE: CPT

## 2023-01-01 PROCEDURE — 84484 ASSAY OF TROPONIN QUANT: CPT | Performed by: EMERGENCY MEDICINE

## 2023-01-01 PROCEDURE — 85027 COMPLETE CBC AUTOMATED: CPT | Performed by: PHYSICIAN ASSISTANT

## 2023-01-01 PROCEDURE — 80048 BASIC METABOLIC PNL TOTAL CA: CPT | Performed by: PHYSICIAN ASSISTANT

## 2023-01-01 PROCEDURE — 82803 BLOOD GASES ANY COMBINATION: CPT

## 2023-01-01 PROCEDURE — 84156 ASSAY OF PROTEIN URINE: CPT | Performed by: PHYSICIAN ASSISTANT

## 2023-01-01 PROCEDURE — 92133 CPTRZD OPH DX IMG PST SGM ON: CPT | Performed by: STUDENT IN AN ORGANIZED HEALTH CARE EDUCATION/TRAINING PROGRAM

## 2023-01-01 PROCEDURE — 93970 EXTREMITY STUDY: CPT

## 2023-01-01 PROCEDURE — 85610 PROTHROMBIN TIME: CPT | Performed by: EMERGENCY MEDICINE

## 2023-01-01 PROCEDURE — 71045 X-RAY EXAM CHEST 1 VIEW: CPT

## 2023-01-01 PROCEDURE — 250N000013 HC RX MED GY IP 250 OP 250 PS 637: Performed by: INTERNAL MEDICINE

## 2023-01-01 PROCEDURE — 81001 URINALYSIS AUTO W/SCOPE: CPT | Performed by: EMERGENCY MEDICINE

## 2023-01-01 PROCEDURE — G0378 HOSPITAL OBSERVATION PER HR: HCPCS

## 2023-01-01 PROCEDURE — 97760 ORTHOTIC MGMT&TRAING 1ST ENC: CPT | Mod: GO | Performed by: OCCUPATIONAL THERAPIST

## 2023-01-01 PROCEDURE — 73060 X-RAY EXAM OF HUMERUS: CPT | Mod: RT

## 2023-01-01 PROCEDURE — 92083 EXTENDED VISUAL FIELD XM: CPT | Performed by: STUDENT IN AN ORGANIZED HEALTH CARE EDUCATION/TRAINING PROGRAM

## 2023-01-01 PROCEDURE — 85025 COMPLETE CBC W/AUTO DIFF WBC: CPT | Performed by: EMERGENCY MEDICINE

## 2023-01-01 PROCEDURE — 250N000013 HC RX MED GY IP 250 OP 250 PS 637: Performed by: PHYSICIAN ASSISTANT

## 2023-01-01 PROCEDURE — 93306 TTE W/DOPPLER COMPLETE: CPT | Mod: 26 | Performed by: INTERNAL MEDICINE

## 2023-01-01 PROCEDURE — 71045 X-RAY EXAM CHEST 1 VIEW: CPT | Mod: 26 | Performed by: RADIOLOGY

## 2023-01-01 PROCEDURE — 84484 ASSAY OF TROPONIN QUANT: CPT

## 2023-01-01 PROCEDURE — 99284 EMERGENCY DEPT VISIT MOD MDM: CPT | Mod: 25

## 2023-01-01 PROCEDURE — 93306 TTE W/DOPPLER COMPLETE: CPT

## 2023-01-01 PROCEDURE — 96361 HYDRATE IV INFUSION ADD-ON: CPT

## 2023-01-01 PROCEDURE — 36415 COLL VENOUS BLD VENIPUNCTURE: CPT | Performed by: PHYSICIAN ASSISTANT

## 2023-01-01 PROCEDURE — 84484 ASSAY OF TROPONIN QUANT: CPT | Performed by: PHYSICIAN ASSISTANT

## 2023-01-01 PROCEDURE — 93005 ELECTROCARDIOGRAM TRACING: CPT

## 2023-01-01 PROCEDURE — 83735 ASSAY OF MAGNESIUM: CPT | Performed by: EMERGENCY MEDICINE

## 2023-01-01 PROCEDURE — 36415 COLL VENOUS BLD VENIPUNCTURE: CPT | Performed by: STUDENT IN AN ORGANIZED HEALTH CARE EDUCATION/TRAINING PROGRAM

## 2023-01-01 PROCEDURE — 70553 MRI BRAIN STEM W/O & W/DYE: CPT | Mod: 26 | Performed by: RADIOLOGY

## 2023-01-01 PROCEDURE — 250N000013 HC RX MED GY IP 250 OP 250 PS 637: Performed by: STUDENT IN AN ORGANIZED HEALTH CARE EDUCATION/TRAINING PROGRAM

## 2023-01-01 PROCEDURE — 255N000001 HC RX 255: Performed by: RADIOLOGY

## 2023-01-01 PROCEDURE — 99239 HOSP IP/OBS DSCHRG MGMT >30: CPT | Performed by: HOSPITALIST

## 2023-01-01 PROCEDURE — 99232 SBSQ HOSP IP/OBS MODERATE 35: CPT | Mod: FS | Performed by: INTERNAL MEDICINE

## 2023-01-01 PROCEDURE — 83690 ASSAY OF LIPASE: CPT | Performed by: EMERGENCY MEDICINE

## 2023-01-01 PROCEDURE — 93010 ELECTROCARDIOGRAM REPORT: CPT | Performed by: EMERGENCY MEDICINE

## 2023-01-01 PROCEDURE — 83880 ASSAY OF NATRIURETIC PEPTIDE: CPT | Performed by: EMERGENCY MEDICINE

## 2023-01-01 PROCEDURE — 99204 OFFICE O/P NEW MOD 45 MIN: CPT | Performed by: ORTHOPAEDIC SURGERY

## 2023-01-01 PROCEDURE — 96360 HYDRATION IV INFUSION INIT: CPT

## 2023-01-01 PROCEDURE — 84155 ASSAY OF PROTEIN SERUM: CPT | Performed by: EMERGENCY MEDICINE

## 2023-01-01 PROCEDURE — 74220 X-RAY XM ESOPHAGUS 1CNTRST: CPT

## 2023-01-01 PROCEDURE — 99207 PR SERVICE NOT STAFFED W/SUPERV PROV: CPT | Performed by: NEUROLOGICAL SURGERY

## 2023-01-01 PROCEDURE — 84484 ASSAY OF TROPONIN QUANT: CPT | Mod: 91 | Performed by: EMERGENCY MEDICINE

## 2023-01-01 PROCEDURE — 99207 PR APP CREDIT; MD BILLING SHARED VISIT: CPT | Performed by: STUDENT IN AN ORGANIZED HEALTH CARE EDUCATION/TRAINING PROGRAM

## 2023-01-01 PROCEDURE — 99285 EMERGENCY DEPT VISIT HI MDM: CPT | Mod: CS | Performed by: EMERGENCY MEDICINE

## 2023-01-01 PROCEDURE — 96375 TX/PRO/DX INJ NEW DRUG ADDON: CPT | Mod: 59

## 2023-01-01 PROCEDURE — 82803 BLOOD GASES ANY COMBINATION: CPT | Performed by: EMERGENCY MEDICINE

## 2023-01-01 PROCEDURE — 71275 CT ANGIOGRAPHY CHEST: CPT | Mod: 26 | Performed by: RADIOLOGY

## 2023-01-01 PROCEDURE — 99223 1ST HOSP IP/OBS HIGH 75: CPT | Mod: AI | Performed by: STUDENT IN AN ORGANIZED HEALTH CARE EDUCATION/TRAINING PROGRAM

## 2023-01-01 PROCEDURE — 70450 CT HEAD/BRAIN W/O DYE: CPT

## 2023-01-01 PROCEDURE — 80053 COMPREHEN METABOLIC PANEL: CPT | Performed by: EMERGENCY MEDICINE

## 2023-01-01 PROCEDURE — 99222 1ST HOSP IP/OBS MODERATE 55: CPT | Mod: FS | Performed by: INTERNAL MEDICINE

## 2023-01-01 PROCEDURE — 85027 COMPLETE CBC AUTOMATED: CPT | Performed by: EMERGENCY MEDICINE

## 2023-01-01 PROCEDURE — 250N000013 HC RX MED GY IP 250 OP 250 PS 637: Performed by: EMERGENCY MEDICINE

## 2023-01-01 PROCEDURE — 71275 CT ANGIOGRAPHY CHEST: CPT

## 2023-01-01 PROCEDURE — 80048 BASIC METABOLIC PNL TOTAL CA: CPT | Performed by: INTERNAL MEDICINE

## 2023-01-01 PROCEDURE — 36415 COLL VENOUS BLD VENIPUNCTURE: CPT | Performed by: EMERGENCY MEDICINE

## 2023-01-01 PROCEDURE — 999N000226 HC STATISTIC SLP IP EVAL DEFER

## 2023-01-01 PROCEDURE — 36415 COLL VENOUS BLD VENIPUNCTURE: CPT | Performed by: INTERNAL MEDICINE

## 2023-01-01 PROCEDURE — 85025 COMPLETE CBC W/AUTO DIFF WBC: CPT | Performed by: STUDENT IN AN ORGANIZED HEALTH CARE EDUCATION/TRAINING PROGRAM

## 2023-01-01 PROCEDURE — 99233 SBSQ HOSP IP/OBS HIGH 50: CPT | Mod: FS | Performed by: PHYSICIAN ASSISTANT

## 2023-01-01 PROCEDURE — 93005 ELECTROCARDIOGRAM TRACING: CPT | Performed by: EMERGENCY MEDICINE

## 2023-01-01 PROCEDURE — 82947 ASSAY GLUCOSE BLOOD QUANT: CPT | Performed by: EMERGENCY MEDICINE

## 2023-01-01 PROCEDURE — G0463 HOSPITAL OUTPT CLINIC VISIT: HCPCS | Mod: 25

## 2023-01-01 PROCEDURE — 99207 OCT OPTIC NERVE RNFL SPECTRALIS OU (BOTH EYES): CPT | Mod: 26 | Performed by: STUDENT IN AN ORGANIZED HEALTH CARE EDUCATION/TRAINING PROGRAM

## 2023-01-01 PROCEDURE — G0463 HOSPITAL OUTPT CLINIC VISIT: HCPCS | Performed by: RADIOLOGY

## 2023-01-01 PROCEDURE — 87637 SARSCOV2&INF A&B&RSV AMP PRB: CPT | Performed by: EMERGENCY MEDICINE

## 2023-01-01 PROCEDURE — 74220 X-RAY XM ESOPHAGUS 1CNTRST: CPT | Mod: 26 | Performed by: RADIOLOGY

## 2023-01-01 PROCEDURE — 99239 HOSP IP/OBS DSCHRG MGMT >30: CPT | Mod: FS | Performed by: INTERNAL MEDICINE

## 2023-01-01 PROCEDURE — 99214 OFFICE O/P EST MOD 30 MIN: CPT | Mod: GC | Performed by: RADIOLOGY

## 2023-01-01 PROCEDURE — 73060 X-RAY EXAM OF HUMERUS: CPT | Mod: 26 | Performed by: RADIOLOGY

## 2023-01-01 PROCEDURE — 96366 THER/PROPH/DIAG IV INF ADDON: CPT

## 2023-01-01 PROCEDURE — C9803 HOPD COVID-19 SPEC COLLECT: HCPCS

## 2023-01-01 PROCEDURE — 258N000003 HC RX IP 258 OP 636: Performed by: PHYSICIAN ASSISTANT

## 2023-01-01 PROCEDURE — 80053 COMPREHEN METABOLIC PANEL: CPT | Performed by: STUDENT IN AN ORGANIZED HEALTH CARE EDUCATION/TRAINING PROGRAM

## 2023-01-01 PROCEDURE — 258N000003 HC RX IP 258 OP 636: Performed by: EMERGENCY MEDICINE

## 2023-01-01 PROCEDURE — 96374 THER/PROPH/DIAG INJ IV PUSH: CPT

## 2023-01-01 PROCEDURE — 85730 THROMBOPLASTIN TIME PARTIAL: CPT | Performed by: EMERGENCY MEDICINE

## 2023-01-01 PROCEDURE — A9585 GADOBUTROL INJECTION: HCPCS | Mod: JZ | Performed by: STUDENT IN AN ORGANIZED HEALTH CARE EDUCATION/TRAINING PROGRAM

## 2023-01-01 PROCEDURE — 255N000002 HC RX 255 OP 636: Mod: JZ | Performed by: STUDENT IN AN ORGANIZED HEALTH CARE EDUCATION/TRAINING PROGRAM

## 2023-01-01 PROCEDURE — 29125 APPL SHORT ARM SPLINT STATIC: CPT | Mod: RT

## 2023-01-01 PROCEDURE — 99285 EMERGENCY DEPT VISIT HI MDM: CPT | Mod: CS,25

## 2023-01-01 PROCEDURE — 29105 APPLICATION LONG ARM SPLINT: CPT | Mod: RT | Performed by: EMERGENCY MEDICINE

## 2023-01-01 PROCEDURE — 92004 COMPRE OPH EXAM NEW PT 1/>: CPT | Performed by: STUDENT IN AN ORGANIZED HEALTH CARE EDUCATION/TRAINING PROGRAM

## 2023-01-01 PROCEDURE — 96365 THER/PROPH/DIAG IV INF INIT: CPT

## 2023-01-01 PROCEDURE — 99222 1ST HOSP IP/OBS MODERATE 55: CPT | Performed by: STUDENT IN AN ORGANIZED HEALTH CARE EDUCATION/TRAINING PROGRAM

## 2023-01-01 PROCEDURE — 92134 CPTRZ OPH DX IMG PST SGM RTA: CPT | Performed by: STUDENT IN AN ORGANIZED HEALTH CARE EDUCATION/TRAINING PROGRAM

## 2023-01-01 PROCEDURE — 81001 URINALYSIS AUTO W/SCOPE: CPT | Performed by: PHYSICIAN ASSISTANT

## 2023-01-01 PROCEDURE — 99223 1ST HOSP IP/OBS HIGH 75: CPT

## 2023-01-01 PROCEDURE — 82310 ASSAY OF CALCIUM: CPT | Performed by: EMERGENCY MEDICINE

## 2023-01-01 RX ORDER — ERYTHROMYCIN 5 MG/G
0.5 OINTMENT OPHTHALMIC 4 TIMES DAILY
Qty: 3.5 G | Refills: 1 | Status: SHIPPED | OUTPATIENT
Start: 2023-01-01 | End: 2023-01-01

## 2023-01-01 RX ORDER — OLANZAPINE 5 MG/1
5 TABLET, ORALLY DISINTEGRATING ORAL ONCE
Status: COMPLETED | OUTPATIENT
Start: 2023-01-01 | End: 2023-01-01

## 2023-01-01 RX ORDER — SODIUM CHLORIDE 9 MG/ML
INJECTION, SOLUTION INTRAVENOUS CONTINUOUS
Status: DISCONTINUED | OUTPATIENT
Start: 2023-01-01 | End: 2023-01-01

## 2023-01-01 RX ORDER — OLANZAPINE 5 MG/1
5 TABLET ORAL AT BEDTIME
COMMUNITY

## 2023-01-01 RX ORDER — LISINOPRIL 20 MG/1
20 TABLET ORAL DAILY
Status: DISCONTINUED | OUTPATIENT
Start: 2023-01-01 | End: 2023-01-01 | Stop reason: HOSPADM

## 2023-01-01 RX ORDER — DEXTROSE MONOHYDRATE 25 G/50ML
25-50 INJECTION, SOLUTION INTRAVENOUS
Status: DISCONTINUED | OUTPATIENT
Start: 2023-01-01 | End: 2023-01-01 | Stop reason: HOSPADM

## 2023-01-01 RX ORDER — LISINOPRIL 20 MG/1
20 TABLET ORAL DAILY
Status: ON HOLD | COMMUNITY
End: 2023-01-01

## 2023-01-01 RX ORDER — NALOXONE HYDROCHLORIDE 0.4 MG/ML
0.2 INJECTION, SOLUTION INTRAMUSCULAR; INTRAVENOUS; SUBCUTANEOUS
Status: DISCONTINUED | OUTPATIENT
Start: 2023-01-01 | End: 2023-01-01 | Stop reason: HOSPADM

## 2023-01-01 RX ORDER — HYDROMORPHONE HYDROCHLORIDE 1 MG/ML
0.5 INJECTION, SOLUTION INTRAMUSCULAR; INTRAVENOUS; SUBCUTANEOUS ONCE
Status: COMPLETED | OUTPATIENT
Start: 2023-01-01 | End: 2023-01-01

## 2023-01-01 RX ORDER — PANTOPRAZOLE SODIUM 40 MG/1
40 TABLET, DELAYED RELEASE ORAL
Qty: 60 TABLET | Refills: 1 | Status: SHIPPED | OUTPATIENT
Start: 2023-01-01

## 2023-01-01 RX ORDER — HYDROMORPHONE HYDROCHLORIDE 2 MG/1
2 TABLET ORAL
Status: DISCONTINUED | OUTPATIENT
Start: 2023-01-01 | End: 2023-01-01 | Stop reason: HOSPADM

## 2023-01-01 RX ORDER — IOPAMIDOL 755 MG/ML
67 INJECTION, SOLUTION INTRAVASCULAR ONCE
Status: COMPLETED | OUTPATIENT
Start: 2023-01-01 | End: 2023-01-01

## 2023-01-01 RX ORDER — CYCLOBENZAPRINE HCL 5 MG
5 TABLET ORAL 3 TIMES DAILY PRN
Status: DISCONTINUED | OUTPATIENT
Start: 2023-01-01 | End: 2023-01-01 | Stop reason: HOSPADM

## 2023-01-01 RX ORDER — DULOXETIN HYDROCHLORIDE 60 MG/1
60 CAPSULE, DELAYED RELEASE ORAL DAILY
COMMUNITY

## 2023-01-01 RX ORDER — PANTOPRAZOLE SODIUM 40 MG/1
40 TABLET, DELAYED RELEASE ORAL
Status: DISCONTINUED | OUTPATIENT
Start: 2023-01-01 | End: 2023-01-01 | Stop reason: HOSPADM

## 2023-01-01 RX ORDER — AMLODIPINE BESYLATE 10 MG/1
10 TABLET ORAL DAILY
Status: DISCONTINUED | OUTPATIENT
Start: 2023-01-01 | End: 2023-01-01 | Stop reason: HOSPADM

## 2023-01-01 RX ORDER — HYDROXYZINE HYDROCHLORIDE 25 MG/1
25 TABLET, FILM COATED ORAL 3 TIMES DAILY PRN
Qty: 30 TABLET | Refills: 0 | Status: SHIPPED | OUTPATIENT
Start: 2023-01-01

## 2023-01-01 RX ORDER — GABAPENTIN 600 MG/1
300 TABLET ORAL 3 TIMES DAILY
Qty: 45 TABLET | Refills: 0 | Status: SHIPPED | OUTPATIENT
Start: 2023-01-01 | End: 2023-01-01

## 2023-01-01 RX ORDER — ASPIRIN 81 MG/1
324 TABLET, CHEWABLE ORAL ONCE
Status: COMPLETED | OUTPATIENT
Start: 2023-01-01 | End: 2023-01-01

## 2023-01-01 RX ORDER — OXYCODONE HYDROCHLORIDE 5 MG/1
5 TABLET ORAL EVERY 4 HOURS PRN
Qty: 18 TABLET | Refills: 0 | Status: SHIPPED | OUTPATIENT
Start: 2023-01-01 | End: 2023-01-01

## 2023-01-01 RX ORDER — PANTOPRAZOLE SODIUM 40 MG/1
40 TABLET, DELAYED RELEASE ORAL
Qty: 60 TABLET | Refills: 1 | Status: SHIPPED | OUTPATIENT
Start: 2023-01-01 | End: 2023-01-01

## 2023-01-01 RX ORDER — PANTOPRAZOLE SODIUM 40 MG/1
40 TABLET, DELAYED RELEASE ORAL
Status: DISCONTINUED | OUTPATIENT
Start: 2023-01-01 | End: 2023-01-01

## 2023-01-01 RX ORDER — ALBUTEROL SULFATE 90 UG/1
1-2 AEROSOL, METERED RESPIRATORY (INHALATION) EVERY 4 HOURS PRN
Status: DISCONTINUED | OUTPATIENT
Start: 2023-01-01 | End: 2023-01-01 | Stop reason: HOSPADM

## 2023-01-01 RX ORDER — CALCIUM CARBONATE 500 MG/1
500 TABLET, CHEWABLE ORAL 2 TIMES DAILY
Status: DISCONTINUED | OUTPATIENT
Start: 2023-01-01 | End: 2023-01-01 | Stop reason: HOSPADM

## 2023-01-01 RX ORDER — OXYCODONE HYDROCHLORIDE 5 MG/1
5 TABLET ORAL EVERY 6 HOURS PRN
Qty: 18 TABLET | Refills: 0 | Status: SHIPPED | OUTPATIENT
Start: 2023-01-01 | End: 2023-08-15

## 2023-01-01 RX ORDER — ONDANSETRON 4 MG/1
4 TABLET, ORALLY DISINTEGRATING ORAL EVERY 6 HOURS PRN
Status: DISCONTINUED | OUTPATIENT
Start: 2023-01-01 | End: 2023-01-01 | Stop reason: HOSPADM

## 2023-01-01 RX ORDER — ERYTHROMYCIN 5 MG/G
OINTMENT OPHTHALMIC
COMMUNITY

## 2023-01-01 RX ORDER — DOXYCYCLINE 100 MG/1
100 CAPSULE ORAL EVERY 12 HOURS SCHEDULED
Status: DISCONTINUED | OUTPATIENT
Start: 2023-01-01 | End: 2023-01-01 | Stop reason: HOSPADM

## 2023-01-01 RX ORDER — LIDOCAINE 40 MG/G
CREAM TOPICAL
Status: DISCONTINUED | OUTPATIENT
Start: 2023-01-01 | End: 2023-01-01 | Stop reason: HOSPADM

## 2023-01-01 RX ORDER — CEFTRIAXONE 2 G/1
2 INJECTION, POWDER, FOR SOLUTION INTRAMUSCULAR; INTRAVENOUS ONCE
Status: COMPLETED | OUTPATIENT
Start: 2023-01-01 | End: 2023-01-01

## 2023-01-01 RX ORDER — OLANZAPINE 5 MG/1
5 TABLET, ORALLY DISINTEGRATING ORAL AT BEDTIME
Status: DISCONTINUED | OUTPATIENT
Start: 2023-01-01 | End: 2023-01-01 | Stop reason: HOSPADM

## 2023-01-01 RX ORDER — GUAIFENESIN 200 MG/10ML
200 LIQUID ORAL EVERY 4 HOURS PRN
COMMUNITY

## 2023-01-01 RX ORDER — NALOXONE HYDROCHLORIDE 0.4 MG/ML
0.4 INJECTION, SOLUTION INTRAMUSCULAR; INTRAVENOUS; SUBCUTANEOUS
Status: DISCONTINUED | OUTPATIENT
Start: 2023-01-01 | End: 2023-01-01 | Stop reason: HOSPADM

## 2023-01-01 RX ORDER — DOXYCYCLINE 100 MG/1
100 CAPSULE ORAL EVERY 12 HOURS
Qty: 4 CAPSULE | Refills: 0 | Status: SHIPPED | OUTPATIENT
Start: 2023-01-01 | End: 2023-01-01

## 2023-01-01 RX ORDER — GADOBUTROL 604.72 MG/ML
0.1 INJECTION INTRAVENOUS ONCE
Status: COMPLETED | OUTPATIENT
Start: 2023-01-01 | End: 2023-01-01

## 2023-01-01 RX ORDER — HYDROMORPHONE HYDROCHLORIDE 2 MG/1
2 TABLET ORAL EVERY 4 HOURS PRN
Status: DISCONTINUED | OUTPATIENT
Start: 2023-01-01 | End: 2023-01-01

## 2023-01-01 RX ORDER — NICOTINE POLACRILEX 4 MG
15-30 LOZENGE BUCCAL
Status: DISCONTINUED | OUTPATIENT
Start: 2023-01-01 | End: 2023-01-01 | Stop reason: HOSPADM

## 2023-01-01 RX ORDER — TAMSULOSIN HYDROCHLORIDE 0.4 MG/1
0.4 CAPSULE ORAL DAILY
Status: DISCONTINUED | OUTPATIENT
Start: 2023-01-01 | End: 2023-01-01 | Stop reason: HOSPADM

## 2023-01-01 RX ORDER — CYCLOBENZAPRINE HCL 5 MG
5 TABLET ORAL 3 TIMES DAILY PRN
Qty: 30 TABLET | Refills: 0 | Status: SHIPPED | OUTPATIENT
Start: 2023-01-01 | End: 2023-01-01

## 2023-01-01 RX ORDER — HYDROMORPHONE HYDROCHLORIDE 1 MG/ML
0.5 INJECTION, SOLUTION INTRAMUSCULAR; INTRAVENOUS; SUBCUTANEOUS EVERY 30 MIN PRN
Status: DISCONTINUED | OUTPATIENT
Start: 2023-01-01 | End: 2023-01-01

## 2023-01-01 RX ORDER — HYDROMORPHONE HYDROCHLORIDE 2 MG/1
2 TABLET ORAL EVERY 4 HOURS PRN
COMMUNITY

## 2023-01-01 RX ORDER — GABAPENTIN 300 MG/1
300 CAPSULE ORAL 3 TIMES DAILY
Status: DISCONTINUED | OUTPATIENT
Start: 2023-01-01 | End: 2023-01-01 | Stop reason: HOSPADM

## 2023-01-01 RX ORDER — GABAPENTIN 300 MG/1
600 CAPSULE ORAL 3 TIMES DAILY
Status: DISCONTINUED | OUTPATIENT
Start: 2023-01-01 | End: 2023-01-01

## 2023-01-01 RX ORDER — LIDOCAINE HYDROCHLORIDE 20 MG/ML
15 SOLUTION OROPHARYNGEAL 2 TIMES DAILY PRN
COMMUNITY

## 2023-01-01 RX ORDER — AMOXICILLIN 250 MG
1 CAPSULE ORAL 2 TIMES DAILY PRN
Status: DISCONTINUED | OUTPATIENT
Start: 2023-01-01 | End: 2023-01-01 | Stop reason: HOSPADM

## 2023-01-01 RX ORDER — IOPAMIDOL 755 MG/ML
74 INJECTION, SOLUTION INTRAVASCULAR ONCE
Status: COMPLETED | OUTPATIENT
Start: 2023-01-01 | End: 2023-01-01

## 2023-01-01 RX ORDER — NITROGLYCERIN 0.4 MG/1
0.4 TABLET SUBLINGUAL EVERY 5 MIN PRN
Status: DISCONTINUED | OUTPATIENT
Start: 2023-01-01 | End: 2023-01-01 | Stop reason: HOSPADM

## 2023-01-01 RX ORDER — ACETAMINOPHEN 650 MG/1
650 SUPPOSITORY RECTAL EVERY 6 HOURS PRN
Status: DISCONTINUED | OUTPATIENT
Start: 2023-01-01 | End: 2023-01-01 | Stop reason: HOSPADM

## 2023-01-01 RX ORDER — CETIRIZINE HYDROCHLORIDE 10 MG/1
10 TABLET ORAL DAILY PRN
Status: ON HOLD | COMMUNITY
End: 2023-01-01

## 2023-01-01 RX ORDER — HYDROCORTISONE 2.5 %
CREAM (GRAM) TOPICAL 2 TIMES DAILY PRN
COMMUNITY

## 2023-01-01 RX ORDER — SENNOSIDES 8.6 MG
1 TABLET ORAL 2 TIMES DAILY
COMMUNITY

## 2023-01-01 RX ORDER — OXYCODONE HYDROCHLORIDE 5 MG/1
5 TABLET ORAL ONCE
Status: COMPLETED | OUTPATIENT
Start: 2023-01-01 | End: 2023-01-01

## 2023-01-01 RX ORDER — AMOXICILLIN 250 MG
1 CAPSULE ORAL DAILY
Status: DISCONTINUED | OUTPATIENT
Start: 2023-01-01 | End: 2023-01-01 | Stop reason: HOSPADM

## 2023-01-01 RX ORDER — ONDANSETRON 8 MG/1
8 TABLET, ORALLY DISINTEGRATING ORAL EVERY 8 HOURS PRN
COMMUNITY

## 2023-01-01 RX ORDER — OXYCODONE HYDROCHLORIDE 5 MG/1
5 TABLET ORAL EVERY 6 HOURS PRN
Qty: 10 TABLET | Refills: 0 | Status: SHIPPED | OUTPATIENT
Start: 2023-01-01 | End: 2023-01-01

## 2023-01-01 RX ORDER — ACETAMINOPHEN 325 MG/1
650 TABLET ORAL EVERY 6 HOURS PRN
Status: DISCONTINUED | OUTPATIENT
Start: 2023-01-01 | End: 2023-01-01 | Stop reason: HOSPADM

## 2023-01-01 RX ORDER — ACETAMINOPHEN 325 MG/1
325-650 TABLET ORAL EVERY 8 HOURS PRN
Status: DISCONTINUED | OUTPATIENT
Start: 2023-01-01 | End: 2023-01-01 | Stop reason: HOSPADM

## 2023-01-01 RX ORDER — AMOXICILLIN 250 MG
2 CAPSULE ORAL 2 TIMES DAILY PRN
Status: DISCONTINUED | OUTPATIENT
Start: 2023-01-01 | End: 2023-01-01 | Stop reason: HOSPADM

## 2023-01-01 RX ORDER — ONDANSETRON 2 MG/ML
4 INJECTION INTRAMUSCULAR; INTRAVENOUS EVERY 6 HOURS PRN
Status: DISCONTINUED | OUTPATIENT
Start: 2023-01-01 | End: 2023-01-01 | Stop reason: HOSPADM

## 2023-01-01 RX ORDER — DULOXETIN HYDROCHLORIDE 60 MG/1
60 CAPSULE, DELAYED RELEASE ORAL DAILY
Status: DISCONTINUED | OUTPATIENT
Start: 2023-01-01 | End: 2023-01-01 | Stop reason: HOSPADM

## 2023-01-01 RX ORDER — PROCHLORPERAZINE MALEATE 5 MG
10 TABLET ORAL EVERY 6 HOURS PRN
Status: DISCONTINUED | OUTPATIENT
Start: 2023-01-01 | End: 2023-01-01 | Stop reason: HOSPADM

## 2023-01-01 RX ORDER — LOPERAMIDE HCL 2 MG
2 CAPSULE ORAL 4 TIMES DAILY PRN
Status: ON HOLD | COMMUNITY
End: 2023-01-01

## 2023-01-01 RX ADMIN — PANTOPRAZOLE SODIUM 40 MG: 40 TABLET, DELAYED RELEASE ORAL at 09:34

## 2023-01-01 RX ADMIN — CALCIUM CARBONATE (ANTACID) CHEW TAB 500 MG 500 MG: 500 CHEW TAB at 19:58

## 2023-01-01 RX ADMIN — GABAPENTIN 300 MG: 300 CAPSULE ORAL at 08:17

## 2023-01-01 RX ADMIN — CALCIUM CARBONATE (ANTACID) CHEW TAB 500 MG 500 MG: 500 CHEW TAB at 07:54

## 2023-01-01 RX ADMIN — APIXABAN 5 MG: 5 TABLET, FILM COATED ORAL at 09:08

## 2023-01-01 RX ADMIN — HYDROMORPHONE HYDROCHLORIDE 0.5 MG: 1 INJECTION, SOLUTION INTRAMUSCULAR; INTRAVENOUS; SUBCUTANEOUS at 07:03

## 2023-01-01 RX ADMIN — GABAPENTIN 300 MG: 300 CAPSULE ORAL at 13:31

## 2023-01-01 RX ADMIN — CALCIUM CARBONATE (ANTACID) CHEW TAB 500 MG 500 MG: 500 CHEW TAB at 19:33

## 2023-01-01 RX ADMIN — Medication 1 TABLET: at 09:09

## 2023-01-01 RX ADMIN — GABAPENTIN 300 MG: 300 CAPSULE ORAL at 00:12

## 2023-01-01 RX ADMIN — HYDROMORPHONE HYDROCHLORIDE 2 MG: 2 TABLET ORAL at 18:50

## 2023-01-01 RX ADMIN — APIXABAN 5 MG: 5 TABLET, FILM COATED ORAL at 19:58

## 2023-01-01 RX ADMIN — ANTACID/ANTIFLATULENT 4 G: 380; 550; 10; 10 GRANULE, EFFERVESCENT ORAL at 13:05

## 2023-01-01 RX ADMIN — GABAPENTIN 300 MG: 300 CAPSULE ORAL at 09:07

## 2023-01-01 RX ADMIN — GABAPENTIN 300 MG: 300 CAPSULE ORAL at 13:43

## 2023-01-01 RX ADMIN — NITROGLYCERIN 0.4 MG: 0.4 TABLET SUBLINGUAL at 18:24

## 2023-01-01 RX ADMIN — GABAPENTIN 300 MG: 300 CAPSULE ORAL at 16:05

## 2023-01-01 RX ADMIN — Medication 1 TABLET: at 07:54

## 2023-01-01 RX ADMIN — PANTOPRAZOLE SODIUM 40 MG: 40 TABLET, DELAYED RELEASE ORAL at 09:08

## 2023-01-01 RX ADMIN — SENNOSIDES AND DOCUSATE SODIUM 1 TABLET: 50; 8.6 TABLET ORAL at 09:34

## 2023-01-01 RX ADMIN — SODIUM CHLORIDE, POTASSIUM CHLORIDE, SODIUM LACTATE AND CALCIUM CHLORIDE 500 ML: 600; 310; 30; 20 INJECTION, SOLUTION INTRAVENOUS at 13:43

## 2023-01-01 RX ADMIN — LISINOPRIL 20 MG: 20 TABLET ORAL at 08:17

## 2023-01-01 RX ADMIN — PANTOPRAZOLE SODIUM 40 MG: 40 TABLET, DELAYED RELEASE ORAL at 16:43

## 2023-01-01 RX ADMIN — ACETAMINOPHEN 650 MG: 325 TABLET, FILM COATED ORAL at 00:12

## 2023-01-01 RX ADMIN — NITROGLYCERIN 0.4 MG: 0.4 TABLET SUBLINGUAL at 18:16

## 2023-01-01 RX ADMIN — APIXABAN 5 MG: 5 TABLET, FILM COATED ORAL at 07:54

## 2023-01-01 RX ADMIN — CEFTRIAXONE SODIUM 2 G: 2 INJECTION, POWDER, FOR SOLUTION INTRAMUSCULAR; INTRAVENOUS at 06:43

## 2023-01-01 RX ADMIN — CALCIUM CARBONATE (ANTACID) CHEW TAB 500 MG 500 MG: 500 CHEW TAB at 08:17

## 2023-01-01 RX ADMIN — ACETAMINOPHEN 650 MG: 325 TABLET, FILM COATED ORAL at 21:22

## 2023-01-01 RX ADMIN — PANTOPRAZOLE SODIUM 40 MG: 40 TABLET, DELAYED RELEASE ORAL at 18:39

## 2023-01-01 RX ADMIN — GABAPENTIN 300 MG: 300 CAPSULE ORAL at 19:58

## 2023-01-01 RX ADMIN — GABAPENTIN 300 MG: 300 CAPSULE ORAL at 11:08

## 2023-01-01 RX ADMIN — Medication 1 TABLET: at 08:18

## 2023-01-01 RX ADMIN — GADOBUTROL 9.7 ML: 604.72 INJECTION INTRAVENOUS at 10:06

## 2023-01-01 RX ADMIN — HYDROMORPHONE HYDROCHLORIDE 0.5 MG: 1 INJECTION, SOLUTION INTRAMUSCULAR; INTRAVENOUS; SUBCUTANEOUS at 16:33

## 2023-01-01 RX ADMIN — APIXABAN 5 MG: 5 TABLET, FILM COATED ORAL at 09:34

## 2023-01-01 RX ADMIN — AMLODIPINE BESYLATE 10 MG: 10 TABLET ORAL at 09:07

## 2023-01-01 RX ADMIN — IOPAMIDOL 67 ML: 755 INJECTION, SOLUTION INTRAVENOUS at 20:11

## 2023-01-01 RX ADMIN — AMLODIPINE BESYLATE 10 MG: 10 TABLET ORAL at 19:33

## 2023-01-01 RX ADMIN — GABAPENTIN 300 MG: 300 CAPSULE ORAL at 19:33

## 2023-01-01 RX ADMIN — OLANZAPINE 5 MG: 5 TABLET, ORALLY DISINTEGRATING ORAL at 01:09

## 2023-01-01 RX ADMIN — NITROGLYCERIN 0.4 MG: 0.4 TABLET SUBLINGUAL at 18:31

## 2023-01-01 RX ADMIN — SODIUM CHLORIDE 1000 ML: 9 INJECTION, SOLUTION INTRAVENOUS at 00:12

## 2023-01-01 RX ADMIN — GABAPENTIN 300 MG: 300 CAPSULE ORAL at 21:22

## 2023-01-01 RX ADMIN — PANTOPRAZOLE SODIUM 40 MG: 40 TABLET, DELAYED RELEASE ORAL at 07:54

## 2023-01-01 RX ADMIN — SENNOSIDES AND DOCUSATE SODIUM 1 TABLET: 50; 8.6 TABLET ORAL at 09:08

## 2023-01-01 RX ADMIN — CALCIUM CARBONATE (ANTACID) CHEW TAB 500 MG 500 MG: 500 CHEW TAB at 00:12

## 2023-01-01 RX ADMIN — Medication 1 TABLET: at 17:47

## 2023-01-01 RX ADMIN — PANTOPRAZOLE SODIUM 40 MG: 40 TABLET, DELAYED RELEASE ORAL at 16:05

## 2023-01-01 RX ADMIN — DOXYCYCLINE HYCLATE 100 MG: 100 CAPSULE ORAL at 09:07

## 2023-01-01 RX ADMIN — IOPAMIDOL 74 ML: 755 INJECTION, SOLUTION INTRAVENOUS at 05:42

## 2023-01-01 RX ADMIN — APIXABAN 5 MG: 5 TABLET, FILM COATED ORAL at 08:18

## 2023-01-01 RX ADMIN — DULOXETINE HYDROCHLORIDE 60 MG: 60 CAPSULE, DELAYED RELEASE ORAL at 11:07

## 2023-01-01 RX ADMIN — ASPIRIN 324 MG: 81 TABLET, CHEWABLE ORAL at 18:14

## 2023-01-01 RX ADMIN — AZITHROMYCIN MONOHYDRATE 500 MG: 500 INJECTION, POWDER, LYOPHILIZED, FOR SOLUTION INTRAVENOUS at 07:39

## 2023-01-01 RX ADMIN — PANTOPRAZOLE SODIUM 40 MG: 40 TABLET, DELAYED RELEASE ORAL at 09:07

## 2023-01-01 RX ADMIN — PANTOPRAZOLE SODIUM 40 MG: 40 TABLET, DELAYED RELEASE ORAL at 08:18

## 2023-01-01 RX ADMIN — AMLODIPINE BESYLATE 10 MG: 10 TABLET ORAL at 09:34

## 2023-01-01 RX ADMIN — OXYCODONE HYDROCHLORIDE 5 MG: 5 TABLET ORAL at 15:10

## 2023-01-01 RX ADMIN — DULOXETINE HYDROCHLORIDE 60 MG: 60 CAPSULE, DELAYED RELEASE ORAL at 09:07

## 2023-01-01 RX ADMIN — Medication 1 TABLET: at 18:20

## 2023-01-01 RX ADMIN — CALCIUM CARBONATE (ANTACID) CHEW TAB 500 MG 500 MG: 500 CHEW TAB at 09:08

## 2023-01-01 RX ADMIN — OLANZAPINE 5 MG: 5 TABLET, ORALLY DISINTEGRATING ORAL at 22:26

## 2023-01-01 RX ADMIN — GABAPENTIN 300 MG: 300 CAPSULE ORAL at 07:54

## 2023-01-01 RX ADMIN — HYDROMORPHONE HYDROCHLORIDE 2 MG: 2 TABLET ORAL at 22:26

## 2023-01-01 RX ADMIN — APIXABAN 5 MG: 5 TABLET, FILM COATED ORAL at 00:12

## 2023-01-01 RX ADMIN — APIXABAN 5 MG: 5 TABLET, FILM COATED ORAL at 21:08

## 2023-01-01 RX ADMIN — APIXABAN 5 MG: 5 TABLET, FILM COATED ORAL at 19:32

## 2023-01-01 ASSESSMENT — CONF VISUAL FIELD
OS_SUPERIOR_NASAL_RESTRICTION: 0
OD_SUPERIOR_NASAL_RESTRICTION: 0
OS_INFERIOR_NASAL_RESTRICTION: 0
OS_NORMAL: 1
OD_INFERIOR_TEMPORAL_RESTRICTION: 0
OS_INFERIOR_TEMPORAL_RESTRICTION: 0
OD_SUPERIOR_TEMPORAL_RESTRICTION: 0
OD_NORMAL: 1
OS_SUPERIOR_TEMPORAL_RESTRICTION: 0
OD_INFERIOR_NASAL_RESTRICTION: 0

## 2023-01-01 ASSESSMENT — ACTIVITIES OF DAILY LIVING (ADL)
ADLS_ACUITY_SCORE: 35
ADLS_ACUITY_SCORE: 31
ADLS_ACUITY_SCORE: 31
ADLS_ACUITY_SCORE: 18
ADLS_ACUITY_SCORE: 18
HEARING_DIFFICULTY_OR_DEAF: NO
ADLS_ACUITY_SCORE: 18
ADLS_ACUITY_SCORE: 31
ADLS_ACUITY_SCORE: 35
ADLS_ACUITY_SCORE: 31
ADLS_ACUITY_SCORE: 31
ADLS_ACUITY_SCORE: 33
DIFFICULTY_EATING/SWALLOWING: NO
ADLS_ACUITY_SCORE: 18
ADLS_ACUITY_SCORE: 35
TOILETING_ISSUES: NO
ADLS_ACUITY_SCORE: 35
ADLS_ACUITY_SCORE: 31
ADLS_ACUITY_SCORE: 35
WALKING_OR_CLIMBING_STAIRS_DIFFICULTY: NO
ADLS_ACUITY_SCORE: 31
ADLS_ACUITY_SCORE: 35
ADLS_ACUITY_SCORE: 31
FALL_HISTORY_WITHIN_LAST_SIX_MONTHS: NO
ADLS_ACUITY_SCORE: 18
ADLS_ACUITY_SCORE: 31
ADLS_ACUITY_SCORE: 18
ADLS_ACUITY_SCORE: 31
ADLS_ACUITY_SCORE: 35
ADLS_ACUITY_SCORE: 18
DRESSING/BATHING_DIFFICULTY: NO
ADLS_ACUITY_SCORE: 35
CONCENTRATING,_REMEMBERING_OR_MAKING_DECISIONS_DIFFICULTY: NO
ADLS_ACUITY_SCORE: 35
ADLS_ACUITY_SCORE: 35
ADLS_ACUITY_SCORE: 18
ADLS_ACUITY_SCORE: 35
ADLS_ACUITY_SCORE: 31
ADLS_ACUITY_SCORE: 35
CHANGE_IN_FUNCTIONAL_STATUS_SINCE_ONSET_OF_CURRENT_ILLNESS/INJURY: NO
ADLS_ACUITY_SCORE: 31
ADLS_ACUITY_SCORE: 31
DIFFICULTY_COMMUNICATING: NO
ADLS_ACUITY_SCORE: 31
ADLS_ACUITY_SCORE: 35
DEPENDENT_IADLS:: CLEANING;COOKING;LAUNDRY;SHOPPING;MEAL PREPARATION
ADLS_ACUITY_SCORE: 35
ADLS_ACUITY_SCORE: 35
ADLS_ACUITY_SCORE: 31
ADLS_ACUITY_SCORE: 18
ADLS_ACUITY_SCORE: 35
ADLS_ACUITY_SCORE: 31
ADLS_ACUITY_SCORE: 31
ADLS_ACUITY_SCORE: 35
ADLS_ACUITY_SCORE: 35
ADLS_ACUITY_SCORE: 31
ADLS_ACUITY_SCORE: 18
ADLS_ACUITY_SCORE: 35
WEAR_GLASSES_OR_BLIND: NO
DOING_ERRANDS_INDEPENDENTLY_DIFFICULTY: NO

## 2023-01-01 ASSESSMENT — REFRACTION_MANIFEST
OD_AXIS: 045
OD_SPHERE: -3.25
OS_CYLINDER: +1.75
OS_SPHERE: -2.50
OD_CYLINDER: +1.75
OS_AXIS: 180

## 2023-01-01 ASSESSMENT — VISUAL ACUITY
METHOD: SNELLEN - LINEAR
OD_SC: 20/50+2
OS_SC: 20/70-

## 2023-01-01 ASSESSMENT — EXTERNAL EXAM - RIGHT EYE: OD_EXAM: WNL

## 2023-01-01 ASSESSMENT — TONOMETRY
IOP_METHOD: TONOPEN
OD_IOP_MMHG: 17
OS_IOP_MMHG: 13

## 2023-01-01 ASSESSMENT — SLIT LAMP EXAM - LIDS
COMMENTS: WNL
COMMENTS: WNL

## 2023-01-01 ASSESSMENT — EXTERNAL EXAM - LEFT EYE: OS_EXAM: WNL

## 2023-01-01 ASSESSMENT — CUP TO DISC RATIO
OD_RATIO: 0.2
OS_RATIO: 0.2

## 2023-01-08 NOTE — ED PROVIDER NOTES
[facility] ([facility])    1/08/23      History     Chief Complaint   Patient presents with     Chest Pain     HPI  Julio John is a 52 year old male with PMH significant for stage IV renal cell carcinoma s/p left-sided nephrectomy (2016) with metastases to brain and lung s/p chemo and gamma knife treatment, prior bilateral PE (on Eliquis), HTN, and GERD who presents to the ED for evaluation of chest pain, back pain, and shortness of breath. He states he has been having central chest pain that radiates to his back since earlier this morning. He also complains of shortness of breath and cramps in his face that radiate back through his head on the R side. He denies any F/C/abd pain/N/V. He does also complain of a cough but he states he has had both the COVID and influenza vaccines.    Past Medical History  Past Medical History:   Diagnosis Date     Alcohol abuse     in remission     Benign essential hypertension      Cerebrovascular accident (H)     2010/2011     Cocaine abuse (H)     in remission     Metastatic renal cell carcinoma (H)     2016     Past Surgical History:   Procedure Laterality Date     PICC INSERTION Left 05/31/2017    5fr DL BioFlo PICC, 45cm (3cm external) in the L medial brachial vein w/ tip in the SVC RA junction.     radical left nephrectomy Left 03/09/2016 2016     acetaminophen (TYLENOL) 325 MG tablet  albuterol (PROAIR HFA/PROVENTIL HFA/VENTOLIN HFA) 108 (90 Base) MCG/ACT inhaler  amLODIPine (NORVASC) 10 MG tablet  apixaban ANTICOAGULANT (ELIQUIS) 5 MG tablet  calcium carbonate (TUMS) 500 MG chewable tablet  calcium-vitamin D (OSCAL) 250-125 MG-UNIT TABS per tablet  cetirizine (ZYRTEC) 10 MG tablet  cyclobenzaprine (FLEXERIL) 5 MG tablet  dexamethasone (DECADRON) 2 MG tablet  ergocalciferol (ERGOCALCIFEROL) 1.25 MG (57473 UT) capsule  gabapentin (NEURONTIN) 300 MG capsule  hypromellose-dextran (GENTEAL TEARS) 0.1-0.3 % ophthalmic  solution  ketoconazole (NIZORAL) 2 % external shampoo  loperamide (IMODIUM) 2 MG capsule  magic mouthwash suspension, diphenhydrAMINE, lidocaine, aluminum-magnesium & simethicone, (FIRST-MOUTHWASH BLM) compounding kit  nystatin (MYCOSTATIN) 303520 UNIT/ML suspension  omeprazole (PRILOSEC) 20 MG DR capsule  ondansetron (ZOFRAN ODT) 8 MG ODT tab  oxyCODONE (ROXICODONE) 5 MG tablet  prochlorperazine (COMPAZINE) 10 MG tablet  senna-docusate (SENOKOT-S/PERICOLACE) 8.6-50 MG tablet  sennosides (SENOKOT) 8.6 MG tablet  tamsulosin (FLOMAX) 0.4 MG capsule  traMADol (ULTRAM) 50 MG tablet  triamcinolone acetonide 0.025 % LOTN      Allergies   Allergen Reactions     Carvedilol Other (See Comments)     Per pt, it gave him back pain     Family History  Family History   Problem Relation Age of Onset     Family History Negative Other         No family history of cancer, kidney cancer     Cancer No family hx of      Social History   Social History     Tobacco Use     Smoking status: Never     Smokeless tobacco: Never   Substance Use Topics     Alcohol use: Not Currently     Drug use: Not Currently     Types: Cocaine         A medically appropriate review of systems was performed with pertinent positives and negatives noted in the HPI, and all other systems negative.    Physical Exam   BP: (!) 148/91  Pulse: 73  Temp: 98.1  F (36.7  C)  Resp: 16  SpO2: 99 %  Physical Exam  Vitals and nursing note reviewed.   Constitutional:       General: He is not in acute distress.     Appearance: He is well-developed. He is not toxic-appearing or diaphoretic.   HENT:      Head: Atraumatic.   Eyes:      General: No scleral icterus.     Extraocular Movements: Extraocular movements intact.      Pupils: Pupils are equal, round, and reactive to light.   Cardiovascular:      Rate and Rhythm: Normal rate and regular rhythm.      Pulses:           Carotid pulses are 2+ on the right side and 2+ on the left side.       Radial pulses are 2+ on the right side  and 2+ on the left side.        Dorsalis pedis pulses are 2+ on the right side and 2+ on the left side.        Posterior tibial pulses are 2+ on the right side and 2+ on the left side.      Heart sounds: Normal heart sounds. No murmur heard.    No friction rub. No gallop.   Pulmonary:      Effort: Pulmonary effort is normal. No tachypnea, accessory muscle usage or respiratory distress.      Breath sounds: Normal breath sounds. No decreased breath sounds, wheezing, rhonchi or rales.   Chest:      Chest wall: No mass or tenderness.   Abdominal:      General: Bowel sounds are normal.      Palpations: Abdomen is soft. There is no mass.      Tenderness: There is no abdominal tenderness. There is no guarding or rebound.   Musculoskeletal:         General: No tenderness. Normal range of motion.      Cervical back: Normal range of motion and neck supple.      Right lower leg: No tenderness. No edema.      Left lower leg: No tenderness. No edema.   Skin:     General: Skin is warm.      Findings: No erythema or rash.   Neurological:      General: No focal deficit present.      Mental Status: He is alert and oriented to person, place, and time.      Motor: No weakness.      Comments: Right upper eyelid droop.   Psychiatric:         Mood and Affect: Mood is not anxious.         Behavior: Behavior normal.         ED Course, Procedures, & Data      Procedures     EKG, CXR, Labs and CTA chest ordered. He was given nitroglycerin SL x3 with improvement of his chest pain from a ten down to a zero. EKG showed NSR, no acute STTW changes. Labs show Cr 1.68 which is up from baseline, VBG with pH 7.53, PCO2 25, and PO2 72, Na 130, otherwise relatively unremarkable. CT chest shows:  1. Exam is negative for pulmonary embolism.     2. Partial visualization of right renal cell carcinoma with slight  increase in size of right adrenal metastasis and decrease in size of  multiple previously seen pulmonary metastases.     3. Stable bony sclerosis  of the manubrium which may be representative  of metastatic disease.    We will plan to bring him in for observation for r/o ACS.         EKG Interpretation:      Interpreted by EDWIN ALCALA MD  Time reviewed: 1920 hrs  Symptoms at time of EKG: chest pain and SOB   Rhythm: normal sinus   Rate: normal  Axis: normal  Ectopy: none  Conduction: normal  ST Segments/ T Waves: No ST-T wave changes  Q Waves: none  Comparison to prior: Unchanged    Clinical Impression: normal EKG            Results for orders placed or performed during the hospital encounter of 01/08/23   XR Chest Port 1 View     Status: None (Preliminary result)    Impression    RESIDENT PRELIMINARY INTERPRETATION  IMPRESSION:    1. Bibasilar streakiness, likely atelectasis.  2. Multiple solid pulmonary nodule in largest CT dated 9/25/2022  consistent with patient's known history of metastatic renal cell  carcinoma.   Troponin T, High Sensitivity     Status: Normal   Result Value Ref Range    Troponin T, High Sensitivity 19 <=22 ng/L   INR     Status: Normal   Result Value Ref Range    INR 1.03 0.85 - 1.15   Partial thromboplastin time     Status: Abnormal   Result Value Ref Range    aPTT 39 (H) 22 - 38 Seconds   Comprehensive metabolic panel     Status: Abnormal   Result Value Ref Range    Sodium 130 (L) 136 - 145 mmol/L    Potassium 4.2 3.4 - 5.3 mmol/L    Chloride 99 98 - 107 mmol/L    Carbon Dioxide (CO2) 18 (L) 22 - 29 mmol/L    Anion Gap 13 7 - 15 mmol/L    Urea Nitrogen 11.4 6.0 - 20.0 mg/dL    Creatinine 1.68 (H) 0.67 - 1.17 mg/dL    Calcium 8.9 8.6 - 10.0 mg/dL    Glucose 101 (H) 70 - 99 mg/dL    Alkaline Phosphatase 39 (L) 40 - 129 U/L    AST 26 10 - 50 U/L    ALT 17 10 - 50 U/L    Protein Total 6.4 6.4 - 8.3 g/dL    Albumin 4.2 3.5 - 5.2 g/dL    Bilirubin Total 0.4 <=1.2 mg/dL    GFR Estimate 49 (L) >60 mL/min/1.73m2   Lipase     Status: Normal   Result Value Ref Range    Lipase 55 13 - 60 U/L   Blood gas venous     Status: Abnormal   Result  Value Ref Range    pH Venous 7.53 (H) 7.32 - 7.43    pCO2 Venous 25 (L) 40 - 50 mm Hg    pO2 Venous 72 (H) 25 - 47 mm Hg    Bicarbonate Venous 21 21 - 28 mmol/L    Base Excess/Deficit (+/-) -0.3 -7.7 - 1.9 mmol/L    FIO2 100    Nt probnp inpatient (BNP)     Status: Normal   Result Value Ref Range    N terminal Pro BNP Inpatient 128 0 - 900 pg/mL   CBC with platelets and differential     Status: Abnormal   Result Value Ref Range    WBC Count 3.4 (L) 4.0 - 11.0 10e3/uL    RBC Count 4.43 4.40 - 5.90 10e6/uL    Hemoglobin 13.3 13.3 - 17.7 g/dL    Hematocrit 39.9 (L) 40.0 - 53.0 %    MCV 90 78 - 100 fL    MCH 30.0 26.5 - 33.0 pg    MCHC 33.3 31.5 - 36.5 g/dL    RDW 14.5 10.0 - 15.0 %    Platelet Count 151 150 - 450 10e3/uL    % Neutrophils 53 %    % Lymphocytes 39 %    % Monocytes 6 %    % Eosinophils 2 %    % Basophils 0 %    % Immature Granulocytes 0 %    NRBCs per 100 WBC 0 <1 /100    Absolute Neutrophils 1.8 1.6 - 8.3 10e3/uL    Absolute Lymphocytes 1.3 0.8 - 5.3 10e3/uL    Absolute Monocytes 0.2 0.0 - 1.3 10e3/uL    Absolute Eosinophils 0.1 0.0 - 0.7 10e3/uL    Absolute Basophils 0.0 0.0 - 0.2 10e3/uL    Absolute Immature Granulocytes 0.0 <=0.4 10e3/uL    Absolute NRBCs 0.0 10e3/uL   Symptomatic Influenza A/B & SARS-CoV2 (COVID-19) Virus PCR Multiplex Nasopharyngeal     Status: Normal    Specimen: Nasopharyngeal; Swab   Result Value Ref Range    Influenza A PCR Negative Negative    Influenza B PCR Negative Negative    RSV PCR Negative Negative    SARS CoV2 PCR Negative Negative    Narrative    Testing was performed using the Xpert Xpress CoV2/Flu/RSV Assay on the Cepheid GeneXpert Instrument. This test should be ordered for the detection of SARS-CoV-2 and influenza viruses in individuals who meet clinical and/or epidemiological criteria. Test performance is unknown in asymptomatic patients. This test is for in vitro diagnostic use under the FDA EUA for laboratories certified under CLIA to perform high or moderate  complexity testing. This test has not been FDA cleared or approved. A negative result does not rule out the presence of PCR inhibitors in the specimen or target RNA in concentration below the limit of detection for the assay. If only one viral target is positive but coinfection with multiple targets is suspected, the sample should be re-tested with another FDA cleared, approved, or authorized test, if coinfection would change clinical management. This test was validated by the [facility]. These laboratories are certified under the Clinical Laboratory Improvement Amendments of 1988 (CLIA-88) as qualified to perform high complexity laboratory testing.   Extra Tube     Status: None    Narrative    The following orders were created for panel order Extra Tube.  Procedure                               Abnormality         Status                     ---------                               -----------         ------                     Extra Red Top Tube[[id number]]                               Final result                 Please view results for these tests on the individual orders.   Extra Red Top Tube     Status: None   Result Value Ref Range    Hold Specimen JIC    Extra Tube     Status: None ()    Narrative    The following orders were created for panel order Extra Tube.  Procedure                               Abnormality         Status                     ---------                               -----------         ------                     Extra Green Top (Lithium...[[id number]]                                                   Please view results for these tests on the individual orders.   iStat Gases (lactate) venous, POCT     Status: Abnormal   Result Value Ref Range    Lactic Acid POCT 0.6 <=2.0 mmol/L    Bicarbonate Venous POCT 20 (L) 21 - 28 mmol/L    O2 Sat, Venous POCT 96 94 - 100 %    pCO2V Venous POCT 24 (L) 40 - 50 mm Hg    pH Venous POCT 7.53 (H) 7.32 - 7.43    pO2 Venous POCT 71 (H) 25  - 47 mm Hg   iStat Troponin, POCT     Status: Normal   Result Value Ref Range    TROPPC POCT 0.01 <=0.12 ug/L   EKG 12 lead     Status: None (Preliminary result)   Result Value Ref Range    Systolic Blood Pressure  mmHg    Diastolic Blood Pressure  mmHg    Ventricular Rate 74 BPM    Atrial Rate 73 BPM    AK Interval  ms    QRS Duration 96 ms     ms    QTc 375 ms    P Axis  degrees    R AXIS -11 degrees    T Axis 1 degrees    Interpretation ECG       Accelerated Junctional rhythm  Low voltage QRS  Cannot rule out Anterior infarct , age undetermined  Abnormal ECG     CBC with platelets differential     Status: Abnormal    Narrative    The following orders were created for panel order CBC with platelets differential.  Procedure                               Abnormality         Status                     ---------                               -----------         ------                     CBC with platelets and d...[[id number]]  Abnormal            Final result                 Please view results for these tests on the individual orders.     Medications   nitroGLYcerin (NITROSTAT) sublingual tablet 0.4 mg (0.4 mg Sublingual Given 1/8/23 1831)   iopamidol (ISOVUE-370) solution 67 mL (has no administration in time range)   sodium chloride (PF) 0.9% PF flush 96 mL (has no administration in time range)   aspirin (ASA) chewable tablet 324 mg (324 mg Oral Given 1/8/23 1814)     Labs Ordered and Resulted from Time of ED Arrival to Time of ED Departure   PARTIAL THROMBOPLASTIN TIME - Abnormal       Result Value    aPTT 39 (*)    COMPREHENSIVE METABOLIC PANEL - Abnormal    Sodium 130 (*)     Potassium 4.2      Chloride 99      Carbon Dioxide (CO2) 18 (*)     Anion Gap 13      Urea Nitrogen 11.4      Creatinine 1.68 (*)     Calcium 8.9      Glucose 101 (*)     Alkaline Phosphatase 39 (*)     AST 26      ALT 17      Protein Total 6.4      Albumin 4.2      Bilirubin Total 0.4      GFR Estimate 49 (*)    BLOOD GAS VENOUS -  Abnormal    pH Venous 7.53 (*)     pCO2 Venous 25 (*)     pO2 Venous 72 (*)     Bicarbonate Venous 21      Base Excess/Deficit (+/-) -0.3      FIO2 100     CBC WITH PLATELETS AND DIFFERENTIAL - Abnormal    WBC Count 3.4 (*)     RBC Count 4.43      Hemoglobin 13.3      Hematocrit 39.9 (*)     MCV 90      MCH 30.0      MCHC 33.3      RDW 14.5      Platelet Count 151      % Neutrophils 53      % Lymphocytes 39      % Monocytes 6      % Eosinophils 2      % Basophils 0      % Immature Granulocytes 0      NRBCs per 100 WBC 0      Absolute Neutrophils 1.8      Absolute Lymphocytes 1.3      Absolute Monocytes 0.2      Absolute Eosinophils 0.1      Absolute Basophils 0.0      Absolute Immature Granulocytes 0.0      Absolute NRBCs 0.0     ISTAT GASES LACTATE VENOUS POCT - Abnormal    Lactic Acid POCT 0.6      Bicarbonate Venous POCT 20 (*)     O2 Sat, Venous POCT 96      pCO2V Venous POCT 24 (*)     pH Venous POCT 7.53 (*)     pO2 Venous POCT 71 (*)    TROPONIN T, HIGH SENSITIVITY - Normal    Troponin T, High Sensitivity 19     INR - Normal    INR 1.03     LIPASE - Normal    Lipase 55     NT PROBNP INPATIENT - Normal    N terminal Pro BNP Inpatient 128     INFLUENZA A/B & SARS-COV2 PCR MULTIPLEX - Normal    Influenza A PCR Negative      Influenza B PCR Negative      RSV PCR Negative      SARS CoV2 PCR Negative     ISTAT TROPONIN POCT - Normal    TROPPC POCT 0.01       XR Chest Port 1 View   Preliminary Result   RESIDENT PRELIMINARY INTERPRETATION   IMPRESSION:      1. Bibasilar streakiness, likely atelectasis.   2. Multiple solid pulmonary nodule in largest CT dated 9/25/2022   consistent with patient's known history of metastatic renal cell   carcinoma.      CT Chest Pulmonary Embolism w Contrast    (Results Pending)          Medical Decision Making  The patient presented with a problem that is an acute illness with systemic symptoms.    The patient's evaluation involved:  review of 3+ prior external note(s) (see separate  area of note for details)  ordering and review of 3+ test(s) (see separate area of note for details)  review of 3+ test result(s) ordered prior to this encounter (see separate area of note for details)    The patient's management involved prescription drug management and a decision regarding hospitalization.      Assessment & Plan    53 yo male here with chest pain and SOB since earlier this morning with known bilateral segmental pulmonary emboli. EKG without any acute changes. Labs show elevated Cr from baseline. CTA chest is negative for PE, shows an increase in his adrenal mass, and a decrease in size of his previously known pulmonary metastases. He will be brought in to medicine on observation stay to r/o ACS.    I have reviewed the nursing notes. I have reviewed the findings, diagnosis, plan and need for follow up with the patient.    New Prescriptions    No medications on file       Final diagnoses:   Chest pain, unspecified type   SOB (shortness of breath)     IJose, am serving as a trained medical scribe to document services personally performed by Ming Carcamo MD, based on the provider's statements to me.     Ming WILDER MD, was physically present and have reviewed and verified the accuracy of this note documented by Jose Pereira.    Ming Carcamo MD  [facility] EMERGENCY DEPARTMENT  1/8/2023     Ming Carcamo MD  01/08/23 1519

## 2023-01-08 NOTE — ED TRIAGE NOTES
Patient ambulatory to triage for chest pain and back pain. Patient stated the chest pain started last night around 5pm. Patient also endorsing shortness of breath.

## 2023-01-09 NOTE — CONSULTS
Gastroenterology Consultation  GI Luminal Service    Date of Admission:  1/8/2023  Reason for Admission: Chest Pain  Date of Consult  1/9/2023   Requesting Physician:  Nirav Redmond MD         ASSESSMENT AND RECOMMENDATIONS:   Assessment:  Julio John is a 52 year old male with a history of stage IV renal cell carcinoma s/p left-sided nephrectomy (2016) with metastases to the brain and lung s/p chemo and gamma knife treatment, bilateral PE (9/25/2022, on Eliquis), hypertension, and GERD who presented 1/8/2023 for chest pain. The GI Luminal Service was consulted regarding concern for dysphagia and odynophagia.     # Globus  # GERD  # Voice Hoarseness  # Dysgeusia   Reports 3 week history of constant sensation of lump in his throat (points to mid-neck), unchanged by swallowing saliva, food or liquid, no dysphagia or odynophagia, which is consistent with globus. Also reports voice hoarseness and change in taste (dysgeusia) that started the same time. Given no dysphagia or odynophagia, no indication for acute GI endoscopic intervention. Of note, currently on Eliquis for recent PE (September 2022). CTA pulmonary angiogram 1/8/2023 reported with normal thyroid, no suspicious mediastinal lymphadenopathy, patulous esophagus, stable bony sclerosis of the manubrium which may be representative of metastatic disease; negative for PE. Regarding globus, anything that can irritate the throat can cause this symptom of globus, including secretions from the sinuses or nasal cavities, lungs, or stomach. Globus can also be caused by visceral hypersensitivity. Once the irritant is eliminated, it can take weeks for the throat to heal and subsequent symptom resolution. Dysgeusia could be chemotherapy related versus GERD versus CNS cause given known brain metastases. From a GI standpoint, gastroesophageal reflux (GERD) can cause irritation so would maximize treatment of GERD with PPI 40 mg PO BID and lifestyle changes  (provided GERD educational documents in Croatian). If globus symptom is not getting better despite maximizing GERD treatment, further evaluation could be considered and facilitated outpatient by the patient's primary care provider, including ENT evaluation of the oropharynx (for both globus and voice hoarseness) +/- procedure only referral for EGD evaluation (for globus, GERD).      Recommendations:  -- Could consider inpatient XR Esophagram (barium).   -- Consider maximizing GERD pharmacotherapy: PPI 40 mg PO BID.  -- Please consider following an antireflux regimen. This includes:  - Do not lie down for at least 3 to 4 hours after meals.  - Raise the head of the bed 6 to 8 inches.  - Decrease excess weight.  - Avoid foods and liquids that cause symptoms.  - Avoid cigarettes and other tobacco products.  -- No indication for acute GI intervention at this time.   -- Continue Supportive Cares  - Adequate volume resuscitation with IVF, pRBCs.  - Monitor Hemoglobin. Transfuse to keep Hgb > 7 g/dL from GI standpoint.   - Monitor Platelets. Keep PLT > 50 10e3/uL from GI standpoint.  - Maintain hemodynamics: MAP >65 mmHg and HR <100.  - Monitor and optimize electrolytes.  - Monitor and optimize nutrition. --> Diet per primary team.   - Reposition/Ambulate every 2 hours while awake.   -- Avoid NSAIDs.  -- Analgesia/Antiemetics per primary team.    Outpatient:  -- If globus symptom is not getting better despite maximizing GERD treatment, further evaluation could be considered and facilitated outpatient by the patient's primary care provider, including ENT evaluation of the oropharynx (for both globus and voice hoarseness) +/- procedure only referral for EGD evaluation (for globus, GERD).     Discussed with Catherine Kramer PA-C - Medicine Gold 3.    COVID status: Negative 1/8/2023.     Thank you for involving us in this patient's care. Please do not hesitate to contact the GI service with any questions or concerns.     The  "patient was discussed and plan agreed upon with Dr. Dulce Maria Miles, GI Luminal Service staff physician.    Overall time spent on the date of this encounter preparing to see the patient (including chart review of available notes, clinical status events, imaging and labs); recommending medications, tests or procedures; communicating with other health care professionals; and documenting the above clinical information in the electronic medical record was 120 minutes.    Bobbi Marshall PA-C  Inpatient Gastroenterology Service  [facility]  Text Page         History of Present Illness:   Patient seen and examined at 11:15 AM. History is obtained from patient (utilized phone  and speaks some English) and chart review.    Julio John is a 52 year old male with a PMH significant for history of stage IV renal cell carcinoma s/p left-sided nephrectomy (2016) with metastases to the brain and lung s/p chemo and gamma knife treatment, bilateral PE (on Eliquis), hypertension, and GERD who presented 1/8/2023 for chest pain. The GI Luminal Service was consulted regarding concern for dysphagia and odynophagia.     Reports for the last 3 weeks, it feels like there is constantly something sitting is his throat (pointing to his mid neck. It is always there regardless of swallowing spit, food or liquid. Reports the feeling is getting \"stronger\" or \"worse\" day by day, feels more and more like there is something sitting in there. Additionally his voice has been more hoarse over the last 3 week, started around the same time. Around the same time his taste has changed as well. Questions whether the chemotherapy could be causing this.     Denies dysphagia (nothing is getting stuck). Denies odynophagia (no pain with swallowing).     Reports some nausea over the weekend but no vomiting.     Ate some tacos last week and had quite a bit of acid reflux after. No acid reflux, heartburn " currently.    Denies abdominal pain.     Normally has 2 bowel movements daily. Since Friday, only 1 bowel movement daily. His one bowel movement today was a loose stool. He questioned imodium. Discussed for now to continue to monitor stools as to not induce constipation given his bowel movement frequency is currently below baseline.     Denies any previous EGDs.     Endorses previous colonoscopies.       Previous Procedures:    8/4/2020 - Diagnostic Colonoscopy for rectal bleeding - [facility], Dr. Ye Solis   Impression:        No evidence for active bleeding or stigmata of recent   bleeding on this examination. Suspect that his   intermittent rectal bleeding is due to internal   hemorrhoids.   - Hemorrhoids found on perianal exam.   - The examined portion of the ileum was normal.   - One 4 mm polyp in the transverse colon, removed with a   cold snare. Resected and retrieved.   - Non-bleeding internal hemorrhoids.   - The examination was otherwise normal.   Recommendation:    - Discharge patient to home.   - Return to referring physician.   - Await pathology results.   - Repeat colonoscopy in 5 years.   - If rectal bleeding remained to be an issue despite   increasing fiber intake and using stool softeners,   recommend consultation with colorectal surgery.   -- Unable to view pathology results in Care Everywhere.     3/5/2018 - Diagnostic Colonoscopy for rectal bleeding - [facility] Dr. Mark Stone  Impression:        - The examined portion of the ileum was normal.   - One 4 mm polyp in the descending colon, removed with a   cold snare. Resected and retrieved.   - One 4 mm polyp in the sigmoid colon, removed with a cold   snare. Resected and retrieved.   - Small non-bleeding internal hemorrhoids.   - The examination was otherwise normal on direct and   retroflexion views.   Recommendation:    - Discharge patient to home (with escort).   - Await pathology results.   - Repeat colonoscopy in 5  years for surveillance.   -- Unable to view pathology results in Care Everywhere.              Past Medical History:   Reviewed and edited as appropriate  Past Medical History:   Diagnosis Date     Alcohol abuse     in remission     Benign essential hypertension      Cerebrovascular accident (H)     2010/2011     Cocaine abuse (H)     in remission     Metastatic renal cell carcinoma (H)     2016            Past Surgical History:   Reviewed and edited as appropriate   Past Surgical History:   Procedure Laterality Date     PICC INSERTION Left 05/31/2017    5fr DL BioFlo PICC, 45cm (3cm external) in the L medial brachial vein w/ tip in the SVC RA junction.     radical left nephrectomy Left 03/09/2016 2016              Social History:   The patient lives in [place], [place].    Alcohol: Denies currently. History of alcohol abuse.  Tobacco: Denies.  Illicit drugs: History of cocaine use.            Family History:   Patient's family history is reviewed today and is non-contributory    Family History   Problem Relation Age of Onset     Family History Negative Other         No family history of cancer, kidney cancer     Cancer No family hx of              Allergies:   Reviewed and edited as appropriate     Allergies   Allergen Reactions     Carvedilol Other (See Comments)     Per pt, it gave him back pain            Medications:     Current Facility-Administered Medications   Medication     acetaminophen (TYLENOL) tablet 650 mg    Or     acetaminophen (TYLENOL) Suppository 650 mg     albuterol (PROVENTIL HFA/VENTOLIN HFA) inhaler     amLODIPine (NORVASC) tablet 10 mg     apixaban ANTICOAGULANT (ELIQUIS) tablet 5 mg     calcium carbonate (TUMS) chewable tablet 500 mg     calcium carbonate-vitamin D (OSCAL) 500-5 MG-MCG per tablet 1 tablet     gabapentin (NEURONTIN) capsule 300 mg     lisinopril (ZESTRIL) tablet 20 mg     melatonin tablet 1 mg     nitroGLYcerin (NITROSTAT) sublingual tablet 0.4 mg     ondansetron (ZOFRAN  ODT) ODT tab 4 mg    Or     ondansetron (ZOFRAN) injection 4 mg     pantoprazole (PROTONIX) EC tablet 40 mg     senna-docusate (SENOKOT-S/PERICOLACE) 8.6-50 MG per tablet 1 tablet    Or     senna-docusate (SENOKOT-S/PERICOLACE) 8.6-50 MG per tablet 2 tablet     tamsulosin (FLOMAX) capsule 0.4 mg             Review of Systems:     A complete review of systems was performed and is negative except as noted in the HPI           Physical Exam:   Temp: 97.5  F (36.4  C) Temp src: Oral BP: 122/68 Pulse: 70   Resp: 16 SpO2: 98 % O2 Device: None (Room air)    Wt:   Wt Readings from Last 2 Encounters:   12/30/22 91.5 kg (201 lb 11.2 oz)   10/26/22 91.9 kg (202 lb 9.6 oz)        General: 52 year old male lying in bed in NAD. Appears comfortable.  Answers appropriately in both English and Finnish via phone .   HEENT: Head is AT/NC. Sclera anicteric. Oropharynx without visualized exudates or lesions.  Lungs: No increased work of breathing, speaking in full sentences, equal chest rise. On room air.   Heart: Regular rate. Peripheral perfusion intact.  Abdomen: Soft, non-tender, non-distended. No peritoneal signs.  Extremities: WWP.  Musculoskeletal: No gross deformity.  Skin: No jaundice or rash on exposed skin.  Neurologic: Grossly non-focal.  CN 2-12 grossly intact.   Mental status/Psych: A&O. Asks/answers questions appropriately. Pleasant, interactive.            Data:   LAB WORK:    BMP  Recent Labs   Lab 01/09/23  0656 01/08/23  1810    130*   POTASSIUM 4.2 4.2   CHLORIDE 105 99   KVNG 9.1 8.9   CO2 18* 18*   BUN 9.8 11.4   CR 1.51* 1.68*   GLC 83 101*     CBC  Recent Labs   Lab 01/09/23  0656 01/08/23  1810   WBC 2.7* 3.4*   RBC 4.38* 4.43   HGB 13.2* 13.3   HCT 39.4* 39.9*   MCV 90 90   MCH 30.1 30.0   MCHC 33.5 33.3   RDW 14.4 14.5   * 151     INR  Recent Labs   Lab 01/08/23  1810   INR 1.03     LFTs  Recent Labs   Lab 01/08/23  1810   ALKPHOS 39*   AST 26   ALT 17   BILITOTAL 0.4   PROTTOTAL 6.4    ALBUMIN 4.2      PANC  Recent Labs   Lab 01/08/23  1810   LIPASE 55       IMAGING:    CTA pulmonary angiogram, 1/8/2023 8:24 PM   FINDINGS:  Contrast bolus is: adequate.  Exam is negative for acute  pulmonary embolism.      Chest: Normal heart size, no pericardial effusion. Normal caliber  thoracic aorta and main pulmonary artery. Normal thyroid. No  suspicious mediastinal lymphadenopathy. Patulous esophagus.     LUNGS: No pleural effusion. No pneumothorax.   Multiple metastases in the lungs overall decreased in size from prior,  for example:   -in the left lower lobe measuring 2.4 x 2.4 cm (series 8, image 120)  previously 2.8 x 2.4 cm  -Solid lesion in the left upper lobe measuring 8 mm, previously 1.5 x  2.0 cm with central cavitation  -In the left upper lobe measuring 1.0 x 0.7 cm previously 1.4 x 0.9 cm  -in the right lower lobe measuring 3.0 x 2.2 cm, previously 3.2 x 2.0  cm         Upper abdomen: 7.4 x 4.9 cm mass arising from the right adrenal,  increased in size from prior which time it measured 7.1 x 4.8 cm.  Partial visualization of right sided renal cell carcinoma. Left  nephrectomy.  Soft tissues/bones: Left subscapularis lipoma. Bony sclerosis of the  manubrium, in retrospect, similar to CT dated 9/25/2022. Mild  degenerative changes of the spine.                                                                      IMPRESSION:   1. Exam is negative for pulmonary embolism.     2. Partial visualization of right renal cell carcinoma with slight  increase in size of right adrenal metastasis and decrease in size of  multiple previously seen pulmonary metastases.     3. Stable bony sclerosis of the manubrium which may be representative  of metastatic disease.      =======================================================================

## 2023-01-09 NOTE — PROGRESS NOTES
Admitted/transferred from: ED  2 RN full   skin assessment completed by Josiane Murray, RN and Anushka ALFRED RN.  Skin assessment finding: issues found Red bumpy rash on left and righ shoulders, neck, and chest. Right shin scab. Peeling and dry bilateral feet. Scattered scars to abdomen and back.   Interventions/actions: skin interventions No interventions taken, patient educated on ambulation and bed mobility.      Bedside Emergency Equipment Present:  Suction Regulator: Yes  Suction Canister: Yes  Tubing between Regulator and Canister: Yes  O2 Regulator with Tree: Yes  Ambu Bag: Yes

## 2023-01-09 NOTE — PROGRESS NOTES
[facility]    Medicine Progress Note - Hospitalist Service, GOLD TEAM 3    Date of Admission:  1/8/2023    Assessment & Plan   Julio John is a 52 year old male with a history of metastatic renal cell carcinoma s/p left nephrectomy (2016), bilateral PE on Eliquis, HTN, GERD, who was admitted with chest pain.     Chest pain, improved: Troponin negative. EKG without ischemic changes. CT PE with no acute PE. Pain is reproducible on examination. Low suspicion for cardiac etiology. Suspect pain is multifactorial secondary to manubrium mets, severe GERD, musculoskeletal pain.   - Tylenol, lidocaine patches PRN   - GERD management as below    LEO  Hyponatremia  Cr to peak of 1.68. Suspect decreased Na and LEO both secondary to dehydration. Labs improved with IVF.    - Encourage oral intake   - BMP in AM    Globus sensation  GERD  Patient reporting 2-3 weeks of globus sensation. GI evaluated today, patient with multiple potential etiologies of globus sensation including GERD, visceral hypersensitivity, increased secretions.    - XR esophagram   - SLP consulted   - PPI 40 mg BID    Stage IV RCC s/p L nephrectomy (2016): With mets to lung and brain. Was on last cycle of Sutent on 11/14/22 when he was admitted with shingles but then was restarted on 12/5/22, per outpatient oncology if disease progression is noted will consider switching to tivozanib.  Follows extensively with outpatient oncology and radiation oncology.     Bilateral PE: Continue PTA Eliquis.     Hypertension: Continue PTA amlodipine. Lisinopril on hold d/t LEO.     Polyuria: Continue PTA Flomax.        Diet: Regular Diet Adult    DVT Prophylaxis: DOAC  Randall Catheter: Not present  Lines: None     Cardiac Monitoring: None  Code Status: Full Code      Clinically Significant Risk Factors Present on Admission               # Drug Induced Coagulation Defect: home medication list includes an anticoagulant  medication    # Hypertension: home medication list includes antihypertensive(s)              Disposition Plan      Expected Discharge Date: 01/11/2023                The patient's care was discussed with the Attending Physician, Dr. Goddard and Patient.    Catherine Kramer PA-C  Hospitalist Service, GOLD TEAM [address] [place] [address]  Securely message with tvCompass (more info)  Text page via [facility] Paging/Directory   See signed in provider for up to date coverage information  ______________________________________________________________________    Interval History   Chest pain has improved this morning. He reports some tenderness to palpation over his left chest wall. Has globus sensation and some discomfort in his chest with swallowing foods or liquids. No coughing or choking with oral intake. No dyspnea, palpitations, leg swelling, exertional symptoms.     Physical Exam   Vital Signs: Temp: 97.5  F (36.4  C) Temp src: Oral BP: 122/68 Pulse: 70   Resp: 16 SpO2: 98 % O2 Device: None (Room air)    Weight: 0 lbs 0 oz    Constitutional: Awake and alert, in no apparent distress. Sitting up comfortably in bed.   Eyes: Sclera clear, anicteric   Respiratory: Breathing non-labored. CTAB. Good air entry.   Cardiovascular:  RRR, normal S1/S2. No rubs or murmurs. Intact bilateral pedal pulses. No peripheral edema.   GI: Soft, non-tender, non-distended.  Normoactive bowel sounds.   Skin:  Good color. No jaundice. No visible rashes, lesions, or bruising of concern.   Neurologic: Alert and fully oriented. No focal deficits.           Medical Decision Making       50 MINUTES SPENT BY ME on the date of service doing chart review, history, exam, documentation & further activities per the note.      Data     I have personally reviewed the following data over the past 24 hrs:    2.7 (L)  \   13.2 (L)   / 142 (L)     136 105 9.8 /  83   4.2 18 (L) 1.51 (H) \       ALT: 17 AST: 26 AP: 39 (L) TBILI: 0.4    ALB: 4.2 TOT PROTEIN: 6.4 LIPASE: 55       Trop: 19 BNP: 128       Procal: N/A CRP: N/A Lactic Acid: 0.6       INR:  1.03 PTT:  39 (H)   D-dimer:  N/A Fibrinogen:  N/A       Imaging results reviewed over the past 24 hrs:   Recent Results (from the past 24 hour(s))   XR Chest Port 1 View    Narrative    EXAM: XR CHEST PORT 1 VIEW  1/8/2023 6:13 PM      HISTORY: chest pain and SOB, r/o fluid overload vs. CAP    COMPARISON: CT 9/25/2022    FINDINGS: Single view of the chest. No pleural effusion. No  pneumothorax. Normal cardiac silhouette. Solid nodule in the mid left  upper lobe measuring approximately 1.2 x 2.0 cm and in the right lower  lobe measuring 1.8 x 2.6 cm, similar to CT dated 9/25/2022. Bibasilar  streakiness. No aggressive osseous abnormalities. Visualized upper  abdomen is unremarkable.       Impression    IMPRESSION:    1. Bibasilar streakiness, likely atelectasis.  2. Multiple solid pulmonary nodules consistent with patient's known  history of metastatic renal cell carcinoma. They appear similar. CT  would be required for definitive change.    I have personally reviewed the examination and initial interpretation  and I agree with the findings.    TRISTAN SHERWOOD MD         SYSTEM ID:  [id number]   CT Chest Pulmonary Embolism w Contrast    Narrative    EXAMINATION: CTA pulmonary angiogram, 1/8/2023 8:24 PM     COMPARISON: X-ray 1/8/2023, CT: 9/25/2022    HISTORY: Renal cell carcinoma. Rule out PE.    TECHNIQUE: Volumetric helical acquisition of CT images of the chest  from the lung apices to the kidneys were acquired after the  administration of 80 mL of Isovue-370 IV contrast. Flash technique  with free breathing acquisition.  Post-processed multiplanar and/or  MIP reformations were obtained, archived to PACS and used in  interpretation of this study.     FINDINGS:  Contrast bolus is: adequate.  Exam is negative for acute  pulmonary embolism.     Chest: Normal heart size, no pericardial effusion. Normal  caliber  thoracic aorta and main pulmonary artery. Normal thyroid. No  suspicious mediastinal lymphadenopathy. Patulous esophagus.    LUNGS: No pleural effusion. No pneumothorax.   Multiple metastases in the lungs overall decreased in size from prior,  for example:   -in the left lower lobe measuring 2.4 x 2.4 cm (series 8, image 120)  previously 2.8 x 2.4 cm  -Solid lesion in the left upper lobe measuring 8 mm, previously 1.5 x  2.0 cm with central cavitation  -In the left upper lobe measuring 1.0 x 0.7 cm previously 1.4 x 0.9 cm  -in the right lower lobe measuring 3.0 x 2.2 cm, previously 3.2 x 2.0  cm       Upper abdomen: 7.4 x 4.9 cm mass arising from the right adrenal,  increased in size from prior which time it measured 7.1 x 4.8 cm.  Partial visualization of right sided renal cell carcinoma. Left  nephrectomy.    Soft tissues/bones: Left subscapularis lipoma. Bony sclerosis of the  manubrium, in retrospect, similar to CT dated 9/25/2022. Mild  degenerative changes of the spine.      Impression    IMPRESSION:   1. Exam is negative for pulmonary embolism.    2. Partial visualization of right renal cell carcinoma with slight  increase in size of right adrenal metastasis and decrease in size of  multiple previously seen pulmonary metastases.    3. Stable bony sclerosis of the manubrium which may be representative  of metastatic disease.      In the event of a positive result for acute pulmonary embolism  resulting in right heart strain, please activate the PERT  Multidisciplinary group for consultation by paging [phone]).     PERT -- Pulmonary Embolism Response Team (Multidisciplinary team  including cardiology, interventional radiology, critical care,  hematology)       I have personally reviewed the examination and initial interpretation  and I agree with the findings.    TRISTAN SHERWOOD MD         SYSTEM ID:  [id number]

## 2023-01-09 NOTE — H&P
[facility]    History and Physical - Hospitalist Service, GOLD TEAM        Date of Admission:  1/8/2023    Assessment & Plan      uJlio John is a 52 year old male with a pmh of significant for stage IV renal cell carcinoma s/p left-sided nephrectomy(2016) who with metastases to brain and lung s/p chemo and gamma knife treatment, bilateral PE (on Eliquis), HTN, and GERD who presents with chest pain work up    1 #Chest pain  - Initial troponin 19 in ED, will trend q6hrs X2  - currently chest pain free on exam  - ECG without ST elevation; consider repeat ECG in AM if chest pain occurs  - given  mg in the ED with nitroglycerin tabs prn  - CT PE study shows no acute PE but does show bony sclerosis of manubrium which is concern for metastatic disease  - consider morphine and oxygen if indications are acute coronary syndrome with prompt cardiac consult    2 # Stage IV renal cell carcinoma s/p left-sided nephrectomy(2016) with metastasis to lung and brain  3 # Hyponatremia  4 # Acute kidney injury  - While on the last cycle of Sutent on 11/14/22 when he was admitted with shingles but then was restarted on 12/5/22, per outpatient oncology if disease progression is noted will consider switching to tivozanib.  Follows extensively with outpatient oncology and radiation oncology  - previous treatments with IL2, nivolumab + ipilumumab, abozantinib, axitinib, everolimus, and lenvatinib  - underwent Gamma Knife ICON treatment in August 2022 to the skull base, right eye doesn't move past midline after that  - Creatinine elevated to 1.68 up from 1.16 last admission; if doesn't improve with fluids consider holding lisinopril  - maintenance fluids overnight at 100 ml/hr  - CT shows partial visualization of right renal cell carcinoma with sight increase in right adrenal metastasis    5 #PE history  - resume prior to admission eliquis    6 #HTN  - resume prior to admission  norvasc and lisinopril for a goal SBP     7 #Dysphagia  8 # neck pain  9 # GERD  - resume prior to admission omeprazole  - consider gastrogafin study and GI consult for dysphagia with both fluids and solids as he is complaining of it gets study in he throat and causing coughing spells, if pain doesn't radiate from acute coronary syndrome    #10 Polyuria  - resume flomax  - send Urinalysis for protenuria     Diet: Regular Diet Adult    DVT Prophylaxis: DOAC  Randall Catheter: Not present  Lines: None     Cardiac Monitoring: None  Code Status:  Full    Clinically Significant Risk Factors Present on Admission               # Drug Induced Coagulation Defect: home medication list includes an anticoagulant medication    # Hypertension: home medication list includes antihypertensive(s)              Disposition Plan  home     Expected Discharge Date: 01/09/2023                The patient's care was discussed with the Attending Physician, Dr. Redmond.    HUBER WALTON PA-C  Hospitalist Service, [facility]  Securely message with [place] (more info)  Text page via [facility] Paging/Directory   See signed in provider for up to date coverage information    ______________________________________________________________________    Chief Complaint   Chest pain    History is obtained from the patient    History of Present Illness   Julio John is a 52 year old male with a pmh of significant for stage IV renal cell carcinoma s/p left-sided nephrectomy(2016) who with metastases to brain and lung s/p chemo and gamma knife treatment, bilateral PE (on Eliquis), HTN, and GERD who presents with two days worth of central chest pain that started in his back and migrated to his chest.  Over the last 2 days he has noted more SOB, dizziness, chills, loss of appetite, and trouble swallowing.  He has a pain in his central neck that he believes in contributing to his trouble  swallowing with both liquids and solids.  Last night while he was sleeping he also needed bilateral forearm numbness.  He denies recent fevers, jaw pain, abdominal pain, N/V/D, coughing up blood, or blood in his stool.       Past Medical History    Past Medical History:   Diagnosis Date     Alcohol abuse     in remission     Benign essential hypertension      Cerebrovascular accident (H)     2010/2011     Cocaine abuse (H)     in remission     Metastatic renal cell carcinoma (H)     2016       Past Surgical History   Past Surgical History:   Procedure Laterality Date     PICC INSERTION Left 05/31/2017    5fr DL BioFlo PICC, 45cm (3cm external) in the L medial brachial vein w/ tip in the SVC RA junction.     radical left nephrectomy Left 03/09/2016 2016       Prior to Admission Medications   Prior to Admission Medications   Prescriptions Last Dose Informant Patient Reported? Taking?   acetaminophen (TYLENOL) 325 MG tablet  at prn Pharmacy, Self Yes Yes   Sig: Take 325-650 mg by mouth every 8 hours as needed for mild pain Take 2 tablets once every 6 hours as needed for mild pain   albuterol (PROAIR HFA/PROVENTIL HFA/VENTOLIN HFA) 108 (90 Base) MCG/ACT inhaler  at prn Pharmacy, Self No Yes   Sig: Inhale 1-2 puffs into the lungs every 4 hours as needed for shortness of breath / dyspnea or wheezing   amLODIPine (NORVASC) 10 MG tablet 1/7/2023 Pharmacy, Self No Yes   Sig: Take 1 tablet (10 mg) by mouth daily   apixaban ANTICOAGULANT (ELIQUIS) 5 MG tablet 1/8/2023 Pharmacy, Self No Yes   Sig: Take 1 tablet (5 mg) by mouth 2 times daily   calcium carbonate (TUMS) 500 MG chewable tablet Not Taking Pharmacy, Self Yes No   Sig: Take 1 chew tab by mouth 2 times daily Chew and swallow 1 tablet by mouth twice daily   Patient not taking: Reported on 1/8/2023   calcium-vitamin D (OSCAL) 250-125 MG-UNIT TABS per tablet 1/8/2023 Pharmacy, Self Yes Yes   Sig: Take 1 tablet by mouth 2 times daily   cetirizine (ZYRTEC) 10 MG tablet  Not Taking Pharmacy, Self Yes No   Sig: Take 10 mg by mouth daily   Patient not taking: Reported on 2023   cyclobenzaprine (FLEXERIL) 5 MG tablet Not Taking Pharmacy, Self Yes No   Sig: Tale 1-2 tablets (5-10 mg) by mouth 3 times daily as needed for muscle cramps.   Patient not taking: Reported on 2023   dexamethasone (DECADRON) 2 MG tablet Not Taking Pharmacy, Self No No   Sig: Take 1 tablet (2 mg) by mouth every 12 hours   Patient not taking: Reported on 2023   ergocalciferol (ERGOCALCIFEROL) 1.25 MG (69641 UT) capsule Not Taking Pharmacy, Self Yes No   Sig: Take 50,000 Units by mouth   Patient not taking: Reported on 2023   gabapentin (NEURONTIN) 300 MG capsule 2023 Pharmacy, Self Yes Yes   Sig: Take 300 mg by mouth 3 times daily Take 2 capsules 3 times daily   hypromellose-dextran (GENTEAL TEARS) 0.1-0.3 % ophthalmic solution Not Taking Pharmacy, Self Yes No   Si drop One drop in right eye 4 times daily   Patient not taking: Reported on 2023   ketoconazole (NIZORAL) 2 % external shampoo Not Taking Pharmacy, Self Yes No   Sig: Use 3 times per week. Apply to affected area, leave on for 20 minutes, then rinse.   Patient not taking: Reported on 2023   lisinopril (ZESTRIL) 20 MG tablet 2023 Pharmacy, Self Yes Yes   Sig: Take 20 mg by mouth daily   loperamide (IMODIUM) 2 MG capsule Not Taking Pharmacy, Self Yes No   Sig: Take 2 mg by mouth Take 2 capsules at the onset of symptoms, then take 1 capsule as needed for diarrhea. DO NOT take more than 8 capsules in 24 hours.   Patient not taking: Reported on 2023   magic mouthwash suspension, diphenhydrAMINE, lidocaine, aluminum-magnesium & simethicone, (FIRST-MOUTHWASH BLM) compounding kit Not Taking Pharmacy, Self No No   Sig: Swish and swallow 5-10 mLs in mouth every 6 hours as needed   Patient not taking: Reported on 2023   neomycin-polymixin-dexamethasone (MAXITROL) ophthalmic ointment  at [facility], Self Yes Yes   Sig:  "Apply small amount (1/4\" - 1/2\" strip) to right eye TID   nystatin (MYCOSTATIN) 204982 UNIT/ML suspension Not Taking Pharmacy, Self No No   Sig: Take 5 mLs (500,000 Units) by mouth 4 times daily   Patient not taking: Reported on 1/8/2023   omeprazole (PRILOSEC) 20 MG DR capsule 1/7/2023 Pharmacy, Self Yes Yes   Sig: Take 20 mg by mouth daily   ondansetron (ZOFRAN ODT) 8 MG ODT tab Not Taking Pharmacy, Self Yes No   Sig: Take 8 mg by mouth   Patient not taking: Reported on 1/8/2023   oxyCODONE (ROXICODONE) 5 MG tablet  at prn Pharmacy, Self Yes Yes   Sig: Take 5 mg by mouth every 6 hours as needed for severe pain   prochlorperazine (COMPAZINE) 10 MG tablet  at prn Pharmacy, Self Yes Yes   Sig: Take 10 mg by mouth every 6 hours as needed for nausea   senna-docusate (SENOKOT-S/PERICOLACE) 8.6-50 MG tablet  at prn Pharmacy, Self Yes Yes   Sig: Take 1 tablet by mouth daily Take 1-2 tablets twice daily as needed as needed for constipation   sennosides (SENOKOT) 8.6 MG tablet Not Taking Pharmacy, Self Yes No   Sig: Take 1 tablet by mouth 2 times daily   Patient not taking: Reported on 1/8/2023   tamsulosin (FLOMAX) 0.4 MG capsule Not Taking Pharmacy, Self No No   Sig: Take 1 capsule (0.4 mg) by mouth daily   Patient not taking: Reported on 1/8/2023   traMADol (ULTRAM) 50 MG tablet  at prn Pharmacy, Self Yes Yes   Sig: Take 50 mg by mouth every 6 hours as needed for severe pain   triamcinolone acetonide 0.025 % LOTN Not Taking Pharmacy, Self Yes No   Sig: Mix with ketoconazole cream and apply to face twice daily   Patient not taking: Reported on 1/8/2023      Facility-Administered Medications: None        Review of Systems    The 10 point Review of Systems is negative other than noted in the HPI or here.      Physical Exam   Vital Signs: Temp: 98.1  F (36.7  C) Temp src: Oral BP: (!) 127/91 Pulse: 67   Resp: 12 SpO2: 97 % O2 Device: None (Room air)    Weight: 0 lbs 0 oz    Gen: well nourished, answers questions " appropriately  HEENT: Right occular movement doesn't cross midline, no scleral icterus, moist mucous membranes  Cardiac: RRR with no murmurs or rubs  Pulm: CTAB with no adventitious breath sounds  Abd: non tender to palaption, non distended, + BS  Extremities: 2+ radial and dorsalis pedis pulses bilaterally, no BLE edema  Neuro: HERNANDEZ to commands, gross sensory intact in all extremities  Psych: appropriate affect    Medical Decision Making     60 MINUTES SPENT BY ME on the date of service doing chart review, history, exam, documentation & further activities per the note.      Data     I have personally reviewed the following data over the past 24 hrs:    3.4 (L)  \   13.3   / 151     130 (L) 99 11.4 /  101 (H)   4.2 18 (L) 1.68 (H) \       ALT: 17 AST: 26 AP: 39 (L) TBILI: 0.4   ALB: 4.2 TOT PROTEIN: 6.4 LIPASE: 55       Trop: 19 BNP: 128       Procal: N/A CRP: N/A Lactic Acid: 0.6       INR:  1.03 PTT:  39 (H)   D-dimer:  N/A Fibrinogen:  N/A       Imaging results reviewed over the past 24 hrs:   Recent Results (from the past 24 hour(s))   XR Chest Port 1 View    Narrative    EXAM: XR CHEST PORT 1 VIEW  1/8/2023 6:13 PM      HISTORY: chest pain and SOB, r/o fluid overload vs. CAP    COMPARISON: CT 9/25/2022    FINDINGS: Single view of the chest. No pleural effusion. No  pneumothorax. Normal cardiac silhouette. Solid nodule in the mid left  upper lobe measuring approximately 1.2 x 2.0 cm and in the right lower  lobe measuring 1.8 x 2.6 cm, similar to CT dated 9/25/2022. Bibasilar  streakiness. No aggressive osseous abnormalities. Visualized upper  abdomen is unremarkable.       Impression    IMPRESSION:    1. Bibasilar streakiness, likely atelectasis.  2. Multiple solid pulmonary nodules consistent with patient's known  history of metastatic renal cell carcinoma. They appear similar. CT  would be required for definitive change.    I have personally reviewed the examination and initial interpretation  and I agree with  the findings.    TRISTAN SHERWOOD MD         SYSTEM ID:  [id number]   CT Chest Pulmonary Embolism w Contrast    Narrative    EXAMINATION: CTA pulmonary angiogram, 1/8/2023 8:24 PM     COMPARISON: X-ray 1/8/2023, CT: 9/25/2022    HISTORY: Renal cell carcinoma. Rule out PE.    TECHNIQUE: Volumetric helical acquisition of CT images of the chest  from the lung apices to the kidneys were acquired after the  administration of 80 mL of Isovue-370 IV contrast. Flash technique  with free breathing acquisition.  Post-processed multiplanar and/or  MIP reformations were obtained, archived to PACS and used in  interpretation of this study.     FINDINGS:  Contrast bolus is: adequate.  Exam is negative for acute  pulmonary embolism.     Chest: Normal heart size, no pericardial effusion. Normal caliber  thoracic aorta and main pulmonary artery. Normal thyroid. No  suspicious mediastinal lymphadenopathy. Patulous esophagus.    LUNGS: No pleural effusion. No pneumothorax.   Multiple metastases in the lungs overall decreased in size from prior,  for example:   -in the left lower lobe measuring 2.4 x 2.4 cm (series 8, image 120)  previously 2.8 x 2.4 cm  -Solid lesion in the left upper lobe measuring 8 mm, previously 1.5 x  2.0 cm with central cavitation  -In the left upper lobe measuring 1.0 x 0.7 cm previously 1.4 x 0.9 cm  -in the right lower lobe measuring 3.0 x 2.2 cm, previously 3.2 x 2.0  cm       Upper abdomen: 7.4 x 4.9 cm mass arising from the right adrenal,  increased in size from prior which time it measured 7.1 x 4.8 cm.  Partial visualization of right sided renal cell carcinoma. Left  nephrectomy.  Soft tissues/bones: Left subscapularis lipoma. Bony sclerosis of the  manubrium, in retrospect, similar to CT dated 9/25/2022. Mild  degenerative changes of the spine.      Impression    IMPRESSION:   1. Exam is negative for pulmonary embolism.    2. Partial visualization of right renal cell carcinoma with slight  increase in size  of right adrenal metastasis and decrease in size of  multiple previously seen pulmonary metastases.    3. Stable bony sclerosis of the manubrium which may be representative  of metastatic disease.      In the event of a positive result for acute pulmonary embolism  resulting in right heart strain, please activate the [facility]  Multidisciplinary group for consultation by paging [phone]  ([phone]).     [facility] -- Pulmonary Embolism Response Team (Multidisciplinary team  including cardiology, interventional radiology, critical care,  hematology)

## 2023-01-09 NOTE — PLAN OF CARE
SLP: Orders received. Chart reviewed and discussed with care team.  SLP not indicated as pt's symptoms appear esophageal/reflux in nature. Pt denies any coughing or choking with PO intake, and denies any aspiration type events. Pt reports he will sometimes start coughing when laying down and reports increase in hoarse voice over the last 3-4 weeks. He reports feeling globus sensation at the level of his throat, that occurs with solids and liquids and has been consistent over the last few weeks. He consumed sips of water during my visit without overt s/sx of aspiration.    Defer discharge recommendations to GI team.  Will complete orders. Please re consult if any changes or concerns arise.

## 2023-01-09 NOTE — PROGRESS NOTES
Observation Goals:    -diagnostic tests and consults completed and resulted: Waiting on further results.  -vital signs normal or at patient baseline: Met      Time: 6947-8362  Temp: 98.1  F (36.7  C) Temp src: Oral BP: 115/77 Pulse: 60   Resp: 10 SpO2: 95 % O2 Device: None (Room air)       Activity: Independent.  Diet: Regular.  Pain: Rates pain at 2. No chest pain.  Neuro: Alert, oriented x4. C/o numbness and tingling in BUE. Mobility intact.   Cardiac: HR and BP normal, denies chest pain at this time.  Respiratory: Lung sounds clear. Complains of heavy chest feeling when taking deep breaths. Room air, sating mid 90's.  Skin: See skin note.   GI/: Voiding spontaneously. LBM 1/8 per patient.  Lines/inf: PIV infusing NS at 100.  New Labs: Trending troponin - results negative.

## 2023-01-09 NOTE — PLAN OF CARE
Goal Outcome Evaluation:  Vitals:    01/08/23 2300 01/09/23 0200 01/09/23 0428 01/09/23 0809   BP: (!) 127/91 (!) 112/91 115/77 122/68   BP Location:   Left arm Left arm   Patient Position:   Supine    Cuff Size:   Adult Regular    Pulse: 67 72 60 70   Resp: 12 10 10 16   Temp:    97.5  F (36.4  C)   TempSrc:    Oral   SpO2: 97% 97% 95% 98%   A/D  Pt had breakfast and  meds early thi shift  stated that he has some ab discomfort  after about 2 hrs.  No nausea reported.  Denied any other pain.  Up tot the BR independently voided and had BM.   I/P Meds given per order urine sample sent to the lab result pending. Primary team assessed pt  no new orders so far.  Continue to follow up per plan of care.

## 2023-01-09 NOTE — ED NOTES
Nurse bedside swallow performed. Able to swallow ice chips, and water but reported difficulty swallowing which is similar to what has recently occurred. Notified Dr. Carcamo.

## 2023-01-10 PROBLEM — R07.9 CHEST PAIN: Status: ACTIVE | Noted: 2023-01-01

## 2023-01-10 NOTE — PLAN OF CARE
[id number]    /77 (BP Location: Left arm)   Pulse 70   Temp (!) 96.7  F (35.9  C) (Oral)   Resp 16   SpO2 98%       Activity: up ad paco   Neuros: alert and orientated x 4, calm and cooperative.  Baseline numbness and tingling bilateral UE's    Cardiac: WNL, denies cardiac chest pain    Respiratory: O2 sats high 90's on RA, unlabored    GI/: abdomen soft/non-tender.  No BM this shift, voiding spont   Diet: sips of water overnight   Lines: PIV   Incisions/Drains: n/a    Labs: reviewed   Pain/nausea: denies having pain, no nausea   New changes this shift: none   Plan: continue POC      Observation goals:      PRIOR TO DISCHARGE        Comments: -diagnostic tests and consults completed and resulted -pending   -vital signs normal or at patient baseline -yes    Nurse to notify provider when observation goals have been met and patient is ready for discharge

## 2023-01-10 NOTE — PROGRESS NOTES
/89 (BP Location: Left arm)   Pulse 80   Temp 97.3  F (36.3  C) (Oral)   Resp 16   SpO2 90%     Status: chest pain workup  Activity: up ad paco   Neuros: A/O x 4, calm and cooperative.  Baseline numbness and tingling bilateral UE's    Cardiac: WNL, denies cardiac chest pain    Respiratory: On RA, unlabored    GI/: abdomen soft/non-tender.  No BM this shift, voiding spont   Diet: Regular die  Lines: PIV   Incisions/Drains: none  Labs: reviewed   Pain/nausea: denies pain and  nausea   New changes this shift: none   Plan: continue POC

## 2023-01-10 NOTE — PLAN OF CARE
Goal Outcome Evaluation:      Plan of Care Reviewed With: patient    Overall Patient Progress: improvingOverall Patient Progress: improving    Cardiac: denies cardiac chest pain   Resp: RA, sating >90%, denies SOB  Neuro: A&Ox4, calm & cooperative, baseline numbness and tingling in BUE    GI/: voiding spontaneously, no BM this shift   Diet: Regular  Skin: no new skin deficits noted or reported by patient   IV access: R PIV SL   Labs: Reviewed   Nausea: denies   Activity: Independent   Pain: denies      Plan: discharge tomorrow   New changes this shift: no new changes, continue POC

## 2023-01-10 NOTE — UTILIZATION REVIEW
"Admission Status; Secondary Review Determination     Admission Date: 1/8/2023  5:56 PM       Under the authority of the Utilization Management Committee, the utilization review process indicated a secondary review on the above patient.  The review outcome is based on review of the medical records, discussions with staff, and applying clinical experience noted on the date of the review.          (x) Observation Status Appropriate - This patient does not meet hospital inpatient criteria and is placed in observation status. If this patient's primary payer is Medicare and was admitted as an inpatient, Condition Code 44 should be used and patient status changed to \"observation\".       RATIONALE FOR DETERMINATION      Brief clinical presentation, information copied from the chart, abbreviated and edited for relevant content:     Paged team to change to OBS      Julio John is a 52 year old male with a history of metastatic renal cell carcinoma s/p left nephrectomy (2016), bilateral PE on Eliquis, HTN, GERD, who was admitted with chest pain.  Troponin negative. EKG without ischemic changes. CT PE with no acute PE. Pain is reproducible on examination. Low suspicion for cardiac etiology. Suspect pain is multifactorial secondary to manubrium mets, severe GERD, musculoskeletal pain. Pain has resolved with symptomatic care. Also with LEO and Hyponatremia, resolved. Cr to peak of 1.68. Suspect decreased Na and LEO both secondary to dehydration. Labs improved with IVF.        The severity of illness, intensity of cares provided, risk for adverse outcome, and expected LOS make the care appropriate for observation.       The information on this document is developed by the utilization review team in order for the business office to ensure compliance.  This only denotes the appropriateness of proper admission status and does not reflect the quality of care rendered.         The definitions of Inpatient Status and " Observation Status used in making the determination above are those provided in the CMS Coverage Manual, Chapter 1 and Chapter 6, section 70.4.      Sincerely,     Carol Jaquez MD   Utilization Review/ Case Management  [facility].

## 2023-01-10 NOTE — PROGRESS NOTES
Observation Goal  -diagnostic tests and consults completed and resulted NO  -vital signs normal or at patient baseline Yes

## 2023-01-10 NOTE — PROGRESS NOTES
[facility]    Medicine Progress Note - Hospitalist Service, GOLD TEAM 3    Date of Admission:  1/8/2023    Assessment & Plan   Julio John is a 52 year old male with a history of metastatic renal cell carcinoma s/p left nephrectomy (2016), bilateral PE on Eliquis, HTN, GERD, who was admitted with chest pain.      Chest pain, resolved: Troponin negative. EKG without ischemic changes. CT PE with no acute PE. Pain is reproducible on examination. Low suspicion for cardiac etiology. Suspect pain is multifactorial secondary to manubrium mets, severe GERD, musculoskeletal pain.   - Tylenol, lidocaine patches PRN   - GERD management as below     LEO  Hyponatremia, resolved  Cr to peak of 1.68. Suspect decreased Na and LEO both secondary to dehydration. Labs improved with IVF.    - Encourage oral intake   - BMP in AM     Globus sensation  GERD  Patient reporting 2-3 weeks of globus sensation. GI evaluated today, patient with multiple potential etiologies of globus sensation including GERD, visceral hypersensitivity, increased secretions.    - XR esophagram today, results pending   - PPI 40 mg BID     Stage IV RCC s/p L nephrectomy (2016): With mets to lung and brain. Was on last cycle of Sutent on 11/14/22 when he was admitted with shingles but then was restarted on 12/5/22, per outpatient oncology if disease progression is noted will consider switching to tivozanib.  Follows extensively with outpatient oncology and radiation oncology.      Bilateral PE: Continue PTA Eliquis.      Hypertension: Continue PTA amlodipine. Lisinopril on hold d/t LEO.      Polyuria: Continue PTA Flomax.           Diet: Regular Diet Adult  Diet    DVT Prophylaxis: DOAC  Randall Catheter: Not present  Lines: None     Cardiac Monitoring: None  Code Status: Full Code      Clinically Significant Risk Factors Present on Admission               # Drug Induced Coagulation Defect: home medication list  includes an anticoagulant medication    # Hypertension: home medication list includes antihypertensive(s)              Disposition Plan      Expected Discharge Date: 01/12/2023      Destination: home;home with family          The patient's care was discussed with the Attending Physician, Dr. Logan and Patient.    Catherine Kramer PA-C  Hospitalist Service, GOLD TEAM 3  [facility]  Securely message with [employer] (more info)  Text page via [facility] Paging/Directory   See signed in provider for up to date coverage information  ______________________________________________________________________    Interval History   Chest pain resolved. Still having some globus sensation. No issues with eating or drinking. No coughing or choking with oral intake. No dyspnea, palpitations, leg swelling, exertional symptoms.     Physical Exam   Vital Signs: Temp: 97.5  F (36.4  C) Temp src: Oral BP: 135/80 Pulse: 68   Resp: 16 SpO2: 98 % O2 Device: None (Room air)    Weight: 0 lbs 0 oz    Constitutional: Awake and alert, in no apparent distress. Sitting up comfortably in bed.   Eyes: Sclera clear, anicteric   Respiratory: Breathing non-labored. CTAB. Good air entry.   Cardiovascular:  RRR, normal S1/S2. No rubs or murmurs. Intact bilateral pedal pulses. No peripheral edema.   GI: Soft, non-tender, non-distended.  Normoactive bowel sounds.   Skin:  Good color. No jaundice. No visible rashes, lesions, or bruising of concern.   Neurologic: Alert and fully oriented. No focal deficits.     Medical Decision Making       35 MINUTES SPENT BY ME on the date of service doing chart review, history, exam, documentation & further activities per the note.      Data     I have personally reviewed the following data over the past 24 hrs:    N/A  \   N/A   / N/A     136 103 14.3 /  96   3.9 19 (L) 1.61 (H) \       Imaging results reviewed over the past 24 hrs:   No results found for this or any previous  visit (from the past 24 hour(s)).

## 2023-01-10 NOTE — PLAN OF CARE
Goal Outcome Evaluation:  /82 (BP Location: Left arm)   Pulse 76   Temp 97.4  F (36.3  C) (Oral)   Resp 17   SpO2 98%     Observation Goals:     -diagnostic tests and consults completed and resulted: Waiting on further results.  -vital signs normal or at patient baseline: Met    Care from: [id number]    Vital signs stable on RA. Denied any pain medication, rated 1/10. C/o some chest tightness with taking deep breaths. A&Ox4, up independently in room. LBM 1/9. C/o numbness and tingling in BUE. Red rash present on left and right shoulders, neck and chest. Scab on R shin. Scars present on abdomen and back. Regular diet, tolerating well. R PIV SL.       Plan: Continue with plan of care and observation goals.       Plan of Care Reviewed With: patient    Overall Patient Progress: improving

## 2023-01-10 NOTE — PROGRESS NOTES
Observation goals:     PRIOR TO DISCHARGE        Comments: -diagnostic tests and consults completed and resulted -pending   -vital signs normal or at patient baseline -yes    Nurse to notify provider when observation goals have been met and patient is ready for discharge

## 2023-01-10 NOTE — PROGRESS NOTES
Observation Goal  -diagnostic tests and consults completed and resulted Yes  -vital signs normal or at patient baseline Yes

## 2023-01-10 NOTE — CONSULTS
Care Management Initial Consult    General Information  Assessment completed with: Patient,    Type of CM/SW Visit: Initial Assessment    Primary Care Provider verified and updated as needed:  (no pcp, is followed closely by oncology)   Readmission within the last 30 days:        Reason for Consult: discharge planning (elevated risk score)  Advance Care Planning: Advance Care Planning Reviewed: no concerns identified          Communication Assessment  Patient's communication style: spoken language (English or Bilingual)    Hearing Difficulty or Deaf: no        Cognitive  Cognitive/Neuro/Behavioral: WDL  Level of Consciousness: alert  Arousal Level: opens eyes spontaneously  Orientation: oriented x 4  Mood/Behavior: behavior appropriate to situation  Best Language: 0 - No aphasia  Speech: clear, spontaneous    Living Environment:   People in home: spouse, child(meg), dependent     Current living Arrangements: apartment      Able to return to prior arrangements: yes       Family/Social Support:  Care provided by: self  Provides care for: child(meg)  Marital Status:   Wife          Description of Support System: Supportive, Involved    Support Assessment: Adequate family and caregiver support, Adequate social supports    Current Resources:   Patient receiving home care services: No     Community Resources: None  Equipment currently used at home: none  Supplies currently used at home: None    Employment/Financial:  Employment Status: unemployed        Financial Concerns: No concerns identified           Lifestyle & Psychosocial Needs:  Social Determinants of Health     Tobacco Use: Low Risk      Smoking Tobacco Use: Never     Smokeless Tobacco Use: Never     Passive Exposure: Not on file   Alcohol Use: Not on file   Financial Resource Strain: Not on file   Food Insecurity: Not on file   Transportation Needs: Not on file   Physical Activity: Not on file   Stress: Not on file   Social Connections: Not on file    Intimate Partner Violence: Not on file   Depression: Not at risk     PHQ-2 Score: 2   Housing Stability: Not on file       Functional Status:  Prior to admission patient needed assistance:   Dependent ADLs:: Independent  Dependent IADLs:: Cleaning, Cooking, Laundry, Shopping, Meal Preparation (wife assists)  Assesssment of Functional Status: At functional baseline    Mental Health Status:  Mental Health Status: No Current Concerns       Chemical Dependency Status:  Chemical Dependency Status: No Current Concerns             Values/Beliefs:  Spiritual, Cultural Beliefs, Mormonism Practices, Values that affect care: yes               Additional Information:  Patient is a 52 year old male with past medical history of metastatic renal cell carcinoma s/p left nephrectomy, bilateral PE on eliquis, HTN, GERD, who was admitted with chest pain.      Met with patient at bedside to introduce RNCC role and discuss discharge planning.  Patient lives with his wife and 3 of his children(ages 3, 6, and 9).  Patient reports being independent with all ADL's prior to admission.  His wife assists with all IADL's including transportation.  Patient reports having no discharge concerns.  His wife will provide transportation to home on day of discharge.  RNCC will remain available if further needs arise.       LAUREANO Morales  Phone: [phone]  Pager: [phone]    SEARCHABLE in [facility] - search CARE COORDINATOR     [place] ([id number]) Saturday & Sunday; ([id number]) [facility] Recognized Holidays     Units: 4A, 4C, 4E, 5A & 5B   Pager: [phone]    Units: 6A & 6B    Pager: [phone]    Units: 6C & 6D   Pager: [phone]    Units: 7A, 7B, 7C, 7D & 5C    Pager: [phone]    Units: [facility] ED, 5 Ortho, 5 Med/Surg, 6 Med/Surg, 8A, 10 ICU, & [facility]    Pager: [phone]

## 2023-01-11 NOTE — PLAN OF CARE
Blood pressure 136/85, pulse 71, temperature 97.2  F (36.2  C), temperature source Oral, resp. rate 16, SpO2 100 %.      Cardiac: WNL   Resp: WNL on RA  Neuro: A&Ox4, calm & cooperative, baseline numbness and tingling in BUE    GI/: voiding spontaneously, not saving no BM this shift   Diet: Regular  Skin: no new skin deficits noted  IV access: R PIV SL   Labs: Reviewed. Creatinine: 1.61 (up from 1.51)  Nausea: Denies  Activity: Independent in room   Pain: Reporting acute R ear pain overnight. Managed w/ PRN tylenol x1       Plan: Probable discharge today 1/11

## 2023-01-11 NOTE — DISCHARGE SUMMARY
[facility]  Hospitalist Discharge Summary      Date of Admission:  1/8/2023  Date of Discharge:  1/11/2023  Discharging Provider: Catherine Kramer PA-C; Dr. Logan  Discharge Service: Hospitalist Service, GOLD TEAM 3    Discharge Diagnoses   Chest pain  LEO  Hyponatremia  Globus sensation  GERD  Stage IV RCC s/p left nephrectomy  Bilateral PE  Hypertension    Follow-ups Needed After Discharge    - Follow-up with Oncology as scheduled next week   - Establish care and follow-up with a new primary care provider at first available appointment,  referral placed   - Follow-up with Ophthalmology at next available appointment, referral placed   - Follow-up with ENT as needed, referral can be discussed with PCP       Discharge Disposition   Discharged to home  Condition at discharge: Stable      Hospital Course   Julio John is a 52 year old male with a history of metastatic renal cell carcinoma s/p left nephrectomy (2016), bilateral PE on Eliquis, HTN, GERD, who was admitted with chest pain.      Chest pain, resolved: Troponin negative. EKG without ischemic changes. CT PE with no acute PE. Pain is reproducible on examination. Low suspicion for cardiac etiology. Suspect pain is multifactorial secondary to manubrium mets, severe GERD, musculoskeletal pain. Patient discharged with Tylenol, Flexeril, and already has a home supply of oxycodone.      LEO  Hyponatremia, resolved  Cr to peak of 1.68. Suspect decreased Na and LEO both secondary to dehydration. Labs improved with IVF.     Globus sensation  GERD  Patient reporting 2-3 weeks of globus sensation. GI evaluated today, patient with multiple potential etiologies of globus sensation including GERD, visceral hypersensitivity, increased secretions. GI consulted. XR esophagram was normal. Per GI patient with multiple potential etiologies of globus sensation including GERD, visceral hypersensitivity, increased  secretions 2/2 sinus and pulmonary metastases. SLP evaluated. Patient ok for regular diet with reflux precautions.   - Pantoprazole 40 mg BID   - GI recommends evaluation with ENT if symptoms fail to improve over the next 1-2 months. ENT referral can be discussed with PCP as needed.     Stage IV RCC s/p L nephrectomy (2016): With mets to lung and brain. Was on last cycle of Sutent on 11/14/22 when he was admitted with shingles but then was restarted on 12/5/22, per outpatient oncology if disease progression is noted will consider switching to tivozanib.  Follows extensively with outpatient oncology and radiation oncology.      Bilateral PE: Continue PTA Eliquis.      Hypertension: Continue PTA amlodipine, lisinopril.           Consultations This Hospital Stay   GI LUMINAL ADULT IP CONSULT  SPEECH LANGUAGE PATH ADULT IP CONSULT  CARE MANAGEMENT / SOCIAL WORK IP CONSULT    Code Status   Full Code    Time Spent on this Encounter   I, Catherine Kramer PA-C, personally saw the patient today and spent greater than 30 minutes discharging this patient.       Catherine Kramer PA-C  [facility] [address] [address]  Phone: [phone]  ______________________________________________________________________    Physical Exam   Vital Signs: Temp: 97.3  F (36.3  C) Temp src: Oral BP: 128/86 Pulse: 66   Resp: 18 SpO2: 96 % O2 Device: None (Room air)    Weight: 0 lbs 0 oz  Constitutional: Awake and alert, in no apparent distress. Sitting up comfortably in bed.   Eyes: Sclera clear, anicteric   Respiratory: Breathing non-labored. CTAB. Good air entry.   Cardiovascular:  RRR, normal S1/S2. No rubs or murmurs. Intact bilateral pedal pulses. No peripheral edema.   GI: Soft, non-tender, non-distended.  Normoactive bowel sounds.   Skin:  Good color. No jaundice. No visible rashes, lesions, or bruising of concern.   Neurologic: Alert and fully oriented. No focal deficits.        Primary  Care Physician   Physician No Ref-Primary    Discharge Orders      Basic metabolic panel     Adult Eye  Referral      Internal Medicine Referral      Activity    Your activity upon discharge: Activity as tolerated     Adult [facility] Follow-up and recommended labs and tests    Follow-up with primary care provider within 1 week for hospital follow-up.     You should have your kidney function labs rechecked on Friday 1/13/23. These labs are ordered for you, you will need to make a lab only appointment at the lab of your choice. Results will be forwarded to your primary care provider.     Follow-up with Gastroenterology as needed.     Appointments on [place] and/or [facility] (with [facility] or [facility] provider or service). Call [phone] if you haven't heard regarding these appointments within 7 days of discharge.     Reason for your hospital stay    Dear Julio John,    You were hospitalized at [facility] with chest pain. Your heart tests were normal and your chest pain resolved spontaneously. You were also evaluated for your swallowing difficulties, this is most likely due to acid reflux and will require an increase in your antacid medication. Today you are ready to be discharged home. You should continue to improve but if you develop fever, shortness of breath, light headedness, chest pain or otherwise worsen please seek medical attention.      Take care!    Catherine Kramer PA-C   Hospitalist Service     Diet    Follow this diet upon discharge: Regular diet. Follow reflux precautions as discussed by Gastroenterology.       Significant Results and Procedures   ROUTINE IP LABS (Last four results)  Recent Labs   Lab 01/11/23  0817 01/10/23  0634 01/09/23  0656 01/08/23  1810    136 136 130*   POTASSIUM 4.0 3.9 4.2 4.2   CHLORIDE 103 103 105 99   CO2 20* 19* 18* 18*   ANIONGAP 13 14 13 13   GLC 98 96 83 101*   BUN 11.9 14.3 9.8 11.4   CR 1.50* 1.61* 1.51* 1.68*    KVNG 8.8 9.1 9.1 8.9   PROTTOTAL  --   --   --  6.4   ALBUMIN  --   --   --  4.2   BILITOTAL  --   --   --  0.4   ALKPHOS  --   --   --  39*   AST  --   --   --  26   ALT  --   --   --  17     Recent Labs   Lab 01/09/23  0656 01/08/23  1810   WBC 2.7* 3.4*   RBC 4.38* 4.43   HGB 13.2* 13.3   HCT 39.4* 39.9*   MCV 90 90   MCH 30.1 30.0   MCHC 33.5 33.3   RDW 14.4 14.5   * 151     Recent Labs   Lab 01/08/23  1810   INR 1.03            Discharge Medications   Current Discharge Medication List      START taking these medications    Details   cyclobenzaprine (FLEXERIL) 5 MG tablet Take 1 tablet (5 mg) by mouth 3 times daily as needed for muscle spasms  Qty: 30 tablet, Refills: 0    Associated Diagnoses: Metastatic renal cell carcinoma, unspecified laterality (H)      pantoprazole (PROTONIX) 40 MG EC tablet Take 1 tablet (40 mg) by mouth 2 times daily (before meals)  Qty: 60 tablet, Refills: 1    Associated Diagnoses: Gastroesophageal reflux disease without esophagitis         CONTINUE these medications which have NOT CHANGED    Details   acetaminophen (TYLENOL) 325 MG tablet Take 325-650 mg by mouth every 8 hours as needed for mild pain Take 2 tablets once every 6 hours as needed for mild pain      albuterol (PROAIR HFA/PROVENTIL HFA/VENTOLIN HFA) 108 (90 Base) MCG/ACT inhaler Inhale 1-2 puffs into the lungs every 4 hours as needed for shortness of breath / dyspnea or wheezing  Qty: 18 g, Refills: 4    Comments: Pharmacy may dispense brand covered by insurance (Proair, or proventil or ventolin or generic albuterol inhaler)  Associated Diagnoses: Metastatic renal cell carcinoma, left (H); SOB (shortness of breath); Malignant neoplasm metastatic to both lungs (H)      amLODIPine (NORVASC) 10 MG tablet Take 1 tablet (10 mg) by mouth daily  Qty: 90 tablet, Refills: 3    Associated Diagnoses: Essential hypertension      apixaban ANTICOAGULANT (ELIQUIS) 5 MG tablet Take 1 tablet (5 mg) by mouth 2 times daily  Qty: 60  "tablet, Refills: 0    Associated Diagnoses: Multiple pulmonary emboli (H)      calcium-vitamin D (OSCAL) 250-125 MG-UNIT TABS per tablet Take 1 tablet by mouth 2 times daily      gabapentin (NEURONTIN) 300 MG capsule Take 300 mg by mouth 3 times daily Take 2 capsules 3 times daily      lisinopril (ZESTRIL) 20 MG tablet Take 20 mg by mouth daily      neomycin-polymixin-dexamethasone (MAXITROL) ophthalmic ointment Apply small amount (1/4\" - 1/2\" strip) to right eye TID      prochlorperazine (COMPAZINE) 10 MG tablet Take 10 mg by mouth every 6 hours as needed for nausea      senna-docusate (SENOKOT-S/PERICOLACE) 8.6-50 MG tablet Take 1 tablet by mouth daily Take 1-2 tablets twice daily as needed as needed for constipation         STOP taking these medications       calcium carbonate (TUMS) 500 MG chewable tablet Comments:   Reason for Stopping:         omeprazole (PRILOSEC) 20 MG DR capsule Comments:   Reason for Stopping:         oxyCODONE (ROXICODONE) 5 MG tablet Comments:   Reason for Stopping:         tamsulosin (FLOMAX) 0.4 MG capsule Comments:   Reason for Stopping:         traMADol (ULTRAM) 50 MG tablet Comments:   Reason for Stopping:             Allergies   Allergies   Allergen Reactions     Carvedilol Other (See Comments)     Per pt, it gave him back pain     "

## 2023-01-11 NOTE — PROGRESS NOTES
Brief GI Luminal Sign-Off Note:    The patient was not seen or examined today.     Chart Reviewed, plans for discharge today.     S/p Esophagram 1/10/2023 with no structural abnormalities of the esophagus identified. No indication for acute GI intervention at this time.    Regarding patient's symptom of globus, anything that can irritate the throat can cause globus, including secretions from the sinuses or nasal cavities (known metastasis to sinuses and nasal pharynx), lungs (known pulmonary metastasis), or stomach (known GERD). Globus can also be caused by visceral hypersensitivity. Once the irritant is eliminated (if elimination is possible), it can take weeks for the throat to heal and subsequent symptom resolution.     GERD educational documents were provided in [place] language to the patient.     Recommendations:  -- Continue maximizing GERD pharmacotherapy: PPI 40 mg PO BID.  -- Please consider following an antireflux regimen. This includes:  - Do not lie down for at least 3 to 4 hours after meals.  - Raise the head of the bed 6 to 8 inches.  - Decrease excess weight.  - Avoid foods and liquids that cause symptoms.  - Avoid cigarettes and other tobacco products.  -- No indication for acute GI intervention at this time.   -- Continue Supportive Cares  - Adequate volume resuscitation with IVF, pRBCs.  - Monitor Hemoglobin. Transfuse to keep Hgb > 7 g/dL from GI standpoint.   - Monitor Platelets. Keep PLT > 50 10e3/uL from GI standpoint.  - Maintain hemodynamics: MAP >65 mmHg and HR <100.  - Monitor and optimize electrolytes.  - Monitor and optimize nutrition. --> Diet per primary team.   - Reposition/Ambulate every 2 hours while awake.   -- Avoid NSAIDs.  -- Analgesia/Antiemetics per primary team.     Outpatient:  -- Follow-up with PCP regarding symptom of globus.    - If globus symptom is not getting better despite maximizing GERD treatment, further evaluation could be considered and facilitated outpatient by  the patient's primary care provider, considering referral for outpatient ENT evaluation of the oropharynx (for both globus and voice hoarseness).  -- No outpatient GI follow-up necessary.     Discussed with Catherine Kramer PA-C - Medicine Gold 3.     The inpatient gastroenterology service will sign off at this time. Thank you for allowing us to participate in the care of this patient. Please do not hesitate to page the GI service with any questions or concerns.      The patient was discussed and plan agreed upon with Dr. Dulce Maria Miles, GI Luminal Service staff physician.     Bobbi Marshall PA-C  Inpatient Gastroenterology Service  [facility]  Text Page    __________________________________________________________________________________      Imaging:    Double Contrast Esophagram dated 1/10/2023  HISTORY:    Globus sensation     TECHNIQUE: In standing position, the patient was given effervescent  granules and water followed by high density barium. Patient was then  given thin barium in the prone right anterior oblique position.     FINDINGS: Examination of the esophagus reveals adequate primary and  secondary peristalsis. There were no focal strictures, masses or  mucosal abnormalities. The gastroesophageal junction is patent. No  hiatal hernia was seen on examination. There was absence of  gastroesophageal reflux. Transient stasis of contrast in the  midesophagus with slight splitting of the contrast bolus while in the  prone right anterior oblique position.                                                                    IMPRESSION: No structural abnormalities of the esophagus identified.

## 2023-01-16 NOTE — TELEPHONE ENCOUNTER
Telephone Encounter    Writer spoke to Mr Baron John, a patient of Dr. Schaefer who has been treated with multiple courses of RT secondary to brain metastases from RCC primary, most recently treated in 9/2022 with 27.5 Gy in 5 fractions Gamma Knife SRS. He contacted our office today with complaint of a headache that began on 1/14. He also complained of chest pain.  His number was tried with multiple attempts with no answer. A call to his spouse's listed number was also made, with no answer. An attempt was made to leave a message but the mailbox did not record a message.     Sudhakar Poe MD on 1/16/2023 at 4:20 PM

## 2023-01-19 NOTE — TELEPHONE ENCOUNTER
Telephone Encounter    Mr Baron called the gamma knife suite today with complaint of ongoing headaches. Writer was able to speak to him. He reports worsening headache 10/10 since last week. He notes this started the day after his discharge. He endorses nausea and vomiting, imbalance, and worsening vision.  These are not alleviated with Tramadol or Oxycodone.   He had a followup appointment with Dr. Schmitz at [facility] who evaluated him earlier today. She has ordered MR brain for ~5pm today, and will decide on whether to admit based on stat radiology read.      Sudhakar Poe MD on 1/19/2023 at 3:46 PM

## 2023-01-25 NOTE — PROGRESS NOTES
HPI     Esotropia Follow Up    In right eye.  Duration of 4 months.  Occurring constantly.  Since onset it is gradually worsening.  Associated symptoms include dizziness.  Negative for blurred vision.  Pain was noted as 0/10.           Comments    Pt here for evaluation of esotropia onset right eye after radiation.  He is unable to move his right eye temporally at all.  He can move his eye up and down, but with pain when looking up.    Vision is clear if he covers his left eye, but with both eyes open he sees double.      Per office visit with Dr. Martínez in June:  POH:  - History of radiation therapy   - Dry eyes  - Anisometropia, myopia  - Right CN VI palsy.      He states he gets some white (sometimes green) thing in his right eye in the right lower lid which his wife has removed with both q-tips and a finger, and his eye feels better.  He wonders what we recommend when this happens in the future and he wants to know why he is getting these things in his eye.      MC Luevano 10:46 AM 01/25/2023              Last edited by Angelique Crespo on 1/25/2023 10:50 AM.          Review of systems for the eyes was negative other than the pertinent positives/negatives listed in the HPI.    Ocular Meds: ATs prn OU    Ocular Hx: History of radiation therapy; Dry eyes OU; Refractive error OU; Right CN VI palsy.    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 Stable right temporal calvarial, dural, and leptomeningeal  metastasis with involvement of the scalp.  2. Stable metastasis involving the clivus, right cavernous sinus,  sphenoid sinus, and nasopharynx.  3. Mildly increased edema in the right temporal lobe..    Assessment & Plan      Julio John is a 52 year old male with the following diagnoses:    1. Brain metastasis (H)    2. Sixth cranial nerve  palsy, right    3. Visual disturbance    4. Degenerative drusen of both eyes    5. Dry eyes, bilateral    6. Injury of conjunctiva and corneal abrasion of right eye w/o FB, initial encounter    7. Myopia of both eyes with astigmatism       Hx of Renal Cell Carcinoma with Brain Metastasis  Sixth cranial nerve palsy, right sided  - known history of RCC with brain met; imaging on 12/30/22 with involvement of the clivus, right cavernous sinus, sphenoid sinus, and nasopharynx  - significant abduction deficit of right eye  - advised to patch one eye for comfort to alleviate symptoms of diplopia  - baseline visual field testing and oct rnfl ou today  - no RAPD OU on pupil exam by eye tech, color plates full OU  - will refer to neuro-ophthalmology    Drusen OU  - also noted to have peripapillary fluid left eye  - amsler precautions  - will refer to retina for further evaluation    Dry Eyes OU  - warm compresses BID OU x 5-10 min each time  - PFATs QID and prn OU    Corneal Abrasion OD  - noticed foreign body sensation during clinic visit; no known trauma, fbs resolved with proparacaine administration  - will start erythromycin ophthalmic ointment QID x 7 days  - recheck in acute care clinic    Counseled return precautions    Myopia of both eyes with astigmatism  - hold on new MRx at this time    Patient disposition:   Return for Follow Up, or sooner changes.   1 week acute clinic [place] only  Next available retina  Next available neuro-ophthalmology      Attending Physician Attestation:  Complete documentation of historical and exam elements from today's encounter can be found in the full encounter summary report (not reduplicated in this progress note).  I personally obtained the chief complaint(s) and history of present illness.  I confirmed and edited as necessary the review of systems, past medical/surgical history, family history, social history, and examination findings as documented by others; and I examined the patient  myself.  I personally reviewed the relevant tests, images, and reports as documented above.  I formulated and edited as necessary the assessment and plan and discussed the findings and management plan with the patient and family. . - Supriya Price MD

## 2023-01-28 NOTE — PROGRESS NOTES
Department of Radiation Oncology  [facility]  [address] [place] [address]  [phone]       Radiation Oncology Follow-up Visit  2023      Julio John  MRN: [id number]  : 1970    DISEASE TREATED:   Metastatic renal cell carcinoma    RADIATION THERAPY DELIVERED:   1. 2016 - 2017: SBRT to lung nodules at [facility]  2. 2020 - 2020: 30 Gy / 10 fractions to the right humerus at [facility]  3. 12/10/2021 - 2021: 39 Gy / 13 fractions to the clivus at [facility]  4. 3/30/2021 - 2021: 30 Gy / 10 fractions to the right parietal skull at [facility]  5. 8/3/2022 - 8/15/2022: 30 Gy / 10 fractions to the right parietal skull at [facility]  6. 2022 - 2022: 2750 cGy in 5 fractions via GK-SRS    SUBJECTIVE:   Mr. Baron John is a 52 year old gentleman with a widely metastatic renal cell carcinoma s/p several course of prior palliative radiotherapy as detailed above. He was recently seen at [facility] with intractable headaches with repeat imaging demonstrating progression of a right temporal lobe metastasis. His case was discussed at [facility]'s neuroradiology conference with consideration for surgical resection of this mass.     On exam today, Mr. Baron John says he continues to have headaches that are well controlled with 2mg BID decadron. These headaches are less than 5/10, and he notes that what initially brought him to [facility] 2 weeks ago was 10/10 pain. He reports that 2 days after his discharge from [facility] on 23, he had another episode of 10/10 headache that improved with dilaudid that was prescribed. He has not needed to use opioid pain medication since.   He denies nausea, vomiting or imbalance.      OBJECTIVE:  There were no vitals taken for this visit.    General:  52 year old man sitting comfortably with his right eye closed in NAD  HEENT:  NCAT, anicteric sclera, moist oral mucosa  Pulmonary:  Breathing comfortably on room air, no  stridor, no audible wheeze  Skin:  Pink, warm, no rash  Neurologic:  Alert and oriented x 3, strength 5/5 in upper and lower extremities  CN:        II,III -- PERRLA, no visual field cut  III,IV,VI --medial deviation of the right eye with inability to abduct beyond midline, left eye with normal EOM, no ptosis  V --symmetric sensation intact to light touch bilaterally; masseter function intact  VII -- no facial weakness/droop  VIII -- hears finger rub equally bilaterally  IX,X -- voice normal, palate elevates symmetrically  XI -- SCM/trapezii 5/5 strength bilaterally  XII -- tongue protrudes midline  MSK:  normal muscular bulk and tone, no tender bones or joints    LABS AND IMAGING:  Reviewed     IMPRESSION:   Mr. Baron John is a 52 year old man with metastatic renal cell carcinoma s/p multiple prior courses of radiotherapy. He now presents with recurrent disease within the right temporal region that has been previously irradiated at [facility].    PLAN:   In light of his prior radiotherapy, we discussed that resection of the affected right parietal skull from neurosurgery would be our recommendation for management. He has followed with Dr. Alcaraz at [facility] and will followup for consultation regarding surgical resection. If surgical resection is not a viable option, we could consider repeat radiotherapy albeit with a low dose palliative treatment that would be unlikely to provide robust local control of this disease. Follow-up with Dr. Alcaraz at [facility] for consideration of surgical resection    Sudhakar Poe MD  PGY-2 Radiation Oncology  Department of Radiation Oncology  [facility]  Phone: [phone]      Attending addendum:   I saw and examined the patient with the resident and agree with the documented plan of care.    Briefly, Mr. Baron John presents with progressive disease involving the right temporal region. This area has previously received palliative radiotherapy from his team at [facility]  and it is unlikely that any ablative doses could be delivered to this region without placing him at undue risk of severe radiation treatment-related toxicity. While low-dose reirradiation could be considered, I am dubious about the long-term local control that this treatment would afford. I therefore recommended consideration of surgical resection and instructed him to follow-up with Dr. Alcaraz at [facility] in the near future for a consultation.    I did inform Mr. Baron John that I am in the process of leaving the [facility] in the next week and, should he require radiotherapy in the future, we can arrange for him to be seen by one of my colleagues here at the [facility]. He will otherwise be in excellent hands with the team at [facility].    Chano Schaefer MD/PhD    Dept of Radiation Oncology  [facility]

## 2023-02-02 NOTE — PROGRESS NOTES
FOLLOW-UP VISIT    Patient Name: Julio John      : 1970     Age: 52 year old        ______________________________________________________________________________     Chief Complaint   Patient presents with     Cancer     Follow up:Gamma Knife completed 22         Pain  Denies    Labs  Other Labs: No    Imaging  MRI: 22        Other Appointments:     MD Name:  Appointment Date:    MD Name: Appointment Date:   MD Name: Appointment Date:   Other Appointment Notes:     Residual Radiation side effect: Headaches

## 2023-03-17 NOTE — TELEPHONE ENCOUNTER
Called and LVM     [facility]     move his 3/24 visit for 3/31 at 8 or 8:30 AM     Ene Draper Communication Facilitator on 3/17/2023 at 10:11 AM

## 2023-03-20 NOTE — TELEPHONE ENCOUNTER
Called and LVM for Julio Kim's appointment with Dr. Moreland needs to be rescheduled.     I have attempted x 2 and left 2 GIL Draper Communication Facilitator on 3/20/2023 at 10:32 AM

## 2023-03-31 NOTE — TELEPHONE ENCOUNTER
please call this patient (Cymro speaking) to schedule retina clinic evaluation of possible intraocular metastasis from renal cell carcinoma    Above per on call eye provider    Note to patient communicator to assist in scheduling exam    Open time slots with Dr. Guerrero on Monday    Jimmy Lee RN 3:25 PM 03/31/23

## 2023-04-03 PROBLEM — C79.31 MALIGNANT NEOPLASM METASTATIC TO BRAIN (H): Status: ACTIVE | Noted: 2022-01-01

## 2023-04-03 NOTE — TELEPHONE ENCOUNTER
Called and spoke to Julio    Made an appointment for 5.4 @ 745 am with Dr. Guerrero      - can you please email a new pt packet     Thanks,     V

## 2023-07-02 PROBLEM — J18.9 PNEUMONIA DUE TO INFECTIOUS ORGANISM, UNSPECIFIED LATERALITY, UNSPECIFIED PART OF LUNG: Status: ACTIVE | Noted: 2023-01-01

## 2023-07-02 PROBLEM — R07.9 CHEST PAIN, UNSPECIFIED TYPE: Status: ACTIVE | Noted: 2023-01-01

## 2023-07-02 PROBLEM — R51.9 NONINTRACTABLE HEADACHE, UNSPECIFIED CHRONICITY PATTERN, UNSPECIFIED HEADACHE TYPE: Status: ACTIVE | Noted: 2023-01-01

## 2023-07-02 PROBLEM — R60.0 LOWER EXTREMITY EDEMA: Status: ACTIVE | Noted: 2023-01-01

## 2023-07-02 PROBLEM — C64.9 RENAL CELL CARCINOMA, UNSPECIFIED LATERALITY (H): Status: ACTIVE | Noted: 2023-01-01

## 2023-07-02 NOTE — PHARMACY-ADMISSION MEDICATION HISTORY
Pharmacist Admission Medication History    Admission medication history is complete. The information provided in this note is only as accurate as the sources available at the time of the update.    Medication reconciliation/reorder completed by provider prior to medication history? Yes    Information Source(s): Patient, Clinic records and CareEverywhere/SureScripts via in-person    Pertinent Information:       Patient's dose of tivozanib was recently reduced from 1.34 mg daily to 0.89 mg daily by [place]ology [place] [facility]. Patient is aware of dose reduction. He reports that he just received this medication this week via mail order but has not started it yet. Of note, patient reports that he is supposed to take tivozanib daily for one week, the off one week. Oncology notes from [facility] endorse typical dosing strategy for this medication of daily administration on days 1-21 of cycle, off last 7 days of 28 day cycle.      Lisinopril is currently on hold.    Changes made to PTA medication list:      Added:   o Belbuca   o Hydromorphone PRN   o Tivozanib   o Erythromycin eye ointment   o Lidocaine solution PRN   o Loperamide PRN   o Hydrocortisone topical PRN       Deleted:   o Artificial tears ophthalmic ointment - per patient he only uses drops as needed         Changed:   o Gabapentin dose increased to 600 mg TID   o OLZ dose increased to 5 mg daily       Prior to Admission medications    Medication Sig Last Dose Taking? Auth Provider Long Term End Date   acetaminophen (TYLENOL) 325 MG tablet Take 325-650 mg by mouth every 8 hours as needed for mild pain Take 2 tablets once every 6 hours as needed for mild pain  Yes Reported, Patient     albuterol (PROAIR HFA/PROVENTIL HFA/VENTOLIN HFA) 108 (90 Base) MCG/ACT inhaler Inhale 1-2 puffs into the lungs every 4 hours as needed for shortness of breath / dyspnea or wheezing  Yes Ludwin Painting MD Yes    amLODIPine (NORVASC) 10 MG tablet Take 1 tablet (10 mg) by mouth daily   Yes Huey, Virginie Belcher MD Yes    apixaban ANTICOAGULANT (ELIQUIS) 5 MG tablet Take 1 tablet (5 mg) by mouth 2 times daily  Yes Romy Ponce MD     Buprenorphine HCl (BELBUCA) 75 MCG FILM buccal film Place 75 mcg inside cheek every 12 hours  Yes Unknown, Entered By History     Calcium Carb-Cholecalciferol 500-5 MG-MCG PACK Take 1 tablet by mouth 2 times daily  Yes Reported, Patient     cetirizine (ZYRTEC) 10 MG tablet Take 10 mg by mouth daily as needed for allergies  Yes Unknown, Entered By History     DULoxetine (CYMBALTA) 60 MG capsule Take 60 mg by mouth daily  Yes Unknown, Entered By History     erythromycin (ROMYCIN) 5 MG/GM ophthalmic ointment Apply 1/2 inch strip to the right eye 6 times per day.  Yes Unknown, Entered By History     gabapentin (NEURONTIN) 600 MG tablet Take 600 mg by mouth 3 times daily Take 2 capsules 3 times daily  Yes Reported, Patient Yes    guaiFENesin (ROBITUSSIN) 20 mg/mL liquid Take 200 mg by mouth every 4 hours as needed for cough  Yes Unknown, Entered By History     hydrocortisone 2.5 % cream Apply topically 2 times daily as needed for itching  Yes Unknown, Entered By History     HYDROmorphone (DILAUDID) 2 MG tablet Take 2 mg by mouth every 4 hours as needed for breakthrough pain  Yes Unknown, Entered By History     hypromellose (ARTIFICIAL TEARS) 0.5 % SOLN ophthalmic solution 1 drop every hour as needed for dry eyes  Yes Unknown, Entered By History     lidocaine, viscous, (XYLOCAINE) 2 % solution Swish and spit 15 mLs in mouth 2 times daily as needed for other (mouth sores) ; Max 8 doses/24 hour period.  Yes Unknown, Entered By History     loperamide (IMODIUM) 2 MG capsule Take 2 mg by mouth 4 times daily as needed for diarrhea  Yes Unknown, Entered By History     OLANZapine (ZYPREXA) 5 MG tablet Take 5 mg by mouth At Bedtime  Yes Unknown, Entered By History     ondansetron (ZOFRAN ODT) 8 MG ODT tab Take 8 mg by mouth every 8 hours as needed for nausea  Yes Unknown,  Entered By History     prochlorperazine (COMPAZINE) 10 MG tablet Take 10 mg by mouth every 6 hours as needed for nausea  Yes Reported, Patient     sennosides (SENOKOT) 8.6 MG tablet Take 1 tablet by mouth 2 times daily  Yes Unknown, Entered By History     tivozanib HCl (FOTIVDA) 0.89 MG capsule Take 0.89 mg by mouth daily Take daily on days 1-21 of a 28 day cycle (3 weeks on, 1 off)  Yes Unknown, Entered By History     lisinopril (ZESTRIL) 20 MG tablet Take 20 mg by mouth daily  Patient not taking: Reported on 7/2/2023 Not Taking  Reported, Patient No      Medication History Completed By: Solange Gould [facility] 7/2/2023 9:45 AM

## 2023-07-02 NOTE — CONSULTS
[facility]         NEUROSURGERY CONSULTATION      This consultation was requested by Dr. Dong from the Medicine ([facility]) service.      Time Paged/Notified: 9:04   Arrival time in ED: 9:10    GCS: 15      Reason for Consultation: Interval growth of RCC met to brain s/p radiation and surgery     HPI:  Julio John is a 54 y/o M with a PMHx of RCC s/p gamma knife 9/13/2022 and surgical resection in 2/22/23 at [facility], presenting with headache, and leg swelling.  Neurosurgery was consulted for CTH, obtained for unresolving headache, which demonstrated progression of tumor along the posterior clivus extending into the R cavernous sinus to R nasal pharynx and possible sphenoid sinusd, along with encasement of Cavernous and supraclinoid portion of R ICA, and destruction of R side of clivus. There were no acute abnormalities noted. The patient reports that his headache started Friday, was worse on Saturday, but is now no longer there.     No recent fevers, chills, nausea, vomiting, chest pain, shortness of breath, and denies headaches, new weakness, LOC, numbness/weakness/paresthesias in extremities, changes in sensation, taste, smell, nor trouble speaking or other neurologic symptoms. He has stable eye swelling in his R eye s/p previous surgery.    PAST MEDICAL HISTORY:   Past Medical History:   Diagnosis Date     Alcohol abuse     in remission     Benign essential hypertension      Cerebrovascular accident (H)     2010/2011     Cocaine abuse (H)     in remission     Metastatic renal cell carcinoma (H)     2016       PAST SURGICAL HISTORY:   Past Surgical History:   Procedure Laterality Date     PICC INSERTION Left 05/31/2017    5fr DL BioFlo PICC, 45cm (3cm external) in the L medial brachial vein w/ tip in the SVC RA junction.     radical left nephrectomy Left 03/09/2016 2016       FAMILY HISTORY:   Family History   Problem Relation Age of Onset     Family History  Negative Other         No family history of cancer, kidney cancer     Cancer No family hx of        SOCIAL HISTORY:   Social History     Tobacco Use     Smoking status: Never     Smokeless tobacco: Never   Substance Use Topics     Alcohol use: Not Currently       MEDICATIONS:  Current Outpatient Medications   Medication Sig Dispense Refill     acetaminophen (TYLENOL) 325 MG tablet Take 325-650 mg by mouth every 8 hours as needed for mild pain Take 2 tablets once every 6 hours as needed for mild pain       albuterol (PROAIR HFA/PROVENTIL HFA/VENTOLIN HFA) 108 (90 Base) MCG/ACT inhaler Inhale 1-2 puffs into the lungs every 4 hours as needed for shortness of breath / dyspnea or wheezing 18 g 4     amLODIPine (NORVASC) 10 MG tablet Take 1 tablet (10 mg) by mouth daily 90 tablet 3     apixaban ANTICOAGULANT (ELIQUIS) 5 MG tablet Take 1 tablet (5 mg) by mouth 2 times daily 60 tablet 0     Buprenorphine HCl (BELBUCA) 75 MCG FILM buccal film Place 75 mcg inside cheek every 12 hours       Calcium Carb-Cholecalciferol 500-5 MG-MCG PACK Take 1 tablet by mouth 2 times daily       cetirizine (ZYRTEC) 10 MG tablet Take 10 mg by mouth daily as needed for allergies       DULoxetine (CYMBALTA) 60 MG capsule Take 60 mg by mouth daily       erythromycin (ROMYCIN) 5 MG/GM ophthalmic ointment Apply 1/2 inch strip to the right eye 6 times per day.       gabapentin (NEURONTIN) 600 MG tablet Take 600 mg by mouth 3 times daily Take 2 capsules 3 times daily       guaiFENesin (ROBITUSSIN) 20 mg/mL liquid Take 200 mg by mouth every 4 hours as needed for cough       hydrocortisone 2.5 % cream Apply topically 2 times daily as needed for itching       HYDROmorphone (DILAUDID) 2 MG tablet Take 2 mg by mouth every 4 hours as needed for breakthrough pain       hypromellose (ARTIFICIAL TEARS) 0.5 % SOLN ophthalmic solution 1 drop every hour as needed for dry eyes       lidocaine, viscous, (XYLOCAINE) 2 % solution Swish and spit 15 mLs in mouth 2  "times daily as needed for other (mouth sores) ; Max 8 doses/24 hour period.       loperamide (IMODIUM) 2 MG capsule Take 2 mg by mouth 4 times daily as needed for diarrhea       OLANZapine (ZYPREXA) 5 MG tablet Take 5 mg by mouth At Bedtime       ondansetron (ZOFRAN ODT) 8 MG ODT tab Take 8 mg by mouth every 8 hours as needed for nausea       prochlorperazine (COMPAZINE) 10 MG tablet Take 10 mg by mouth every 6 hours as needed for nausea       sennosides (SENOKOT) 8.6 MG tablet Take 1 tablet by mouth 2 times daily       tivozanib HCl (FOTIVDA) 0.89 MG capsule Take 0.89 mg by mouth daily Take daily on days 1-21 of a 28 day cycle (3 weeks on, 1 off)       lisinopril (ZESTRIL) 20 MG tablet Take 20 mg by mouth daily (Patient not taking: Reported on 7/2/2023)         Allergies:  Allergies   Allergen Reactions     Carvedilol Other (See Comments)     Per pt, it gave him back pain       ROS: 10 point ROS of systems including Constitutional, Eyes, Respiratory, Cardiovascular, Gastroenterology, Genitourinary, Integumentary, Muscularskeletal, Psychiatric were all negative except for pertinent positives noted in my HPI.    Physical exam:   Blood pressure 116/72, pulse 81, temperature 97.5  F (36.4  C), temperature source Oral, resp. rate 16, height 1.676 m (5' 6\"), weight 90.7 kg (200 lb), SpO2 96 %.  General: resting comfortably, NAD  HEENT: normocephalic, atraumatic  PULM: breathing comfortably on room air  NEUROLOGIC:  -- Awake; Alert; oriented x 3  -- Follows commands briskly  -- +repetition, calculation, and naming  -- Speech fluent, spontaneous. No aphasia or dysarthria.  -- no gaze preference. No apparent hemineglect.  Cranial Nerves:  -- Pupils asymmetric, R pupil not reactive  -- face asymmetrical with R facial droop, tongue midline  -- sensory V1-V3 intact L face, diminished sensation R face all trigeminal distributions  -- palate elevates symmetrically, uvula midline  -- hearing grossly intact to conversation  -- " Trapezii 5/5 strength bilat symmetric    Motor:  Normal bulk / tone; no tremor, rigidity, or bradykinesia.  No muscle wasting or fasciculations  No Pronator Drift     Delt Bi Tri Hand Flexion/  Extension Iliopsoas Quadriceps Hamstrings Tibialis Anterior Gastroc    C5 C6 C7 C8/T1 L2 L3 L4-S1 L4 S1   R 5 5 5 5 5 5 5 5 5   L 5 5 5 5 5 5 5 5 5     Sensory:  intact to LT x 4 extremities    Gait: deferred       IMAGING:  Recent Results (from the past 24 hour(s))   XR Chest Port 1 View    Narrative    EXAM: XR CHEST PORT 1 VIEW  LOCATION: [facility]  DATE: 7/2/2023    INDICATION: Chest pain.  COMPARISON: 09/12/2022.      Impression    IMPRESSION: Mild patchy infiltrate in the right mid and lower lung and possibly to a lesser extent left lower lung suggestive of pneumonitis. Clinical correlation. Suggest short-term follow-up chest x-ray to monitor for resolution following appropriate   therapy. Heart size and pulmonary vascularity within normal limits. Hypertrophic changes thoracic spine.   Head CT w/o contrast    Narrative    EXAM: CT HEAD W/O CONTRAST  LOCATION: [facility]  DATE: 7/2/2023    INDICATION: known brain mets, increased HA  COMPARISON: 01/20/2023. 1/19/2023..  TECHNIQUE: Routine CT Head without IV contrast. Multiplanar reformats. Dose reduction techniques were used.    FINDINGS:  INTRACRANIAL CONTENTS: There is no extra-axial fluid collection or acute intraparenchymal hemorrhage. There is no evidence of a midline shift. No CT evidence of acute infarct. The patient is status post a right lateral cranioplasty with postsurgical   changes noted. There is a resection cavity involving the mid right temporal lobe along with dural thickening. There is no significant surrounding vasogenic edema.    Visualized on the bone windows there is further destruction of the right side of the clivus with a mass lesion along the posterior  clivus extending  into the prepontine cistern and extending superiorly surrounding the right cavernous carotid artery which   has progressed in the interval. This does encase the right carotid artery. There is probable extension into the right nasopharynx and possibly sphenoid sinuses. The rest of the brain parenchyma is unremarkable. The ventricular system, basal cisterns and   the cortical sulci are within normal limits for the patient's age.    VISUALIZED ORBITS/SINUSES/MASTOIDS: No intraorbital abnormality.Opacification of  sphenoid sinuses bilaterally and posterior left ethmoid air cells. No middle ear or mastoid effusion.    BONES/SOFT TISSUES: Status post right lateral cranioplasty along with further destruction lytic lesion of the left side of clivus.      Impression    IMPRESSION:  1.  Status post right lateral cranioplasty with resection cavity in mid right temporal lobe with associated dural thickening.  2.  Progression of tumor involvement along the posterior clivus extending further into the right cavernous sinus region to the right nasal pharynx and possibly sphenoid sinuses. Encasement of the cavernous and supraclinoid portion of the right internal   carotid artery and further destruction of right side of the clivus.  3.  Recommend MRI brain without and with contrast for further lesion.  4.  No CT evidence of a midline shift, acute hemorrhage or focal area suggestive of acute infarct.   US Lower Extremity Venous Duplex Bilateral    Narrative    EXAMINATION: US LOWER EXTREMITY VENOUS DUPLEX BILATERAL  7/2/2023 4:26  AM      CLINICAL HISTORY: Bilateral lower extremity swelling, HISTORY of clots    COMPARISON: 9/26/2022    PROCEDURE COMMENTS: Ultrasound was performed of the deep venous system  of the right and left lower extremity using grayscale, color, and  spectral Doppler.    FINDINGS:  The right and left common femoral, greater saphenous origin, femoral,  popliteal, and deep calf veins are  visualized and are patent. Venous  waveforms are normal. There is normal response to compression.    Small amount of soft tissue edema within the subcutaneous tissues of  the right and left lower extremity.      Impression    IMPRESSION:.  1. No deep vein thrombosis in the right or left lower extremity.  2. Mild soft tissue edema of the right and left lower extremity.    I have personally reviewed the examination and initial interpretation  and I agree with the findings.    LUTHER PEREZ MD         SYSTEM ID:  [id number]   CT Chest Pulmonary Embolism w Contrast    Narrative    EXAM: CT CHEST PULMONARY EMBOLISM W CONTRAST  LOCATION: [facility]  DATE: 7/2/2023    INDICATION: chest pain, h o previous PE  COMPARISON: CT chest 01/08/2023  TECHNIQUE: CT chest pulmonary angiogram during arterial phase injection of IV contrast. Multiplanar reformats and MIP reconstructions were performed. Dose reduction techniques were used.   CONTRAST: iopamidol (ISOVUE 370) solution 74 mL   [    FINDINGS:  ANGIOGRAM CHEST: Pulmonary arteries are normal caliber and negative for pulmonary emboli. Thoracic aorta is negative for dissection. No CT evidence of right heart strain.    LUNGS AND PLEURA: Extensive pulmonary metastases, many which are new and enlarging. For example, right upper lobe 13 mm (series 4 image 17, previously 8 mm). Patchy areas of groundglass opacity predominantly in the upper lobes. No pleural effusion or   pneumothorax.    MEDIASTINUM/AXILLAE: Slight enlargement of mediastinal and hilar lymph nodes, for example, superior left hilum 2.3 x 3.6 cm, previously 1.8 x 2.4 cm measured using similar technique.    CORONARY ARTERY CALCIFICATION: None.    UPPER ABDOMEN: Large right adrenal mass has enlarged, 10.4 x 7.0 cm, previously 7.7 x 5.0 cm.    MUSCULOSKELETAL: No concerning bone lesions.      Impression    IMPRESSION:  1.  Extensive new and enlarging pulmonary  metastases.  2.  Superimposed pulmonary groundglass opacity, favored to be infectious or inflammatory in etiology.  3.  Mild enlargement of mediastinal/hilar adenopathy.  4.  Enlarging right adrenal mass.  5.  No pulmonary embolism.   Echocardiogram Complete   Result Value    LVEF  60-65%    Narrative    [id number]  [id number]  [id number]  [id number]^JAYLIN^ANNETTE^ALISHA     [facility]  [address] [place] [address]     Name: ALIA VASQUEZ  MRN: [id number]  : 1970  Study Date: 2023 08:38 AM  Age: 53 yrs  Gender: Male  Patient Location: [facility]  Reason For Study: Edema  Ordering Physician: ANNETTE MOSQUEDA  Performed By: Vickie Roe RDCS     BSA: 2.0 m2  Height: 66 in  Weight: 200 lb  BP: 123/74 mmHg  ______________________________________________________________________________  Procedure  Complete Portable Echo Adult.  ______________________________________________________________________________  Interpretation Summary  Global and regional left ventricular function is normal with an EF of 60-65%.  Left ventricular diastolic function is normal.  Right ventricular function, chamber size, wall motion, and thickness are  normal.  Pulmonary artery systolic pressure is normal.  The inferior vena cava is normal.  No pericardial effusion is present.  ______________________________________________________________________________  Left Ventricle  Global and regional left ventricular function is normal with an EF of 60-65%.  Left ventricular wall thickness is normal. Left ventricular size is normal.  Left ventricular diastolic function is normal. No regional wall motion  abnormalities are seen.     Right Ventricle  Right ventricular function, chamber size, wall motion, and thickness are  normal.     Atria  Both atria appear normal. The atrial septum is intact as assessed by color  Doppler .     Mitral Valve  The mitral valve is normal.  Trace mitral insufficiency is present.     Aortic Valve  Aortic valve sclerosis is present.     Tricuspid Valve  The tricuspid valve is normal. Trace tricuspid insufficiency is present. The  right ventricular systolic pressure is approximated at 21.7 mmHg plus the  right atrial pressure. Pulmonary artery systolic pressure is normal.     Pulmonic Valve  The pulmonic valve is normal.     Vessels  The thoracic aorta is normal. The pulmonary artery and bifurcation cannot be  assessed. The inferior vena cava is normal.     Pericardium  No pericardial effusion is present.     ______________________________________________________________________________  MMode/2D Measurements & Calculations  IVSd: 0.97 cm  LVIDd: 5.1 cm  LVIDs: 2.8 cm  LVPWd: 1.3 cm     FS: 45.5 %  LV mass(C)d: 218.8 grams  LV mass(C)dI: 109.4 grams/m2  Ao root diam: 3.4 cm  asc Aorta Diam: 3.2 cm  LVOT diam: 2.3 cm  LVOT area: 4.2 cm2  LA Volume (BP): 55.5 ml  LA Volume Index (BP): 27.8 ml/m2  RWT: 0.51  TAPSE: 1.9 cm     Doppler Measurements & Calculations  MV E max jonh: 96.9 cm/sec  MV A max jonh: 114.0 cm/sec  MV E/A: 0.85  MV dec time: 0.26 sec  Ao V2 max: 160.0 cm/sec  Ao max PG: 10.2 mmHg  TR max jonh: 233.0 cm/sec  TR max P.7 mmHg  E/E' av.1  Lateral E/e': 8.5  Medial E/e': 9.7  RV S Jonh: 13.2 cm/sec     ______________________________________________________________________________  Report approved by: Constance Augustin 2023 10:00 AM                 LABS:   Last Comprehensive Metabolic Panel:  Lab Results   Component Value Date     (L) 2023    POTASSIUM 4.5 2023    CHLORIDE 97 (L) 2023    CO2 20 (L) 2023    ANIONGAP 16 (H) 2023     (H) 2023    BUN 24.5 (H) 2023    CR 1.90 (H) 2023    GFRESTIMATED 42 (L) 2023    KVNG 8.8 2023         Lab Results   Component Value Date    WBC 6.6 2023    WBC 6.6 2023    WBC 7.8 2020     Lab Results   Component Value  Date    RBC 3.17 07/02/2023    RBC 3.17 07/02/2023    RBC 5.12 11/28/2020     Lab Results   Component Value Date    HGB 7.8 07/02/2023    HGB 7.8 07/02/2023    HGB 14.8 11/28/2020     Lab Results   Component Value Date    HCT 25.4 07/02/2023    HCT 25.4 07/02/2023    HCT 44.6 11/28/2020     Lab Results   Component Value Date    MCV 80 07/02/2023    MCV 80 07/02/2023    MCV 87 11/28/2020     Lab Results   Component Value Date    MCH 24.6 07/02/2023    MCH 24.6 07/02/2023    MCH 28.9 11/28/2020     Lab Results   Component Value Date    MCHC 30.7 07/02/2023    MCHC 30.7 07/02/2023    MCHC 33.2 11/28/2020     Lab Results   Component Value Date    RDW 18.0 07/02/2023    RDW 18.0 07/02/2023    RDW 13.4 11/28/2020     Lab Results   Component Value Date     07/02/2023     07/02/2023     11/28/2020     INR   Date Value Ref Range Status   01/08/2023 1.03 0.85 - 1.15 Final   05/15/2017 1.20 (H) 0.86 - 1.14 Final    a  aPTT   Date Value Ref Range Status   01/08/2023 39 (H) 22 - 38 Seconds Final      ASSESSMENT:  Julio Bower is a 53 year old male with a PMHx of RCC s/p gamma knife 9/13/2022 and surgical resection in 2/22/23 at [facility], presenting with tumor progression and multiple cranial nerve palsies. He desires discharge home with his family and outpatient follow-up. Recommend continued medical management, no operative intervention at this time.    RECOMMENDATIONS:  No neurosurgical intervention indicated at this time   MRI suggests interval growth of tumor  Need a more comprehensive goals of care discussion with family given aggressive tumor and prior failed radiation and chemotherapy  Note per Oncology at [facility] suggests very poor prognosis of condition  Ok to discharge per patient preference from NSGY perspective    Ruiz Whaley MD, PGY-1  Department of Neurosurgery  Pager: [phone]    Julita Baker MD, PhD  PGY-2 Neurosurgery      Please contact neurosurgery resident on call with questions.    Dial *  * [phone], enter [phone] when prompted.       The patient was discussed with Dr. Abdullahi, neurosurgery chief resident, and Dr. Chowdhury, neurosurgery staff.

## 2023-07-02 NOTE — PROGRESS NOTES
"  Status: Left kidney malignant neoplasm met to brain.  Neuro: A&O x4  GI: +bs +flatus no Bm  : Voiding via urine  Resp: R.A  Mobility: SBA  Cardiac: WDL  Skin: No skin deficit   Lines/Drains: PIV SL  Pain: Denies pain  Behavior: calm   VS: Blood pressure 116/72, pulse 81, temperature 97.5  F (36.4  C), temperature source Oral, resp. rate 16, height 1.676 m (5' 6\"), weight 90.7 kg (200 lb), SpO2 96 %.      Plan of Care: Possible discharge today.  "

## 2023-07-02 NOTE — PROGRESS NOTES
Brief Status update / Progress:    - Discussed with admitting hospitalist  - MRI brain, oncology and NSG consults placed  - Per Pharmacy: Yulissa 300 TID based on Cr trend'    Update: 1234 PM  Prelim MRI Brain; NSG aware and is working on a plan.    Pt updated.      Julius Leal MD  Text Page

## 2023-07-02 NOTE — CONSULTS
"Med Onc Consult Note    Reason for consult: [facility] with pedal edema and worsening scans    Chief complaint: pedeal edema    HPI:  Patient follow with Dr. Schmitz at [facility].    Left sided RCC s/p nephrectomy in 2016.  Then metastatic didsease.  He has been treated with IL2, nivolumab + ipilumumab, sunitinib, cabozantinib, axitinib, everolimus + lenvatinib.   In 2023, started tivozanib .  Most recent outpatient CT CAP on 2023 with mixed response, decided to continue tivozanib at dose of 0.89 mg q4 week cycle (3 weeks on, 1 week off)  Also on symptom control meds.    Came in to ED with pedal edema on 2023.  DVT US and TTE wnl.  CT PE with no PE but new mets, MR brain with extensive findings-- but many findings were present before.      Interval history:  Laying down, comfortable  R eye more shut  Some headaches    Exam:  /72 (BP Location: Left arm)   Pulse 81   Temp 97.5  F (36.4  C) (Oral)   Resp 16   Ht 1.676 m (5' 6\")   Wt 90.7 kg (200 lb)   SpO2 96%   BMI 32.28 kg/m      ECO    Alert, oriented  Warm  Minimal pedal edema  Abdomen soft      Labs/ imaging:  As in HPI      Recommendations:    Not on tivozanib currently--- off week. Do not start new cycle unless plan for potential surgery made since can affect wound healing    Dr. Schmitz has discussed limited treatment options and prognosis with him. Expected survival could be months    Steroids, e.g., dex 2 bid, might be helpful for headaches and CNS symptoms    Look forward to neurosurgery recommendations    Also consider pall care consult depending on whether he stays here or goes home    If staying, can get CT AP to evaluate for IVC compression etc if that's what is causing pedal edema-- which is what seems to subjectively bother him most-- I am not sure he has any significant edema     Ok to discharge from med onc perspective if no surgical procedure and he wants to go      Franklin Cali M.D.   of Medicine  Division " of Hematology, Oncology and Transplantation  [facility]       Render Post-Care Instructions In Note?: no Wound Care: No ointment Detail Level: Detailed Billing Type: Third-Party Bill Electrodesiccation Text: The wound bed was treated with electrodesiccation after the biopsy was performed. Additional Anesthesia Volume In Cc (Will Not Render If 0): 0 Biopsy Type: H and E Electrodesiccation And Curettage Text: The wound bed was treated with electrodesiccation and curettage after the biopsy was performed. Dressing: waterproof dressing Anesthesia Type: 1% lidocaine with 1:100,000 epinephrine and a 1:6 solution of 8.4% sodium bicarbonate Silver Nitrate Text: The wound bed was treated with silver nitrate after the biopsy was performed. Size Of Lesion In Cm: 1.4 Anesthesia Volume In Cc (Will Not Render If 0): 1 Hemostasis: Pressure Was A Bandage Applied: Yes Path Notes (To The Dermatopathologist): 14mm \\nVictoria Post-Care Instructions: I reviewed with the patient in detail post-care instructions. Patient is to keep the biopsy site dry overnight, and then apply aquaphor twice daily until healed. Type Of Destruction Used: Curettage Biopsy Method: Dermablade Depth Of Biopsy: dermis Cryotherapy Text: The wound bed was treated with cryotherapy after the biopsy was performed. Consent: Verbal consent was obtained and risks were reviewed including but not limited to scarring, infection, bleeding, scabbing, incomplete removal, nerve damage and allergy to anesthesia.

## 2023-07-02 NOTE — H&P
[facility]    History and Physical - Hospitalist Service, GOLD TEAM        Date of Admission:  7/2/2023    Assessment & Plan      Julio John is a 53 year old male with metastatic renal cell cancer admitted with 1 week of lower extremity edema    Lower extremity edema  -Worsened over past week, associated shortness of breath,   -Lower extremity ultrasound - negative for DVT, CT PE study with no PE  -BNP > 1000  -DDx : CHF or pericardial effusion vs obstruction from malignancy  Plan:  >ECHO    RCC with metastases to brain/scalp mets, bones  -July 2022 brain MRI showed progression of right pareital and clivus mets.   - completed gamma knife treatment 9/13/2022, previous radiation treatment in 2021 and 2022  -Underwent right temporal resection 2/22/23 with neurosurgery at [facility]  Repeat brain MRI (6/8/2023) showed stable findings with no new metastases.  -CT head on 7/2 demonstrated progression of tumor  Plan:  >Unclear if CT head was compared to earlier june MRI and is new from that scan date, would discuss with radiology but if new may need MRI and his [facility] oncology team has wanted him to speak with [facility] neurosurgery previously which has not happened  >If progression then brain MRI and possible neurosurgery consultation    Ground glass opacity  Possible pneumonia  -CT PE study with ground glass opacity noted, per patient no infectious symptoms or shortness of breath. No oxygen requirement. Given antibiotics in the ER  -No fever noted, 1 day of cough  Plan:  >will complete additional 2 days of antibiotics (doxycycline) for 3 day course starting on 7/3    Neoplastic Related Pain  Headache secondary to metastatic lesions  Plan:  - Continue belbuca 75 mg BID   - Dilaudid and tylenol prn  - Cymbalta 60 mg AM  - Gabapentin 600 mg TID  - Zyprexa q hs     PE   -diagnosed at [facility] sep 2022  - Continue apixaban.    CKD stage III  -Baseline Cr approx 1.5  -Will resume  "lisiniopril on 7/3    Hyperglycemia  -Glucose 200 on admission  Plan:  >Sliding scale    Anemia - likely secondary to neoplasm, chronic inflammation and CKD - monitor CBC daily       Diet:   regular diet  DVT Prophylaxis: DOAC  Randall Catheter: Not present  Lines: None     Cardiac Monitoring: None  Code Status:   Full Code, discussed in ER on 7/2    Clinically Significant Risk Factors Present on Admission               # Drug Induced Coagulation Defect: home medication list includes an anticoagulant medication    # Hypertension: Noted on problem list      # Obesity: Estimated body mass index is 32.28 kg/m  as calculated from the following:    Height as of this encounter: 1.676 m (5' 6\").    Weight as of this encounter: 90.7 kg (200 lb).            Disposition Plan      Expected Discharge Date: 07/03/2023                  Trever Vidal MD  Hospitalist Service, [facility]  Securely message with eV[employer] (more info)  Text page via [facility] Paging/Directory   See signed in provider for up to date coverage information    ______________________________________________________________________    Chief Complaint   Lower extremity swelling    History is obtained from the patient    History of Present Illness   Julio John is a 53 year old male with metastatic renal cell cancer who presents with 1 week of worsening lower extremity edema.  Patient was recently traveling out of state when he noticed increase swelling in his lower extremities.  He also notes some shortness of breath with activity.  He denies any orthopnea or PND.  He denies any fevers or chills.  No rhinorrhea or sore throat.  No nausea or vomiting.  No diarrhea.  He has follow-up with his oncologist coming up on Monday.  He denies any cough or sputum production.      Past Medical History    Past Medical History:   Diagnosis Date     Alcohol abuse     in remission     Benign essential " "hypertension      Cerebrovascular accident (H)     2010/2011     Cocaine abuse (H)     in remission     Metastatic renal cell carcinoma (H)     2016       Past Surgical History   Past Surgical History:   Procedure Laterality Date     PICC INSERTION Left 05/31/2017    5fr DL BioFlo PICC, 45cm (3cm external) in the L medial brachial vein w/ tip in the SVC RA junction.     radical left nephrectomy Left 03/09/2016    2016       Prior to Admission Medications   Prior to Admission Medications   Prescriptions Last Dose Informant Patient Reported? Taking?   acetaminophen (TYLENOL) 325 MG tablet  Pharmacy, Self Yes No   Sig: Take 325-650 mg by mouth every 8 hours as needed for mild pain Take 2 tablets once every 6 hours as needed for mild pain   albuterol (PROAIR HFA/PROVENTIL HFA/VENTOLIN HFA) 108 (90 Base) MCG/ACT inhaler  Pharmacy, Self No No   Sig: Inhale 1-2 puffs into the lungs every 4 hours as needed for shortness of breath / dyspnea or wheezing   amLODIPine (NORVASC) 10 MG tablet  Pharmacy, Self No No   Sig: Take 1 tablet (10 mg) by mouth daily   apixaban ANTICOAGULANT (ELIQUIS) 5 MG tablet  Pharmacy, Self No No   Sig: Take 1 tablet (5 mg) by mouth 2 times daily   calcium-vitamin D (OSCAL) 250-125 MG-UNIT TABS per tablet  Pharmacy, Self Yes No   Sig: Take 1 tablet by mouth 2 times daily   cyclobenzaprine (FLEXERIL) 5 MG tablet   No No   Sig: Take 1 tablet (5 mg) by mouth 3 times daily as needed for muscle spasms   erythromycin (ROMYCIN) 5 MG/GM ophthalmic ointment   No No   Sig: Place 0.5 inches into the right eye 4 times daily   gabapentin (NEURONTIN) 300 MG capsule  Pharmacy, Self Yes No   Sig: Take 300 mg by mouth 3 times daily Take 2 capsules 3 times daily   lisinopril (ZESTRIL) 20 MG tablet  Pharmacy, Self Yes No   Sig: Take 20 mg by mouth daily   neomycin-polymixin-dexamethasone (MAXITROL) ophthalmic ointment  Pharmacy, Self Yes No   Sig: Apply small amount (1/4\" - 1/2\" strip) to right eye TID   pantoprazole " (PROTONIX) 40 MG EC tablet   No No   Sig: Take 1 tablet (40 mg) by mouth 2 times daily (before meals)   prochlorperazine (COMPAZINE) 10 MG tablet  Pharmacy, Self Yes No   Sig: Take 10 mg by mouth every 6 hours as needed for nausea   senna-docusate (SENOKOT-S/PERICOLACE) 8.6-50 MG tablet  Pharmacy, Self Yes No   Sig: Take 1 tablet by mouth daily Take 1-2 tablets twice daily as needed as needed for constipation      Facility-Administered Medications: None        Social History   I have reviewed this patient's social history and updated it with pertinent information if needed.  Social History     Tobacco Use     Smoking status: Never     Smokeless tobacco: Never   Substance Use Topics     Alcohol use: Not Currently     Drug use: Not Currently     Types: Cocaine       Allergies   Allergies   Allergen Reactions     Carvedilol Other (See Comments)     Per pt, it gave him back pain        Physical Exam   Vital Signs: Temp: 97.4  F (36.3  C) Temp src: Oral BP: 123/74 Pulse: 80   Resp: 18 SpO2: 96 % O2 Device: None (Room air)    Weight: 200 lbs 0 oz    Constitutional: alert, lying in bed, no acute distress  Head: healed scalp incisions  Eyes: right eye able to be opened but as baseline kept closed by patient  ENT: supple  Respiratory: no wheezing, no respiratory distress  Cardiovascular: RRR, normal s1, s2, no rub  GI: soft, non-tender, non-distended, bowel sounds present  Skin: no rash  Musculoskeletal: 1+ pitting edema bilaterally  Neurologic: awake, following commands, moving all extremities, no focal deficit     Medical Decision Making             Data

## 2023-07-02 NOTE — ED PROVIDER NOTES
ED Provider Note  [facility]      History     Chief Complaint   Patient presents with     Chest Pain     Headache     HPI  Julio John is a 53 year old male with a past medical history of metastatic RCC (mets to brain, lung, bones) s/p brain tumor resection 2/2023, Afib, and CKD II who presents to the Emergency Department seeking evaluation of chest pain, headache, and feet swelling.  He reports that he went to [place] with his family for a week.  He reports that on Monday he noticed that his feet were quite swollen bilaterally.  Was better on Tuesday but Wednesday got worse again.  He states that they cooked their own food, and he does not feel his diet was different than usual.  He does have a history of previous PE, does use apixaban, states has been compliant with his meds.  He states that on Friday, yesterday, while driving home, he developed a bilateral frontal and parietal headache.  He states that he took some of his oral Dilaudid at home without much improvement.  No reported change in vision, new weakness or numbness.  No vomiting.  He states that he has developed a sensation of chest congestion, has had a little bit of a cough.  He states that he has had some pinkish and bloody sputum.  He states that he did have a lot of of sharp chest pain on Friday which has mostly resolved, though he occasionally gets a brief pinprick sensation in the chest.  No fever.  He does have some chronic right-sided abdominal pain, he states related to his tumors, but states it is minor.  He states he does have a little bit of dysuria as well.    Past Medical History  Past Medical History:   Diagnosis Date     Alcohol abuse     in remission     Benign essential hypertension      Cerebrovascular accident (H)     2010/2011     Cocaine abuse (H)     in remission     Metastatic renal cell carcinoma (H)     2016     Past Surgical History:   Procedure Laterality Date     PICC INSERTION  "Left 05/31/2017    5fr DL BioFlo PICC, 45cm (3cm external) in the L medial brachial vein w/ tip in the SVC RA junction.     radical left nephrectomy Left 03/09/2016 2016     acetaminophen (TYLENOL) 325 MG tablet  albuterol (PROAIR HFA/PROVENTIL HFA/VENTOLIN HFA) 108 (90 Base) MCG/ACT inhaler  amLODIPine (NORVASC) 10 MG tablet  apixaban ANTICOAGULANT (ELIQUIS) 5 MG tablet  calcium-vitamin D (OSCAL) 250-125 MG-UNIT TABS per tablet  cyclobenzaprine (FLEXERIL) 5 MG tablet  erythromycin (ROMYCIN) 5 MG/GM ophthalmic ointment  gabapentin (NEURONTIN) 300 MG capsule  lisinopril (ZESTRIL) 20 MG tablet  neomycin-polymixin-dexamethasone (MAXITROL) ophthalmic ointment  pantoprazole (PROTONIX) 40 MG EC tablet  prochlorperazine (COMPAZINE) 10 MG tablet  senna-docusate (SENOKOT-S/PERICOLACE) 8.6-50 MG tablet      Allergies   Allergen Reactions     Carvedilol Other (See Comments)     Per pt, it gave him back pain     Family History  Family History   Problem Relation Age of Onset     Family History Negative Other         No family history of cancer, kidney cancer     Cancer No family hx of      Social History   Social History     Tobacco Use     Smoking status: Never     Smokeless tobacco: Never   Substance Use Topics     Alcohol use: Not Currently     Drug use: Not Currently     Types: Cocaine         A medically appropriate review of systems was performed with pertinent positives and negatives noted in the HPI, and all other systems negative.    Physical Exam   BP: 122/75  Pulse: 92  Temp: 97.9  F (36.6  C)  Resp: 18  Height: 167.6 cm (5' 6\")  Weight: 90.7 kg (200 lb)  SpO2: 98 %  Physical Exam  Constitutional:       General: He is not in acute distress.     Appearance: He is not diaphoretic.   HENT:      Head: Atraumatic.   Cardiovascular:      Rate and Rhythm: Regular rhythm.      Heart sounds: Normal heart sounds.   Pulmonary:      Effort: No respiratory distress.      Breath sounds: Normal breath sounds.   Chest:      Chest " wall: No tenderness.   Abdominal:      Palpations: Abdomen is soft.      Tenderness: There is abdominal tenderness (minimal right sided abd tenderness with palpatin).   Musculoskeletal:         General: No tenderness. Normal range of motion.      Cervical back: No tenderness.      Thoracic back: No tenderness.      Lumbar back: No tenderness.      Right lower leg: Edema (2-3+) present.      Left lower leg: Edema (2-3+) present.   Skin:     Findings: No abrasion or laceration.   Neurological:      Mental Status: He is alert and oriented to person, place, and time.      Comments: Tongue deviation to the right, right eye only moves inferiorly from midline, decreased sensation right side of face - pt states baseline           ED Course, Procedures, & Data      Procedures       ED Course Selections:        EKG Interpretation:      Interpreted by Nerissa Mann MD  Time reviewed: 0100  Symptoms at time of EKG: chest pain   Rhythm: normal sinus   Rate: Normal  Axis: Normal  Ectopy: none  Conduction: normal  ST Segments/ T Waves: Non-specific T wave changes  Q Waves: none  Comparison to prior: Unchanged    Clinical Impression: NSR with non-specific T wave changes                           Results for orders placed or performed during the hospital encounter of 07/02/23   Head CT w/o contrast     Status: None    Narrative    EXAM: CT HEAD W/O CONTRAST  LOCATION: [facility]  DATE: 7/2/2023    INDICATION: known brain mets, increased HA  COMPARISON: 01/20/2023. 1/19/2023..  TECHNIQUE: Routine CT Head without IV contrast. Multiplanar reformats. Dose reduction techniques were used.    FINDINGS:  INTRACRANIAL CONTENTS: There is no extra-axial fluid collection or acute intraparenchymal hemorrhage. There is no evidence of a midline shift. No CT evidence of acute infarct. The patient is status post a right lateral cranioplasty with postsurgical   changes noted. There is a resection  cavity involving the mid right temporal lobe along with dural thickening. There is no significant surrounding vasogenic edema.    Visualized on the bone windows there is further destruction of the right side of the clivus with a mass lesion along the posterior clivus extending  into the prepontine cistern and extending superiorly surrounding the right cavernous carotid artery which   has progressed in the interval. This does encase the right carotid artery. There is probable extension into the right nasopharynx and possibly sphenoid sinuses. The rest of the brain parenchyma is unremarkable. The ventricular system, basal cisterns and   the cortical sulci are within normal limits for the patient's age.    VISUALIZED ORBITS/SINUSES/MASTOIDS: No intraorbital abnormality.Opacification of  sphenoid sinuses bilaterally and posterior left ethmoid air cells. No middle ear or mastoid effusion.    BONES/SOFT TISSUES: Status post right lateral cranioplasty along with further destruction lytic lesion of the left side of clivus.      Impression    IMPRESSION:  1.  Status post right lateral cranioplasty with resection cavity in mid right temporal lobe with associated dural thickening.  2.  Progression of tumor involvement along the posterior clivus extending further into the right cavernous sinus region to the right nasal pharynx and possibly sphenoid sinuses. Encasement of the cavernous and supraclinoid portion of the right internal   carotid artery and further destruction of right side of the clivus.  3.  Recommend MRI brain without and with contrast for further lesion.  4.  No CT evidence of a midline shift, acute hemorrhage or focal area suggestive of acute infarct.   XR Chest Port 1 View     Status: None    Narrative    EXAM: XR CHEST PORT 1 VIEW  LOCATION: [facility]  DATE: 7/2/2023    INDICATION: Chest pain.  COMPARISON: 09/12/2022.      Impression    IMPRESSION: Mild patchy  infiltrate in the right mid and lower lung and possibly to a lesser extent left lower lung suggestive of pneumonitis. Clinical correlation. Suggest short-term follow-up chest x-ray to monitor for resolution following appropriate   therapy. Heart size and pulmonary vascularity within normal limits. Hypertrophic changes thoracic spine.   CT Chest Pulmonary Embolism w Contrast     Status: None    Narrative    EXAM: CT CHEST PULMONARY EMBOLISM W CONTRAST  LOCATION: [facility]  DATE: 7/2/2023    INDICATION: chest pain, h o previous PE  COMPARISON: CT chest 01/08/2023  TECHNIQUE: CT chest pulmonary angiogram during arterial phase injection of IV contrast. Multiplanar reformats and MIP reconstructions were performed. Dose reduction techniques were used.   CONTRAST: iopamidol (ISOVUE 370) solution 74 mL   [    FINDINGS:  ANGIOGRAM CHEST: Pulmonary arteries are normal caliber and negative for pulmonary emboli. Thoracic aorta is negative for dissection. No CT evidence of right heart strain.    LUNGS AND PLEURA: Extensive pulmonary metastases, many which are new and enlarging. For example, right upper lobe 13 mm (series 4 image 17, previously 8 mm). Patchy areas of groundglass opacity predominantly in the upper lobes. No pleural effusion or   pneumothorax.    MEDIASTINUM/AXILLAE: Slight enlargement of mediastinal and hilar lymph nodes, for example, superior left hilum 2.3 x 3.6 cm, previously 1.8 x 2.4 cm measured using similar technique.    CORONARY ARTERY CALCIFICATION: None.    UPPER ABDOMEN: Large right adrenal mass has enlarged, 10.4 x 7.0 cm, previously 7.7 x 5.0 cm.    MUSCULOSKELETAL: No concerning bone lesions.      Impression    IMPRESSION:  1.  Extensive new and enlarging pulmonary metastases.  2.  Superimposed pulmonary groundglass opacity, favored to be infectious or inflammatory in etiology.  3.  Mild enlargement of mediastinal/hilar adenopathy.  4.  Enlarging right  adrenal mass.  5.  No pulmonary embolism.   US Lower Extremity Venous Duplex Bilateral     Status: None (Preliminary result)    Impression    RESIDENT PRELIMINARY INTERPRETATION  IMPRESSION:.  1. No deep vein thrombosis in the right or left lower extremity.  2. Mild soft tissue edema of the right and left lower extremity.   CBC with platelets     Status: Abnormal   Result Value Ref Range    WBC Count 6.6 4.0 - 11.0 10e3/uL    RBC Count 3.17 (L) 4.40 - 5.90 10e6/uL    Hemoglobin 7.8 (L) 13.3 - 17.7 g/dL    Hematocrit 25.4 (L) 40.0 - 53.0 %    MCV 80 78 - 100 fL    MCH 24.6 (L) 26.5 - 33.0 pg    MCHC 30.7 (L) 31.5 - 36.5 g/dL    RDW 18.0 (H) 10.0 - 15.0 %    Platelet Count 190 150 - 450 10e3/uL   Troponin T, High Sensitivity     Status: Abnormal   Result Value Ref Range    Troponin T, High Sensitivity 24 (H) <=22 ng/L   Comprehensive metabolic panel     Status: Abnormal   Result Value Ref Range    Sodium 133 (L) 136 - 145 mmol/L    Potassium 4.5 3.4 - 5.3 mmol/L    Chloride 97 (L) 98 - 107 mmol/L    Carbon Dioxide (CO2) 20 (L) 22 - 29 mmol/L    Anion Gap 16 (H) 7 - 15 mmol/L    Urea Nitrogen 24.5 (H) 6.0 - 20.0 mg/dL    Creatinine 1.90 (H) 0.67 - 1.17 mg/dL    Calcium 8.8 8.6 - 10.0 mg/dL    Glucose 201 (H) 70 - 99 mg/dL    Alkaline Phosphatase 64 40 - 129 U/L    AST 23 0 - 45 U/L    ALT 22 0 - 70 U/L    Protein Total 6.8 6.4 - 8.3 g/dL    Albumin 4.0 3.5 - 5.2 g/dL    Bilirubin Total 0.3 <=1.2 mg/dL    GFR Estimate 42 (L) >60 mL/min/1.73m2   Nt probnp inpatient     Status: Abnormal   Result Value Ref Range    N terminal Pro BNP Inpatient 1,000 (H) 0 - 900 pg/mL   [place] Draw     Status: None    Narrative    The following orders were created for panel order [place] Draw.  Procedure                               Abnormality         Status                     ---------                               -----------         ------                     Extra Blue Top Tube[[id number]]                              Final result                Extra Red Top Tube[[id number]]                               Final result               Extra Green Top (Lithium...[[id number]]                                                 Extra Purple Top Tube[[id number]]                                                         Please view results for these tests on the individual orders.   Extra Blue Top Tube     Status: None   Result Value Ref Range    Hold Specimen JIC    Extra Red Top Tube     Status: None   Result Value Ref Range    Hold Specimen JIC    Magnesium     Status: Normal   Result Value Ref Range    Magnesium 2.2 1.7 - 2.3 mg/dL   UA with Microscopic     Status: Abnormal   Result Value Ref Range    Color Urine Light Yellow Colorless, Straw, Light Yellow, Yellow    Appearance Urine Clear Clear    Glucose Urine Negative Negative mg/dL    Bilirubin Urine Negative Negative    Ketones Urine Negative Negative mg/dL    Specific Gravity Urine 1.010 1.003 - 1.035    Blood Urine Negative Negative    pH Urine 6.0 5.0 - 7.0    Protein Albumin Urine 30 (A) Negative mg/dL    Urobilinogen Urine Normal Normal, 2.0 mg/dL    Nitrite Urine Negative Negative    Leukocyte Esterase Urine Negative Negative    Bacteria Urine Few (A) None Seen /HPF    RBC Urine <1 <=2 /HPF    WBC Urine 1 <=5 /HPF   CBC with platelets and differential     Status: Abnormal   Result Value Ref Range    WBC Count 6.6 4.0 - 11.0 10e3/uL    RBC Count 3.17 (L) 4.40 - 5.90 10e6/uL    Hemoglobin 7.8 (L) 13.3 - 17.7 g/dL    Hematocrit 25.4 (L) 40.0 - 53.0 %    MCV 80 78 - 100 fL    MCH 24.6 (L) 26.5 - 33.0 pg    MCHC 30.7 (L) 31.5 - 36.5 g/dL    RDW 18.0 (H) 10.0 - 15.0 %    Platelet Count 190 150 - 450 10e3/uL    % Neutrophils 80 %    % Lymphocytes 14 %    % Monocytes 6 %    % Eosinophils 0 %    % Basophils 0 %    % Immature Granulocytes 0 %    NRBCs per 100 WBC 0 <1 /100    Absolute Neutrophils 5.1 1.6 - 8.3 10e3/uL    Absolute Lymphocytes 0.9 0.8 - 5.3 10e3/uL    Absolute Monocytes 0.4 0.0 - 1.3 10e3/uL     Absolute Eosinophils 0.0 0.0 - 0.7 10e3/uL    Absolute Basophils 0.0 0.0 - 0.2 10e3/uL    Absolute Immature Granulocytes 0.0 <=0.4 10e3/uL    Absolute NRBCs 0.0 10e3/uL   Symptomatic Influenza A/B, RSV, & SARS-CoV2 PCR (COVID-19) Nasopharyngeal     Status: Normal    Specimen: Nasopharyngeal; Swab   Result Value Ref Range    Influenza A PCR Negative Negative    Influenza B PCR Negative Negative    RSV PCR Negative Negative    SARS CoV2 PCR Negative Negative    Narrative    Testing was performed using the Xpert Xpress CoV2/Flu/RSV Assay on the [employer] Instrument. This test should be ordered for the detection of SARS-CoV-2, influenza, and RSV viruses in individuals who meet clinical and/or epidemiological criteria. Test performance is unknown in asymptomatic patients. This test is for in vitro diagnostic use under the FDA EUA for laboratories certified under CLIA to perform high or moderate complexity testing. This test has not been FDA cleared or approved. A negative result does not rule out the presence of PCR inhibitors in the specimen or target RNA in concentration below the limit of detection for the assay. If only one viral target is positive but coinfection with multiple targets is suspected, the sample should be re-tested with another FDA cleared, approved, or authorized test, if coinfection would change clinical management. This test was validated by the [facility] [employer]. These laboratories are certified under the Clinical Laboratory Improvement Amendments of 1988 (CLIA-88) as qualified to perform high complexity laboratory testing.   Troponin T, High Sensitivity     Status: Abnormal   Result Value Ref Range    Troponin T, High Sensitivity 23 (H) <=22 ng/L   EKG 12 lead     Status: None (Preliminary result)   Result Value Ref Range    Systolic Blood Pressure  mmHg    Diastolic Blood Pressure  mmHg    Ventricular Rate 91 BPM    Atrial Rate 91 BPM    IN Interval 170 ms    QRS Duration  96 ms     ms    QTc 452 ms    P Axis 46 degrees    R AXIS 30 degrees    T Axis -21 degrees    Interpretation ECG       Sinus rhythm  Nonspecific T wave abnormality  Abnormal ECG     CBC with platelets differential     Status: Abnormal    Narrative    The following orders were created for panel order CBC with platelets differential.  Procedure                               Abnormality         Status                     ---------                               -----------         ------                     CBC with platelets and d...[[id number]]  Abnormal            Final result                 Please view results for these tests on the individual orders.     Medications   azithromycin (ZITHROMAX) 500 mg in sodium chloride 0.9 % 250 mL intermittent infusion (has no administration in time range)   OLANZapine zydis (zyPREXA) ODT tab 5 mg (5 mg Oral $Given 7/2/23 0109)   iopamidol (ISOVUE-370) solution 74 mL (74 mLs Intravenous $Given 7/2/23 0542)   sodium chloride (PF) 0.9% PF flush 90 mL (90 mLs Intravenous $Given 7/2/23 0548)   cefTRIAXone (ROCEPHIN) 2 g vial to attach to  ml bag for ADULTS or NS 50 ml bag for PEDS (0 g Intravenous Stopped 7/2/23 0703)   HYDROmorphone (PF) (DILAUDID) injection 0.5 mg (0.5 mg Intravenous $Given 7/2/23 0703)     Labs Ordered and Resulted from Time of ED Arrival to Time of ED Departure   CBC WITH PLATELETS - Abnormal       Result Value    WBC Count 6.6      RBC Count 3.17 (*)     Hemoglobin 7.8 (*)     Hematocrit 25.4 (*)     MCV 80      MCH 24.6 (*)     MCHC 30.7 (*)     RDW 18.0 (*)     Platelet Count 190     TROPONIN T, HIGH SENSITIVITY - Abnormal    Troponin T, High Sensitivity 24 (*)    COMPREHENSIVE METABOLIC PANEL - Abnormal    Sodium 133 (*)     Potassium 4.5      Chloride 97 (*)     Carbon Dioxide (CO2) 20 (*)     Anion Gap 16 (*)     Urea Nitrogen 24.5 (*)     Creatinine 1.90 (*)     Calcium 8.8      Glucose 201 (*)     Alkaline Phosphatase 64      AST 23      ALT 22       Protein Total 6.8      Albumin 4.0      Bilirubin Total 0.3      GFR Estimate 42 (*)    NT PROBNP INPATIENT - Abnormal    N terminal Pro BNP Inpatient 1,000 (*)    ROUTINE UA WITH MICROSCOPIC - Abnormal    Color Urine Light Yellow      Appearance Urine Clear      Glucose Urine Negative      Bilirubin Urine Negative      Ketones Urine Negative      Specific Gravity Urine 1.010      Blood Urine Negative      pH Urine 6.0      Protein Albumin Urine 30 (*)     Urobilinogen Urine Normal      Nitrite Urine Negative      Leukocyte Esterase Urine Negative      Bacteria Urine Few (*)     RBC Urine <1      WBC Urine 1     CBC WITH PLATELETS AND DIFFERENTIAL - Abnormal    WBC Count 6.6      RBC Count 3.17 (*)     Hemoglobin 7.8 (*)     Hematocrit 25.4 (*)     MCV 80      MCH 24.6 (*)     MCHC 30.7 (*)     RDW 18.0 (*)     Platelet Count 190      % Neutrophils 80      % Lymphocytes 14      % Monocytes 6      % Eosinophils 0      % Basophils 0      % Immature Granulocytes 0      NRBCs per 100 WBC 0      Absolute Neutrophils 5.1      Absolute Lymphocytes 0.9      Absolute Monocytes 0.4      Absolute Eosinophils 0.0      Absolute Basophils 0.0      Absolute Immature Granulocytes 0.0      Absolute NRBCs 0.0     TROPONIN T, HIGH SENSITIVITY - Abnormal    Troponin T, High Sensitivity 23 (*)    MAGNESIUM - Normal    Magnesium 2.2     INFLUENZA A/B, RSV, & SARS-COV2 PCR - Normal    Influenza A PCR Negative      Influenza B PCR Negative      RSV PCR Negative      SARS CoV2 PCR Negative       CT Chest Pulmonary Embolism w Contrast   Final Result   IMPRESSION:   1.  Extensive new and enlarging pulmonary metastases.   2.  Superimposed pulmonary groundglass opacity, favored to be infectious or inflammatory in etiology.   3.  Mild enlargement of mediastinal/hilar adenopathy.   4.  Enlarging right adrenal mass.   5.  No pulmonary embolism.      US Lower Extremity Venous Duplex Bilateral   Preliminary Result   RESIDENT PRELIMINARY  INTERPRETATION   IMPRESSION:.   1. No deep vein thrombosis in the right or left lower extremity.   2. Mild soft tissue edema of the right and left lower extremity.      Head CT w/o contrast   Final Result   IMPRESSION:   1.  Status post right lateral cranioplasty with resection cavity in mid right temporal lobe with associated dural thickening.   2.  Progression of tumor involvement along the posterior clivus extending further into the right cavernous sinus region to the right nasal pharynx and possibly sphenoid sinuses. Encasement of the cavernous and supraclinoid portion of the right internal    carotid artery and further destruction of right side of the clivus.   3.  Recommend MRI brain without and with contrast for further lesion.   4.  No CT evidence of a midline shift, acute hemorrhage or focal area suggestive of acute infarct.      XR Chest Port 1 View   Final Result   IMPRESSION: Mild patchy infiltrate in the right mid and lower lung and possibly to a lesser extent left lower lung suggestive of pneumonitis. Clinical correlation. Suggest short-term follow-up chest x-ray to monitor for resolution following appropriate    therapy. Heart size and pulmonary vascularity within normal limits. Hypertrophic changes thoracic spine.             Critical care was not performed.     Medical Decision Making  The patient's presentation was of moderate complexity (an acute illness with systemic symptoms).    The patient's evaluation involved:  review of external note(s) from 1 sources (previous note)  ordering and/or review of 3+ test(s) in this encounter (see separate area of note for details)  review of 3+ test result(s) ordered prior to this encounter (previous results)  independent interpretation of testing performed by another health professional (CXR independenctly evaluated)    The patient's management necessitated high risk (a decision regarding hospitalization).      Assessment & Plan    I did review the patient's  outside records.  His cancer has been worsening.  He does have a complicated history as well including previous PE, is on anticoagulation chronically.  EKG showed nonspecific T wave changes.  Basic labs were not revealing.  He was given Zyprexa, small dose, to see if this would help with headache, as droperidol is helped in the past.  He reports it did not help.  He did have some improvement, then got worse again.  He was given Dilaudid.  CT showed worsening disease.  I did speak with neurosurgery who did not feel that there was any emergent intervention though that needed to be done.  Chest x-ray showed possible infiltrate.  I did CT his chest given history of PE, which did show worsening disease as well as concern for possible infiltrate.  Azithromycin and Rocephin were started.  He does have significant lower extremity edema.  Bilateral lower extremity ultrasounds were negative for DVT.  The patient's been feeling quite unwell, having a lot of headaches that are getting worse.  He is very bothered by his lower extremity edema.  We will bring him into the hospital for observation for further treatment of his pneumonia and management of his symptoms.    Dictation Disclaimer: Some of this Note has been completed with voice-recognition dictation software. Although errors are generally corrected real-time, there is the potential for a rare error to be present in the completed chart.      I have reviewed the nursing notes. I have reviewed the findings, diagnosis, plan and need for follow up with the patient.    New Prescriptions    No medications on file       Final diagnoses:   Chest pain, unspecified type   Nonintractable headache, unspecified chronicity pattern, unspecified headache type   Lower extremity edema   Pneumonia due to infectious organism, unspecified laterality, unspecified part of lung   Renal cell carcinoma, unspecified laterality (H)   Robi WILDER, am serving as a trained medical scribe to  document services personally performed by Nerissa Mann MD, based on the provider's statements to me.     I, Nerissa Mann MD, was physically present and have reviewed and verified the accuracy of this note documented by Robi Tabor.      Nerissa Mann MD  [facility] within [facility] EMERGENCY DEPARTMENT  7/2/2023     Nerissa Mann MD  07/02/23 0711

## 2023-07-02 NOTE — ED TRIAGE NOTES
Patient presents to the ED with chest pain, headache, and feet swelling. Pt reports recent surgery to remove tumors in head and currently in treatment for lung cancer. Chest pain 6/10, headache 8/10.     Triage Assessment     Row Name 07/02/23 0037       Triage Assessment (Adult)    Airway WDL WDL       Respiratory WDL    Respiratory WDL WDL       Skin Circulation/Temperature WDL    Skin Circulation/Temperature WDL WDL       Cardiac WDL    Cardiac WDL X;chest pain       Chest Pain Assessment    Chest Pain Location midsternal    Character sharp    Precipitating Factors at rest    Alleviating Factors activity

## 2023-07-03 NOTE — PROGRESS NOTES
Admitted/transferred from: ED  2 RN full   skin assessment completed by Griselda Leung RN and Luz De Jesus  Skin assessment finding: skin intact, no problems   Interventions/actions: other None     Will continue to monitor.

## 2023-07-03 NOTE — PLAN OF CARE
Pt was admitted from the ED to  at 1530. On observations. VSS. Up ad paco. Tolerating regular diet and denies nausea. Two skin Rn assessment completed (see note). Mild edema in Lower extremity. Pain managed with dilaudid and tylenol. Right PIV SL. Will cont. POC.

## 2023-07-03 NOTE — DISCHARGE SUMMARY
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    Weight as of this encounter: 90.7 kg (200 lb).       Follow-ups Needed After Discharge   Follow-up Appointments     Adult [facility] Follow-up and recommended labs and tests      Today with [facility] Primary Oncology team    Appointments on [place] and/or [facility] (with [facility] or [facility]   provider or service). Call [phone] if you haven't heard regarding   these appointments within 7 days of discharge.        {Additional follow-up instructions/to-do's for PCP    :Prompt [facility] Med Onc appt today    Unresulted Labs Ordered in the Past 30 Days of this Admission     No orders found for last 31 day(s).      These results will be followed    Discharge Disposition   Discharged to home  Condition at discharge: Stable    Hospital Course     Julio John is a 53 year old male with metastatic renal cell cancer admitted with 1 week of lower extremity edema    Admitted  Seen by [facility] Oncology and [facility] Neurosurgery     Onc recs:  Recommendations:    Not on tivozanib currently--- off week. Do not start new cycle unless plan for potential surgery made since can affect wound healing    Dr. Schmitz has discussed limited treatment options and prognosis with him. Expected survival could be months    Steroids, e.g., dex 2 bid, " might be helpful for headaches and CNS symptoms    Look forward to neurosurgery recommendations    Also consider pall care consult depending on whether he stays here or goes home    If staying, can get CT AP to evaluate for IVC compression etc if that's what is causing pedal edema-- which is what seems to subjectively bother him most-- I am not sure he has any significant edema     Ok to discharge from med onc perspective if no surgical procedure and he wants to go    NSG RECOMMENDATIONS:  No neurosurgical intervention indicated at this time   MRI suggests interval growth of tumor  Need a more comprehensive goals of care discussion with family given aggressive tumor and prior failed radiation and chemotherapy  Note per Oncology at [facility] suggests very poor prognosis of condition    7/3: pt seen and agreeable with the plan and desires to discharge to attend his [facility] appointment today at noon.    Medically stable for discharge.      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   - completed gamma knife treatment 9/13/2022, previous radiation treatment in 2021 and 2022  -Underwent right temporal resection 2/22/23 with neurosurgery at [facility]  Repeat brain MRI (6/8/2023) showed stable findings with no new metastases.  -CT head on 7/2 demonstrated progression of tumor  Plan:  >Unclear if CT head was compared to earlier june MRI and is new from that scan date, would discuss with radiology but if new may need MRI and his [facility] oncology team has wanted him to speak with [facility] neurosurgery previously which has not happened  >If progression then brain MRI and possible neurosurgery consultation     Ground  glass opacity  Possible pneumonia  -CT PE study with ground glass opacity noted, per patient no infectious symptoms or shortness of breath. No oxygen requirement. Given antibiotics in the ER  -No fever noted, 1 day of cough  Plan:  >will complete additional 2 days of antibiotics (doxycycline) for 3 day course starting on 7/3     Neoplastic Related Pain  Headache secondary to metastatic lesions  Plan:  - Continue belbuca 75 mg BID   - Dilaudid and tylenol prn  - Cymbalta 60 mg AM  - Gabapentin 600 mg TID  - Zyprexa q hs     PE   -diagnosed at  sep 2022  - Continue apixaban.    CKD stage III  -Baseline Cr approx 1.5  -Will resume lisiniopril on 7/3     Hyperglycemia  -Glucose 200 on admission  Plan:  >Sliding scale     Anemia - likely secondary to neoplasm, chronic inflammation and CKD - monitor CBC daily  Consultations This Hospital Stay   MEDICATION HISTORY IP PHARMACY CONSULT  ONCOLOGY ADULT IP CONSULT  ONCOLOGY ADULT IP CONSULT  NEUROSURGERY ADULT IP CONSULT    Code Status   Full Code    Time Spent on this Encounter   I, Julius Leal MD, personally saw the patient today and spent greater than 30 minutes discharging this patient.       Julius Leal MD  [facility] [address] [place] [id number]  Phone: [phone]  ______________________________________________________________________    Physical Exam   Vital Signs: Temp: 97.7  F (36.5  C) Temp src: Oral BP: 106/61 Pulse: 70   Resp: 16 SpO2: 96 % O2 Device: None (Room air)    Weight: 200 lbs 0 oz    Alert, awake, baseline mental status, no confusion.       Primary Care Physician   Physician No Ref-Primary    Discharge Orders      Reason for your hospital stay    Suspected malignancy progression  Possible Pneumonia     Activity    Your activity upon discharge: activity as tolerated     Adult [facility] Follow-up and recommended labs and tests    Today with [facility] Primary Oncology team    Appointments on [facility] and/or  [facility] (with [facility] or [employer] provider or service). Call [phone] if you haven't heard regarding these appointments within 7 days of discharge.     Discharge Instructions    Per oncology:  Recommendations:    Not on tivozanib currently--- off week. Do not start new cycle unless plan for potential surgery made since can affect wound healing    Dr. Schmitz has discussed limited treatment options and prognosis with him. Expected survival could be months    Steroids, e.g., dex 2 bid, might be helpful for headaches and CNS symptoms    Look forward to neurosurgery recommendations    Also consider pall care consult depending on whether he stays here or goes home    If staying, can get CT AP to evaluate for IVC compression etc if that's what is causing pedal edema-- which is what seems to subjectively bother him most-- I am not sure he has any significant edema     Ok to discharge from med onc perspective if no surgical procedure and he wants to go    Per neurosurgery:  No neurosurgical intervention indicated at this time   MRI suggests interval growth of tumor  Need a more comprehensive goals of care discussion with family given aggressive tumor and prior failed radiation and chemotherapy  Note per Oncology at [facility] suggests very poor prognosis of condition     Diet    Follow this diet upon discharge: Orders Placed This Encounter      Combination Diet Regular Diet Adult       Significant Results and Procedures   Results for orders placed or performed during the hospital encounter of 07/02/23   Head CT w/o contrast    Narrative    EXAM: CT HEAD W/O CONTRAST  LOCATION: [facility]  DATE: 7/2/2023    INDICATION: known brain mets, increased HA  COMPARISON: 01/20/2023. 1/19/2023..  TECHNIQUE: Routine CT Head without IV contrast. Multiplanar reformats. Dose reduction techniques were used.    FINDINGS:  INTRACRANIAL CONTENTS: There is no extra-axial fluid collection or acute  intraparenchymal hemorrhage. There is no evidence of a midline shift. No CT evidence of acute infarct. The patient is status post a right lateral cranioplasty with postsurgical   changes noted. There is a resection cavity involving the mid right temporal lobe along with dural thickening. There is no significant surrounding vasogenic edema.    Visualized on the bone windows there is further destruction of the right side of the clivus with a mass lesion along the posterior clivus extending  into the prepontine cistern and extending superiorly surrounding the right cavernous carotid artery which   has progressed in the interval. This does encase the right carotid artery. There is probable extension into the right nasopharynx and possibly sphenoid sinuses. The rest of the brain parenchyma is unremarkable. The ventricular system, basal cisterns and   the cortical sulci are within normal limits for the patient's age.    VISUALIZED ORBITS/SINUSES/MASTOIDS: No intraorbital abnormality.Opacification of  sphenoid sinuses bilaterally and posterior left ethmoid air cells. No middle ear or mastoid effusion.    BONES/SOFT TISSUES: Status post right lateral cranioplasty along with further destruction lytic lesion of the left side of clivus.      Impression    IMPRESSION:  1.  Status post right lateral cranioplasty with resection cavity in mid right temporal lobe with associated dural thickening.  2.  Progression of tumor involvement along the posterior clivus extending further into the right cavernous sinus region to the right nasal pharynx and possibly sphenoid sinuses. Encasement of the cavernous and supraclinoid portion of the right internal   carotid artery and further destruction of right side of the clivus.  3.  Recommend MRI brain without and with contrast for further lesion.  4.  No CT evidence of a midline shift, acute hemorrhage or focal area suggestive of acute infarct.   XR Chest Port 1 View    Narrative    EXAM: XR  CHEST PORT 1 VIEW  LOCATION: [facility]  DATE: 7/2/2023    INDICATION: Chest pain.  COMPARISON: 09/12/2022.      Impression    IMPRESSION: Mild patchy infiltrate in the right mid and lower lung and possibly to a lesser extent left lower lung suggestive of pneumonitis. Clinical correlation. Suggest short-term follow-up chest x-ray to monitor for resolution following appropriate   therapy. Heart size and pulmonary vascularity within normal limits. Hypertrophic changes thoracic spine.   CT Chest Pulmonary Embolism w Contrast    Narrative    EXAM: CT CHEST PULMONARY EMBOLISM W CONTRAST  LOCATION: [facility]  DATE: 7/2/2023    INDICATION: chest pain, h o previous PE  COMPARISON: CT chest 01/08/2023  TECHNIQUE: CT chest pulmonary angiogram during arterial phase injection of IV contrast. Multiplanar reformats and MIP reconstructions were performed. Dose reduction techniques were used.   CONTRAST: iopamidol (ISOVUE 370) solution 74 mL   [    FINDINGS:  ANGIOGRAM CHEST: Pulmonary arteries are normal caliber and negative for pulmonary emboli. Thoracic aorta is negative for dissection. No CT evidence of right heart strain.    LUNGS AND PLEURA: Extensive pulmonary metastases, many which are new and enlarging. For example, right upper lobe 13 mm (series 4 image 17, previously 8 mm). Patchy areas of groundglass opacity predominantly in the upper lobes. No pleural effusion or   pneumothorax.    MEDIASTINUM/AXILLAE: Slight enlargement of mediastinal and hilar lymph nodes, for example, superior left hilum 2.3 x 3.6 cm, previously 1.8 x 2.4 cm measured using similar technique.    CORONARY ARTERY CALCIFICATION: None.    UPPER ABDOMEN: Large right adrenal mass has enlarged, 10.4 x 7.0 cm, previously 7.7 x 5.0 cm.    MUSCULOSKELETAL: No concerning bone lesions.      Impression    IMPRESSION:  1.  Extensive new and enlarging pulmonary  metastases.  2.  Superimposed pulmonary groundglass opacity, favored to be infectious or inflammatory in etiology.  3.  Mild enlargement of mediastinal/hilar adenopathy.  4.  Enlarging right adrenal mass.  5.  No pulmonary embolism.   US Lower Extremity Venous Duplex Bilateral    Narrative    EXAMINATION: US LOWER EXTREMITY VENOUS DUPLEX BILATERAL  7/2/2023 4:26  AM      CLINICAL HISTORY: Bilateral lower extremity swelling, HISTORY of clots    COMPARISON: 9/26/2022    PROCEDURE COMMENTS: Ultrasound was performed of the deep venous system  of the right and left lower extremity using grayscale, color, and  spectral Doppler.    FINDINGS:  The right and left common femoral, greater saphenous origin, femoral,  popliteal, and deep calf veins are visualized and are patent. Venous  waveforms are normal. There is normal response to compression.    Small amount of soft tissue edema within the subcutaneous tissues of  the right and left lower extremity.      Impression    IMPRESSION:.  1. No deep vein thrombosis in the right or left lower extremity.  2. Mild soft tissue edema of the right and left lower extremity.    I have personally reviewed the examination and initial interpretation  and I agree with the findings.    LUTHER PEREZ MD         SYSTEM ID:  [id number]   MR Brain w/o & w Contrast    Narrative    EXAM: MR BRAIN W/O & W CONTRAST  7/2/2023 10:51 AM     HISTORY:  Met RCC with CT showing increased brain mets       COMPARISON:  Same day CT, MRI 12/30/2022    TECHNIQUE: MR head: Multiplanar T1-weighted, axial FLAIR, and  susceptibility images were obtained without intravenous contrast.  Following intravenous gadolinium-based contrast administration, axial  T2-weighted, diffusion, and T1-weighted images (in multiple planes)  were obtained.    CONTRAST: .    FINDINGS:  Postsurgical changes of right temporal cranioplasty and mass  resection. Nodular enhancement, for example measuring 1.4 x 0.7 x 1.0  cm and 1.4 x 1.8  x 0.9 cm, in the resection cavity with adjacent dural  thickening/enhancement. Increased size of the tumor centered about the  clivus with involvement of bilateral cavernous sinus, prepontine and  suprasellar cisterns, posterior nasal cavity and sphenoid sinuses. The  mass measures roughly 5.4 x 5.6 x 5.1 cm where this previously  measured roughly 4.2 x 4.3 x 4.6 cm. Encasement of bilateral cavernous  internal carotid arteries.    Again, there is a previously seen cystic structure with mild  peripheral  enhancement involving the superficial portion of the left parotid  gland measuring 1.3 x 1.4 cm which is unchanged in size when measured  in a similar fashion.    No significant midline shift, or intracranial hemorrhage. The  ventricles are proportionate to the cerebral sulci. Diffusion and  susceptibility weighted images are negative for acute/focal  abnormality. Major intracranial vascular structures are within normal  limits.    No suspicious abnormality of the skull marrow signal. Mastoid air  cells are clear. The orbits are normal.      Impression    IMPRESSION:    1. Nodular enhancement in the resection cavity in the right temporal  lobe concerning for disease recurrence.  2. Increased size of known mass centered about the clivus now  involving bilateral cavernous sinuses, prepontine and suprasellar  cisterns, posterior nasal cavity and sphenoid sinuses. Encasement of  bilateral cavernous internal carotid arteries with no significant  stenosis.  3. Stable mild peripherally enhancing cystic lesion in the superficial  lobe of the left parotid gland. Differential would include a Warthin's  tumor. Consider ENT evaluation.    I have personally reviewed the examination and initial interpretation  and I agree with the findings.    YANNICK FLORENTINO MD         SYSTEM ID:  [id number]   Echocardiogram Complete     Value    LVEF  60-65%    Narrative    [id number]  [id number]  [id number]  [id number]^JAYLIN^ANNETTE^[facility]  [facility]  Echocardiography Laboratory  [address] [place] [address]     Name: ALIA VASQUEZ  MRN: [id number]  : 1970  Study Date: 2023 08:38 AM  Age: 53 yrs  Gender: Male  Patient Location: [facility] [place]  Reason For Study: Edema  Ordering Physician: ANNETTE MOSQUEDA  Performed By: Vickie Roe RDCS     BSA: 2.0 m2  Height: 66 in  Weight: 200 lb  BP: 123/74 mmHg  ______________________________________________________________________________  Procedure  Complete Portable Echo Adult.  ______________________________________________________________________________  Interpretation Summary  Global and regional left ventricular function is normal with an EF of 60-65%.  Left ventricular diastolic function is normal.  Right ventricular function, chamber size, wall motion, and thickness are  normal.  Pulmonary artery systolic pressure is normal.  The inferior vena cava is normal.  No pericardial effusion is present.  ______________________________________________________________________________  Left Ventricle  Global and regional left ventricular function is normal with an EF of 60-65%.  Left ventricular wall thickness is normal. Left ventricular size is normal.  Left ventricular diastolic function is normal. No regional wall motion  abnormalities are seen.     Right Ventricle  Right ventricular function, chamber size, wall motion, and thickness are  normal.     Atria  Both atria appear normal. The atrial septum is intact as assessed by color  Doppler .     Mitral Valve  The mitral valve is normal. Trace mitral insufficiency is present.     Aortic Valve  Aortic valve sclerosis is present.     Tricuspid Valve  The tricuspid valve is normal. Trace tricuspid insufficiency is present. The  right ventricular systolic pressure is approximated at 21.7 mmHg plus the  right atrial pressure. Pulmonary artery systolic pressure is normal.     Pulmonic Valve  The pulmonic valve is  normal.     Vessels  The thoracic aorta is normal. The pulmonary artery and bifurcation cannot be  assessed. The inferior vena cava is normal.     Pericardium  No pericardial effusion is present.     ______________________________________________________________________________  MMode/2D Measurements & Calculations  IVSd: 0.97 cm  LVIDd: 5.1 cm  LVIDs: 2.8 cm  LVPWd: 1.3 cm     FS: 45.5 %  LV mass(C)d: 218.8 grams  LV mass(C)dI: 109.4 grams/m2  Ao root diam: 3.4 cm  asc Aorta Diam: 3.2 cm  LVOT diam: 2.3 cm  LVOT area: 4.2 cm2  LA Volume (BP): 55.5 ml  LA Volume Index (BP): 27.8 ml/m2  RWT: 0.51  TAPSE: 1.9 cm     Doppler Measurements & Calculations  MV E max jonh: 96.9 cm/sec  MV A max jonh: 114.0 cm/sec  MV E/A: 0.85  MV dec time: 0.26 sec  Ao V2 max: 160.0 cm/sec  Ao max PG: 10.2 mmHg  TR max jonh: 233.0 cm/sec  TR max P.7 mmHg  E/E' av.1  Lateral E/e': 8.5  Medial E/e': 9.7  RV S Jonh: 13.2 cm/sec     ______________________________________________________________________________  Report approved by: Constance Augustin 2023 10:00 AM               Discharge Medications   Current Discharge Medication List      START taking these medications    Details   doxycycline hyclate (VIBRAMYCIN) 100 MG capsule Take 1 capsule (100 mg) by mouth every 12 hours for 2 days  Qty: 4 capsule, Refills: 0    Associated Diagnoses: Pneumonia due to infectious organism, unspecified laterality, unspecified part of lung         CONTINUE these medications which have CHANGED    Details   gabapentin (NEURONTIN) 600 MG tablet Take 0.5 tablets (300 mg) by mouth 3 times daily for 30 days Take 2 capsules 3 times daily  Qty: 45 tablet, Refills: 0    Associated Diagnoses: Malignant neoplasm of left kidney (H)      pantoprazole (PROTONIX) 40 MG EC tablet Take 1 tablet (40 mg) by mouth 2 times daily (before meals)  Qty: 60 tablet, Refills: 1    Associated Diagnoses: Gastroesophageal reflux disease without esophagitis         CONTINUE these  medications which have NOT CHANGED    Details   acetaminophen (TYLENOL) 325 MG tablet Take 325-650 mg by mouth every 8 hours as needed for mild pain Take 2 tablets once every 6 hours as needed for mild pain      albuterol (PROAIR HFA/PROVENTIL HFA/VENTOLIN HFA) 108 (90 Base) MCG/ACT inhaler Inhale 1-2 puffs into the lungs every 4 hours as needed for shortness of breath / dyspnea or wheezing  Qty: 18 g, Refills: 4    Comments: Pharmacy may dispense brand covered by insurance (Proair, or proventil or ventolin or generic albuterol inhaler)  Associated Diagnoses: Metastatic renal cell carcinoma, left (H); SOB (shortness of breath); Malignant neoplasm metastatic to both lungs (H)      amLODIPine (NORVASC) 10 MG tablet Take 1 tablet (10 mg) by mouth daily  Qty: 90 tablet, Refills: 3    Associated Diagnoses: Essential hypertension      apixaban ANTICOAGULANT (ELIQUIS) 5 MG tablet Take 1 tablet (5 mg) by mouth 2 times daily  Qty: 60 tablet, Refills: 0    Associated Diagnoses: Multiple pulmonary emboli (H)      DULoxetine (CYMBALTA) 60 MG capsule Take 60 mg by mouth daily      erythromycin (ROMYCIN) 5 MG/GM ophthalmic ointment Apply 1/2 inch strip to the right eye 6 times per day.      guaiFENesin (ROBITUSSIN) 20 mg/mL liquid Take 200 mg by mouth every 4 hours as needed for cough      hydrocortisone 2.5 % cream Apply topically 2 times daily as needed for itching      HYDROmorphone (DILAUDID) 2 MG tablet Take 2 mg by mouth every 4 hours as needed for breakthrough pain      hypromellose (ARTIFICIAL TEARS) 0.5 % SOLN ophthalmic solution 1 drop every hour as needed for dry eyes      lidocaine, viscous, (XYLOCAINE) 2 % solution Swish and spit 15 mLs in mouth 2 times daily as needed for other (mouth sores) ; Max 8 doses/24 hour period.      OLANZapine (ZYPREXA) 5 MG tablet Take 5 mg by mouth At Bedtime      ondansetron (ZOFRAN ODT) 8 MG ODT tab Take 8 mg by mouth every 8 hours as needed for nausea      prochlorperazine (COMPAZINE)  10 MG tablet Take 10 mg by mouth every 6 hours as needed for nausea      sennosides (SENOKOT) 8.6 MG tablet Take 1 tablet by mouth 2 times daily      tivozanib HCl (FOTIVDA) 0.89 MG capsule Take 0.89 mg by mouth daily Take daily on days 1-21 of a 28 day cycle (3 weeks on, 1 off)         STOP taking these medications       Buprenorphine HCl (BELBUCA) 75 MCG FILM buccal film Comments:   Reason for Stopping:         Calcium Carb-Cholecalciferol 500-5 MG-MCG PACK Comments:   Reason for Stopping:         cetirizine (ZYRTEC) 10 MG tablet Comments:   Reason for Stopping:         cyclobenzaprine (FLEXERIL) 5 MG tablet Comments:   Reason for Stopping:         lisinopril (ZESTRIL) 20 MG tablet Comments:   Reason for Stopping:         loperamide (IMODIUM) 2 MG capsule Comments:   Reason for Stopping:         senna-docusate (SENOKOT-S/PERICOLACE) 8.6-50 MG tablet Comments:   Reason for Stopping:             Allergies   Allergies   Allergen Reactions     Carvedilol Other (See Comments)     Per pt, it gave him back pain

## 2023-07-03 NOTE — PLAN OF CARE
"Writer Assumed care starting 2300.    /61 (BP Location: Left arm)   Pulse 70   Temp 97.7  F (36.5  C) (Oral)   Resp 16   Ht 1.676 m (5' 6\")   Wt 90.7 kg (200 lb)   SpO2 96%   BMI 32.28 kg/m      Observation goals  1. Diagnostic tests and consults completed and resulted: In progress  2. Adequate pain control on oral analgesics: Met  3. Safe disposition plan has been identified: In progress    Alert, Oriented x 4, Independent, Cooperative   Pt had adequate rest periods. VS remained stable and within his baseline. No complaints all night. Pain is more controlled. No signs of acute distress. RN will continue current plan of care.                        "

## 2023-07-03 NOTE — PROGRESS NOTES
Observation goals  1. Diagnostic tests and consults completed and resulted: In progress  2. Adequate pain control on oral analgesics: Met  3. Safe disposition plan has been identified: In progress    No complaints so far. He's asleep since writer assumed care.

## 2023-07-03 NOTE — PROGRESS NOTES
Care Management Discharge Note    Discharge Date: 07/03/2023       Discharge Disposition: Home    Discharge Services: Other (see comment) (OP Oncology)    Discharge DME: None    Discharge Transportation:  Spouse    Private pay costs discussed: Not applicable    Does the patient's insurance plan have a 3 day qualifying hospital stay waiver?  Yes   Will the waiver be used for post-acute placement? No    PAS Confirmation Code:    Patient/family educated on Medicare website which has current facility and service quality ratings:      Education Provided on the Discharge Plan: Yes  Persons Notified of Discharge Plans: patient  Patient/Family in Agreement with the Plan: yes    Handoff Referral Completed: Patient does not have a PCP but is interested in establishing with [facility]. Writer educated pt that he can search by location and call the preferred clinic.    Additional Information:  Writer attempted to complete CMA but patient was already dressed and leaving.   Patient has OP Oncology appt at noon.  All concerns addressed.     Jennifer Gutierrez RNNELSY  7/3/2023  [phone]  Nurse Coordinator    Social Work and Care Management Department

## 2023-07-03 NOTE — DISCHARGE SUMMARY
Afebrile, VSS on RA.  Alert and oriented x4.  Denies pain and nausea.  Tolerating regular diet, appetite good.  Up independently.  PIV removed.  Discharge orders and medications reviewed with Pt.  Pt. Ambulated from unit independently to pharmacy and lobby door.  Pt. discharged to home.

## 2023-07-03 NOTE — PROGRESS NOTES
Observation goals  1. Diagnostic tests and consults completed and resulted: In progress  2. Adequate pain control on oral analgesics: Met  3. Safe disposition plan has been identified: in progress

## 2023-08-07 NOTE — DISCHARGE INSTRUCTIONS
Please keep your arm and Neer's sling and splint until seen by the surgeon.  Orthopedic surgery should contact you to set up an appointment.  If you do not hear from them you may contact them at the number below. Please make an appointment to follow up with Orthopedics Clinic (phone: [phone])

## 2023-08-07 NOTE — ED TRIAGE NOTES
Pt ambulatory to triage with c/o RUE pain. HX bone cancer. Pt states he's been having increased pain, and is worried his arm may be broken.      Triage Assessment       Row Name 08/07/23 6163       Triage Assessment (Adult)    Airway WDL WDL       Respiratory WDL    Respiratory WDL WDL       Skin Circulation/Temperature WDL    Skin Circulation/Temperature WDL WDL       Cardiac WDL    Cardiac WDL WDL       Peripheral/Neurovascular WDL    Peripheral Neurovascular WDL WDL       Cognitive/Neuro/Behavioral WDL    Cognitive/Neuro/Behavioral WDL WDL

## 2023-08-07 NOTE — ED PROVIDER NOTES
[facility] ([facility])    8/07/23       ED PROVIDER NOTE  VTC    History     Chief Complaint   Patient presents with    Arm Pain     HPI  Julio John is a 53 year old male with a past medical history significant for polysubstance abuse, renal cell carcinoma (metastatic to brain, lung, bones), PAF, PE on Eliquis, CKD II, HTN who presents to the Emergency Department for evaluation of arm pain. Patient believes that he broke a bone in his right arm. He states he was lifting a 100 pound bag 40 minutes ago and heard a crack in his right arm which caused him a lot of pain. He also adds that he has bone cancer.      Past Medical History  Past Medical History:   Diagnosis Date    Alcohol abuse     in remission    Benign essential hypertension     Cerebrovascular accident (H)     2010/2011    Cocaine abuse (H)     in remission    Metastatic renal cell carcinoma (H)     2016     Past Surgical History:   Procedure Laterality Date    PICC INSERTION Left 05/31/2017    5fr DL BioFlo PICC, 45cm (3cm external) in the L medial brachial vein w/ tip in the SVC RA junction.    radical left nephrectomy Left 03/09/2016 2016     oxyCODONE (ROXICODONE) 5 MG tablet  acetaminophen (TYLENOL) 325 MG tablet  albuterol (PROAIR HFA/PROVENTIL HFA/VENTOLIN HFA) 108 (90 Base) MCG/ACT inhaler  amLODIPine (NORVASC) 10 MG tablet  apixaban ANTICOAGULANT (ELIQUIS) 5 MG tablet  DULoxetine (CYMBALTA) 60 MG capsule  erythromycin (ROMYCIN) 5 MG/GM ophthalmic ointment  gabapentin (NEURONTIN) 600 MG tablet  guaiFENesin (ROBITUSSIN) 20 mg/mL liquid  hydrocortisone 2.5 % cream  HYDROmorphone (DILAUDID) 2 MG tablet  hypromellose (ARTIFICIAL TEARS) 0.5 % SOLN ophthalmic solution  lidocaine, viscous, (XYLOCAINE) 2 % solution  OLANZapine (ZYPREXA) 5 MG tablet  ondansetron (ZOFRAN ODT) 8 MG ODT tab  pantoprazole (PROTONIX) 40 MG EC tablet  prochlorperazine (COMPAZINE) 10 MG tablet  sennosides (SENOKOT) 8.6 MG  tablet  tivozanib HCl (FOTIVDA) 0.89 MG capsule      Allergies   Allergen Reactions    Carvedilol Other (See Comments)     Per pt, it gave him back pain     Family History  Family History   Problem Relation Age of Onset    Family History Negative Other         No family history of cancer, kidney cancer    Cancer No family hx of      Social History   Social History     Tobacco Use    Smoking status: Never    Smokeless tobacco: Never   Substance Use Topics    Alcohol use: Not Currently    Drug use: Not Currently     Types: Cocaine      Past medical history, past surgical history, medications, allergies, family history, and social history were reviewed with the patient. No additional pertinent items.      A complete review of systems was performed with pertinent positives and negatives noted in the HPI, and all other systems negative.    Physical Exam   BP: 114/78  Pulse: 87  Temp: 97.6  F (36.4  C)  Resp: 15  SpO2: 97 %  Physical Exam  Vitals and nursing note reviewed.   Constitutional:       General: He is not in acute distress.     Appearance: He is well-developed.   HENT:      Head: Normocephalic.      Right Ear: External ear normal.      Left Ear: External ear normal.      Nose: Nose normal.   Eyes:      Extraocular Movements: Extraocular movements intact.      Conjunctiva/sclera: Conjunctivae normal.   Pulmonary:      Effort: Pulmonary effort is normal. No respiratory distress.   Abdominal:      General: Abdomen is flat. There is no distension.   Musculoskeletal:         General: No deformity. Normal range of motion.      Cervical back: Normal range of motion and neck supple.      Comments: Severe right arm tenderness just proximal to the elbow   Skin:     General: Skin is warm and dry.   Neurological:      Mental Status: He is alert. Mental status is at baseline.      Comments: Oriented   Psychiatric:         Mood and Affect: Mood normal.         Behavior: Behavior normal.           ED Course, Procedures, & Data       Procedures       Results for orders placed or performed during the hospital encounter of 08/07/23   Humerus XR, G/E 2 views, right     Status: None    Narrative    Exam: 2 views of the right humerus dated 8/7/2023.    COMPARISON: None.    CLINICAL HISTORY: Known metastatic disease to the humerus.    FINDINGS: 2 views of the right humerus were obtained. Tiny bone  fragment along the superior humeral head, presumed degenerative. There  is a mixed lytic and sclerotic lesion involving the distal right  humeral diaphysis, with extension into the metaphysis. Subtle linear  area of lucency in the bone with cortical irregularity, suspicious for  a nondisplaced fracture.      Impression    IMPRESSION: Mixed sclerotic lucent lesion in the right distal humeral  diaphysis, extending into the metaphysis with cortical irregularity  and a subtle linear lucency, worrisome for a nondisplaced fracture.  Correlate clinically. Further imaging with CT or MRI as clinically  indicated.    TC XIONG MD         SYSTEM ID:  [id number]   [place] Draw     Status: None    Narrative    The following orders were created for panel order [place] Draw.  Procedure                               Abnormality         Status                     ---------                               -----------         ------                     Extra Blue Top Tube[[id number]]                              Final result               Extra Red Top Tube[[id number]]                               Final result               Extra Green Top (Lithium...[[id number]]                      Final result               Extra Purple Top Tube[[id number]]                            Final result                 Please view results for these tests on the individual orders.   Extra Blue Top Tube     Status: None   Result Value Ref Range    Hold Specimen JIC    Extra Red Top Tube     Status: None   Result Value Ref Range    Hold Specimen JIC    Extra Green Top (Lithium Heparin) Tube      Status: None   Result Value Ref Range    Hold Specimen JIC    Extra Purple Top Tube     Status: None   Result Value Ref Range    Hold Specimen JIC                    Results for orders placed or performed during the hospital encounter of 08/07/23   Humerus XR, G/E 2 views, right     Status: None    Narrative    Exam: 2 views of the right humerus dated 8/7/2023.    COMPARISON: None.    CLINICAL HISTORY: Known metastatic disease to the humerus.    FINDINGS: 2 views of the right humerus were obtained. Tiny bone  fragment along the superior humeral head, presumed degenerative. There  is a mixed lytic and sclerotic lesion involving the distal right  humeral diaphysis, with extension into the metaphysis. Subtle linear  area of lucency in the bone with cortical irregularity, suspicious for  a nondisplaced fracture.      Impression    IMPRESSION: Mixed sclerotic lucent lesion in the right distal humeral  diaphysis, extending into the metaphysis with cortical irregularity  and a subtle linear lucency, worrisome for a nondisplaced fracture.  Correlate clinically. Further imaging with CT or MRI as clinically  indicated.    TC XIONG MD         SYSTEM ID:  [id number]   [place] Draw     Status: None    Narrative    The following orders were created for panel order [place] Draw.  Procedure                               Abnormality         Status                     ---------                               -----------         ------                     Extra Blue Top Tube[[id number]]                              Final result               Extra Red Top Tube[[id number]]                               Final result               Extra Green Top (Lithium...[[id number]]                      Final result               Extra Purple Top Tube[[id number]]                            Final result                 Please view results for these tests on the individual orders.   Extra Blue Top Tube     Status: None   Result Value Ref Range    Hold  Specimen JIC    Extra Red Top Tube     Status: None   Result Value Ref Range    Hold Specimen JIC    Extra Green Top (Lithium Heparin) Tube     Status: None   Result Value Ref Range    Hold Specimen JIC    Extra Purple Top Tube     Status: None   Result Value Ref Range    Hold Specimen JIC      Medications   oxyCODONE (ROXICODONE) tablet 5 mg (5 mg Oral $Given 8/7/23 1510)   HYDROmorphone (PF) (DILAUDID) injection 0.5 mg (0.5 mg Intravenous $Given 8/7/23 1633)     Labs Ordered and Resulted from Time of ED Arrival to Time of ED Departure - No data to display  Humerus XR, G/E 2 views, right   Final Result   IMPRESSION: Mixed sclerotic lucent lesion in the right distal humeral   diaphysis, extending into the metaphysis with cortical irregularity   and a subtle linear lucency, worrisome for a nondisplaced fracture.   Correlate clinically. Further imaging with CT or MRI as clinically   indicated.      TC XIONG MD            SYSTEM ID:  [id number]             Medical Decision Making  The patient's presentation is strongly suggestive of moderate complexity (an acute complicated injury).    The patient's evaluation involved:  review of external note(s) from 3+ sources (Most recent H&P in addition to clinic and ED note)  review of 2 test result(s) ordered prior to this encounter (Most recent BMP and CBC)  X-ray independently interpreted  Management discussed with their healthcare provider the orthopedic surgeon  The patient's management involved high risk (a parenteral controlled substance).      Assessment & Plan    53-year-old male presents to us with a chief complaint of arm pain sudden onset while lifting a heavy object.  In the place of the pain he has known renal cell carcinoma metastasis.  Patient was given IV Dilaudid for pain as well as oxycodone.  X-ray shows evidence of a nondisplaced fracture.  Management discussed with orthopedic surgery.  They recommend a long-arm splint and then follow-up in the office  for likely surgical stabilization.  Orthopedic surgery referral was placed.  Patient and his family understood the plan and will follow-up with orthopedic surgery.    I have reviewed the nursing notes. I have reviewed the findings, diagnosis, plan and need for follow up with the patient.    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      IReji DO, was physically present and have reviewed and verified the accuracy of this note documented by CANDACE FUENTES.     Reji Negrete DO  [facility] EMERGENCY DEPARTMENT  8/7/2023     Reji Negrete DO  08/08/23 1600

## 2023-08-07 NOTE — PLAN OF CARE
BRIEF ORTHOPEDIC NOTE    Ortho was contacted about Rosanne John.  This is a patient with known renal cell carcinoma with the known metastasis to the right distal humerus.  Today, he was carrying a heavy load and felt a snap.  X-rays are notable for a likely impending fracture of the right distal humerus.  He can be managed in a posterior slab splint, nonweightbearing right upper extremity, and can follow-up with one of our orthopedic tumor surgeons in 1 week for evaluation of next treatment steps.  We have messaged our schedulers.    Dr. Negrete did not request that I personally see or examine the patient at this time. My recommendations are not intended to take the place of the Dr. Negrete's clinical judgment, which should always be utilized to provide the most appropriate care to meet the unique needs of each patient.     Orthopedic Surgery did not see nor was involved with the physical examination of the patient for this encounter.    --  Nolan Hirsch MD, PhD  Orthopedic Surgery PGY-1  [phone]    Please page me directly with any questions/concerns during regular weekday hours before 5pm. If there is no response, if it is a weekend, or if it is during evening hours, then please page the orthopedic surgery resident on call.

## 2023-08-08 NOTE — TELEPHONE ENCOUNTER
VM left using a  regarding a referral from Dr. Chano Hirsch regarding a pathological fracture to distal humerus.    Sonal Roca, ZOEN

## 2023-08-09 NOTE — TELEPHONE ENCOUNTER
Records Requested     August 9, 2023 8:55 AM  [id number]   Facility  [facility]  Fax: [phone]   Outcome CSS faxed urgent request to [facility] to push images to PACS.     UPDATE 8/9/23 10:29AM MMT - Images resolved in PACS. Records complete.        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 {M84.521A] // hx metastatic renal cell carcinoma to brain and bones    APPOINTMENT DATE: 8/10/23   NOTES STATUS DETAILS   OFFICE NOTE from other specialist Internal [facility]:   8/7/23 Documentation only encounter- Ifrah Morrow RN - [facility] Oncology     8/3/23, 7/20/23 - Arabella Soto PA-C - [facility] Oncology     7/3/23, 6/21/23, 6/12/23, 4/7/23, 4/3/23, Josef Schmitz MD - [facility] Medical Oncology     5/31/23, 3/9/23, 8/11/22, 7/18/22, 1/23/20  - Ricarda Chan MBBS - [facility] Rad Onc     3/27/23 - Jose Juárez MD - [facility] Pallative Care     Radiation Therapy [facility]:   3/16/23-3/22/23 -R  Humerus   1/27/20-2/7/20 - R Humerus     Internal:   2/2/23, 12/30/22, 8/18/22 - Chano Schaefer MD - [facility] Rad Onc     10/25/22 - Debbie Olivares PA-VANCE - [facility] Med Onc    DISCHARGE SUMMARY from hospital Internal 3/17/23-3/21/23 - [facility] ED-hospital admission - Jennie Lee MD - s/p RT to R Humerus    DISCHARGE REPORT from the ER Internal 8/7/23 - [facility] ED - Reji Negrete DO - Pathological fracture of right humerus; initial encounter    MEDICATION LIST Internal    LABS     CBC/DIFF Care Everywhere 8/3/23   NM BONE SCAN/ PET SCAN Internal / In Process Internal:   NM Bone Scan - 5/30/17, 4/12/17    [facility]:   NM Bone Scan - 2/2/23, 8/4/22, 5/10/22, 2/14/22, 11/8/21, 8/30/21, 6/28/21, 1/25/21, 10/9/20, 6/11/20, 1/23/20, 1/7/19, 12/13/17, 12/2/16, 2/25/16   XRAYS (IMAGES & REPORTS) Internal / In Process Internal:   XR Humerus Right - 8/7/23    [facility]:   XR Humerus Right - 4/2/21, 2/8/21  XR Elbow Right - 1/13/20   TUMOR     PATHOLOGY  Slides & report Care Everywhere [facility]:  2/22/23 - Brain  tumor bx, temporal bone bx, muscle temporalis bx - metastatic renal carcinoma

## 2023-08-10 PROBLEM — M25.521 RIGHT ELBOW PAIN: Status: RESOLVED | Noted: 2023-01-01 | Resolved: 2023-01-01

## 2023-08-10 PROBLEM — M25.521 RIGHT ELBOW PAIN: Status: ACTIVE | Noted: 2023-01-01

## 2023-08-10 NOTE — NURSING NOTE
Chief Complaint   Patient presents with    Consult     Right distal humerus pathologic fracture        53 year old  1970          Pain Assessment  Patient Currently in Pain: Yes  0-10 Pain Scale: 10 (over 10)  Primary Pain Location: Elbow (right elbow area)            [facility] - [place], [place] - [address] [id number] - [place], [place] - [address] [facility] [address]  [facility] - [place], [place] - [address] [facility] [id number]        Allergies   Allergen Reactions    Carvedilol Other (See Comments)     Per pt, it gave him back pain           Current Outpatient Medications   Medication    oxyCODONE (ROXICODONE) 5 MG tablet    acetaminophen (TYLENOL) 325 MG tablet    albuterol (PROAIR HFA/PROVENTIL HFA/VENTOLIN HFA) 108 (90 Base) MCG/ACT inhaler    amLODIPine (NORVASC) 10 MG tablet    apixaban ANTICOAGULANT (ELIQUIS) 5 MG tablet    DULoxetine (CYMBALTA) 60 MG capsule    erythromycin (ROMYCIN) 5 MG/GM ophthalmic ointment    gabapentin (NEURONTIN) 600 MG tablet    guaiFENesin (ROBITUSSIN) 20 mg/mL liquid    hydrocortisone 2.5 % cream    HYDROmorphone (DILAUDID) 2 MG tablet    hypromellose (ARTIFICIAL TEARS) 0.5 % SOLN ophthalmic solution    lidocaine, viscous, (XYLOCAINE) 2 % solution    OLANZapine (ZYPREXA) 5 MG tablet    ondansetron (ZOFRAN ODT) 8 MG ODT tab    pantoprazole (PROTONIX) 40 MG EC tablet    prochlorperazine (COMPAZINE) 10 MG tablet    sennosides (SENOKOT) 8.6 MG tablet    tivozanib HCl (FOTIVDA) 0.89 MG capsule     No current facility-administered medications for this visit.

## 2023-08-10 NOTE — TELEPHONE ENCOUNTER
Central Prior Authorization Team   Phone: [phone]    PA Initiation-EMA-SITE 19 DOS 9/6/22 ([employer])    Medication: ALBUTEROL SULFATE  (90 BASE) MCG/ACT IN AERS  Insurance Company: [place] Medicaid ([facility]) - Phone [phone] Fax [phone]  Pharmacy Filling the Rx: [place], [place] - [address] [place] [address] [phone]  Filling Pharmacy Phone: [phone]  Filling Pharmacy Fax:    Start Date: 8/10/2023

## 2023-08-10 NOTE — PROGRESS NOTES
Impression: Undisplaced fracture through renal cell metastasis in the right distal humerus.  Patient would benefit from surgical stabilization.    Plan: 1.  Will have him fitted for a posterior splint today to replace his current cast splint.  2.  Patient has been instructed to elevate his arm and hand when at home above the level of his heart to reduce the swelling.  3.  Patient's been instructed to use gentle exercises with his hand and wrist to try to reduce swelling.  4.  Will see the patient back next Thursday to reassess his swelling.  5.  Ilda to perform the preop visit today.  6.  Samira to contact the patient to schedule surgery.  Case request form is completed.      I interviewed and examined the patient with the patient, his family and the  present.  I agree with Dr. Gould's assessment and plan.    In summary he has a undisplaced pathologic fracture through the right distal humerus at the site of a known renal cell metastasis that has been previously radiated twice and most likely has received a dose close to 50 [address].    Examination of the hand reveals normal hand function but significant swelling of the digits wrist and elbow.  The dressing was released.    I reviewed with the patient the options of closed treatment with immobilization for at least 3 months versus surgical treatment.  Risk and benefits were reviewed including the significant risk of skin healing due to both his swelling and his prior radiation.  He understands these risks and would like to proceed with surgery.    Addendum: Tivozanib requires 24 days off therapy prior to surgery    The patient was seen as a new patient the total length of visit including coordination of his care, reviewing of his imaging and documentation was greater than 45 minutes.

## 2023-08-10 NOTE — CONFIDENTIAL NOTE
Dr. Garcia requested I contact patient and inform him his targeted therapy. Call placed to patient using . Instructed patient to stop taking Tivozanib until he is instructed to continue his therapy after surgery. Patient verbalized understanding. Confirmed appointment with Dr. Garcia 8/17 at 12:45.    Tara Holter, RNCC

## 2023-08-10 NOTE — CONFIDENTIAL NOTE
Call placed to office of Josef Schmitz. Spoke with Bhavya (nurse) and relayed Julio was seen in clinic today for a humeral pathologic fracture. Dr. Garcia is recommending surgical fixation and that he hold his targeted therapy  (Tivozanib) for 3 weeks prior to surgery and 2 weeks after surgery. Bhavya provided cell phone number of Dr. Smith, Dr. Garcia to contact call.    Tara Holter, RNCC

## 2023-08-10 NOTE — NURSING NOTE
Pre-Op Teaching was done in person at the clinic.    Teaching Flowsheet   Relevant Diagnosis: R humeral pathologic fracture  Teaching Topic: Pre-Operative Teaching     Person(s) involved in teaching:   Patient, Wife, and      Motivation Level:  Asks Questions: Yes  Eager to Learn: Yes  Cooperative: Yes  Receptive (willing/able to accept information): Yes  Any cultural factors/Latter-day beliefs that may influence understanding or compliance? No     Patient demonstrates understanding of the following:  Reason for the appointment, diagnosis and treatment plan: Yes  Knowledge of proper use of medications and conditions for which they are ordered (with special attention to potential side effects or drug interactions): Yes  Which situations necessitate calling provider and whom to contact: Yes- discussed the stoplight tool to help assist with this.      Teaching Concerns Addressed:      Proper use of surgical scrub explain and provided to patient.    Nutritional needs and diet plan: Yes  Pain management techniques: Yes  Wound Care: Yes  How and/when to access community resources: Yes     Instructional Materials Used/Given: a surgical folder and surgical soap given in clinic      - Important contact info/ phone numbers  - Map/ location of surgery  - Showering instructions  - Stop light tool    Additionally the following was discussed with patient:  -Patient informed they will be staying in the hospital for 1 night after surgery.  -Authorization to Share Protected Health Information- Person to person communication signed by patient and sent to be scanned into chart. Patient authorized the following person or people:   Alda England (spouse) [phone]  Trisha Draper (Sister) [phone]       -Next step: Perioperative  to contact patient and schedule surgery/post ops. Patient to be evaluated by PAC.     Time spent with patient: 15 minutes.     Tara Holter, RNCC

## 2023-08-10 NOTE — LETTER
8/10/2023       RE: Julio John  [address]  [facility] [address]    Dear Colleague,    Thank you for referring your patient, Julio John, to the [facility] [place]. Please see a copy of my visit note below.    Orthopedic surgery consult note    Patient is a 53-year-old male with history of left-sided renal cell carcinoma status post nephrectomy in 2016 with metastatic disease to brain, lung, pancreas, right kidney and right distal humerus.  Recently started on tivozanib in April of this year.  He has had longstanding mets to the right distal humerus with local radiation to this area in 2020 as well as most recently in March 2022.  He presented to the emergency department on 8/7 after reportedly lifting 100 pound bag and heard a crack in his right arm which caused a lot of pain.  He was placed into a long-arm splint and was referred to clinic today to discuss next steps.  He states that he did have a little bit of pain in this area prior to his injury on 8/7.  He states that he has had significant pain since his injury.  He continues to take oxycodone but the ED prescribed him.  He is also concerned about his swelling in his right hand as this is new since his injury.  He denies any numbness or tingling to his distal fingers.    Exam:  No acute distress.  Nonlabored breathing.  Regular rate and rhythm.  Focused assessment of right upper extremity with posterior slab splint in place.  Significant swelling throughout into his hand.  Sensation intact to light touch in median, radial, ulnar nerve distributions.  Able to fire [employer], EPL, interossei.  Skin assessed over posterior elbow showing intact skin without any lesions.  Tenderness palpation at area of fracture.  Hand warm and well-perfused.  Radial pulse palpable.    Imaging:  X-rays of right elbow showing lytic lesion at the distal humerus with pathologic fracture.  Overall minimally displaced in  good alignment.    Assessment/plan:  53-year-old male with history of metastatic renal cell carcinoma with pathologic fracture of the right distal humerus.  We discussed his injury with him and his family at great length.  We discussed possible interventions such as nonoperative and operative intervention.  Nonoperative intervention would include immobilization for likely 3 months in total.  As this is a pathologic fracture of metastatic disease, there is a higher chance of nonunion without fracture healing.  We then discussed operative intervention including internal fixation, possible curettage and cement augmentation.  We discussed about risks of surgery as well.  His most concerning risk given his prior radiation to this area would be wound healing.  He and his family wish to proceed with surgical intervention.  Postoperative recovery was also discussed at length.  Given his significant swelling, he will be transition into a custom posterior slab splint by the occupational therapy team today.  Will plan to see him back in 1 week for repeat check of his swelling to make sure that this would be amenable to surgical intervention.  Will plan to get him scheduled in approximately 2 weeks or so to allow swelling to improve.  Patient and family in agreement with plan.  All questions answered.    Patient seen and examined with Dr. Jose Gould MD  Orthopedic Surgery, PGY-4    Impression: Undisplaced fracture through renal cell metastasis in the right distal humerus.  Patient would benefit from surgical stabilization.    Plan: 1.  Will have him fitted for a posterior splint today to replace his current cast splint.  2.  Patient has been instructed to elevate his arm and hand when at home above the level of his heart to reduce the swelling.  3.  Patient's been instructed to use gentle exercises with his hand and wrist to try to reduce swelling.  4.  Will see the patient back next Thursday to reassess his  swelling.  5.  Ilda to perform the preop visit today.  6.  Samira to contact the patient to schedule surgery.  Case request form is completed.      I interviewed and examined the patient with the patient, his family and the  present.  I agree with Dr. Gould's assessment and plan.    In summary he has a undisplaced pathologic fracture through the right distal humerus at the site of a known renal cell metastasis that has been previously radiated twice and most likely has received a dose close to 50 [address].    Examination of the hand reveals normal hand function but significant swelling of the digits wrist and elbow.  The dressing was released.    I reviewed with the patient the options of closed treatment with immobilization for at least 3 months versus surgical treatment.  Risk and benefits were reviewed including the significant risk of skin healing due to both his swelling and his prior radiation.  He understands these risks and would like to proceed with surgery.    The patient was seen as a new patient the total length of visit including coordination of his care, reviewing of his imaging and documentation was greater than 45 minutes.      Misael Garcia MD

## 2023-08-10 NOTE — PROGRESS NOTES
Orthopedic surgery consult note    Patient is a 53-year-old male with history of left-sided renal cell carcinoma status post nephrectomy in 2016 with metastatic disease to brain, lung, pancreas, right kidney and right distal humerus.  Recently started on tivozanib in April of this year.  He has had longstanding mets to the right distal humerus with local radiation to this area in 2020 as well as most recently in March 2022.  He presented to the emergency department on 8/7 after reportedly lifting 100 pound bag and heard a crack in his right arm which caused a lot of pain.  He was placed into a long-arm splint and was referred to clinic today to discuss next steps.  He states that he did have a little bit of pain in this area prior to his injury on 8/7.  He states that he has had significant pain since his injury.  He continues to take oxycodone but the ED prescribed him.  He is also concerned about his swelling in his right hand as this is new since his injury.  He denies any numbness or tingling to his distal fingers.    Exam:  No acute distress.  Nonlabored breathing.  Regular rate and rhythm.  Focused assessment of right upper extremity with posterior slab splint in place.  Significant swelling throughout into his hand.  Sensation intact to light touch in median, radial, ulnar nerve distributions.  Able to fire [employer], EPL, interossei.  Skin assessed over posterior elbow showing intact skin without any lesions.  Tenderness palpation at area of fracture.  Hand warm and well-perfused.  Radial pulse palpable.    Imaging:  X-rays of right elbow showing lytic lesion at the distal humerus with pathologic fracture.  Overall minimally displaced in good alignment.    Assessment/plan:  53-year-old male with history of metastatic renal cell carcinoma with pathologic fracture of the right distal humerus.  We discussed his injury with him and his family at great length.  We discussed possible interventions such as nonoperative  and operative intervention.  Nonoperative intervention would include immobilization for likely 3 months in total.  As this is a pathologic fracture of metastatic disease, there is a higher chance of nonunion without fracture healing.  We then discussed operative intervention including internal fixation, possible curettage and cement augmentation.  We discussed about risks of surgery as well.  His most concerning risk given his prior radiation to this area would be wound healing.  He and his family wish to proceed with surgical intervention.  Postoperative recovery was also discussed at length.  Given his significant swelling, he will be transition into a custom posterior slab splint by the occupational therapy team today.  Will plan to see him back in 1 week for repeat check of his swelling to make sure that this would be amenable to surgical intervention.  Will plan to get him scheduled in approximately 2 weeks or so to allow swelling to improve.  Patient and family in agreement with plan.  All questions answered.    Patient seen and examined with Dr. Jose Gould MD  Orthopedic Surgery, PGY-4

## 2023-08-10 NOTE — PATIENT INSTRUCTIONS
Impression: Undisplaced fracture through renal cell metastasis in the right distal humerus.  Patient would benefit from surgical stabilization.     Plan: 1.  Will have him fitted for a posterior splint today to replace his current cast splint.  2.  Patient has been instructed to elevate his arm and hand when at home above the level of his heart to reduce the swelling.  3.  Patient's been instructed to use gentle exercises with his hand and wrist to try to reduce swelling.  4.  Will see the patient back next Thursday to reassess his swelling.  5.  Ilda to perform the preop visit today.  6.  Samira to contact the patient to schedule surgery.  Case request form is completed.

## 2023-08-11 NOTE — PROGRESS NOTES
OCCUPATIONAL THERAPY EVALUATION  Type of Visit: Evaluation    See electronic medical record for Abuse and Falls Screening details.    Per MD notes: Pt has an undisplaced pathologic fracture through the right distal humerus at the site of a known renal cell metastasis that has been previously radiated twice. Plan is for surgical stabilization.    Subjective      Presenting condition or subjective complaint:    Date of onset:      Relevant medical history:   past medical history significant for polysubstance abuse, renal cell carcinoma (metastatic to brain, lung, bones), PAF, PE on Eliquis, CKD II, HTN   Dates & types of surgery:  surgery is being planned for the R humerus    Prior diagnostic imaging/testing results:     imaging see chart  Prior therapy history for the same diagnosis, illness or injury:    none    Prior Level of Function  Transfers: Independent  Ambulation: Independent  ADL: Independent  IADL: IND in all IADLs    Living Environment:   Patient is independent at home and there are no concerns about accessibility and mobility in the home.    Employment:    did not assess  Hobbies/Interests:  did not assess    Patient goals for therapy:  decrease pain    Pain assessment: Pain present     Objective   ADDITIONAL HISTORY:  Patient reports symptoms of pain, weakness/loss of strength, and edema  Transportation: unable to use normal mode of transportation, due to current injury    Functional Outcome Measure:   NT    Edema:8/10/2023  severe of the   R elbow, forearm    Sensation   Did not fully assess    AROM:   Gross wrist extension:WFL  Gross wrist flexion:WFL    ROM of Fingers:  8/10/2023   Finger ROM is WNL all planes    Orthosis fabricated for R UE, to be worn most of the time, for support.    Strength  Testing deferred      Assessment & Plan   CLINICAL IMPRESSIONS  Medical Diagnosis: R humeral pathologic fracture    Treatment Diagnosis: R elbow pain    Impression/Assessment: Pt is a 53 year old male  presenting to Occupational Therapy due to R distal humerus Fx and he requires an orthosis per MD.  The following significant findings have been identified: Impaired ROM and Pain.  These identified deficits interfere with their ability to perform self care tasks, work tasks, recreational activities, household chores, and driving  as compared to previous level of function.     Clinical Decision Making (Complexity):  Assessment of Occupational Performance: 5 or more Performance Deficits  Occupational Performance Limitations: dressing, hygiene and grooming, care of others, home establishment and management, work, and leisure activities  Clinical Decision Making (Complexity): Low complexity    PLAN OF CARE  Treatment Interventions:  Orthotic Fabrication:  Static and Long arm    Long Term Goals   OT Goal 1  Goal Description: Pt will be IND in a home program consisting of an individualized wearing schedule for an appropriately fitting orthosis, in order to support the status of the affected tissues to allow for optimal physical function in ADL.  Target Date: 09/10/23      Frequency of Treatment: 2 times per month  Duration of Treatment: one month     Recommended Referrals to Other Professionals:   Education Assessment: Learner/Method: Patient;Listening     Risks and benefits of evaluation/treatment have been explained.   Patient/Family/caregiver agrees with Plan of Care.     Evaluation Time:            Signing Clinician: Nivia Wang OT      [facility]                                                                                   OUTPATIENT OCCUPATIONAL THERAPY      PLAN OF TREATMENT FOR OUTPATIENT REHABILITATION   Patient's Last Name, First Name, Julio Lucero    YOB: 1970   Provider's Name   [facility]   Medical Record No.  [id number]     Onset Date:   Start of Care Date: 08/10/23     Medical Diagnosis:  R humeral  pathologic fracture      OT Treatment Diagnosis:  R elbow pain Plan of Treatment  Frequency/Duration:2 times per month/one month    Certification date from 08/10/23   To 09/10/23        See note for plan of treatment details and functional goals     Nivia Wang OT                         I CERTIFY THE NEED FOR THESE SERVICES FURNISHED UNDER        THIS PLAN OF TREATMENT AND WHILE UNDER MY CARE     (Physician attestation of this document indicates review and certification of the therapy plan).                Referring Provider:  Dr Garcia      Initial Assessment  See Epic Evaluation- 08/10/23        Please refer to the daily flowsheet for treatment today, total treatment time and time spent performing 1:1 timed codes.      Thank you for the opportunity to work with this patient.   If you have questions or concerns, please contact me with an in basket message or via the information below.     Nivia Wang MS, OTR/L, CHT  Certified Hand Therapist    [facility] - Hand Therapy  Clinics and Surgery Center  [address] [place] [address], Room [address] [id number]    Email: [email or web address]   Phone / Voicemail: [phone]   Fax: [phone]

## 2023-08-11 NOTE — TELEPHONE ENCOUNTER
Phoned patient to confirm surgery date  with Dr. Garcia (in [place]ese.)    Patient was unavailable, Call went to voicemail.   Provided call back number in voicemail:   [phone] & [phone] for care team.

## 2023-08-11 NOTE — TELEPHONE ENCOUNTER
FUTURE VISIT INFORMATION      SURGERY INFORMATION:  Date: 2023   Location: [facility] OR   Surgeon:  Misael Garcia MD   Anesthesia Type:  General with Block   Procedure: plating and possible tumor removal right distal humerus. Possible cementation   Consult: 8/10/23    RECORDS REQUESTED FROM:       Primary Care Provider: [facility]     Pertinent Medical History: Acute renal failure superimposed on stage 2 chronic kidney disease (H); Anemia in other chronic diseases classified elsewhere - secondary to RCCA and chemotherapy      Most recent EKG+ Tracin23 - EPIC     Most recent ECHO: 23 - EPIC    Most recent PFT's: 17- EPIC

## 2023-08-14 NOTE — TELEPHONE ENCOUNTER
"\"Team,  Thank you for all your work to help Mr Baron John. See notes below. Unfortunately he had a brain bleed. Most likely from CNS tumor.  His surgery should be cancelled.  Thank you,  Misael \"    Surgery and other appts have been cancelled.            "

## 2023-08-14 NOTE — TELEPHONE ENCOUNTER
Second attempt to reach the patient.   Phoned patient to confirm surgery date  with Dr. Garcia (in [place]     Patient was unavailable, Call went to voicemail.   Provided call back number in voicemail:   [phone] & [phone] for care team.

## 2023-08-16 ENCOUNTER — PRE VISIT (OUTPATIENT)
Dept: SURGERY | Facility: CLINIC | Age: 53
End: 2023-08-16

## 2024-03-14 NOTE — TELEPHONE ENCOUNTER
Prior Authorization Not Needed per Insurance-Care plan was not approved for this Dx.    Medication: ALBUTEROL SULFATE  (90 BASE) MCG/ACT IN AERS  Insurance Company: [place] Medicaid ([facility]) - Phone [phone] Fax [phone]  Expected CoPay: $    Pharmacy Filling the Rx: [facility] [place], [place] - [address] [place] [address] [phone]  Pharmacy Notified: No  Patient Notified:

## 2024-03-23 NOTE — PLAN OF CARE
Problem: Goal Outcome Summary  Goal: Goal Outcome Summary  Alert and oriented X 4. AVSS. C/o bilateral shoulder pain. Declined pain interventions. Itching well controlled with hydroxyzine. Pt slept in between cares.        Xray Chest 1 View- PORTABLE-Urgent

## (undated) RX ORDER — LIDOCAINE HYDROCHLORIDE 10 MG/ML
INJECTION, SOLUTION EPIDURAL; INFILTRATION; INTRACAUDAL; PERINEURAL
Status: DISPENSED
Start: 2017-06-26